# Patient Record
Sex: FEMALE | Race: WHITE | Employment: OTHER | URBAN - METROPOLITAN AREA
[De-identification: names, ages, dates, MRNs, and addresses within clinical notes are randomized per-mention and may not be internally consistent; named-entity substitution may affect disease eponyms.]

---

## 2016-04-08 LAB — COLONOSCOPY, EXTERNAL: NORMAL

## 2017-01-17 ENCOUNTER — OFFICE VISIT (OUTPATIENT)
Dept: FAMILY MEDICINE CLINIC | Age: 62
End: 2017-01-17

## 2017-01-17 VITALS
DIASTOLIC BLOOD PRESSURE: 90 MMHG | HEART RATE: 76 BPM | WEIGHT: 267.6 LBS | TEMPERATURE: 98.7 F | BODY MASS INDEX: 42 KG/M2 | SYSTOLIC BLOOD PRESSURE: 136 MMHG | OXYGEN SATURATION: 99 % | RESPIRATION RATE: 18 BRPM | HEIGHT: 67 IN

## 2017-01-17 DIAGNOSIS — I10 BENIGN ESSENTIAL HYPERTENSION: Primary | ICD-10-CM

## 2017-01-17 NOTE — PROGRESS NOTES
HISTORY OF PRESENT ILLNESS  Navya Harrison is a 64 y.o. female. HPI  3 month follow up BP check and medication evaluation. At her last visit in 8-2016, changed her Lisinopril to Cozaar due to cough. Shortly after the change the cough went completely away. No problems since then. She is tolerating current medication regimen well but admits she has not been eating very healthy. No interim BP checks since making the change either. Review of Systems   Constitutional: Negative. Respiratory: Negative. Cardiovascular: Negative. Gastrointestinal: Negative. Neurological: Negative. Problem List  Date Reviewed: 1/17/2017          Codes Class Noted    Benign essential hypertension ICD-10-CM: I10  ICD-9-CM: 401.1  5/26/2016    Overview Signed 5/26/2016  9:26 AM by Myrna Maddox MD     2006             Hypothyroidism ICD-10-CM: E03.9  ICD-9-CM: 244.9  5/26/2016    Overview Signed 5/26/2016  9:27 AM by Myrna Maddox MD     7/1999             Primary osteoarthritis of left knee ICD-10-CM: E08.02  ICD-9-CM: 715.16  5/26/2016    Overview Signed 5/26/2016  9:42 AM by Myrna Maddox MD     Extensive;  Ortho (needs knee replacement), CSI x 2,              Hypercholesterolemia ICD-10-CM: E78.00  ICD-9-CM: 272.0  6/25/2013    Overview Signed 6/25/2013  8:29 AM by Myrna Maddox MD     ~2006             S/P benign cervical polypectomy ICD-10-CM: S72.733, Z87.42  ICD-9-CM: V45.89  12/20/2010    Overview Addendum 5/26/2016  9:42 AM by Myrna Maddox MD     2009, 2015; Gyn Dr Derrick Daly             H/O Benign Colon Polyp ICD-10-CM: K63.5  ICD-9-CM: 211.3  12/20/2010    Overview Signed 12/20/2010 12:10 PM by Myrna Maddox MD     Colonoscopy 10/2005, 11/2010  q5yrs  Dr Monet German             H/O Uterine Fibroids ICD-10-CM: D25.9  ICD-9-CM: 218.9  12/20/2010    Overview Signed 12/20/2010 12:13 PM by Myrna Maddox MD     2005; no surgical intervention  Gyn Dr Geraldine Guzman incontinence ICD-10-CM: N39.3  ICD-9-CM: [id number]  5/26/2010    Overview Signed 5/26/2010 12:47 PM by Ruthann Thompson MD     Urologist Dr Rapp [facility]             Sleep apnea, RENÉE ICD-10-CM: G47.30  ICD-9-CM: 780.57  4/5/2010    Overview Addendum 12/20/2010 12:14 PM by Ruthann Thompson MD     2009; cpap             Anxiety ICD-10-CM: F41.9  ICD-9-CM: 300.00  4/5/2010        Carpal tunnel syndrome ICD-10-CM: G56.00  ICD-9-CM: 354.0  4/5/2010        Mild Dependent Ankle edema, B ICD-10-CM: M25.473  ICD-9-CM: 782.3  4/5/2010    Overview Signed 4/5/2010  3:52 PM by Lewis Malhotra     mild             Perimenopausal ICD-10-CM: N95.1  ICD-9-CM: 627.2  4/5/2010    Overview Signed 4/5/2010  3:52 PM by Lewis Malhotra     2007             Depression ICD-10-CM: F32.9  ICD-9-CM: 315  4/5/2010        ?  Passed Kidney stone ICD-10-CM: N20.0  ICD-9-CM: 592.0  4/5/2010    Overview Signed 5/26/2010 12:47 PM by Ruthann Thompson MD     2/2010                 Past Surgical History   Procedure Laterality Date    Hx carpal tunnel release  3/2010     Right; Dr Nat Morton Colonoscopy  10/05     benign polyp; repeat 5 yr per Dr Maile Thomas    Hx tonsil and adenoidectomy  age 11    Hx wisdom teeth extraction      Hx carpal tunnel release  3/2010     right hand carpal tunnel    Hx dilation and curettage  7/2005     AUB, benign/neg bx; Dr Dawood Groves    Colonoscopy  11/2010     normal, f/u 5 yrs, Dr Joanie Walden  5/2009     NSR, rate 84, normal EKG    Hx bladder suspension  ~2010     Dr Bianca Ambrocio    Hx cervical polypectomy  2009, 2015     Dr Dawood Groves    Hx dilation and curettage  10/2012     Dr Dawood Groves, [facility]    Hx breast biopsy  11/2011     VPI, right breast biopsy negative    Hx partial hysterectomy  4/26/13     Supracervical hysterectomy for fibroids, Dr Dawood Groves    Hx colonoscopy  4/2016     \"normal\", repeat 5 yrs[facility]     OB History  Para Term  AB TAB SAB Ectopic Multiple Living    1 1                Obstetric Comments     x 1; Gyn Dr Romie Clarke        Current Outpatient Prescriptions   Medication Sig    losartan (COZAAR) 50 mg tablet take 1 tablet by mouth once daily for hypertension    meloxicam (MOBIC) 15 mg tablet Take 1 Tab by mouth daily (with breakfast). Indications: OSTEOARTHRITIS    buPROPion XL (WELLBUTRIN XL) 300 mg XL tablet Take 1 Tab by mouth daily.  levothyroxine (SYNTHROID) 150 mcg tablet Take 1 Tab by mouth Daily (before breakfast).  cholecalciferol (VITAMIN D3) 1,000 unit cap Take  by mouth daily.  multivitamin (ONE A DAY) tablet Take 1 Tab by mouth daily.  aspirin 81 mg tablet Take  by mouth.  OMEGA-3 FATTY ACIDS/VITAMIN E (OMEGA-3 FISH OIL PO) Take 1,200 mg by mouth daily.  Flaxseed Oil Oil Take 1,300 mg by mouth daily. No current facility-administered medications for this visit.       Allergies   Allergen Reactions    Lisinopril Cough    Pcn [Penicillins] Hives    Sulfa (Sulfonamide Antibiotics) Hives    Zocor [Simvastatin] Other (comments)     aches     Social History     Social History    Marital status:      Spouse name: N/A    Number of children: 1    Years of education: N/A     Occupational History   [employer]      Social History Main Topics    Smoking status: Former Smoker     Quit date: 2005    Smokeless tobacco: Never Used    Alcohol use Yes      Comment: maybe 2-3 drinks a week at [employer] Drug use: No    Sexual activity: Yes     Partners: Male     Other Topics Concern    Caffeine Concern Yes     no coffee or tea; cut back to 1-2 cans of diet Coke a day    Special Diet No    Exercise No     Social History Narrative    1 biological daughter, 1 \"adopted\", and 2 step children     Visit Vitals    BP Initial nurse BP check 146/82, repeated at end of exam was 136/90    Pulse 76    Temp 98.7 °F (37.1 °C) (Oral)    Resp 18    Ht 5' 7\" (1.702 m)    Wt 267 lb 9.6 oz (121.4 kg)    LMP 05/01/2011    SpO2 99%    BMI 41.91 kg/m2     Physical Exam   Constitutional: No distress. Neck: Neck supple. No JVD present. Carotid bruit is not present. No thyromegaly present. Cardiovascular: Normal rate, regular rhythm and normal heart sounds. No murmur heard. Pulmonary/Chest: Effort normal and breath sounds normal.   Lymphadenopathy:     She has no cervical adenopathy. Vitals reviewed. ASSESSMENT and PLAN    ICD-10-CM ICD-9-CM    1. Benign essential hypertension I10 401.1      BP mildly elevated today on current regimen  Patient wishes to work on diet/exercise a bit more for now before adjusting her meds. At her last visit in 8-2016, changed her Lisinopril to Cozaar due to cough. Shortly after the change the cough went completely away.   Reviewed diet, nutrition, exercise and weight control  Reviewed medications and side effects  Fasting follow up 3months  Monitor weekly BP's in the interim and bring log to next appt  Follow up sooner if BP's are not improving/approaching goal

## 2017-01-17 NOTE — PATIENT INSTRUCTIONS
High Blood Pressure: Care Instructions  Your Care Instructions  If your blood pressure is usually above 140/90, you have high blood pressure, or hypertension. That means the top number is 140 or higher or the bottom number is 90 or higher, or both. Despite what a lot of people think, high blood pressure usually doesn't cause headaches or make you feel dizzy or lightheaded. It usually has no symptoms. But it does increase your risk for heart attack, stroke, and kidney or eye damage. The higher your blood pressure, the more your risk increases. Your doctor will give you a goal for your blood pressure. Your goal will be based on your health and your age. An example of a goal is to keep your blood pressure below 140/90. Lifestyle changes, such as eating healthy and being active, are always important to help lower blood pressure. You might also take medicine to reach your blood pressure goal.  Follow-up care is a key part of your treatment and safety. Be sure to make and go to all appointments, and call your doctor if you are having problems. It's also a good idea to know your test results and keep a list of the medicines you take. How can you care for yourself at home? Medical treatment  · If you stop taking your medicine, your blood pressure will go back up. You may take one or more types of medicine to lower your blood pressure. Be safe with medicines. Take your medicine exactly as prescribed. Call your doctor if you think you are having a problem with your medicine. · Talk to your doctor before you start taking aspirin every day. Aspirin can help certain people lower their risk of a heart attack or stroke. But taking aspirin isn't right for everyone, because it can cause serious bleeding. · See your doctor regularly. You may need to see the doctor more often at first or until your blood pressure comes down.   · If you are taking blood pressure medicine, talk to your doctor before you take decongestants or anti-inflammatory medicine, such as ibuprofen. Some of these medicines can raise blood pressure. · Learn how to check your blood pressure at home. Lifestyle changes  · Stay at a healthy weight. This is especially important if you put on weight around the waist. Losing even 10 pounds can help you lower your blood pressure. · If your doctor recommends it, get more exercise. Walking is a good choice. Bit by bit, increase the amount you walk every day. Try for at least 30 minutes on most days of the week. You also may want to swim, bike, or do other activities. · Avoid or limit alcohol. Talk to your doctor about whether you can drink any alcohol. · Try to limit how much sodium you eat to less than 2,300 milligrams (mg) a day. Your doctor may ask you to try to eat less than 1,500 mg a day. · Eat plenty of fruits (such as bananas and oranges), vegetables, legumes, whole grains, and low-fat dairy products. · Lower the amount of saturated fat in your diet. Saturated fat is found in animal products such as milk, cheese, and meat. Limiting these foods may help you lose weight and also lower your risk for heart disease. · Do not smoke. Smoking increases your risk for heart attack and stroke. If you need help quitting, talk to your doctor about stop-smoking programs and medicines. These can increase your chances of quitting for good. When should you call for help? Call 911 anytime you think you may need emergency care. This may mean having symptoms that suggest that your blood pressure is causing a serious heart or blood vessel problem. Your blood pressure may be over 180/110. For example, call 911 if:  · You have symptoms of a heart attack. These may include:  ¨ Chest pain or pressure, or a strange feeling in the chest.  ¨ Sweating. ¨ Shortness of breath. ¨ Nausea or vomiting. ¨ Pain, pressure, or a strange feeling in the back, neck, jaw, or upper belly or in one or both shoulders or arms.   ¨ Lightheadedness or sudden weakness. ¨ A fast or irregular heartbeat. · You have symptoms of a stroke. These may include:  ¨ Sudden numbness, tingling, weakness, or loss of movement in your face, arm, or leg, especially on only one side of your body. ¨ Sudden vision changes. ¨ Sudden trouble speaking. ¨ Sudden confusion or trouble understanding simple statements. ¨ Sudden problems with walking or balance. ¨ A sudden, severe headache that is different from past headaches. · You have severe back or belly pain. Do not wait until your blood pressure comes down on its own. Get help right away. Call your doctor now or seek immediate care if:  · Your blood pressure is much higher than normal (such as 180/110 or higher), but you don't have symptoms. · You think high blood pressure is causing symptoms, such as:  ¨ Severe headache. ¨ Blurry vision. Watch closely for changes in your health, and be sure to contact your doctor if:  · Your blood pressure measures 140/90 or higher at least 2 times. That means the top number is 140 or higher or the bottom number is 90 or higher, or both. · You think you may be having side effects from your blood pressure medicine. · Your blood pressure is usually normal, but it goes above normal at least 2 times. Where can you learn more? Go to http://radha-za.info/. Enter C928 in the search box to learn more about \"High Blood Pressure: Care Instructions. \"  Current as of: August 8, 2016  Content Version: 11.1  © 1911-4023 [employer]. Care instructions adapted under license by [employer] (which disclaims liability or warranty for this information). If you have questions about a medical condition or this instruction, always ask your healthcare professional. [place] [address] any warranty or liability for your use of this information.        DASH Diet: Care Instructions  Your Care Instructions  The DASH diet is an eating plan that can help lower your blood pressure. DASH stands for Dietary Approaches to Stop Hypertension. Hypertension is high blood pressure. The DASH diet focuses on eating foods that are high in calcium, potassium, and magnesium. These nutrients can lower blood pressure. The foods that are highest in these nutrients are fruits, vegetables, low-fat dairy products, nuts, seeds, and legumes. But taking calcium, potassium, and magnesium supplements instead of eating foods that are high in those nutrients does not have the same effect. The DASH diet also includes whole grains, fish, and poultry. The DASH diet is one of several lifestyle changes your doctor may recommend to lower your high blood pressure. Your doctor may also want you to decrease the amount of sodium in your diet. Lowering sodium while following the DASH diet can lower blood pressure even further than just the DASH diet alone. Follow-up care is a key part of your treatment and safety. Be sure to make and go to all appointments, and call your doctor if you are having problems. It's also a good idea to know your test results and keep a list of the medicines you take. How can you care for yourself at home? Following the DASH diet  · Eat 4 to 5 servings of fruit each day. A serving is 1 medium-sized piece of fruit, ½ cup chopped or canned fruit, 1/4 cup dried fruit, or 4 ounces (½ cup) of fruit juice. Choose fruit more often than fruit juice. · Eat 4 to 5 servings of vegetables each day. A serving is 1 cup of lettuce or raw leafy vegetables, ½ cup of chopped or cooked vegetables, or 4 ounces (½ cup) of vegetable juice. Choose vegetables more often than vegetable juice. · Get 2 to 3 servings of low-fat and fat-free dairy each day. A serving is 8 ounces of milk, 1 cup of yogurt, or 1 ½ ounces of cheese. · Eat 6 to 8 servings of grains each day.  A serving is 1 slice of bread, 1 ounce of dry cereal, or ½ cup of cooked rice, pasta, or cooked cereal. Try to choose whole-grain products as much as possible. · Limit lean meat, poultry, and fish to 2 servings each day. A serving is 3 ounces, about the size of a deck of cards. · Eat 4 to 5 servings of nuts, seeds, and legumes (cooked dried beans, lentils, and split peas) each week. A serving is 1/3 cup of nuts, 2 tablespoons of seeds, or ½ cup of cooked beans or peas. · Limit fats and oils to 2 to 3 servings each day. A serving is 1 teaspoon of vegetable oil or 2 tablespoons of salad dressing. · Limit sweets and added sugars to 5 servings or less a week. A serving is 1 tablespoon jelly or jam, ½ cup sorbet, or 1 cup of lemonade. · Eat less than 2,300 milligrams (mg) of sodium a day. If you limit your sodium to 1,500 mg a day, you can lower your blood pressure even more. Tips for success  · Start small. Do not try to make dramatic changes to your diet all at once. You might feel that you are missing out on your favorite foods and then be more likely to not follow the plan. Make small changes, and stick with them. Once those changes become habit, add a few more changes. · Try some of the following:  ¨ Make it a goal to eat a fruit or vegetable at every meal and at snacks. This will make it easy to get the recommended amount of fruits and vegetables each day. ¨ Try yogurt topped with fruit and nuts for a snack or healthy dessert. ¨ Add lettuce, tomato, cucumber, and onion to sandwiches. ¨ Combine a ready-made pizza crust with low-fat mozzarella cheese and lots of vegetable toppings. Try using tomatoes, squash, spinach, broccoli, carrots, cauliflower, and onions. ¨ Have a variety of cut-up vegetables with a low-fat dip as an appetizer instead of chips and dip. ¨ Sprinkle sunflower seeds or chopped almonds over salads. Or try adding chopped walnuts or almonds to cooked vegetables. ¨ Try some vegetarian meals using beans and peas. Add garbanzo or kidney beans to salads.  Make burritos and tacos with mashed mcfadden beans or black beans.  Where can you learn more? Go to http://radha-za.info/. Enter D287 in the search box to learn more about \"DASH Diet: Care Instructions. \"  Current as of: March 23, 2016  Content Version: 11.1  © 6958-6131 [employer]. Care instructions adapted under license by [employer] (which disclaims liability or warranty for this information). If you have questions about a medical condition or this instruction, always ask your healthcare professional. [address] any warranty or liability for your use of this information.

## 2017-01-17 NOTE — PROGRESS NOTES
Chief Complaint   Patient presents with    Hypertension     follow up-not fasting     1. Have you been to the ER, urgent care clinic since your last visit? Hospitalized since your last visit? No    2. Have you seen or consulted any other health care providers outside of the [address] [place] [address] since your last visit? Include any pap smears or colon screening.  No

## 2017-03-28 ENCOUNTER — OFFICE VISIT (OUTPATIENT)
Dept: FAMILY MEDICINE CLINIC | Age: 62
End: 2017-03-28

## 2017-03-28 VITALS
OXYGEN SATURATION: 99 % | BODY MASS INDEX: 42.63 KG/M2 | HEART RATE: 71 BPM | RESPIRATION RATE: 18 BRPM | WEIGHT: 271.6 LBS | DIASTOLIC BLOOD PRESSURE: 96 MMHG | HEIGHT: 67 IN | SYSTOLIC BLOOD PRESSURE: 150 MMHG | TEMPERATURE: 98.4 F

## 2017-03-28 DIAGNOSIS — E78.00 HYPERCHOLESTEROLEMIA: ICD-10-CM

## 2017-03-28 DIAGNOSIS — I10 BENIGN ESSENTIAL HYPERTENSION: Primary | ICD-10-CM

## 2017-03-28 RX ORDER — LOSARTAN POTASSIUM 100 MG/1
100 TABLET ORAL DAILY
Qty: 30 TAB | Refills: 5 | Status: SHIPPED | OUTPATIENT
Start: 2017-03-28 | End: 2017-08-27 | Stop reason: SDUPTHER

## 2017-03-28 RX ORDER — ASCORBIC ACID 500 MG
TABLET ORAL
COMMUNITY
End: 2018-02-20

## 2017-03-28 NOTE — PROGRESS NOTES
HISTORY OF PRESENT ILLNESS  Chrissie Solis is a 58 y.o. female. HPI  Fasting follow up hypertension, hyperlipidemia, labs and medication check. Admits not doing anything special w/ diet or exercise. Says, \"I'm pretty sure my labs are going to be horrible\". She also has not been checking her BP's but it has been running high this visit and the last.  Her BP was uncontrolled on Maxzide previously and her TG's were going up. She was changed to Lisinopril which really helped her BP but she developed a cough. She has since been on Cozaar which she is tolerating fine, but BP's not as well controlled. Under some stress recently and admits she has not been taking care of herself. Review of Systems   Constitutional: Negative. Eyes: Negative. Respiratory: Negative. Cardiovascular: Negative. Gastrointestinal: Negative. Neurological: Negative. Negative for headaches. Psychiatric/Behavioral:        Doing ok on Wellbutrin     Problem List  Date Reviewed: 3/28/2017          Codes Class Noted    Benign essential hypertension ICD-10-CM: I10  ICD-9-CM: 401.1  5/26/2016    Overview Signed 5/26/2016  9:26 AM by Chavez Booth MD     2006             Hypothyroidism ICD-10-CM: E03.9  ICD-9-CM: 244.9  5/26/2016    Overview Signed 5/26/2016  9:27 AM by Chavez Booth MD     7/1999             Primary osteoarthritis of left knee ICD-10-CM: G85.67  ICD-9-CM: 715.16  5/26/2016    Overview Signed 5/26/2016  9:42 AM by Chavez Booth MD     Extensive;  Ortho (needs knee replacement), CSI x 2,              Hypercholesterolemia ICD-10-CM: E78.00  ICD-9-CM: 272.0  6/25/2013    Overview Signed 6/25/2013  8:29 AM by Chavez Booth MD     ~2006             S/P benign cervical polypectomy ICD-10-CM: U74.275, Z87.42  ICD-9-CM: V45.89  12/20/2010    Overview Addendum 5/26/2016  9:42 AM by Chavez Booth MD     2009, 2015; Gyn Dr Teddy Bah             H/O Benign Colon Polyp ICD-10-CM: K63.5  ICD-9-CM: 211.3  12/20/2010    Overview Signed 12/20/2010 12:10 PM by Paul Draper MD     Colonoscopy 10/2005, 11/2010  q5yrs  Dr Ferro Grew             H/O Uterine Fibroids ICD-10-CM: D25.9  ICD-9-CM: 218.9  12/20/2010    Overview Signed 12/20/2010 12:13 PM by Paul Draper MD     2005; no surgical intervention  Gyn Dr Elizabeth Hosteller incontinence ICD-10-CM: N39.3  ICD-9-CM: IOS4595  5/26/2010    Overview Signed 5/26/2010 12:47 PM by Paul Draper MD     Urologist Dr Mary Garsia             Sleep apnea, RENÉE ICD-10-CM: Z50.45  ICD-9-CM: 780.57  4/5/2010    Overview Addendum 12/20/2010 12:14 PM by Paul Draper MD     2009; cpap             Anxiety ICD-10-CM: F41.9  ICD-9-CM: 300.00  4/5/2010        Carpal tunnel syndrome ICD-10-CM: G56.00  ICD-9-CM: 354.0  4/5/2010        Mild Dependent Ankle edema, B ICD-10-CM: M25.473  ICD-9-CM: 782.3  4/5/2010    Overview Signed 4/5/2010  3:52 PM by Nicole Doherty     mild             Perimenopausal ICD-10-CM: N95.1  ICD-9-CM: 627.2  4/5/2010    Overview Signed 4/5/2010  3:52 PM by Nicole Doherty     2007             Depression ICD-10-CM: F32.9  ICD-9-CM: 632  4/5/2010        ?  Passed Kidney stone ICD-10-CM: N20.0  ICD-9-CM: 592.0  4/5/2010    Overview Signed 5/26/2010 12:47 PM by Paul Draper MD     2/2010                 Past Surgical History:   Procedure Laterality Date    COLONOSCOPY  10/05    benign polyp; repeat 5 yr per Dr Narendra Lockett COLONOSCOPY  11/2010    normal, f/u 5 yrs, Dr Narendra Lockett EKG ORDERABLE  5/2009    NSR, rate 84, normal EKG    HX BLADDER SUSPENSION  ~2010    Dr [facility]  11/2011    VPI, right breast biopsy negative    HX CARPAL TUNNEL RELEASE  3/2010    Right;  Dearl Members  3/2010    right hand carpal tunnel    HX CERVICAL POLYPECTOMY  2009, 2015    Dr Marquis Buys    HX COLONOSCOPY  4/2016    \"normal\", repeat [facility] yrs[facility]    HX DILATION AND CURETTAGE  2005    AUB, benign/neg bx; Dr Franky Guillaume  10/2012    Dr Maikel Barba, [facility]    HX PARTIAL HYSTERECTOMY  13    Supracervical hysterectomy for fibroids, Dr Alexa Michaud  age 11    HX WISDOM TEETH EXTRACTION       OB History      Para Term  AB TAB SAB Ectopic Multiple Living    1 1                Obstetric Comments     x 1; Gyn Dr Maikel Barba          Current Outpatient Prescriptions   Medication Sig    ascorbic acid, vitamin C, (VITAMIN C) 500 mg tablet Take  by mouth.  meloxicam (MOBIC) 15 mg tablet take 1 tablet by mouth daily with BREAKFAST    losartan (COZAAR) 50 mg tablet take 1 tablet by mouth once daily for hypertension    buPROPion XL (WELLBUTRIN XL) 300 mg XL tablet Take 1 Tab by mouth daily.  levothyroxine (SYNTHROID) 150 mcg tablet Take 1 Tab by mouth Daily (before breakfast).  cholecalciferol (VITAMIN D3) 1,000 unit cap Take  by mouth daily.  multivitamin (ONE A DAY) tablet Take 1 Tab by mouth daily.  aspirin 81 mg tablet Take  by mouth.  OMEGA-3 FATTY ACIDS/VITAMIN E (OMEGA-3 FISH OIL PO) Take 1,200 mg by mouth daily.  Flaxseed Oil Oil Take 1,300 mg by mouth daily. No current facility-administered medications for this visit.       Allergies   Allergen Reactions    Lisinopril Cough    Pcn [Penicillins] Hives    Sulfa (Sulfonamide Antibiotics) Hives    Zocor [Simvastatin] Other (comments)     aches     Social History     Social History    Marital status:      Spouse name: N/A    Number of children: 1    Years of education: N/A     Occupational History   [address] [place] [address]      Social History Main Topics    Smoking status: Former Smoker     Quit date: 2005    Smokeless tobacco: Never Used    Alcohol use Yes      Comment: maybe 2-3 drinks a week at [employer] Drug use: No    Sexual activity: Yes     Partners: Male     Other Topics Concern    Caffeine Concern Yes     no coffee or tea; cut back to 1-2 cans of diet Coke a day    Special Diet No    Exercise No     Social History Narrative    1 biological daughter, 1 \"adopted\", and 2 step children     Visit Vitals    BP (!) 150/96 (BP 1 Location: Left arm, BP Patient Position: Sitting)    Pulse 71    Temp 98.4 °F (36.9 °C) (Oral)    Resp 18    Ht 5' 7\" (1.702 m)    Wt 271 lb 9.6 oz (123.2 kg)    LMP 05/01/2011    SpO2 99%    BMI 42.54 kg/m2     Physical Exam   Constitutional: No distress. Neck: Carotid bruit is not present. Cardiovascular: Normal rate, regular rhythm and normal heart sounds. No murmur heard. Pulmonary/Chest: Effort normal and breath sounds normal.   Vitals reviewed. ASSESSMENT and PLAN    ICD-10-CM ICD-9-CM    1. Benign essential hypertension I10 401.1 losartan (COZAAR) 100 mg tablet      METABOLIC PANEL, COMPREHENSIVE   2. Hypercholesterolemia E78.00 272.0 LIPID PANEL     Fasting labs today    Reviewed diet, nutrition, exercise and weight control; not complying lately    Cardiovascular risk and specific lipid/LDL/BP goals reviewed  Reviewed medications and side effects   Her BP was uncontrolled on Maxzide previously and her TG's were going up. She was changed to Lisinopril which really helped her BP but she developed a cough. She has since been on Cozaar which she is tolerating fine, but BP's not as well controlled. Increase Cozaar from 50 to 10 mg daily. Dash diet. Work on exercise at least 3 x a week for now. Work on wt reduction. Interim BP monitoring.   Further follow up & other recommendations pending review of labs as well, but will plan on follow up check in ~ 3months anyway to reassess BP and medication evaluation

## 2017-03-28 NOTE — PATIENT INSTRUCTIONS
High Blood Pressure: Care Instructions  Your Care Instructions  If your blood pressure is usually above 140/90, you have high blood pressure, or hypertension. That means the top number is 140 or higher or the bottom number is 90 or higher, or both. Despite what a lot of people think, high blood pressure usually doesn't cause headaches or make you feel dizzy or lightheaded. It usually has no symptoms. But it does increase your risk for heart attack, stroke, and kidney or eye damage. The higher your blood pressure, the more your risk increases. Your doctor will give you a goal for your blood pressure. Your goal will be based on your health and your age. An example of a goal is to keep your blood pressure below 140/90. Lifestyle changes, such as eating healthy and being active, are always important to help lower blood pressure. You might also take medicine to reach your blood pressure goal.  Follow-up care is a key part of your treatment and safety. Be sure to make and go to all appointments, and call your doctor if you are having problems. It's also a good idea to know your test results and keep a list of the medicines you take. How can you care for yourself at home? Medical treatment  · If you stop taking your medicine, your blood pressure will go back up. You may take one or more types of medicine to lower your blood pressure. Be safe with medicines. Take your medicine exactly as prescribed. Call your doctor if you think you are having a problem with your medicine. · Talk to your doctor before you start taking aspirin every day. Aspirin can help certain people lower their risk of a heart attack or stroke. But taking aspirin isn't right for everyone, because it can cause serious bleeding. · See your doctor regularly. You may need to see the doctor more often at first or until your blood pressure comes down.   · If you are taking blood pressure medicine, talk to your doctor before you take decongestants or anti-inflammatory medicine, such as ibuprofen. Some of these medicines can raise blood pressure. · Learn how to check your blood pressure at home. Lifestyle changes  · Stay at a healthy weight. This is especially important if you put on weight around the waist. Losing even 10 pounds can help you lower your blood pressure. · If your doctor recommends it, get more exercise. Walking is a good choice. Bit by bit, increase the amount you walk every day. Try for at least 30 minutes on most days of the week. You also may want to swim, bike, or do other activities. · Avoid or limit alcohol. Talk to your doctor about whether you can drink any alcohol. · Try to limit how much sodium you eat to less than 2,300 milligrams (mg) a day. Your doctor may ask you to try to eat less than 1,500 mg a day. · Eat plenty of fruits (such as bananas and oranges), vegetables, legumes, whole grains, and low-fat dairy products. · Lower the amount of saturated fat in your diet. Saturated fat is found in animal products such as milk, cheese, and meat. Limiting these foods may help you lose weight and also lower your risk for heart disease. · Do not smoke. Smoking increases your risk for heart attack and stroke. If you need help quitting, talk to your doctor about stop-smoking programs and medicines. These can increase your chances of quitting for good. When should you call for help? Call 911 anytime you think you may need emergency care. This may mean having symptoms that suggest that your blood pressure is causing a serious heart or blood vessel problem. Your blood pressure may be over 180/110. For example, call 911 if:  · You have symptoms of a heart attack. These may include:  ¨ Chest pain or pressure, or a strange feeling in the chest.  ¨ Sweating. ¨ Shortness of breath. ¨ Nausea or vomiting. ¨ Pain, pressure, or a strange feeling in the back, neck, jaw, or upper belly or in one or both shoulders or arms.   ¨ Lightheadedness or sudden weakness. ¨ A fast or irregular heartbeat. · You have symptoms of a stroke. These may include:  ¨ Sudden numbness, tingling, weakness, or loss of movement in your face, arm, or leg, especially on only one side of your body. ¨ Sudden vision changes. ¨ Sudden trouble speaking. ¨ Sudden confusion or trouble understanding simple statements. ¨ Sudden problems with walking or balance. ¨ A sudden, severe headache that is different from past headaches. · You have severe back or belly pain. Do not wait until your blood pressure comes down on its own. Get help right away. Call your doctor now or seek immediate care if:  · Your blood pressure is much higher than normal (such as 180/110 or higher), but you don't have symptoms. · You think high blood pressure is causing symptoms, such as:  ¨ Severe headache. ¨ Blurry vision. Watch closely for changes in your health, and be sure to contact your doctor if:  · Your blood pressure measures 140/90 or higher at least 2 times. That means the top number is 140 or higher or the bottom number is 90 or higher, or both. · You think you may be having side effects from your blood pressure medicine. · Your blood pressure is usually normal, but it goes above normal at least 2 times. Where can you learn more? Go to http://radha-za.info/. Enter S791 in the search box to learn more about \"High Blood Pressure: Care Instructions. \"  Current as of: August 8, 2016  Content Version: 11.1  © 3381-3565 [employer]. Care instructions adapted under license by [employer] (which disclaims liability or warranty for this information). If you have questions about a medical condition or this instruction, always ask your healthcare professional. [place] [address] any warranty or liability for your use of this information.        DASH Diet: Care Instructions  Your Care Instructions  The DASH diet is an eating plan that can help lower your blood pressure. DASH stands for Dietary Approaches to Stop Hypertension. Hypertension is high blood pressure. The DASH diet focuses on eating foods that are high in calcium, potassium, and magnesium. These nutrients can lower blood pressure. The foods that are highest in these nutrients are fruits, vegetables, low-fat dairy products, nuts, seeds, and legumes. But taking calcium, potassium, and magnesium supplements instead of eating foods that are high in those nutrients does not have the same effect. The DASH diet also includes whole grains, fish, and poultry. The DASH diet is one of several lifestyle changes your doctor may recommend to lower your high blood pressure. Your doctor may also want you to decrease the amount of sodium in your diet. Lowering sodium while following the DASH diet can lower blood pressure even further than just the DASH diet alone. Follow-up care is a key part of your treatment and safety. Be sure to make and go to all appointments, and call your doctor if you are having problems. It's also a good idea to know your test results and keep a list of the medicines you take. How can you care for yourself at home? Following the DASH diet  · Eat 4 to 5 servings of fruit each day. A serving is 1 medium-sized piece of fruit, ½ cup chopped or canned fruit, 1/4 cup dried fruit, or 4 ounces (½ cup) of fruit juice. Choose fruit more often than fruit juice. · Eat 4 to 5 servings of vegetables each day. A serving is 1 cup of lettuce or raw leafy vegetables, ½ cup of chopped or cooked vegetables, or 4 ounces (½ cup) of vegetable juice. Choose vegetables more often than vegetable juice. · Get 2 to 3 servings of low-fat and fat-free dairy each day. A serving is 8 ounces of milk, 1 cup of yogurt, or 1 ½ ounces of cheese. · Eat 6 to 8 servings of grains each day.  A serving is 1 slice of bread, 1 ounce of dry cereal, or ½ cup of cooked rice, pasta, or cooked cereal. Try to choose whole-grain products as much as possible. · Limit lean meat, poultry, and fish to 2 servings each day. A serving is 3 ounces, about the size of a deck of cards. · Eat 4 to 5 servings of nuts, seeds, and legumes (cooked dried beans, lentils, and split peas) each week. A serving is 1/3 cup of nuts, 2 tablespoons of seeds, or ½ cup of cooked beans or peas. · Limit fats and oils to 2 to 3 servings each day. A serving is 1 teaspoon of vegetable oil or 2 tablespoons of salad dressing. · Limit sweets and added sugars to 5 servings or less a week. A serving is 1 tablespoon jelly or jam, ½ cup sorbet, or 1 cup of lemonade. · Eat less than 2,300 milligrams (mg) of sodium a day. If you limit your sodium to 1,500 mg a day, you can lower your blood pressure even more. Tips for success  · Start small. Do not try to make dramatic changes to your diet all at once. You might feel that you are missing out on your favorite foods and then be more likely to not follow the plan. Make small changes, and stick with them. Once those changes become habit, add a few more changes. · Try some of the following:  ¨ Make it a goal to eat a fruit or vegetable at every meal and at snacks. This will make it easy to get the recommended amount of fruits and vegetables each day. ¨ Try yogurt topped with fruit and nuts for a snack or healthy dessert. ¨ Add lettuce, tomato, cucumber, and onion to sandwiches. ¨ Combine a ready-made pizza crust with low-fat mozzarella cheese and lots of vegetable toppings. Try using tomatoes, squash, spinach, broccoli, carrots, cauliflower, and onions. ¨ Have a variety of cut-up vegetables with a low-fat dip as an appetizer instead of chips and dip. ¨ Sprinkle sunflower seeds or chopped almonds over salads. Or try adding chopped walnuts or almonds to cooked vegetables. ¨ Try some vegetarian meals using beans and peas. Add garbanzo or kidney beans to salads.  Make burritos and tacos with mashed mcfadden beans or black beans.  Where can you learn more? Go to http://radha-za.info/. Enter F054 in the search box to learn more about \"DASH Diet: Care Instructions. \"  Current as of: March 23, 2016  Content Version: 11.1  © 1283-2848 [employer]. Care instructions adapted under license by [employer] (which disclaims liability or warranty for this information). If you have questions about a medical condition or this instruction, always ask your healthcare professional. [address] any warranty or liability for your use of this information.

## 2017-03-28 NOTE — MR AVS SNAPSHOT
Visit Information Date & Time Provider Department Dept. Phone Encounter #  
 3/28/2017  8:15 AM [address],  [facility] [phone] [id number] Upcoming Health Maintenance Date Due COLONOSCOPY 1/17/2018 BREAST CANCER SCRN MAMMOGRAM 4/26/2018 PAP AKA CERVICAL CYTOLOGY 5/26/2019 DTaP/Tdap/Td series (2 - Td) 5/26/2026 Allergies as of 3/28/2017  Review Complete On: 3/28/2017 By: [address], [place]  
  
 Severity Noted Reaction Type Reactions Lisinopril  08/17/2016    Cough Pcn [Penicillins]  04/05/2010    Hives Sulfa (Sulfonamide Antibiotics)  04/05/2010    Hives Zocor [Simvastatin]  04/05/2010    Other (comments)  
 aches Current Immunizations  Reviewed on 5/26/2016 Name Date Influenza Vaccine 8/7/2016, 11/10/2015, 10/3/2014, 9/27/2013 Influenza Vaccine Split 10/6/2011, 12/20/2010 Meningococcal Vaccine 1/27/2013 TD Vaccine 5/27/2009, 6/16/1999 Tdap 5/26/2016 Zoster Vaccine, Live 1/27/2013 Not reviewed this visit You Were Diagnosed With   
  
 Codes Comments Hypercholesterolemia    -  Primary ICD-10-CM: E78.00 ICD-9-CM: 272.0 Benign essential hypertension     ICD-10-CM: I10 
ICD-9-CM: 401.1 Vitals BP Pulse Temp Resp Height(growth percentile) Weight(growth percentile) (!) 150/96 (BP 1 Location: Left arm, BP Patient Position: Sitting) 71 98.4 °F (36.9 °C) (Oral) 18 5' 7\" (1.702 m) 271 lb 9.6 oz (123.2 kg) LMP SpO2 BMI OB Status Smoking Status 05/01/2011 99% 42.54 kg/m2 Hysterectomy Former Smoker BMI and BSA Data Body Mass Index Body Surface Area 42.54 kg/m 2 2.41 m 2 Preferred Pharmacy Pharmacy Name Phone [facility], [address] [phone] Your Updated Medication List  
  
   
This list is accurate as of: 3/28/17  8:48 AM.  Always use your most recent med list.  
  
  
  
  
 aspirin 81 mg tablet Take  by mouth. buPROPion  mg XL tablet Commonly known as:  Nickmonserrat Andrew Take 1 Tab by mouth daily. cholecalciferol 1,000 unit Cap Commonly known as:  VITAMIN D3 Take  by mouth daily. Flaxseed Oil Oil Take 1,300 mg by mouth daily. levothyroxine 150 mcg tablet Commonly known as:  SYNTHROID Take 1 Tab by mouth Daily (before breakfast). losartan 100 mg tablet Commonly known as:  COZAAR Take 1 Tab by mouth daily. meloxicam 15 mg tablet Commonly known as:  MOBIC  
take 1 tablet by mouth daily with BREAKFAST  
  
 multivitamin tablet Commonly known as:  ONE A DAY Take 1 Tab by mouth daily. OMEGA-3 FISH OIL PO Take 1,200 mg by mouth daily. VITAMIN C 500 mg tablet Generic drug:  ascorbic acid (vitamin C) Take  by mouth. Prescriptions Sent to Pharmacy Refills  
 losartan (COZAAR) 100 mg tablet 5 Sig: Take 1 Tab by mouth daily. Class: Normal  
 Pharmacy: [id number] [place] [address],[address] Ph #: [phone] Route: Oral  
  
Patient Instructions High Blood Pressure: Care Instructions Your Care Instructions If your blood pressure is usually above 140/90, you have high blood pressure, or hypertension. That means the top number is 140 or higher or the bottom number is 90 or higher, or both. Despite what a lot of people think, high blood pressure usually doesn't cause headaches or make you feel dizzy or lightheaded. It usually has no symptoms. But it does increase your risk for heart attack, stroke, and kidney or eye damage. The higher your blood pressure, the more your risk increases. Your doctor will give you a goal for your blood pressure. Your goal will be based on your health and your age. An example of a goal is to keep your blood pressure below 140/90.  
Lifestyle changes, such as eating healthy and being active, are always important to help lower blood pressure. You might also take medicine to reach your blood pressure goal. 
Follow-up care is a key part of your treatment and safety. Be sure to make and go to all appointments, and call your doctor if you are having problems. It's also a good idea to know your test results and keep a list of the medicines you take. How can you care for yourself at home? Medical treatment · If you stop taking your medicine, your blood pressure will go back up. You may take one or more types of medicine to lower your blood pressure. Be safe with medicines. Take your medicine exactly as prescribed. Call your doctor if you think you are having a problem with your medicine. · Talk to your doctor before you start taking aspirin every day. Aspirin can help certain people lower their risk of a heart attack or stroke. But taking aspirin isn't right for everyone, because it can cause serious bleeding. · See your doctor regularly. You may need to see the doctor more often at first or until your blood pressure comes down. · If you are taking blood pressure medicine, talk to your doctor before you take decongestants or anti-inflammatory medicine, such as ibuprofen. Some of these medicines can raise blood pressure. · Learn how to check your blood pressure at home. Lifestyle changes · Stay at a healthy weight. This is especially important if you put on weight around the waist. Losing even 10 pounds can help you lower your blood pressure. · If your doctor recommends it, get more exercise. Walking is a good choice. Bit by bit, increase the amount you walk every day. Try for at least 30 minutes on most days of the week. You also may want to swim, bike, or do other activities. · Avoid or limit alcohol. Talk to your doctor about whether you can drink any alcohol. · Try to limit how much sodium you eat to less than 2,300 milligrams (mg) a day. Your doctor may ask you to try to eat less than 1,500 mg a day. · Eat plenty of fruits (such as bananas and oranges), vegetables, legumes, whole grains, and low-fat dairy products. · Lower the amount of saturated fat in your diet. Saturated fat is found in animal products such as milk, cheese, and meat. Limiting these foods may help you lose weight and also lower your risk for heart disease. · Do not smoke. Smoking increases your risk for heart attack and stroke. If you need help quitting, talk to your doctor about stop-smoking programs and medicines. These can increase your chances of quitting for good. When should you call for help? Call 911 anytime you think you may need emergency care. This may mean having symptoms that suggest that your blood pressure is causing a serious heart or blood vessel problem. Your blood pressure may be over 180/110. For example, call 911 if: 
· You have symptoms of a heart attack. These may include: ¨ Chest pain or pressure, or a strange feeling in the chest. 
¨ Sweating. ¨ Shortness of breath. ¨ Nausea or vomiting. ¨ Pain, pressure, or a strange feeling in the back, neck, jaw, or upper belly or in one or both shoulders or arms. ¨ Lightheadedness or sudden weakness. ¨ A fast or irregular heartbeat. · You have symptoms of a stroke. These may include: 
¨ Sudden numbness, tingling, weakness, or loss of movement in your face, arm, or leg, especially on only one side of your body. ¨ Sudden vision changes. ¨ Sudden trouble speaking. ¨ Sudden confusion or trouble understanding simple statements. ¨ Sudden problems with walking or balance. ¨ A sudden, severe headache that is different from past headaches. · You have severe back or belly pain. Do not wait until your blood pressure comes down on its own. Get help right away. Call your doctor now or seek immediate care if: 
· Your blood pressure is much higher than normal (such as 180/110 or higher), but you don't have symptoms. · You think high blood pressure is causing symptoms, such as: ¨ Severe headache. ¨ Blurry vision. Watch closely for changes in your health, and be sure to contact your doctor if: 
· Your blood pressure measures 140/90 or higher at least 2 times. That means the top number is 140 or higher or the bottom number is 90 or higher, or both. · You think you may be having side effects from your blood pressure medicine. · Your blood pressure is usually normal, but it goes above normal at least 2 times. Where can you learn more? Go to http://radha-za.info/. Enter S124 in the search box to learn more about \"High Blood Pressure: Care Instructions. \" Current as of: August 8, 2016 Content Version: 11.1 © 2464-0376 [employer]. Care instructions adapted under license by [employer] (which disclaims liability or warranty for this information). If you have questions about a medical condition or this instruction, always ask your healthcare professional. [place] [address] any warranty or liability for your use of this information. DASH Diet: Care Instructions Your Care Instructions The DASH diet is an eating plan that can help lower your blood pressure. DASH stands for Dietary Approaches to Stop Hypertension. Hypertension is high blood pressure. The DASH diet focuses on eating foods that are high in calcium, potassium, and magnesium. These nutrients can lower blood pressure. The foods that are highest in these nutrients are fruits, vegetables, low-fat dairy products, nuts, seeds, and legumes. But taking calcium, potassium, and magnesium supplements instead of eating foods that are high in those nutrients does not have the same effect. The DASH diet also includes whole grains, fish, and poultry. The DASH diet is one of several lifestyle changes your doctor may recommend to lower your high blood pressure.  Your doctor may also want you to decrease the amount of sodium in your diet. Lowering sodium while following the DASH diet can lower blood pressure even further than just the DASH diet alone. Follow-up care is a key part of your treatment and safety. Be sure to make and go to all appointments, and call your doctor if you are having problems. It's also a good idea to know your test results and keep a list of the medicines you take. How can you care for yourself at home? Following the DASH diet · Eat 4 to 5 servings of fruit each day. A serving is 1 medium-sized piece of fruit, ½ cup chopped or canned fruit, 1/4 cup dried fruit, or 4 ounces (½ cup) of fruit juice. Choose fruit more often than fruit juice. · Eat 4 to 5 servings of vegetables each day. A serving is 1 cup of lettuce or raw leafy vegetables, ½ cup of chopped or cooked vegetables, or 4 ounces (½ cup) of vegetable juice. Choose vegetables more often than vegetable juice. · Get 2 to 3 servings of low-fat and fat-free dairy each day. A serving is 8 ounces of milk, 1 cup of yogurt, or 1 ½ ounces of cheese. · Eat 6 to 8 servings of grains each day. A serving is 1 slice of bread, 1 ounce of dry cereal, or ½ cup of cooked rice, pasta, or cooked cereal. Try to choose whole-grain products as much as possible. · Limit lean meat, poultry, and fish to 2 servings each day. A serving is 3 ounces, about the size of a deck of cards. · Eat 4 to 5 servings of nuts, seeds, and legumes (cooked dried beans, lentils, and split peas) each week. A serving is 1/3 cup of nuts, 2 tablespoons of seeds, or ½ cup of cooked beans or peas. · Limit fats and oils to 2 to 3 servings each day. A serving is 1 teaspoon of vegetable oil or 2 tablespoons of salad dressing. · Limit sweets and added sugars to 5 servings or less a week. A serving is 1 tablespoon jelly or jam, ½ cup sorbet, or 1 cup of lemonade. · Eat less than 2,300 milligrams (mg) of sodium a day.  If you limit your sodium to 1,500 mg a day, you can lower your blood pressure even more. Tips for success · Start small. Do not try to make dramatic changes to your diet all at once. You might feel that you are missing out on your favorite foods and then be more likely to not follow the plan. Make small changes, and stick with them. Once those changes become habit, add a few more changes. · Try some of the following: ¨ Make it a goal to eat a fruit or vegetable at every meal and at snacks. This will make it easy to get the recommended amount of fruits and vegetables each day. ¨ Try yogurt topped with fruit and nuts for a snack or healthy dessert. ¨ Add lettuce, tomato, cucumber, and onion to sandwiches. ¨ Combine a ready-made pizza crust with low-fat mozzarella cheese and lots of vegetable toppings. Try using tomatoes, squash, spinach, broccoli, carrots, cauliflower, and onions. ¨ Have a variety of cut-up vegetables with a low-fat dip as an appetizer instead of chips and dip. ¨ Sprinkle sunflower seeds or chopped almonds over salads. Or try adding chopped walnuts or almonds to cooked vegetables. ¨ Try some vegetarian meals using beans and peas. Add garbanzo or kidney beans to salads. Make burritos and tacos with mashed mcfadden beans or black beans. Where can you learn more? Go to http://radha-za.info/. Enter Z421 in the search box to learn more about \"DASH Diet: Care Instructions. \" Current as of: March 23, 2016 Content Version: 11.1 © 4456-1370 [employer]. Care instructions adapted under license by [employer] (which disclaims liability or warranty for this information). If you have questions about a medical condition or this instruction, always ask your healthcare professional. [address]  any warranty or liability for your use of this information. Introducing [facility] & HEALTH SERVICES! Dear Fanny Marie: Thank you for requesting a Avesthagen account. Our records indicate that you already have an active Avesthagen account. You can access your account anytime at [email or web address] Did you know that you can access your hospital and ER discharge instructions at any time in Avesthagen? You can also review all of your test results from your hospital stay or ER visit. Additional Information If you have questions, please visit the Frequently Asked Questions section of the Avesthagen website at [email or web address] Remember, Avesthagen is NOT to be used for urgent needs. For medical emergencies, dial 911. Now available from your iPhone and Android! Please provide this summary of care documentation to your next provider. Your primary care clinician is listed as ESDRAS MUNOZ. If you have any questions after today's visit, please call [phone].

## 2017-03-28 NOTE — PROGRESS NOTES
Chief Complaint   Patient presents with    Hypertension     3 month fasting follow up    Cholesterol Problem     1. Have you been to the ER, urgent care clinic since your last visit? Hospitalized since your last visit? No    2. Have you seen or consulted any other health care providers outside of the [address] [place] [address] since your last visit? Include any pap smears or colon screening.  No

## 2017-03-29 LAB
ALBUMIN SERPL-MCNC: 4.1 G/DL (ref 3.6–4.8)
ALBUMIN/GLOB SERPL: 2 {RATIO} (ref 1.2–2.2)
ALP SERPL-CCNC: 60 IU/L (ref 39–117)
ALT SERPL-CCNC: 27 IU/L (ref 0–32)
AST SERPL-CCNC: 17 IU/L (ref 0–40)
BILIRUB SERPL-MCNC: 0.4 MG/DL (ref 0–1.2)
BUN SERPL-MCNC: 21 MG/DL (ref 8–27)
BUN/CREAT SERPL: 24 (ref 11–26)
CALCIUM SERPL-MCNC: 9.6 MG/DL (ref 8.7–10.3)
CHLORIDE SERPL-SCNC: 105 MMOL/L (ref 96–106)
CHOLEST SERPL-MCNC: 244 MG/DL (ref 100–199)
CO2 SERPL-SCNC: 24 MMOL/L (ref 18–29)
CREAT SERPL-MCNC: 0.87 MG/DL (ref 0.57–1)
GLOBULIN SER CALC-MCNC: 2.1 G/DL (ref 1.5–4.5)
GLUCOSE SERPL-MCNC: 106 MG/DL (ref 65–99)
HDLC SERPL-MCNC: 58 MG/DL
INTERPRETATION, 910389: NORMAL
LDLC SERPL CALC-MCNC: 161 MG/DL (ref 0–99)
POTASSIUM SERPL-SCNC: 4.9 MMOL/L (ref 3.5–5.2)
PROT SERPL-MCNC: 6.2 G/DL (ref 6–8.5)
SODIUM SERPL-SCNC: 142 MMOL/L (ref 134–144)
TRIGL SERPL-MCNC: 127 MG/DL (ref 0–149)
VLDLC SERPL CALC-MCNC: 25 MG/DL (ref 5–40)

## 2017-04-08 NOTE — PROGRESS NOTES
Liver and kidney normal. Electrolytes normal.   TGs are better since stopping the diuretic. Other lipids still high and we already discussed her working on that. Fasting BS is 106 which is considered mild prediabetes. So work hard on sugar free, low carb diet, exercise and wt loss. Fasting follow up 3months.

## 2017-05-25 ENCOUNTER — TELEPHONE (OUTPATIENT)
Dept: FAMILY MEDICINE CLINIC | Age: 62
End: 2017-05-25

## 2017-05-25 NOTE — TELEPHONE ENCOUNTER
Sugey Zimmerman   -   [phone] -  Patient has a sore throat, and possible sinus infection-  Advised of same day appt for tomorrow -  Patient requesting a call from the nurse to see what the nurse has to say -

## 2017-05-26 ENCOUNTER — OFFICE VISIT (OUTPATIENT)
Dept: FAMILY MEDICINE CLINIC | Age: 62
End: 2017-05-26

## 2017-05-26 VITALS
RESPIRATION RATE: 18 BRPM | HEIGHT: 67 IN | BODY MASS INDEX: 43.07 KG/M2 | WEIGHT: 274.4 LBS | TEMPERATURE: 97.1 F | OXYGEN SATURATION: 96 % | HEART RATE: 69 BPM | SYSTOLIC BLOOD PRESSURE: 165 MMHG | DIASTOLIC BLOOD PRESSURE: 95 MMHG

## 2017-05-26 RX ORDER — LOSARTAN POTASSIUM 50 MG/1
TABLET ORAL
Refills: 1 | COMMUNITY
Start: 2017-03-02 | End: 2017-07-03

## 2017-05-26 NOTE — PATIENT INSTRUCTIONS

## 2017-05-26 NOTE — MR AVS SNAPSHOT
Visit Information Date & Time Provider Department Dept. Phone Encounter #  
 5/26/2017  9:20 AM Verónica Ware NP [facility] [phone] [id number] Your Appointments 7/3/2017  8:15 AM  
ROUTINE CARE with Estefani Kelly MD  
[facility]) Appt Note: 3 month f/u appt  
 [address] [address]  
[phone]  
  
   
 [address] [address] Upcoming Health Maintenance Date Due INFLUENZA AGE 9 TO ADULT 8/1/2017 COLONOSCOPY 1/17/2018 BREAST CANCER SCRN MAMMOGRAM 4/26/2018 PAP AKA CERVICAL CYTOLOGY 5/26/2019 DTaP/Tdap/Td series (2 - Td) 5/26/2026 Allergies as of 5/26/2017  Review Complete On: 5/26/2017 By: Verónica Ware NP Severity Noted Reaction Type Reactions Lisinopril  08/17/2016    Cough Pcn [Penicillins]  04/05/2010    Hives Sulfa (Sulfonamide Antibiotics)  04/05/2010    Hives Zocor [Simvastatin]  04/05/2010    Other (comments)  
 aches Current Immunizations  Reviewed on 5/26/2016 Name Date Influenza Vaccine 8/7/2016, 11/10/2015, 10/3/2014, 9/27/2013 Influenza Vaccine Split 10/6/2011, 12/20/2010 Meningococcal Vaccine 1/27/2013 TD Vaccine 5/27/2009, 6/16/1999 Tdap 5/26/2016 Zoster Vaccine, Live 1/27/2013 Not reviewed this visit You Were Diagnosed With   
  
 Codes Comments Cold    -  Primary ICD-10-CM: Dorothy Dark ICD-9-CM: 864 Vitals BP Pulse Temp Resp Height(growth percentile) Weight(growth percentile) (!) 165/95 (BP 1 Location: Left arm, BP Patient Position: Sitting) 69 97.1 °F (36.2 °C) (Oral) 18 5' 7\" (1.702 m) 274 lb 6.4 oz (124.5 kg) LMP SpO2 BMI OB Status Smoking Status 05/01/2011 96% 42.98 kg/m2 Hysterectomy Former Smoker Vitals History BMI and BSA Data Body Mass Index Body Surface Area 42.98 kg/m 2 2.43 m 2 Preferred Pharmacy Pharmacy Name Phone [address] [phone] Your Updated Medication List  
  
   
This list is accurate as of: 5/26/17  9:44 AM.  Always use your most recent med list.  
  
  
  
  
 aspirin 81 mg tablet Take  by mouth. buPROPion  mg XL tablet Commonly known as:  Nadara Carmona Take 1 Tab by mouth daily. cholecalciferol 1,000 unit Cap Commonly known as:  VITAMIN D3 Take  by mouth daily. Flaxseed Oil Oil Take 1,300 mg by mouth daily. levothyroxine 150 mcg tablet Commonly known as:  SYNTHROID Take 1 Tab by mouth Daily (before breakfast). * losartan 50 mg tablet Commonly known as:  COZAAR  
  
 * losartan 100 mg tablet Commonly known as:  COZAAR Take 1 Tab by mouth daily. meloxicam 15 mg tablet Commonly known as:  MOBIC  
take 1 tablet by mouth daily with BREAKFAST  
  
 multivitamin tablet Commonly known as:  ONE A DAY Take 1 Tab by mouth daily. OMEGA-3 FISH OIL PO Take 1,200 mg by mouth daily. VITAMIN C 500 mg tablet Generic drug:  ascorbic acid (vitamin C) Take  by mouth. * Notice: This list has 2 medication(s) that are the same as other medications prescribed for you. Read the directions carefully, and ask your doctor or other care provider to review them with you. Patient Instructions Upper Respiratory Infection (Cold): Care Instructions Your Care Instructions An upper respiratory infection, or URI, is an infection of the nose, sinuses, or throat. URIs are spread by coughs, sneezes, and direct contact. The common cold is the most frequent kind of URI. The flu and sinus infections are other kinds of URIs. Almost all URIs are caused by viruses. Antibiotics won't cure them. But you can treat most infections with home care. This may include drinking lots of fluids and taking over-the-counter pain medicine.  You will probably feel better in 4 to 10 days. The doctor has checked you carefully, but problems can develop later. If you notice any problems or new symptoms, get medical treatment right away. Follow-up care is a key part of your treatment and safety. Be sure to make and go to all appointments, and call your doctor if you are having problems. It's also a good idea to know your test results and keep a list of the medicines you take. How can you care for yourself at home? · To prevent dehydration, drink plenty of fluids, enough so that your urine is light yellow or clear like water. Choose water and other caffeine-free clear liquids until you feel better. If you have kidney, heart, or liver disease and have to limit fluids, talk with your doctor before you increase the amount of fluids you drink. · Take an over-the-counter pain medicine, such as acetaminophen (Tylenol), ibuprofen (Advil, Motrin), or naproxen (Aleve). Read and follow all instructions on the label. · Before you use cough and cold medicines, check the label. These medicines may not be safe for young children or for people with certain health problems. · Be careful when taking over-the-counter cold or flu medicines and Tylenol at the same time. Many of these medicines have acetaminophen, which is Tylenol. Read the labels to make sure that you are not taking more than the recommended dose. Too much acetaminophen (Tylenol) can be harmful. · Get plenty of rest. 
· Do not smoke or allow others to smoke around you. If you need help quitting, talk to your doctor about stop-smoking programs and medicines. These can increase your chances of quitting for good. When should you call for help? Call 911 anytime you think you may need emergency care. For example, call if: 
· You have severe trouble breathing. Call your doctor now or seek immediate medical care if: 
· You seem to be getting much sicker. · You have new or worse trouble breathing. · You have a new or higher fever. · You have a new rash. Watch closely for changes in your health, and be sure to contact your doctor if: 
· You have a new symptom, such as a sore throat, an earache, or sinus pain. · You cough more deeply or more often, especially if you notice more mucus or a change in the color of your mucus. · You do not get better as expected. Where can you learn more? Go to http://radha-za.info/. Enter K931 in the search box to learn more about \"Upper Respiratory Infection (Cold): Care Instructions. \" Current as of: June 30, 2016 Content Version: 11.2 © 2918-6092 [employer]. Care instructions adapted under license by [employer] (which disclaims liability or warranty for this information). If you have questions about a medical condition or this instruction, always ask your healthcare professional. [address] any warranty or liability for your use of this information. Introducing [facility] & HEALTH SERVICES! Dear Cj Rosenbaum: Thank you for requesting a Ceros account. Our records indicate that you already have an active Ceros account. You can access your account anytime at [email or web address] Did you know that you can access your hospital and ER discharge instructions at any time in Ceros? You can also review all of your test results from your hospital stay or ER visit. Additional Information If you have questions, please visit the Frequently Asked Questions section of the Ceros website at [email or web address] Remember, Ceros is NOT to be used for urgent needs. For medical emergencies, dial 911. Now available from your iPhone and Android! Please provide this summary of care documentation to your next provider. Your primary care clinician is listed as ESDRAS MUNOZ. If you have any questions after today's visit, please call [phone].

## 2017-05-26 NOTE — PROGRESS NOTES
Oanh Victor is a 58 y.o. female who is being seen  today for an acute care visit  today (5/26/2017). Assessments and plans as follows:     Assessment & Plan:  Kimberley Fernandez was seen today for cold symptoms. Your symptoms appear to be resolving ,  No acute findings on examine. Diagnoses and all orders for this visit:    Cold- mucinex, zyrtec or Claritin     ----------------------------------------------------------------------    Subjective / HPI:  Oanh Victor presents to office today for an acute care visit for cold like symptoms such as sore throat that started 3 days ago that now she feels is moving up towards sinus. Unsure of fevers or chills. Tingling in ears, no eye involvement, some sinus pressure. She has attempted donta seltzer cold medication last PM nothing today. Prior to Admission medications    Medication Sig Start Date End Date Taking? Authorizing Provider   ascorbic acid, vitamin C, (VITAMIN C) 500 mg tablet Take  by mouth. Yes Historical Provider   losartan (COZAAR) 100 mg tablet Take 1 Tab by mouth daily. 3/28/17  Yes Padmini Knight MD   meloxicam (MOBIC) 15 mg tablet take 1 tablet by mouth daily with BREAKFAST 2/28/17  Yes Padmini Knight MD   buPROPion XL (WELLBUTRIN XL) 300 mg XL tablet Take 1 Tab by mouth daily. 7/7/16  Yes Padmini Knight MD   levothyroxine (SYNTHROID) 150 mcg tablet Take 1 Tab by mouth Daily (before breakfast). 7/7/16  Yes Padmini Knight MD   cholecalciferol (VITAMIN D3) 1,000 unit cap Take  by mouth daily. Yes Historical Provider   multivitamin (ONE A DAY) tablet Take 1 Tab by mouth daily. Yes Historical Provider   aspirin 81 mg tablet Take  by mouth. Yes Historical Provider   OMEGA-3 FATTY ACIDS/VITAMIN E (OMEGA-3 FISH OIL PO) Take 1,200 mg by mouth daily.  5/26/10  Yes Historical Provider   Flaxseed Oil Oil Take 1,300 mg by mouth daily. 7/15/10  Yes Historical Provider   losartan (COZAAR) 50 mg tablet  3/2/17   Historical Provider          Allergies   Allergen Reactions    Lisinopril Cough    Pcn [Penicillins] Hives    Sulfa (Sulfonamide Antibiotics) Hives    Zocor [Simvastatin] Other (comments)     aches           ROS    See HPI    Past Medical History:   Diagnosis Date    Ankle edema 4/5/2010    Anxiety 4/5/2010    Benign essential hypertension 5/26/2016    2006    Carpal tunnel syndrome 4/5/2010    Depression 4/5/2010    Elevated cholesterol 4/5/2010    H/O Benign Colon Polyp 12/20/2010    H/O Uterine Fibroids 12/20/2010    HBP (high blood pressure) 4/5/2010    Hypercholesteremia 5/26/2010    Hypercholesterolemia 6/25/2013    ~2006    Hypertension 5/24/2012    Hypothyroid 4/5/2010    Hypothyroidism 5/26/2016 7/1999    Hypothyroidism (acquired) 5/11/2015 7/1999    Kidney stone 4/5/2010    Other ill-defined conditions     high cholesterol    Perimenopausal 4/5/2010    Primary osteoarthritis of left knee 5/26/2016    Extensive; Ortho (needs knee replacement), CSI x 2,     Psychiatric disorder     anxiety    S/P benign cervical polypectomy 12/20/2010    Sleep apnea 4/5/2010    Stress incontinence 5/26/2010    Thyroid disease        Visit Vitals    BP (!) 165/95 (BP 1 Location: Left arm, BP Patient Position: Sitting)    Pulse 69    Temp 97.1 °F (36.2 °C) (Oral)    Resp 18    Ht 5' 7\" (1.702 m)    Wt 274 lb 6.4 oz (124.5 kg)    LMP 05/01/2011    SpO2 96%    BMI 42.98 kg/m2       Objective:   Physical Exam   HENT:   Right Ear: No middle ear effusion. Left Ear:  No middle ear effusion. Nose: No rhinorrhea. Right sinus exhibits no maxillary sinus tenderness and no frontal sinus tenderness. Left sinus exhibits no maxillary sinus tenderness and no frontal sinus tenderness.    Mouth/Throat: No oropharyngeal exudate, posterior oropharyngeal edema or posterior oropharyngeal erythema. Cardiovascular: Regular rhythm and normal heart sounds. No murmur heard. Pulmonary/Chest: Effort normal and breath sounds normal. She has no wheezes. She has no rales. Abdominal: Soft. Bowel sounds are normal. There is no hepatosplenomegaly. There is no tenderness. There is no rebound. Disclaimer:  Advised her to call back or return to office if symptoms worsen/change/persist.  Discussed expected course/resolution/complications of diagnosis in detail with patient. Medication risks/benefits/costs/interactions/alternatives discussed with patient. She was given an after visit summary which includes diagnoses, current medications, & vitals. She expressed understanding with the diagnosis and plan.         Electronic Signature  Nava Mota NP , [facility]  [phone]

## 2017-05-26 NOTE — PROGRESS NOTES
Chief Complaint   Patient presents with    Cold Symptoms     sore throat, cough, ear tingling X 3 days      1. Have you been to the ER, urgent care clinic since your last visit? Hospitalized since your last visit? No    2. Have you seen or consulted any other health care providers outside of the [facility] since your last visit? Include any pap smears or colon screening.  No

## 2017-05-30 NOTE — TELEPHONE ENCOUNTER
Patient was seen 05/26 by Rui Vasquez, is feeling better. Adv to f/u with PCP for further Tx needed.

## 2017-06-26 ENCOUNTER — TELEPHONE (OUTPATIENT)
Dept: FAMILY MEDICINE CLINIC | Age: 62
End: 2017-06-26

## 2017-06-26 NOTE — TELEPHONE ENCOUNTER
Spoke to pt ans she will call the ER and let them know that the ABX sent in she is allergic too. She is to call me if a problem.

## 2017-06-26 NOTE — TELEPHONE ENCOUNTER
LM that she will need to contact the Urgent Care place and let them know what she is allergic to so they can change the ABX. Asked her to call me back if any problem.

## 2017-06-26 NOTE — TELEPHONE ENCOUNTER
Ana Rosa Roxanne  [phone]    Patient states that she is at [place] on vacation. She dropped something on her foot and it cut her foot pretty bad. She went to the ED and they put in stitches and and gave her an antibiotic. She states that they did not ask her if she was allergic to anything and prescribed her an antibiotic with penicillin in it. She is allergic to penicillin and is asking if Dr. Yanni Reardon would call in an alternate antibiotic. Pharmacy verified. Patient is requesting a call to let her know when the medication is called in.

## 2017-07-03 ENCOUNTER — OFFICE VISIT (OUTPATIENT)
Dept: FAMILY MEDICINE CLINIC | Age: 62
End: 2017-07-03

## 2017-07-03 VITALS
TEMPERATURE: 98.6 F | HEIGHT: 67 IN | WEIGHT: 273.2 LBS | OXYGEN SATURATION: 97 % | SYSTOLIC BLOOD PRESSURE: 118 MMHG | HEART RATE: 70 BPM | BODY MASS INDEX: 42.88 KG/M2 | DIASTOLIC BLOOD PRESSURE: 80 MMHG | RESPIRATION RATE: 18 BRPM

## 2017-07-03 DIAGNOSIS — I10 BENIGN ESSENTIAL HYPERTENSION: Primary | ICD-10-CM

## 2017-07-03 DIAGNOSIS — E03.9 HYPOTHYROIDISM, UNSPECIFIED TYPE: ICD-10-CM

## 2017-07-03 DIAGNOSIS — S91.115S: ICD-10-CM

## 2017-07-03 DIAGNOSIS — Z48.02 ENCOUNTER FOR REMOVAL OF SUTURES: ICD-10-CM

## 2017-07-03 DIAGNOSIS — E78.00 HYPERCHOLESTEROLEMIA: ICD-10-CM

## 2017-07-03 RX ORDER — CLINDAMYCIN HYDROCHLORIDE 300 MG/1
CAPSULE ORAL
Refills: 0 | COMMUNITY
Start: 2017-06-26 | End: 2019-02-01 | Stop reason: ALTCHOICE

## 2017-07-03 NOTE — PROGRESS NOTES
Chief Complaint   Patient presents with    Hypertension     fasting follow up    Cholesterol Problem     ]1. Have you been to the ER, urgent care clinic since your last visit? Hospitalized since your last visit? Yes - [facility] ER for stitches -Left middle toe    2. Have you seen or consulted any other health care providers outside of the [address] [place] [address] since your last visit? Include any pap smears or colon screening.    Yes see above

## 2017-07-03 NOTE — PROGRESS NOTES
HISTORY OF PRESENT ILLNESS  Donelle Boatman Alda Gitelman is a 58 y.o. female. HPI  Fasting follow up hypercholesterolemia, hypertension, thyroid, labs and medication evaluation. At last visit, increased her Cozaar from 50 to 100 mg daily and she is tolerating this well w/o reaction or side effects. Has not been checking interim BP's but it is much better today and she is feeling great. Doing well on all current meds. Needs to have sutures removed from toe. Back on 6-25-17 she was on a children's mission trip in [place], [place]. Dropped her CPAP machine and the plastic edge landed on her left 2nd toe and cut it. She went to [address] [place] [address] in [place]. She had sutures placed, was given another Td booster bc she didn't remember we had given her one here last year, and was dc'd initially on rx for Keflex. She took one dose and then realized it was in the PCN which she is allergic to, stopped it (did not have any reactions to that dose however), went back up to the ED, and was changed to Cleocin which she is taking and tolerating fine. She was prescribed Norco 5/325 mf prn pain but states it really is not painful at all at this time. She has only been applying Vaseline to keep the bandage from sticking but nothing else. She has been on her feet a lot, so the toe gets puffy at times. Review of Systems   Constitutional: Negative. Respiratory: Negative. Cardiovascular: Negative. Gastrointestinal: Negative. Neurological: Negative. Negative for headaches. Psychiatric/Behavioral: Negative.          Doing well on Wellbutrin     Problem List  Date Reviewed: 7/3/2017          Codes Class Noted    Benign essential hypertension ICD-10-CM: I10  ICD-9-CM: 401.1  5/26/2016    Overview Signed 5/26/2016  9:26 AM by Edna Triana MD     2006             Hypothyroidism ICD-10-CM: E03.9  ICD-9-CM: 244.9  5/26/2016    Overview Signed 5/26/2016  9:27 AM by Edna Triana MD     7/1999             Primary osteoarthritis of left knee ICD-10-CM: M17.12  ICD-9-CM: 715.16  5/26/2016    Overview Signed 5/26/2016  9:42 AM by Adonay Bennett MD     Extensive; Ortho (needs knee replacement), CSI x 2,              Hypercholesterolemia ICD-10-CM: E78.00  ICD-9-CM: 272.0  6/25/2013    Overview Signed 6/25/2013  8:29 AM by Adonay Bennett MD     ~2006             S/P benign cervical polypectomy ICD-10-CM: S21.653, Z87.42  ICD-9-CM: V45.89  12/20/2010    Overview Addendum 5/26/2016  9:42 AM by Adonay Bennett MD     2009, 2015; Gyn Dr Claudina Bernheim             H/O Benign Colon Polyp ICD-10-CM: K63.5  ICD-9-CM: 211.3  12/20/2010    Overview Signed 12/20/2010 12:10 PM by Adonay Bennett MD     Colonoscopy 10/2005, 11/2010  q5yrs  Dr Michael Varela             H/O Uterine Fibroids ICD-10-CM: D25.9  ICD-9-CM: 218.9  12/20/2010    Overview Signed 12/20/2010 12:13 PM by Adonay Bennett MD     2005; no surgical intervention  Gyn Dr Lorilee Bence incontinence ICD-10-CM: N39.3  ICD-9-CM: [id number]  5/26/2010    Overview Signed 5/26/2010 12:47 PM by Adonay Bennett MD     Urologist Dr Derek Garcia             Sleep apnea, RENÉE ICD-10-CM: J70.58  ICD-9-CM: 780.57  4/5/2010    Overview Addendum 12/20/2010 12:14 PM by Adonay Bennett MD     2009; cpap             Anxiety ICD-10-CM: F41.9  ICD-9-CM: 300.00  4/5/2010        Carpal tunnel syndrome ICD-10-CM: G56.00  ICD-9-CM: 354.0  4/5/2010        Mild Dependent Ankle edema, B ICD-10-CM: M25.473  ICD-9-CM: 719.07  4/5/2010    Overview Signed 4/5/2010  3:52 PM by Yossi Bagley     mild             Perimenopausal ICD-10-CM: N95.1  ICD-9-CM: 627.2  4/5/2010    Overview Signed 4/5/2010  3:52 PM by Yossi Bagley     2007             Depression ICD-10-CM: F32.9  ICD-9-CM: 421  4/5/2010        ?  Passed Kidney stone ICD-10-CM: N20.0  ICD-9-CM: 592.0  4/5/2010    Overview Signed 5/26/2010 12:47 PM by Adonay Bennett MD     8/2329 Past Surgical History:   Procedure Laterality Date    COLONOSCOPY  10/05    benign polyp; repeat 5 yr per Dr Rom Melton COLONOSCOPY  2010    normal, f/u 5 yrs, Dr Rom Melton EKG ORDERABLE  2009    NSR, rate 84, normal EKG    HX BLADDER SUSPENSION  ~    Dr Patel Seek  2011    VPI, right breast biopsy negative    HX CARPAL TUNNEL RELEASE  3/2010    Right; Dr Alondra Johnson  3/2010    right hand carpal tunnel    HX CERVICAL POLYPECTOMY  ,     Dr Severiano Russell    HX COLONOSCOPY  2016    \"normal\", repeat [facility] yrs[facility]    HX DILATION AND CURETTAGE  2005    AUB, benign/neg bx; Dr Marley Less  10/2012    Dr Severiano Russell, AUB    HX PARTIAL HYSTERECTOMY  13    Supracervical hysterectomy for fibroids, Dr Wong Locus  age 11   Keary Roup HX [address] EXTRACTION         OB History      Para Term  AB Living    1 1        SAB TAB Ectopic Molar Multiple Live Births                 Obstetric Comments     x 1; Gyn Dr Severiano Russell        Current Outpatient Prescriptions   Medication Sig    clindamycin (CLEOCIN) 300 mg capsule take 1 capsule by mouth every 6 hours for 10 days    levothyroxine (SYNTHROID) 150 mcg tablet take 1 tablet by mouth daily before BREAKFAST    buPROPion XL (WELLBUTRIN XL) 300 mg XL tablet take 1 tablet by mouth once daily    ascorbic acid, vitamin C, (VITAMIN C) 500 mg tablet Take  by mouth.  losartan (COZAAR) 100 mg tablet Take 1 Tab by mouth daily.  meloxicam (MOBIC) 15 mg tablet take 1 tablet by mouth daily with BREAKFAST    cholecalciferol (VITAMIN D3) 1,000 unit cap Take  by mouth daily.  multivitamin (ONE A DAY) tablet Take 1 Tab by mouth daily.  aspirin 81 mg tablet Take  by mouth.  OMEGA-3 FATTY ACIDS/VITAMIN E (OMEGA-3 FISH OIL PO) Take 1,200 mg by mouth daily.  Flaxseed Oil Oil Take 1,300 mg by mouth daily. No current facility-administered medications for this visit.       Allergies   Allergen Reactions    Lisinopril Cough    Pcn [Penicillins] Hives    Sulfa (Sulfonamide Antibiotics) Hives    Zocor [Simvastatin] Other (comments)     aches     Social History     Social History    Marital status:      Spouse name: N/A    Number of children: 1    Years of education: N/A     Occupational History   [address] [place] [address]      Social History Main Topics    Smoking status: Former Smoker     Quit date: 1/1/2005    Smokeless tobacco: Never Used    Alcohol use Yes      Comment: maybe 2-3 drinks a week at [employer] Drug use: No    Sexual activity: Yes     Partners: Male     Other Topics Concern    Caffeine Concern Yes     no coffee or tea; cut back to 1-2 cans of diet Coke a day    Special Diet No    Exercise No     Social History Narrative    1 biological daughter, 1 \"adopted\", and 2 step children     Immunization History   Administered Date(s) Administered    Influenza Vaccine 09/27/2013, 10/03/2014, 11/10/2015, 08/07/2016    Influenza Vaccine Split 12/20/2010, 10/06/2011    Meningococcal ACWY Vaccine 01/27/2013    TD Vaccine 06/16/1999, 05/27/2009    Tdap 05/26/2016    Zoster Vaccine, Live 01/27/2013       Family History   Problem Relation Age of Onset    Arthritis-osteo Father     Stroke Father      TIA's    Cancer Father      melanoma on back removed    Heart Disease Father 80     pacemaker  placed    Hypertension Mother     Arthritis-osteo Mother     Heart Disease Mother      valve disease    Heart Attack Maternal Grandmother 76    Depression Daughter     Substance Abuse Daughter     Alcohol abuse Daughter      Visit Vitals    /80 (BP 1 Location: Right arm, BP Patient Position: Sitting)    Pulse 70    Temp 98.6 °F (37 °C) (Oral)    Resp 18    Ht 5' 7\" (1.702 m)    Wt 273 lb 3.2 oz (123.9 kg)    LMP 05/01/2011    SpO2 97%    BMI 42.79 kg/m2         Physical Exam Constitutional: No distress. Cardiovascular: Normal rate, regular rhythm and normal heart sounds. No murmur heard. Pulmonary/Chest: Effort normal and breath sounds normal. No respiratory distress. Musculoskeletal: She exhibits no edema. Feet:    Distal left 2nd toe: semicircular closed laceration w/ interrupted sutures in place. Deeply embedded, w/ mildly inflamed, pink skin overlying. Partially dehisced wound w/ superficial crusting. Small bleeding upon removal of sutures. Neurological: Coordination abnormal.   Psychiatric: She has a normal mood and affect. Her behavior is normal. Thought content normal.   Vitals reviewed. ASSESSMENT and PLAN    ICD-10-CM ICD-9-CM    1. Benign essential hypertension R99 415.3 METABOLIC PANEL, BASIC      URINALYSIS W/MICROSCOPIC   2. Hypercholesterolemia E78.00 272.0 LIPID PANEL   3. Hypothyroidism, unspecified type E03.9 244.9 TSH 3RD GENERATION      T4, FREE   4. Laceration of second toe, left, sequela S91.115S 906.1    5. Encounter for removal of sutures Z48.02 V58.32      Fasting labs today  Reviewed diet, nutrition, exercise and weight control  Cardiovascular risk and specific lipid/LDL/BP goals reviewed  BP much better w/ increased dose of Cozaar from 50 to 100 mg last visit  Reviewed medications and side effects  Doing well on current regimen  Patient [facility] at [facility] ED [facility]-25-17 for left 2nd toe laceration. Sutures placed and told to remove 7 days, she is now day 9  Removed sutures in office today. Partial wound dehiscence and mild superficial infection. Area irrigated w/ saline, dried and Dermabond applied. Patient counseled on home care instructions and monitoring for s/sx of worsening infection. Keep dry x 24 hrs, then keep cleaned, dried and topical Bacitracin until well healed.   Complete full course of Cleocin oral abx therapy  Further follow up & other recommendations pending review of labs as well  If all remains good and stable, RTC 1 yr fasting CPE.   RTC sooner prn

## 2017-07-03 NOTE — PATIENT INSTRUCTIONS

## 2017-07-03 NOTE — MR AVS SNAPSHOT
Visit Information Date & Time Provider Department Dept. Phone Encounter #  
 7/3/2017  8:15 AM [address],  [address] [phone] [id number] Upcoming Health Maintenance Date Due INFLUENZA AGE 9 TO ADULT 8/1/2017 COLONOSCOPY 1/17/2018 BREAST CANCER SCRN MAMMOGRAM 4/26/2018 PAP AKA CERVICAL CYTOLOGY 5/26/2019 DTaP/Tdap/Td series (2 - Td) 5/26/2026 Allergies as of 7/3/2017  Review Complete On: 7/3/2017 By: [address], MD  
  
 Severity Noted Reaction Type Reactions Lisinopril  08/17/2016    Cough Pcn [Penicillins]  04/05/2010    Hives Sulfa (Sulfonamide Antibiotics)  04/05/2010    Hives Zocor [Simvastatin]  04/05/2010    Other (comments)  
 aches Current Immunizations  Reviewed on 7/3/2017 Name Date Influenza Vaccine 8/7/2016, 11/10/2015, 10/3/2014, 9/27/2013 Influenza Vaccine Split 10/6/2011, 12/20/2010 Meningococcal ACWY Vaccine 1/27/2013 TD Vaccine 5/27/2009, 6/16/1999 Tdap 5/26/2016 Zoster Vaccine, Live 1/27/2013 Reviewed by [address], MD on 7/3/2017 at  8:27 AM  
You Were Diagnosed With   
  
 Codes Comments Benign essential hypertension    -  Primary ICD-10-CM: I10 
ICD-9-CM: 401.1 Hypercholesterolemia     ICD-10-CM: E78.00 ICD-9-CM: 272.0 Hypothyroidism, unspecified type     ICD-10-CM: E03.9 ICD-9-CM: 244.9 Laceration of second toe, left, sequela     ICD-10-CM: S91.115S ICD-9-CM: 906.1 Encounter for removal of sutures     ICD-10-CM: Z48.02 
ICD-9-CM: V58.32 Vitals BP Pulse Temp Resp Height(growth percentile) Weight(growth percentile) 118/80 (BP 1 Location: Right arm, BP Patient Position: Sitting) 70 98.6 °F (37 °C) (Oral) 18 5' 7\" (1.702 m) 273 lb 3.2 oz (123.9 kg) LMP SpO2 BMI OB Status Smoking Status 05/01/2011 97% 42.79 kg/m2 Hysterectomy Former Smoker Vitals History BMI and BSA Data Body Mass Index Body Surface Area 42.79 kg/m 2 2.42 m 2 Preferred Pharmacy Pharmacy Name Phone Dg [phone] [phone] Your Updated Medication List  
  
   
This list is accurate as of: 7/3/17  9:02 AM.  Always use your most recent med list.  
  
  
  
  
 aspirin 81 mg tablet Take  by mouth. buPROPion  mg XL tablet Commonly known as:  WELLBUTRIN XL  
take 1 tablet by mouth once daily  
  
 cholecalciferol 1,000 unit Cap Commonly known as:  VITAMIN D3 Take  by mouth daily. clindamycin 300 mg capsule Commonly known as:  CLEOCIN  
take 1 capsule by mouth every 6 hours for 10 days Flaxseed Oil Oil Take 1,300 mg by mouth daily. levothyroxine 150 mcg tablet Commonly known as:  SYNTHROID  
take 1 tablet by mouth daily before BREAKFAST  
  
 losartan 100 mg tablet Commonly known as:  COZAAR Take 1 Tab by mouth daily. meloxicam 15 mg tablet Commonly known as:  MOBIC  
take 1 tablet by mouth daily with BREAKFAST  
  
 multivitamin tablet Commonly known as:  ONE A DAY Take 1 Tab by mouth daily. OMEGA-3 FISH OIL PO Take 1,200 mg by mouth daily. VITAMIN C 500 mg tablet Generic drug:  ascorbic acid (vitamin C) Take  by mouth. We Performed the Following LIPID PANEL [30329 CPT(R)] METABOLIC PANEL, BASIC [09927 CPT(R)] T4, FREE [id number] CPT(R)] TSH 3RD GENERATION [06476 CPT(R)] URINALYSIS W/MICROSCOPIC [73682 CPT(R)] Patient Instructions High Cholesterol: Care Instructions Your Care Instructions Cholesterol is a type of fat in your blood. It is needed for many body functions, such as making new cells. Cholesterol is made by your body. It also comes from food you eat. High cholesterol means that you have too much of the fat in your blood. This raises your risk of a heart attack and stroke. LDL and HDL are part of your total cholesterol. LDL is the \"bad\" cholesterol. High LDL can raise your risk for heart disease, heart attack, and stroke. HDL is the \"good\" cholesterol. It helps clear bad cholesterol from the body. High HDL is linked with a lower risk of heart disease, heart attack, and stroke. Your cholesterol levels help your doctor find out your risk for having a heart attack or stroke. You and your doctor can talk about whether you need to lower your risk and what treatment is best for you. A heart-healthy lifestyle along with medicines can help lower your cholesterol and your risk. The way you choose to lower your risk will depend on how high your risk is for heart attack and stroke. It will also depend on how you feel about taking medicines. Follow-up care is a key part of your treatment and safety. Be sure to make and go to all appointments, and call your doctor if you are having problems. It's also a good idea to know your test results and keep a list of the medicines you take. How can you care for yourself at home? · Eat a variety of foods every day. Good choices include fruits, vegetables, whole grains (like oatmeal), dried beans and peas, nuts and seeds, soy products (like tofu), and fat-free or low-fat dairy products. · Replace butter, margarine, and hydrogenated or partially hydrogenated oils with olive and canola oils. (Canola oil margarine without trans fat is fine.) · Replace red meat with fish, poultry, and soy protein (like tofu). · Limit processed and packaged foods like chips, crackers, and cookies. · Bake, broil, or steam foods. Don't saenz them. · Be physically active. Get at least 30 minutes of exercise on most days of the week. Walking is a good choice. You also may want to do other activities, such as running, swimming, cycling, or playing tennis or team sports.  
· Stay at a healthy weight or lose weight by making the changes in eating and physical activity listed above. Losing just a small amount of weight, even 5 to 10 pounds, can reduce your risk for having a heart attack or stroke. · Do not smoke. When should you call for help? Watch closely for changes in your health, and be sure to contact your doctor if: 
· You need help making lifestyle changes. · You have questions about your medicine. Where can you learn more? Go to http://radha-za.info/. Enter O595 in the search box to learn more about \"High Cholesterol: Care Instructions. \" Current as of: April 3, 2017 Content Version: 11.3 © 5790-2737 [employer]. Care instructions adapted under license by [employer] (which disclaims liability or warranty for this information). If you have questions about a medical condition or this instruction, always ask your healthcare professional. [address] any warranty or liability for your use of this information. Introducing [facility]! Dear Alec Gr: Thank you for requesting a [employer] account. Our records indicate that you already have an active [employer] account. You can access your account anytime at [email or web address] Did you know that you can access your hospital and ER discharge instructions at any time in [employer]? You can also review all of your test results from your hospital stay or ER visit. Additional Information If you have questions, please visit the Frequently Asked Questions section of the [employer] website at [email or web address] Remember, [employer] is NOT to be used for urgent needs. For medical emergencies, dial 911. Now available from your iPhone and Android! Please provide this summary of care documentation to your next provider. Your primary care clinician is listed as ESDRAS MUNOZ. If you have any questions after today's visit, please call [phone].

## 2017-07-04 LAB
APPEARANCE UR: CLEAR
BACTERIA #/AREA URNS HPF: NORMAL /[HPF]
BILIRUB UR QL STRIP: NEGATIVE
BUN SERPL-MCNC: 18 MG/DL (ref 8–27)
BUN/CREAT SERPL: 20 (ref 12–28)
CALCIUM SERPL-MCNC: 9.4 MG/DL (ref 8.7–10.3)
CASTS URNS QL MICRO: NORMAL /LPF
CHLORIDE SERPL-SCNC: 104 MMOL/L (ref 96–106)
CHOLEST SERPL-MCNC: 238 MG/DL (ref 100–199)
CO2 SERPL-SCNC: 23 MMOL/L (ref 18–29)
COLOR UR: YELLOW
CREAT SERPL-MCNC: 0.89 MG/DL (ref 0.57–1)
EPI CELLS #/AREA URNS HPF: NORMAL /HPF
GLUCOSE SERPL-MCNC: 110 MG/DL (ref 65–99)
GLUCOSE UR QL: NEGATIVE
HDLC SERPL-MCNC: 54 MG/DL
HGB UR QL STRIP: NEGATIVE
INTERPRETATION, 910389: NORMAL
KETONES UR QL STRIP: NEGATIVE
LDLC SERPL CALC-MCNC: 144 MG/DL (ref 0–99)
LEUKOCYTE ESTERASE UR QL STRIP: ABNORMAL
MICRO URNS: ABNORMAL
NITRITE UR QL STRIP: NEGATIVE
PH UR STRIP: 6 [PH] (ref 5–7.5)
POTASSIUM SERPL-SCNC: 4.8 MMOL/L (ref 3.5–5.2)
PROT UR QL STRIP: NEGATIVE
RBC #/AREA URNS HPF: NORMAL /HPF
SODIUM SERPL-SCNC: 141 MMOL/L (ref 134–144)
SP GR UR: 1.01 (ref 1–1.03)
T4 FREE SERPL-MCNC: 1.47 NG/DL (ref 0.82–1.77)
TRIGL SERPL-MCNC: 202 MG/DL (ref 0–149)
TSH SERPL DL<=0.005 MIU/L-ACNC: 0.34 UIU/ML (ref 0.45–4.5)
UROBILINOGEN UR STRIP-MCNC: 0.2 MG/DL (ref 0.2–1)
VLDLC SERPL CALC-MCNC: 40 MG/DL (ref 5–40)
WBC #/AREA URNS HPF: NORMAL /HPF

## 2017-07-12 NOTE — PROGRESS NOTES
Spoke with patient regarding results and recommendations. Voiced understanding.    6 month follow up scheduled

## 2017-07-12 NOTE — PROGRESS NOTES
Urine, kidney normal  Review lipids, total and LDL are getting better, still not at goal but moving in right direction  HDL remains good  TG high and have worsened from last check  Fasting BS mildly elevated and in prediabetes range  Try to follow some simple nutrition/health tips: including avoiding concentrated sweets, carbohydrate restriction between 30-40g carbs max per meal, 15 grams carbs max per snack, not skipping meals, eating low fat/high protein snacks between meals, getting regular cardio/aerobic exercise at least 3 x a week x 30 minutes to one hour. Thyroid mildly on oversuppressed side, but marginal. Cont same dose for now but need to follow more closely  Would like to have fasting follow up in 6 months to reassess all this. Set now.

## 2017-08-27 DIAGNOSIS — I10 BENIGN ESSENTIAL HYPERTENSION: ICD-10-CM

## 2017-08-27 RX ORDER — MELOXICAM 15 MG/1
TABLET ORAL
Qty: 30 TAB | Refills: 5 | Status: SHIPPED | OUTPATIENT
Start: 2017-08-27 | End: 2018-01-24 | Stop reason: SDUPTHER

## 2017-08-27 RX ORDER — LOSARTAN POTASSIUM 100 MG/1
TABLET ORAL
Qty: 30 TAB | Refills: 5 | Status: SHIPPED | OUTPATIENT
Start: 2017-08-27 | End: 2018-05-25 | Stop reason: SDUPTHER

## 2017-08-27 NOTE — TELEPHONE ENCOUNTER
BUN 18  8 - 27 mg/dL Final   Creatinine 0.89  0.57 - 1.00 mg/dL Final   GFR est non-AA 70  >59 mL/min/1.73 Final   GFR est AA 80  >59 mL/min/1.73 Final   BUN/Creatinine ratio 20  12 - 28  Final   Sodium 141  134 - 144 mmol/L Final   Potassium 4.8  3.5 - 5.2 mmol/L Final   Chloride 104  96 - 106 mmol/L Final   CO2 23  18 - 29 mmol/L Final   Calcium 9.4  8.7 - 10.3 mg/dL Final

## 2018-02-20 ENCOUNTER — OFFICE VISIT (OUTPATIENT)
Dept: FAMILY MEDICINE CLINIC | Age: 63
End: 2018-02-20

## 2018-02-20 VITALS
RESPIRATION RATE: 18 BRPM | BODY MASS INDEX: 37.95 KG/M2 | OXYGEN SATURATION: 98 % | TEMPERATURE: 99 F | WEIGHT: 241.8 LBS | HEART RATE: 73 BPM | SYSTOLIC BLOOD PRESSURE: 120 MMHG | HEIGHT: 67 IN | DIASTOLIC BLOOD PRESSURE: 80 MMHG

## 2018-02-20 DIAGNOSIS — F32.A CHRONIC DEPRESSION: ICD-10-CM

## 2018-02-20 DIAGNOSIS — E03.9 HYPOTHYROIDISM, UNSPECIFIED TYPE: ICD-10-CM

## 2018-02-20 DIAGNOSIS — E78.00 HYPERCHOLESTEROLEMIA: ICD-10-CM

## 2018-02-20 DIAGNOSIS — I10 BENIGN ESSENTIAL HYPERTENSION: Primary | ICD-10-CM

## 2018-02-20 DIAGNOSIS — Z12.31 ENCOUNTER FOR SCREENING MAMMOGRAM FOR BREAST CANCER: ICD-10-CM

## 2018-02-20 RX ORDER — BUPROPION HYDROCHLORIDE 150 MG/1
150 TABLET ORAL
Qty: 30 TAB | Refills: 5 | Status: SHIPPED | OUTPATIENT
Start: 2018-02-20 | End: 2018-07-24 | Stop reason: SDUPTHER

## 2018-02-20 NOTE — PROGRESS NOTES
Chief Complaint   Patient presents with    Hypertension     fasting follow up     Cholesterol Problem    Thyroid Problem     1. Have you been to the ER, urgent care clinic since your last visit? Hospitalized since your last visit? No    2. Have you seen or consulted any other health care providers outside of the [address] [place] [address] since your last visit? Include any pap smears or colon screening.  No     Dana Duffy - chiropractor    Is on [employer] plan and therefor is not taking any vitamins or supplements at this time - started in January

## 2018-02-20 NOTE — PATIENT INSTRUCTIONS
Body Mass Index: Care Instructions  Your Care Instructions    Body mass index (BMI) can help you see if your weight is raising your risk for health problems. It uses a formula to compare how much you weigh with how tall you are. · A BMI lower than 18.5 is considered underweight. · A BMI between 18.5 and 24.9 is considered healthy. · A BMI between 25 and 29.9 is considered overweight. A BMI of 30 or higher is considered obese. If your BMI is in the normal range, it means that you have a lower risk for weight-related health problems. If your BMI is in the overweight or obese range, you may be at increased risk for weight-related health problems, such as high blood pressure, heart disease, stroke, arthritis or joint pain, and diabetes. If your BMI is in the underweight range, you may be at increased risk for health problems such as fatigue, lower protection (immunity) against illness, muscle loss, bone loss, hair loss, and hormone problems. BMI is just one measure of your risk for weight-related health problems. You may be at higher risk for health problems if you are not active, you eat an unhealthy diet, or you drink too much alcohol or use tobacco products. Follow-up care is a key part of your treatment and safety. Be sure to make and go to all appointments, and call your doctor if you are having problems. It's also a good idea to know your test results and keep a list of the medicines you take. How can you care for yourself at home? · Practice healthy eating habits. This includes eating plenty of fruits, vegetables, whole grains, lean protein, and low-fat dairy. · If your doctor recommends it, get more exercise. Walking is a good choice. Bit by bit, increase the amount you walk every day. Try for at least 30 minutes on most days of the week. · Do not smoke. Smoking can increase your risk for health problems. If you need help quitting, talk to your doctor about stop-smoking programs and medicines. These can increase your chances of quitting for good. · Limit alcohol to 2 drinks a day for men and 1 drink a day for women. Too much alcohol can cause health problems. If you have a BMI higher than 25  · Your doctor may do other tests to check your risk for weight-related health problems. This may include measuring the distance around your waist. A waist measurement of more than 40 inches in men or 35 inches in women can increase the risk of weight-related health problems. · Talk with your doctor about steps you can take to stay healthy or improve your health. You may need to make lifestyle changes to lose weight and stay healthy, such as changing your diet and getting regular exercise. If you have a BMI lower than 18.5  · Your doctor may do other tests to check your risk for health problems. · Talk with your doctor about steps you can take to stay healthy or improve your health. You may need to make lifestyle changes to gain or maintain weight and stay healthy, such as getting more healthy foods in your diet and doing exercises to build muscle. Where can you learn more? Go to http://radha-za.info/. Enter S176 in the search box to learn more about \"Body Mass Index: Care Instructions. \"  Current as of: October 13, 2016  Content Version: 11.4  © 1414-3289 [employer]wise[employer]. Care instructions adapted under license by [employer] (which disclaims liability or warranty for this information). If you have questions about a medical condition or this instruction, always ask your healthcare professional. [place] [address] any warranty or liability for your use of this information. High Cholesterol: Care Instructions  Your Care Instructions    Cholesterol is a type of fat in your blood. It is needed for many body functions, such as making new cells. Cholesterol is made by your body. It also comes from food you eat.  High cholesterol means that you have too much of the fat in your blood. This raises your risk of a heart attack and stroke. LDL and HDL are part of your total cholesterol. LDL is the \"bad\" cholesterol. High LDL can raise your risk for heart disease, heart attack, and stroke. HDL is the \"good\" cholesterol. It helps clear bad cholesterol from the body. High HDL is linked with a lower risk of heart disease, heart attack, and stroke. Your cholesterol levels help your doctor find out your risk for having a heart attack or stroke. You and your doctor can talk about whether you need to lower your risk and what treatment is best for you. A heart-healthy lifestyle along with medicines can help lower your cholesterol and your risk. The way you choose to lower your risk will depend on how high your risk is for heart attack and stroke. It will also depend on how you feel about taking medicines. Follow-up care is a key part of your treatment and safety. Be sure to make and go to all appointments, and call your doctor if you are having problems. It's also a good idea to know your test results and keep a list of the medicines you take. How can you care for yourself at home? · Eat a variety of foods every day. Good choices include fruits, vegetables, whole grains (like oatmeal), dried beans and peas, nuts and seeds, soy products (like tofu), and fat-free or low-fat dairy products. · Replace butter, margarine, and hydrogenated or partially hydrogenated oils with olive and canola oils. (Canola oil margarine without trans fat is fine.)  · Replace red meat with fish, poultry, and soy protein (like tofu). · Limit processed and packaged foods like chips, crackers, and cookies. · Bake, broil, or steam foods. Don't saenz them. · Be physically active. Get at least 30 minutes of exercise on most days of the week. Walking is a good choice. You also may want to do other activities, such as running, swimming, cycling, or playing tennis or team sports.   · Stay at a healthy weight or lose weight by making the changes in eating and physical activity listed above. Losing just a small amount of weight, even 5 to 10 pounds, can reduce your risk for having a heart attack or stroke. · Do not smoke. When should you call for help? Watch closely for changes in your health, and be sure to contact your doctor if:  ? · You need help making lifestyle changes. ? · You have questions about your medicine. Where can you learn more? Go to http://radha-za.info/. Enter H833 in the search box to learn more about \"High Cholesterol: Care Instructions. \"  Current as of: September 21, 2016  Content Version: 11.4  © 8231-6530 [employer]. Care instructions adapted under license by [employer] (which disclaims liability or warranty for this information). If you have questions about a medical condition or this instruction, always ask your healthcare professional. [address] any warranty or liability for your use of this information.

## 2018-02-20 NOTE — PROGRESS NOTES
HISTORY OF PRESENT ILLNESS   HPI  Fasting follow up hypercholesterolemia, hypertension, hypothyroidism, depression, labs and medication check. Overall has been getting along well and feeling pretty good in general.  She has been seeing a chiropractor and received some laser treatments of her knees which has helped some but not completely. She is still taking the Mobic daily which enables her to remain active and be more functional.  She started a nutrition/wt loss program in 1-2017 offered by her chiropractor called [employer].  It consists of varying low calorie diet/meal plans that focus on low fat, low carb foods, lean protein and increased green vegetables. She has lost 32 lbs since doing so. In the literature she was reading, it shared that people who take Wellbutrin could be hindered by the wt reduction benefits. She has been on this medication x several years for chronic depression. Her mood and energy have been really good for a long time now and she would like to taper the dose and see how she does on the lower dose for a while. REVIEW OF SYMPTOMS     Review of Systems   Respiratory: Negative. Cardiovascular: Negative. Negative for chest pain, palpitations and leg swelling (she states her ankle and leg edema have been really well controlled, not bothered by it for a while). Gastrointestinal: Negative. Psychiatric/Behavioral: Negative for depression.  The patient is not nervous/anxious and does not have insomnia.            PROBLEM LIST/MEDICAL HISTORY      Problem List  Date Reviewed: 2/20/2018          Codes Class Noted    Benign essential hypertension ICD-10-CM: I10  ICD-9-CM: 401.1  5/26/2016    Overview Signed 5/26/2016  9:26 AM by Narayan Quesada MD     2006             Hypothyroidism ICD-10-CM: E03.9  ICD-9-CM: 244.9  5/26/2016    Overview Signed 5/26/2016  9:27 AM by Narayan Quesada MD     7/1999             Primary osteoarthritis of left knee ICD-10-CM: M17.12  ICD-9-CM: 715.16  5/26/2016    Overview Signed 5/26/2016  9:42 AM by [address], [place]     Extensive; Ortho (needs knee replacement), CSI x 2,              Hypercholesterolemia ICD-10-CM: E78.00  ICD-9-CM: 272.0  6/25/2013    Overview Signed 6/25/2013  8:29 AM by [address], MD     ~2006             S/P benign cervical polypectomy ICD-10-CM: N95.719, Z87.42  ICD-9-CM: V45.89  12/20/2010    Overview Addendum 5/26/2016  9:42 AM by [address], MD     2009, 2015; Gyn Dr Devi Johnson             H/O Benign Colon Polyp ICD-10-CM: K63.5  ICD-9-CM: 211.3  12/20/2010    Overview Signed 12/20/2010 12:10 PM by [address], MD     Colonoscopy 10/2005, 11/2010  q5yrs  Dr Darrian Cooper             H/O Uterine Fibroids ICD-10-CM: D25.9  ICD-9-CM: 218.9  12/20/2010    Overview Signed 12/20/2010 12:13 PM by [address], MD     2005; no surgical intervention  Gyn Dr Denver Paling incontinence ICD-10-CM: N39.3  ICD-9-CM: KGC1020  5/26/2010    Overview Signed 5/26/2010 12:47 PM by [address], MD     Urologist Dr Willy Clayton             Sleep apnea, RENÉE ICD-10-CM: U97.38  ICD-9-CM: 780.57  4/5/2010    Overview Addendum 12/20/2010 12:14 PM by [address], MD     2009; cpap             Anxiety ICD-10-CM: F41.9  ICD-9-CM: 300.00  4/5/2010        Carpal tunnel syndrome ICD-10-CM: G56.00  ICD-9-CM: 354.0  4/5/2010        Mild Dependent Ankle edema, B ICD-10-CM: M25.473  ICD-9-CM: 719.07  4/5/2010    Overview Signed 4/5/2010  3:52 PM by Taryn Lemus     mild             Perimenopausal ICD-10-CM: N95.1  ICD-9-CM: 627.2  4/5/2010    Overview Signed 4/5/2010  3:52 PM by Taryn Lemus     2007             Depression ICD-10-CM: F32.9  ICD-9-CM: 638  4/5/2010        ?  Passed Kidney stone ICD-10-CM: N20.0  ICD-9-CM: 592.0  4/5/2010    Overview Signed 5/26/2010 12:47 PM by [address] [place] [address] [address], MD     2/2010                       PAST SURGICAL HISTORY     Past Surgical History:   Procedure Laterality Date    COLONOSCOPY  10/05    benign polyp; repeat 5 yr per Dr Noel Moses COLONOSCOPY  11/2010    normal, f/u 5 yrs, Dr Noel Moses EKG ORDERABLE  5/2009    NSR, rate 84, normal EKG    HX BLADDER SUSPENSION  ~2010    Dr Richard Meza  11/2011    VPI, right breast biopsy negative    HX CARPAL TUNNEL RELEASE  3/2010    Right; Dr Santos Claire  3/2010    right hand carpal tunnel    HX CERVICAL POLYPECTOMY  2009, 2015    Dr Dex Spicer    HX COLONOSCOPY  4/2016    \"normal\", [facility] yrs[facility]    HX COLONOSCOPY  04/08/2016    Dr Brian Hannon-Internaal Hemorrhoids- Normal mucosa in the whole colon - Repeat colonoscopy in five years    HX DILATION AND CURETTAGE  7/2005    AUB, benign/neg bx; Dr Meme Phillipsnt  10/2012    Dr Dex Spicer, [facility]    HX PARTIAL HYSTERECTOMY  4/26/13    Supracervical hysterectomy for fibroids, Dr Rosalva Simons  age 11   [facility]           MEDICATIONS      Current Outpatient Prescriptions   Medication Sig    levothyroxine (SYNTHROID) 150 mcg tablet take 1 tablet by mouth once daily BEFORE BREAKFAST    meloxicam (MOBIC) 15 mg tablet take 1 tablet by mouth daily WITH BREAKFAST    losartan (COZAAR) 100 mg tablet take 1 tablet by mouth once daily    clindamycin (CLEOCIN) 300 mg capsule take 1 capsule by mouth every 6 hours for 10 days    buPROPion XL (WELLBUTRIN XL) 300 mg XL tablet take 1 tablet by mouth once daily     No current facility-administered medications for this visit.            ALLERGIES     Allergies   Allergen Reactions    Lisinopril Cough    Pcn [Penicillins] Hives    Sulfa (Sulfonamide Antibiotics) Hives    Zocor [Simvastatin] Other (comments)     aches          SOCIAL HISTORY       Social History     Social History    Marital status:      Spouse name: N/A    Number of children: 1    Years of education: N/A     Occupational History   [address] [employer]      Social History Main Topics    Smoking status: Former Smoker     Quit date: 1/1/2005    Smokeless tobacco: Never Used    Alcohol use Yes      Comment: maybe 2-3 drinks a week at [facility] Drug use: No    Sexual activity: Yes     Partners: Male     Other Topics Concern    Caffeine Concern No     no coffee, tea and cut out her diet Cokes    Weight Concern Yes     started Nutra[employer] Diet 1-1-18 through her chiropractor and so far has lost 32 lbs    Special Diet Yes     started [employer] Diet 1-1-18 through her chiropractor; no sugars, low carb, reduced calorie, more protein and vegetables    Exercise No     Social History Narrative    1 biological daughter, 1 \"adopted\", and 2 step children        IMMUNIZATIONS  Immunization History   Administered Date(s) Administered    Influenza Vaccine 09/27/2013, 10/03/2014, 11/10/2015, 08/07/2016, 09/30/2017    Influenza Vaccine Split 12/20/2010, 10/06/2011    Meningococcal ACWY Vaccine 01/27/2013    TD Vaccine 06/16/1999, 05/27/2009    Td 06/25/2017    Tdap 05/26/2016    Zoster Vaccine, Live 01/27/2013         FAMILY HISTORY     Family History   Problem Relation Age of Onset    Arthritis-osteo Father     Stroke Father      TIA's   [facility] Cancer Father      melanoma on back removed    Heart Disease Father 80     pacemaker  placed    Hypertension Mother     Arthritis-osteo Mother     Heart Disease Mother      valve disease    Heart Attack Maternal Grandmother 76    Depression Daughter     Substance Abuse Daughter     Alcohol abuse Daughter          VITALS     Visit Vitals    /80 (BP 1 Location: Left arm, BP Patient Position: Sitting)    Pulse 73    Temp 99 °F (37.2 °C) (Oral)    Resp 18    Ht 5' 7\" (1.702 m)    Wt 241 lb 12.8 oz (109.7 kg)    LMP 05/01/2011    SpO2 98%    BMI 37.87 kg/m2          PHYSICAL EXAMINATION     Physical Exam   Constitutional: No distress.    Neck: Neck supple. No JVD present. Carotid bruit is not present. No thyromegaly present. Cardiovascular: Normal rate, regular rhythm and normal heart sounds. Exam reveals no gallop and no friction rub. No murmur heard. Pulmonary/Chest: Effort normal and breath sounds normal. No respiratory distress. Abdominal: Soft. Bowel sounds are normal. She exhibits no distension and no mass. There is no tenderness. Musculoskeletal: She exhibits no tenderness. Mild, non pitting B ankle edema. Skin warm, intact. Psychiatric: Mood, affect and judgment normal.   Vitals reviewed.              ASSESSMENT & PLAN       ICD-10-CM ICD-9-CM    1. Benign essential hypertension I10 401.1 CBC W/O DIFF      LIPID PANEL      METABOLIC PANEL, BASIC   2. Hypercholesterolemia E78.00 272.0 LIPID PANEL      ALT      AST   3. Hypothyroidism, unspecified type E03.9 244.9 T4, FREE      TSH 3RD GENERATION   4. Chronic depression F32.9 311 buPROPion XL (WELLBUTRIN XL) 150 mg tablet   5. Encounter for screening mammogram for breast cancer Z12.31 V76.12 [facility] MAMMO BI SCREENING INCL CAD     Fasting labs today  Reviewed diet, nutrition, exercise, wt mgt, BMI and goals  Commended on her modifications and progress thus far  Cardiovascular risk and specific lipid/LDL goals reviewed  Reviewed medications and side effects in detail  Doing well on current regimen. Her depression has been under really good control on Wellbutrin x years and she would like to try tapering dose and see how she does on the reduced dose for a while. Decreased Wellbutrin XL from 300 to 150 mg qam and reassess over the next few months, sooner prn  Further follow up & other recommendations pending review of labs.  If all remains good and stable, follow up in 6months

## 2018-02-21 LAB
ALT SERPL-CCNC: 20 IU/L (ref 0–32)
AST SERPL-CCNC: 14 IU/L (ref 0–40)
BUN SERPL-MCNC: 14 MG/DL (ref 8–27)
BUN/CREAT SERPL: 16 (ref 12–28)
CALCIUM SERPL-MCNC: 9.7 MG/DL (ref 8.7–10.3)
CHLORIDE SERPL-SCNC: 101 MMOL/L (ref 96–106)
CHOLEST SERPL-MCNC: 190 MG/DL (ref 100–199)
CO2 SERPL-SCNC: 20 MMOL/L (ref 18–29)
CREAT SERPL-MCNC: 0.9 MG/DL (ref 0.57–1)
ERYTHROCYTE [DISTWIDTH] IN BLOOD BY AUTOMATED COUNT: 13.8 % (ref 12.3–15.4)
GFR SERPLBLD CREATININE-BSD FMLA CKD-EPI: 69 ML/MIN/{1.73_M2}
GFR SERPLBLD CREATININE-BSD FMLA CKD-EPI: 79 ML/MIN/{1.73_M2}
GLUCOSE SERPL-MCNC: 92 MG/DL (ref 65–99)
HCT VFR BLD AUTO: 41.4 % (ref 34–46.6)
HDLC SERPL-MCNC: 39 MG/DL
HGB BLD-MCNC: 13.2 G/DL (ref 11.1–15.9)
INTERPRETATION, 910389: NORMAL
LDLC SERPL CALC-MCNC: 124 MG/DL (ref 0–99)
MCH RBC QN AUTO: 29.5 PG (ref 26.6–33)
MCHC RBC AUTO-ENTMCNC: 31.9 G/DL (ref 31.5–35.7)
MCV RBC AUTO: 92 FL (ref 79–97)
PLATELET # BLD AUTO: 181 X10E3/UL (ref 150–379)
POTASSIUM SERPL-SCNC: 4.7 MMOL/L (ref 3.5–5.2)
RBC # BLD AUTO: 4.48 X10E6/UL (ref 3.77–5.28)
SODIUM SERPL-SCNC: 140 MMOL/L (ref 134–144)
T4 FREE SERPL-MCNC: 2.29 NG/DL (ref 0.82–1.77)
TRIGL SERPL-MCNC: 134 MG/DL (ref 0–149)
TSH SERPL DL<=0.005 MIU/L-ACNC: 0.03 UIU/ML (ref 0.45–4.5)
VLDLC SERPL CALC-MCNC: 27 MG/DL (ref 5–40)
WBC # BLD AUTO: 4.1 X10E3/UL (ref 3.4–10.8)

## 2018-02-27 ENCOUNTER — TELEPHONE (OUTPATIENT)
Dept: FAMILY MEDICINE CLINIC | Age: 63
End: 2018-02-27

## 2018-02-27 DIAGNOSIS — E03.9 HYPOTHYROIDISM, UNSPECIFIED TYPE: Primary | ICD-10-CM

## 2018-02-28 NOTE — TELEPHONE ENCOUNTER
Blood counts, liver, kidney, electrolytes normal.  BS good and wnl  TG MARKEDLY better from 202 to 134 and LDL has improved nicely from 144 to 124 and is the best it has been the last several checks, great job! HDL did go down however from 54 to 39 w/ goal being >50. Keep working on diet and wt loss, incorporate exercise as discussed and target at least 150 minutes per week    Also unfortunately her thyroid has continued to go more on \"overactive\" side and now we MUST decrease her dose to reduce her risk of cardiac and bone effects. She is currently taking Synthroid 150 mcg daily. Reduce to 137 mcg daily. After r/w pt route back so I can send in new dose, confirm pharm. RTC for non fasting lab check only in 8 weeks. Order pended, no appt needed.

## 2018-03-02 ENCOUNTER — TELEPHONE (OUTPATIENT)
Dept: FAMILY MEDICINE CLINIC | Age: 63
End: 2018-03-02

## 2018-03-02 RX ORDER — LEVOTHYROXINE SODIUM 137 UG/1
137 TABLET ORAL
Qty: 30 TAB | Refills: 3 | Status: CANCELLED | OUTPATIENT
Start: 2018-03-02

## 2018-03-02 RX ORDER — LEVOTHYROXINE SODIUM 137 UG/1
137 TABLET ORAL
Qty: 30 TAB | Refills: 3 | OUTPATIENT
Start: 2018-03-02 | End: 2018-05-25 | Stop reason: SDUPTHER

## 2018-03-02 NOTE — TELEPHONE ENCOUNTER
PI of results. She states that she wants me to leave a voice mail of her results or anything tht I need to tell her. She can't always call back right away. I explained that we aren't allowed to do that unless the pt tells the [facility] that she wants us too. (It can't be a standing order). She states understanding. I told her I would put it in her chart. I confirmed pharmacy and she does 30 day supply.

## 2018-03-02 NOTE — TELEPHONE ENCOUNTER
Patient returned your call in ref to a medication refill. She says that she will call you every time she is available hoping to catch you.   Best # [phone]  Davida Alegria  3/2/18

## 2018-03-06 ENCOUNTER — HOSPITAL ENCOUNTER (OUTPATIENT)
Dept: MAMMOGRAPHY | Age: 63
Discharge: HOME | End: 2018-03-06
Attending: FAMILY MEDICINE
Payer: COMMERCIAL

## 2018-03-06 DIAGNOSIS — Z12.31 ENCOUNTER FOR SCREENING MAMMOGRAM FOR BREAST CANCER: ICD-10-CM

## 2018-03-06 PROCEDURE — 77067 SCR MAMMO BI INCL CAD: CPT

## 2018-04-17 ENCOUNTER — TELEPHONE (OUTPATIENT)
Dept: FAMILY MEDICINE CLINIC | Age: 63
End: 2018-04-17

## 2018-04-17 DIAGNOSIS — G47.33 OSA (OBSTRUCTIVE SLEEP APNEA): Primary | ICD-10-CM

## 2018-04-17 NOTE — TELEPHONE ENCOUNTER
----- Message from Vladimir Treadwell sent at 4/17/2018  9:21 AM EDT -----  Regarding: RE: Non-Urgent Medical Question  Contact: [phone]  Thank you! I have made an appointment with Dr Thuan Smith for July 18. I told them Dr Gricelda Saez recommended her. Do I need some kind of official referral?  ----- Message -----  From: Brian Askew LPN  Sent: 1/84/4194  3:56 PM EDT  To: Vladimir Treadwell  Subject: RE: Non-Urgent Medical Question    I can set up a referral and they will contact you if you would like. Or you can call and see when they can get you in. NORAH    ----- Message -----     From: Vladimir Treadwell     Sent: 4/12/2018  2:29 PM EDT       To: Hector Miranda MD  Subject: RE: Non-Urgent Medical Question    Thanks so much!  ----- Message -----  From: Brian Askew LPN  Sent: 1/84/0578  1:46 PM EDT  To: Vladimir Treadwell  Subject: RE: Non-Urgent Medical Question    We refer to Dr Thuan Smith @ [facility]. They have an office at [place] [address]. They have 2 other places as well. At [address] [place] [address]. NORAH    I don't know if they are with [employer]. I would assume they are since we are.    ----- Message -----     From: Vladimir Treadwell     Sent: 4/12/2018 11:07 AM EDT       To: Hector Miranda MD  Subject: Non-Urgent Medical Question    Hey, it is me again. ..... Riky Myers Maybe you can refer a specialist that deals with sleep apnea that is in the [facility]?     Thanks

## 2018-05-25 DIAGNOSIS — I10 BENIGN ESSENTIAL HYPERTENSION: ICD-10-CM

## 2018-05-26 RX ORDER — LOSARTAN POTASSIUM 100 MG/1
TABLET ORAL
Qty: 30 TAB | Refills: 5 | Status: SHIPPED | OUTPATIENT
Start: 2018-05-26 | End: 2018-11-20 | Stop reason: SDUPTHER

## 2018-05-26 RX ORDER — LEVOTHYROXINE SODIUM 137 UG/1
TABLET ORAL
Qty: 30 TAB | Refills: 0 | Status: SHIPPED | OUTPATIENT
Start: 2018-05-26 | End: 2018-06-23 | Stop reason: SDUPTHER

## 2018-06-24 RX ORDER — LEVOTHYROXINE SODIUM 137 UG/1
TABLET ORAL
Qty: 14 TAB | Refills: 0 | Status: SHIPPED | OUTPATIENT
Start: 2018-06-24 | End: 2018-07-24 | Stop reason: SDUPTHER

## 2018-06-25 DIAGNOSIS — E03.9 HYPOTHYROIDISM, UNSPECIFIED TYPE: ICD-10-CM

## 2018-06-26 ENCOUNTER — TELEPHONE (OUTPATIENT)
Dept: FAMILY MEDICINE CLINIC | Age: 63
End: 2018-06-26

## 2018-06-26 LAB
T4 FREE SERPL-MCNC: 1.59 NG/DL (ref 0.82–1.77)
TSH SERPL DL<=0.005 MIU/L-ACNC: 0.78 UIU/ML (ref 0.45–4.5)

## 2018-06-27 NOTE — TELEPHONE ENCOUNTER
Can call and LM for pt that her follow up thyroid tests are improved and normal. Can cont same meds. Set fasting cpe for ~ 2-2019, return sooner prn.

## 2018-07-18 ENCOUNTER — OFFICE VISIT (OUTPATIENT)
Dept: SLEEP MEDICINE | Age: 63
End: 2018-07-18

## 2018-07-18 VITALS
SYSTOLIC BLOOD PRESSURE: 105 MMHG | HEIGHT: 67 IN | HEART RATE: 73 BPM | WEIGHT: 223 LBS | BODY MASS INDEX: 35 KG/M2 | OXYGEN SATURATION: 94 % | DIASTOLIC BLOOD PRESSURE: 66 MMHG

## 2018-07-18 DIAGNOSIS — I10 BENIGN ESSENTIAL HYPERTENSION: ICD-10-CM

## 2018-07-18 DIAGNOSIS — G47.33 OBSTRUCTIVE SLEEP APNEA (ADULT) (PEDIATRIC): Primary | ICD-10-CM

## 2018-07-18 NOTE — Clinical Note
Thank you for the referral.  I will keep you informed of her progress.  [address], [place]

## 2018-07-18 NOTE — PROGRESS NOTES
217 [facility]. [place], [address]  Tel.  [phone]  Fax. 100 [facility] 60  [place], [address]  Tel.  [phone]  Fax. [phone] [address] [address] [address]  Tel.  [phone]  Fax. [phone]       Subjective:      Raven Bey is a 61 y.o. female who I am asked to see in consultation for evaluation and management of sleep apnea. She was diagnosed with sleep apnea 9 years ago. She is using her device regularly. She complains of needing newmachine associated withhers is wearing out and is big and bulky . Symptoms began a few years ago, unchanged since that time. She usually can fall asleep in 20 minutes. Family or house members note snoring, periods of not breathing, without CPAP. She denies falling asleep while driving. There are minimal  mask comfort problems. The following problems are noted with PAP:     Drowsiness no Problems exhaling no   Snoring no Forget to put on no   Mask Comfortable no Can't fall asleep no   Dry Mouth no Mask falls off yes   Air Leaking yes Frequent awakenings yes     Raven Bey does not wake up frequently at night. She is not bothered by waking up too early and left unable to get back to sleep. She actually sleeps about 7 hours at night and wakes up about 1 times during the night. She does not work shifts:  .   Simeon Barnes indicates she does not get too little sleep at night. Her bedtime is 2300. She awakens at 0700. She does take naps. She takes 1 naps a week lasting 45 min to an hour. She has the following observed behaviors: Light snoring;  .   Other remarks:    She needs a new machine  Graham Sleepiness Score: 4      Allergies   Allergen Reactions    Lisinopril Cough    Pcn [Penicillins] Hives    Sulfa (Sulfonamide Antibiotics) Hives    Zocor [Simvastatin] Other (comments)     aches         Current Outpatient Prescriptions:     levothyroxine (SYNTHROID) 137 mcg tablet, take 1 tablet by mouth every morning on empty stomach, Disp: 14 Tab, Rfl: 0    losartan (COZAAR) 100 mg tablet, take 1 tablet by mouth once daily, Disp: 30 Tab, Rfl: 5    OTHER, Portable CPAP machine for RENÉE, with new mask and tubing but no change to mask or settings; G47.30 GARO 99m prog good, Disp: 1 Each, Rfl: 0    meloxicam (MOBIC) 15 mg tablet, take 1 tablet by mouth daily WITH BREAKFAST, Disp: 30 Tab, Rfl: 5    buPROPion XL (WELLBUTRIN XL) 150 mg tablet, Take 1 Tab by mouth Daily (before breakfast). , Disp: 30 Tab, Rfl: 5    meloxicam (MOBIC) 15 mg tablet, take 1 tablet by mouth daily WITH BREAKFAST, Disp: 90 Tab, Rfl: 1    clindamycin (CLEOCIN) 300 mg capsule, take 1 capsule by mouth every 6 hours for 10 days, Disp: , Rfl: 0     She  has a past medical history of Ankle edema (4/5/2010); Anxiety (4/5/2010); Benign essential hypertension (5/26/2016); Carpal tunnel syndrome (4/5/2010); Depression (4/5/2010); Elevated cholesterol (4/5/2010); H/O Benign Colon Polyp (12/20/2010); H/O Uterine Fibroids (12/20/2010); HBP (high blood pressure) (4/5/2010); Hypercholesteremia (5/26/2010); Hypercholesterolemia (6/25/2013); Hypertension (5/24/2012); Hypothyroid (4/5/2010); Hypothyroidism (5/26/2016); Hypothyroidism (acquired) (5/11/2015); Kidney stone (4/5/2010); Other ill-defined conditions(799.89); Perimenopausal (4/5/2010); Primary osteoarthritis of left knee (5/26/2016); Psychiatric disorder; S/P benign cervical polypectomy (12/20/2010); Sleep apnea (4/5/2010); Stress incontinence (5/26/2010); and Thyroid disease.     She  has a past surgical history that includes hx carpal tunnel release (3/2010); colonoscopy (10/05); hx tonsil and adenoidectomy (age 11); hx wisdom teeth extraction; hx carpal tunnel release (3/2010); hx dilation and curettage (7/2005); colonoscopy (11/2010); ekg orderable (5/2009); hx bladder suspension (~2010); hx cervical polypectomy (2009, 2015); hx dilation and curettage (10/2012); hx breast biopsy (11/2011); hx partial hysterectomy (4/26/13); hx colonoscopy (4/2016); and hx colonoscopy (04/08/2016). She family history includes Alcohol abuse in her daughter; Arthritis-osteo in her father and mother; Cancer in her father; Depression in her daughter; Heart Attack (age of onset: 76) in her maternal grandmother; Heart Disease in her mother; Heart Disease (age of onset: 80) in her father; Hypertension in her mother; Stroke in her father; Substance Abuse in her daughter. She  reports that she quit smoking about 13 years ago. She has never used smokeless tobacco. She reports that she drinks alcohol. She reports that she does not use illicit drugs. Review of Systems:  Constitutional: +weight loss (50 pounds)  Eyes:  No blurred vision. CVS:  No significant chest pain  Pulm:  No significant shortness of breath  GI:  No significant nausea or vomiting  :  No significant nocturia  Musculoskeletal:   significant joint pain at night  Skin:  No significant rashes  Neuro:  No significant dizziness   Psych:  No active mood issues    raredreaming noted; no nightmares ; no early morning headaches ; no memory problems; occasional concentration issues; no history of any automobile or occupational accidents due to daytime drowsiness. Objective:     Visit Vitals    /66 (BP 1 Location: Left arm, BP Patient Position: Sitting)    Pulse 73    Ht 5' 7\" (1.702 m)    Wt 223 lb (101.2 kg)    LMP 05/01/2011    SpO2 94%    BMI 34.93 kg/m2         General:   Not in acute distress   Eyes:  Anicteric sclerae, no obvious strabismus   Nose:  No obvious nasal septum deviation    Oropharynx:   Class 3 oropharyngeal outlet, thick tongue base, enlarged and boggy uvula, low-lying soft palate, narrow tonsilo-pharyngeal pilars   Tonsils:   tonsils are absent   Neck:   Neck circ.  in \"inches\": 14; midline trachea   Chest/Lungs:  Equal lung expansion, clear on auscultation    CVS:  Normal rate, regular rhythm; no JVD   Skin:  Warm to touch; no obvious rashes   Neuro:  No focal deficits ; no obvious tremor    Psych:  Normal affect,  normal countenance;          Assessment:       ICD-10-CM ICD-9-CM    1. Obstructive sleep apnea (adult) (pediatric) G47.33 327.23 SLEEP STUDY UNATTENDED, 4 CHANNEL   2. Benign essential hypertension I10 401.1        Plan:         Follow-up Disposition: Not on File      No previous sleep records available. HSAT ordered to reestablish the diagnosis of sleep apnea. I have reviewed the different types of sleep studies. Attended sleep studies and home sleep apnea tests. Home sleep testing tests only for the presence and severity of sleep apnea. she understands that if the HSAT does not provide reliable result(such as poor data/failed HSAT recording), she may have to repeat the HSAT or come in for an attended polysomnogram.   Treatment options for sleep apnea were reviewed. she is not against a trial of PAP if found to have significant sleep apnea. She needs a new PAP (APAP 5-12 cm)  The treatment plan was reviewed with the patient in detail and reviewed with the patient and the lead technologist. she understands that the lead technologist will be calling her  with the results and assisting with the next step in the treatment plan as outlined today during the consultation with me. All of her questions were addressed. Counseling was provided regarding the importance of regular PAP use and on proper sleep hygiene and safe driving. 2. Hypertension - she continues on her current regimen. I have reviewed the relationship between hypertension as it relates to sleep-disordered breathing. Thank you for allowing us to participate in your patient's medical care.   Electronically signed by    Denise Ramirez MD  Diplomate in Sleep Medicine  [facility]

## 2018-07-18 NOTE — PATIENT INSTRUCTIONS
217 [facility] [place]. [place], [address]  Tel.  [phone]  Fax. 100 [facility] 60  [place], [address] [facility]  Tel.  [phone]  Fax. [phone] [address]  Tel.  [phone]  Fax. [phone]     Sleep Apnea: After Your Visit  Your Care Instructions  Sleep apnea occurs when you frequently stop breathing for 10 seconds or longer during sleep. It can be mild to severe, based on the number of times per hour that you stop breathing or have slowed breathing. Blocked or narrowed airways in your nose, mouth, or throat can cause sleep apnea. Your airway can become blocked when your throat muscles and tongue relax during sleep. Sleep apnea is common, occurring in 1 out of 20 individuals. Individuals having any of the following characteristics should be evaluated and treated right away due to high risk and detrimental consequences from untreated sleep apnea:  1. Obesity  2. Congestive Heart failure  3. Atrial Fibrillation  4. Uncontrolled Hypertension  5. Type II Diabetes  6. Night-time Arrhythmias  7. Stroke  8. Pulmonary Hypertension  9. High-risk Driving Populations (pilots, truck drivers, etc.)  10. Patients Considering Weight-loss Surgery    How do you know you have sleep apnea? You probably have sleep apnea if you answer 'yes' to 3 or more of the following questions:  S - Have you been told that you Snore? T - Are you often Tired during the day? O - Has anyone Observed you stop breathing while sleeping? P- Do you have (or are being treated for) high blood Pressure? B - Are you obese (Body Mass Index > 35)? A - Is your Age 48years old or older? N - Is your Neck size greater than 16 inches? G - Are you male Gender? A sleep physician can prescribe a breathing device that prevents tissues in the throat from blocking your airway.  Or your doctor may recommend using a dental device (oral breathing device) to help keep your airway open. In some cases, surgery may be needed to remove enlarged tissues in the throat. Follow-up care is a key part of your treatment and safety. Be sure to make and go to all appointments, and call your doctor if you are having problems. It's also a good idea to know your test results and keep a list of the medicines you take. How can you care for yourself at home? · Lose weight, if needed. It may reduce the number of times you stop breathing or have slowed breathing. · Go to bed at the same time every night. · Sleep on your side. It may stop mild apnea. If you tend to roll onto your back, sew a pocket in the back of your pajama top. Put a tennis ball into the pocket, and stitch the pocket shut. This will help keep you from sleeping on your back. · Avoid alcohol and medicines such as sleeping pills and sedatives before bed. · Do not smoke. Smoking can make sleep apnea worse. If you need help quitting, talk to your doctor about stop-smoking programs and medicines. These can increase your chances of quitting for good. · Prop up the head of your bed 4 to 6 inches by putting bricks under the legs of the bed. · Treat breathing problems, such as a stuffy nose, caused by a cold or allergies. · Use a continuous positive airway pressure (CPAP) breathing machine if lifestyle changes do not help your apnea and your doctor recommends it. The machine keeps your airway from closing when you sleep. · If CPAP does not help you, ask your doctor whether you should try other breathing machines. A bilevel positive airway pressure machine has two types of air pressureâone for breathing in and one for breathing out. Another device raises or lowers air pressure as needed while you breathe. · If your nose feels dry or bleeds when using one of these machines, talk with your doctor about increasing moisture in the air. A humidifier may help.   · If your nose is runny or stuffy from using a breathing machine, talk with your doctor about using decongestants or a corticosteroid nasal spray. When should you call for help? Watch closely for changes in your health, and be sure to contact your doctor if:  · You still have sleep apnea even though you have made lifestyle changes. · You are thinking of trying a device such as CPAP. · You are having problems using a CPAP or similar machine. Where can you learn more? Go to [employer]. Enter L380 in the search box to learn more about \"Sleep Apnea: After Your Visit. \"   © 1572-1789 Healthwise, Incorporated. Care instructions adapted under license by Sade Melton (which disclaims liability or warranty for this information). This care instruction is for use with your licensed healthcare professional. If you have questions about a medical condition or this instruction, always ask your healthcare professional. Ami Driscoll any warranty or liability for your use of this information. PROPER SLEEP HYGIENE    What to avoid  · Do not have drinks with caffeine, such as coffee or black tea, for 8 hours before bed. · Do not smoke or use other types of tobacco near bedtime. Nicotine is a stimulant and can keep you awake. · Avoid drinking alcohol late in the evening, because it can cause you to wake in the middle of the night. · Do not eat a big meal close to bedtime. If you are hungry, eat a light snack. · Do not drink a lot of water close to bedtime, because the need to urinate may wake you up during the night. · Do not read or watch TV in bed. Use the bed only for sleeping and sexual activity. What to try  · Go to bed at the same time every night, and wake up at the same time every morning. Do not take naps during the day. · Keep your bedroom quiet, dark, and cool. · Get regular exercise, but not within 3 to 4 hours of your bedtime. .  · Sleep on a comfortable pillow and mattress.   · If watching the clock makes you anxious, turn it facing away from you so you cannot see the time. · If you worry when you lie down, start a worry book. Well before bedtime, write down your worries, and then set the book and your concerns aside. · Try meditation or other relaxation techniques before you go to bed. · If you cannot fall asleep, get up and go to another room until you feel sleepy. Do something relaxing. Repeat your bedtime routine before you go to bed again. · Make your house quiet and calm about an hour before bedtime. Turn down the lights, turn off the TV, log off the computer, and turn down the volume on music. This can help you relax after a busy day. Drowsy Driving  The [address] [place] [address] Road Traffic Safety Administration cites drowsiness as a causing factor in more than 248,074 police reported crashes annually, resulting in 76,000 injuries and 1,500 deaths. Other surveys suggest 55% of people polled have driven while drowsy in the past year, 23% had fallen asleep but not crashed, 3% crashed, and 2% had and accident due to drowsy driving. Who is at risk? Young Drivers: One study of drowsy driving accidents states that 55% of the drivers were under 25 years. Of those, 75% were male. Shift Workers and Travelers: People who work overnight or travel across time zones frequently are at higher risk of experiencing Circadian Rhythm Disorders. They are trying to work and function when their body is programed to sleep. Sleep Deprived: Lack of sleep has a serious impact on your ability to pay attention or focus on a task. Consistently getting less than the average of 8 hours your body needs creates partial or cumulative sleep deprivation. Untreated Sleep Disorders: Sleep Apnea, Narcolepsy, R.L.S., and other sleep disorders (untreated) prevent a person from getting enough restful sleep. This leads to excessive daytime sleepiness and increases the risk for drowsy driving accidents by up to 7 times.   Medications / Alcohol: Even over the counter medications can cause drowsiness. Medications that impair a drivers attention should have a warning label. Alcohol naturally makes you sleepy and on its own can cause accidents. Combined with excessive drowsiness its effects are amplified. Signs of Drowsy Driving:   * You don't remember driving the last few miles   * You may drift out of your anna marie   * You are unable to focus and your thoughts wander   * You may yawn more often than normal   * You have difficulty keeping your eyes open / nodding off   * Missing traffic signs, speeding, or tailgating  Prevention-   Good sleep hygiene, lifestyle and behavioral choices have the most impact on drowsy driving. There is no substitute for sleep and the average person requires 8 hours nightly. If you find yourself driving drowsy, stop and sleep. Consider the sleep hygiene tips provided during your visit as well. Medication Refill Policy: Refills for all medications require 1 week advance notice. Please have your pharmacy fax a refill request. We are unable to fax, or call in \"controled substance\" medications and you will need to pick these prescriptions up from our office. [employer]is Activation    Thank you for requesting access to [employer]. Please follow the instructions below to securely access and download your online medical record. Id[employer]is allows you to send messages to your doctor, view your test results, renew your prescriptions, schedule appointments, and more. How Do I Sign Up? 1. In your internet browser, go to [email or web address]. 2. Click on the First Time User? Click Here link in the Sign In box. You will see the New Member Sign Up page. 3. Enter your Id[employer]is Access Code exactly as it appears below. You will not need to use this code after youve completed the sign-up process. If you do not sign up before the expiration date, you must request a new code. Idylis Access Code:  Activation code not generated  Current Idylis Status: Active (This is the date your [employer] access code will )    4. Enter the last four digits of your Social Security Number (xxxx) and Date of Birth (mm/dd/yyyy) as indicated and click Submit. You will be taken to the next sign-up page. 5. Create a RingCaptchat ID. This will be your [employer]use login ID and cannot be changed, so think of one that is secure and easy to remember. 6. Create a [employer] password. You can change your password at any time. 7. Enter your Password Reset Question and Answer. This can be used at a later time if you forget your password. 8. Enter your e-mail address. You will receive e-mail notification when new information is available in [address] 9. Click Sign Up. You can now view and download portions of your medical record. 10. Click the Download Summary menu link to download a portable copy of your medical information. Additional Information    If you have questions, please call [phone]. Remember, [employer] is NOT to be used for urgent needs. For medical emergencies, dial 911.

## 2018-07-18 NOTE — PROGRESS NOTES
Patients PAP machine was unable to be downloaded. The following information was able to be collected: Therapy Type: CPAP  PAP /Model: F&P SleepStyle 600  Set Pressure: 7 cm (by manometer)  Heated humidifier: yes  Mask /Size: Respironics Comfort gel nasal - medium                      [address] [place]. [place], [address]  Tel.  [phone]  Fax. 100 [facility] 60  [place], [address] [facility]  Tel.  [phone]  Fax. [phone] [facility] [place] [address]  Tel.  [phone]  Fax. [phone]       S>Fannie Cobb is a 61 y.o. female seen today to receive a home sleep testing unit (HST). · Patient was educated on proper hookup and operation of the HST. · Instruction forms and documentation were reviewed and signed. · The patient demonstrated good understanding of the HST. O>    Visit Vitals    /66 (BP 1 Location: Left arm, BP Patient Position: Sitting)    Pulse 73    Ht 5' 7\" (1.702 m)    Wt 223 lb (101.2 kg)    LMP 05/01/2011    SpO2 94%    BMI 34.93 kg/m2    Neck circ. in \"inches\": 14    A>  1. Obstructive sleep apnea (adult) (pediatric)          P>  · General information regarding operations and maintenance of the device was provided. · She was provided information on sleep apnea including coresponding risk factors and the importance of proper treatment. · Follow-up appointment was made to return the HST. She will be contacted once the results have been reviewed. · She was asked to contact our office for any problems regarding her home sleep test study.

## 2018-07-24 DIAGNOSIS — F32.A CHRONIC DEPRESSION: ICD-10-CM

## 2018-07-25 ENCOUNTER — TELEPHONE (OUTPATIENT)
Dept: SLEEP MEDICINE | Age: 63
End: 2018-07-25

## 2018-07-25 DIAGNOSIS — G47.33 OBSTRUCTIVE SLEEP APNEA (ADULT) (PEDIATRIC): Primary | ICD-10-CM

## 2018-07-25 NOTE — TELEPHONE ENCOUNTER
[facility]    Date of Study: 7/18/18    The following information was gathered from the patients study log:    · Lights off: 11:50P  · Estimated sleep onset: 12:50A    · Awakened a total of 4 times  · The patient felt they slept 6 hours  · Patient took no sleep aid before starting the test  · Sleep quality was worse compared to a usual nights sleep. Further information provided: \"Seemed more restless than usual.  Could be the change in pattern with sleep study, but do have a lot going on. \"

## 2018-07-26 RX ORDER — BUPROPION HYDROCHLORIDE 150 MG/1
TABLET ORAL
Qty: 90 TAB | Refills: 0 | Status: SHIPPED | OUTPATIENT
Start: 2018-07-26 | End: 2018-10-22 | Stop reason: SDUPTHER

## 2018-07-26 RX ORDER — LEVOTHYROXINE SODIUM 137 UG/1
TABLET ORAL
Qty: 90 TAB | Refills: 0 | Status: SHIPPED | OUTPATIENT
Start: 2018-07-26 | End: 2018-09-22 | Stop reason: SDUPTHER

## 2018-07-26 NOTE — TELEPHONE ENCOUNTER
Results of sleep study in [employer]  Lead tech to convey results to patient  HSAT results in [employer]. Test positive for moderate sleep apnea. AHI 19/hour and lowest oxygen saturation was 78%. We had discussed treatment options at initial consultation. Based on the results of the home sleep apnea test, I believe an APAP (this will be a replacement device since hers is so old) would be an effective mode of therapy. APAP order attached. she should be seen in the sleep disorder center 4-6 weeks after initiating PAP therapy. The APAP will have modem access so she can call the sleep center if she  has questions/concerns regarding the initial PAP settings. Front staff to Order PAP and call patient and let them know which DME company they should be hearing from after results reviewed with lead support technologist.   Schedule for first adherence visit in 6 weeks.

## 2018-07-26 NOTE — TELEPHONE ENCOUNTER
Last seen 2/20/18, last lab 6/25/18. Pt scheduled 2/1/19. rx sent over per verbal order from Dr Gregg Crane    thyroid tests are improved and normal. Can cont same meds. Set fasting cpe for ~ 2-2019, return sooner prn.

## 2018-07-31 ENCOUNTER — DOCUMENTATION ONLY (OUTPATIENT)
Dept: SLEEP MEDICINE | Age: 63
End: 2018-07-31

## 2018-07-31 NOTE — TELEPHONE ENCOUNTER
Reviewed sleep study results with patient. She expressed understanding and is willing to proceed with a trial of APAP. Fax DME order & Schedule 1st adherence visit in 60 to 90 days. She will leave to go out of town this Saturday 8/4/2018 and will be gone for about 10 days.

## 2018-08-02 ENCOUNTER — TELEPHONE (OUTPATIENT)
Dept: SLEEP MEDICINE | Age: 63
End: 2018-08-02

## 2018-11-28 ENCOUNTER — OFFICE VISIT (OUTPATIENT)
Dept: SLEEP MEDICINE | Age: 63
End: 2018-11-28

## 2018-11-28 VITALS
WEIGHT: 228 LBS | OXYGEN SATURATION: 97 % | BODY MASS INDEX: 35.79 KG/M2 | SYSTOLIC BLOOD PRESSURE: 121 MMHG | HEART RATE: 68 BPM | HEIGHT: 67 IN | DIASTOLIC BLOOD PRESSURE: 75 MMHG

## 2018-11-28 DIAGNOSIS — I10 BENIGN ESSENTIAL HYPERTENSION: ICD-10-CM

## 2018-11-28 DIAGNOSIS — G47.33 OBSTRUCTIVE SLEEP APNEA (ADULT) (PEDIATRIC): Primary | ICD-10-CM

## 2018-11-28 PROBLEM — E66.01 SEVERE OBESITY (HCC): Status: ACTIVE | Noted: 2018-11-28

## 2018-11-28 NOTE — PROGRESS NOTES
217 [facility]., [facility]. [place], [address]  Tel.  [phone]  Fax. 100 [facility] 60  [place], [address] [facility]  Tel.  [phone]  Fax. [phone] [address]   Tel.  [phone]  Fax. [phone]     S>Fannie Crouch is a 61 y.o. female seen for a positive airway pressure follow-up. She reports no problems using the device. The following problems are identified:    Drowsiness no Problems exhaling no   Snoring no Forget to put on no   Mask Comfortable yes Can't fall asleep no   Dry Mouth no Mask falls off no   Air Leaking no Frequent awakenings no       Download reviewed. She admits that her sleep has improved. Therapy Apnea Index averaged over PAP use: 1 /hr which reflects improved sleep breathing condition. Allergies   Allergen Reactions    Lisinopril Cough    Pcn [Penicillins] Hives    Sulfa (Sulfonamide Antibiotics) Hives    Zocor [Simvastatin] Other (comments)     aches       She has a current medication list which includes the following prescription(s): losartan, bupropion xl, levothyroxine, OTHER, meloxicam, meloxicam, and clindamycin. .      She  has a past medical history of Ankle edema, Anxiety, Benign essential hypertension, Carpal tunnel syndrome, Depression, Elevated cholesterol, H/O Benign Colon Polyp, H/O Uterine Fibroids, HBP (high blood pressure), Hypercholesteremia, Hypercholesterolemia, Hypertension, Hypothyroid, Hypothyroidism, Hypothyroidism (acquired), Kidney stone, Other ill-defined conditions(799.89), Perimenopausal, Primary osteoarthritis of left knee, Psychiatric disorder, S/P benign cervical polypectomy, Sleep apnea, Stress incontinence, and Thyroid disease. [place] Sleepiness Score: 4   and Modified F.O.S.Q. Score Total / 2: 20   which reflect improved sleep quality over therapy time.     O>    Visit Vitals  /75 (BP 1 Location: Left arm, BP Patient Position: Sitting)   Pulse 68   Ht 5' 7\" (1.702 m)   Wt 228 lb (103.4 kg)   LMP 05/01/2011   SpO2 97%   BMI 35.71 kg/m²           General:   Alert, oriented, not in distress   Neck:   No JVD    Chest/Lungs:  symetrical lung expansion , no accessory muscle use    Extremities:  no obvious rashes , negative edema    Neuro:  No focal deficits ; No obvious tremor    Psych:  Normal affect ,  Normal countenance ;           A>    ICD-10-CM ICD-9-CM    1. Obstructive sleep apnea (adult) (pediatric) G47.33 327.23    2. Benign essential hypertension I10 401.1      AHI = 19(2018). On CPAP :  5-12 cmH2O. Compliant:      yes    Therapeutic Response:  Positive    P>      * Follow-up Disposition:  Return in about 1 year (around 11/28/2019). she will continue on her current pressure settings. Wants old machine adjusted to 10 cm for use at her daughter's house   * She was asked to contact our office for any problems regarding PAP therapy. * Counseling was provided regarding the importance of regular PAP use and on proper sleep hygiene and safe driving. * Re-enforced proper and regular cleaning for the device. 2. Hypertension - she continues on her current regimen. I have reviewed the relationship between hypertension as it relates to sleep-disordered breathing.      Electronically signed by    Tommie Kwan MD  Diplomate in Sleep Medicine  [facility]

## 2018-11-28 NOTE — PATIENT INSTRUCTIONS
217 [facility]., [place]. [place], [address]  Tel.  [phone]  Fax. 100 [facility] 60  [place], [address]  Tel.  [phone]  Fax. [phone] [address] [place]  Tel.  [phone]  Fax. [phone]     PROPER SLEEP HYGIENE    What to avoid  · Do not have drinks with caffeine, such as coffee or black tea, for 8 hours before bed. · Do not smoke or use other types of tobacco near bedtime. Nicotine is a stimulant and can keep you awake. · Avoid drinking alcohol late in the evening, because it can cause you to wake in the middle of the night. · Do not eat a big meal close to bedtime. If you are hungry, eat a light snack. · Do not drink a lot of water close to bedtime, because the need to urinate may wake you up during the night. · Do not read or watch TV in bed. Use the bed only for sleeping and sexual activity. What to try  · Go to bed at the same time every night, and wake up at the same time every morning. Do not take naps during the day. · Keep your bedroom quiet, dark, and cool. · Get regular exercise, but not within 3 to 4 hours of your bedtime. .  · Sleep on a comfortable pillow and mattress. · If watching the clock makes you anxious, turn it facing away from you so you cannot see the time. · If you worry when you lie down, start a worry book. Well before bedtime, write down your worries, and then set the book and your concerns aside. · Try meditation or other relaxation techniques before you go to bed. · If you cannot fall asleep, get up and go to another room until you feel sleepy. Do something relaxing. Repeat your bedtime routine before you go to bed again. · Make your house quiet and calm about an hour before bedtime. Turn down the lights, turn off the TV, log off the computer, and turn down the volume on music. This can help you relax after a busy day.     Drowsy Driving  The [address] [place] [address] [employer] Traffic Safety Administration cites drowsiness as a causing factor in more than 225,651 police reported crashes annually, resulting in 76,000 injuries and 1,500 deaths. Other surveys suggest 55% of people polled have driven while drowsy in the past year, 23% had fallen asleep but not crashed, 3% crashed, and 2% had and accident due to drowsy driving. Who is at risk? Young Drivers: One study of drowsy driving accidents states that 55% of the drivers were under 25 years. Of those, 75% were male. Shift Workers and Travelers: People who work overnight or travel across time zones frequently are at higher risk of experiencing Circadian Rhythm Disorders. They are trying to work and function when their body is programed to sleep. Sleep Deprived: Lack of sleep has a serious impact on your ability to pay attention or focus on a task. Consistently getting less than the average of 8 hours your body needs creates partial or cumulative sleep deprivation. Untreated Sleep Disorders: Sleep Apnea, Narcolepsy, R.L.S., and other sleep disorders (untreated) prevent a person from getting enough restful sleep. This leads to excessive daytime sleepiness and increases the risk for drowsy driving accidents by up to 7 times. Medications / Alcohol: Even over the counter medications can cause drowsiness. Medications that impair a drivers attention should have a warning label. Alcohol naturally makes you sleepy and on its own can cause accidents. Combined with excessive drowsiness its effects are amplified. Signs of Drowsy Driving:   * You don't remember driving the last few miles   * You may drift out of your anna marie   * You are unable to focus and your thoughts wander   * You may yawn more often than normal   * You have difficulty keeping your eyes open / nodding off   * Missing traffic signs, speeding, or tailgating  Prevention-   Good sleep hygiene, lifestyle and behavioral choices have the most impact on drowsy driving.  There is no substitute for sleep and the average person requires 8 hours nightly. If you find yourself driving drowsy, stop and sleep. Consider the sleep hygiene tips provided during your visit as well. Medication Refill Policy: Refills for all medications require 1 week advance notice. Please have your pharmacy fax a refill request. We are unable to fax, or call in \"controled substance\" medications and you will need to pick these prescriptions up from our office. [employer]    Thank you for requesting access to [employer]. Please follow the instructions below to securely access and download your online medical record. [employer] allows you to send messages to your doctor, view your test results, renew your prescriptions, schedule appointments, and more. How Do I Sign Up? 1. In your internet browser, go to [email or web address]. 2. Click on the First Time User? Click Here link in the Sign In box. You will see the New Member Sign Up page. 3. Enter your [employer] Access Code exactly as it appears below. You will not need to use this code after youve completed the sign-up process. If you do not sign up before the expiration date, you must request a new code. [employer] Access Code: Activation code not generated  Current [employer] Status: Active (This is the date your [employer] access code will )    4. Enter the last four digits of your Social Security Number (xxxx) and Date of Birth (mm/dd/yyyy) as indicated and click Submit. You will be taken to the next sign-up page. 5. Create a [employer] ID. This will be your [employer] login ID and cannot be changed, so think of one that is secure and easy to remember. 6. Create a [employer] password. You can change your password at any time. 7. Enter your Password Reset Question and Answer. This can be used at a later time if you forget your password. 8. Enter your e-mail address. You will receive e-mail notification when new information is available in [address]   9. Click Sign Up. You can now view and download portions of your medical record. 10. Click the Download Summary menu link to download a portable copy of your medical information. Additional Information    If you have questions, please call [phone]. Remember, [employer] is NOT to be used for urgent needs. For medical emergencies, dial 911.

## 2019-02-01 ENCOUNTER — OFFICE VISIT (OUTPATIENT)
Dept: FAMILY MEDICINE CLINIC | Age: 64
End: 2019-02-01

## 2019-02-01 VITALS
HEIGHT: 68 IN | TEMPERATURE: 98.2 F | SYSTOLIC BLOOD PRESSURE: 104 MMHG | BODY MASS INDEX: 35.4 KG/M2 | OXYGEN SATURATION: 98 % | DIASTOLIC BLOOD PRESSURE: 64 MMHG | WEIGHT: 233.6 LBS | RESPIRATION RATE: 18 BRPM | HEART RATE: 66 BPM

## 2019-02-01 DIAGNOSIS — I10 BENIGN ESSENTIAL HYPERTENSION: ICD-10-CM

## 2019-02-01 DIAGNOSIS — E03.9 HYPOTHYROIDISM, UNSPECIFIED TYPE: ICD-10-CM

## 2019-02-01 DIAGNOSIS — E78.00 HYPERCHOLESTEROLEMIA: ICD-10-CM

## 2019-02-01 DIAGNOSIS — Z01.419 WELL WOMAN EXAM WITH ROUTINE GYNECOLOGICAL EXAM: ICD-10-CM

## 2019-02-01 DIAGNOSIS — Z00.00 ROUTINE GENERAL MEDICAL EXAMINATION AT A HEALTH CARE FACILITY: Primary | ICD-10-CM

## 2019-02-01 PROBLEM — F32.A MILD DEPRESSION: Status: ACTIVE | Noted: 2019-02-01

## 2019-02-01 NOTE — PROGRESS NOTES
Chief Complaint   Patient presents with    Complete Physical     fasting - does pap here and is due      1. Have you been to the ER, urgent care clinic since your last visit? Hospitalized since your last visit? No    2. Have you seen or consulted any other health care providers outside of the [address] [place] [address] since your last visit? Include any pap smears or colon screening.  No

## 2019-02-01 NOTE — PATIENT INSTRUCTIONS
Well Visit, Women 48 to 72: Care Instructions  Your Care Instructions    Physical exams can help you stay healthy. Your doctor has checked your overall health and may have suggested ways to take good care of yourself. He or she also may have recommended tests. At home, you can help prevent illness with healthy eating, regular exercise, and other steps. Follow-up care is a key part of your treatment and safety. Be sure to make and go to all appointments, and call your doctor if you are having problems. It's also a good idea to know your test results and keep a list of the medicines you take. How can you care for yourself at home? · Reach and stay at a healthy weight. This will lower your risk for many problems, such as obesity, diabetes, heart disease, and high blood pressure. · Get at least 30 minutes of exercise on most days of the week. Walking is a good choice. You also may want to do other activities, such as running, swimming, cycling, or playing tennis or team sports. · Do not smoke. Smoking can make health problems worse. If you need help quitting, talk to your doctor about stop-smoking programs and medicines. These can increase your chances of quitting for good. · Protect your skin from too much sun. When you're outdoors from 10 a.m. to 4 p.m., stay in the shade or cover up with clothing and a hat with a wide brim. Wear sunglasses that block UV rays. Even when it's cloudy, put broad-spectrum sunscreen (SPF 30 or higher) on any exposed skin. · See a dentist one or two times a year for checkups and to have your teeth cleaned. · Wear a seat belt in the car. · Limit alcohol to 1 drink a day. Too much alcohol can cause health problems. Follow your doctor's advice about when to have certain tests. These tests can spot problems early. · Cholesterol.  Your doctor will tell you how often to have this done based on your age, family history, or other things that can increase your risk for heart attack and stroke. · Blood pressure. Have your blood pressure checked during a routine doctor visit. Your doctor will tell you how often to check your blood pressure based on your age, your blood pressure results, and other factors. · Mammogram. Ask your doctor how often you should have a mammogram, which is an X-ray of your breasts. A mammogram can spot breast cancer before it can be felt and when it is easiest to treat. · Pap test and pelvic exam. Ask your doctor how often you should have a Pap test. You may not need to have a Pap test as often as you used to. · Vision. Have your eyes checked every year or two or as often as your doctor suggests. Some experts recommend that you have yearly exams for glaucoma and other age-related eye problems starting at age 48. · Hearing. Tell your doctor if you notice any change in your hearing. You can have tests to find out how well you hear. · Diabetes. Ask your doctor whether you should have tests for diabetes. · Colon cancer. You should begin tests for colon cancer at age 48. You may have one of several tests. Your doctor will tell you how often to have tests based on your age and risk. Risks include whether you already had a precancerous polyp removed from your colon or whether your parents, sisters and brothers, or children have had colon cancer. · Thyroid disease. Talk to your doctor about whether to have your thyroid checked as part of a regular physical exam. Women have an increased chance of a thyroid problem. · Osteoporosis. You should begin tests for bone density at age 72. If you are younger than 72, ask your doctor whether you have factors that may increase your risk for this disease. You may want to have this test before age 72. · Heart attack and stroke risk. At least every 4 to 6 years, you should have your risk for heart attack and stroke assessed.  Your doctor uses factors such as your age, blood pressure, cholesterol, and whether you smoke or have diabetes to show what your risk for a heart attack or stroke is over the next 10 years. When should you call for help? Watch closely for changes in your health, and be sure to contact your doctor if you have any problems or symptoms that concern you. Where can you learn more? Go to http://radha-za.info/. Enter N991 in the search box to learn more about \"Well Visit, Women 50 to 72: Care Instructions. \"  Current as of: March 28, 2018  Content Version: 11.9  © 7513-4294 [employer]. Care instructions adapted under license by [employer] (which disclaims liability or warranty for this information). If you have questions about a medical condition or this instruction, always ask your healthcare professional. [address] any warranty or liability for your use of this information.

## 2019-02-01 NOTE — PROGRESS NOTES
HISTORY OF PRESENT ILLNESS   HPI  Annual CPE, fasting and well woman visit w/ gyn exam and pap. , s/p supracervical hysterectomy for benign disease. No HRT. No /Gyn/breast or  complaints. Mood good and stable on Wellbutrin. Trying to cut back on carbs. No exercise. BPs have been excellent. No problems w/ Cozaar. In general feeling pretty well. No specific complaints at this time. REVIEW OF SYMPTOMS     Review of Systems   Constitutional: Negative. HENT: Negative. Eyes: Negative. Wears glasses, up to date on complete eye exam   Respiratory: Negative. Cardiovascular: Negative. Gastrointestinal: Negative. Genitourinary: Negative. No complaints of abnormal vaginal discharge or bleeding, dryness, irritation or urinary complaints. No complaints of breast lumps, tenderness, mass or nipple discharge. No sexually active for a long time. Neurological: Negative. Endo/Heme/Allergies: Negative. Psychiatric/Behavioral: Negative. Negative for depression. Wellbutrin working well           PROBLEM LIST/MEDICAL HISTORY      Problem List  Date Reviewed: 2/1/2019          Codes Class Noted    Severe obesity ([facility] [address] ICD-10-CM: E66.01  ICD-9-CM: 278.01  11/28/2018        Benign essential hypertension ICD-10-CM: I10  ICD-9-CM: 401.1  5/26/2016    Overview Signed 5/26/2016  9:26 AM by Chinyere Lou MD     2006             Hypothyroidism ICD-10-CM: E03.9  ICD-9-CM: 244.9  5/26/2016    Overview Signed 5/26/2016  9:27 AM by Chinyere Lou MD     7/1999             Primary osteoarthritis of left knee ICD-10-CM: M17.12  ICD-9-CM: 715.16  5/26/2016    Overview Signed 5/26/2016  9:42 AM by Chinyere Lou MD     Extensive;  Ortho (needs knee replacement), CSI x 2,              Hypercholesterolemia ICD-10-CM: E78.00  ICD-9-CM: 272.0  6/25/2013    Overview Signed 6/25/2013  8:29 AM by Chinyere Lou MD     ~5820             S/P benign cervical polypectomy ICD-10-CM: Z98.890, Z87.42  ICD-9-CM: V45.89  12/20/2010    Overview Addendum 5/26/2016  9:42 AM by Kvng Schneider MD     2009, 2015; Gyn Dr Emilee Jaeger             H/O Benign Colon Polyp ICD-10-CM: K63.5  ICD-9-CM: 211.3  12/20/2010    Overview Signed 12/20/2010 12:10 PM by Kvng Schneider MD     Colonoscopy 10/2005, 11/2010  q5yrs  Dr Sofi Segrua             H/O Uterine Fibroids ICD-10-CM: D21.9  ICD-9-CM: 215.9  12/20/2010    Overview Signed 12/20/2010 12:13 PM by Kvng Schneider MD     2005; no surgical intervention  Gyn Dr Ruslan Ceja incontinence ICD-10-CM: N39.3  ICD-9-CM: FIR5901  5/26/2010    Overview Signed 5/26/2010 12:47 PM by Kvng Schneider MD     Urologist Dr Geovany Villar             Sleep apnea, RENÉE ICD-10-CM: G47.30  ICD-9-CM: 780.57  4/5/2010    Overview Addendum 12/20/2010 12:14 PM by Kvng Schneider MD     2009; cpap             Anxiety ICD-10-CM: F41.9  ICD-9-CM: 300.00  4/5/2010        Carpal tunnel syndrome ICD-10-CM: G56.00  ICD-9-CM: 354.0  4/5/2010        Mild Dependent Ankle edema, B ICD-10-CM: M25.473  ICD-9-CM: 719.07  4/5/2010    Overview Signed 4/5/2010  3:52 PM by Abby Banda     mild             Perimenopausal ICD-10-CM: N95.1  ICD-9-CM: 627.2  4/5/2010    Overview Signed 4/5/2010  3:52 PM by Abby Banda     2007             Depression ICD-10-CM: F32.9  ICD-9-CM: 258  4/5/2010        ?  Passed Kidney stone ICD-10-CM: N20.0  ICD-9-CM: 592.0  4/5/2010    Overview Signed 5/26/2010 12:47 PM by Kvng Schneider MD     2/2010                       PAST SURGICAL HISTORY       Past Surgical History:   Procedure Laterality Date    COLONOSCOPY  10/05    benign polyp; repeat 5 yr per Dr Nella Vallecillo COLONOSCOPY  11/2010    normal, f/u 5 yrs, Dr Sofi Segura    EKG ORDERABLE  5/2009    NSR, rate 84, normal EKG    HX BLADDER SUSPENSION  ~2010    Dr Jalen Merchant  11/2011    VPI, right breast biopsy negative    HX CARPAL TUNNEL RELEASE  3/2010    Right; Dr Hung Bacon  2009, 2015    Dr Mely Ibarra HX COLONOSCOPY  4/2016    \"normal\", [facility] yrs[facility]    HX COLONOSCOPY  04/08/2016    Dr Darlene Hannon-Internal Hemorrhoids- Normal mucosa in the whole colon - Repeat colonoscopy in 5 years    HX DILATION AND CURETTAGE  7/2005    AUB, benign/neg bx; Dr Akila Harding  10/2012    Dr Sabi Herring, [facility]    HX PARTIAL HYSTERECTOMY  4/26/13    Supracervical hysterectomy for fibroids, Dr Dai Willett  age 11   [facility] HX WISDOM TEETH EXTRACTION           MEDICATIONS      Current Outpatient Medications   Medication Sig    losartan (COZAAR) 100 mg tablet take 1 tablet by mouth once daily    buPROPion XL (WELLBUTRIN XL) 150 mg tablet take 1 tablet by mouth once daily BEFORE BREAKFAST    levothyroxine (SYNTHROID) 137 mcg tablet Take 137 mcg by mouth Daily (before breakfast).  OTHER Portable CPAP machine for RENÉE, with new mask and tubing but no change to mask or settings; G47.30 GARO 99m prog good    meloxicam (MOBIC) 15 mg tablet take 1 tablet by mouth daily WITH BREAKFAST     No current facility-administered medications for this visit.            ALLERGIES     Allergies   Allergen Reactions    Lisinopril Cough    Pcn [Penicillins] Hives    Sulfa (Sulfonamide Antibiotics) Hives    Zocor [Simvastatin] Other (comments)     aches          SOCIAL HISTORY       Social History     Socioeconomic History    Marital status:      Spouse name: Not on file    Number of children: 1    Years of education: Not on file    Highest education level: Not on file   Social Needs    Financial resource strain: Not on file    Food insecurity - worry: Not on file    Food insecurity - inability: Not on file   [facility] needs - medical: Not on file   [facility] needs - non-medical: Not on file   Occupational History    Occupation: [address] Occupation: Retired    Tobacco Use    Smoking status: Former Smoker     Last attempt to quit: 2005     Years since quittin.0    Smokeless tobacco: Never Used   Substance and Sexual Activity    Alcohol use: Yes     Comment: maybe 2-3 drinks a week at most    Drug use: No    Sexual activity: Not x years     Partners: Male   Other Topics Concern     Service Not Asked    Blood Transfusions Not Asked    Caffeine Concern No     Comment: no coffee, tea and cut out her diet Cokes    Occupational Exposure Not Asked    Hobby Hazards Not Asked    Sleep Concern Not Asked    Stress Concern Not Asked    Weight Concern Not Asked    Special Diet No     Comment: just trying to eat less carbs    Back Care Not Asked    Exercise No    Bike Helmet Not Asked    Seat Belt Not Asked    Self-Exams Not Asked   Social History Narrative    1 biological daughter, 1 \"adopted\", and 2 step children        IMMUNIZATIONS  Immunization History   Administered Date(s) Administered    Influenza Vaccine 10/03/2014, 11/10/2015, 2016, 2017, 2018    Influenza Vaccine Split 2010, 10/06/2011    Meningococcal ACWY Vaccine 2013    TD Vaccine 1999, 2009    Td 2017    Tdap 2016    Zoster Recombinant 2018    Zoster Vaccine, Live 2013         FAMILY HISTORY     Family History   Problem Relation Age of Onset    Arthritis-osteo Father     Stroke Father         TIA's    Cancer Father         melanoma on back removed    Pacemaker Father 80    Heart Disease Father          81 yo in     Hypertension Mother    [de-identified] Arthritis-osteo Mother     Heart Disease Mother         valve disease    Heart Attack Maternal Grandmother 76    Depression Daughter     Substance Abuse Daughter     Alcohol abuse Daughter          VITALS     Visit Vitals  /64 (BP 1 Location: Left arm, BP Patient Position: Sitting)   Pulse 66   Temp 98.2 °F (36.8 °C) (Oral)   Resp 18   Ht 5' 7.5\" (1.715 m)   Wt 233 lb 9.6 oz (106 kg)   LMP 05/01/2011   SpO2 98%   BMI 36.05 kg/m²          PHYSICAL EXAMINATION     Physical Exam   Constitutional: She is oriented to person, place, and time and well-developed, well-nourished, and in no distress. HENT:   Right Ear: Tympanic membrane normal.   Left Ear: Tympanic membrane normal.   Nose: Nose normal.   Mouth/Throat: Oropharynx is clear and moist. No oropharyngeal exudate. Eyes: Conjunctivae are normal. Pupils are equal, round, and reactive to light. Neck: Neck supple. Carotid bruit is not present. No thyromegaly present. Cardiovascular: Normal rate, regular rhythm and normal heart sounds. No murmur heard. Pulmonary/Chest: Effort normal and breath sounds normal. Right breast exhibits no inverted nipple, no mass, no nipple discharge, no skin change and no tenderness. Left breast exhibits no inverted nipple, no mass, no nipple discharge, no skin change and no tenderness. Abdominal: Soft. Bowel sounds are normal. She exhibits no distension and no mass. There is no tenderness. Genitourinary: Vagina normal and vulva normal.   Genitourinary Comments: 2 small endocervical polyps. Cervix friable w/ pap collection. Musculoskeletal: Normal range of motion. She exhibits no edema or tenderness. Lymphadenopathy:     She has no cervical adenopathy. She has no axillary adenopathy. Right: No supraclavicular adenopathy present. Left: No supraclavicular adenopathy present. Neurological: She is alert and oriented to person, place, and time. Skin: Skin is warm and dry. Psychiatric: Affect normal.   Vitals reviewed.              ASSESSMENT & PLAN       ICD-10-CM ICD-9-CM    1. Routine general medical examination at a health care facility Z00.00 V70.0 CBC W/O DIFF      LIPID PANEL      METABOLIC PANEL, COMPREHENSIVE      TSH 3RD GENERATION      URINALYSIS W/ RFLX MICROSCOPIC   2.  Well woman exam with routine gynecological exam Z01.419 V72.31 PAP IG, APTIMA HPV AND RFX 16/18,45 ([id number])   3. Benign essential hypertension I10 401.1    4. Hypercholesterolemia E78.00 272.0 LIPID PANEL   5. Hypothyroidism, unspecified type E03.9 244.9 TSH 3RD GENERATION     Pap collected today. Endocervical polyps present, friable pap collection. Patient advised that this may be inadequate specimen due to both of these issues today. Patient reports long standing h/o recurrent benign cervical polypectomies over the years per her gyn Dr. Nahum Gloria. Patient retired last month and currently has a high deductible insurance plan right now. She would prefer to defer seeing gyn for another cervical polypectomy given her low risk hx and asymptomatic state. Once she gets a new plan or goes on to Medicare she will pursue it further at that time or sooner if any problems or concerns arise in the interim  Fasting labs today  Cardiovascular risk and specific lipid/LDL/BP goals reviewed  Her BP has been under excellent control. She would like to taper the Cozaar 100 mg to 1/2 tablet daily and monitor interim BPs  She will update me w/ interim BP readings and if remains good, will send in 50 mg tablets next rx renewal and let her stick w/ that then try taper to 25 mg if she is doing better w/ lifestyle modification at that time. Reviewed medications and side effects in detail  Reviewed diet, nutrition, exercise, weight management, BMI/goals, age/risk based screening recommendations, health maintenance & prevention counseling. Colonoscopy up to date  Patient low risk and defers mammogram screenings to q 2 yrs, informed consent  Further follow up & other recommendations pending review of labs.  If all remains good and stable, follow up in 1 yr, sooner prn

## 2019-02-02 LAB
ALBUMIN SERPL-MCNC: 4.6 G/DL (ref 3.6–4.8)
ALBUMIN/GLOB SERPL: 2.3 {RATIO} (ref 1.2–2.2)
ALP SERPL-CCNC: 57 IU/L (ref 39–117)
ALT SERPL-CCNC: 21 IU/L (ref 0–32)
APPEARANCE UR: CLEAR
AST SERPL-CCNC: 16 IU/L (ref 0–40)
BACTERIA #/AREA URNS HPF: ABNORMAL /[HPF]
BILIRUB SERPL-MCNC: 1.1 MG/DL (ref 0–1.2)
BILIRUB UR QL STRIP: NEGATIVE
BUN SERPL-MCNC: 26 MG/DL (ref 8–27)
BUN/CREAT SERPL: 32 (ref 12–28)
CALCIUM SERPL-MCNC: 9.4 MG/DL (ref 8.7–10.3)
CASTS URNS QL MICRO: ABNORMAL /LPF
CHLORIDE SERPL-SCNC: 102 MMOL/L (ref 96–106)
CHOLEST SERPL-MCNC: 268 MG/DL (ref 100–199)
CO2 SERPL-SCNC: 21 MMOL/L (ref 20–29)
COLOR UR: YELLOW
CREAT SERPL-MCNC: 0.81 MG/DL (ref 0.57–1)
EPI CELLS #/AREA URNS HPF: ABNORMAL /HPF
ERYTHROCYTE [DISTWIDTH] IN BLOOD BY AUTOMATED COUNT: 14 % (ref 12.3–15.4)
GLOBULIN SER CALC-MCNC: 2 G/DL (ref 1.5–4.5)
GLUCOSE SERPL-MCNC: 81 MG/DL (ref 65–99)
GLUCOSE UR QL: NEGATIVE
HCT VFR BLD AUTO: 44.6 % (ref 34–46.6)
HDLC SERPL-MCNC: 73 MG/DL
HGB BLD-MCNC: 14.7 G/DL (ref 11.1–15.9)
HGB UR QL STRIP: NEGATIVE
INTERPRETATION, 910389: NORMAL
KETONES UR QL STRIP: NEGATIVE
LDLC SERPL CALC-MCNC: 170 MG/DL (ref 0–99)
LEUKOCYTE ESTERASE UR QL STRIP: ABNORMAL
MCH RBC QN AUTO: 30.8 PG (ref 26.6–33)
MCHC RBC AUTO-ENTMCNC: 33 G/DL (ref 31.5–35.7)
MCV RBC AUTO: 94 FL (ref 79–97)
MICRO URNS: ABNORMAL
NITRITE UR QL STRIP: POSITIVE
PH UR STRIP: 5.5 [PH] (ref 5–7.5)
PLATELET # BLD AUTO: 202 X10E3/UL (ref 150–379)
POTASSIUM SERPL-SCNC: 4.5 MMOL/L (ref 3.5–5.2)
PROT SERPL-MCNC: 6.6 G/DL (ref 6–8.5)
PROT UR QL STRIP: NEGATIVE
RBC # BLD AUTO: 4.77 X10E6/UL (ref 3.77–5.28)
RBC #/AREA URNS HPF: ABNORMAL /HPF
SODIUM SERPL-SCNC: 139 MMOL/L (ref 134–144)
SP GR UR: 1.01 (ref 1–1.03)
TRIGL SERPL-MCNC: 126 MG/DL (ref 0–149)
TSH SERPL DL<=0.005 MIU/L-ACNC: 3.13 UIU/ML (ref 0.45–4.5)
UROBILINOGEN UR STRIP-MCNC: 0.2 MG/DL (ref 0.2–1)
VLDLC SERPL CALC-MCNC: 25 MG/DL (ref 5–40)
WBC # BLD AUTO: 4.2 X10E3/UL (ref 3.4–10.8)
WBC #/AREA URNS HPF: >30 /HPF

## 2019-02-08 LAB
CYTOLOGIST CVX/VAG CYTO: NORMAL
CYTOLOGY CVX/VAG DOC THIN PREP: NORMAL
DX ICD CODE: NORMAL
HPV I/H RISK 4 DNA CVX QL PROBE+SIG AMP: NEGATIVE
Lab: NORMAL
Lab: NORMAL
OTHER STN SPEC: NORMAL
PATH REPORT.FINAL DX SPEC: NORMAL
STAT OF ADQ CVX/VAG CYTO-IMP: NORMAL

## 2019-02-09 ENCOUNTER — TELEPHONE (OUTPATIENT)
Dept: FAMILY MEDICINE CLINIC | Age: 64
End: 2019-02-09

## 2019-02-13 ENCOUNTER — TELEPHONE (OUTPATIENT)
Dept: FAMILY MEDICINE CLINIC | Age: 64
End: 2019-02-13

## 2019-02-14 RX ORDER — CIPROFLOXACIN 500 MG/1
500 TABLET ORAL 2 TIMES DAILY
Qty: 6 TAB | Refills: 0 | Status: SHIPPED | OUTPATIENT
Start: 2019-02-14 | End: 2019-02-17

## 2019-02-14 NOTE — TELEPHONE ENCOUNTER
Ok I sent in the Cipro to the pharm you pended. Just sending back to you because I didn't know if pt was waiting to hear back. Thanks.

## 2019-02-14 NOTE — TELEPHONE ENCOUNTER
PI of results. She denies having any urinary symptoms. She is currently in [place] and will need to send ABX to [facility] there. She gave me the info where to send it.

## 2019-02-14 NOTE — TELEPHONE ENCOUNTER
Advise pt blood counts, liver, kidney, and thyroid are all good and normal.  BS good and normal, non diabetes range    LDL significantly elevated and has worsened from last check. HDL has improved very nicely and is currently excellent, the best reading she has had the last several checks over the past few years. She was on Zocor in the past for high LDL but didn't tolerate it. Does she want to work on diet/exercise and continue monitoring or try different statin? Also she can follow AHA cholesterol lowering tips and gradually build cardio to at least 150 min per week. Urine appears to be consistent w/ a possible urinary tract infection. Having any urinary symptoms (ie/ frequent urination, urgency, burning, etc?)  I would recommend treatment w/ oral abx Cipro 500 mg bid x 3 days. After r/w pt may send this in or phone in and advise me.

## 2019-04-17 ENCOUNTER — HOSPITAL ENCOUNTER (OUTPATIENT)
Dept: MAMMOGRAPHY | Age: 64
Discharge: HOME | End: 2019-04-17
Attending: FAMILY MEDICINE
Payer: COMMERCIAL

## 2019-04-17 DIAGNOSIS — Z12.39 SCREENING BREAST EXAMINATION: ICD-10-CM

## 2019-04-17 PROCEDURE — 77063 BREAST TOMOSYNTHESIS BI: CPT

## 2019-04-18 ENCOUNTER — HOSPITAL ENCOUNTER (OUTPATIENT)
Dept: MAMMOGRAPHY | Age: 64
Discharge: HOME | End: 2019-04-18
Attending: FAMILY MEDICINE
Payer: COMMERCIAL

## 2019-04-18 ENCOUNTER — TELEPHONE (OUTPATIENT)
Dept: FAMILY MEDICINE CLINIC | Age: 64
End: 2019-04-18

## 2019-04-18 DIAGNOSIS — R92.8 ABNORMAL MAMMOGRAM OF RIGHT BREAST: ICD-10-CM

## 2019-04-18 PROCEDURE — 77061 BREAST TOMOSYNTHESIS UNI: CPT

## 2019-04-18 PROCEDURE — 76642 ULTRASOUND BREAST LIMITED: CPT

## 2019-04-18 NOTE — TELEPHONE ENCOUNTER
I had Ralf Chapman calling from [address] [place] [address] imaging center wanted to let you know that she will send a biopsy order for two areas on her right breast for Dr. Tami Lozano to sign off on it.      Best contact # [phone]

## 2019-04-18 NOTE — PROGRESS NOTES
I spoke with Lynne Barahona and she will have MJ tell Dr Ronda López sign off on the orders in CC.   dsw

## 2019-04-22 ENCOUNTER — HOSPITAL ENCOUNTER (OUTPATIENT)
Dept: MAMMOGRAPHY | Age: 64
Discharge: HOME | End: 2019-04-22
Attending: FAMILY MEDICINE
Payer: COMMERCIAL

## 2019-04-22 DIAGNOSIS — R92.8 ABNORMAL MAMMOGRAM OF RIGHT BREAST: ICD-10-CM

## 2019-04-22 DIAGNOSIS — N63.10 MASS OF RIGHT BREAST: ICD-10-CM

## 2019-04-22 PROCEDURE — 74011250636 HC RX REV CODE- 250/636: Performed by: RADIOLOGY

## 2019-04-22 PROCEDURE — 77065 DX MAMMO INCL CAD UNI: CPT

## 2019-04-22 PROCEDURE — 74011000250 HC RX REV CODE- 250: Performed by: RADIOLOGY

## 2019-04-22 PROCEDURE — 19083 BX BREAST 1ST LESION US IMAG: CPT

## 2019-04-22 PROCEDURE — 88305 TISSUE EXAM BY PATHOLOGIST: CPT

## 2019-04-22 PROCEDURE — A4648 IMPLANTABLE TISSUE MARKER: HCPCS

## 2019-04-22 PROCEDURE — 88360 TUMOR IMMUNOHISTOCHEM/MANUAL: CPT

## 2019-04-22 RX ORDER — LIDOCAINE HYDROCHLORIDE 10 MG/ML
10 INJECTION INFILTRATION; PERINEURAL
Status: COMPLETED | OUTPATIENT
Start: 2019-04-22 | End: 2019-04-22

## 2019-04-22 RX ORDER — LIDOCAINE HYDROCHLORIDE AND EPINEPHRINE 10; 10 MG/ML; UG/ML
20 INJECTION, SOLUTION INFILTRATION; PERINEURAL ONCE
Status: COMPLETED | OUTPATIENT
Start: 2019-04-22 | End: 2019-04-22

## 2019-04-22 RX ADMIN — LIDOCAINE HYDROCHLORIDE 10 ML: 10 INJECTION, SOLUTION INFILTRATION; PERINEURAL at 08:25

## 2019-04-22 RX ADMIN — LIDOCAINE HYDROCHLORIDE,EPINEPHRINE BITARTRATE 200 MG: 10; .01 INJECTION, SOLUTION INFILTRATION; PERINEURAL at 08:25

## 2019-04-22 NOTE — DISCHARGE INSTRUCTIONS
Breast Biopsy Discharge Instructions    PAIN CONTROL     You may have mild discomfort; take 1-2 Tylenol every 4-6 hours as needed.  Do not take aspirin containing products or anti-inflammatory medications (Advil, Aleve, Motrin, Ibuprofen, etc.) for 24 hours.  Wearing a comfortable bra for support may help with discomfort. WOUND CARE      A small amount of bleeding or bruising at the biopsy site is normal. Watch for signs and symptoms of infections: skin warm to touch, yellowish drainage from wound, fever or severe pain.  Place an ice pack over the site hourly, 20-30 at a time for a few hours today.  The clear dressing is water resistant (you may shower, but do not allow the dressing to become saturated). You may remove the dressing in 48 hours. The steri-strips (small pieces of tape covering the incision) will fall off on their own in a few days. If the clear dressing irritates your skin or begins to peel off, you may remove it. Remember, if you remove the clear dressing before 48 hours, you should not get the site wet.  If at any point you have EXCESSIVE BLEEDING (saturating the gauze under the clear dressing) OR pain please call:    Daytime 7:30am-5:00pm: [facility] [phone]    After Hours:    [phone] (ask to speak to a radiologist)              or [address] [facility] [phone]    ACTIVITY     Do not participate in any strenuous activities for 24 hours (exercise, sports, housework, etc.).  You may resume work (light duty only) for the first 24 hours.  No heavy lifting with the arm on the affected side of your body. ADDITIONAL INSTRUCTIONS    We will call you with results on Wednesday April 24 in the afternoon.        YOUR TREATMENT TEAM TODAY WAS    MD: Mellissa  RN: Jesusita Zamorano   Radiology Tech: Tushar Phillips

## 2019-04-22 NOTE — PROGRESS NOTES
Patient tolerated right breast biopsy x 2 sites well with minimal bleeding. Biopsy site was bandaged in the usual fashion and discharge instructions were reviewed with the patient. She was provided with a written copy as well. Advised patient that results should be available by Wednesday and that she will receive a phone call in the afternoon. Encouraged her to call with any questions or concerns.

## 2019-04-24 ENCOUNTER — TELEPHONE (OUTPATIENT)
Dept: FAMILY MEDICINE CLINIC | Age: 64
End: 2019-04-24

## 2019-04-24 NOTE — PROGRESS NOTES
Dr. Katherine Zamora called patient with pathology. Follow up call placed to the patient with appointment information, Dr. Ivy Channel May 6 at 8:30, arrival 8am. Patient states that the biopsy sites are healing well with only some bruising, no concerns. Encouraged her to call with any questions.

## 2019-04-24 NOTE — TELEPHONE ENCOUNTER
Advise pt I got path report from her biopsy and there is a note that some one from that dept has already called to discuss the results w/ her. The findings are detailed below. Patient needs to see breast surgeon, our [facility] group of Dr. Delbert Rojo. I would like to get her in for an appt as soon as possible. Not sure if they gave pt information to get an appt when they advised her of the results or not, but we need to get her in. Let me know status. Thanks. PATHOLOGY RESULTS:   Right breast, three o'clock mass, site A, core biopsy:   Fibrocystic changes with apocrine metaplasia and dense, sclerotic stroma   No in situ or invasive carcinoma is identified      Right breast, five o'clock mass, site B, core biopsy:   Invasive ductal carcinoma, favor grade 2   No significant in situ component is identified   Invasive carcinoma measures 1.2 cm in greatest dimension in a single core   ER, [place] and HER-2/keli are pending, results will be reported in an addendum      RESULTS CONVEYED: To the patient via phone by Dr. Alfredo Robles 4/24/19.     ASSESSMENT:   The results of the 3:00 biopsy site are considered benign discordant. The results of the 5:00 biopsy site are considered concordant      RECOMMENDATIONS: Surgical consultation.  Excision of the 3:00 site is recommended  as well as treatment of the known 5:00 malignancy.

## 2019-05-06 ENCOUNTER — OFFICE VISIT (OUTPATIENT)
Dept: SURGERY | Age: 64
End: 2019-05-06

## 2019-05-06 VITALS
SYSTOLIC BLOOD PRESSURE: 122 MMHG | BODY MASS INDEX: 35.31 KG/M2 | WEIGHT: 233 LBS | DIASTOLIC BLOOD PRESSURE: 55 MMHG | HEIGHT: 68 IN | HEART RATE: 81 BPM

## 2019-05-06 DIAGNOSIS — C50.311 MALIGNANT NEOPLASM OF LOWER-INNER QUADRANT OF RIGHT BREAST OF FEMALE, ESTROGEN RECEPTOR POSITIVE (HCC): Primary | ICD-10-CM

## 2019-05-06 DIAGNOSIS — Z17.0 MALIGNANT NEOPLASM OF LOWER-INNER QUADRANT OF RIGHT BREAST OF FEMALE, ESTROGEN RECEPTOR POSITIVE (HCC): Primary | ICD-10-CM

## 2019-05-06 RX ORDER — DOXYCYCLINE HYCLATE 100 MG
TABLET ORAL
Refills: 0 | COMMUNITY
Start: 2019-05-05 | End: 2019-06-04

## 2019-05-06 NOTE — PROGRESS NOTES
HISTORY OF PRESENT ILLNESS  Worthy Hashimoto Genna Conner is a 59 y.o. female. HPI NEW Patient presents for consultation at the request of Dr. Felice Blanco for newly diagnosed RIGHT breast cancer. The patient had no abnormal breast symptoms. This was detected from mammogram which led to diagnostic imaging and biopsies. No pain at biopsy sites. She currently has an infection on her nose and is taking doxycycline (started antibiotics yesterday). Her  has h/o impetigo  Her sister in law is here with her today. 4/22/19 - RIGHT breast biopsies. Site A) fibrocystic changes with possible radial scar and atypia. Site B) IDC, grade 2, ER positive, TX positive, HER 2 negative. No family history of breast or ovarian cancer. Father had melanoma. Breast imaging-  Screening mammogram 4/17/19 BI-RADS 0.  RIGHT breast diagnostic mammogram and RIGHT breast US 4/18/19 BI-RADS 4. RIGHT breast biopsies 4/22/19. [facility] [place] Results (most recent):  Results from [place] encounter on 04/22/19   SILV[facility]O POST BX IMAGING RT INCL CAD    Addendum Addendum: ADDENDUM TO Ultrasound guided right BREAST BIOPSY, PERFORMED ON 4/22/19  PATHOLOGY RESULTS:  Right breast, three o'clock mass, site A, core biopsy:  Fibrocystic changes with apocrine metaplasia and dense, sclerotic stroma  No in situ or invasive carcinoma is identified   Right breast, five o'clock mass, site B, core biopsy:  Invasive ductal carcinoma, favor grade 2  No significant in situ component is identified  Invasive carcinoma measures 1.2 cm in greatest dimension in a single core  ER, TX and HER-2/keli are pending, results will be reported in an addendum   RESULTS CONVEYED: To the patient via phone by Dr. Geeta Rodarte 4/24/19. ASSESSMENT:  The results of the 3:00 biopsy site are considered benign discordant. The results of the 5:00 biopsy site are considered concordant   RECOMMENDATIONS: Surgical consultation.  Excision of the 3:00 site is  recommended as well as treatment of the known 5:00 malignancy. Anna Moseley MD 4/24/2019  1:43 PM          Narrative STUDY:  ULTRASOUND-GUIDED CORE NEEDLE BIOPSY OF THE right BREAST x2    INDICATION: 2 suspicious right breast masses    PROCEDURE:    The risks and benefits of the procedure were explained to the patient, questions  were answered, and informed consent was obtained. The mass at 3 o'clock in the right breast (site A) was localized with  ultrasound. The breast was prepped in the usual sterile manner. Following the  administration of a local anesthetic and dermatotomy, and using ultrasound  guidance,  a 12 gauge Celero  needle was advanced to the lesion, and 4 cores  were obtained. Pre and post-fire images confirmed accurate needle trajectory. A metallic clip was placed at the biopsy site. The above steps were then  repeated for a 5:00 right breast mass (site B). Post-biopsy digital mammogram  confirms the presence of the clip at the intended biopsy site. The patient  tolerated the procedure well without complication. Final pathology is pending. Impression IMPRESSION:     Successful ultrasound-guided biopsy yielding cores submitted for histologic  analysis. Clips were placed at the biopsy sites. Post-biopsy digital mammogram  confirms the presence of the clips at the intended biopsy sites. Further  recommendations will be based on final pathology results. Past Medical History:   Diagnosis Date    Ankle edema 4/5/2010    Anxiety 4/5/2010    Benign essential hypertension 5/26/2016 2006    Carpal tunnel syndrome 4/5/2010    Depression 4/5/2010    H/O Benign Colon Polyp 12/20/2010    H/O Uterine Fibroids 12/20/2010    Hypercholesterolemia 6/25/2013    ~2006    Hypothyroidism 5/26/2016 7/1999    Kidney stone 4/5/2010    Perimenopausal 4/5/2010    Primary osteoarthritis of left knee 5/26/2016    Extensive;  Ortho (needs knee replacement), CSI x 2,     S/P benign cervical polypectomy 2010    Sleep apnea 2010    Stress incontinence 2010     Past Surgical History:   Procedure Laterality Date    COLONOSCOPY  10/05    benign polyp; repeat 5 yr per Dr Stefania Lance COLONOSCOPY  2010    normal, f/u 5 yrs, Dr Stefania Lance EKG ORDERABLE  2009    NSR, rate 84, normal EKG    HX BLADDER SUSPENSION  ~    Dr Emeka Rene  2011    VPI, right breast biopsy negative    HX CARPAL TUNNEL RELEASE  3/2010    Right; Dr Corrine Palma  ,     Dr Brigida Melara    HX COLONOSCOPY  2016    Dr Sally Hannon-Internal Hemorrhoids- Normal mucosa in the whole colon - Repeat colonoscopy in 5 years    HX DILATION AND CURETTAGE  2005    AUB, benign/neg bx; Dr Brigida Melara    HX [facility]  10/2012    Dr Brigida Melara, [facility]    [address]  [address]    Supracervical hysterectomy for fibroids, Dr Rossana Childs  age 11   [facility] HX WISDOM TEETH EXTRACTION       Social History     Socioeconomic History    Marital status:      Spouse name: Not on file    Number of children: 1    Years of education: Not on file    Highest education level: Not on file   Occupational History    Occupation: [address] Occupation: Retired    Social Needs    Financial resource strain: Not on file    Food insecurity:     Worry: Not on file     Inability: Not on file   go2 media needs:     Medical: Not on file     Non-medical: Not on file   Tobacco Use    Smoking status: Former Smoker     Last attempt to quit: 2005     Years since quittin.3    Smokeless tobacco: Never Used   Substance and Sexual Activity    Alcohol use: Yes     Comment: maybe 2-3 drinks a week at most    Drug use: No    Sexual activity: Not Currently     Partners: Male     Comment: not sexually active x many years   Lifestyle    Physical activity:     Days per week: Not on file     Minutes per session: Not on file    Stress: Not on file   Relationships    Social connections:     Talks on phone: Not on file     Gets together: Not on file     Attends Shinto service: Not on file     Active member of club or organization: Not on file     Attends meetings of clubs or organizations: Not on file     Relationship status: Not on file    Intimate partner violence:     Fear of current or ex partner: Not on file     Emotionally abused: Not on file     Physically abused: Not on file     Forced sexual activity: Not on file   Other Topics Concern     Service Not Asked    Blood Transfusions Not Asked    Caffeine Concern No     Comment: no coffee, tea and cut out her diet Cokes    Occupational Exposure Not Asked   Larna Bernardo Hazards Not Asked    Sleep Concern Not Asked    Stress Concern Not Asked    Weight Concern Not Asked    Special Diet No     Comment: just trying to eat less carbs    Back Care Not Asked    Exercise No    Bike Helmet Not Asked    Seat Belt Not Asked    Self-Exams Not Asked   Social History Narrative    1 biological daughter, 1 \"adopted\", and 2 step children     OB History        1    Para   1    Term                AB        Living           SAB        TAB        Ectopic        Molar        Multiple        Live Births              Obstetric Comments    x 1; Previous Gyn Dr Ariadna Lane  Menarche:  10. LMP: ? .  # of Children:  1. Age at Delivery of First Child:  34.   Hysterectomy/oophorectomy:  YES/YES. Breast Bx:  yes. Hx of Breast Feeding:  yes. BCP:  yes.  Hormone therapy:  no.                  Current Outpatient Medications:     doxycycline (VIBRA-TABS) 100 mg tablet, take 1 tablet by mouth twice a day, Disp: , Rfl: 0    meloxicam (MOBIC) 15 mg tablet, take 1 tablet by mouth daily with BREAKFAST, Disp: 90 Tab, Rfl: 0    buPROPion XL (WELLBUTRIN XL) 150 mg tablet, take 1 tablet by mouth once daily before BREAKFAST, Disp: 90 Tab, Rfl: 3    levothyroxine (SYNTHROID) 137 mcg tablet, take 1 tablet by mouth once daily BEFORE BRAKFAST, Disp: 90 Tab, Rfl: 3    losartan (COZAAR) 100 mg tablet, take 1 tablet by mouth once daily, Disp: 90 Tab, Rfl: 3    meloxicam (MOBIC) 15 mg tablet, take 1 tablet by mouth daily WITH BREAKFAST, Disp: 30 Tab, Rfl: 5    OTHER, Portable CPAP machine for RENÉE, with new mask and tubing but no change to mask or settings; G47.30 GARO 99m prog good, Disp: 1 Each, Rfl: 0  Allergies   Allergen Reactions    Lisinopril Cough    Pcn [Penicillins] Hives    Sulfa (Sulfonamide Antibiotics) Hives    Zocor [Simvastatin] Other (comments)     aches       Review of Systems   Constitutional: Negative. HENT: Negative. Eyes: Negative. Respiratory: Negative. Cardiovascular: Negative. Gastrointestinal: Negative. Genitourinary: Negative. Musculoskeletal: Positive for joint pain. Skin: Negative. Nose redness and swelling   Neurological: Negative. Endo/Heme/Allergies: Bruises/bleeds easily. Psychiatric/Behavioral: The patient is nervous/anxious. All other systems reviewed and are negative. Physical Exam   Constitutional: She is oriented to person, place, and time. She appears well-developed and well-nourished. HENT:   Head: Normocephalic. Nose with cellulitis and swelling. Inside right nare area of fluctuance   Eyes: EOM are normal.   Neck: Neck supple. No thyromegaly present. Cardiovascular: Normal rate, regular rhythm, normal heart sounds and intact distal pulses. Pulmonary/Chest: Effort normal and breath sounds normal. Right breast exhibits no inverted nipple, no mass, no nipple discharge, no skin change and no tenderness. Left breast exhibits no inverted nipple, no mass, no nipple discharge, no skin change and no tenderness. Breasts are symmetrical.   Abdominal: Soft. Musculoskeletal: Normal range of motion. Lymphadenopathy:     She has no cervical adenopathy. She has no axillary adenopathy.    Neurological: She is alert and oriented to person, place, and time. Skin: Skin is warm. Nursing note and vitals reviewed. BREAST ULTRASOUND, Preop planning  Indication:preop planning  right Breast 3:00 and 5:00    Technique: The area was scanned using a high-frequency linear-array near-field transducer  Findings: clip seen 3:00 at RA region  Slip seen at 5:00 3 cfn   Impression: Biopsy site visible with ultrasound  Disposition:  Breast mri    ASSESSMENT and PLAN    ICD-10-CM ICD-9-CM    1. Malignant neoplasm of lower-inner quadrant of right breast of female, estrogen receptor positive ([facility] [address] C50.311 174.3 MRI BREAST BI W WO CONT    Z17.0 V86.0      60 yo female with right breast IDC and atypia at 5:00 and 3:00  IDC grade 2 Er/Pr+ Her 2 keli -   T1 N0  She is here with her friend  I have reviewed the imaging and pathology with her and she was given copies of these reports. 90 minutes were spent face-to-face with the patient during this encounter and 90% of that time was spent on counseling and coordination of care. 1. Discussed lumpectomy and radiation vs mastectomy. Discussed reconstruction. MRI ordered to see if patient is a candidate for a lumpectomy. 2. Discussed sentinel lymph node biopsy. 3. Discussed external beam radiation. 4. Discussed hormone therapy. 5. Discussed the possibility of chemotherapy. Will schedule mri  Patient would like to see plastics  I will call her after mri  She has severe nasal cellulitis  I have called Nadine Wren and Dr. Onesimo Tapia will see her today.

## 2019-05-06 NOTE — LETTER
5/6/2019 10:21 AM 
 
Patient:  Caleb Liz YOB: 1955 Date of Visit: 5/6/2019 Dear Fabián Sosa MD 
[address] [id number] VIA In Basket 
 : Thank you for referring Ms. Sushma Villegas to me for evaluation/treatment. Below are the relevant portions of my assessment and plan of care. HISTORY OF PRESENT ILLNESS Caleb Liz is a 59 y.o. female. HPI NEW Patient presents for consultation at the request of Dr. Nadira Herron for newly diagnosed RIGHT breast cancer. The patient had no abnormal breast symptoms. This was detected from mammogram which led to diagnostic imaging and biopsies. No pain at biopsy sites. She currently has an infection on her nose and is taking doxycycline (started antibiotics yesterday). Her  has h/o impetigo Her sister in law is here with her today. 4/22/19 - RIGHT breast biopsies. Site A) fibrocystic changes with possible radial scar and atypia. Site B) IDC, grade 2, ER positive, DE positive, HER 2 negative. No family history of breast or ovarian cancer. Father had melanoma. Breast imaging- 
Screening mammogram 4/17/19 BI-RADS 0. 
RIGHT breast diagnostic mammogram and RIGHT breast US 4/18/19 BI-RADS 4. RIGHT breast biopsies 4/22/19. [facility] Results (most recent): 
Results from Hospital Encounter encounter on 04/22/19 [facility]O POST BX IMAGING RT INCL CAD  Addendum Addendum: ADDENDUM TO Ultrasound guided right BREAST BIOPSY, PERFORMED ON 4/22/19  PATHOLOGY RESULTS:  Right breast, three o'clock mass, site A, core biopsy:  Fibrocystic changes with apocrine metaplasia and dense, sclerotic stroma  No in situ or invasive carcinoma is identified   Right breast, five o'clock mass, site B, core biopsy:  Invasive ductal carcinoma, favor grade 2  No significant in situ component is identified  Invasive carcinoma measures 1.2 cm in greatest dimension in a single core  ER, DE and HER-2/keli are pending, results will be reported in an addendum   RESULTS CONVEYED: To the patient via phone by Dr. Vanessa Gan 4/24/19. ASSESSMENT:  The results of the 3:00 biopsy site are considered benign discordant. The results of the 5:00 biopsy site are considered concordant   RECOMMENDATIONS: Surgical consultation. Excision of the 3:00 site is  recommended as well as treatment of the known 5:00 malignancy. Reny Larkin MD 4/24/2019  1:43 PM        
 Narrative STUDY:  ULTRASOUND-GUIDED CORE NEEDLE BIOPSY OF THE right BREAST x2 
 
INDICATION: 2 suspicious right breast masses PROCEDURE: The risks and benefits of the procedure were explained to the patient, questions 
were answered, and informed consent was obtained. The mass at 3 o'clock in the right breast (site A) was localized with 
ultrasound. The breast was prepped in the usual sterile manner. Following the 
administration of a local anesthetic and dermatotomy, and using ultrasound 
guidance,  a 12 gauge Celero  needle was advanced to the lesion, and 4 cores 
were obtained. Pre and post-fire images confirmed accurate needle trajectory. A metallic clip was placed at the biopsy site. The above steps were then 
repeated for a 5:00 right breast mass (site B). Post-biopsy digital mammogram 
confirms the presence of the clip at the intended biopsy site. The patient 
tolerated the procedure well without complication. Final pathology is pending. Impression IMPRESSION:  
 
Successful ultrasound-guided biopsy yielding cores submitted for histologic 
analysis. Clips were placed at the biopsy sites. Post-biopsy digital mammogram 
confirms the presence of the clips at the intended biopsy sites. Further 
recommendations will be based on final pathology results. Past Medical History:  
Diagnosis Date  Ankle edema 4/5/2010  Anxiety 4/5/2010  Benign essential hypertension 5/26/2016  
 2006  Carpal tunnel syndrome 4/5/2010  Depression 4/5/2010  H/O Benign Colon Polyp 12/20/2010  
 H/O Uterine Fibroids 12/20/2010  Hypercholesterolemia 6/25/2013  
 ~2006  Hypothyroidism 5/26/2016 7/1999  Kidney stone 4/5/2010  Perimenopausal 4/5/2010  Primary osteoarthritis of left knee 5/26/2016 Extensive; Ortho (needs knee replacement), CSI x 2,   S/P benign cervical polypectomy 12/20/2010  Sleep apnea 4/5/2010  Stress incontinence 5/26/2010 Past Surgical History:  
Procedure Laterality Date  COLONOSCOPY  10/05  
 benign polyp; repeat 5 yr per Dr India Partida  COLONOSCOPY  11/2010  
 normal, f/u 5 yrs, Dr India Partida  EKG ORDERABLE  5/2009 NSR, rate 84, normal EKG  HX BLADDER SUSPENSION  ~2010 Dr Danielle Jimenez  HX BREAST BIOPSY  11/2011 VPI, right breast biopsy negative  HX CARPAL TUNNEL RELEASE  3/2010 Right; Dr Bhavesh Queen  HX CERVICAL POLYPECTOMY  2009, 2015 Dr Maximo Waldrop  HX COLONOSCOPY  04/08/2016 Dr Tori Hannon-Internal Hemorrhoids- Normal mucosa in the whole colon - Repeat colonoscopy in 5 years  HX DILATION AND CURETTAGE  7/2005 AUB, benign/neg bx; Dr Maximo Waldrop  HX DILATION AND CURETTAGE  10/2012 Dr Maximo Waldrop, [facility]  HX PARTIAL HYSTERECTOMY  4/26/13 Supracervical hysterectomy for fibroids, Dr Maximo Waldrop  HX TONSIL AND ADENOIDECTOMY  age 9  
 HX WISDOM TEETH EXTRACTION Social History Socioeconomic History  Marital status:  Spouse name: Not on file  Number of children: 1  Years of education: Not on file  Highest education level: Not on file Occupational History  Occupation: [employer]  Occupation: Retired  Social Needs  Financial resource strain: Not on file  Food insecurity:  
  Worry: Not on file Inability: Not on file  Transportation needs:  
  Medical: Not on file Non-medical: Not on file Tobacco Use  Smoking status: Former Smoker Last attempt to quit: 1/1/2005 Years since quittin.3  Smokeless tobacco: Never Used Substance and Sexual Activity  Alcohol use: Yes Comment: maybe 2-3 drinks a week at most  
 Drug use: No  
 Sexual activity: Not Currently Partners: Male Comment: not sexually active x many years Lifestyle  Physical activity:  
  Days per week: Not on file Minutes per session: Not on file  Stress: Not on file Relationships  Social connections:  
  Talks on phone: Not on file Gets together: Not on file Attends Alevism service: Not on file Active member of club or organization: Not on file Attends meetings of clubs or organizations: Not on file Relationship status: Not on file  Intimate partner violence:  
  Fear of current or ex partner: Not on file Emotionally abused: Not on file Physically abused: Not on file Forced sexual activity: Not on file Other Topics Concern [address] Service Not Asked  Blood Transfusions Not Asked  Caffeine Concern No  
  Comment: no coffee, tea and cut out her diet Cokes  Occupational Exposure Not Asked Rodolph New Hazards Not Asked  Sleep Concern Not Asked  Stress Concern Not Asked  Weight Concern Not Asked  Special Diet No  
  Comment: just trying to eat less carbs  Back Care Not Asked  Exercise No  
 Bike Helmet Not Asked  Seat Belt Not Asked  Self-Exams Not Asked Social History Narrative 1 biological daughter, 1 \"adopted\", and 2 step children OB History Ava Solum 1 Para 1 Term  AB Living SAB  
   
 TAB Ectopic Molar Multiple Live Births Obstetric Comments  x 1; Previous Gyn Dr Velasquez Handler Menarche:  10. LMP: ? .  # of Children:  1. Age at Delivery of First Child:  34.   Hysterectomy/oophorectomy:  YES/YES. Breast Bx:  yes. Hx of Breast Feeding:  yes. BCP:  yes.  Hormone therapy:  no.  
 
  
  
  
 
 
 Current Outpatient Medications:  
  doxycycline (VIBRA-TABS) 100 mg tablet, take 1 tablet by mouth twice a day, Disp: , Rfl: 0 
  meloxicam (MOBIC) 15 mg tablet, take 1 tablet by mouth daily with BREAKFAST, Disp: 90 Tab, Rfl: 0 
  buPROPion XL (WELLBUTRIN XL) 150 mg tablet, take 1 tablet by mouth once daily before BREAKFAST, Disp: 90 Tab, Rfl: 3 
  levothyroxine (SYNTHROID) 137 mcg tablet, take 1 tablet by mouth once daily BEFORE BRAKFAST, Disp: 90 Tab, Rfl: 3 
  losartan (COZAAR) 100 mg tablet, take 1 tablet by mouth once daily, Disp: 90 Tab, Rfl: 3 
  meloxicam (MOBIC) 15 mg tablet, take 1 tablet by mouth daily WITH BREAKFAST, Disp: 30 Tab, Rfl: 5 
  OTHER, Portable CPAP machine for RENÉE, with new mask and tubing but no change to mask or settings; G47.30 GARO 99m prog good, Disp: 1 Each, Rfl: 0 Allergies Allergen Reactions  Lisinopril Cough  Pcn [Penicillins] Hives  Sulfa (Sulfonamide Antibiotics) Hives  Zocor [Simvastatin] Other (comments)  
  aches Review of Systems Constitutional: Negative. HENT: Negative. Eyes: Negative. Respiratory: Negative. Cardiovascular: Negative. Gastrointestinal: Negative. Genitourinary: Negative. Musculoskeletal: Positive for joint pain. Skin: Negative. Nose redness and swelling Neurological: Negative. Endo/Heme/Allergies: Bruises/bleeds easily. Psychiatric/Behavioral: The patient is nervous/anxious. All other systems reviewed and are negative. Physical Exam  
Constitutional: She is oriented to person, place, and time. She appears well-developed and well-nourished. HENT:  
Head: Normocephalic. Nose with cellulitis and swelling. Inside right nare area of fluctuance Eyes: EOM are normal.  
Neck: Neck supple. No thyromegaly present. Cardiovascular: Normal rate, regular rhythm, normal heart sounds and intact distal pulses.   
Pulmonary/Chest: Effort normal and breath sounds normal. Right breast exhibits no inverted nipple, no mass, no nipple discharge, no skin change and no tenderness. Left breast exhibits no inverted nipple, no mass, no nipple discharge, no skin change and no tenderness. Breasts are symmetrical.  
Abdominal: Soft. Musculoskeletal: Normal range of motion. Lymphadenopathy:  
  She has no cervical adenopathy. She has no axillary adenopathy. Neurological: She is alert and oriented to person, place, and time. Skin: Skin is warm. Nursing note and vitals reviewed. BREAST ULTRASOUND, Preop planning Indication:preop planning  right Breast 3:00 and 5:00 Technique: The area was scanned using a high-frequency linear-array near-field transducer Findings: clip seen 3:00 at RA region Slip seen at 5:00 3 cfn Impression: Biopsy site visible with ultrasound Disposition:  Breast mri ASSESSMENT and PLAN 
  ICD-10-CM ICD-9-CM 1. Malignant neoplasm of lower-inner quadrant of right breast of [facility], [facility] C50.311 174.3 MRI BREAST BI W WO CONT  
 Z17.0 V86.0   
 
60 yo female with right breast IDC and atypia at 5:00 and 3:00 IDC grade 2 Er/Pr+ Her 2 keli -  
T1 N0 She is here with her friend I have reviewed the imaging and pathology with her and she was given copies of these reports. 90 minutes were spent face-to-face with the patient during this encounter and 90% of that time was spent on counseling and coordination of care. 1. Discussed lumpectomy and radiation vs mastectomy. Discussed reconstruction. MRI ordered to see if patient is a candidate for a lumpectomy. 2. Discussed sentinel lymph node biopsy. 3. Discussed external beam radiation. 4. Discussed hormone therapy. 5. Discussed the possibility of chemotherapy. Will schedule mri Patient would like to see plastics I will call her after mri She has severe nasal cellulitis I have called Marichuy Kern and Dr. Cassandra Padilla will see her today. If you have questions, please do not hesitate to call me. I look forward to following Ms. See Urbina along with you. Sincerely, Paige Mcneill MD

## 2019-05-06 NOTE — COMMUNICATION BODY
HISTORY OF PRESENT ILLNESS  Joyce Gan is a 59 y.o. female. HPI NEW Patient presents for consultation at the request of Dr. Alannah Klein for newly diagnosed RIGHT breast cancer. The patient had no abnormal breast symptoms. This was detected from mammogram which led to diagnostic imaging and biopsies. No pain at biopsy sites. She currently has an infection on her nose and is taking doxycycline (started antibiotics yesterday). Her  has h/o impetigo  Her sister in law is here with her today. 4/22/19 - RIGHT breast biopsies. Site A) fibrocystic changes with possible radial scar and atypia. Site B) IDC, grade 2, ER positive, AK positive, HER 2 negative. No family history of breast or ovarian cancer. Father had melanoma. Breast imaging-  Screening mammogram 4/17/19 BI-RADS 0.  RIGHT breast diagnostic mammogram and RIGHT breast US 4/18/19 BI-RADS 4. RIGHT breast biopsies 4/22/19. [facility] (most recent[facility]  Results from [place] encounter on 04/22/19   SIL[facility]ANO POST BX IMAGING RT INCL CAD    Addendum Addendum: ADDENDUM TO Ultrasound guided right BREAST BIOPSY, PERFORMED ON 4/22/19  PATHOLOGY RESULTS:  Right breast, three o'clock mass, site A, core biopsy:  Fibrocystic changes with apocrine metaplasia and dense, sclerotic stroma  No in situ or invasive carcinoma is identified   Right breast, five o'clock mass, site B, core biopsy:  Invasive ductal carcinoma, favor grade 2  No significant in situ component is identified  Invasive carcinoma measures 1.2 cm in greatest dimension in a single core  ER, AK and HER-2/keli are pending, results will be reported in an addendum   RESULTS CONVEYED: To the patient via phone by Dr. Nicholas Mistry 4/24/19. ASSESSMENT:  The results of the 3:00 biopsy site are considered benign discordant. The results of the 5:00 biopsy site are considered concordant   RECOMMENDATIONS: Surgical consultation.  Excision of the 3:00 site is  recommended as well as treatment of the known 5:00 malignancy. Rebeca Paiz MD 4/24/2019  1:43 PM          Narrative STUDY:  ULTRASOUND-GUIDED CORE NEEDLE BIOPSY OF THE right BREAST x2    INDICATION: 2 suspicious right breast masses    PROCEDURE:    The risks and benefits of the procedure were explained to the patient, questions  were answered, and informed consent was obtained. The mass at 3 o'clock in the right breast (site A) was localized with  ultrasound. The breast was prepped in the usual sterile manner. Following the  administration of a local anesthetic and dermatotomy, and using ultrasound  guidance,  a 12 gauge Celero  needle was advanced to the lesion, and 4 cores  were obtained. Pre and post-fire images confirmed accurate needle trajectory. A metallic clip was placed at the biopsy site. The above steps were then  repeated for a 5:00 right breast mass (site B). Post-biopsy digital mammogram  confirms the presence of the clip at the intended biopsy site. The patient  tolerated the procedure well without complication. Final pathology is pending. Impression IMPRESSION:     Successful ultrasound-guided biopsy yielding cores submitted for histologic  analysis. Clips were placed at the biopsy sites. Post-biopsy digital mammogram  confirms the presence of the clips at the intended biopsy sites. Further  recommendations will be based on final pathology results. Past Medical History:   Diagnosis Date    Ankle edema 4/5/2010    Anxiety 4/5/2010    Benign essential hypertension 5/26/2016 2006    Carpal tunnel syndrome 4/5/2010    Depression 4/5/2010    H/O Benign Colon Polyp 12/20/2010    H/O Uterine Fibroids 12/20/2010    Hypercholesterolemia 6/25/2013    ~2006    Hypothyroidism 5/26/2016 7/1999    Kidney stone 4/5/2010    Perimenopausal 4/5/2010    Primary osteoarthritis of left knee 5/26/2016    Extensive;  Ortho (needs knee replacement), CSI x 2,     S/P benign cervical polypectomy 2010    Sleep apnea 2010    Stress incontinence 2010     Past Surgical History:   Procedure Laterality Date    COLONOSCOPY  10/05    benign polyp; repeat 5 yr per Dr Sunil Carlson COLONOSCOPY  2010    normal, f/u 5 yrs, Dr Sunil Carlson EKG ORDERABLE  2009    NSR, rate 84, normal EKG    HX BLADDER SUSPENSION  ~    Dr Tono Frias  2011    VPI, right breast biopsy negative    HX CARPAL TUNNEL RELEASE  3/2010    Right; Dr Konrad Gooden  ,     Dr Dianna Fields    HX COLONOSCOPY  2016    Dr Karen Hannon-Internal Hemorrhoids- Normal mucosa in the whole colon - Repeat colonoscopy in 5 years    HX DILATION AND CURETTAGE  2005    AUB, benign/neg bx; Dr Dianna Fields    HX [address]  10/2012    Dr Dianna Fields, [facility]    [address]  [address]    Supracervical hysterectomy for fibroids, Dr Thad Colon  age 11   [facility] HX WISDOM TEETH EXTRACTION       Social History     Socioeconomic History    Marital status:      Spouse name: Not on file    Number of children: 1    Years of education: Not on file    Highest education level: Not on file   Occupational History    Occupation: [address] Occupation: Retired    Social Needs    Financial resource strain: Not on file    Food insecurity:     Worry: Not on file     Inability: Not on file   TheraCell needs:     Medical: Not on file     Non-medical: Not on file   Tobacco Use    Smoking status: Former Smoker     Last attempt to quit: 2005     Years since quittin.3    Smokeless tobacco: Never Used   Substance and Sexual Activity    Alcohol use: Yes     Comment: maybe 2-3 drinks a week at most    Drug use: No    Sexual activity: Not Currently     Partners: Male     Comment: not sexually active x many years   Lifestyle    Physical activity:     Days per week: Not on file     Minutes per session: Not on file    Stress: Not on file   Relationships    Social connections:     Talks on phone: Not on file     Gets together: Not on file     Attends Christian service: Not on file     Active member of club or organization: Not on file     Attends meetings of clubs or organizations: Not on file     Relationship status: Not on file    Intimate partner violence:     Fear of current or ex partner: Not on file     Emotionally abused: Not on file     Physically abused: Not on file     Forced sexual activity: Not on file   Other Topics Concern     Service Not Asked    Blood Transfusions Not Asked    Caffeine Concern No     Comment: no coffee, tea and cut out her diet Cokes    Occupational Exposure Not Asked   [place] Hazards Not Asked    Sleep Concern Not Asked    Stress Concern Not Asked    Weight Concern Not Asked    Special Diet No     Comment: just trying to eat less carbs    Back Care Not Asked    Exercise No    Bike Helmet Not Asked    Seat Belt Not Asked    Self-Exams Not Asked   Social History Narrative    1 biological daughter, 1 \"adopted\", and 2 step children     OB History        1    Para   1    Term                AB        Living           SAB        TAB        Ectopic        Molar        Multiple        Live Births              Obstetric Comments    x 1; Previous Gyn Dr Julieanne Gilford  Menarche:  10. LMP: ? .  # of Children:  1. Age at Delivery of First Child:  34.   Hysterectomy/oophorectomy:  YES/YES. Breast Bx:  yes. Hx of Breast Feeding:  yes. BCP:  yes.  Hormone therapy:  no.                  Current Outpatient Medications:     doxycycline (VIBRA-TABS) 100 mg tablet, take 1 tablet by mouth twice a day, Disp: , Rfl: 0    meloxicam (MOBIC) 15 mg tablet, take 1 tablet by mouth daily with BREAKFAST, Disp: 90 Tab, Rfl: 0    buPROPion XL (WELLBUTRIN XL) 150 mg tablet, take 1 tablet by mouth once daily before BREAKFAST, Disp: 90 Tab, Rfl: 3    levothyroxine (SYNTHROID) 137 mcg tablet, take 1 tablet by mouth once daily BEFORE BRAKFAST, Disp: 90 Tab, Rfl: 3    losartan (COZAAR) 100 mg tablet, take 1 tablet by mouth once daily, Disp: 90 Tab, Rfl: 3    meloxicam (MOBIC) 15 mg tablet, take 1 tablet by mouth daily WITH BREAKFAST, Disp: 30 Tab, Rfl: 5    OTHER, Portable CPAP machine for RENÉE, with new mask and tubing but no change to mask or settings; G47.30 GARO 99m prog good, Disp: 1 Each, Rfl: 0  Allergies   Allergen Reactions    Lisinopril Cough    Pcn [Penicillins] Hives    Sulfa (Sulfonamide Antibiotics) Hives    Zocor [Simvastatin] Other (comments)     aches       Review of Systems   Constitutional: Negative. HENT: Negative. Eyes: Negative. Respiratory: Negative. Cardiovascular: Negative. Gastrointestinal: Negative. Genitourinary: Negative. Musculoskeletal: Positive for joint pain. Skin: Negative. Nose redness and swelling   Neurological: Negative. Endo/Heme/Allergies: Bruises/bleeds easily. Psychiatric/Behavioral: The patient is nervous/anxious. All other systems reviewed and are negative. Physical Exam   Constitutional: She is oriented to person, place, and time. She appears well-developed and well-nourished. HENT:   Head: Normocephalic. Nose with cellulitis and swelling. Inside right nare area of fluctuance   Eyes: EOM are normal.   Neck: Neck supple. No thyromegaly present. Cardiovascular: Normal rate, regular rhythm, normal heart sounds and intact distal pulses. Pulmonary/Chest: Effort normal and breath sounds normal. Right breast exhibits no inverted nipple, no mass, no nipple discharge, no skin change and no tenderness. Left breast exhibits no inverted nipple, no mass, no nipple discharge, no skin change and no tenderness. Breasts are symmetrical.   Abdominal: Soft. Musculoskeletal: Normal range of motion. Lymphadenopathy:     She has no cervical adenopathy. She has no axillary adenopathy.    Neurological: She is alert and oriented to person, place, and time. Skin: Skin is warm. Nursing note and vitals reviewed. BREAST ULTRASOUND, Preop planning  Indication:preop planning  right Breast 3:00 and 5:00    Technique: The area was scanned using a high-frequency linear-array near-field transducer  Findings: clip seen 3:00 at RA region  Slip seen at 5:00 3 cfn   Impression: Biopsy site visible with ultrasound  Disposition:  Breast mri    ASSESSMENT and PLAN    ICD-10-CM ICD-9-CM    1. Malignant neoplasm of lower-inner quadrant of right breast of female, estrogen receptor positive ([facility] [address] C50.311 174.3 MRI BREAST BI W WO CONT    Z17.0 V86.0      60 yo female with right breast IDC and atypia at 5:00 and 3:00  IDC grade 2 Er/Pr+ Her 2 keli -   T1 N0  She is here with her friend  I have reviewed the imaging and pathology with her and she was given copies of these reports. 90 minutes were spent face-to-face with the patient during this encounter and 90% of that time was spent on counseling and coordination of care. 1. Discussed lumpectomy and radiation vs mastectomy. Discussed reconstruction. MRI ordered to see if patient is a candidate for a lumpectomy. 2. Discussed sentinel lymph node biopsy. 3. Discussed external beam radiation. 4. Discussed hormone therapy. 5. Discussed the possibility of chemotherapy. Will schedule mri  Patient would like to see plastics  I will call her after mri  She has severe nasal cellulitis  I have called Maurene Heimlich and Dr. Eldon Thompson will see her today.

## 2019-05-06 NOTE — PATIENT INSTRUCTIONS

## 2019-05-13 ENCOUNTER — DOCUMENTATION ONLY (OUTPATIENT)
Dept: SURGERY | Age: 64
End: 2019-05-13

## 2019-05-15 ENCOUNTER — DOCUMENTATION ONLY (OUTPATIENT)
Dept: SURGERY | Age: 64
End: 2019-05-15

## 2019-05-15 NOTE — PROGRESS NOTES
Received voicemail from [facility] re: has a prior authorization been performed for breast MRI? She did not leave a phone number. I have reached out to Armando Salcido at [facility].

## 2019-05-16 ENCOUNTER — DOCUMENTATION ONLY (OUTPATIENT)
Dept: SURGERY | Age: 64
End: 2019-05-16

## 2019-05-16 NOTE — PROGRESS NOTES
Received a call from [address] [place] [address] Jami De La Rosa  that this patient has still not been authorized for her breast MRI. They requested our office call to accelerate the process since the MRI has already been postponed once. The tracking number is [id number]. ALBERT was called and I received authorization. The authorization number is [id number].

## 2019-05-17 ENCOUNTER — TELEPHONE (OUTPATIENT)
Dept: SURGERY | Age: 64
End: 2019-05-17

## 2019-05-17 ENCOUNTER — DOCUMENTATION ONLY (OUTPATIENT)
Dept: SURGERY | Age: 64
End: 2019-05-17

## 2019-05-17 ENCOUNTER — HOSPITAL ENCOUNTER (OUTPATIENT)
Dept: MRI IMAGING | Age: 64
Discharge: HOME | End: 2019-05-17
Attending: SURGERY
Payer: COMMERCIAL

## 2019-05-17 DIAGNOSIS — C50.311 MALIGNANT NEOPLASM OF LOWER-INNER QUADRANT OF RIGHT BREAST OF FEMALE, ESTROGEN RECEPTOR POSITIVE (HCC): ICD-10-CM

## 2019-05-17 DIAGNOSIS — Z17.0 MALIGNANT NEOPLASM OF LOWER-INNER QUADRANT OF RIGHT BREAST OF FEMALE, ESTROGEN RECEPTOR POSITIVE (HCC): ICD-10-CM

## 2019-05-17 PROCEDURE — 74011000258 HC RX REV CODE- 258: Performed by: SURGERY

## 2019-05-17 PROCEDURE — A9585 GADOBUTROL INJECTION: HCPCS | Performed by: SURGERY

## 2019-05-17 PROCEDURE — 77049 MRI BREAST C-+ W/CAD BI: CPT

## 2019-05-17 PROCEDURE — 74011250636 HC RX REV CODE- 250/636: Performed by: SURGERY

## 2019-05-17 RX ADMIN — SODIUM CHLORIDE 100 ML: 900 INJECTION, SOLUTION INTRAVENOUS at 10:39

## 2019-05-17 RX ADMIN — GADOBUTROL 10 ML: 604.72 INJECTION INTRAVENOUS at 10:38

## 2019-05-17 NOTE — TELEPHONE ENCOUNTER
Patient called with several questions:    - has appt with plastics on Tuesday with Dr. Connie Xiong (?) at [facility]. Per [facility] notes, it looks like the doctor who Dr. Olivia Soto had originally wanted the patient to go see (Dr. Claritza Mackay) was out of network. The next note referred to Dr. Delia Reyes, but the patient got an appt with Dr. Connie Xiong.    - had breast mri today. Not resulted yet. Dr. Olivia Soto will call her on Monday with results.     - I gave her results of low risk mammaprint and she was happy with this

## 2019-06-04 NOTE — PERIOP NOTES
[facility]  Ambulatory Surgery Unit  Pre-operative Instructions    Surgery/Procedure Date  06/13/19            Tentative Arrival Time 0700      1. On the day of your surgery/procedure, please report to the Ambulatory Surgery Unit Registration Desk and sign in at your designated time. The Ambulatory Surgery Unit is located in [facility] on the [facility] side of the [facility] [place] across from the [facility]. Please have all of your health insurance cards and a photo ID. 2. You must have someone with you to drive you home, as you should not drive a car for 24 hours following anesthesia. Please make arrangements for a responsible adult friend or family member to stay with you for at least the first 24 hours after your surgery. 3. Do not have anything to eat or drink (including water, gum, mints, coffee, juice) after 11:59 PM  06/12/19. This may not apply to medications prescribed by your physician. (Please note below the special instructions with medications to take the morning of surgery, if applicable.)    4. We recommend you do not drink any alcoholic beverages for 24 hours before and after your surgery. 5. Contact your surgeons office for instructions on the following medications: non-steroidal anti-inflammatory drugs (i.e. Advil, Aleve), vitamins, and supplements. (Some surgeons will want you to stop these medications prior to surgery and others may allow you to take them)   **If you are currently taking Plavix, Coumadin, Aspirin and/or other blood-thinning agents, contact your surgeon for instructions. ** Your surgeon will partner with the physician prescribing these medications to determine if it is safe to stop or if you need to continue taking. Please do not stop taking these medications without instructions from your surgeon.     6. In an effort to help prevent surgical site infection, we ask that you shower with an anti-bacterial soap (i.e. Dial/Safeguard, or the soap provided to you at your preadmission testing appointment) for 3 days prior to and on the morning of surgery, using a fresh towel after each shower. (Please begin this process with fresh bed linens.) Do not apply any lotions, powders, or deodorants after the shower on the day of your procedure. If applicable, please do not shave the operative site for 48 hours prior to surgery. 7. Wear comfortable clothes. Wear glasses instead of contacts. Do not bring any jewelry or money (other than copays or fees as instructed). Do not wear make-up, particularly mascara, the morning of your surgery. Do not wear nail polish, particularly if you are having foot /hand surgery. Wear your hair loose or down, no ponytails, buns, celine pins or clips. All body piercings must be removed. 8. You should understand that if you do not follow these instructions your surgery may be cancelled. If your physical condition changes (i.e. fever, cold or flu) please contact your surgeon as soon as possible. 9. It is important that you be on time. If a situation occurs where you may be late, or if you have any questions or problems, please call [phone].    10. Your surgery time may be subject to change. You will receive a phone call the day prior to surgery to confirm your arrival time. 11. Pediatric patients: please bring a change of clothes, diapers, bottle/sippy cup, pacifier, etc.      Special Instructions: Take all medications and inhalers, as prescribed, on the morning of surgery with a sip of water EXCEPT: n/a        I understand a pre-operative phone call will be made to verify my surgery time. In the event that I am not available, I give permission for a message to be left on my answering service and/or with another person?       yes         ___________________      ___________________      ________________  (Signature of Patient)          (Witness)                   (Date and Time)

## 2019-06-07 ENCOUNTER — TELEPHONE (OUTPATIENT)
Dept: SURGERY | Age: 64
End: 2019-06-07

## 2019-06-07 ENCOUNTER — DOCUMENTATION ONLY (OUTPATIENT)
Dept: SURGERY | Age: 64
End: 2019-06-07

## 2019-06-07 DIAGNOSIS — Z17.0 MALIGNANT NEOPLASM OF LOWER-INNER QUADRANT OF RIGHT BREAST OF FEMALE, ESTROGEN RECEPTOR POSITIVE (HCC): Primary | ICD-10-CM

## 2019-06-07 DIAGNOSIS — C50.311 MALIGNANT NEOPLASM OF LOWER-INNER QUADRANT OF RIGHT BREAST OF FEMALE, ESTROGEN RECEPTOR POSITIVE (HCC): Primary | ICD-10-CM

## 2019-06-07 NOTE — TELEPHONE ENCOUNTER
The patient called to make sure the instructions she received from [facility] were correct. She will stop her Mobic 5 days prior to surgery. She also wanted to report that she did not have a MRSA test done when she had her nasal infection. I told her I would let Dr. Flor Bryan know. She was appreciative for the call back.

## 2019-06-12 ENCOUNTER — ANESTHESIA EVENT (OUTPATIENT)
Dept: SURGERY | Age: 64
End: 2019-06-12
Payer: COMMERCIAL

## 2019-06-12 ENCOUNTER — TELEPHONE (OUTPATIENT)
Dept: SURGERY | Age: 64
End: 2019-06-12

## 2019-06-12 NOTE — TELEPHONE ENCOUNTER
I called the patient and explained that Dr. Gay Olivera will review this with the patient tomorrow. The patient was appreciative.

## 2019-06-12 NOTE — TELEPHONE ENCOUNTER
----- Message from Mohini Kim MD sent at 6/12/2019  3:29 PM EDT -----  Regarding: RE: travel to [place]   I can discuss this with her tomorrow. ----- Message -----  From: Jose Monk RN  Sent: 6/12/2019   2:21 PM  To: Dariela Hargrove RN, Mohini Kim MD  Subject: travel to [facility],  Patient having lumpectomy tomorrow 6/13/19. She wants to know if/when she can visit daughter in [place]. She understands that she will need radiation and wants to know what her timeline is.     Thanks,  Korina Gutierrez

## 2019-06-13 ENCOUNTER — APPOINTMENT (OUTPATIENT)
Dept: NUCLEAR MEDICINE | Age: 64
End: 2019-06-13
Attending: SURGERY
Payer: COMMERCIAL

## 2019-06-13 ENCOUNTER — ANESTHESIA (OUTPATIENT)
Dept: SURGERY | Age: 64
End: 2019-06-13
Payer: COMMERCIAL

## 2019-06-13 ENCOUNTER — HOSPITAL ENCOUNTER (OUTPATIENT)
Age: 64
Setting detail: OUTPATIENT SURGERY
Discharge: HOME | End: 2019-06-13
Attending: SURGERY | Admitting: SURGERY
Payer: COMMERCIAL

## 2019-06-13 VITALS
OXYGEN SATURATION: 98 % | DIASTOLIC BLOOD PRESSURE: 52 MMHG | SYSTOLIC BLOOD PRESSURE: 101 MMHG | RESPIRATION RATE: 16 BRPM | BODY MASS INDEX: 37.47 KG/M2 | HEIGHT: 69 IN | TEMPERATURE: 97.9 F | HEART RATE: 69 BPM | WEIGHT: 253 LBS

## 2019-06-13 DIAGNOSIS — C50.311 MALIGNANT NEOPLASM OF LOWER-INNER QUADRANT OF RIGHT FEMALE BREAST (HCC): ICD-10-CM

## 2019-06-13 DIAGNOSIS — Z17.0 ESTROGEN RECEPTOR POSITIVE: ICD-10-CM

## 2019-06-13 DIAGNOSIS — Z17.0 MALIGNANT NEOPLASM OF LOWER-INNER QUADRANT OF RIGHT BREAST OF FEMALE, ESTROGEN RECEPTOR POSITIVE (HCC): ICD-10-CM

## 2019-06-13 DIAGNOSIS — C50.311 MALIGNANT NEOPLASM OF LOWER-INNER QUADRANT OF RIGHT BREAST OF FEMALE, ESTROGEN RECEPTOR POSITIVE (HCC): ICD-10-CM

## 2019-06-13 PROCEDURE — 77030031139 HC SUT VCRL2 J&J -A: Performed by: SURGERY

## 2019-06-13 PROCEDURE — 77030011640 HC PAD GRND REM COVD -A: Performed by: SURGERY

## 2019-06-13 PROCEDURE — 74011250636 HC RX REV CODE- 250/636

## 2019-06-13 PROCEDURE — 77030020255 HC SOL INJ LR 1000ML BG: Performed by: SURGERY

## 2019-06-13 PROCEDURE — 77030010509 HC AIRWY LMA MSK TELE -A: Performed by: NURSE ANESTHETIST, CERTIFIED REGISTERED

## 2019-06-13 PROCEDURE — 74011000250 HC RX REV CODE- 250: Performed by: SURGERY

## 2019-06-13 PROCEDURE — 76030000001 HC AMB SURG OR TIME 1 TO 1.5: Performed by: SURGERY

## 2019-06-13 PROCEDURE — 88342 IMHCHEM/IMCYTCHM 1ST ANTB: CPT

## 2019-06-13 PROCEDURE — 77030019908 HC STETH ESOPH SIMS -A: Performed by: NURSE ANESTHETIST, CERTIFIED REGISTERED

## 2019-06-13 PROCEDURE — 76060000062 HC AMB SURG ANES 1 TO 1.5 HR: Performed by: SURGERY

## 2019-06-13 PROCEDURE — 74011000250 HC RX REV CODE- 250

## 2019-06-13 PROCEDURE — 77030034556 HC PRB MARGN DETECT LUMPECTMY DISP DUNE -G: Performed by: SURGERY

## 2019-06-13 PROCEDURE — 77030021352 HC CBL LD SYS DISP COVD -B: Performed by: SURGERY

## 2019-06-13 PROCEDURE — 88307 TISSUE EXAM BY PATHOLOGIST: CPT

## 2019-06-13 PROCEDURE — 77030002933 HC SUT MCRYL J&J -A: Performed by: SURGERY

## 2019-06-13 PROCEDURE — 74011250636 HC RX REV CODE- 250/636: Performed by: ANESTHESIOLOGY

## 2019-06-13 PROCEDURE — 77030034626 HC LIGASURE SM JAW SEAL OPN SURG COVD -E: Performed by: SURGERY

## 2019-06-13 PROCEDURE — 77030011267 HC ELECTRD BLD COVD -A: Performed by: SURGERY

## 2019-06-13 PROCEDURE — 88360 TUMOR IMMUNOHISTOCHEM/MANUAL: CPT

## 2019-06-13 PROCEDURE — 77030011825 HC SUPP SURG PSTOP S2SG -B: Performed by: SURGERY

## 2019-06-13 PROCEDURE — 77030018673: Performed by: SURGERY

## 2019-06-13 PROCEDURE — 78195 LYMPH SYSTEM IMAGING: CPT

## 2019-06-13 PROCEDURE — 88331 PATH CONSLTJ SURG 1 BLK 1SPC: CPT

## 2019-06-13 PROCEDURE — 76210000057 HC AMBSU PH II REC 1 TO 1.5 HR: Performed by: SURGERY

## 2019-06-13 PROCEDURE — 74011250636 HC RX REV CODE- 250/636: Performed by: SURGERY

## 2019-06-13 PROCEDURE — 77030018836 HC SOL IRR NACL ICUM -A: Performed by: SURGERY

## 2019-06-13 RX ORDER — OXYCODONE AND ACETAMINOPHEN 5; 325 MG/1; MG/1
1 TABLET ORAL
Qty: 30 TAB | Refills: 0 | Status: SHIPPED | OUTPATIENT
Start: 2019-06-13 | End: 2019-06-16

## 2019-06-13 RX ORDER — ONDANSETRON 4 MG/1
4 TABLET, ORALLY DISINTEGRATING ORAL
Qty: 5 TAB | Refills: 1 | Status: SHIPPED | OUTPATIENT
Start: 2019-06-13 | End: 2019-06-26 | Stop reason: ALTCHOICE

## 2019-06-13 RX ORDER — HYDROMORPHONE HYDROCHLORIDE 1 MG/ML
.2-.5 INJECTION, SOLUTION INTRAMUSCULAR; INTRAVENOUS; SUBCUTANEOUS ONCE
Status: DISCONTINUED | OUTPATIENT
Start: 2019-06-13 | End: 2019-06-13 | Stop reason: HOSPADM

## 2019-06-13 RX ORDER — SODIUM CHLORIDE, SODIUM LACTATE, POTASSIUM CHLORIDE, CALCIUM CHLORIDE 600; 310; 30; 20 MG/100ML; MG/100ML; MG/100ML; MG/100ML
25 INJECTION, SOLUTION INTRAVENOUS CONTINUOUS
Status: DISCONTINUED | OUTPATIENT
Start: 2019-06-13 | End: 2019-06-13 | Stop reason: HOSPADM

## 2019-06-13 RX ORDER — FENTANYL CITRATE 50 UG/ML
25 INJECTION, SOLUTION INTRAMUSCULAR; INTRAVENOUS
Status: DISCONTINUED | OUTPATIENT
Start: 2019-06-13 | End: 2019-06-13 | Stop reason: HOSPADM

## 2019-06-13 RX ORDER — SODIUM CHLORIDE 9 MG/ML
INJECTION, SOLUTION INTRAVENOUS
Status: DISCONTINUED
Start: 2019-06-13 | End: 2019-06-13 | Stop reason: HOSPADM

## 2019-06-13 RX ORDER — PHENYLEPHRINE HCL IN 0.9% NACL 0.4MG/10ML
SYRINGE (ML) INTRAVENOUS AS NEEDED
Status: DISCONTINUED | OUTPATIENT
Start: 2019-06-13 | End: 2019-06-13 | Stop reason: HOSPADM

## 2019-06-13 RX ORDER — EPHEDRINE SULFATE 50 MG/ML
INJECTION, SOLUTION INTRAVENOUS AS NEEDED
Status: DISCONTINUED | OUTPATIENT
Start: 2019-06-13 | End: 2019-06-13 | Stop reason: HOSPADM

## 2019-06-13 RX ORDER — ONDANSETRON 2 MG/ML
INJECTION INTRAMUSCULAR; INTRAVENOUS AS NEEDED
Status: DISCONTINUED | OUTPATIENT
Start: 2019-06-13 | End: 2019-06-13 | Stop reason: HOSPADM

## 2019-06-13 RX ORDER — SODIUM CHLORIDE 900 MG/100ML
INJECTION INTRAVENOUS
Status: DISCONTINUED
Start: 2019-06-13 | End: 2019-06-13 | Stop reason: HOSPADM

## 2019-06-13 RX ORDER — BUPIVACAINE HYDROCHLORIDE 2.5 MG/ML
20 INJECTION, SOLUTION EPIDURAL; INFILTRATION; INTRACAUDAL ONCE
Status: COMPLETED | OUTPATIENT
Start: 2019-06-13 | End: 2019-06-13

## 2019-06-13 RX ORDER — SODIUM CHLORIDE 0.9 % (FLUSH) 0.9 %
5-40 SYRINGE (ML) INJECTION EVERY 8 HOURS
Status: DISCONTINUED | OUTPATIENT
Start: 2019-06-13 | End: 2019-06-13 | Stop reason: HOSPADM

## 2019-06-13 RX ORDER — LIDOCAINE HYDROCHLORIDE 10 MG/ML
0.1 INJECTION, SOLUTION EPIDURAL; INFILTRATION; INTRACAUDAL; PERINEURAL AS NEEDED
Status: DISCONTINUED | OUTPATIENT
Start: 2019-06-13 | End: 2019-06-13 | Stop reason: HOSPADM

## 2019-06-13 RX ORDER — SODIUM CHLORIDE 0.9 % (FLUSH) 0.9 %
5-40 SYRINGE (ML) INJECTION AS NEEDED
Status: DISCONTINUED | OUTPATIENT
Start: 2019-06-13 | End: 2019-06-13 | Stop reason: HOSPADM

## 2019-06-13 RX ORDER — OXYCODONE AND ACETAMINOPHEN 5; 325 MG/1; MG/1
1 TABLET ORAL
Status: DISCONTINUED | OUTPATIENT
Start: 2019-06-13 | End: 2019-06-13 | Stop reason: HOSPADM

## 2019-06-13 RX ORDER — DIPHENHYDRAMINE HYDROCHLORIDE 50 MG/ML
12.5 INJECTION, SOLUTION INTRAMUSCULAR; INTRAVENOUS AS NEEDED
Status: DISCONTINUED | OUTPATIENT
Start: 2019-06-13 | End: 2019-06-13 | Stop reason: HOSPADM

## 2019-06-13 RX ORDER — MIDAZOLAM HYDROCHLORIDE 1 MG/ML
INJECTION, SOLUTION INTRAMUSCULAR; INTRAVENOUS AS NEEDED
Status: DISCONTINUED | OUTPATIENT
Start: 2019-06-13 | End: 2019-06-13 | Stop reason: HOSPADM

## 2019-06-13 RX ORDER — LIDOCAINE HYDROCHLORIDE 20 MG/ML
INJECTION, SOLUTION EPIDURAL; INFILTRATION; INTRACAUDAL; PERINEURAL AS NEEDED
Status: DISCONTINUED | OUTPATIENT
Start: 2019-06-13 | End: 2019-06-13 | Stop reason: HOSPADM

## 2019-06-13 RX ORDER — FENTANYL CITRATE 50 UG/ML
INJECTION, SOLUTION INTRAMUSCULAR; INTRAVENOUS AS NEEDED
Status: DISCONTINUED | OUTPATIENT
Start: 2019-06-13 | End: 2019-06-13 | Stop reason: HOSPADM

## 2019-06-13 RX ORDER — VANCOMYCIN HYDROCHLORIDE 1 G/20ML
INJECTION, POWDER, LYOPHILIZED, FOR SOLUTION INTRAVENOUS
Status: COMPLETED
Start: 2019-06-13 | End: 2019-06-13

## 2019-06-13 RX ORDER — VANCOMYCIN/0.9 % SOD CHLORIDE 1.5G/250ML
1500 PLASTIC BAG, INJECTION (ML) INTRAVENOUS ONCE
Status: DISCONTINUED | OUTPATIENT
Start: 2019-06-13 | End: 2019-06-13 | Stop reason: SDUPTHER

## 2019-06-13 RX ORDER — MORPHINE SULFATE 10 MG/ML
2 INJECTION, SOLUTION INTRAMUSCULAR; INTRAVENOUS
Status: DISCONTINUED | OUTPATIENT
Start: 2019-06-13 | End: 2019-06-13 | Stop reason: HOSPADM

## 2019-06-13 RX ORDER — DEXAMETHASONE SODIUM PHOSPHATE 4 MG/ML
INJECTION, SOLUTION INTRA-ARTICULAR; INTRALESIONAL; INTRAMUSCULAR; INTRAVENOUS; SOFT TISSUE AS NEEDED
Status: DISCONTINUED | OUTPATIENT
Start: 2019-06-13 | End: 2019-06-13 | Stop reason: HOSPADM

## 2019-06-13 RX ORDER — PROPOFOL 10 MG/ML
INJECTION, EMULSION INTRAVENOUS AS NEEDED
Status: DISCONTINUED | OUTPATIENT
Start: 2019-06-13 | End: 2019-06-13 | Stop reason: HOSPADM

## 2019-06-13 RX ORDER — LIDOCAINE HYDROCHLORIDE AND EPINEPHRINE 10; 10 MG/ML; UG/ML
1.5 INJECTION, SOLUTION INFILTRATION; PERINEURAL ONCE
Status: COMPLETED | OUTPATIENT
Start: 2019-06-13 | End: 2019-06-13

## 2019-06-13 RX ADMIN — ONDANSETRON 4 MG: 2 INJECTION INTRAMUSCULAR; INTRAVENOUS at 10:22

## 2019-06-13 RX ADMIN — Medication 80 MCG: at 10:15

## 2019-06-13 RX ADMIN — FENTANYL CITRATE 25 MCG: 50 INJECTION, SOLUTION INTRAMUSCULAR; INTRAVENOUS at 10:27

## 2019-06-13 RX ADMIN — Medication 80 MCG: at 10:56

## 2019-06-13 RX ADMIN — VANCOMYCIN HYDROCHLORIDE 1000 MG: 1 INJECTION, POWDER, LYOPHILIZED, FOR SOLUTION INTRAVENOUS at 09:57

## 2019-06-13 RX ADMIN — DEXAMETHASONE SODIUM PHOSPHATE 8 MG: 4 INJECTION, SOLUTION INTRA-ARTICULAR; INTRALESIONAL; INTRAMUSCULAR; INTRAVENOUS; SOFT TISSUE at 10:22

## 2019-06-13 RX ADMIN — FENTANYL CITRATE 25 MCG: 50 INJECTION, SOLUTION INTRAMUSCULAR; INTRAVENOUS at 11:16

## 2019-06-13 RX ADMIN — FENTANYL CITRATE 25 MCG: 50 INJECTION, SOLUTION INTRAMUSCULAR; INTRAVENOUS at 10:11

## 2019-06-13 RX ADMIN — Medication 80 MCG: at 10:29

## 2019-06-13 RX ADMIN — EPHEDRINE SULFATE 10 MG: 50 INJECTION, SOLUTION INTRAVENOUS at 10:40

## 2019-06-13 RX ADMIN — VANCOMYCIN HYDROCHLORIDE 1000 MG: 1 INJECTION, POWDER, LYOPHILIZED, FOR SOLUTION INTRAVENOUS at 09:58

## 2019-06-13 RX ADMIN — LIDOCAINE HYDROCHLORIDE 40 MG: 20 INJECTION, SOLUTION EPIDURAL; INFILTRATION; INTRACAUDAL; PERINEURAL at 10:11

## 2019-06-13 RX ADMIN — PROPOFOL 160 MG: 10 INJECTION, EMULSION INTRAVENOUS at 10:11

## 2019-06-13 RX ADMIN — Medication 80 MCG: at 10:34

## 2019-06-13 RX ADMIN — FENTANYL CITRATE 25 MCG: 50 INJECTION, SOLUTION INTRAMUSCULAR; INTRAVENOUS at 11:00

## 2019-06-13 RX ADMIN — MIDAZOLAM HYDROCHLORIDE 2 MG: 1 INJECTION, SOLUTION INTRAMUSCULAR; INTRAVENOUS at 10:05

## 2019-06-13 RX ADMIN — SODIUM CHLORIDE, SODIUM LACTATE, POTASSIUM CHLORIDE, AND CALCIUM CHLORIDE 25 ML/HR: 600; 310; 30; 20 INJECTION, SOLUTION INTRAVENOUS at 08:57

## 2019-06-13 NOTE — ANESTHESIA POSTPROCEDURE EVALUATION
Procedure(s):  RIGHT BREAST LUMPECTOMY ( x2 ) WTIH ULTRASOUND , RIGHT SENTINAL NODE BIOPSY  SENTINEL NODE BIOPSY. general    Anesthesia Post Evaluation      Multimodal analgesia: multimodal analgesia used between 6 hours prior to anesthesia start to PACU discharge  Patient location during evaluation: bedside  Patient participation: complete - patient participated  Level of consciousness: awake and alert  Pain score: 0  Airway patency: patent  Anesthetic complications: no  Cardiovascular status: acceptable  Respiratory status: acceptable  Hydration status: acceptable  Post anesthesia nausea and vomiting:  none      No vitals data found for the desired time range.

## 2019-06-13 NOTE — ANESTHESIA PREPROCEDURE EVALUATION
Relevant Problems   No relevant active problems       Anesthetic History   No history of anesthetic complications            Review of Systems / Medical History  Patient summary reviewed, nursing notes reviewed and pertinent labs reviewed    Pulmonary        Sleep apnea: CPAP           Neuro/Psych         Psychiatric history (anxiety)     Cardiovascular    Hypertension: well controlled              Exercise tolerance: >4 METS     GI/Hepatic/Renal  Within defined limits              Endo/Other      Hypothyroidism: well controlled  Obesity, arthritis ( knees) and cancer (right breast)     Other Findings            Physical Exam    Airway  Mallampati: I  TM Distance: 4 - 6 cm  Neck ROM: normal range of motion   Mouth opening: Normal     Cardiovascular    Rhythm: regular  Rate: normal      Pertinent negatives: No murmur   Dental    Dentition: Caps/crowns  Comments:  On molars   Pulmonary  Breath sounds clear to auscultation               Abdominal  GI exam deferred       Other Findings            Anesthetic Plan    ASA: 3  Anesthesia type: general    Monitoring Plan: BIS      Induction: Intravenous  Anesthetic plan and risks discussed with: Patient

## 2019-06-13 NOTE — BRIEF OP NOTE
BRIEF OPERATIVE NOTE    Date of Procedure: 6/13/2019   Preoperative Diagnosis: RIGHT BREAST CANCER right apocrine metaplasia  Postoperative Diagnosis: same  Procedure(s):  RIGHT BREAST LUMPECTOMY ( x2 ) WTIH ULTRASOUND , RIGHT   SENTINEL NODE BIOPSY  intraop margin assessment using rf spectroscopy  Surgeon(s) and Role:     Parminder Child MD - Primary         Surgical Assistant: kat savage    Surgical Staff:  Circ-1: Marely Meier RN  Scrub Tech-1: Allison Plush Tech-2: Angelina Meza  Surg Asst-1: Lelon Masker  Event Time In Time Out   Incision Start 1027    Incision Close 1110      Anesthesia: General   Estimated Blood Loss: 10 ml  Specimens:   ID Type Source Tests Collected by Time Destination   1 : RIGHT AXILLARY SENTINEL NODE #1 Frozen Section Axillary  Jearlean Scheuermann, MD 6/13/2019 1033 Pathology   2 : RIGHT AXILLARY SENTINEL NODE #2 Frozen Section Axillary  Jearlean Scheuermann, MD 6/13/2019 1034 Pathology   3 : RIGHT AXILLARY SENTINEL NODE #3 Frozen Section Axillary  Jearlean Scheuermann, MD 6/13/2019 1034 Pathology   4 : RIGHT BREAST LUMPECTOMY AT 3 O'CLOCK Preservative Breast  Jearlean Scheuermann, MD 6/13/2019 1045 Pathology   5 : RIGHT BREAST LUMPECTOMY AT 5 O'CLOCK Preservative Breast  Jearlean Scheuermann, MD 6/13/2019 1055 Pathology      Findings: sln negative    Complications: none  Implants: * No implants in log *    Dictated [id number]

## 2019-06-13 NOTE — PERIOP NOTES
Patient: Keon Green MRN: [id number]  SSN: [id number]   YOB: 1955  Age: 59 y.o. Sex: female     Patient is status post Procedure(s):  RIGHT BREAST LUMPECTOMY ( x2 ) WTIH ULTRASOUND , RIGHT SENTINAL NODE BIOPSY  SENTINEL NODE BIOPSY. Surgeon(s) and Role:     Trenton Bailey MD - Primary    Local/Dose/Irrigation:  SEE MAR                Peripheral IV 06/13/19 Left Antecubital (Active)   Site Assessment Clean, dry, & intact 6/13/2019  8:54 AM   Phlebitis Assessment 0 6/13/2019  8:54 AM   Infiltration Assessment 0 6/13/2019  8:54 AM   Dressing Status Clean, dry, & intact 6/13/2019  8:54 AM   Dressing Type Tape;Transparent 6/13/2019  8:54 AM   Hub Color/Line Status Pink; Infusing 6/13/2019  8:54 AM            Airway - Endotracheal Tube 06/13/19 Oral (Active)                   Dressing/Packing:  Wound Breast Right-Dressing Type: 4 x 4;Bra;Topical skin adhesive/glue (06/13/19 0900)  Wound Axilla Right-Dressing Type: 4 x 4;Topical skin adhesive/glue (06/13/19 0900)    Splint/Cast:  ]    Other:  INTRAOPERATIVE ASSESSMENT USING MARGIN PROBE.

## 2019-06-13 NOTE — PERIOP NOTES
Patient states that  Roshni Fatima will be with them for at least 24 hours following today's procedure.

## 2019-06-13 NOTE — PERIOP NOTES
Permission received to review discharge instructions and discuss private health information with  Christine Gray. [id number] at bedside, updated on pt's status. Blood pressure improving with iv fluids. [place]- receiving d/c instructions. 1144-D/c instructions completed, all questions answered. Reviewed when to call the doctor, when to call 911, hygiene care, use of ice, acitivity and diet. 1202-Blood pressure improving, pt reports her normal pressure around 863'I systolic. Pt denies any dizziness or feeling funny. Explained that anesthesia relaxes you and therefore drops blood pressure. 1220-Blood pressure improving, MAP remains in 60's pt continuing to receive iv fluids. Pt continues to deny  Dizziness. 1230 Blood pressure lying 103/60, 68, 21, 94% room air, sittin/66, 69, 25, 97%. Pt reports she feels ready to go home. Pt continues to deny pain. 1243-Pt voided x 1 w/o difficulty. 1248-Transported via w/c to awaiting transportation. No complaints noted at this time.

## 2019-06-13 NOTE — H&P
HISTORY OF PRESENT ILLNESS  Crescencio Meza is a 59 y.o. female. HPI NEW Patient presents for consultation at the request of Dr. Josie Post for newly diagnosed RIGHT breast cancer. The patient had no abnormal breast symptoms. This was detected from mammogram which led to diagnostic imaging and biopsies. No pain at biopsy sites. She currently has an infection on her nose and is taking doxycycline (started antibiotics yesterday). Her  has h/o impetigo  Her sister in law is here with her today.     4/22/19 - RIGHT breast biopsies. Site A) fibrocystic changes with possible radial scar and atypia. Site B) IDC, grade 2, ER positive, SC positive, HER 2 negative.      No family history of breast or ovarian cancer. Father had melanoma.     Breast imaging-  Screening mammogram 4/17/19 BI-RADS 0.  RIGHT breast diagnostic mammogram and RIGHT breast US 4/18/19 BI-RADS 4. RIGHT breast biopsies 4/22/19.     [facility] Results (most recent):       Results from [place] encounter on 04/22/19   SILVANO POST BX IMAGING RT INCL CAD     Addendum Addendum: ADDENDUM TO Ultrasound guided right BREAST BIOPSY, PERFORMED ON 4/22/19  PATHOLOGY RESULTS:  Right breast, three o'clock mass, site A, core biopsy:  Fibrocystic changes with apocrine metaplasia and dense, sclerotic stroma  No in situ or invasive carcinoma is identified   Right breast, five o'clock mass, site B, core biopsy:  Invasive ductal carcinoma, favor grade 2  No significant in situ component is identified  Invasive carcinoma measures 1.2 cm in greatest dimension in a single core  ER, SC and HER-2/keli are pending, results will be reported in an addendum   RESULTS CONVEYED: To the patient via phone by Dr. Ana Xie 4/24/19. ASSESSMENT:  The results of the 3:00 biopsy site are considered benign discordant. The results of the 5:00 biopsy site are considered concordant   RECOMMENDATIONS: Surgical consultation.  Excision of the 3:00 site is  recommended as well as treatment of the known 5:00 malignancy. Reny Larkin MD 4/24/2019  1:43 PM           Narrative STUDY:  ULTRASOUND-GUIDED CORE NEEDLE BIOPSY OF THE right BREAST x2     INDICATION: 2 suspicious right breast masses     PROCEDURE:     The risks and benefits of the procedure were explained to the patient, questions  were answered, and informed consent was obtained.     The mass at 3 o'clock in the right breast (site A) was localized with  ultrasound. The breast was prepped in the usual sterile manner. Following the  administration of a local anesthetic and dermatotomy, and using ultrasound  guidance,  a 12 gauge Celero  needle was advanced to the lesion, and 4 cores  were obtained. Pre and post-fire images confirmed accurate needle trajectory. A metallic clip was placed at the biopsy site. The above steps were then  repeated for a 5:00 right breast mass (site B). Post-biopsy digital mammogram  confirms the presence of the clip at the intended biopsy site. The patient  tolerated the procedure well without complication.      Final pathology is pending.        Impression IMPRESSION:      Successful ultrasound-guided biopsy yielding cores submitted for histologic  analysis. Clips were placed at the biopsy sites. Post-biopsy digital mammogram  confirms the presence of the clips at the intended biopsy sites. Further  recommendations will be based on final pathology results.                 Past Medical History:   Diagnosis Date    Ankle edema 4/5/2010    Anxiety 4/5/2010    Benign essential hypertension 5/26/2016 2006    Carpal tunnel syndrome 4/5/2010    Depression 4/5/2010    H/O Benign Colon Polyp 12/20/2010    H/O Uterine Fibroids 12/20/2010    Hypercholesterolemia 6/25/2013     ~2006    Hypothyroidism 5/26/2016 7/1999    Kidney stone 4/5/2010    Perimenopausal 4/5/2010    Primary osteoarthritis of left knee 5/26/2016     Extensive;  Ortho (needs knee replacement), CSI x 2,     S/P benign cervical polypectomy 2010    Sleep apnea 2010    Stress incontinence 2010            Past Surgical History:   Procedure Laterality Date    COLONOSCOPY   10/05     benign polyp; repeat 5 yr per Dr Morris Phillips    COLONOSCOPY   2010     normal, f/u 5 yrs, Dr Morris Phillips    EKG ORDERABLE   2009     NSR, rate 84, normal EKG    HX BLADDER SUSPENSION   ~     Dr Callum Stroud   2011     VPI, right breast biopsy negative    HX CARPAL TUNNEL RELEASE   3/2010     Right; Dr Perlita Green CERVICAL POLYPECTOMY   ,      Dr Tasha Denney    HX COLONOSCOPY   2016     Dr Mitchell Hannon-Internal Hemorrhoids- Normal mucosa in the whole colon - Repeat colonoscopy in 5 years    HX DILATION AND CURETTAGE   2005     AUB, benign/neg bx; Dr Tahsa Denney    HX [facility]   10/2012     Dr Tasha Denney, AUB    HX PARTIAL HYSTERECTOMY   13     Supracervical hysterectomy for fibroids, Dr Chip Obrien   age 11   [de-identified] [de-identified] TEETH EXTRACTION          Social History            Socioeconomic History    Marital status:        Spouse name: Not on file    Number of children: 1    Years of education: Not on file    Highest education level: Not on file   Occupational History    Occupation: [address] Occupation: Retired    Social Needs    Financial resource strain: Not on file    Food insecurity:       Worry: Not on file       Inability: Not on file   [employer]history[employer] needs:       Medical: Not on file       Non-medical: Not on file   Tobacco Use    Smoking status: Former Smoker       Last attempt to quit: 2005       Years since quittin.3    Smokeless tobacco: Never Used   Substance and Sexual Activity    Alcohol use:  Yes       Comment: maybe 2-3 drinks a week at most    Drug use: No    Sexual activity: Not Currently       Partners: Male       Comment: not sexually active x many years   Lifestyle  Physical activity:       Days per week: Not on file       Minutes per session: Not on file    Stress: Not on file   Relationships    Social connections:       Talks on phone: Not on file       Gets together: Not on file       Attends Sikhism service: Not on file       Active member of club or organization: Not on file       Attends meetings of clubs or organizations: Not on file       Relationship status: Not on file    Intimate partner violence:       Fear of current or ex partner: Not on file       Emotionally abused: Not on file       Physically abused: Not on file       Forced sexual activity: Not on file   Other Topics Concern     Service Not Asked    Blood Transfusions Not Asked    Caffeine Concern No       Comment: no coffee, tea and cut out her diet Cokes    Occupational Exposure Not Asked    Hobby Hazards Not Asked    Sleep Concern Not Asked    Stress Concern Not Asked    Weight Concern Not Asked    Special Diet No       Comment: just trying to eat less carbs    Back Care Not Asked    Exercise No    Bike Helmet Not Asked    Seat Belt Not Asked    Self-Exams Not Asked   Social History Narrative     1 biological daughter, 1 \"adopted\", and 2 step children               OB History         1    Para   1    Term                AB        Living            SAB        TAB        Ectopic        Molar        Multiple        Live Births               Obstetric Comments    x 1; Previous Gyn Dr Daniel Watson  Menarche:  10. LMP: ? .  # of Children:  1. Age at Delivery of First Child:  34.   Hysterectomy/oophorectomy:  YES/YES. Breast Bx:  yes. Hx of Breast Feeding:  yes. BCP:  yes.  Hormone therapy:  no.                       Current Outpatient Medications:     doxycycline (VIBRA-TABS) 100 mg tablet, take 1 tablet by mouth twice a day, Disp: , Rfl: 0    meloxicam (MOBIC) 15 mg tablet, take 1 tablet by mouth daily with BREAKFAST, Disp: 90 Tab, Rfl: 0    buPROPion XL (WELLBUTRIN XL) 150 mg tablet, take 1 tablet by mouth once daily before BREAKFAST, Disp: 90 Tab, Rfl: 3    levothyroxine (SYNTHROID) 137 mcg tablet, take 1 tablet by mouth once daily BEFORE BRAKFAST, Disp: 90 Tab, Rfl: 3    losartan (COZAAR) 100 mg tablet, take 1 tablet by mouth once daily, Disp: 90 Tab, Rfl: 3    meloxicam (MOBIC) 15 mg tablet, take 1 tablet by mouth daily WITH BREAKFAST, Disp: 30 Tab, Rfl: 5    OTHER, Portable CPAP machine for RENÉE, with new mask and tubing but no change to mask or settings; G47.30 GARO 99m prog good, Disp: 1 Each, Rfl: 0        Allergies   Allergen Reactions    Lisinopril Cough    Pcn [Penicillins] Hives    Sulfa (Sulfonamide Antibiotics) Hives    Zocor [Simvastatin] Other (comments)       aches         Review of Systems   Constitutional: Negative. HENT: Negative. Eyes: Negative. Respiratory: Negative. Cardiovascular: Negative. Gastrointestinal: Negative. Genitourinary: Negative. Musculoskeletal: Positive for joint pain. Skin: Negative. Nose redness and swelling   Neurological: Negative. Endo/Heme/Allergies: Bruises/bleeds easily. Psychiatric/Behavioral: The patient is nervous/anxious. All other systems reviewed and are negative.        Physical Exam   Constitutional: She is oriented to person, place, and time. She appears well-developed and well-nourished. HENT:   Head: Normocephalic. Nose with cellulitis and swelling. Inside right nare area of fluctuance   Eyes: EOM are normal.   Neck: Neck supple. No thyromegaly present. Cardiovascular: Normal rate, regular rhythm, normal heart sounds and intact distal pulses. Pulmonary/Chest: Effort normal and breath sounds normal. Right breast exhibits no inverted nipple, no mass, no nipple discharge, no skin change and no tenderness. Left breast exhibits no inverted nipple, no mass, no nipple discharge, no skin change and no tenderness. Breasts are symmetrical.   Abdominal: Soft. Musculoskeletal: Normal range of motion. Lymphadenopathy:     She has no cervical adenopathy. She has no axillary adenopathy. Neurological: She is alert and oriented to person, place, and time. Skin: Skin is warm. Nursing note and vitals reviewed.     BREAST ULTRASOUND, Preop planning  Indication:preop planning  right Breast 3:00 and 5:00    Technique: The area was scanned using a high-frequency linear-array near-field transducer  Findings: clip seen 3:00 at RA region  Slip seen at 5:00 3 cfn   Impression: Biopsy site visible with ultrasound  Disposition:  Breast mri     ASSESSMENT and PLAN      ICD-10-CM ICD-9-CM     1. Malignant neoplasm of lower-inner quadrant of right breast of female, estrogen receptor positive ([facility] [address] C50.311 174.3 MRI BREAST BI W WO CONT     Z17.0 V86.0        58 yo female with right breast IDC and atypia at 5:00 and 3:00  IDC grade 2 Er/Pr+ Her 2 keli -   T1 N0  She is here with her friend  I have reviewed the imaging and pathology with her and she was given copies of these reports.     90 minutes were spent face-to-face with the patient during this encounter and 90% of that time was spent on counseling and coordination of care. 1. Discussed lumpectomy and radiation vs mastectomy. Discussed reconstruction. MRI ordered to see if patient is a candidate for a lumpectomy. 2. Discussed sentinel lymph node biopsy. 3. Discussed external beam radiation. 4. Discussed hormone therapy. 5. Discussed the possibility of chemotherapy. Right breast us guided lumpectomies x 2   sln biopsy.

## 2019-06-13 NOTE — PERIOP NOTES
Permission received to review discharge instructions and discuss private health information with  Rima Pink

## 2019-06-13 NOTE — PERIOP NOTES
Handy Paws applied at this time and set to appropriate temperature. Patient given remote and instructed on use. Will continue to monitor.

## 2019-06-13 NOTE — PERIOP NOTES
Mihir Solano  1955  [id number]    Situation:  Verbal report given from: Kriss Grullon. DIMAS GRAHAM RN  Procedure: Procedure(s):  RIGHT BREAST LUMPECTOMY ( x2 ) WTIH ULTRASOUND , RIGHT SENTINAL NODE BIOPSY  SENTINEL NODE BIOPSY    Background:    Preoperative diagnosis: RIGHT BREAST CANCER    Postoperative diagnosis: RIGHT BREAST CANCER    :  Dr. Olivia Soto    Assistant(s): Circ-1: Yessica White RN  Scrub Tech-1: [place] [place] Tech-2: Elliot Diaz  Surg Asst-1: Solomon Solis    Specimens:   ID Type Source Tests Collected by Time Destination   1 : RIGHT AXILLARY SENTINEL NODE #1 Frozen Section Axillary  Keke Coburn MD 6/13/2019 1033 Pathology   2 : RIGHT AXILLARY SENTINEL NODE #2 Frozen Section Axillary  Keke Coburn MD 6/13/2019 1034 Pathology   3 : RIGHT AXILLARY SENTINEL NODE #3 Frozen Section Temitope Coburn MD 6/13/2019 1034 Pathology   4 : RIGHT BREAST LUMPECTOMY AT 3 O'CLOCK Preservative Breast  Keke Coburn MD 6/13/2019 1045 Pathology   5 : RIGHT BREAST LUMPECTOMY AT 5 O'CLOCK Preservative Breast  Keke Coburn MD 6/13/2019 1055 Pathology       Assessment:  Intra-procedure medications         Anesthesia gave intra-procedure sedation and medications, see anesthesia flow sheet     Intravenous fluids: LR@ KVO     Vital signs stable       Recommendation:

## 2019-06-13 NOTE — DISCHARGE INSTRUCTIONS
Discharge Instructions from Dr. Mango Nina    · I will call you with the pathology results, typically within 1 week from today. · You may shower, but no hot tubs, swimming pools, or baths until your incision is healed. · No heavy lifting with the affected extremity (nothing greater than 5 pounds), and limit its use for the next 4-5 days. · You may use an ice pack for comfort for the next couple of days, but do not place ice directly on the skin. Rather, use a towel or clothing to serve as a barrier between skin and ice to prevent injury. · If I placed a drain, follow the drain instructions provided, especially as you keep a record of the drain output. · Follow medication instructions carefully. No aspirin, ibuprofen, aleve or mobic x 1 week  · Wear surgical bra and dressing for 24 hours, then remove. Wear supportive bra at all times. · You will have bruising and swelling  · Watch for signs of infection as listed below. · Redness  · Swelling  · Drainage from the incision or from your nipple that appears infected  · Fever over 101.5 degrees for consecutive readings, or over 99.5 if you are currently undergoing chemotherapy. · Call our office (number is below) for a follow-up appointment. · If you have any problems, our phone number is [phone]    TO PREVENT AN INFECTION      1. [facility] YOUR HANDS     To prevent infection, good handwashing is the most important thing you or your caregiver can do.  Wash your hands with soap and water or use the hand  we gave you before you touch any wounds. 2. SHOWER     Use the antibacterial soap we gave you when you take a shower.  Shower with this soap until your wounds are healed.  To reach all areas of your body, you may need someone to help you.  Dont forget to clean your belly button with every shower. 3.  USE CLEAN SHEETS     Use freshly cleaned sheets on your bed after surgery.       To keep the surgery site clean, do not allow pets to sleep with you while your wound is still healing. 4. STOP SMOKING     Stop smoking, or at least cut back on smoking     Smoking slows your healing. 5.  CONTROL YOUR BLOOD SUGAR     High blood sugars slow wound healing.  If you are diabetic, control your blood sugar levels before and after your surgery. DO NOT TAKE TYLENOL/ACETAMINOPHEN WITH PERCOCET, [facility], [address] TAKE NARCOTIC PAIN MEDICATIONS WITH FOOD     Narcotics tend to be constipating, we suggest taking a stool softener such as Colace or Miralax (follow package instructions). DO NOT DRIVE WHILE TAKING NARCOTIC PAIN MEDICATIONS. DO NOT TAKE SLEEPING MEDICATIONS OR ANTIANXIETY MEDICATIONS WHILE TAKING NARCOTIC PAIN MEDICATIONS,  ESPECIALLY THE NIGHT OF ANESTHESIA! CPAP PATIENTS BE SURE TO WEAR MACHINE WHENEVER NAPPING OR SLEEPING! DISCHARGE SUMMARY from Nurse    The following personal items collected during your admission are returned to you:   Dental Appliance: Dental Appliances: None  Vision: Visual Aid: Glasses(placed in pacu )  Hearing Aid:    Jewelry: Jewelry: None  Clothing: Clothing: With patient(under stretcher)  Other Valuables: Other Valuables: Cell Phone, Eyeglasses, Other (comment)(cell phone given to  Rachid, eyeglasses placed in pacu, patient has small bag with clothes in it that is under stretcher )  Valuables sent to safe:        PATIENT INSTRUCTIONS:    After General Anesthesia or Intravenous Sedation, for 24 hours or while taking prescription Narcotics:        Someone should be with you for the next 24 hours. For your own safety, a responsible adult must drive you home. · Limit your activities  · Recommended activity: Rest today, up with assistance today. Do not climb stairs or shower unattended for the next 24 hours. · Please start with a soft bland diet and advance as tolerated (no nausea) to regular diet.   · If you have a sore throat you should try the following: fluids, warm salt water gargles, or throat lozenges. If it does not improve after several days please follow up with your primary physician. · Do not drive and operate hazardous machinery  · Do not make important personal or business decisions  · Do  not drink alcoholic beverages  · If you have not urinated within 8 hours after discharge, please contact your surgeon on call. Report the following to your surgeon:  · Excessive pain, swelling, redness or odor of or around the surgical area  · Temperature over 100.5  · Nausea and vomiting lasting longer than 4 hours or if unable to take medications  · Any signs of decreased circulation or nerve impairment to extremity: change in color, persistent  numbness, tingling, coldness or increase pain      · You will receive a Post Operative Call from one of the Recovery Room Nurses on the day after your surgery to check on you. It is very important for us to know how you are recovering after your surgery. If you have an issue or need to speak with someone, please call your surgeon, do not wait for the post operative call. · You may receive an e-mail or letter in the mail from Yelena regarding your experience with us in the Ambulatory Surgery Unit. Your feedback is valuable to us and we appreciate your participation in the survey. · If the above instructions are not adequate or you are having problems after your surgery, call the physician at their office number. · We wish you a speedy recovery ? What to do at Home:      *  Please give a list of your current medications to your Primary Care Provider. *  Please update this list whenever your medications are discontinued, doses are      changed, or new medications (including over-the-counter products) are added. *  Please carry medication information at all times in case of emergency situations.             These are general instructions for a healthy lifestyle:    No smoking/ No tobacco products/ Avoid exposure to second hand smoke    Surgeon General's Warning:  Quitting smoking now greatly reduces serious risk to your health. Obesity, smoking, and sedentary lifestyle greatly increases your risk for illness    A healthy diet, regular physical exercise & weight monitoring are important for maintaining a healthy lifestyle    You may be retaining fluid if you have a history of heart failure or if you experience any of the following symptoms:  Weight gain of 3 pounds or more overnight or 5 pounds in a week, increased swelling in our hands or feet or shortness of breath while lying flat in bed. Please call your doctor as soon as you notice any of these symptoms; do not wait until your next office visit. Recognize signs and symptoms of STROKE:    B - Balance  E - Eyes    F-  Face looks uneven  A-  Arms unable to move or move even  S-  Speech slurred or non-existent  T-  Time-call 911 as soon as signs and symptoms begin-DO NOT go       Back to bed or wait to see if you get better-TIME IS BRAIN. If you have not received your influenza and/or pneumococcal vaccine, please follow up with your primary care physician. The discharge information has been reviewed with the patient and caregiver. The patient and caregiver verbalized understanding.

## 2019-06-14 ENCOUNTER — TELEPHONE (OUTPATIENT)
Dept: SURGERY | Age: 64
End: 2019-06-14

## 2019-06-14 NOTE — OP NOTES
[address]  OPERATIVE REPORT    Name:  Aron Dewitt  MR#:  [id number]  :  1955  ACCOUNT #:  [de-identified]  DATE OF SERVICE:  2019    PREOPERATIVE DIAGNOSES:  1. Right breast cancer, 5 o'clock. 2.  Right apocrine metaplasia, 3 o'clock. POSTOPERATIVE DIAGNOSES:  1. Right breast cancer, 5 o'clock. 2.  Right apocrine metaplasia, 3 o'clock. PROCEDURE PERFORMED:  Right breast ultrasound-guided lumpectomy x2, right axillary sentinel lymph node biopsy, intraop margin assessment using RF spectroscopy. SURGEON:  Brayden Quiñonez MD    ASSISTANT:  Sunil Almendarez. ANESTHESIA:  General.    COMPLICATIONS:  None. SPECIMENS REMOVED:  1. Right axillary sentinel node #1.  2.  Right axillary sentinel node #2.  3.  Right axillary sentinel node #3. 4.  Right breast lumpectomy, 3 o'clock. 5.  Right breast lumpectomy, 5 o'clock. IMPLANTS:  No implants. ESTIMATED BLOOD LOSS:  10 mL. FINDINGS:  Levasy lymph nodes negative. INDICATION FOR PROCEDURE:  This is a 77-year-old female with a right breast cancer at 5 o'clock. She also had a lesion biopsy though is apocrine metaplasia at 3 o'clock. She wished to have lumpectomy and node biopsy. PROCEDURE IN DETAIL:  The patient initially went to Nuclear Medicine where technetium-99 was injected to the right breast, she tolerated this well. She went to the preop holding area where surgical site was marked by surgeon. Informed consent was obtained. She was taken to the operating room, laid in supine position, where LMA anesthesia was induced. Right breast prepped and draped in usual fashion. A time-out was performed. Attention was turned to the right axilla, 10 mL of local anesthetic was injected into the skin. A 10-blade was used to make an inferior axillary hairline incision. Bovie cautery was used to dissect down through the axillary fascia.   Three sentinel lymph nodes were identified using Neoprobe guidance and excised with LigaSure and sent for frozen section. These were found to be negative. The cavity was irrigated. A 10 mL of additional local anesthetic was injected in the right axilla and skin. The axillary fascia was closed with interrupted 3-0 Vicryl and the skin with 4-0 subcuticular Monocryl. Attention was turned to the right breast and a periareolar incision from 1 o'clock to 7 o'clock was made with a 10 blade. The 3 o'clock lesion was identified first, it was retroareolar and excised with Bovie cautery and marked short stitch superior and long stitch lateral and sent for permanent pathology. Next, the lesion at 5 o'clock, 3 cm from the nipple was identified, Bovie cautery was used to the tunnels from the nipple incision to this. This was excised all the way to the chest wall and marked with a short stitch superior and a long stitch lateral.  The MarginProbe device was plugged in and calibrated. All six margins were assessed. Total intraop time for margin assessment was two minutes. Next, the cavities were irrigated and additional 20 mL of local was injected into the breast tissue and skin. The deeper tissues were closed with interrupted 2-0 Vicryl and the skin with interrupted 3-0 Vicryl and 4-0 subcuticular Monocryl. Skin glue was placed on incision. All sponge, needle and instrument counts were correct. A surgical bra and dressing was placed on the patient and she went in stable condition to Recovery.       MD SELWNY [email or web address]/[email or web address]  D:  06/13/2019 11:24  T:  06/13/2019 17:22  JOB #:  [id number]

## 2019-06-14 NOTE — TELEPHONE ENCOUNTER
Patient patient to see how she is doing after surgery yesterday. She is doing great! No pain. Has post op 6/26 then going out of town to visit daughter. Asking about radiation. We will set up initial appt/consulation for the patient when Dr. Nuno Alan places order.

## 2019-06-24 DIAGNOSIS — Z17.0 MALIGNANT NEOPLASM OF LOWER-INNER QUADRANT OF RIGHT BREAST OF FEMALE, ESTROGEN RECEPTOR POSITIVE (HCC): Primary | ICD-10-CM

## 2019-06-24 DIAGNOSIS — C50.311 MALIGNANT NEOPLASM OF LOWER-INNER QUADRANT OF RIGHT BREAST OF FEMALE, ESTROGEN RECEPTOR POSITIVE (HCC): Primary | ICD-10-CM

## 2019-06-26 ENCOUNTER — OFFICE VISIT (OUTPATIENT)
Dept: SURGERY | Age: 64
End: 2019-06-26

## 2019-06-26 VITALS
BODY MASS INDEX: 38.21 KG/M2 | WEIGHT: 258 LBS | HEART RATE: 76 BPM | HEIGHT: 69 IN | SYSTOLIC BLOOD PRESSURE: 138 MMHG | DIASTOLIC BLOOD PRESSURE: 72 MMHG

## 2019-06-26 DIAGNOSIS — C50.811 MALIGNANT NEOPLASM OF OVERLAPPING SITES OF RIGHT BREAST IN FEMALE, ESTROGEN RECEPTOR POSITIVE (HCC): Primary | ICD-10-CM

## 2019-06-26 DIAGNOSIS — Z17.0 MALIGNANT NEOPLASM OF OVERLAPPING SITES OF RIGHT BREAST IN FEMALE, ESTROGEN RECEPTOR POSITIVE (HCC): Primary | ICD-10-CM

## 2019-06-26 RX ORDER — MELOXICAM 15 MG/1
1 TABLET ORAL DAILY
Refills: 0 | COMMUNITY
Start: 2019-06-23 | End: 2020-01-30

## 2019-06-26 NOTE — PROGRESS NOTES
HISTORY OF PRESENT ILLNESS  Franklyn Urbina is a 59 y.o. female. HPI ESTABLISHED patient with RIGHT breast cancer, here for POD#13 post-op follow-up s/p RIGHT breast lumpectomy X2 sites, RIGHT SLNB on 6/13/19. She states she has had minimal pain, not taking pain meds and incisions have healed well. Will be meeting with Dr. Sandra Coreas on 7/12/19 for plastics consult. No family history of breast or ovarian cancer. Father had melanoma.     Breast imaging-  Screening mammogram 4/17/19 BI-RADS 0.  RIGHT breast diagnostic mammogram and RIGHT breast US 4/18/19 BI-RADS 4. RIGHT breast biopsies 4/22/19.      6/13/19 surgical pathology:  FINAL PATHOLOGIC DIAGNOSIS   1. Lymph node, right axilla, sentinel #1, biopsy:   One lymph node, no tumor identified (three levels and cytokeratin stain)   2. Lymph node, right axilla, sentinel #2, biopsy:   One lymph node, no tumor identified (three levels and cytokeratin stain)   3. Lymph node, right axilla, sentinel #3, biopsy:   Benign adipose tissue, no lymph node tissue identified   4. Breast, right, 3:00, lumpectomy:   Invasive low-grade adenosquamous carcinoma   Tumor present at anterior and lateral margins   Please see synoptic report and comment   INVASIVE CARCINOMA OF THE BREAST   SPECIMEN   Procedure: Excision (less than total mastectomy)   Specimen Laterality: Right   TUMOR   Histologic Type: Low grade adenosquamous carcinoma   Glandular (Acinar) / Tubular Differentiation: Score 2   Nuclear Pleomorphism: Score 1   Mitotic Rate: Score 1   Overall Grade: Grade 1   Tumor Size: 17 Millimeters (mm)   Ductal Carcinoma In Situ (DCIS): Not identified   Tumor Extent   Skin: Skin is not present   Accessory Findings   Lymphovascular Invasion: Not identified   Treatment Effect: No known presurgical therapy   MARGINS   Invasive Carcinoma Margins: Positive for invasive carcinoma   Positive Margin(s):    Anterior   Lateral   LYMPH NODES   Regional Lymph Nodes: Uninvolved by tumor cells   Number of Lymph Nodes Examined: 2   Number of [place] Nodes Examined: 2   PATHOLOGIC STAGE CLASSIFICATION (pTNM, AJCC 8th Edition)   TNM Descriptors: m (multiple foci of invasive carcinoma)   Primary Tumor (Invasive Carcinoma) (pT): pT1c   Regional Lymph Nodes (pN)   Modifier: (sn): Only sentinel node(s) evaluated. Category (pN): pN0   5. Breast, right, 5:00, lumpectomy:   Invasive and in situ ductal carcinoma   Medial margin is positive for invasive tumor   Lateral margin and superior margin are 2 mm from invasive tumor   Please see synoptic report and comment   INVASIVE CARCINOMA OF THE BREAST   SPECIMEN   Procedure: Excision (less than total mastectomy)   Specimen Laterality: Right   TUMOR   Histologic Type:  Invasive carcinoma of no special type (ductal, not   otherwise specified)   Glandular (Acinar) / Tubular Differentiation: Score 2   Nuclear Pleomorphism: Score 2   Mitotic Rate: Score 1   Overall Grade: Grade 1   Tumor Size: 22 Millimeters (mm)   Ductal Carcinoma In Situ (DCIS): Present   Size (Extent) of DCIS: Millimeters (mm)   Architectural Patterns: Micropapillary, Cribriform   Nuclear Grade: Grade II (intermediate)   Necrosis: Present, central   Tumor Extent   Skin: Skin is not present   Accessory Findings   Perineural invasion: Present   Treatment Effect: No known presurgical therapy   MARGINS   Invasive Carcinoma Margins: Positive for invasive carcinoma   Positive Margin(s): Medial   Additional close margins: Superior and lateral (2 mm)   DCIS Margins: Uninvolved by DCIS   Distance of DCIS from Closest Margin in Millimeters: 2   Millimeters (mm)   Closest Margin: Medial   LYMPH NODES   Regional Lymph Nodes: Uninvolved by tumor cells   Number of Lymph Nodes Examined: 2   Number of [place] Nodes Examined: 2   PATHOLOGIC STAGE CLASSIFICATION (pTNM, AJCC 8th Edition)   TNM Descriptors: m (multiple foci of invasive carcinoma)   Primary Tumor (Invasive Carcinoma) (pT): pT2   Regional Lymph Nodes (pN) Modifier: (sn): Only sentinel node(s) evaluated. Category (pN): pN0     Review of Systems   All other systems reviewed and are negative. Physical Exam   Pulmonary/Chest:       Breast and axillary incisions healing well   Nursing note and vitals reviewed. ASSESSMENT and PLAN    ICD-10-CM ICD-9-CM    1. Malignant neoplasm of overlapping sites of right breast in female, estrogen [facility] C50.811 174.8     Z17.0 V86.0      60 yo female now with multicentric right breast ca.    Discussed mastectomies, reconstruction  She has appointment with Dr. Rufino Cesar  She does not want genetic testing  Will schedule surgery

## 2019-06-26 NOTE — PATIENT INSTRUCTIONS

## 2019-07-24 ENCOUNTER — DOCUMENTATION ONLY (OUTPATIENT)
Dept: SURGERY | Age: 64
End: 2019-07-24

## 2019-07-24 NOTE — PROGRESS NOTES
Verbally informed patient surgery  [facility] ( WILL NOTIFY )      Date: 08/27/2019 @ 7:30 AM  Arrive: 6:00 AM  report to 1st floor outpatient registration day of surgery.  PATIENT ACCESS    PAT: NURSE will call the patient    Arrive: Nurse gives this information  report to [address] [place] [address], [address]; [address] Kaylee Cunha [phone] following instructions:  NPO after Midnight the night before  Shower in AM, no lotion, deodorant, powder,perfume or makeup  Will need  morning of the surgery    POST OP APPOINTMENT : 09/09/2019 @ 1:45PM [facility]

## 2019-07-25 ENCOUNTER — DOCUMENTATION ONLY (OUTPATIENT)
Dept: SURGERY | Age: 64
End: 2019-07-25

## 2019-07-25 NOTE — PROGRESS NOTES
I spoke with the patient, we went over her surgery information. She was getting confused by her MY CHART, to her it looked like her surgery was at [facility]. I reassured her it was at [facility]. She appreciated the call.

## 2019-08-01 DIAGNOSIS — C50.811 MALIGNANT NEOPLASM OF OVERLAPPING SITES OF RIGHT FEMALE BREAST, UNSPECIFIED ESTROGEN RECEPTOR STATUS (HCC): Primary | ICD-10-CM

## 2019-08-20 ENCOUNTER — HOSPITAL ENCOUNTER (OUTPATIENT)
Dept: PREADMISSION TESTING | Age: 64
Discharge: HOME | End: 2019-08-20
Payer: COMMERCIAL

## 2019-08-20 VITALS
WEIGHT: 254.13 LBS | HEIGHT: 68 IN | RESPIRATION RATE: 20 BRPM | DIASTOLIC BLOOD PRESSURE: 74 MMHG | HEART RATE: 71 BPM | SYSTOLIC BLOOD PRESSURE: 119 MMHG | TEMPERATURE: 98.6 F | BODY MASS INDEX: 38.51 KG/M2

## 2019-08-20 LAB
ATRIAL RATE: 65 BPM
BASOPHILS # BLD: 0 K/UL (ref 0–0.1)
BASOPHILS NFR BLD: 0 % (ref 0–1)
CALCULATED P AXIS, ECG09: 56 DEGREES
CALCULATED R AXIS, ECG10: 12 DEGREES
CALCULATED T AXIS, ECG11: 18 DEGREES
DIAGNOSIS, 93000: NORMAL
DIFFERENTIAL METHOD BLD: NORMAL
EOSINOPHIL # BLD: 0 K/UL (ref 0–0.4)
EOSINOPHIL NFR BLD: 0 % (ref 0–7)
ERYTHROCYTE [DISTWIDTH] IN BLOOD BY AUTOMATED COUNT: 13.6 % (ref 11.5–14.5)
HCT VFR BLD AUTO: 41.3 % (ref 35–47)
HGB BLD-MCNC: 12.8 G/DL (ref 11.5–16)
IMM GRANULOCYTES # BLD AUTO: 0 K/UL (ref 0–0.04)
IMM GRANULOCYTES NFR BLD AUTO: 0 % (ref 0–0.5)
LYMPHOCYTES # BLD: 1.4 K/UL (ref 0.8–3.5)
LYMPHOCYTES NFR BLD: 30 % (ref 12–49)
MCH RBC QN AUTO: 29.6 PG (ref 26–34)
MCHC RBC AUTO-ENTMCNC: 31 G/DL (ref 30–36.5)
MCV RBC AUTO: 95.6 FL (ref 80–99)
MONOCYTES # BLD: 0.3 K/UL (ref 0–1)
MONOCYTES NFR BLD: 6 % (ref 5–13)
NEUTS SEG # BLD: 2.9 K/UL (ref 1.8–8)
NEUTS SEG NFR BLD: 64 % (ref 32–75)
NRBC # BLD: 0 K/UL (ref 0–0.01)
NRBC BLD-RTO: 0 PER 100 WBC
P-R INTERVAL, ECG05: 206 MS
PLATELET # BLD AUTO: 185 K/UL (ref 150–400)
PMV BLD AUTO: 11.8 FL (ref 8.9–12.9)
Q-T INTERVAL, ECG07: 412 MS
QRS DURATION, ECG06: 100 MS
QTC CALCULATION (BEZET), ECG08: 428 MS
RBC # BLD AUTO: 4.32 M/UL (ref 3.8–5.2)
VENTRICULAR RATE, ECG03: 65 BPM
WBC # BLD AUTO: 4.6 K/UL (ref 3.6–11)

## 2019-08-20 PROCEDURE — 36415 COLL VENOUS BLD VENIPUNCTURE: CPT

## 2019-08-20 PROCEDURE — 93005 ELECTROCARDIOGRAM TRACING: CPT

## 2019-08-20 PROCEDURE — 85025 COMPLETE CBC W/AUTO DIFF WBC: CPT

## 2019-08-20 NOTE — PERIOP NOTES
Patient given surgical site infection FAQ handout and CHG liquid. Preop instructions reviewed and patient verbalizes understanding of instructions. Patient has been given the opportunity to ask additional questions.

## 2019-08-27 ENCOUNTER — HOSPITAL ENCOUNTER (OUTPATIENT)
Age: 64
Setting detail: OBSERVATION
Discharge: HOME | End: 2019-08-28
Attending: SURGERY | Admitting: SURGERY
Payer: COMMERCIAL

## 2019-08-27 ENCOUNTER — ANESTHESIA (OUTPATIENT)
Dept: MEDSURG UNIT | Age: 64
End: 2019-08-27
Payer: COMMERCIAL

## 2019-08-27 ENCOUNTER — ANESTHESIA EVENT (OUTPATIENT)
Dept: MEDSURG UNIT | Age: 64
End: 2019-08-27
Payer: COMMERCIAL

## 2019-08-27 DIAGNOSIS — C50.811 MALIGNANT NEOPLASM OF OVERLAPPING SITES OF RIGHT FEMALE BREAST, UNSPECIFIED ESTROGEN RECEPTOR STATUS (HCC): ICD-10-CM

## 2019-08-27 PROCEDURE — 77030034850: Performed by: SURGERY

## 2019-08-27 PROCEDURE — 77030011640 HC PAD GRND REM COVD -A: Performed by: SURGERY

## 2019-08-27 PROCEDURE — 74011250636 HC RX REV CODE- 250/636: Performed by: ANESTHESIOLOGY

## 2019-08-27 PROCEDURE — 77030011264 HC ELECTRD BLD EXT COVD -A: Performed by: SURGERY

## 2019-08-27 PROCEDURE — 77030038213 HC SUPP BRA COMPRSS PSTOP COND -B: Performed by: SURGERY

## 2019-08-27 PROCEDURE — 74011000250 HC RX REV CODE- 250: Performed by: SURGERY

## 2019-08-27 PROCEDURE — 77030034626 HC LIGASURE SM JAW SEAL OPN SURG COVD -E: Performed by: SURGERY

## 2019-08-27 PROCEDURE — 88360 TUMOR IMMUNOHISTOCHEM/MANUAL: CPT

## 2019-08-27 PROCEDURE — 77030013674 HC FIL EXPND TISS ALGN -A: Performed by: SURGERY

## 2019-08-27 PROCEDURE — 77030012407 HC DRN WND BARD -B: Performed by: SURGERY

## 2019-08-27 PROCEDURE — 77030019702 HC WRP THER MENM -C: Performed by: SURGERY

## 2019-08-27 PROCEDURE — 77030011267 HC ELECTRD BLD COVD -A: Performed by: SURGERY

## 2019-08-27 PROCEDURE — P9045 ALBUMIN (HUMAN), 5%, 250 ML: HCPCS | Performed by: NURSE ANESTHETIST, CERTIFIED REGISTERED

## 2019-08-27 PROCEDURE — 77030031139 HC SUT VCRL2 J&J -A: Performed by: SURGERY

## 2019-08-27 PROCEDURE — 74011000258 HC RX REV CODE- 258: Performed by: NURSE ANESTHETIST, CERTIFIED REGISTERED

## 2019-08-27 PROCEDURE — 77030020268 HC MISC GENERAL SUPPLY: Performed by: SURGERY

## 2019-08-27 PROCEDURE — 74011000258 HC RX REV CODE- 258: Performed by: SURGERY

## 2019-08-27 PROCEDURE — 77030002966 HC SUT PDS J&J -A: Performed by: SURGERY

## 2019-08-27 PROCEDURE — 76030000012 HC AMB SURG OR TIME 6 TO 6.5: Performed by: SURGERY

## 2019-08-27 PROCEDURE — 74011250636 HC RX REV CODE- 250/636: Performed by: NURSE ANESTHETIST, CERTIFIED REGISTERED

## 2019-08-27 PROCEDURE — 77030010514 HC APPL CLP LIG COVD -B: Performed by: SURGERY

## 2019-08-27 PROCEDURE — 88305 TISSUE EXAM BY PATHOLOGIST: CPT

## 2019-08-27 PROCEDURE — C9290 INJ, BUPIVACAINE LIPOSOME: HCPCS | Performed by: SURGERY

## 2019-08-27 PROCEDURE — 99218 HC RM OBSERVATION: CPT

## 2019-08-27 PROCEDURE — 74011250636 HC RX REV CODE- 250/636

## 2019-08-27 PROCEDURE — 76060000071 HC AMB SURG ANES 6 TO 6.5 HR: Performed by: SURGERY

## 2019-08-27 PROCEDURE — 77030026227 HC FUNL KELLR 2 PCH ALGN -C: Performed by: SURGERY

## 2019-08-27 PROCEDURE — 77030008684 HC TU ET CUF COVD -B: Performed by: ANESTHESIOLOGY

## 2019-08-27 PROCEDURE — 77030012406 HC DRN WND PENRS BARD -A: Performed by: SURGERY

## 2019-08-27 PROCEDURE — 77030040361 HC SLV COMPR DVT MDII -B: Performed by: SURGERY

## 2019-08-27 PROCEDURE — 77030008467 HC STPLR SKN COVD -B: Performed by: SURGERY

## 2019-08-27 PROCEDURE — 77030018842 HC SOL IRR SOD CL 9% BAXT -A: Performed by: SURGERY

## 2019-08-27 PROCEDURE — 77030020263 HC SOL INJ SOD CL0.9% LFCR 1000ML: Performed by: SURGERY

## 2019-08-27 PROCEDURE — 76030000013 HC AMB SURG OR TIME ADDL 0.5: Performed by: SURGERY

## 2019-08-27 PROCEDURE — 74011000250 HC RX REV CODE- 250: Performed by: NURSE ANESTHETIST, CERTIFIED REGISTERED

## 2019-08-27 PROCEDURE — 76060000074: Performed by: SURGERY

## 2019-08-27 PROCEDURE — 76210000035 HC AMBSU PH I REC 1 TO 1.5 HR: Performed by: SURGERY

## 2019-08-27 PROCEDURE — C1789 PROSTHESIS, BREAST, IMP: HCPCS | Performed by: SURGERY

## 2019-08-27 PROCEDURE — 77030013567 HC DRN WND RESERV BARD -A: Performed by: SURGERY

## 2019-08-27 PROCEDURE — 77030018836 HC SOL IRR NACL ICUM -A: Performed by: SURGERY

## 2019-08-27 PROCEDURE — 77030018673: Performed by: SURGERY

## 2019-08-27 PROCEDURE — 74011250636 HC RX REV CODE- 250/636: Performed by: SURGERY

## 2019-08-27 PROCEDURE — 88307 TISSUE EXAM BY PATHOLOGIST: CPT

## 2019-08-27 PROCEDURE — 77030010507 HC ADH SKN DERMBND J&J -B: Performed by: SURGERY

## 2019-08-27 PROCEDURE — 77030020061 HC IV BLD WRMR ADMIN SET 3M -B: Performed by: ANESTHESIOLOGY

## 2019-08-27 PROCEDURE — 77030003666 HC NDL SPINAL BD -A: Performed by: SURGERY

## 2019-08-27 PROCEDURE — 77030002933 HC SUT MCRYL J&J -A: Performed by: SURGERY

## 2019-08-27 PROCEDURE — 77030026438 HC STYL ET INTUB CARD -A: Performed by: ANESTHESIOLOGY

## 2019-08-27 PROCEDURE — 77030027138 HC INCENT SPIROMETER -A

## 2019-08-27 PROCEDURE — 74011250637 HC RX REV CODE- 250/637: Performed by: SURGERY

## 2019-08-27 PROCEDURE — 77030002996 HC SUT SLK J&J -A: Performed by: SURGERY

## 2019-08-27 DEVICE — NATRELLE STYLE 68MP-600 MODERATE PROJECTION  SMOOTH ROUND SALINE
Type: IMPLANTABLE DEVICE | Site: BREAST | Status: FUNCTIONAL
Brand: NATRELLE SALINE-FILLED BREAST IMPLANTS

## 2019-08-27 DEVICE — GRAFT HUM TISS M THK1.2-2MM M PERF CNTOUR ACELLULAR DERM: Type: IMPLANTABLE DEVICE | Site: BREAST | Status: FUNCTIONAL

## 2019-08-27 RX ORDER — PHENYLEPHRINE HCL IN 0.9% NACL 0.4MG/10ML
SYRINGE (ML) INTRAVENOUS AS NEEDED
Status: DISCONTINUED | OUTPATIENT
Start: 2019-08-27 | End: 2019-08-27 | Stop reason: HOSPADM

## 2019-08-27 RX ORDER — PROPOFOL 10 MG/ML
INJECTION, EMULSION INTRAVENOUS AS NEEDED
Status: DISCONTINUED | OUTPATIENT
Start: 2019-08-27 | End: 2019-08-27 | Stop reason: HOSPADM

## 2019-08-27 RX ORDER — ONDANSETRON 2 MG/ML
INJECTION INTRAMUSCULAR; INTRAVENOUS AS NEEDED
Status: DISCONTINUED | OUTPATIENT
Start: 2019-08-27 | End: 2019-08-27 | Stop reason: HOSPADM

## 2019-08-27 RX ORDER — MIDAZOLAM HYDROCHLORIDE 1 MG/ML
1 INJECTION, SOLUTION INTRAMUSCULAR; INTRAVENOUS AS NEEDED
Status: DISCONTINUED | OUTPATIENT
Start: 2019-08-27 | End: 2019-08-27 | Stop reason: HOSPADM

## 2019-08-27 RX ORDER — LEVOTHYROXINE SODIUM 150 UG/1
150 TABLET ORAL
Status: DISCONTINUED | OUTPATIENT
Start: 2019-08-28 | End: 2019-08-27

## 2019-08-27 RX ORDER — CLINDAMYCIN PHOSPHATE 900 MG/50ML
900 INJECTION INTRAVENOUS EVERY 8 HOURS
Status: DISCONTINUED | OUTPATIENT
Start: 2019-08-27 | End: 2019-08-27 | Stop reason: CLARIF

## 2019-08-27 RX ORDER — VANCOMYCIN/0.9 % SOD CHLORIDE 1.5G/250ML
1500 PLASTIC BAG, INJECTION (ML) INTRAVENOUS EVERY 12 HOURS
Status: DISCONTINUED | OUTPATIENT
Start: 2019-08-27 | End: 2019-08-28 | Stop reason: HOSPADM

## 2019-08-27 RX ORDER — FENTANYL CITRATE 50 UG/ML
INJECTION, SOLUTION INTRAMUSCULAR; INTRAVENOUS AS NEEDED
Status: DISCONTINUED | OUTPATIENT
Start: 2019-08-27 | End: 2019-08-27 | Stop reason: HOSPADM

## 2019-08-27 RX ORDER — SODIUM CHLORIDE 0.9 % (FLUSH) 0.9 %
5-40 SYRINGE (ML) INJECTION EVERY 8 HOURS
Status: DISCONTINUED | OUTPATIENT
Start: 2019-08-27 | End: 2019-08-28 | Stop reason: HOSPADM

## 2019-08-27 RX ORDER — LOSARTAN POTASSIUM 50 MG/1
100 TABLET ORAL DAILY
Status: DISCONTINUED | OUTPATIENT
Start: 2019-08-28 | End: 2019-08-28 | Stop reason: HOSPADM

## 2019-08-27 RX ORDER — CLINDAMYCIN PHOSPHATE 900 MG/50ML
INJECTION INTRAVENOUS AS NEEDED
Status: DISCONTINUED | OUTPATIENT
Start: 2019-08-27 | End: 2019-08-27 | Stop reason: HOSPADM

## 2019-08-27 RX ORDER — LIDOCAINE HYDROCHLORIDE 10 MG/ML
0.1 INJECTION, SOLUTION EPIDURAL; INFILTRATION; INTRACAUDAL; PERINEURAL AS NEEDED
Status: DISCONTINUED | OUTPATIENT
Start: 2019-08-27 | End: 2019-08-27 | Stop reason: HOSPADM

## 2019-08-27 RX ORDER — DIAZEPAM 5 MG/1
2.5 TABLET ORAL
Status: DISCONTINUED | OUTPATIENT
Start: 2019-08-27 | End: 2019-08-28 | Stop reason: HOSPADM

## 2019-08-27 RX ORDER — ACETAMINOPHEN 325 MG/1
650 TABLET ORAL ONCE
Status: DISCONTINUED | OUTPATIENT
Start: 2019-08-27 | End: 2019-08-27 | Stop reason: HOSPADM

## 2019-08-27 RX ORDER — OXYCODONE AND ACETAMINOPHEN 5; 325 MG/1; MG/1
1 TABLET ORAL
Status: DISCONTINUED | OUTPATIENT
Start: 2019-08-27 | End: 2019-08-28 | Stop reason: HOSPADM

## 2019-08-27 RX ORDER — SODIUM CHLORIDE, SODIUM LACTATE, POTASSIUM CHLORIDE, CALCIUM CHLORIDE 600; 310; 30; 20 MG/100ML; MG/100ML; MG/100ML; MG/100ML
125 INJECTION, SOLUTION INTRAVENOUS CONTINUOUS
Status: DISCONTINUED | OUTPATIENT
Start: 2019-08-27 | End: 2019-08-27 | Stop reason: HOSPADM

## 2019-08-27 RX ORDER — MORPHINE SULFATE 10 MG/ML
2 INJECTION, SOLUTION INTRAMUSCULAR; INTRAVENOUS
Status: DISCONTINUED | OUTPATIENT
Start: 2019-08-27 | End: 2019-08-27 | Stop reason: HOSPADM

## 2019-08-27 RX ORDER — ONDANSETRON 2 MG/ML
4 INJECTION INTRAMUSCULAR; INTRAVENOUS
Status: DISCONTINUED | OUTPATIENT
Start: 2019-08-27 | End: 2019-08-28 | Stop reason: HOSPADM

## 2019-08-27 RX ORDER — FENTANYL CITRATE 50 UG/ML
25 INJECTION, SOLUTION INTRAMUSCULAR; INTRAVENOUS
Status: COMPLETED | OUTPATIENT
Start: 2019-08-27 | End: 2019-08-27

## 2019-08-27 RX ORDER — NALOXONE HYDROCHLORIDE 0.4 MG/ML
0.4 INJECTION, SOLUTION INTRAMUSCULAR; INTRAVENOUS; SUBCUTANEOUS AS NEEDED
Status: DISCONTINUED | OUTPATIENT
Start: 2019-08-27 | End: 2019-08-28 | Stop reason: HOSPADM

## 2019-08-27 RX ORDER — SODIUM CHLORIDE, SODIUM LACTATE, POTASSIUM CHLORIDE, CALCIUM CHLORIDE 600; 310; 30; 20 MG/100ML; MG/100ML; MG/100ML; MG/100ML
75 INJECTION, SOLUTION INTRAVENOUS CONTINUOUS
Status: DISCONTINUED | OUTPATIENT
Start: 2019-08-27 | End: 2019-08-27 | Stop reason: HOSPADM

## 2019-08-27 RX ORDER — SCOLOPAMINE TRANSDERMAL SYSTEM 1 MG/1
1 PATCH, EXTENDED RELEASE TRANSDERMAL ONCE
Status: DISCONTINUED | OUTPATIENT
Start: 2019-08-27 | End: 2019-08-28 | Stop reason: HOSPADM

## 2019-08-27 RX ORDER — ACETAMINOPHEN 10 MG/ML
INJECTION, SOLUTION INTRAVENOUS AS NEEDED
Status: DISCONTINUED | OUTPATIENT
Start: 2019-08-27 | End: 2019-08-27 | Stop reason: HOSPADM

## 2019-08-27 RX ORDER — GLYCOPYRROLATE 0.2 MG/ML
INJECTION INTRAMUSCULAR; INTRAVENOUS AS NEEDED
Status: DISCONTINUED | OUTPATIENT
Start: 2019-08-27 | End: 2019-08-27 | Stop reason: HOSPADM

## 2019-08-27 RX ORDER — ROCURONIUM BROMIDE 10 MG/ML
INJECTION, SOLUTION INTRAVENOUS AS NEEDED
Status: DISCONTINUED | OUTPATIENT
Start: 2019-08-27 | End: 2019-08-27 | Stop reason: HOSPADM

## 2019-08-27 RX ORDER — BUPROPION HYDROCHLORIDE 150 MG/1
150 TABLET, EXTENDED RELEASE ORAL DAILY
Status: DISCONTINUED | OUTPATIENT
Start: 2019-08-28 | End: 2019-08-28 | Stop reason: HOSPADM

## 2019-08-27 RX ORDER — SUCCINYLCHOLINE CHLORIDE 20 MG/ML
INJECTION INTRAMUSCULAR; INTRAVENOUS AS NEEDED
Status: DISCONTINUED | OUTPATIENT
Start: 2019-08-27 | End: 2019-08-27 | Stop reason: HOSPADM

## 2019-08-27 RX ORDER — SODIUM CHLORIDE 9 MG/ML
25 INJECTION, SOLUTION INTRAVENOUS CONTINUOUS
Status: DISCONTINUED | OUTPATIENT
Start: 2019-08-27 | End: 2019-08-27 | Stop reason: HOSPADM

## 2019-08-27 RX ORDER — DEXAMETHASONE SODIUM PHOSPHATE 4 MG/ML
INJECTION, SOLUTION INTRA-ARTICULAR; INTRALESIONAL; INTRAMUSCULAR; INTRAVENOUS; SOFT TISSUE AS NEEDED
Status: DISCONTINUED | OUTPATIENT
Start: 2019-08-27 | End: 2019-08-27 | Stop reason: HOSPADM

## 2019-08-27 RX ORDER — HYDROMORPHONE HYDROCHLORIDE 1 MG/ML
0.2 INJECTION, SOLUTION INTRAMUSCULAR; INTRAVENOUS; SUBCUTANEOUS
Status: DISCONTINUED | OUTPATIENT
Start: 2019-08-27 | End: 2019-08-27 | Stop reason: HOSPADM

## 2019-08-27 RX ORDER — ONDANSETRON 2 MG/ML
4 INJECTION INTRAMUSCULAR; INTRAVENOUS AS NEEDED
Status: DISCONTINUED | OUTPATIENT
Start: 2019-08-27 | End: 2019-08-27 | Stop reason: HOSPADM

## 2019-08-27 RX ORDER — NEOSTIGMINE METHYLSULFATE 1 MG/ML
INJECTION INTRAVENOUS AS NEEDED
Status: DISCONTINUED | OUTPATIENT
Start: 2019-08-27 | End: 2019-08-27 | Stop reason: HOSPADM

## 2019-08-27 RX ORDER — SODIUM CHLORIDE 0.9 % (FLUSH) 0.9 %
5-40 SYRINGE (ML) INJECTION AS NEEDED
Status: DISCONTINUED | OUTPATIENT
Start: 2019-08-27 | End: 2019-08-28 | Stop reason: HOSPADM

## 2019-08-27 RX ORDER — FENTANYL CITRATE 50 UG/ML
50 INJECTION, SOLUTION INTRAMUSCULAR; INTRAVENOUS AS NEEDED
Status: DISCONTINUED | OUTPATIENT
Start: 2019-08-27 | End: 2019-08-27 | Stop reason: HOSPADM

## 2019-08-27 RX ORDER — SODIUM CHLORIDE, SODIUM LACTATE, POTASSIUM CHLORIDE, CALCIUM CHLORIDE 600; 310; 30; 20 MG/100ML; MG/100ML; MG/100ML; MG/100ML
50 INJECTION, SOLUTION INTRAVENOUS CONTINUOUS
Status: DISCONTINUED | OUTPATIENT
Start: 2019-08-27 | End: 2019-08-28 | Stop reason: HOSPADM

## 2019-08-27 RX ORDER — LIDOCAINE HYDROCHLORIDE 20 MG/ML
INJECTION, SOLUTION EPIDURAL; INFILTRATION; INTRACAUDAL; PERINEURAL AS NEEDED
Status: DISCONTINUED | OUTPATIENT
Start: 2019-08-27 | End: 2019-08-27 | Stop reason: HOSPADM

## 2019-08-27 RX ORDER — SODIUM CHLORIDE 0.9 % (FLUSH) 0.9 %
5-40 SYRINGE (ML) INJECTION EVERY 8 HOURS
Status: DISCONTINUED | OUTPATIENT
Start: 2019-08-27 | End: 2019-08-27 | Stop reason: HOSPADM

## 2019-08-27 RX ORDER — SODIUM CHLORIDE, SODIUM LACTATE, POTASSIUM CHLORIDE, CALCIUM CHLORIDE 600; 310; 30; 20 MG/100ML; MG/100ML; MG/100ML; MG/100ML
INJECTION, SOLUTION INTRAVENOUS
Status: DISCONTINUED | OUTPATIENT
Start: 2019-08-27 | End: 2019-08-27 | Stop reason: HOSPADM

## 2019-08-27 RX ORDER — MIDAZOLAM HYDROCHLORIDE 1 MG/ML
INJECTION, SOLUTION INTRAMUSCULAR; INTRAVENOUS AS NEEDED
Status: DISCONTINUED | OUTPATIENT
Start: 2019-08-27 | End: 2019-08-27 | Stop reason: HOSPADM

## 2019-08-27 RX ORDER — SODIUM CHLORIDE 0.9 % (FLUSH) 0.9 %
5-40 SYRINGE (ML) INJECTION AS NEEDED
Status: DISCONTINUED | OUTPATIENT
Start: 2019-08-27 | End: 2019-08-27 | Stop reason: HOSPADM

## 2019-08-27 RX ORDER — DIPHENHYDRAMINE HYDROCHLORIDE 50 MG/ML
12.5 INJECTION, SOLUTION INTRAMUSCULAR; INTRAVENOUS
Status: DISCONTINUED | OUTPATIENT
Start: 2019-08-27 | End: 2019-08-28 | Stop reason: HOSPADM

## 2019-08-27 RX ORDER — POLYETHYLENE GLYCOL 3350 17 G/17G
17 POWDER, FOR SOLUTION ORAL DAILY
Status: DISCONTINUED | OUTPATIENT
Start: 2019-08-28 | End: 2019-08-28 | Stop reason: HOSPADM

## 2019-08-27 RX ORDER — DIPHENHYDRAMINE HYDROCHLORIDE 50 MG/ML
12.5 INJECTION, SOLUTION INTRAMUSCULAR; INTRAVENOUS AS NEEDED
Status: DISCONTINUED | OUTPATIENT
Start: 2019-08-27 | End: 2019-08-27 | Stop reason: HOSPADM

## 2019-08-27 RX ORDER — HYDROMORPHONE HYDROCHLORIDE 2 MG/ML
INJECTION, SOLUTION INTRAMUSCULAR; INTRAVENOUS; SUBCUTANEOUS AS NEEDED
Status: DISCONTINUED | OUTPATIENT
Start: 2019-08-27 | End: 2019-08-27 | Stop reason: HOSPADM

## 2019-08-27 RX ORDER — ALBUMIN HUMAN 50 G/1000ML
SOLUTION INTRAVENOUS AS NEEDED
Status: DISCONTINUED | OUTPATIENT
Start: 2019-08-27 | End: 2019-08-27 | Stop reason: HOSPADM

## 2019-08-27 RX ORDER — HYDROMORPHONE HYDROCHLORIDE 1 MG/ML
0.5 INJECTION, SOLUTION INTRAMUSCULAR; INTRAVENOUS; SUBCUTANEOUS
Status: DISCONTINUED | OUTPATIENT
Start: 2019-08-27 | End: 2019-08-28 | Stop reason: HOSPADM

## 2019-08-27 RX ORDER — MIDAZOLAM HYDROCHLORIDE 1 MG/ML
0.5 INJECTION, SOLUTION INTRAMUSCULAR; INTRAVENOUS
Status: DISCONTINUED | OUTPATIENT
Start: 2019-08-27 | End: 2019-08-27 | Stop reason: HOSPADM

## 2019-08-27 RX ORDER — ROPIVACAINE HYDROCHLORIDE 5 MG/ML
30 INJECTION, SOLUTION EPIDURAL; INFILTRATION; PERINEURAL ONCE
Status: DISCONTINUED | OUTPATIENT
Start: 2019-08-27 | End: 2019-08-27 | Stop reason: HOSPADM

## 2019-08-27 RX ADMIN — SODIUM CHLORIDE, SODIUM LACTATE, POTASSIUM CHLORIDE, AND CALCIUM CHLORIDE 50 ML/HR: 600; 310; 30; 20 INJECTION, SOLUTION INTRAVENOUS at 19:56

## 2019-08-27 RX ADMIN — SODIUM CHLORIDE, SODIUM LACTATE, POTASSIUM CHLORIDE, AND CALCIUM CHLORIDE 125 ML/HR: 600; 310; 30; 20 INJECTION, SOLUTION INTRAVENOUS at 07:19

## 2019-08-27 RX ADMIN — FENTANYL CITRATE 100 MCG: 50 INJECTION, SOLUTION INTRAMUSCULAR; INTRAVENOUS at 07:45

## 2019-08-27 RX ADMIN — Medication 120 MCG: at 08:19

## 2019-08-27 RX ADMIN — ROCURONIUM BROMIDE 10 MG: 10 SOLUTION INTRAVENOUS at 10:46

## 2019-08-27 RX ADMIN — HYDROMORPHONE HYDROCHLORIDE 0.5 MG: 2 INJECTION, SOLUTION INTRAMUSCULAR; INTRAVENOUS; SUBCUTANEOUS at 14:57

## 2019-08-27 RX ADMIN — SODIUM CHLORIDE 6 MCG: 900 INJECTION, SOLUTION INTRAVENOUS at 11:26

## 2019-08-27 RX ADMIN — SODIUM CHLORIDE, POTASSIUM CHLORIDE, SODIUM LACTATE AND CALCIUM CHLORIDE: 600; 310; 30; 20 INJECTION, SOLUTION INTRAVENOUS at 14:23

## 2019-08-27 RX ADMIN — NEOSTIGMINE METHYLSULFATE 3 MG: 1 INJECTION, SOLUTION INTRAVENOUS at 15:21

## 2019-08-27 RX ADMIN — ROCURONIUM BROMIDE 10 MG: 10 SOLUTION INTRAVENOUS at 12:45

## 2019-08-27 RX ADMIN — ONDANSETRON HYDROCHLORIDE 4 MG: 2 INJECTION, SOLUTION INTRAVENOUS at 08:24

## 2019-08-27 RX ADMIN — CLINDAMYCIN IN 5 PERCENT DEXTROSE 900 MG: 18 INJECTION, SOLUTION INTRAVENOUS at 13:57

## 2019-08-27 RX ADMIN — ROCURONIUM BROMIDE 10 MG: 10 SOLUTION INTRAVENOUS at 10:51

## 2019-08-27 RX ADMIN — ALBUMIN (HUMAN) 250 ML: 12.5 INJECTION, SOLUTION INTRAVENOUS at 08:34

## 2019-08-27 RX ADMIN — SODIUM CHLORIDE 4 MCG: 900 INJECTION, SOLUTION INTRAVENOUS at 10:39

## 2019-08-27 RX ADMIN — ROCURONIUM BROMIDE 20 MG: 10 SOLUTION INTRAVENOUS at 09:00

## 2019-08-27 RX ADMIN — ACETAMINOPHEN 1000 MG: 10 INJECTION, SOLUTION INTRAVENOUS at 07:51

## 2019-08-27 RX ADMIN — MIDAZOLAM 2 MG: 1 INJECTION INTRAMUSCULAR; INTRAVENOUS at 07:25

## 2019-08-27 RX ADMIN — Medication 80 MCG: at 10:15

## 2019-08-27 RX ADMIN — HYDROMORPHONE HYDROCHLORIDE 1 MG: 2 INJECTION, SOLUTION INTRAMUSCULAR; INTRAVENOUS; SUBCUTANEOUS at 09:43

## 2019-08-27 RX ADMIN — SODIUM CHLORIDE, POTASSIUM CHLORIDE, SODIUM LACTATE AND CALCIUM CHLORIDE: 600; 310; 30; 20 INJECTION, SOLUTION INTRAVENOUS at 08:00

## 2019-08-27 RX ADMIN — Medication 120 MCG: at 10:55

## 2019-08-27 RX ADMIN — FENTANYL CITRATE 25 MCG: 50 INJECTION, SOLUTION INTRAMUSCULAR; INTRAVENOUS at 16:12

## 2019-08-27 RX ADMIN — ROCURONIUM BROMIDE 20 MG: 10 SOLUTION INTRAVENOUS at 09:27

## 2019-08-27 RX ADMIN — ROCURONIUM BROMIDE 10 MG: 10 SOLUTION INTRAVENOUS at 11:59

## 2019-08-27 RX ADMIN — FENTANYL CITRATE 25 MCG: 50 INJECTION, SOLUTION INTRAMUSCULAR; INTRAVENOUS at 16:35

## 2019-08-27 RX ADMIN — OXYCODONE HYDROCHLORIDE AND ACETAMINOPHEN 1 TABLET: 5; 325 TABLET ORAL at 20:25

## 2019-08-27 RX ADMIN — FENTANYL CITRATE 25 MCG: 50 INJECTION, SOLUTION INTRAMUSCULAR; INTRAVENOUS at 16:28

## 2019-08-27 RX ADMIN — FENTANYL CITRATE 25 MCG: 50 INJECTION, SOLUTION INTRAMUSCULAR; INTRAVENOUS at 16:19

## 2019-08-27 RX ADMIN — FENTANYL CITRATE 50 MCG: 50 INJECTION, SOLUTION INTRAMUSCULAR; INTRAVENOUS at 07:53

## 2019-08-27 RX ADMIN — VANCOMYCIN HYDROCHLORIDE 1500 MG: 10 INJECTION, POWDER, LYOPHILIZED, FOR SOLUTION INTRAVENOUS at 22:25

## 2019-08-27 RX ADMIN — SUCCINYLCHOLINE CHLORIDE 160 MG: 20 INJECTION, SOLUTION INTRAMUSCULAR; INTRAVENOUS at 07:45

## 2019-08-27 RX ADMIN — PROPOFOL 200 MG: 10 INJECTION, EMULSION INTRAVENOUS at 07:45

## 2019-08-27 RX ADMIN — SODIUM CHLORIDE, POTASSIUM CHLORIDE, SODIUM LACTATE AND CALCIUM CHLORIDE: 600; 310; 30; 20 INJECTION, SOLUTION INTRAVENOUS at 07:25

## 2019-08-27 RX ADMIN — MIDAZOLAM 2 MG: 1 INJECTION INTRAMUSCULAR; INTRAVENOUS at 07:35

## 2019-08-27 RX ADMIN — Medication 10 ML: at 22:28

## 2019-08-27 RX ADMIN — LIDOCAINE HYDROCHLORIDE 100 MG: 20 INJECTION, SOLUTION EPIDURAL; INFILTRATION; INTRACAUDAL; PERINEURAL at 07:45

## 2019-08-27 RX ADMIN — ONDANSETRON HYDROCHLORIDE 4 MG: 2 INJECTION, SOLUTION INTRAVENOUS at 15:21

## 2019-08-27 RX ADMIN — FENTANYL CITRATE 100 MCG: 50 INJECTION, SOLUTION INTRAMUSCULAR; INTRAVENOUS at 09:02

## 2019-08-27 RX ADMIN — ROCURONIUM BROMIDE 10 MG: 10 SOLUTION INTRAVENOUS at 11:35

## 2019-08-27 RX ADMIN — GLYCOPYRROLATE 0.4 MG: 0.2 INJECTION INTRAMUSCULAR; INTRAVENOUS at 15:21

## 2019-08-27 RX ADMIN — DEXAMETHASONE SODIUM PHOSPHATE 4 MG: 4 INJECTION, SOLUTION INTRAMUSCULAR; INTRAVENOUS at 08:24

## 2019-08-27 RX ADMIN — CLINDAMYCIN IN 5 PERCENT DEXTROSE 900 MG: 18 INJECTION, SOLUTION INTRAVENOUS at 07:51

## 2019-08-27 RX ADMIN — ROCURONIUM BROMIDE 10 MG: 10 SOLUTION INTRAVENOUS at 13:34

## 2019-08-27 RX ADMIN — HYDROMORPHONE HYDROCHLORIDE 0.5 MG: 2 INJECTION, SOLUTION INTRAMUSCULAR; INTRAVENOUS; SUBCUTANEOUS at 13:09

## 2019-08-27 RX ADMIN — SODIUM CHLORIDE, POTASSIUM CHLORIDE, SODIUM LACTATE AND CALCIUM CHLORIDE: 600; 310; 30; 20 INJECTION, SOLUTION INTRAVENOUS at 11:13

## 2019-08-27 RX ADMIN — ROCURONIUM BROMIDE 5 MG: 10 SOLUTION INTRAVENOUS at 07:45

## 2019-08-27 NOTE — BRIEF OP NOTE
BRIEF OPERATIVE NOTE    Date of Procedure: 8/27/2019   Preoperative Diagnosis: RIGHT BREAST CANCER,  Postoperative Diagnosis: RIGHT BREAST CANCER,    Procedure(s):  BILATERAL BREAST SKIN SPARING MASTECTOMIES  IMMEDIATE BILATERAL BREAST RECONSTRUCTION WITH DIRECT TO IMPLANT USE OF ALLODERM AND BILATERAL BREAST RECONSTRUCTION INTRA-OPERATIVE ASSESMENT OF BLOOD FLOW  Surgeon(s) and Role:  Panel 1:     Capri Velasquez MD - Primary  Panel 2:     * Efrem Pappas MD - Primary         Surgical Assistant: None    Surgical Staff:  Circ-1: Minal Page, EARL  Circ-2: Rina Gonzales, RN  Registered Nurse Assistant: Celestino Rivera, RN; Ana Foley, EARL  Scrub Tech-1: Jason Mar  Scrub Tech-2: Shivam Mir  Scrub RN-Relief: Nahun Argueta RN; Suan Schirmer, RN  Event Time In Time Out   Incision Start 5713    Incision Close 1536      Anesthesia: General   Estimated Blood Loss: 200  Specimens:   ID Type Source Tests Collected by Time Destination   1 : LEFT PROPHYLACTIC MASTECTOMY Fresh Breast  David Bey MD 8/27/2019 0848 Pathology   2 : RIGHT MASTECTOMY Fresh Breast  David Bey MD 8/27/2019 0940 Pathology   3 : Left mastectomy skin, short stitch superior, long stitch lateral Fresh Breast  David Bey MD 8/27/2019 1155 Pathology   4 : Right breast mastectomy skin, short stitch superior, long stitch lateral Fresh Breast  David Bey MD 8/27/2019 1510 Pathology      Findings: Breast cancer    Complications: None  Implants:   Implant Name Type Inv.  Item Serial No.  Lot No. LRB No. Used Action   ALLODERM SELECT TISSUE MATRIX RTU   [id number]  [id number] Left 1 Implanted   ALLODERM SELECT TISSUE MATRIX RTU   [id number]  [id number] Left 1 Implanted   ALLODERM SELECT TISSUE [employer]RI[employer] RTU   [id number]  [id number] Right 1 Implanted   ALLODERM SELECT TISSUE MATRIX RTU   [id number]  [id number] Right 1 Implanted   IMPLANT BREAST SALINE 600CC - [id number] Breast IMPLANT BREAST SALINE 600CC [id number] [employer] 67 N/A Left 1 Implanted   IMPLANT BREAST SALINE 600CC - [id number] Breast IMPLANT BREAST SALINE 600CC [id number] [employer] 67 N/A Right 1 Implanted

## 2019-08-27 NOTE — BRIEF OP NOTE
BRIEF OPERATIVE NOTE    Date of Procedure: 8/27/2019   Preoperative Diagnosis: RIGHT BREAST CANCER multicentric with + margins  Postoperative Diagnosis: RIGHT BREAST CANCER multicentric with + margins   Procedure(s):  BILATERAL BREAST SKIN SPARING MASTECTOMIES  IMMEDIATE BILATERAL BREAST RECONSTRUCTION WITH DIRECT TO IMPLANT USE OF ALLODERM AND BILATERAL BREAST RECONSTRUCTION INTRA-OPERATIVE ASSESMENT OF BLOOD FLOW  Surgeon(s) and Role:  Panel 1:     Sharath Brown MD - Primary  Panel 2:     * Alyssa Raymond MD - Primary         Surgical Assistant: Lydia Rossi    Surgical Staff:  Circ-1: Jyotsna Marinelli RN  Circ-2: Donne Sever, RN  Registered Nurse Assistant: Latonia Starr RN  Scrub Tech-1: Dmitri HOOKS  Event Time In Time Out   Incision Start 9237    Incision Close       Anesthesia: General   Estimated Blood Loss: 150 ml  Specimens:   ID Type Source Tests Collected by Time Destination   1 : LEFT PROPHYLACTIC MASTECTOMY Fresh Breast  Yaw Frederick MD 8/27/2019 0848 Pathology   2 : RIGHT MASTECTOMY Fresh Breast  Yaw Frederick MD 8/27/2019 8228 Pathology      Findings: bilateral breasts removed    Complications: none  Implants:   Implant Name Type Inv.  Item Serial No.  Lot No. LRB No. Used Action   ALLODERM SELECT TISSUE MATRIX RTU   [id number]  [id number] Left 1 Implanted   ALLODERM SELECT TISSUE [employer] RTU   [id number]  [id number] Left 1 Implanted   ALLODERM SELECT TISSUE MATRIX RTU   [id number]  [id number] Right 1 Implanted   ALLODERM SELECT TISSUE MATRIX RTU   [id number]  [id number] Right 1 Implanted       Dictated stat

## 2019-08-27 NOTE — ROUTINE PROCESS
Patient: Kieran Kerns MRN: [id number]  SSN: [id number]   YOB: 1955  Age: 59 y.o. Sex: female     Patient is status post Procedure(s):  BILATERAL BREAST SKIN SPARING MASTECTOMIES  IMMEDIATE BILATERAL BREAST RECONSTRUCTION WITH DIRECT TO IMPLANT USE OF ALLODERM AND BILATERAL BREAST RECONSTRUCTION INTRA-OPERATIVE ASSESMENT OF BLOOD FLOW. Surgeon(s) and Role:  Panel 1:     * Toi Lowe MD - Primary  Panel 2:     * Manish Espitia MD - Primary    Local/Dose/Irrigation:  See [facility]                  Peripheral IV 08/27/19 Left;Posterior Hand (Active)   Site Assessment Clean, dry, & intact 8/27/2019  7:17 AM   Phlebitis Assessment 0 8/27/2019  7:17 AM   Infiltration Assessment 0 8/27/2019  7:17 AM   Dressing Status Clean, dry, & intact 8/27/2019  7:17 AM   Dressing Type Transparent 8/27/2019  7:17 AM   Hub Color/Line Status Pink 8/27/2019  7:17 AM   Alcohol Cap Used Yes 8/27/2019  7:17 AM       Peripheral IV 08/27/19 Right Hand (Active)          Angelo-Patten Drain 08/27/19 Left;Superior; Outer Chest (Active)       Angelo-Patten Drain 08/27/19 Left; Inferior; Outer Chest (Active)       Angelo-Patten Drain 08/27/19 Right;Superior; Outer Chest (Active)       Angelo-Patten Drain 08/27/19 Inferior; Outer Chest (Active)      Airway - Endotracheal Tube 08/27/19 Oral (Active)                   Dressing/Packing:  Wound Breast-Dressing Type: Bra;Topical skin adhesive/glue(Dermabond, Biopatch and Tegaderm x 4 on drain sites) (08/27/19 1500)    Splint/Cast:  ]    Other:

## 2019-08-27 NOTE — H&P
HISTORY OF PRESENT ILLNESS  Sixto Anand is a 59 y.o. female. HPI ESTABLISHED patient with RIGHT breast cancer, here for POD#13 post-op follow-up s/p RIGHT breast lumpectomy X2 sites, RIGHT SLNB on 6/13/19. She states she has had minimal pain, not taking pain meds and incisions have healed well. Will be meeting with Dr. Nate Simon on 7/12/19 for plastics consult.     No family history of breast or ovarian cancer. Father had melanoma.     Breast imaging-  Screening mammogram 4/17/19 BI-RADS 0.  RIGHT breast diagnostic mammogram and RIGHT breast US 4/18/19 BI-RADS 4. RIGHT breast biopsies 4/22/19.        6/13/19 surgical pathology:  FINAL PATHOLOGIC DIAGNOSIS   1. Lymph node, right axilla, sentinel #1, biopsy:   One lymph node, no tumor identified (three levels and cytokeratin stain)   2. Lymph node, right axilla, sentinel #2, biopsy:   One lymph node, no tumor identified (three levels and cytokeratin stain)   3. Lymph node, right axilla, sentinel #3, biopsy:   Benign adipose tissue, no lymph node tissue identified   4. Breast, right, 3:00, lumpectomy:   Invasive low-grade adenosquamous carcinoma   Tumor present at anterior and lateral margins   Please see synoptic report and comment   INVASIVE CARCINOMA OF THE BREAST   SPECIMEN   Procedure: Excision (less than total mastectomy)   Specimen Laterality: Right   TUMOR   Histologic Type: Low grade adenosquamous carcinoma   Glandular (Acinar) / Tubular Differentiation: Score 2   Nuclear Pleomorphism: Score 1   Mitotic Rate: Score 1   Overall Grade: Grade 1   Tumor Size: 17 Millimeters (mm)   Ductal Carcinoma In Situ (DCIS): Not identified   Tumor Extent   Skin: Skin is not present   Accessory Findings   Lymphovascular Invasion: Not identified   Treatment Effect: No known presurgical therapy   MARGINS   Invasive Carcinoma Margins: Positive for invasive carcinoma   Positive Margin(s):    Anterior   Lateral   LYMPH NODES   Regional Lymph Nodes: Uninvolved by tumor cells Number of Lymph Nodes Examined: 2   Number of [place] Nodes Examined: 2   PATHOLOGIC STAGE CLASSIFICATION (pTNM, AJCC 8th Edition)   TNM Descriptors: m (multiple foci of invasive carcinoma)   Primary Tumor (Invasive Carcinoma) (pT): pT1c   Regional Lymph Nodes (pN)   Modifier: (sn): Only sentinel node(s) evaluated. Category (pN): pN0   5. Breast, right, 5:00, lumpectomy:   Invasive and in situ ductal carcinoma   Medial margin is positive for invasive tumor   Lateral margin and superior margin are 2 mm from invasive tumor   Please see synoptic report and comment   INVASIVE CARCINOMA OF THE BREAST   SPECIMEN   Procedure: Excision (less than total mastectomy)   Specimen Laterality: Right   TUMOR   Histologic Type:  Invasive carcinoma of no special type (ductal, not   otherwise specified)   Glandular (Acinar) / Tubular Differentiation: Score 2   Nuclear Pleomorphism: Score 2   Mitotic Rate: Score 1   Overall Grade: Grade 1   Tumor Size: 22 Millimeters (mm)   Ductal Carcinoma In Situ (DCIS): Present   Size (Extent) of DCIS: Millimeters (mm)   Architectural Patterns: Micropapillary, Cribriform   Nuclear Grade: Grade II (intermediate)   Necrosis: Present, central   Tumor Extent   Skin: Skin is not present   Accessory Findings   Perineural invasion: Present   Treatment Effect: No known presurgical therapy   MARGINS   Invasive Carcinoma Margins: Positive for invasive carcinoma   Positive Margin(s): Medial   Additional close margins: Superior and lateral (2 mm)   DCIS Margins: Uninvolved by DCIS   Distance of DCIS from Closest Margin in Millimeters: 2   Millimeters (mm)   Closest Margin: Medial   LYMPH NODES   Regional Lymph Nodes: Uninvolved by tumor cells   Number of Lymph Nodes Examined: 2   Number of [place] Nodes Examined: 2   PATHOLOGIC STAGE CLASSIFICATION (pTNM, AJCC 8th Edition)   TNM Descriptors: m (multiple foci of invasive carcinoma)   Primary Tumor (Invasive Carcinoma) (pT): pT2   Regional Lymph Nodes (pN) Modifier: (sn): Only sentinel node(s) evaluated. Category (pN): pN0      Review of Systems   All other systems reviewed and are negative.        Physical Exam   Pulmonary/Chest:       Breast and axillary incisions healing well   Nursing note and vitals reviewed.        ASSESSMENT and PLAN      ICD-10-CM ICD-9-CM     1. Malignant neoplasm of overlapping sites of right breast in female, estrogen receptor positive ([facility] C50.811 174. 8       Z17.0 V86.0        Bilateral skin sparing mastectomies recon admit for obs

## 2019-08-27 NOTE — PERIOP NOTES
Consent from office not completely filled in. Dr. Marissa Cazares in to see patient. Procedure verified, opportunity for questions given, resigned by .

## 2019-08-27 NOTE — PROGRESS NOTES
Bedside and Verbal shift change report given to Esvin Jo RN (oncoming nurse) by Cande Friedman RN (offgoing nurse). Report included the following information SBAR, Kardex, OR Summary, Procedure Summary, Intake/Output, MAR, Accordion and Recent Results.

## 2019-08-27 NOTE — PROGRESS NOTES
TRANSFER - IN REPORT:    Verbal report received from Margaret(name) on Elias Dumont  being received from Ambulatory PACU(unit) for routine post - op      Report consisted of patients Situation, Background, Assessment and   Recommendations(SBAR). Information from the following report(s) SBAR, Kardex, OR Summary, Procedure Summary, Intake/Output, MAR and Recent Results was reviewed with the receiving nurse. Opportunity for questions and clarification was provided. Assessment completed upon patients arrival to unit and care assumed.

## 2019-08-27 NOTE — PERIOP NOTES
TRANSFER - OUT REPORT:    Verbal report given to Nadeen Quinteros on [facility]  being transferred to Room 307, 3rd floor for routine post - op       Report consisted of patients Situation, Background, Assessment and   Recommendations(SBAR). Information from the following report(s) SBAR was reviewed with the receiving nurse. Lines:   Peripheral IV 08/27/19 Left;Posterior Hand (Active)   Site Assessment Clean, dry, & intact 8/27/2019  4:00 PM   Phlebitis Assessment 0 8/27/2019  4:00 PM   Infiltration Assessment 0 8/27/2019  4:00 PM   Dressing Status Clean, dry, & intact 8/27/2019  4:00 PM   Dressing Type Transparent 8/27/2019  4:00 PM   Hub Color/Line Status Pink; Infusing 8/27/2019  4:00 PM   Alcohol Cap Used Yes 8/27/2019  7:17 AM       Peripheral IV 08/27/19 Right Hand (Active)   Site Assessment Clean, dry, & intact 8/27/2019  3:53 PM   Phlebitis Assessment 0 8/27/2019  3:53 PM   Infiltration Assessment 0 8/27/2019  3:53 PM   Dressing Status Clean, dry, & intact 8/27/2019  3:53 PM   Dressing Type Transparent 8/27/2019  3:53 PM   Hub Color/Line Status Pink; Infusing 8/27/2019  3:53 PM        Opportunity for questions and clarification was provided.       Patient transported with:   Registered Nurse

## 2019-08-27 NOTE — OP NOTES
[address]  OPERATIVE REPORT    Name:  Jason Foster  MR#:  [id number]  :  1955  ACCOUNT #:  [de-identified]  DATE OF SERVICE:  2019      PREOPERATIVE DIAGNOSIS:  Multicentric right breast cancer with positive margins on lumpectomy. POSTOPERATIVE DIAGNOSIS:  Multicentric right breast cancer with positive margins on lumpectomy. PROCEDURE PERFORMED:  Bilateral skin-sparing mastectomies. SURGEON:  Tatiana Rodas MD    ASSISTANT:  Marco Antonio Jones. ANESTHESIA:  General.    COMPLICATIONS:  None. SPECIMENS REMOVED:  1. Left prophylactic mastectomy. 2.  Right mastectomy. IMPLANTS:  No implants for my part of the procedure. ESTIMATED BLOOD LOSS:  150 mL. FINDINGS:  Bilateral breasts removed. INDICATIONS FOR PROCEDURE:  This is a 22-year-old female who had breast conservation with lumpectomy, sentinel node biopsy on the right. She initially had breast cancer in the lower inner quadrant on the right and then some atypia at 3 o'clock on core needle biopsy, and then on excision, she turned out to have multicentric breast cancer with positive margins. She decided to not have re-excision, but have mastectomy with reconstruction and a prophylactic mastectomy with reconstruction on either side. PROCEDURE IN DETAIL:  The patient was seen in the preop holding area where surgical site was marked by surgeon and informed consent was obtained. She was taken to the operating room, laid in supine position where general endotracheal anesthesia was induced. A Modi catheter was placed without complication. Bilateral breasts were prepped and draped in usual fashion. A time-out was performed. Attention was turned to the left breast where a periareolar incision was made with a 10-blade. Bovie cautery was used to dissect superior, medial, inferior and lateral skin flaps.   The breast was removed in total from the chest wall in a medial-to-lateral fashion incorporating the pectoral fascia. The breast was marked short superior and long stitch lateral and sent for permanent pathology. Hemostasis was obtained with Bovie cautery and a moist lap was packed in the cavity. Attention was turned to the right breast.  In the same fashion, a periareolar incision was made with a 10-blade. Bovie cautery was used to dissect superior, medial, inferior and lateral skin flaps. The breast was removed in total from the chest wall in a medial-to-lateral fashion and marked short superior, long stitch lateral and sent for permanent pathology. Hemostasis was obtained on the right with a Bovie cautery and a moist lap was packed in the cavity. All sponge, needle, and instrument counts were correct during this time and the patient was stable, and Dr. Elizabeth Kimball came in to start his part of the procedure.         Viridiana Borrero MD      [id number]  D:  08/27/2019 9:58  T:  08/27/2019 10:03  JOB #:  [id number]

## 2019-08-27 NOTE — ANESTHESIA PREPROCEDURE EVALUATION
Relevant Problems   No relevant active problems       Anesthetic History   No history of anesthetic complications            Review of Systems / Medical History  Patient summary reviewed, nursing notes reviewed and pertinent labs reviewed    Pulmonary        Sleep apnea: CPAP           Neuro/Psych         Psychiatric history (anxiety)     Cardiovascular    Hypertension: well controlled              Exercise tolerance: >4 METS     GI/Hepatic/Renal  Within defined limits              Endo/Other      Hypothyroidism: well controlled  Morbid obesity, arthritis ( knees) and cancer (right breast)  Pertinent negatives: No obesity   Other Findings              Physical Exam    Airway  Mallampati: I  TM Distance: 4 - 6 cm  Neck ROM: normal range of motion   Mouth opening: Normal     Cardiovascular    Rhythm: regular  Rate: normal      Pertinent negatives: No murmur   Dental    Dentition: Caps/crowns  Comments:  On molars   Pulmonary  Breath sounds clear to auscultation               Abdominal  GI exam deferred       Other Findings            Anesthetic Plan    ASA: 3  Anesthesia type: general          Induction: Intravenous  Anesthetic plan and risks discussed with: Patient

## 2019-08-28 VITALS
WEIGHT: 254 LBS | TEMPERATURE: 98.3 F | HEART RATE: 72 BPM | HEIGHT: 68 IN | BODY MASS INDEX: 38.49 KG/M2 | RESPIRATION RATE: 16 BRPM | DIASTOLIC BLOOD PRESSURE: 77 MMHG | OXYGEN SATURATION: 97 % | SYSTOLIC BLOOD PRESSURE: 137 MMHG

## 2019-08-28 PROCEDURE — 74011250637 HC RX REV CODE- 250/637: Performed by: SURGERY

## 2019-08-28 PROCEDURE — 99218 HC RM OBSERVATION: CPT

## 2019-08-28 PROCEDURE — 77030018846 HC SOL IRR STRL H20 ICUM -A

## 2019-08-28 RX ADMIN — BUPROPION HYDROCHLORIDE 150 MG: 150 TABLET, EXTENDED RELEASE ORAL at 09:29

## 2019-08-28 RX ADMIN — LOSARTAN POTASSIUM 100 MG: 50 TABLET ORAL at 09:29

## 2019-08-28 RX ADMIN — OXYCODONE HYDROCHLORIDE AND ACETAMINOPHEN 1 TABLET: 5; 325 TABLET ORAL at 03:52

## 2019-08-28 RX ADMIN — OXYCODONE HYDROCHLORIDE AND ACETAMINOPHEN 1 TABLET: 5; 325 TABLET ORAL at 11:54

## 2019-08-28 RX ADMIN — POLYETHYLENE GLYCOL 3350 17 G: 17 POWDER, FOR SOLUTION ORAL at 09:30

## 2019-08-28 RX ADMIN — LEVOTHYROXINE SODIUM 137 MCG: 112 TABLET ORAL at 07:06

## 2019-08-28 RX ADMIN — Medication 10 ML: at 07:07

## 2019-08-28 NOTE — PROGRESS NOTES
Dx: Breast cancer    S:  Doing well, no complaints    O:  Bilat breast incision c/d/i  Implants intact  ALLI ss x 4  Flaps wwp    A/P:  Dispo

## 2019-08-28 NOTE — ANESTHESIA POSTPROCEDURE EVALUATION
Procedure(s):  BILATERAL BREAST SKIN SPARING MASTECTOMIES  IMMEDIATE BILATERAL BREAST RECONSTRUCTION WITH DIRECT TO IMPLANT USE OF ALLODERM AND BILATERAL BREAST RECONSTRUCTION INTRA-OPERATIVE ASSESMENT OF BLOOD FLOW.    general    <BSHSIANPOST>    Vitals Value Taken Time   /65 8/27/2019  4:45 PM   Temp 36.8 °C (98.2 °F) 8/27/2019  3:55 PM   Pulse 84 8/27/2019  4:49 PM   Resp 14 8/27/2019  4:49 PM   SpO2 97 % 8/27/2019  4:49 PM   Vitals shown include unvalidated device data.

## 2019-08-28 NOTE — OP NOTES
[address]   OPERATIVE REPORT    Name:  Eloise Thompson  MR#:  [id number]  :  1955  ACCOUNT #:  [de-identified]  DATE OF SERVICE:  2019    PREOPERATIVE DIAGNOSES:  1. Right breast cancer. 2.  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 POSTOPERATIVE DIAGNOSES:  1. Right breast cancer. 2.  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 PROCEDURES PERFORMED:  1. Immediate bilateral breast reconstruction with direct-to-implant reconstruction. 2.  Use of AlloDerm bilateral breast reconstruction. SURGEON:  Amy Sorenson MD    ASSISTANT:  None. ANESTHESIA:  General.    COMPLICATIONS:  None. SPECIMENS REMOVED:  1. Right breast mastectomy skin. 2.  Left breast mastectomy skin. IMPLANTS:  1. Right breast implant reference #.  2.  Right breast implant serial #[id number].  3.  Left breast implant reference #.  4.  Left breast implant serial #[id number].  5.  Right breast implant fill volume 650/600 mL. 6.  Left breast implant fill volume 650/600 mL. ESTIMATED BLOOD LOSS:  200 mL. IV FLUIDS:  1.  2700 mL of crystalloid. 2.  250 mL of albumin. URINE OUTPUT:  900 mL. DRAINS:  1. Right breast sub-AlloDerm drain. 2.  Right breast supra-AlloDerm drain. 3.  Left breast sub-AlloDerm drain. 4.  Left breast supra-AlloDerm drain. RIGHT BREAST WEIGHT:  828 g. LEFT BREAST WEIGHT:  857 g. RIGHT BREAST ALLODERM:  1. Lot # [id number].  2.  Lot # [id number]. LEFT BREAST ALLODERM:  Lot # [id number]. FINDINGS:  1. Right breast cancer. 2.  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 INDICATION FOR PROCEDURE:  The patient is a 20-year-old female, known to [facility] due to diagnosis of right breast cancer. She is status post diagnosis of right breast cancer.   She now presents to the operating room today after having a diagnosis of positive margins in 2015 for a completion right mastectomy and a left-sided prophylactic mastectomy. She also desires to undergo immediate reconstruction. Her preference is for a direct-to-implant reconstruction. DESCRIPTION OF PROCEDURE:  I met the patient in the preoperative holding area. I reviewed and updated the history and physical and surgical consent. I answered the patient's questions to her complete satisfaction. I discussed the risks of surgery, which included, but not limited to, infection, bleeding, wound complications, need for additional surgery, need for emergency surgery. The patient expressed understanding and wished to proceed forward with the surgery. The patient was brought to the operating room and preoperative briefing performed. SCDs were placed on bilateral lower extremities and turned on. The patient was placed on the operating room table. Pressure points were padded. Preoperative antibiotics were administered and the patient was placed under general anesthesia. The chest and upper abdomen were prepped and draped in sterile fashion and a time-out was performed. I first began by taking 2 pieces of AlloDerm per side, sized them together to perform one large sheath in order to cover the anterior surface of the implant on both the left and right-hand side. In the meantime, Dr. Verena Guzman began with her portion of the procedure, which would be bilateral skin-sparing mastectomies, which will be separately dictated by her. Upon completion of her portion of the procedure, I began my portion of the procedure. I first began by performing careful irrigation and hemostasis on both the left and right-hand side. I examined the mastectomy flaps and appeared to be good, healthy mastectomy flaps. I then tried several sizers, and after trying several sizers, it appeared that we could proceed forward with the direct-to-implant reconstruction. After this was done, I then selected permanent version of the implant, this was a .   I filled the implant with 650 mL on both the left and right-hand side. I then trimmed the ADMs on both the left and right-hand side in order to minimize the redundancy of the AlloDerm. I then transposed the boundaries of the implants on both the left and right-hand side and sutured to the outer aspect of the ADMs to the chest wall, leaving a portion of it just inside in order to insert the implant. Using no-touch technique, I placed the implant into the sub-AlloDerm space. I then placed a 7-Swedish flat drain in the sub-AlloDerm space and approximated the remainder of the AlloDerm to the chest wall with PDS sutures. I then placed a 15-Swedish round drain in the supra-AlloDerm space. I then removed the portion of the mastectomy skin on both the left and right-hand side in order to provide fresh skin edges. I then closed the incision in layers with subdermal and subcuticular Monocryls followed by Dermabond. The patient was then awakened and taken to the recovery room in stable condition. Sponge and instrument counts were correct at the end of the case. I was present throughout the entirety of the procedure.         MD ASHISH [email or web address]  D:  08/27/2019 17:43  T:  08/27/2019 20:18  JOB #:  [id number]

## 2019-08-28 NOTE — PROGRESS NOTES
1149 Discharge instructions have been discussed with the patient and her . The patient has a few questions pertaining to instructions provided by Dr. Reji Zamora and his medical group. Dr. Shorty Ibarra instructions are not present. Pt was advised to contact Dr. Shorty Ibarra office for clarification. One dose of Percocet was provided for pt's comfort on ride home.

## 2019-08-28 NOTE — PROGRESS NOTES
Problem: Falls - Risk of  Goal: *Absence of Falls  Description  Document Candida Lightning Fall Risk and appropriate interventions in the flowsheet.   Outcome: Resolved/Met  Note:   Fall Risk Interventions:            Medication Interventions: Teach patient to arise slowly                   Problem: Patient Education: Go to Patient Education Activity  Goal: Patient/Family Education  Outcome: Resolved/Met     Problem: Patient Education: Go to Patient Education Activity  Goal: Patient/Family Education  Outcome: Resolved/Met     Problem: Mastectomy: Day of Surgery  Goal: Activity/Safety  Outcome: Resolved/Met  Goal: Consults, if ordered  Outcome: Resolved/Met  Goal: Diagnostic Test/Procedures  Outcome: Resolved/Met  Goal: Nutrition/Diet  Outcome: Resolved/Met  Goal: Discharge Planning  Outcome: Resolved/Met  Goal: Medications  Outcome: Resolved/Met  Goal: Respiratory  Outcome: Resolved/Met  Goal: Treatments/Interventions/Procedures  Outcome: Resolved/Met  Goal: Psychosocial  Outcome: Resolved/Met  Goal: *Hemodynamically stable  Outcome: Resolved/Met  Goal: *Optimal pain control at patient's stated goal  Outcome: Resolved/Met  Goal: *Absence of infection signs and symptoms  Outcome: Resolved/Met     Problem: Mastectomy: Post-Op Day 1/Day of Discharge  Goal: Activity/Safety  Outcome: Resolved/Met  Goal: Consults, if ordered  Outcome: Resolved/Met  Goal: Diagnostic Test/Procedures  Outcome: Resolved/Met  Goal: Nutrition/Diet  Outcome: Resolved/Met  Goal: Discharge Planning  Outcome: Resolved/Met  Goal: Medications  Outcome: Resolved/Met  Goal: Respiratory  Outcome: Resolved/Met  Goal: Treatments/Interventions/Procedures  Outcome: Resolved/Met  Goal: Psychosocial  Outcome: Resolved/Met     Problem: Mastectomy: Discharge Outcomes  Goal: *Demonstrates progressive activity  Outcome: Resolved/Met  Goal: *Tolerating diet  Outcome: Resolved/Met  Goal: *Hemodynamically stable  Outcome: Resolved/Met  Goal: *Describes available resources and support systems  Outcome: Resolved/Met  Goal: *Optimal pain control at patient's stated goal  Outcome: Resolved/Met  Goal: *Verbalizes understanding and describes medication purposes and frequencies  Outcome: Resolved/Met  Goal: *Lungs clear or at baseline  Outcome: Resolved/Met  Goal: *Voiding  Outcome: Resolved/Met  Goal: *Verbalizes and demonstrates incision care  Outcome: Resolved/Met  Goal: *Expresses feelings about diagnosis and surgery  Outcome: Resolved/Met     Problem: Pain  Goal: *Control of Pain  Outcome: Resolved/Met  Goal: *PALLIATIVE CARE:  Alleviation of Pain  Outcome: Resolved/Met     Problem: Patient Education: Go to Patient Education Activity  Goal: Patient/Family Education  Outcome: Resolved/Met

## 2019-08-28 NOTE — PROGRESS NOTES
7288 Bedside and Verbal shift change report given to Maryanne Rene RN / Gil Reeves RN (oncoming nurse) by Ankit Johnston RN (offgoing nurse). Report included the following information SBAR, Kardex, Procedure Summary, Intake/Output, MAR, Accordion, Recent Results and Med Rec Status.

## 2019-08-28 NOTE — DISCHARGE SUMMARY
Dx: Breast cancer    DOA - 08/27/19  DOD - 08/28/19    Patient admitted on 08/27/19 for mastectomies and immediate reconstruction. Surgery uneventful. On POD #1 hospital course unremarkable and patient to be followed as an outpatient.      FOV: 09/06/19 - Dr. Romie Andre    Rx: Cont home meds, given ERAS protocol

## 2019-08-28 NOTE — DISCHARGE INSTRUCTIONS
Patient Education        Breast Reconstruction With an Expander or Implant: What to Expect at Home  Your Recovery    Breast reconstruction is surgery to rebuild the shape of your breast after you have had part or all of your breast removed because of cancer. You may have had a tissue expander or an implant placed during the surgery. If an expander was placed, salt water (saline) or air was added to it to start stretching your skin. Right after the surgery you will probably feel weak, and you may feel pain for 2 to 3 weeks. You may have a pulling or stretching feeling in your breast area. You can expect to feel better and stronger each day, although you may need pain medicine for a week or two. You may get tired easily or have less energy than usual. This may last for several weeks after surgery. You will likely have several drains near your incision. These help with healing. The drains will be removed when the fluid buildup slows. Drains are usually removed in the first few weeks after surgery. Stitches usually are removed in 5 to 10 days. If you had an expander placed, you will need to regularly see your doctor every couple of weeks. During these visits, more salt water will be added to the expander. Or if you have an expander that uses air, you may need to add air at home. After several months, this will stretch the skin enough to cover the implant. Your new breast may feel firmer and look rounder or flatter than your other breast. The new breast may not have the same shape as your breast did before it was removed. Breast reconstruction with an implant won't restore normal feeling to your breast. It may take several months for your breast to heal.  This care sheet gives you a general idea about how long it will take for you to recover. But each person recovers at a different pace. Follow the steps below to get better as quickly as possible. How can you care for yourself at home?   Activity    · Rest when you feel tired. Getting enough sleep will help you recover.     · For about 6 weeks after surgery, avoid lifting anything that would make you strain. This may include a child, heavy grocery bags, milk containers, a heavy briefcase or backpack, cat litter or dog food bags, a vacuum , or anything that weighs more than 10 to 15 pounds. Do not lift anything over your head for 3 to 6 weeks.     · You may have pain for a few weeks after surgery. Support the area with your hands or a firm pillow when you bend, cough, or move.     · Ask your doctor when you can drive again.     · Ask your doctor when it is okay for you to have sex.     · You can take your first shower the day after the drain near your incision is removed. This is usually in about 1 week. Do not take a bath or soak in a hot tub for about 4 weeks.     · You will probably be able to go back to work or your normal routine in 3 to 6 weeks. This depends on the type of work you do and any further treatment. Diet    · You can eat your normal diet. If your stomach is upset, try bland, low-fat foods like plain rice, broiled chicken, toast, and yogurt.     · Drink plenty of fluids (unless your doctor tells you not to).     · You may notice that your bowel movements are not regular right after your surgery. This is common. Try to avoid constipation and straining with bowel movements. You may want to take a fiber supplement. If you have not had a bowel movement after a couple of days, ask your doctor about taking a mild laxative. Medicines    · Your doctor will tell you if and when you can restart your medicines. He or she will also give you instructions about taking any new medicines.     · If you take blood thinners, such as warfarin (Coumadin), clopidogrel (Plavix), or aspirin, be sure to talk to your doctor. He or she will tell you if and when to start taking those medicines again.  Make sure that you understand exactly what your doctor wants you to do.     · Take pain medicines exactly as directed. ? If the doctor gave you a prescription medicine for pain, take it as prescribed. ? If you are not taking a prescription pain medicine, ask your doctor if you can take an over-the-counter medicine.     · If you think your pain medicine is making you sick to your stomach:  ? Take your medicine after meals (unless your doctor has told you not to). ? Ask your doctor for a different pain medicine. If you were given medicine for nausea, take it as directed.     · If your doctor prescribed antibiotics, take them as directed. Do not stop taking them just because you feel better. You need to take the full course of antibiotics. Incision care    · If your doctor gave you specific instructions on how to care for your incision, follow those instructions.     · You may be wearing a special bra that holds your bandages in place after the surgery. Your doctor will tell you when you can stop wearing the bra. Your doctor may want you to wear the bra at night as well as during the day for several weeks. Do not wear an underwire bra for 1 month.     · If you have strips of tape on the incision, leave the tape on for a week or until it falls off.     · Wash the area daily with warm, soapy water, and pat it dry. Don't use hydrogen peroxide or alcohol, which can slow healing.     · You may cover the area with a gauze bandage if it weeps or rubs against clothing. Change the bandage 1 to 2 times every day. Consider having someone help you with this.     · Keep the area clean and dry. Drain care    · You may have one or more drains near your incision. Your doctor will tell you how to take care of them. Exercise    · Try to walk each day. Start by walking a little more than you did the day before. Bit by bit, increase the amount you walk.  Walking boosts blood flow and helps prevent pneumonia, constipation, and blood clots in your legs.     · Avoid strenuous activities, such as bicycle riding, jogging, weight lifting, or aerobic exercise, until your doctor says it is okay. This includes housework, especially if you have to use the arm on the side of your surgery.     · Your doctor will tell you when to begin stretching exercises and normal activities.    Elevation    · Prop up the arm on the side of your surgery on a pillow when you sit or lie down. Try to keep your arm above the level of your heart. This will help reduce swelling. Follow-up care is a key part of your treatment and safety. Be sure to make and go to all appointments, and call your doctor if you are having problems. It's also a good idea to know your test results and keep a list of the medicines you take. When should you call for help? Call 911 anytime you think you may need emergency care. For example, call if:    · You passed out (lost consciousness).     · You have chest pain, are short of breath, or cough up blood.    Call your doctor now or seek immediate medical care if:    · You have pain that does not get better after you take pain medicine.     · You are sick to your stomach or cannot drink fluids.     · You cannot pass stools or gas.     · You have loose stitches, or your incision comes open.     · Bright red blood has soaked through the bandage over your incision.     · You have signs of infection, such as:  ? Increased pain, swelling, warmth, or redness. ? Red streaks leading from the incision. ? Pus draining from the incision. ? A fever.     · You have signs of a blood clot in your leg (called a deep vein thrombosis), such as:  ? Pain in your calf, back of the knee, thigh, or groin. ? Redness or swelling in your leg.    Watch closely for any changes in your health, and be sure to contact your doctor if you have any problems. Where can you learn more? Go to http://radha-za.info/.   Enter J010 in the search box to learn more about \"Breast Reconstruction With an Expander or Implant: What to Expect at Home. \"  Current as of: December 19, 2018  Content Version: 12.1  © 6817-6095 [employer]. Care instructions adapted under license by [employer] (which disclaims liability or warranty for this information). If you have questions about a medical condition or this instruction, always ask your healthcare professional. [place] [address] any warranty or liability for your use of this information.

## 2019-09-04 DIAGNOSIS — C50.811 MALIGNANT NEOPLASM OF OVERLAPPING SITES OF RIGHT BREAST IN FEMALE, ESTROGEN RECEPTOR POSITIVE (HCC): Primary | ICD-10-CM

## 2019-09-04 DIAGNOSIS — Z17.0 MALIGNANT NEOPLASM OF OVERLAPPING SITES OF RIGHT BREAST IN FEMALE, ESTROGEN RECEPTOR POSITIVE (HCC): Primary | ICD-10-CM

## 2019-09-11 ENCOUNTER — TELEPHONE (OUTPATIENT)
Dept: SURGERY | Age: 64
End: 2019-09-11

## 2019-09-11 ENCOUNTER — OFFICE VISIT (OUTPATIENT)
Dept: ONCOLOGY | Age: 64
End: 2019-09-11

## 2019-09-11 ENCOUNTER — DOCUMENTATION ONLY (OUTPATIENT)
Dept: ONCOLOGY | Age: 64
End: 2019-09-11

## 2019-09-11 ENCOUNTER — OFFICE VISIT (OUTPATIENT)
Dept: SURGERY | Age: 64
End: 2019-09-11

## 2019-09-11 VITALS
HEIGHT: 68 IN | BODY MASS INDEX: 39.4 KG/M2 | SYSTOLIC BLOOD PRESSURE: 138 MMHG | DIASTOLIC BLOOD PRESSURE: 71 MMHG | WEIGHT: 260 LBS | HEART RATE: 75 BPM

## 2019-09-11 VITALS
WEIGHT: 260.1 LBS | BODY MASS INDEX: 39.42 KG/M2 | DIASTOLIC BLOOD PRESSURE: 120 MMHG | HEART RATE: 77 BPM | HEIGHT: 68 IN | TEMPERATURE: 99.8 F | SYSTOLIC BLOOD PRESSURE: 177 MMHG | OXYGEN SATURATION: 97 %

## 2019-09-11 DIAGNOSIS — Z98.890 H/O BREAST RECONSTRUCTION: ICD-10-CM

## 2019-09-11 DIAGNOSIS — C50.811 CANCER OF OVERLAPPING SITES OF RIGHT BREAST (HCC): Primary | ICD-10-CM

## 2019-09-11 DIAGNOSIS — L08.9 SKIN INFECTION: ICD-10-CM

## 2019-09-11 DIAGNOSIS — M19.90 ARTHRITIS: ICD-10-CM

## 2019-09-11 DIAGNOSIS — I10 ESSENTIAL HYPERTENSION: ICD-10-CM

## 2019-09-11 DIAGNOSIS — Z90.13 H/O BILATERAL MASTECTOMY: ICD-10-CM

## 2019-09-11 RX ORDER — ANASTROZOLE 1 MG/1
1 TABLET ORAL DAILY
Qty: 90 TAB | Refills: 3 | Status: SHIPPED | OUTPATIENT
Start: 2019-09-11 | End: 2020-09-22

## 2019-09-11 NOTE — PATIENT INSTRUCTIONS
Breast Reconstruction With an Expander or Implant: What to Expect at [address] [place] [address]    Breast reconstruction is surgery to rebuild the shape of your breast after you have had part or all of your breast removed because of cancer. You may have had a tissue expander or an implant placed during the surgery. If an expander was placed, salt water (saline) or air was added to it to start stretching your skin. Right after the surgery you will probably feel weak, and you may feel pain for 2 to 3 weeks. You may have a pulling or stretching feeling in your breast area. You can expect to feel better and stronger each day, although you may need pain medicine for a week or two. You may get tired easily or have less energy than usual. This may last for several weeks after surgery. You will likely have several drains near your incision. These help with healing. The drains will be removed when the fluid buildup slows. Drains are usually removed in the first few weeks after surgery. Stitches usually are removed in 5 to 10 days. If you had an expander placed, you will need to regularly see your doctor every couple of weeks. During these visits, more salt water will be added to the expander. Or if you have an expander that uses air, you may need to add air at home. After several months, this will stretch the skin enough to cover the implant. Your new breast may feel firmer and look rounder or flatter than your other breast. The new breast may not have the same shape as your breast did before it was removed. Breast reconstruction with an implant won't restore normal feeling to your breast. It may take several months for your breast to heal.  This care sheet gives you a general idea about how long it will take for you to recover. But each person recovers at a different pace. Follow the steps below to get better as quickly as possible. How can you care for yourself at home? Activity    · Rest when you feel tired.  Getting enough sleep will help you recover.     · For about 6 weeks after surgery, avoid lifting anything that would make you strain. This may include a child, heavy grocery bags, milk containers, a heavy briefcase or backpack, cat litter or dog food bags, a vacuum , or anything that weighs more than 10 to 15 pounds. Do not lift anything over your head for 3 to 6 weeks.     · You may have pain for a few weeks after surgery. Support the area with your hands or a firm pillow when you bend, cough, or move.     · Ask your doctor when you can drive again.     · Ask your doctor when it is okay for you to have sex.     · You can take your first shower the day after the drain near your incision is removed. This is usually in about 1 week. Do not take a bath or soak in a hot tub for about 4 weeks.     · You will probably be able to go back to work or your normal routine in 3 to 6 weeks. This depends on the type of work you do and any further treatment. Diet    · You can eat your normal diet. If your stomach is upset, try bland, low-fat foods like plain rice, broiled chicken, toast, and yogurt.     · Drink plenty of fluids (unless your doctor tells you not to).     · You may notice that your bowel movements are not regular right after your surgery. This is common. Try to avoid constipation and straining with bowel movements. You may want to take a fiber supplement. If you have not had a bowel movement after a couple of days, ask your doctor about taking a mild laxative. Medicines    · Your doctor will tell you if and when you can restart your medicines. He or she will also give you instructions about taking any new medicines.     · If you take blood thinners, such as warfarin (Coumadin), clopidogrel (Plavix), or aspirin, be sure to talk to your doctor. He or she will tell you if and when to start taking those medicines again.  Make sure that you understand exactly what your doctor wants you to do.     · Take pain medicines exactly as directed. ? If the doctor gave you a prescription medicine for pain, take it as prescribed. ? If you are not taking a prescription pain medicine, ask your doctor if you can take an over-the-counter medicine.     · If you think your pain medicine is making you sick to your stomach:  ? Take your medicine after meals (unless your doctor has told you not to). ? Ask your doctor for a different pain medicine. If you were given medicine for nausea, take it as directed.     · If your doctor prescribed antibiotics, take them as directed. Do not stop taking them just because you feel better. You need to take the full course of antibiotics. Incision care    · If your doctor gave you specific instructions on how to care for your incision, follow those instructions.     · You may be wearing a special bra that holds your bandages in place after the surgery. Your doctor will tell you when you can stop wearing the bra. Your doctor may want you to wear the bra at night as well as during the day for several weeks. Do not wear an underwire bra for 1 month.     · If you have strips of tape on the incision, leave the tape on for a week or until it falls off.     · Wash the area daily with warm, soapy water, and pat it dry. Don't use hydrogen peroxide or alcohol, which can slow healing.     · You may cover the area with a gauze bandage if it weeps or rubs against clothing. Change the bandage 1 to 2 times every day. Consider having someone help you with this.     · Keep the area clean and dry. Drain care    · You may have one or more drains near your incision. Your doctor will tell you how to take care of them. Exercise    · Try to walk each day. Start by walking a little more than you did the day before. Bit by bit, increase the amount you walk.  Walking boosts blood flow and helps prevent pneumonia, constipation, and blood clots in your legs.     · Avoid strenuous activities, such as bicycle riding, jogging, weight lifting, or aerobic exercise, until your doctor says it is okay. This includes housework, especially if you have to use the arm on the side of your surgery.     · Your doctor will tell you when to begin stretching exercises and normal activities.    Elevation    · Prop up the arm on the side of your surgery on a pillow when you sit or lie down. Try to keep your arm above the level of your heart. This will help reduce swelling. Follow-up care is a key part of your treatment and safety. Be sure to make and go to all appointments, and call your doctor if you are having problems. It's also a good idea to know your test results and keep a list of the medicines you take. When should you call for help? Call 911 anytime you think you may need emergency care. For example, call if:    · You passed out (lost consciousness).     · You have chest pain, are short of breath, or cough up blood.    Call your doctor now or seek immediate medical care if:    · You have pain that does not get better after you take pain medicine.     · You are sick to your stomach or cannot drink fluids.     · You cannot pass stools or gas.     · You have loose stitches, or your incision comes open.     · Bright red blood has soaked through the bandage over your incision.     · You have signs of infection, such as:  ? Increased pain, swelling, warmth, or redness. ? Red streaks leading from the incision. ? Pus draining from the incision. ? A fever.     · You have signs of a blood clot in your leg (called a deep vein thrombosis), such as:  ? Pain in your calf, back of the knee, thigh, or groin. ? Redness or swelling in your leg.    Watch closely for any changes in your health, and be sure to contact your doctor if you have any problems. Where can you learn more? Go to http://radha-za.info/.   Enter Z915 in the search box to learn more about \"Breast Reconstruction With an Expander or Implant: What to Expect at Home.\"  Current as of: December 19, 2018  Content Version: 12.1  © 9215-4640 Healthwise, [facility]. Care instructions adapted under license by [employer] (which disclaims liability or warranty for this information). If you have questions about a medical condition or this instruction, always ask your healthcare professional. [place] [address] any warranty or liability for your use of this information.

## 2019-09-11 NOTE — PROGRESS NOTES
[facility] [place] at [address], [address]: [phone]  F: [phone]    Reason for Visit:   Kashmir Dumont is a 59 y.o. female who is seen in consultation at the request of Dr. Jaxon Francis for evaluation of breast cancer. Treatment History:   B/l  Mast/ reconstruction 8/27/19    STAGE: 1 3 o clock adenosquamous triple negative  5 o clock ER+ HER2 negative mammaprint low risk    History of Present Illness:     Pt seen today for office consult for new breast cancer IDC ER+ HER2 negative and adenosquamous triple negative post b/l mast/ reconstruction for adjuvant therapy discussion. Initially pt had abnormal screening mammo 4/19. Breast imaging-  Screening mammogram 4/17/19 BI-RADS 0.  RIGHT breast diagnostic mammogram and RIGHT breast US 4/18/19 BI-RADS 4. RIGHT breast biopsies 4/22/19. Breast biopsy IDC ER+ HER2 negative mammaprint low risk    Breast MRI with 2 sites on RIGHT, negative on LEFT. Lumpectomy surgery  3 o clock adenosquamous triple negative  5 o clock ER+ HER2 negative mammaprint low risk  + margins. Then mastectomy/ reconstruction 8/27/19. Still healing with drains. + residual disease ER+ HER2 negative. [facility] op [facility]/ got out sat. On IV abx now for 3 weeks. No records here. Seeing breast surgery this afternoon.    Has drain tubes/ some redness better on right, still present on left      Past Medical History:   Diagnosis Date    Ankle edema 4/5/2010    Anxiety 4/5/2010    Benign essential hypertension 5/26/2016    2006     Cancer ([address] 2019    BREAST    Carpal tunnel syndrome 4/5/2010    Depression 4/5/2010    ANXIETY    H/O Benign Colon Polyp 12/20/2010    H/O Uterine Fibroids 12/20/2010    Hypercholesterolemia 6/25/2013    ~2006    Hypothyroidism 5/26/2016 7/1999    Kidney stone 4/5/2010    Perimenopausal 4/5/2010    Primary osteoarthritis of left knee 5/26/2016    Extensive;  Ortho (needs knee replacement), [facility] x 2,     S/P benign cervical polypectomy 12/20/2010    Sleep apnea 4/5/2010    USES CPAP    Stress incontinence 5/26/2010      Past Surgical History:   Procedure Laterality Date    COLONOSCOPY  10/05    benign polyp; repeat 5 yr per Dr Tono Ramos COLONOSCOPY  11/2010    normal, f/u 5 yrs, Dr Tono Ramos EKG ORDERABLE  5/2009    NSR, rate 84, normal EKG    HX BLADDER SUSPENSION  ~2010    Dr Zoë Coyne  11/2011    VPI, right breast biopsy negative    HX BREAST LUMPECTOMY Right 6/13/2019    RIGHT BREAST LUMPECTOMY ( x2 ) WTIH ULTRASOUND , RIGHT SENTINAL NODE BIOPSY performed by Yash Nath MD at [facility]    HX BREAST RECONSTRUCTION Bilateral 8/27/2019    IMMEDIATE BILATERAL BREAST RECONSTRUCTION WITH DIRECT TO IMPLANT USE OF ALLODERM AND BILATERAL BREAST RECONSTRUCTION INTRA-OPERATIVE ASSESMENT OF BLOOD FLOW performed by Meek Ferrer MD at [address]  3/2010    Right; Dr Doreen Go  2009, 2015    Dr Barron Kimball    HX COLONOSCOPY  04/08/2016    Dr Devika Hannon-Internal Hemorrhoids- Normal mucosa in the whole colon - Repeat colonoscopy in 5 years    HX DILATION AND CURETTAGE  7/2005    AUB, benign/neg bx; Dr Jorge L Mendiola  10/2012    Dr Barron Kimball, [facility]    HX GI  2016    COLONOSCOPY    HX [address]    HX HYSTERECTOMY  2013    HX MASTECTOMY Bilateral 8/27/2019    BILATERAL BREAST SKIN SPARING MASTECTOMIES performed by Yash Nath MD at [address] ORTHOPAEDIC Right 2010    CARPAL TUNNEL    HX PARTIAL HYSTERECTOMY  4/26/13    Supracervical hysterectomy for fibroids, Dr Grullon Mon  age 11    [place] [facility] UROLOGICAL  2010    BLADDER SLING     HX WISDOM TEETH EXTRACTION        Social History     Tobacco Use    Smoking status: Former Smoker     Packs/day: 0.50     Years: 25.00     Pack years: 12.50     Last attempt to quit: 2005     Years since quittin.7    Smokeless tobacco: Never Used   Substance Use Topics    Alcohol use: Yes     Alcohol/week: 5.0 standard drinks     Types: 5 Shots of liquor per week      Family History   Problem Relation Age of Onset   [place] Arthritis-osteo Father     Stroke Father         TIA's   [place] Cancer Father         melanoma on back removed    Pacemaker Father 80    Heart Disease Father          81 yo in     Hypertension Mother    [place] Arthritis-osteo Mother     Heart Disease Mother         valve disease    Heart Attack Maternal Grandmother 76    No Known Problems Sister     No Known Problems Brother     Depression Daughter     Substance Abuse Daughter     Alcohol abuse Daughter      Current Outpatient Medications   Medication Sig    anastrozole (ARIMIDEX) 1 mg tablet Take 1 mg by mouth daily. Indications: Hormone Receptor Positive Breast Cancer    buPROPion XL (WELLBUTRIN XL) 150 mg tablet take 1 tablet by mouth once daily before BREAKFAST    levothyroxine (SYNTHROID) 137 mcg tablet take 1 tablet by mouth once daily BEFORE BRAKFAST    losartan (COZAAR) 100 mg tablet take 1 tablet by mouth once daily (Patient taking differently: one daily)    OTHER Portable CPAP machine for RENÉE, with new mask and tubing but no change to mask or settings; G47.30 GARO 99m prog good    meloxicam (MOBIC) 15 mg tablet Take 1 Tab by mouth daily. No current facility-administered medications for this visit. Allergies   Allergen Reactions    Lisinopril Cough    Pcn [Penicillins] Hives    Sulfa (Sulfonamide Antibiotics) Hives    Zocor [Simvastatin] Myalgia        Review of Systems: A complete review of systems was obtained, negative except as described above.     Physical Exam:     Visit Vitals  BP (!) 177/120 (BP Patient Position: Sitting) Comment (BP 1 Location): ankle   Pulse 77   Temp 99.8 °F (37.7 °C) (Oral)   Ht 5' 8\" (1.727 m)   Wt 260 lb 1.6 oz (118 kg)   LMP 05/01/2011   SpO2 97%   BMI 39.55 kg/m²     ECOG PS: 1  bp not accurate due to location taken  General: No distress  Eyes: PERRLA, anicteric sclerae  HENT: Atraumatic, OP clear  Neck: Supple  Lymphatic: No cervical, supraclavicular  Respiratory: CTAB, normal respiratory effort  CV: Normal rate, regular rhythm, no murmurs, no peripheral edema  GI: Soft, nontender, nondistended, no masses, no hepatomegaly, no splenomegaly  MS: Normal gait and station. Digits without clubbing or cyanosis. Skin: No rashes, ecchymoses, or petechiae. Normal temperature, turgor, and texture. Psych: Alert, oriented, appropriate affect, normal judgment/insight  Breast b/l mastectomy/ reconstruction healing with drains with some redness on left      Results:     Lab Results   Component Value Date/Time    WBC 4.6 08/20/2019 10:57 AM    HGB 12.8 08/20/2019 10:57 AM    HCT 41.3 08/20/2019 10:57 AM    PLATELET 835 77/85/7940 10:57 AM    MCV 95.6 08/20/2019 10:57 AM    ABS. NEUTROPHILS 2.9 08/20/2019 10:57 AM     Lab Results   Component Value Date/Time    Sodium 139 02/01/2019 10:32 AM    Potassium 4.5 02/01/2019 10:32 AM    Chloride 102 02/01/2019 10:32 AM    CO2 21 02/01/2019 10:32 AM    Glucose 81 02/01/2019 10:32 AM    BUN 26 02/01/2019 10:32 AM    Creatinine 0.81 02/01/2019 10:32 AM    GFR est AA 89 02/01/2019 10:32 AM    GFR est non-AA 78 02/01/2019 10:32 AM    Calcium 9.4 02/01/2019 10:32 AM     Lab Results   Component Value Date/Time    Bilirubin, total 1.1 02/01/2019 10:32 AM    ALT (SGPT) 21 02/01/2019 10:32 AM    AST (SGOT) 16 02/01/2019 10:32 AM    Alk.  phosphatase 57 02/01/2019 10:32 AM    Protein, total 6.6 02/01/2019 10:32 AM    Albumin 4.6 02/01/2019 10:32 AM    Globulin 3.3 07/15/2010 08:05 AM       MRI Results (most recent):  Results from Hospital Encounter encounter on 05/17/19   MRI BREAST BI W WO CONT    Narrative INDICATION: Right invasive ductal carcinoma, grade 2, ER/PA positive, HER-2/keli  negative. COMPARISON: Multiple prior breast imaging studies dating back to 2015. TECHNIQUE:  Multisequence, multiplanar, bilateral breast MRI was performed in prone position  using a dedicated breast coil. Images were obtained without contrast and dynamic  postcontrast images were obtained in multiple phases. 10 mL IV Gadavist was  administered. Subtraction images were reconstructed. Postcontrast images were  reviewed with dedicated kinetic analysis software. FINDINGS:  There is mild background parenchymal enhancement and heterogeneous  fibroglandular tissue. Numerous sub-5 mm foci of enhancement are scattered in  the bilateral breasts. Right breast:  A 16 x 13 x 16 mm, spiculated mass with a biopsy clip and mixed kinetics at 6:00  in the middle third of the right breast corresponds to the biopsy-proven  invasive ductal carcinoma. There is a small hematoma in the retroareolar biopsy site in the anterior third. A 6 x 7 x 8 mm, irregularly marginated, enhancing nodule with mixed kinetics at  the superior aspect of the hematoma corresponds roughly in size and morphology  to the hypoechoic, shadowing mass seen on prior ultrasound at 3:00 in the  periareolar region. Incidental note is made of an intramammary lymph node at 1:00 in the middle  third. No axillary or internal mammary chain lymphadenopathy. Left breast:  No suspicious enhancing foci. No axillary or internal mammary chain  lymphadenopathy. A summary portfolio with key images has been sent from kinetic analysis software  to PACS. Impression IMPRESSION:   1. Invasive ductal carcinoma in the right breast at 6:00.   2. Small enhancing nodule associated with a hematoma in the retroareolar right  breast biopsy site. 3. No suspicious enhancing foci in the left breast.  4. No lymphadenopathy. 5. BI-RADS Assessment Category 6: Known biopsy proven malignancy.              Records reviewed and summarized above. Pathology report(s) reviewed above. Radiology report(s) reviewed above. Assessment/PLAN:     1) stage 1 T1cN0 ER+ HER2 negative mammaprint low risk breast cancer post b/l mast/ reconstruction  Pt has two separate tumors with different path types. 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 Still healing from surgery today with drains. Had infection post surgery and admit at [address]. No records here. On IV abx now for 3 weeks and pt is seeing surgery today and plastics tmw. Reviewed path and data today. Reviewed dx, stage and treatment of breast cancer overall. Pt does not want adjuvant chemo and likely would have a low benefit. Pt states she does not need radiation and will clear this up with breast surgery today. Unclear if mastectomy margins negative. We discussed adjuvant hormonal therapy with anastrozole today. The risks and benefits of aromatase inhibitors (anastrozole, letrozole, and exemestane) were discussed in detail and the patient was informed of the following: Risks include the development of painful muscles and joints (arthralgia/myalgia) and bone loss. Muscle and joint pain can be severe but rarely result in any tissue damage; symptoms usually resolve in several weeks when the medication is stopped. Bone loss is common and a bone density test is recommended as a baseline and then yearly to every several years depending on initial results. The risk of fractures is increased by a few percent in patients taking these drugs, but careful monitoring of bone density and using bone protecting agents when indicated can minimize these risks. Unlike tamoxifen there is no increased risk of blood clots or endometrial cancer.  AIs can cause or worsen vaginal dryness but women using these drugs should not use vaginal estrogen preparations for these symptoms. AIs can also cause or increase hot flashes. Any other symptoms should be reported. Pt is not ready to start AI and needs to heal from surgery/ has infection on IV abx for 3 weeks. We discussed f/u here in a month to decide on when to start AI. Pt is fine trying adjuvant AI. rx given today. 2) post mast/ reconstruction infection on IV abx for 3 weeks. Per surgery/ plastics. 3) HTN/ depression/ hypothyroid/ arthritis/ obesity. Per PCP. 4) psychosocial. Mood good. Here with daughter today. Has had more pain than she anticipated post surgery. SW for support today. F/u here in a month  Call if questions. I appreciate the opportunity to participate in Ms. Shalini Willingham's care.     Signed By: Neno Tim DO

## 2019-09-11 NOTE — PROGRESS NOTES
HISTORY OF PRESENT ILLNESS  Enrique Phoenix is a 59 y.o. female. HPI ESTABLISHED patient here for post op visit s/p BILATERAL mastectomies with reconstruction. Patient developed fever and post op infection at drain sites and was hospitalized for 4 days at [facility]. She still has a PICC line and is getting IV antibiotics. Has 3 of 4 ALLI drains in place still. Has an appt with Dr. Anisa Wilcox tomorrow morning. Met with Dr. Bret Rowe today to discuss AI. Breast cancer-  6/13/19 - RIGHT breast lumpectomy x 2 sites and RIGHT SLNB. 60 yo female now with multicentric right breast ca. Discussed mastectomies, reconstruction. 8/27/19 - BILATERAL mastectomies with reconstruction. Dr. Anisa Wilcox did direct saline implants. Surg path showed LEFT breast benign, residual IDC on RIGHT breast.  9/11/19 - met with Dr. Bret Rowe. Will begin Anastrozole after she heals.     No family history of breast or ovarian cancer. Father had melanoma. FINAL PATHOLOGIC DIAGNOSIS   1. Breast, left, prophylactic mastectomy:   Benign breast with fibrocystic changes including intraductal papillomas, usual duct hyperplasia, microcyst formation, sclerosing adenosis, apocrine metaplasia, stromal fibrosis   Calcified blood vessel   2. Breast, right, mastectomy:   Residual multifocal intraductal carcinoma, duct carcinoma in situ, and adenosquamous carcinoma   Anterior margin positive for ductal carcinoma in situ   Invasive carcinoma within 1.0 mm of anterior margin   ER/NV/Her-2 status will be reported in an addendum   Please see synoptic report and comment     SYNOPTIC REPORT - INVASIVE CARCINOMA OF THE BREAST   SPECIMEN   Procedure: Total mastectomy   Specimen Laterality: Right   TUMOR   Histologic Type:  Invasive carcinoma of no special type (ductal, not otherwise specified)   Glandular (Acinar) / Tubular Differentiation: Score 2   Nuclear Pleomorphism: Score 2   Mitotic Rate: Score 1 (? 3 mitoses per mm2)   Overall Grade: Grade 1 (scores of 3, 4 or 5)   Tumor Size: 8.0 mm   Tumor Focality: Multiple foci of invasive carcinoma   Number of Foci: 4   Ductal Carcinoma In Situ (DCIS): Present   Ductal Carcinoma In Situ (DCIS): Negative for extensive intraductal component (EIC)   Architectural Patterns: Solid, Cribriform, Comedo   Nuclear Grade: Grade II (intermediate)   Necrosis: Present, central (expansive \"comedo\" necrosis)   Lobular Carcinoma In Situ (LCIS): No LCIS in specimen   Tumor Extent   Nipple DCIS: DCIS does not involve the nipple epidermis   Accessory Findings   Lymphovascular Invasion: Not identified   Dermal Lymphovascular Invasion: Not identified   Microcalcifications: Present in carcinoma and in non-neoplastic tissue   Treatment Effect: No known presurgical therapy   MARGINS   Invasive Carcinoma Margins: Invasive carcinoma within 1.0 mm of anterior margin   DCIS Margins: Positive anterior margin   LYMPH NODES   Regional Lymph Nodes: No lymph nodes submitted or found   PATHOLOGIC STAGE CLASSIFICATION (pTNM, AJCC 8th Edition)   TNM Descriptors: m (multiple foci of invasive carcinoma)   Primary Tumor (Invasive Carcinoma) (pT): [id number]   ADDITIONAL FINDINGS: Fibrocystic breast changes, calcified blood vessels, post-surgical changes   3. Skin, left breast, excision:   Benign skin and breast parenchyma   4. Skin, right breast, excision:   Benign skin and breast parenchyma with postsurgical changes   Review of Systems   All other systems reviewed and are negative. Physical Exam   Pulmonary/Chest:       Bilateral reconstruction intact  Drain on each side, serosanguinous  Minimal erythema left recon breast.   No erythema around drains. No erythema right recon breast   Nursing note and vitals reviewed. ASSESSMENT and PLAN    ICD-10-CM ICD-9-CM    1. Cancer of overlapping sites of right breast ([facility] C50.811 174.8      - has f/u with Dr. Dayana Tom tomorrow. Still on iv abx. Overall healing ok. Will see her back in 1 month  Prescription for neurontin given for pain.

## 2019-09-11 NOTE — PROGRESS NOTES
Kashmir Dumont is a 59 y.o. female  Chief Complaint   Patient presents with    Follow-up     Breast Cancer     1. Have you been to the ER, urgent care clinic since your last visit? Hospitalized since your last visit? This is the pt first time here. 2. Have you seen or consulted any other health care providers outside of the [address] [place] [address] since your last visit? Include any pap smears or colon screening. This is the pt first time here. Pt had a double myectomy. (August 27th)  Pt had came home to surgery and then went back to the ER because of High temp. (September 4th)  Pt has very high BP, retook and still high.

## 2019-09-11 NOTE — LETTER
9/11/19 Patient: Yvette Fan YOB: 1955 Date of Visit: 9/11/2019 Judi Fisher MD 
[address] [facility] [address] [phone] VIA In Basket Dear Judi Fisher MD, Thank you for referring Ms. Jacqueline Villegas to [address] [place] [address] for evaluation. My notes for this consultation are attached. If you have questions, please do not hesitate to call me. I look forward to following your patient along with you. Sincerely, Hamlet Nevarez, DO

## 2019-09-11 NOTE — TELEPHONE ENCOUNTER
Patient called to confirm name of prescription that Dr. Ab Bridges wrote for her - thought it was another name. I told her gabapentin is also called neurontin.

## 2019-09-11 NOTE — PROGRESS NOTES
This note will not be viewable in [address] Oncology Navigator  Psychosocial Assessment    Reason for Assessment:    []Depression  []Anxiety  []Caregiver Chelsea  []Maladaptive Coping with Serious Illness   [] Social Work Referral [x] Initial Assessment  [] Other     Sources of Information:    [x]Patient  [x]Family  []Staff  []Medical Record    Advance Care Planning:  Advance Care Planning 8/27/2019   Confirm Advance Directive Yes, not on file   Patient Would Like to Complete Advance Directive Yes   Does the patient have other document types Organ Directive   Patient does not have an advanced medical directive, and did not express interest in completing one today.     Mental Status:    [x]Alert  []Lethargic  []Unresponsive   [] Unable to assess   Oriented to:  [x]Person  [x]Place  [x]Time  [x]Situation      Barriers to Learning:    []Language  []Developmental  []Cognitive  []Altered Mental Status  []Visual/Hearing Impairment  []Unable to Read/Write  []Motivational   [x]No Barriers Identified  []Other:    Relationship Status:  []Single  [x]  []Significant Other/Life Partner  []  []  []      Living Circumstances:  []Lives Alone  [x]Family/Significant Other in Household  []Roommates  []Children in the Home  []Paid Caregivers  []Assisted Living Facility/Group Home  []Skilled [address]  []Homeless  []Incarcerated  []Environmental/Care Concerns  []other:    Employment Status:  []Employed Full-time []Employed Part-time []Homemaker [] Disabled  [x] Retired []Other:    Support System:    [x]Strong  []Fair  []Limited    Financial/Legal Concerns:    []Uninsured  []Limited Income/Resources  []Non-Citizen  [x]No Concerns Identified  []Financial POA:    []Other:    Denominational/Spiritual/Existential:  []Strong Sense of Spirituality  []Involved in [employer]  []Request  Visit  []Expressing Spiritual/Existential Angst  [x]No Concerns Identified    Coping with Illness:         Patient: Family/Caregiver:   Understanding and Acceptance of Illness/Prognosis  [x] []   Strong Sense of Resilience [x] []   Self Reflection [x] []   Engaged Support System [x] []   Does not Readily Discuss Illness [] []   Denial of Terminal Status [] []   Anger [] []   Depression [] []   Anxiety/Fear [] []   Bargaining [] []   Recent Diagnosis/Prognosis [] []   Difficulties with Body Image [] []   Loss of Identity [] []   Excessive Substance Use [] []   Mental Health History [] []   Enmeshed Relationships [] []   History of Loss [] []   Anticipatory Grief [] []   Concern for Complicated Grief [] []   Suicidal Ideation or Plan [] []   Unable to assess [] []            Narrative:   Met with the patient and her daughter Lora Woodson) during her initial office visit today. Patient is being evaluated for cancer of overlapping sites of her right breast.  The patient explained \"I had surgery and was hospitalized for a skin infection. I am on IV ABX for another 2.5 weeks. \"      The patient lives with her . She explained that there is a handicap accessible addition and ramp on their home, from preparations of taking her 80year old father into her home. She explained that he passed in December 2018 not long after the additional was completed. The patient and her  have one daughter, Ashley Atkins. The patient is retired. 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 The patient's  is a retired , and suffers from [address] and back problems. The patient has IV ABX through [facility] for another 2.5 weeks. Her daughter, Ashley Atkins, is visiting from [place] with her dog. Ashley Atkins is assisting the patient with IV ABX care. The patient has a PCP - Dr. Natalia Mccord. Provided this 's contact information as well as information regarding the complementary services provided by the [facility]. Assessment/Action:   1.   Introduced self and role of this  in the [address] [place] [address]  2.  Informed the patient of the [facility] and available resources there. 3.  Continue to meet with the patient when she returns to the clinic for ongoing assessment of the patients adjustment to her diagnosis and treatment. 4.  Ongoing psychosocial support as desired by patient.       Plan/Referral:     Complementary therapies referral  Ken Samuel LCSW

## 2019-09-11 NOTE — PATIENT INSTRUCTIONS
Anastrozole (Arimidex®)   This information is about a hormonal therapy used to treat breast cancer called anastrozole, which is also called Arimidex®. Throughout this page we refer to it by its more commonly used name, Arimidex. This information should ideally be read with our general information about breast cancer or secondary breast cancer. Arimidex   Arimidex is a type of hormonal therapy used to treat breast cancer in women who have been through the menopause (change of life). You will see your doctor regularly while you have this treatment so they can monitor its effects. Hormonal therapies interfere with the production or action of particular hormones in the body. Hormones are substances produced naturally in the body. They act as chemical messengers and help control the activity of cells and organs. Aromatase inhibitors   Many breast cancers rely on the hormone oestrogen to grow. These cancers are known as hormone-sensitive breast cancers. In women who have had their menopause, the main source of oestrogen is through the conversion of androgens (sex hormones produced by the adrenal glands) into oestrogens. This is carried out by an enzyme called aromatase. The conversion process is known as aromatisation, and it happens mainly in the fatty tissues of the body. Arimidex is a drug that blocks the process of aromatisation, and so reduces the amount of oestrogen in the body. As less oestrogen reaches the cancer cells, they grow more slowly or stop growing altogether. Drugs that work in this way are known as aromatase inhibitors. Other aromatase inhibitors include letrozole (Femara®) and exemestane (Aromasin®). How Arimidex is taken   Arimidex is a tablet that is taken once a day. It should be swallowed whole with a glass of water, at about the same time each day. It doesn't matter whether this is in the morning or evening.    When it is given   Your doctor will take into account a number of different factors when planning your treatment. Arimidex is used to treat post-menopausal women with hormone-sensitive breast cancer. Early breast cancer   Arimidex may be given to women with early breast cancer (cancer that has not spread) after they have had surgery to remove the cancer. Giving treatment after surgery to reduce the chance of the cancer coming back is known as adjuvant therapy. For some women, Arimidex may be more effective than tamoxifen, and it has different side effects. Studies show that switching to Arimidex after taking tamoxifen for 2-3 years may be better than five years of tamoxifen for some women. Advanced breast cancer   Arimidex can be used to treat women who have breast cancer that has spread to other parts of the body (advanced or secondary breast cancer). It can also be used to treat women whose breast cancer has come back after initial treatment. You might find our information about staging and grading of breast cancer useful. Length of treatment   Your doctors will discuss the length of treatment they feel is appropriate for you. Arimidex may be given over a number of years, or for as long as it is controlling your cancer, depending on your individual situation. Possible side effects   Each person's reaction to any medicine is different. Most people have very few side effects with Arimidex, while others may experience more. The side effects described here won't affect everyone and may be different if you are taking more than one drug. We have outlined the most common side effects, but haven't included those that are rare and therefore unlikely to affect you. If you notice any effects which aren't listed here, discuss them with your doctor or nurse. You may have some of the following side effects, to varying degrees:   Hot flushes and sweats   These are usually mild and may wear off after a period of time.  Sometimes people find it helps to cut down on tea, coffee, nicotine and alcohol. Research suggests that hormones called progestogens or some types of antidepressants may be helpful in controlling this side effect. Your doctor or nurse can discuss this with you. Some people find complementary therapies such as acupuncture helpful. Your GP may be able to give you details about having these on the [facility]. If you find your own therapist, make sure that they are properly qualified and registered. You can read more about treatments for menopausal symptoms like hot flushes in our information about managing menopausal symptoms. Vaginal dryness   This may occur while using Arimidex. Gels that can help to overcome the dryness are available. You can buy these from a  or your doctor can prescribe them. Feeling sick (nausea) and being sick (vomiting)   These side effects are rare and usually mild. They can usually be effectively treated, so let your doctor know if you are affected. Feeling sick can often be relieved by taking your tablet with food or at night. Diarrhoea   If you have diarrhoea it's important to drink plenty of fluids. Hair thinning   Some women notice that their hair becomes thinner while taking Arimidex. This is usually mild and the hair grows back at the end of treatment. Headaches   Some people have headaches while taking Arimidex, but this is not common. It's important to drink plenty of fluids. Let your doctor know if you are getting headaches, as they can prescribe medication to help. Skin rashes   Rarely, Arimidex can cause skin rashes. Vaginal bleeding   Some women have some vaginal bleeding, usually in the first few weeks of treatment. This is rare and usually occurs after changing from other hormonal therapies to treatment with Arimidex. If the bleeding continues, tell your doctor or breast care nurse. Joint pains/muscular stiffness   Some women have pain and stiffness in their joints while taking Arimidex.  Let your doctor know if these effects are a problem. You may find it helpful to take mild painkillers. Tiredness (fatigue) and lethargy   Some people can have increased tiredness, especially at the start of treatment. It's important to get plenty of rest. If you are very sleepy you should not drive or operate machinery. Risk of osteoporosis   Women who have, or are at risk of, osteoporosis (weakened bones), should have their bone strength assessed before and during treatment with Arimidex. Some women may need to take bone-strengthening drugs to help prevent osteoporosis developing. Always let your doctor or nurse know about any side effects you have. There are usually ways in which they can be controlled or improved. Things to remember about Arimidex tablets    Arimidex may interact with other medicines. Tell your doctor about any medicines you are taking, including non-prescribed drugs such as complementary therapies, vitamins and herbal drugs.  Keep the tablets in a safe place, out of the reach of children.  Keep the tablets in the original packaging and store them at room temperature, away from heat and direct sunlight.  Don't worry if you forget to take your tablet. Do not take a double dose. The levels of the drug in your blood will not change very much, but try not to miss more than one or two tablets in a row.  If your doctor decides to stop the treatment, return any remaining tablets to the pharmacist. Don't flush them down the toilet or throw them away.  Remember to get a new prescription a few weeks before you run out of tablets, and make sure that you have plenty for holidays. References   This information is based on our Anastrozole (Arimidex®) fact sheet and has been compiled using information from a number of reliable sources, including:    wallace Crane. Dick Mcdaniel: The Complete Drug Reference. 36th edition. 2009. [employer]. [facility] [employer]. 59th edition. 2010.  [facility] and [address].  Early and locally advanced breast cancer: diagnosis and treatment. February 2009. National institute for Health and Clinical Excellence (NICE).  electronic Medicines [facility]). www.medicines. org.uk (accessed September 2010).

## 2019-09-23 ENCOUNTER — TELEPHONE (OUTPATIENT)
Dept: SURGERY | Age: 64
End: 2019-09-23

## 2019-09-23 NOTE — TELEPHONE ENCOUNTER
Returned patient's \"hello\" message. She is actually at [facility] this morning awaiting surgery to drain fluid after her BILATERAL mastectomy and reconstruction performed almost a month ago. Says that she finally got in touch with Dr. Bright Salazar last night, and he admitted her. She does not think there is any infection because the drainage is \"thin and pinkish,\" however Dr. Bright Salazar does not feel like this will resolve with aspiration and is planning on taking her to the OR to \"clean the fluid out. \"  She is an add-on today, so she is not sure when the surgery will take place. I asked her to keep us posted on her progress. She was very appreciative of the phone call. Says that she has been very impressed with the care provided by this office.

## 2019-10-08 ENCOUNTER — TELEPHONE (OUTPATIENT)
Dept: SURGERY | Age: 64
End: 2019-10-08

## 2019-10-08 ENCOUNTER — OFFICE VISIT (OUTPATIENT)
Dept: ONCOLOGY | Age: 64
End: 2019-10-08

## 2019-10-08 VITALS
DIASTOLIC BLOOD PRESSURE: 114 MMHG | TEMPERATURE: 98.6 F | HEART RATE: 66 BPM | BODY MASS INDEX: 38.81 KG/M2 | HEIGHT: 68 IN | SYSTOLIC BLOOD PRESSURE: 166 MMHG | OXYGEN SATURATION: 95 % | WEIGHT: 256.1 LBS

## 2019-10-08 DIAGNOSIS — Z98.890 H/O BREAST RECONSTRUCTION: ICD-10-CM

## 2019-10-08 DIAGNOSIS — C50.811 CANCER OF OVERLAPPING SITES OF RIGHT BREAST (HCC): Primary | ICD-10-CM

## 2019-10-08 DIAGNOSIS — L08.9 SKIN INFECTION: ICD-10-CM

## 2019-10-08 DIAGNOSIS — E66.01 SEVERE OBESITY (HCC): ICD-10-CM

## 2019-10-08 DIAGNOSIS — M19.90 ARTHRITIS: ICD-10-CM

## 2019-10-08 DIAGNOSIS — Z90.13 H/O BILATERAL MASTECTOMY: ICD-10-CM

## 2019-10-08 DIAGNOSIS — I10 ESSENTIAL HYPERTENSION: ICD-10-CM

## 2019-10-08 DIAGNOSIS — F32.A MILD DEPRESSION: ICD-10-CM

## 2019-10-08 RX ORDER — GABAPENTIN 300 MG/1
300 CAPSULE ORAL 2 TIMES DAILY
COMMUNITY
End: 2020-01-30

## 2019-10-08 NOTE — TELEPHONE ENCOUNTER
Patient L/M that she needed to talk to Dr. Vashti Joyner about her \"condition. \"      I attempted to call the patient back, but she was unavailable. I L/M for her that I was available today if I could answer any questions for her, otherwise I would have to have Dr. Vashti Joyner call her tomorrow when she is back in the office.

## 2019-10-08 NOTE — TELEPHONE ENCOUNTER
Patient returned my message. She had surgery on 9/23/19 to \"clean out\" her LEFT implant. Has done better, but still is seeing Dr. Luz Marina Hdz for drainage from the LEFT surgical incision. Has been on several courses of antibiotics. He has recommended silver bandages to the area to help with healing. Complains of both breasts being swollen and painful. To her, they feel as thought they are going to \"explode. \"  She does not think this has gotten better since surgery, although does say that it is more \"off and on. \"  Is taking gabapentin and OTC pain meds without improvement. Is interested in finding out what Dr. Nanci Vang thinks about the pain and is also wondering if Dr. Luz Marina Hdz recommends having the LEFT implant removed, would she recommend having the RIGHT removed as well? Is this one likely to cause her problems as well? I let her know that I would send a message to Dr. Nanci Vang to call her tomorrow. She has an appointment at the end of the month. Wonders if she should come in sooner. She was very appreciative of the return phone call. She is aware Dr. Nanci Vang is in surgery today so will not get a call today.

## 2019-10-08 NOTE — LETTER
10/8/2019 3:17 PM 
 
Patient:  Tasia Harrison YOB: 1955 Date of Visit: 10/8/2019 Dear No Recipients: Thank you for referring Ms. Juana Bernardo to me for evaluation/treatment. Below are the relevant portions of my assessment and plan of care. If you have questions, please do not hesitate to call me. I look forward to following Ms. Ana Thaddeus along with you. Sincerely, Mone Ann, DO

## 2019-10-08 NOTE — PROGRESS NOTES
[facility] [place] at [address], [address]  Danis [place]: [phone]  F: [phone]    Reason for Visit:   Gaby Gordon is a 59 y.o. female who is seen in consultation at the request of Dr. Anitha Saab for evaluation of breast cancer. 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 Still has not started AI. Not ready to start adjuvant hormonal therapy yet. Hx of mastectomy/ reconstruction 8/27/19. Still healing with drains. + residual disease ER+ HER2 negative. Still on IV abx. Here with daughter. Past Medical History:   Diagnosis Date    Ankle edema 4/5/2010    Anxiety 4/5/2010    Benign essential hypertension 5/26/2016    2006     Cancer ([address] 2019    BREAST    Carpal tunnel syndrome 4/5/2010    Depression 4/5/2010    ANXIETY    H/O Benign Colon Polyp 12/20/2010    H/O Uterine Fibroids 12/20/2010    Hypercholesterolemia 6/25/2013    ~2006    Hypothyroidism 5/26/2016 7/1999    Kidney stone 4/5/2010    Perimenopausal 4/5/2010    Primary osteoarthritis of left knee 5/26/2016    Extensive;  Ortho (needs knee replacement), CSI x 2,     S/P benign cervical polypectomy 12/20/2010    Sleep apnea 4/5/2010    USES CPAP    Stress incontinence 5/26/2010      Past Surgical History:   Procedure Laterality Date    COLONOSCOPY  10/05    benign polyp; repeat 5 yr per Dr Diana Bedoya COLONOSCOPY  11/2010    normal, f/u 5 yrs, Dr Diana Bedoya EKG ORDERABLE  5/2009    NSR, rate 84, normal EKG    HX BLADDER SUSPENSION  ~2010    Dr Lobo Reveal  11/2011    VPI, right breast biopsy negative    HX BREAST LUMPECTOMY Right 6/13/2019 RIGHT BREAST LUMPECTOMY ( x2 ) [facility] ULTRASOUND , RIGHT SENTINAL NODE BIOPSY performed by Virgilio Taylor MD at [facility] AMBULATORY OR    HX BREAST RECONSTRUCTION Bilateral 2019    IMMEDIATE BILATERAL BREAST RECONSTRUCTION WITH DIRECT TO IMPLANT USE OF ALLODERM AND BILATERAL BREAST RECONSTRUCTION INTRA-OPERATIVE ASSESMENT OF BLOOD FLOW performed by Hugo Zamorano MD at [address]  3/2010    Right; Dr Cher Coronado  ,     Dr Su Diaz    HX COLONOSCOPY  2016    Dr Dolores Hannon-Internal Hemorrhoids- Normal mucosa in the whole colon - Repeat colonoscopy in 5 years    HX DILATION AND CURETTAGE  2005    AUB, benign/neg bx; Dr Ab Go  10/2012    Dr Su Diaz, AUB    HX GI  2016    COLONOSCOPY    HX [address]    HX HYSTERECTOMY  2013    HX MASTECTOMY Bilateral 2019    BILATERAL BREAST SKIN SPARING MASTECTOMIES performed by Virgilio Taylor MD at [address] ORTHOPAEDIC Right 2010    CARPAL TUNNEL    HX PARTIAL HYSTERECTOMY  13    Supracervical hysterectomy for fibroids, Dr Simeon Tejeda  age 11    [facility]    HX UROLOGICAL  2010    BLADDER SLING     HX WISDOM TEETH EXTRACTION        Social History     Tobacco Use    Smoking status: Former Smoker     Packs/day: 0.50     Years: 25.00     Pack years: 12.50     Last attempt to quit: 2005     Years since quittin.7    Smokeless tobacco: Never Used   Substance Use Topics    Alcohol use:  Yes     Alcohol/week: 5.0 standard drinks     Types: 5 Shots of liquor per week      Family History   Problem Relation Age of Onset   [facility] Arthritis-osteo Father     Stroke Father         TIA's    Cancer Father         melanoma on back removed    Pacemaker Father 80    Heart Disease Father          79 yo in     Hypertension Mother     Arthritis-osteo Mother     Heart Disease Mother         valve disease    Heart Attack Maternal Grandmother 76    No Known Problems Sister     No Known Problems Brother     Depression Daughter     Substance Abuse Daughter     Alcohol abuse Daughter      Current Outpatient Medications   Medication Sig    OTHER     gabapentin (NEURONTIN) 300 mg capsule Take 300 mg by mouth two (2) times a day.  anastrozole (ARIMIDEX) 1 mg tablet Take 1 mg by mouth daily. Indications: Hormone Receptor Positive Breast Cancer    meloxicam (MOBIC) 15 mg tablet Take 1 Tab by mouth daily.  buPROPion XL (WELLBUTRIN XL) 150 mg tablet take 1 tablet by mouth once daily before BREAKFAST    levothyroxine (SYNTHROID) 137 mcg tablet take 1 tablet by mouth once daily BEFORE BRAKFAST    losartan (COZAAR) 100 mg tablet take 1 tablet by mouth once daily (Patient taking differently: one daily)    OTHER Portable CPAP machine for RENÉE, with new mask and tubing but no change to mask or settings; G47.30 GARO 99m prog good     No current facility-administered medications for this visit. Allergies   Allergen Reactions    Lisinopril Cough    Pcn [Penicillins] Hives    Sulfa (Sulfonamide Antibiotics) Hives    Zocor [Simvastatin] Myalgia        Review of Systems: A complete review of systems was obtained, negative except as described above. Physical Exam:     Visit Vitals  BP (!) 166/114 (BP Patient Position: Sitting)   Pulse 66   Temp 98.6 °F (37 °C) (Oral)   Ht 5' 8\" (1.727 m)   Wt 256 lb 1.6 oz (116.2 kg)   LMP 05/01/2011   SpO2 95%   BMI 38.94 kg/m²     ECOG PS: 1  bp not accurate due to location taken  General: No distress  Eyes:  anicteric sclerae  HENT: Atraumatic  Neck: Supple  Respiratory: CTAB, normal respiratory effort  CV: Normal rate, regular rhythm, no murmurs, no peripheral edema  GI: Soft, nontender, nondistended  MS: Normal gait and station. Skin: No rashes, ecchymoses, or petechiae.  Normal temperature, turgor, and texture. Psych: Alert, oriented, appropriate affect, normal judgment/insight  PICC line left arm  Breast b/l mastectomy/ reconstruction healing with drains with some redness on left      Results:     Lab Results   Component Value Date/Time    WBC 4.6 08/20/2019 10:57 AM    HGB 12.8 08/20/2019 10:57 AM    HCT 41.3 08/20/2019 10:57 AM    PLATELET 299 02/64/3586 10:57 AM    MCV 95.6 08/20/2019 10:57 AM    ABS. NEUTROPHILS 2.9 08/20/2019 10:57 AM     Lab Results   Component Value Date/Time    Sodium 139 02/01/2019 10:32 AM    Potassium 4.5 02/01/2019 10:32 AM    Chloride 102 02/01/2019 10:32 AM    CO2 21 02/01/2019 10:32 AM    Glucose 81 02/01/2019 10:32 AM    BUN 26 02/01/2019 10:32 AM    Creatinine 0.81 02/01/2019 10:32 AM    GFR est AA 89 02/01/2019 10:32 AM    GFR est non-AA 78 02/01/2019 10:32 AM    Calcium 9.4 02/01/2019 10:32 AM     Lab Results   Component Value Date/Time    Bilirubin, total 1.1 02/01/2019 10:32 AM    ALT (SGPT) 21 02/01/2019 10:32 AM    AST (SGOT) 16 02/01/2019 10:32 AM    Alk. phosphatase 57 02/01/2019 10:32 AM    Protein, total 6.6 02/01/2019 10:32 AM    Albumin 4.6 02/01/2019 10:32 AM    Globulin 3.3 07/15/2010 08:05 AM       MRI Results (most recent):  Results from Hospital Encounter encounter on 05/17/19   MRI BREAST BI W WO CONT    Narrative INDICATION: Right invasive ductal carcinoma, grade 2, ER/TN positive, HER-2/keli  negative. COMPARISON: Multiple prior breast imaging studies dating back to 2015. TECHNIQUE:  Multisequence, multiplanar, bilateral breast MRI was performed in prone position  using a dedicated breast coil. Images were obtained without contrast and dynamic  postcontrast images were obtained in multiple phases. 10 mL IV Gadavist was  administered. Subtraction images were reconstructed. Postcontrast images were  reviewed with dedicated kinetic analysis software. FINDINGS:  There is mild background parenchymal enhancement and heterogeneous  fibroglandular tissue. Numerous sub-5 mm foci of enhancement are scattered in  the bilateral breasts. Right breast:  A 16 x 13 x 16 mm, spiculated mass with a biopsy clip and mixed kinetics at 6:00  in the middle third of the right breast corresponds to the biopsy-proven  invasive ductal carcinoma. There is a small hematoma in the retroareolar biopsy site in the anterior third. A 6 x 7 x 8 mm, irregularly marginated, enhancing nodule with mixed kinetics at  the superior aspect of the hematoma corresponds roughly in size and morphology  to the hypoechoic, shadowing mass seen on prior ultrasound at 3:00 in the  periareolar region. Incidental note is made of an intramammary lymph node at 1:00 in the middle  third. No axillary or internal mammary chain lymphadenopathy. Left breast:  No suspicious enhancing foci. No axillary or internal mammary chain  lymphadenopathy. A summary portfolio with key images has been sent from kinetic analysis software  to PACS. Impression IMPRESSION:   1. Invasive ductal carcinoma in the right breast at 6:00.   2. Small enhancing nodule associated with a hematoma in the retroareolar right  breast biopsy site. 3. No suspicious enhancing foci in the left breast.  4. No lymphadenopathy. 5. BI-RADS Assessment Category 6: Known biopsy proven malignancy. Records reviewed and summarized above. Pathology report(s) reviewed above. Radiology report(s) reviewed above. Assessment/PLAN:     1) stage 1 T1cN0 ER+ HER2 negative mammaprint low risk breast cancer post b/l mast/ reconstruction  Pt has two separate tumors with different path types. 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 Still healing from surgery today with drains. Still on IV abx. Had infection post surgery and still dealing with this. On IV abx still. Feeling ok overall. Tired.    Pt did not want adjuvant chemo and likely would have a low benefit. We discussed adjuvant hormonal therapy with anastrozole today. Pt is not ready to start AI and wants to continue to heal from surgery. We discussed f/u here in a month again to decide on when to start AI. 2) post mast/ reconstruction infection on IV abx for 3 weeks. Per surgery/ plastics. 3) HTN/ depression/ hypothyroid/ arthritis/ obesity. Per PCP. 4) psychosocial. Mood good. Here with daughter today. SW for support today. F/u here in a month  Call if questions. I appreciate the opportunity to participate in Ms. Shonda Willingham's care.     Signed By: Sherren Mire, DO

## 2019-10-08 NOTE — LETTER
10/8/19 Patient: Krystal Cowan YOB: 1955 Date of Visit: 10/8/2019 Lacey Maravilla MD 
[address] [address] VIA In Basket Dear Lacey Maravilla MD, Thank you for referring Ms. Bryan Powell to [address] [place] [address] for evaluation. My notes for this consultation are attached. If you have questions, please do not hesitate to call me. I look forward to following your patient along with you. Sincerely, Simón Purvis, DO

## 2019-10-08 NOTE — PROGRESS NOTES
Rafa Hansen is a 59 y.o. female    Chief Complaint   Patient presents with    Follow-up     Breast Cancer     1. Have you been to the ER, urgent care clinic since your last visit? Hospitalized since your last visit? Yes went to the ER for leakage in surgical area. 2. Have you seen or consulted any other health care providers outside of the [address] [place] [address] since your last visit? Include any pap smears or colon screening. Yes,  and  from [facility]    Pt requested BP to be taken on her ankle.

## 2019-10-10 ENCOUNTER — TELEPHONE (OUTPATIENT)
Dept: SURGERY | Age: 64
End: 2019-10-10

## 2019-10-10 NOTE — TELEPHONE ENCOUNTER
Patient wanted to relay the following message to Dr. Rodolfo Adame:    She met with Dr. Nadine Chaves. Is going to have surgery on 10/15/19 to have implants removed.

## 2019-10-18 ENCOUNTER — TELEPHONE (OUTPATIENT)
Dept: SURGERY | Age: 64
End: 2019-10-18

## 2019-10-18 NOTE — TELEPHONE ENCOUNTER
The patient called to keep Dr. Brooke Orozco \"in the loop\" regarding her surgery with Dr. Ruth Castellon. The patient is S/P BILATERAL Implant removal on 10/15/19. She has questions regarding the inconsistencies she sees in the size and shape of both breasts and is concerned with infection. I let her know that Dr. Brooke Orozco is out of the office but I felt she should call Dr. Dariel Vega office. The patient states she is waiting for their return call. I assured her that I would make sure Dr. Brooke Orozco is aware upon her return. She was appreciative.

## 2019-10-28 ENCOUNTER — DOCUMENTATION ONLY (OUTPATIENT)
Dept: SURGERY | Age: 64
End: 2019-10-28

## 2019-10-28 NOTE — PROGRESS NOTES
Patient L/M on nurse voicemail that she would like a referral to PT. Says that she may not go now, but would like to consider this in the future. She has an appointment on 10/30/19 and can be given the information for the physical therapist at that time.

## 2019-10-30 ENCOUNTER — OFFICE VISIT (OUTPATIENT)
Dept: SURGERY | Age: 64
End: 2019-10-30

## 2019-10-30 VITALS
DIASTOLIC BLOOD PRESSURE: 59 MMHG | WEIGHT: 255 LBS | HEIGHT: 68 IN | HEART RATE: 92 BPM | SYSTOLIC BLOOD PRESSURE: 136 MMHG | BODY MASS INDEX: 38.65 KG/M2

## 2019-10-30 DIAGNOSIS — C50.811 CANCER OF OVERLAPPING SITES OF RIGHT BREAST (HCC): Primary | ICD-10-CM

## 2019-10-30 RX ORDER — IBUPROFEN 600 MG/1
800 TABLET ORAL
Refills: 0 | COMMUNITY
Start: 2019-08-12 | End: 2020-01-30

## 2019-10-30 RX ORDER — ACETAMINOPHEN 325 MG/1
1000 TABLET ORAL
COMMUNITY
Start: 2019-09-23 | End: 2020-01-30

## 2019-10-30 NOTE — PATIENT INSTRUCTIONS
Surgical Drain Care: Care Instructions  Your Care Instructions    After a surgery, fluid may collect inside your body in the surgical area. This makes an infection or other problems more likely. A surgical drain allows the fluid to flow out. The doctor puts a thin, flexible rubber tube into the area of your body where the fluid is likely to collect. The rubber tube carries the fluid outside your body. The most common type of surgical drain carries the fluid into a collection bulb that you empty. This is called a Angelo-Patten (ALLI) drain. The drain uses suction created by the bulb to pull the fluid from your body into the bulb. The rubber tube will probably be held in place by one or two stitches in your skin. The bulb will probably be attached with a safety pin to your clothes or near the bandage so that it doesn't flip around or pull on the stitches. Another type of drain is called a Penrose drain. This type of drain doesn't have a bulb. Instead, the end of the tube is open. That allows the fluid to drain onto a dressing taped to your skin. The drain may be kept in place next to your skin with a stitch or a safety pin in the tube. When you first get the drain, the fluid will be bloody. It will change color from red to pink to a light yellow or clear as the wound heals and the fluid starts to go away. Your doctor may give you information on when you no longer need the drain and when it will be removed. Follow-up care is a key part of your treatment and safety. Be sure to make and go to all appointments, and call your doctor if you are having problems. It's also a good idea to know your test results and keep a list of the medicines you take. How can you care for yourself at home? How to empty the bulb of a Angelo-Patten drain  Follow any instructions your doctor gives you. How often you empty the bulb depends on how much fluid is draining. Empty the bulb when it is half full.   1. Wash your hands with soap and water. 2. Take the plug out of the bulb. 3. Empty the bulb. If your doctor asks you to measure the fluid, empty the fluid into a measuring cup, and write down the color and how much you collected. Your doctor will want to know this information. 4. Clean the plug with alcohol. 5. Squeeze the bulb until it is flat. This removes all the air from the bulb. You may need to put the bulb on a table or a counter to flatten it. 6. Keep the bulb flat, and put the plug in. The bulb should stay flat after you put the plug back in. This creates the suction that pulls the fluid into the bulb. 7. Empty the fluid into the toilet. 8. Wash your hands. How to change the dressing around your surgical drain  You may have a dressing (bandage). The dressing is often made of gauze pads held on with tape. Your doctor will tell you how often to change it. 1. Wash your hands with soap and water. 2. Take off the dressing from around the drain. 3. Clean the drain site and the skin around it with soap and water. Use gauze or a cotton swab. 4. When the site is dry, put on a new dressing. The way your dressing is put on depends on what kind of drain you have. You will get instructions for your type of drain. 5. Wash your hands again with soap and water. Your doctor may ask you to keep track of your dressing changes. Write down the time of day and the amount and color of the fluid on the dressing. How to help prevent clogs in your surgical drain  Squeezing or \"milking\" the tube of your surgical drain can help prevent clogs so that it drains correctly. Your doctor will tell you when you need to do this. In general, you do this when:  · You see a clot in the tube that prevents fluid from draining. The clot may look like a dark, stringy lining. · You see fluid leaking around the tube where it goes into the skin. Follow these steps for milking the tube.   1. Use one hand to hold and pinch the tube where it leaves the skin.  2. With the thumb and first finger of your other hand, pinch the tube just below where you're holding it. 3. Slowly and firmly push your thumb and first finger down the tubing toward the end of the tube. 4. Repeat this as many times as needed to move the clot. If you have a Angelo-Patten (ALLI) drain, the clot should move down the tube and into the bulb. If you have a Penrose drain, the clot should move into the dressing. When should you call for help? Call your doctor now or seek immediate medical care if:    · You have signs of infection, such as:  ? Increased pain, swelling, warmth, or redness around the area. ? Red streaks leading from the area. ? Pus draining from the area. ? A fever.     · You see a sudden change in the color or smell of the drainage.     · The tube is coming loose where it leaves your skin.    Watch closely for changes in your health, and be sure to contact your doctor if:    · You see a lot of fluid around the drain.     · You cannot remove a clot from the tube by milking the tube. Where can you learn more? Go to http://radha-za.info/. Enter K117 in the search box to learn more about \"Surgical Drain Care: Care Instructions. \"  Current as of: June 26, 2019  Content Version: 12.2  © 6326-6687 [employer]. Care instructions adapted under license by [employer] (which disclaims liability or warranty for this information). If you have questions about a medical condition or this instruction, always ask your healthcare professional. [place] [address] any warranty or liability for your use of this information.

## 2019-10-30 NOTE — PROGRESS NOTES
HISTORY OF PRESENT ILLNESS  Codie Mancera is a 59 y.o. female. HPI ESTABLISHED patient here for follow up s/p removal of BILATERAL breast implants. She completed IV antibiotics and had PICC line removed on Saturday. Still had 1 ALLI drain placed on LEFT. Follows up with Dr. Jennifer Salinas tomorrow.      Breast cancer-  6/13/19 - RIGHT breast lumpectomy x 2 sites and RIGHT SLNB. 58 yo female now with multicentric right breast ca. Discussed mastectomies, reconstruction. 8/27/19 - BILATERAL mastectomies with reconstruction. Dr. Jennifer Salinas did direct saline implants. Surg path showed LEFT breast benign, residual IDC on RIGHT breast.  9/11/19 - met with Dr. Chava Will. Will begin Anastrozole after she heals. 10/15/19 - removal of bilateral breast implants      No family history of breast or ovarian cancer. Father had melanoma. Review of Systems   All other systems reviewed and are negative. Physical Exam   Pulmonary/Chest:       Drain on left. Right chest incision healing. No erythema   Nursing note and vitals reviewed. ASSESSMENT and PLAN    ICD-10-CM ICD-9-CM    1.  [facility] C50.811 174.8      - healing ok now  Wants pt will refer her to juan a barrios

## 2019-11-01 ENCOUNTER — DOCUMENTATION ONLY (OUTPATIENT)
Dept: SURGERY | Age: 64
End: 2019-11-01

## 2019-11-12 ENCOUNTER — TELEPHONE (OUTPATIENT)
Dept: SURGERY | Age: 64
End: 2019-11-12

## 2019-11-12 NOTE — TELEPHONE ENCOUNTER
The patient called wondering why she has not heard from PT. I see there is an order in the computer and I spoke with Alex Spicer our . PT states they have tried to call the patient and did not receive an answer. The patient was given the direct number to schedule her own appointment. She was appreciative for the call. # [phone].

## 2019-12-02 ENCOUNTER — OFFICE VISIT (OUTPATIENT)
Dept: ONCOLOGY | Age: 64
End: 2019-12-02

## 2019-12-02 VITALS
BODY MASS INDEX: 41.28 KG/M2 | DIASTOLIC BLOOD PRESSURE: 73 MMHG | OXYGEN SATURATION: 97 % | HEART RATE: 79 BPM | HEIGHT: 68 IN | TEMPERATURE: 97.8 F | SYSTOLIC BLOOD PRESSURE: 124 MMHG | WEIGHT: 272.4 LBS

## 2019-12-02 DIAGNOSIS — Z98.890 H/O BREAST RECONSTRUCTION: ICD-10-CM

## 2019-12-02 DIAGNOSIS — I10 ESSENTIAL HYPERTENSION: ICD-10-CM

## 2019-12-02 DIAGNOSIS — Z90.13 H/O BILATERAL MASTECTOMY: ICD-10-CM

## 2019-12-02 DIAGNOSIS — C50.811 CANCER OF OVERLAPPING SITES OF RIGHT BREAST (HCC): Primary | ICD-10-CM

## 2019-12-02 DIAGNOSIS — F32.A MILD DEPRESSION: ICD-10-CM

## 2019-12-02 DIAGNOSIS — E03.9 HYPOTHYROIDISM, UNSPECIFIED TYPE: ICD-10-CM

## 2019-12-02 DIAGNOSIS — M19.90 ARTHRITIS: ICD-10-CM

## 2019-12-02 DIAGNOSIS — E66.01 SEVERE OBESITY (HCC): ICD-10-CM

## 2019-12-02 NOTE — PROGRESS NOTES
Milana Mcgee is a 59 y.o. female  Chief Complaint   Patient presents with    Follow-up    Breast Cancer     1. Have you been to the ER, urgent care clinic since your last visit? Hospitalized since your last visit? Yes, for the reasons listed below. 2. Have you seen or consulted any other health care providers outside of the [address] [place] [address] since your last visit? Include any pap smears or colon screening. Yes, pt states she sees the doctors at [facility]. Pt states she had her implant removed on the 15th and then was hospitalized on the 30th of October for infection and high fever and was released in the 4th of November. Pt states she has a Wound Vac which is related to her infection.

## 2019-12-02 NOTE — LETTER
12/2/19 Patient: Oh Lyons YOB: 1955 Date of Visit: 12/2/2019 Toño Valencia MD 
[address] [facility] [facility] [address] VIA In Basket Dear Toño Valencia MD, Thank you for referring Ms. Olman Lopez to [address] [place] [address] for evaluation. My notes for this consultation are attached. If you have questions, please do not hesitate to call me. I look forward to following your patient along with you. Sincerely, Catrina Aguilar, DO

## 2019-12-02 NOTE — PROGRESS NOTES
[facility] [place] at [address]   [address], [place]: [phone]  F: [phone]    Reason for Visit:   Michael Gutierrez is a 59 y.o. female who is seen for 1 month follow up of breast cancer. Treatment History:   · 6/13/19 - RIGHT breast lumpectomy x 2 sites and RIGHT SLNB  · 8/27/19 - BILATERAL mastectomies with reconstruction. Dr. Harika Landaverde did direct saline implants. Surg path showed LEFT breast benign, residual IDC on RIGHT breast.  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 She was on IV abx. She had bilateral implant removal on 10/15/19. She was still having issues with wound healing and she was admitted on 10/30/19 with infection/high fevers. She received a wound vac and completed IV abx on 11/21/19. She still has wound vac. She has a follow up appointment with Dr. Harika Landaverde on 12/5/19. She reports that her appetite is good and energy levels low. She is here alone today. Last Visit:  Still has not started AI. Not ready to start adjuvant hormonal therapy yet. Hx of mastectomy/ reconstruction 8/27/19. Still healing with drains. + residual disease ER+ HER2 negative. Still on IV abx. Here with daughter.      Past Medical History:   Diagnosis Date    Ankle edema 4/5/2010    Anxiety 4/5/2010    Benign essential hypertension 5/26/2016    2006     Cancer ([address] 2019    BREAST    Carpal tunnel syndrome 4/5/2010    Depression 4/5/2010    ANXIETY    H/O Benign Colon Polyp 12/20/2010    H/O Uterine Fibroids 12/20/2010    Hypercholesterolemia 6/25/2013    ~2006    Hypothyroidism 5/26/2016 7/1999    Kidney stone 4/5/2010    Perimenopausal 4/5/2010    Primary osteoarthritis of left knee 5/26/2016    Extensive;  Ortho (needs knee replacement), CSI x 2,     S/P benign cervical polypectomy 12/20/2010    Sleep apnea 4/5/2010    USES CPAP    Stress incontinence 5/26/2010      Past Surgical History:   Procedure Laterality Date    COLONOSCOPY  10/05    benign polyp; repeat 5 yr per Dr Lonnie Devries COLONOSCOPY  11/2010    normal, f/u 5 yrs, Dr Lonnie Devries EKG ORDERABLE  5/2009    NSR, rate 84, normal EKG    HX BLADDER SUSPENSION  ~2010    Dr Filiberto Blackwell  11/2011    VPI, right breast biopsy negative    HX BREAST LUMPECTOMY Right 6/13/2019    RIGHT BREAST LUMPECTOMY ( x2 ) WTIH ULTRASOUND , RIGHT SENTINAL NODE BIOPSY performed by Manuelito Herrera MD at [facility]    HX BREAST RECONSTRUCTION Bilateral 8/27/2019    IMMEDIATE BILATERAL BREAST RECONSTRUCTION WITH DIRECT TO IMPLANT USE OF ALLODERM AND BILATERAL BREAST RECONSTRUCTION INTRA-OPERATIVE ASSESMENT OF BLOOD FLOW performed by Sara Donaldson MD at [address]  3/2010    Right; Dr Addie Marie  2009, 2015    Dr Kyung Olszewski    HX COLONOSCOPY  04/08/2016    Dr Alicia Hannon-Internal Hemorrhoids- Normal mucosa in the whole colon - Repeat colonoscopy in 5 years    HX DILATION AND CURETTAGE  7/2005    AUB, benign/neg bx; Dr Stefano Russell  10/2012    Dr Kyung Olszewski, AUB    HX GI  2016    COLONOSCOPY    HX [address]    HX HYSTERECTOMY  2013    HX MASTECTOMY Bilateral 8/27/2019    BILATERAL BREAST SKIN SPARING MASTECTOMIES performed by Manuelito Herrera MD at [address] ORTHOPAEDIC Right 2010    CARPAL TUNNEL    HX PARTIAL HYSTERECTOMY  4/26/13    Supracervical hysterectomy for fibroids, Dr Coral Gage  age 11    [place] HX UROLOGICAL  2010    [address]     HX WISDOM TEETH EXTRACTION        Social History     Tobacco Use    Smoking status: Former Smoker     Packs/day: 0.50     Years: 25.00     Pack years: 12.50     Last attempt to quit: 2005     Years since quittin.9    Smokeless tobacco: Never Used   Substance Use Topics    Alcohol use: Yes     Alcohol/week: 5.0 standard drinks     Types: 5 Shots of liquor per week      Family History   Problem Relation Age of Onset   [facility] Arthritis-osteo Father     Stroke Father         TIA's   [facility] Cancer Father         melanoma on back removed    Pacemaker Father 80    Heart Disease Father          81 yo in     Hypertension Mother    [facility] Arthritis-osteo Mother     Heart Disease Mother         valve disease    Heart Attack Maternal Grandmother 76    No Known Problems Sister     No Known Problems Brother     Depression Daughter     Substance Abuse Daughter     Alcohol abuse Daughter      Current Outpatient Medications   Medication Sig    acetaminophen (TYLENOL) 325 mg tablet Take 1,000 mg by mouth.  ibuprofen (MOTRIN) 600 mg tablet 800 mg.    gabapentin (NEURONTIN) 300 mg capsule Take 300 mg by mouth two (2) times a day.  anastrozole (ARIMIDEX) 1 mg tablet Take 1 mg by mouth daily. Indications: Hormone Receptor Positive Breast Cancer    buPROPion XL (WELLBUTRIN XL) 150 mg tablet take 1 tablet by mouth once daily before BREAKFAST    levothyroxine (SYNTHROID) 137 mcg tablet take 1 tablet by mouth once daily BEFORE BRAKFAST    losartan (COZAAR) 100 mg tablet take 1 tablet by mouth once daily (Patient taking differently: one daily)    OTHER Portable CPAP machine for RENÉE, with new mask and tubing but no change to mask or settings; G47.30 GARO 99m prog good    meloxicam (MOBIC) 15 mg tablet Take 1 Tab by mouth daily. No current facility-administered medications for this visit.        Allergies   Allergen Reactions    Lisinopril Cough    Pcn [Penicillins] Hives    Sulfa (Sulfonamide Antibiotics) Hives    Zocor [Simvastatin] Myalgia      Review of Systems: A complete review of systems was obtained, negative except as described above. Physical Exam:     Visit Vitals  /73 (BP 1 Location: Left arm, BP Patient Position: Sitting)   Pulse 79   Temp 97.8 °F (36.6 °C) (Oral)   Ht 5' 8\" (1.727 m)   Wt 272 lb 6.4 oz (123.6 kg)   LMP 05/01/2011   SpO2 97%   BMI 41.42 kg/m²     ECOG PS: 1    General: No distress  Eyes: Anicteric sclerae  HENT: Atraumatic  Neck: Supple  Respiratory: CTAB, normal respiratory effort  CV: Normal rate, regular rhythm, no murmurs, no peripheral edema  GI: Soft, nontender, nondistended  MS: Normal gait and station. Skin: Warm and dry. Wound vac in place to left breast  Psych: Alert, oriented, appropriate affect, normal judgment/insight  Breast: b/l mastectomy/ reconstruction healing with drains with some redness on left    Results:     Lab Results   Component Value Date/Time    WBC 4.6 08/20/2019 10:57 AM    HGB 12.8 08/20/2019 10:57 AM    HCT 41.3 08/20/2019 10:57 AM    PLATELET 402 80/18/9525 10:57 AM    MCV 95.6 08/20/2019 10:57 AM    ABS. NEUTROPHILS 2.9 08/20/2019 10:57 AM     Lab Results   Component Value Date/Time    Sodium 139 02/01/2019 10:32 AM    Potassium 4.5 02/01/2019 10:32 AM    Chloride 102 02/01/2019 10:32 AM    CO2 21 02/01/2019 10:32 AM    Glucose 81 02/01/2019 10:32 AM    BUN 26 02/01/2019 10:32 AM    Creatinine 0.81 02/01/2019 10:32 AM    GFR est AA 89 02/01/2019 10:32 AM    GFR est non-AA 78 02/01/2019 10:32 AM    Calcium 9.4 02/01/2019 10:32 AM     Lab Results   Component Value Date/Time    Bilirubin, total 1.1 02/01/2019 10:32 AM    ALT (SGPT) 21 02/01/2019 10:32 AM    AST (SGOT) 16 02/01/2019 10:32 AM    Alk.  phosphatase 57 02/01/2019 10:32 AM    Protein, total 6.6 02/01/2019 10:32 AM    Albumin 4.6 02/01/2019 10:32 AM    Globulin 3.3 07/15/2010 08:05 AM       MRI Results (most recent):  Results from Hospital Encounter encounter on 05/17/19   MRI BREAST BI W WO CONT Narrative INDICATION: Right invasive ductal carcinoma, grade 2, ER/MT positive, HER-2/keli  negative. COMPARISON: Multiple prior breast imaging studies dating back to 2015. TECHNIQUE:  Multisequence, multiplanar, bilateral breast MRI was performed in prone position  using a dedicated breast coil. Images were obtained without contrast and dynamic  postcontrast images were obtained in multiple phases. 10 mL IV Gadavist was  administered. Subtraction images were reconstructed. Postcontrast images were  reviewed with dedicated kinetic analysis software. FINDINGS:  There is mild background parenchymal enhancement and heterogeneous  fibroglandular tissue. Numerous sub-5 mm foci of enhancement are scattered in  the bilateral breasts. Right breast:  A 16 x 13 x 16 mm, spiculated mass with a biopsy clip and mixed kinetics at 6:00  in the middle third of the right breast corresponds to the biopsy-proven  invasive ductal carcinoma. There is a small hematoma in the retroareolar biopsy site in the anterior third. A 6 x 7 x 8 mm, irregularly marginated, enhancing nodule with mixed kinetics at  the superior aspect of the hematoma corresponds roughly in size and morphology  to the hypoechoic, shadowing mass seen on prior ultrasound at 3:00 in the  periareolar region. Incidental note is made of an intramammary lymph node at 1:00 in the middle  third. No axillary or internal mammary chain lymphadenopathy. Left breast:  No suspicious enhancing foci. No axillary or internal mammary chain  lymphadenopathy. A summary portfolio with key images has been sent from kinetic analysis software  to PACS. Impression IMPRESSION:   1. Invasive ductal carcinoma in the right breast at 6:00.   2. Small enhancing nodule associated with a hematoma in the retroareolar right  breast biopsy site. 3. No suspicious enhancing foci in the left breast.  4. No lymphadenopathy.   5. BI-RADS Assessment Category 6: Known biopsy proven malignancy. Records reviewed and summarized above. Pathology report(s) reviewed above. Radiology report(s) reviewed above. Assessment/PLAN:     1) Stage 1 T1cN0 ER+ HER2 Negative Mammaprint low risk breast cancer post b/l mast/ reconstruction  Patient has two separate tumors with different path types. 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 5 o clock ER+ HER2 negative mammaprint low risk. She had a lumpectomy 6/13/19 with + margin then had b/l mastectomy/reconstruction on 8/27/19. She was having post op wound/healing issues. She was on IV abx for 3 weeks. She then had bilateral implants removed on 10/15/19 and was then admitted for infection on 10/30/19 and d/c'd on 11/4/19. She completed IV abx on 11/21/19. She is still healing from this/now has a wound vac. She still has post op pain, taking Ibuprofen/Tylenol and Gabapentin. She has a follow up appointment with plastics, Dr. Vernell Landa, on 12/5/19. She sees PT later this week. She is feeling okay overall, exahsued. Patient did not want adjuvant chemo and likely would have a low benefit. Anastrozole has been on hold due to surgeries/wound healing issues. She is here today to discuss starting Anastrozole. She is ready to start now and already has script. The risks and benefits of aromatase inhibitors (anastrozole, letrozole, and exemestane) were discussed in detail and the patient was informed of the following: Risks include the development of painful muscles and joints (arthralgia/myalgia) and bone loss. Muscle and joint pain can be severe but rarely result in any tissue damage; symptoms usually resolve in several weeks when the medication is stopped. Bone loss is common and a bone density test is recommended as a baseline and then yearly to every several years depending on initial results.  The risk of fractures is increased by a few percent in patients taking these drugs, but careful monitoring of bone density and using bone protecting agents when indicated can minimize these risks. Unlike tamoxifen there is no increased risk of blood clots or endometrial cancer. AIs can cause or worsen vaginal dryness but women using these drugs should not use vaginal estrogen preparations for these symptoms. AIs can also cause or increase hot flashes. Any other symptoms should be reported. 2) s/p Mastectomy/reconstruction infection   She was on IV abx for 3 weeks. She then had implants removed on 10/15/19. She was hospitalized on 10/30/19 for infection/fever and d/c'd on 11/4. She completed IV abx on 11/21/19. She still has a wound vac. Management per surgery/plastics - appointment with Dr. Narayan Duvall 12/5/19. 3) HTN/Depression/Hypothyroid/Arthritis/Obesity  BP stable today at 124/73. Management per PCP. 4) Psychosocial  Mood good, coping well overall. SW for support as needed. She is here alone today. Call if questions. Follow up here in 3 months. This patient was seen in conjunction with Loulou Sanders NP. I appreciate the opportunity to participate in Ms. Beth Willingham's care.     Signed By: Darby Goins DO

## 2019-12-04 ENCOUNTER — APPOINTMENT (OUTPATIENT)
Dept: PHYSICAL THERAPY | Age: 64
End: 2019-12-04

## 2019-12-05 ENCOUNTER — HOSPITAL ENCOUNTER (OUTPATIENT)
Dept: PHYSICAL THERAPY | Age: 64
Discharge: HOME | End: 2019-12-05
Payer: COMMERCIAL

## 2019-12-05 PROCEDURE — 97110 THERAPEUTIC EXERCISES: CPT | Performed by: PHYSICAL THERAPIST

## 2019-12-05 PROCEDURE — 97162 PT EVAL MOD COMPLEX 30 MIN: CPT | Performed by: PHYSICAL THERAPIST

## 2019-12-05 PROCEDURE — 97140 MANUAL THERAPY 1/> REGIONS: CPT | Performed by: PHYSICAL THERAPIST

## 2019-12-05 PROCEDURE — 97530 THERAPEUTIC ACTIVITIES: CPT | Performed by: PHYSICAL THERAPIST

## 2019-12-05 NOTE — PROGRESS NOTES
PT ONCOLOGY EVAL/DAILY NOTE    Patient Name: Emelia Mark  Date:2019  : 1955  [x]  Patient  Verified  Payor: Patricia Shea / Plan: Virginie Goldman / Product Type: CAROLA /    In time:4:00  Out time:5:15  Total Treatment Time (min): 75  Total Timed Codes (min): 60     Visit #: 1 of 20    Treatment Area: Shoulder stiffness, right [M25.611]     SUBJECTIVE    Current symptoms/Complaints: Saw Dr Kylie Fish who his having her take a break from wound vac. Sees Dr Kylie Fish next week. Pt c/o's of B chest area pain since implant removal and infection requiring hospitalization. Pain  when using B arms for any ADL involving use of arms at or above shoulder level. Pt is frustrated with lack of stamina and motion, retired Director of [employer], also had to take care of her dad, was building an addition on her house for him, he passed last year and she has been taking care of his estate and her home. Subjective functional status:     Present Symptoms/History:    History of Present Illness:From Dr Noelle Pruitt office note 2019:    Emelia Mark is a 59 y.o. 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 She was on IV abx. She had bilateral implant removal on 10/15/19. She was still having issues with wound healing and she was admitted on 10/30/19 with infection/high fevers. She received a wound vac and completed IV abx on 19. She still has wound vac. She has a follow up appointment with Dr. Kylie Fish on 19. She reports that her appetite is good and energy levels low.  She is here alone today.          Past Medical History:   Diagnosis Date    Ankle edema 2010    Anxiety 2010    Benign essential hypertension 2016     2006     Cancer ([facility] 2019     BREAST    Carpal tunnel syndrome 2010    Depression 2010     ANXIETY    H/O Benign Colon Polyp 2010  H/O Uterine Fibroids 12/20/2010    Hypercholesterolemia 6/25/2013     ~2006    Hypothyroidism 5/26/2016 7/1999    Kidney stone 4/5/2010    Perimenopausal 4/5/2010    Primary osteoarthritis of left knee 5/26/2016     Extensive;  Ortho (needs knee replacement), CSI x 2,     S/P benign cervical polypectomy 12/20/2010    Sleep apnea 4/5/2010     USES CPAP    Stress incontinence 5/26/2010            Past Surgical History:   Procedure Laterality Date    COLONOSCOPY   10/05     benign polyp; repeat 5 yr per Dr Giuliano Chisholm   11/2010     normal, f/u 5 yrs, Dr Dominique Trevino EKG ORDERABLE   5/2009     NSR, rate 84, normal EKG    HX BLADDER SUSPENSION   ~2010     Dr Estephania Cabrera   11/2011     VPI, right breast biopsy negative    HX BREAST LUMPECTOMY Right 6/13/2019     RIGHT BREAST LUMPECTOMY ( x2 ) WTIH ULTRASOUND , RIGHT SENTINAL NODE BIOPSY performed by Tiffanie Alexander MD at [place] [address] HX BREAST RECONSTRUCTION Bilateral 8/27/2019     IMMEDIATE BILATERAL BREAST RECONSTRUCTION WITH DIRECT TO IMPLANT USE OF ALLODERM AND BILATERAL BREAST RECONSTRUCTION INTRA-OPERATIVE ASSESMENT OF BLOOD FLOW performed by Beltran Rojas MD at [place] [address]   3/2010     Right; Dr Harris Saliva   2009, 2015     Dr Yaya Us   04/08/2016     Dr Jaclyn Hannon-Internal Hemorrhoids- Normal mucosa in the whole colon - Repeat colonoscopy in 5 years    HX DILATION AND CURETTAGE   7/2005     AUB, benign/neg bx; Dr Jasmin Escobar   10/2012     Dr Patricia Mcnamaar, AUB    HX GI   2016     COLONOSCOPY    HX HEENT   1993     WISDOM TEETH REMOVED    HX HYSTERECTOMY   2013    HX MASTECTOMY Bilateral 8/27/2019     BILATERAL BREAST SKIN SPARING MASTECTOMIES performed by Tiffanie Alexander MD at [address] [place] [address] HX ORTHOPAEDIC Right 2010     CARPAL TUNNEL    HX PARTIAL HYSTERECTOMY   4/26/13     Supracervical hysterectomy for fibroids, Dr Caro Newton age 10    HX TONSILLECTOMY   1960    HX UROLOGICAL   2010     BLADDER SLING     HX WISDOM TEETH EXTRACTION            Referring Provider: Adina Michel MD   Medical Oncologist: Dr Yao Espana   Primary Care Physician: León Gage MD  Next Appointment: Dr Yomaira Hernandes 12/12/2019 Sees Dr Wally Castro in [place]  Diagnosis: s/p B mastectomies with failed reconstruction; B shoulder stiffness and pain; generalized weakness   Precautions/Indications: lymphedema, hx of recurrent infections  History of Present Illness:  Treatment History:   · 6/13/19 - RIGHT breast lumpectomy x 2 sites and RIGHT SLNB  · 8/27/19 - BILATERAL mastectomies with reconstruction. Dr. Yomaira Hernandes did direct saline implants. Surg path showed LEFT breast benign, residual IDC on RIGHT breast.  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    yes  Limitations/Prior level of functioning: Pt was working full time, no difficulties with use of B UE's  Dominant Hand: right handed  Personal Goals: \"more motion, less pain, more stamina\"  Home Situation (including environment, stairs, family support, if living alone, any DME used at home):  Pt lives alone maintaining home and finishing up with her father's estate, no a.d. in home  Work Status: retired   Current meds: see chart  Barriers:    [x]N/A []pain []financial []time []transportation []other  Motivation: medium  Substance use:   [x]N/A  []Alcohol []Tobacco []other:   Cognition: A & O x 4        OBJECTIVE    Ports/ Drains: N/A  Skin/Soft Tissue: Skin of B chest and UE's dry, incisions healed R chest from removal of implants; slow healing L incision with open draining wound  Vitals: BP L OK=664/70      HR=77            Sp02=99%            Oral temp:  97.9  Outcome Measures: FOTO=62/100     Girth (cm)                             Distance from wrist (cm)    R  L  Palm: 21.3  20.8  Wrist: 18.4  18.6  Forearm:  31.4  30    16               Elbow: 31.2  32  Bicep: 39.3  38.4    34   Axilla: 39  39.2  ROM:                       R   L       Scapulohumoral Control / Rhythm:     No                   No    Able to eccentrically lower with good control?  Left:   No         Right:   No      Upper Extremity AROM:                                                                                 R                                 L  Shoulder Flexion   100                               95                                                     Shoulder Abduction  95                               85  Shoulder Extension  15                               10    Shoulder ER   25                               20    Shoulder IR                        20                               22                                                                                       UPPER QUARTER                           MUSCLE STRENGTH  KEY                                                              R            L  0 - No Contraction        C1, C2 Neck Flex 4                                       1 - Trace                       C3 Side Flex          4           4                                                 2 - Poor                         C4 Sh Elev    4           4                                             3 - Fair                          C5 Deltoid/Biceps  3        3                                   4 - Good                       C6 Wrist Ext           4         4                                            5 - Normal                     C7 Triceps             3         3                                                                                  C8 Thumb Ext        4         4                                                                                          T1 Hand Inst    4         4                                             MMT: See above      R  L  Shoulder flexion  2    2     Shoulder ER  2    2    Shoulder IR  2    2      Neurological: Sensations: Light touch: Diminished B axilla, chest wall                   Palpation: Pt hypersensitive to touch B axilla, all R sided incisions, unable to assess L chest area d/t wound  Posture: Forward head, B IR g-h jts  Breath pattern assessment  Diaphragm: difficulty with initiation; not primary  Anterior chest primary   Accessory respiratory muscle use: B scalenes, UT/LS       15 min [x]Eval                  []Re-Eval       30 min Therapeutic Exercise:  [x] See flow sheet :   Rationale: increase ROM and increase strength to improve the patients ability to perform ADL's required for return to work and/or community      15 min Therapeutic Activity:  []  See flow sheet :   Rationale: Pt education on lymphedema risk reduction, proper skin care, importance of HEP compliance         15 min Manual Therapy:  PROM B UE's all planes, STM kareem R breast area, axilla, lateral torso   Rationale: decrease pain, increase ROM, increase tissue extensibility, decrease edema , decrease trigger points and increase postural awareness to effectively use extremity during functional tasks (reaching, grooming, dressing, walking)             With   [x] TE   [x] TA   [] neuro   [] other: Patient Education: [] Review HEP    [] Progressed/Changed HEP based on:   [] positioning   [] body mechanics   [] transfers   [] heat/ice application    [] other:      Other Objective/Functional Measures:       Pain Level (0-10 scale) post treatment: 3     ASSESSMENT/Changes in Function: Pt is a 79 y.o female s/p B mastectomies with failed reconstruction and infection requiring hospitalization.  Pt's chief c/o is painful use of B UE's for all ADL's and decreased stamina. Pt presents with non-healing L chest wall incision, decreased B upper quadrant motion and strength, impaired breath and posture.  Patient will benefit from skilled PT services to address functional mobility deficits, address ROM deficits, address strength deficits, analyze and address soft tissue restrictions, analyze and cue movement patterns, analyze and modify body mechanics/ergonomics, assess and modify postural abnormalities and instruct in home and community integration to address rehab goals per plan of care          Goals:  See Plan of Care    PLAN  []  Upgrade activities as tolerated     [x]  Continue plan of care  []  Update interventions per flow sheet       []  Discharge due to:_  []  Other:_      Weyman Galeazzi MSPT, B[facility]IE 12/5/2019  3:53 PM

## 2019-12-05 NOTE — PROGRESS NOTES
[address] [facility] Tanya Escobedo 57  Phone: [phone]  Fax: [phone]    Plan of Care/ Statement of Necessity for Physical Therapy Services 2-15    Patient name: Pushpa Alvarado  : 1955  Provider#: [id number]  Referral source: Danielle Terrazas MD      Medical/Treatment Diagnosis: Shoulder stiffness, right [M25.611]  Stiffness of left shoulder, not elsewhere classified [M25.612]     Prior Hospitalization: see medical history     Comorbidities: HTN, R breast cancer, B implant removal, infection  Prior Level of Function: No difficulties with use of B UE's  Medications: Verified on Patient Summary List    Start of Care: 2019      Onset Date: 10/2019       The Plan of Care and following information is based on the information from the initial evaluation. Assessment/ key information: Pt is a 79 y.o female s/p B mastectomies with failed reconstruction and infection requiring hospitalization. Pt's chief c/o is painful use of B UE's for all ADL's and decreased stamina. Pt presents with non-healing L chest wall incision, decreased B upper quadrant motion and strength, impaired breath and posture. Patient will benefit from skilled PT services to address functional mobility deficits, address ROM deficits, address strength deficits, analyze and address soft tissue restrictions, analyze and cue movement patterns, analyze and modify body mechanics/ergonomics, assess and modify postural abnormalities and instruct in home and community integration to address rehab goals per plan of care      Evaluation Complexity History MEDIUM  Complexity : 1-2 comorbidities / personal factors will impact the outcome/ POC ; Examination MEDIUM Complexity : 3 Standardized tests and measures addressing body structure, function, activity limitation and / or participation in recreation  ;Presentation MEDIUM Complexity : Evolving with changing characteristics  ; Clinical Decision Making MEDIUM Complexity : FOTO score of 26-74  Overall Complexity Rating: MEDIUM    Problem List: pain affecting function, decrease ROM, decrease strength, edema affecting function, decrease ADL/ functional abilitiies and decrease activity tolerance   Treatment Plan may include any combination of the following: Therapeutic exercise, Therapeutic activities, Neuromuscular re-education, Physical agent/modality, Manual therapy, Patient education and Functional mobility training  Patient / Family readiness to learn indicated by: asking questions and trying to perform skills  Persons(s) to be included in education: patient (P)  Barriers to Learning/Limitations: None  Patient Goal (s): increase motion, decrease pain, more stamina  Patient Self Reported Health Status: fair  Rehabilitation Potential: good    Short Term Goals: To be accomplished in 2 treatments:  1) Pt will verbalize understanding of  lymphedema risk reduction practices. 2) Pt will verbalize knowledge of signs and symptoms to report to physician. Long Term Goals: To be accomplished in 20 treatments:   1) Pt will be Independent with HEP for pain management, utilizing correct breath pattern, strengthening, and motion exercises in order to maintain gains achieved in therapy. 2) Pt will utilize correct breath and movement patterns during all ADL's involving reaching above shoulder level with decreased pain by 3-4  levels on NPS.   3) Pt will have increased B shoulder flexion, abduction and ER by 10-15 degrees or > and increased B upper quarter strength by 1-2 levels in order to be able to care for home and perform all ADL's with decreased pain in R shoulder/chest by 3-4 levels on NPS  4) Pt will be able to tolerate HIIT therapy session involving cardio, core and UE strengthening for 30 mins without rest breaks or discomfort in B upper quadrants in order to increase activity tolerance and energy levels  Frequency / Duration: Patient to be seen 1-2 times per week for 20 treatments. Patient/ Caregiver education and instruction: exercises and other pt education on lymphedema risk reduction; proper skin care and wound care instructions, importance of HEP compliance    [x]  [facility] 12/5/2019     ________________________________________________________________________    I certify that the above Therapy Services are being furnished while the patient is under my care. I agree with the treatment plan and certify that this therapy is necessary.     [de-identified] Signature:____________________  Date:____________Time: _________

## 2019-12-09 ENCOUNTER — HOSPITAL ENCOUNTER (OUTPATIENT)
Dept: PHYSICAL THERAPY | Age: 64
Discharge: HOME | End: 2019-12-09
Payer: COMMERCIAL

## 2019-12-09 DIAGNOSIS — I10 BENIGN ESSENTIAL HYPERTENSION: ICD-10-CM

## 2019-12-09 PROCEDURE — 97140 MANUAL THERAPY 1/> REGIONS: CPT

## 2019-12-09 PROCEDURE — 97110 THERAPEUTIC EXERCISES: CPT

## 2019-12-09 RX ORDER — LOSARTAN POTASSIUM 100 MG/1
TABLET ORAL
Qty: 90 TAB | Refills: 0 | Status: SHIPPED | OUTPATIENT
Start: 2019-12-09 | End: 2020-03-30 | Stop reason: SDUPTHER

## 2019-12-09 NOTE — PROGRESS NOTES
PT DAILY TREATMENT NOTE 2-15    Patient Name: Jose Rafael Reid  Date:2019  : 1955  [x]  Patient  Verified  Payor: Ga Flanagan / Plan: Andre Melgar / Product Type: CAROLA /    In time:4:00p  Out time:5:00p  Total Treatment Time (min): 60  Visit #:  2    Treatment Area: Shoulder stiffness, right [M25.611]  Stiffness of left shoulder, not elsewhere classified [M25.612]    SUBJECTIVE  Pain Level (0-10 scale): 3 surgical sites  Any medication changes, allergies to medications, adverse drug reactions, diagnosis change, or new procedure performed?: [x] No    [] Yes (see summary sheet for update)  Subjective functional status/changes:   [] No changes reported  Patient reports no issues with HEP. Patient reports continued limitations with reaching overhead. OBJECTIVE    30 min Therapeutic Exercise:  [x] See flow sheet :   Rationale: increase ROM, increase strength and increase proprioception to improve the patients ability to lift, carry, reach, and complete ADL's    30 Min Manual Therapy:  PROM all directions to tolerance, STM kareem R breast, axilla, lateral torso   Rationale: decrease pain, increase ROM, increase tissue extensibility and decrease trigger points  to improve the patients ability to lift, carry, reach, and complete ADL's      With   [] TE   [] TA   [] neuro   [] other: Patient Education: [x] Review HEP    [] Progressed/Changed HEP based on:   [] positioning   [] body mechanics   [] transfers   [] heat/ice application    [] other:      Other Objective/Functional Measures:      Pain Level (0-10 scale) post treatment: 3    ASSESSMENT/Changes in Function:   Patient did well with all interventions with improved ROM noted for IR/ER, and continues to be limited with flexion and abduction.    Patient will continue to benefit from skilled PT services to modify and progress therapeutic interventions, address functional mobility deficits, address ROM deficits, address strength deficits, analyze and address soft tissue restrictions, analyze and cue movement patterns, analyze and modify body mechanics/ergonomics and assess and modify postural abnormalities to attain remaining goals. []  See Plan of Care  []  See progress note/recertification  []  See Discharge Summary         Progress towards goals / Updated goals:  Patient did well with advanced exercises and will do well with continued focus on tissue extensibility and improving ROM.      PLAN  [x]  Upgrade activities as tolerated     [x]  Continue plan of care  []  Update interventions per flow sheet       []  Discharge due to:_  []  Other:_      Luisito Stephen 12/9/2019

## 2019-12-13 ENCOUNTER — HOSPITAL ENCOUNTER (OUTPATIENT)
Dept: PHYSICAL THERAPY | Age: 64
Discharge: HOME | End: 2019-12-13
Payer: COMMERCIAL

## 2019-12-13 PROCEDURE — 97110 THERAPEUTIC EXERCISES: CPT

## 2019-12-13 PROCEDURE — 97140 MANUAL THERAPY 1/> REGIONS: CPT

## 2019-12-13 NOTE — PROGRESS NOTES
PT DAILY TREATMENT NOTE 2-15    Patient Name: Emily Palma  Date:2019  : 1955  [x]  Patient  Verified  Payor: Luis Alfredo De Guzman / Plan: Adrienne Power / Product Type: CAROLA /    In time:10:00a Out time:11:00a  Total Treatment Time (min): 60  Visit #:  3    Treatment Area: Shoulder stiffness, right [M25.611]  Stiffness of left shoulder, not elsewhere classified [M25.612]    SUBJECTIVE  Pain Level (0-10 scale): 3 surgical sites, 4-5 fatigue  Any medication changes, allergies to medications, adverse drug reactions, diagnosis change, or new procedure performed?: [x] No    [] Yes (see summary sheet for update)  Subjective functional status/changes:   [] No changes reported  Patient reports no changes since last session and did not have any issues. Patient report she does not have to go on the wound vac and was given smaller patch to cover wound. OBJECTIVE    40 min Therapeutic Exercise:  [x] See flow sheet :   Rationale: increase ROM, increase strength and increase proprioception to improve the patients ability to lift, carry, reach, and complete ADL's    20 Min Manual Therapy:  PROM all directions to tolerance, STM kareem R breast, axilla, lateral torso   Rationale: decrease pain, increase ROM, increase tissue extensibility and decrease trigger points  to improve the patients ability to lift, carry, reach, and complete ADL's      With   [] TE   [] TA   [] neuro   [] other: Patient Education: [x] Review HEP    [] Progressed/Changed HEP based on:   [] positioning   [] body mechanics   [] transfers   [] heat/ice application    [] other:      Other Objective/Functional Measures: HEP updated     Pain Level (0-10 scale) post treatment: 3    ASSESSMENT/Changes in Function:   Patient did well with advanced interventions with improved ROM noted for IR/ER, and continues to be limited with flexion and abduction.    Patient will continue to benefit from skilled PT services to modify and progress therapeutic interventions, address functional mobility deficits, address ROM deficits, address strength deficits, analyze and address soft tissue restrictions, analyze and cue movement patterns, analyze and modify body mechanics/ergonomics and assess and modify postural abnormalities to attain remaining goals. []  See Plan of Care  []  See progress note/recertification  []  See Discharge Summary         Progress towards goals / Updated goals:  Patient did well with advanced exercises and will do well with continued focus on tissue extensibility and improving ROM.      PLAN  [x]  Upgrade activities as tolerated     [x]  Continue plan of care  []  Update interventions per flow sheet       []  Discharge due to:_  []  Other:_      Mariah Acevedo 12/13/2019

## 2019-12-17 ENCOUNTER — HOSPITAL ENCOUNTER (OUTPATIENT)
Dept: PHYSICAL THERAPY | Age: 64
Discharge: HOME | End: 2019-12-17
Payer: COMMERCIAL

## 2019-12-17 PROCEDURE — 97110 THERAPEUTIC EXERCISES: CPT

## 2019-12-17 PROCEDURE — 97140 MANUAL THERAPY 1/> REGIONS: CPT

## 2019-12-18 ENCOUNTER — TELEPHONE (OUTPATIENT)
Dept: SURGERY | Age: 64
End: 2019-12-18

## 2019-12-18 NOTE — TELEPHONE ENCOUNTER
Patient called reporting an enlarging lump located above mastectomy site. She has PT tomorrow at [facility] at [facility].   I offered her an appt with Dr. Chritsiana Escobar at 3:40pm.

## 2019-12-19 ENCOUNTER — OFFICE VISIT (OUTPATIENT)
Dept: SURGERY | Age: 64
End: 2019-12-19

## 2019-12-19 ENCOUNTER — HOSPITAL ENCOUNTER (OUTPATIENT)
Dept: PHYSICAL THERAPY | Age: 64
Discharge: HOME | End: 2019-12-19
Payer: COMMERCIAL

## 2019-12-19 VITALS
WEIGHT: 272 LBS | HEART RATE: 86 BPM | DIASTOLIC BLOOD PRESSURE: 68 MMHG | HEIGHT: 68 IN | SYSTOLIC BLOOD PRESSURE: 132 MMHG | BODY MASS INDEX: 41.22 KG/M2

## 2019-12-19 DIAGNOSIS — R22.32 AXILLARY MASS, LEFT: Primary | ICD-10-CM

## 2019-12-19 PROCEDURE — 97110 THERAPEUTIC EXERCISES: CPT

## 2019-12-19 PROCEDURE — 97140 MANUAL THERAPY 1/> REGIONS: CPT

## 2019-12-19 NOTE — PROGRESS NOTES
HISTORY OF PRESENT ILLNESS  Nickolas Sorenson is a 59 y.o. female. HPI ESTABLISHED patient here today for complaints of a new enlarging  LEFT axillary lump which started 4 days ago. The lump is tender to palpation. The patient had a complicated breast cancer course including multiple surgeries and hospitalizations for wound infections. Wound vac removed 2 weeks  · 19 - RIGHT breast lumpectomy x 2 sites and RIGHT SLNB  · 19 - BILATERAL mastectomies with reconstruction. Dr. Ab Barahona did direct saline implants. Surg path showed LEFT breast benign, residual IDC on RIGHT breast.  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 Breasts:         Left: Swelling, mass (5 cm mass under the skin erythema), skin change (erythema and swelling in the axilla) and tenderness present. Comments: bandaid over wound      Incision and Drainage complex axillary Abscess  Indication : LEFT axillary abscess  Ultrasound finding: elongated abscess cavity extending 3 cm in depth, 1 cm in width. Significant tissue swelling. Normal 8mm axillary lymph node identified deeper in the axilla. Prep : alcohol. Anesthesia : Lidocaine 1% with epi, 4 cc  Incision: 1 cm incision  Procedure: the abscess cavity was explored and 3 cc exudate was drained  The wound was cleaned with hydrogen peroxide    Packin/4\" nugauze, approximately 5 cm  Diposition: Okay to shower. Change dressing as needed. Remove packing on   ASSESSMENT and PLAN    ICD-10-CM ICD-9-CM    1. Axillary mass, left R22.32 782.2      LEFT axillary abscess drained. This appears to be a superficial abscess. It does have a lot of associated induration that did not resolve with drainage of the exudate.   She will remove the packing on   Follow up next week if symptoms have not improved  She sees Dr Kylie Fish December 26th

## 2019-12-19 NOTE — PROGRESS NOTES
PT DAILY TREATMENT NOTE 2-15    Patient Name: Oh Lyons  Date:2019  : 1955  [x]  Patient  Verified  Payor: Mert Hooks / Plan: Vanessa Larios / Product Type: CAROLA /    In time:2:00p Out time:3:00p  Total Treatment Time (min): 60  Visit #:  5    Treatment Area: Shoulder stiffness, right [M25.611]  Stiffness of left shoulder, not elsewhere classified [M25.612]    SUBJECTIVE  Pain Level (0-10 scale): 1/10, 4 fatigue  Any medication changes, allergies to medications, adverse drug reactions, diagnosis change, or new procedure performed?: [x] No    [] Yes (see summary sheet for update)  Subjective functional status/changes:   [] No changes reported  Patient reports the growth under right arm has gotten a little bigger and has an appointment with Dr. Edmundo Arredondo for after therapy. Patient reports very little if any pain today and mostly with just end range movements. OBJECTIVE    40 min Therapeutic Exercise:  [x] See flow sheet :   Rationale: increase ROM, increase strength and increase proprioception to improve the patients ability to lift, carry, reach, and complete ADL's    20 Min Manual Therapy:  PROM all directions to tolerance, STM kareem R breast, axilla, lateral torso, pec release   Rationale: decrease pain, increase ROM, increase tissue extensibility and decrease trigger points  to improve the patients ability to lift, carry, reach, and complete ADL's      With   [] TE   [] TA   [] neuro   [] other: Patient Education: [x] Review HEP    [] Progressed/Changed HEP based on:   [] positioning   [] body mechanics   [] transfers   [] heat/ice application    [] other:      Other Objective/Functional Measures: min pain PROM flexion<abductionLUE due to growth in axilla. Pain Level (0-10 scale) post treatment: 1    ASSESSMENT/Changes in Function:   Patient did well with advanced interventions with ROM WNL noted for IR/ER, and continues to be limited with flexion and abduction.  Patient presents with about 4 cm diameter growth in L axilla, an increase since last visit, pink in color, no warmth, or signs of infection. Minor TTP, firm to touch. Pt to see MD today to address growth in axilla. Patient will continue to benefit from skilled PT services to modify and progress therapeutic interventions, address functional mobility deficits, address ROM deficits, address strength deficits, analyze and address soft tissue restrictions, analyze and cue movement patterns, analyze and modify body mechanics/ergonomics and assess and modify postural abnormalities to attain remaining goals. []  See Plan of Care  []  See progress note/recertification  []  See Discharge Summary         Progress towards goals / Updated goals: Patient demonstrates improved overall ROM with tightness at end range, no increased pain.      PLAN  [x]  Upgrade activities as tolerated     [x]  Continue plan of care  []  Update interventions per flow sheet       []  Discharge due to:_  []  Other:_      Lori Streeter 12/19/2019

## 2019-12-19 NOTE — PATIENT INSTRUCTIONS

## 2019-12-24 ENCOUNTER — OFFICE VISIT (OUTPATIENT)
Dept: SURGERY | Age: 64
End: 2019-12-24

## 2019-12-24 ENCOUNTER — HOSPITAL ENCOUNTER (OUTPATIENT)
Dept: PHYSICAL THERAPY | Age: 64
Discharge: HOME | End: 2019-12-24
Payer: COMMERCIAL

## 2019-12-24 ENCOUNTER — DOCUMENTATION ONLY (OUTPATIENT)
Dept: SURGERY | Age: 64
End: 2019-12-24

## 2019-12-24 ENCOUNTER — HOSPITAL ENCOUNTER (OUTPATIENT)
Dept: LAB | Age: 64
Discharge: HOME | End: 2019-12-24

## 2019-12-24 VITALS
WEIGHT: 272 LBS | BODY MASS INDEX: 41.22 KG/M2 | DIASTOLIC BLOOD PRESSURE: 79 MMHG | SYSTOLIC BLOOD PRESSURE: 149 MMHG | HEIGHT: 68 IN | HEART RATE: 82 BPM

## 2019-12-24 DIAGNOSIS — M79.89 LEFT AXILLARY SWELLING: ICD-10-CM

## 2019-12-24 DIAGNOSIS — N64.89 SEROMA OF BREAST: Primary | ICD-10-CM

## 2019-12-24 PROCEDURE — 97110 THERAPEUTIC EXERCISES: CPT | Performed by: PHYSICAL THERAPIST

## 2019-12-24 PROCEDURE — 97140 MANUAL THERAPY 1/> REGIONS: CPT | Performed by: PHYSICAL THERAPIST

## 2019-12-24 NOTE — PROGRESS NOTES
[facility]  OFFICE PROCEDURE PROGRESS NOTE        Chart reviewed for the following:   I, Dr. Carlos Parra, have reviewed the History, Physical and updated the Allergic reactions for [address] performed immediately prior to start of procedure:   IDr. Carlos, have performed the following reviews on [address] prior to the start of the procedure:            * Patient was identified by name and date of birth   * Agreement on procedure being performed was verified  * Risks and Benefits explained to the patient  * Procedure site verified and marked as necessary  * Patient was positioned for comfort  * Consent was signed and verified     Time: 1200pm      Date of procedure: 12/24/2019    Procedure performed by:  Dr. Carlos Parra    Provider assisted by: Lola Braxton RN    Patient assisted by: Lola Braxton RN    How tolerated by patient: Patient tolerated the procedure well without complications. Denies pain post biopsy. Post Procedural Pain Scale: 0 none    Comments: Written and verbal post biopsy instructions reviewed with and given to patient with her understanding.

## 2019-12-24 NOTE — PROGRESS NOTES
HISTORY OF PRESENT ILLNESS  Jenny Ng is a 59 y.o. female. HPI ESTABLISHED patient, s/p BILATERAL mastectomies with reconstruction, here for LEFT axillary abscessed lymph node. The abscess was drained and packed 5 days ago. Packing fell out after 2 days, on the 21st and she had been changing the dressing 3x/day. She is concerned that it is still swollen and draining. The patient had a complicated breast cancer course including multiple surgeries and hospitalizations for wound infections. Wound vac removed 2 weeks  Treated with vanc, cefepime  · 6/13/19 - RIGHT breast lumpectomy x 2 sites and RIGHT SLNB  · 8/27/19 - BILATERAL mastectomies with reconstruction. Dr. Radha Dominguez did direct saline implants. Surg path showed LEFT breast benign, residual IDC on RIGHT breast.  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 US - Guided Core Biopsy  Indication : Left axillary lymphadenopathy, and enlarged lymphatic ducts in the upper arm  Ultrasound Findings: lymphadenopathy. The lymph nodes and dilated lymphatic tissue is hyperechoic. No hypoechoic component that would suggest persistent abscess or malignancy. Prep : alcohol. Anesthesia : 1% lidocaine with epinephrine, 5cc. Device : The hand-held 10 gauge BARD needle was inserted through the lesion and captured tissue with real-time Ultrasound Confirmation. .   Core Sampling :  3 cores were obtained. Marker: clip placed   Dressing : Steristrips, gauze and tape. Instructions : Remove gauze this evening. Remove steristrips in one week.    Tolerance: Pt tolerated procedure with no discomfort  Pathology :   [place]:  ASSESSMENT and PLAN    ICD-10-CM ICD-9-CM    1. Seroma of breast N64.89 998.13    2. Left axillary swelling M79.89 729.81 SURGICAL PATHOLOGY      CULTURE, BODY FLUID W GRAM STAIN      1. culures obtained from the LEFT axillary lymph node abscess  2. Biopsy of the LEFT axillary lymph node to confirm that it is not malignant  3. Pt is seeing Dr Jeaneth Damon on Dec 26th.

## 2019-12-24 NOTE — PROGRESS NOTES
PT DAILY TREATMENT NOTE 2-15    Patient Name: Lynn Clemens  Date:2019  : 1955  [x]  Patient  Verified  Payor: Alan June / Plan: Carlos Van / Product Type: CAROLA /    In time:9:00 Out time:10:15   Total Treatment Time (min): 75  Visit #:  6    Treatment Area: Shoulder stiffness, right [M25.611]  Stiffness of left shoulder, not elsewhere classified [M25.612]    SUBJECTIVE  Pain Level (0-10 scale): 1/10, 5 fatigue  Any medication changes, allergies to medications, adverse drug reactions, diagnosis change, or new procedure performed?: [x] No    [] Yes (see summary sheet for update)  Subjective functional status/changes:   [] No changes reported  Pt states that she saw Dr Anushka Nicholson who drained the abcess/growth L axilla, stating \"putting her arm down is uncomfortable\" She sees Dr Anushka Nicholson directly after PT appt today. Pt also c/o's carpal tunnel syndrome L and R    OBJECTIVE    45 min Therapeutic Exercise:  [x] See flow sheet :   Rationale: increase ROM, increase strength and increase proprioception to improve the patients ability to lift, carry, reach, and complete ADL's    30 Min Manual Therapy:  R PROM all directions to tolerance, STM kareem R breast, axilla, lateral torso, pec release, MFR R axilla, lateral torso, MFR neck pull/arm pull   Rationale: decrease pain, increase ROM, increase tissue extensibility and decrease trigger points  to improve the patients ability to lift, carry, reach, and complete ADL's      With   [] TE   [] TA   [] neuro   [] other: Patient Education: [x] Review HEP    [] Progressed/Changed HEP based on:   [] positioning   [] body mechanics   [] transfers   [] heat/ice application    [] other:      Other Objective/Functional Measures:       Pain Level (0-10 scale) post treatment: 1    ASSESSMENT/Changes in Function:    Pt had improved tolerance to PROM R UE, L UE withheld treatment d/t L growth in axilla.   Patient will continue to benefit from skilled PT services to modify and progress therapeutic interventions, address functional mobility deficits, address ROM deficits, address strength deficits, analyze and address soft tissue restrictions, analyze and cue movement patterns, analyze and modify body mechanics/ergonomics and assess and modify postural abnormalities to attain remaining goals.      []  See Plan of Care  []  See progress note/recertification  []  See Discharge Summary         Progress towards goals / Updated goals:      PLAN  [x]  Upgrade activities as tolerated     [x]  Continue plan of care  []  Update interventions per flow sheet       []  Discharge due to:_  []  Other:_      Matteo GRAYT, BREE-ARCHIE 12/24/2019

## 2019-12-26 ENCOUNTER — HOSPITAL ENCOUNTER (OUTPATIENT)
Dept: PHYSICAL THERAPY | Age: 64
Discharge: HOME | End: 2019-12-26
Payer: COMMERCIAL

## 2019-12-26 ENCOUNTER — TELEPHONE (OUTPATIENT)
Dept: SURGERY | Age: 64
End: 2019-12-26

## 2019-12-26 LAB
BACTERIA SPEC CULT: NORMAL
SERVICE CMNT-IMP: NORMAL

## 2019-12-26 PROCEDURE — 97140 MANUAL THERAPY 1/> REGIONS: CPT

## 2019-12-26 PROCEDURE — 97110 THERAPEUTIC EXERCISES: CPT

## 2019-12-26 NOTE — PROGRESS NOTES
PT DAILY TREATMENT NOTE 2-15    Patient Name: Nadine Humphrey  Date:2019  : 1955  [x]  Patient  Verified  Payor: Vita Royal / Plan: Samy Montanez / Product Type: CAROLA /    In time:1:50p Out time:3:05p  Total Treatment Time (min): 60  Visit #:  7    Treatment Area: Shoulder stiffness, right [M25.611]  Stiffness of left shoulder, not elsewhere classified [M25.612]    SUBJECTIVE  Pain Level (0-10 scale): 1/10, 4 fatigue  Any medication changes, allergies to medications, adverse drug reactions, diagnosis change, or new procedure performed?: [x] No    [] Yes (see summary sheet for update)  Subjective functional status/changes:   [] No changes reported  Patient reports the plastic surgeon is happy with her pervious wound, but is leaving Dr. Tejinder Crook to take care of recent abscess. Patient reports she will call Dr. Tejinder Crook to find out the process of the abscess healing as she is unsure if she needs to follow up. OBJECTIVE    40 min Therapeutic Exercise:  [x] See flow sheet :   Rationale: increase ROM, increase strength and increase proprioception to improve the patients ability to lift, carry, reach, and complete ADL's    20 Min Manual Therapy:  PROM all directions to tolerance, STM kareem R breast, axilla, lateral torso, pec release   Rationale: decrease pain, increase ROM, increase tissue extensibility and decrease trigger points  to improve the patients ability to lift, carry, reach, and complete ADL's      With   [] TE   [] TA   [] neuro   [] other: Patient Education: [x] Review HEP    [] Progressed/Changed HEP based on:   [] positioning   [] body mechanics   [] transfers   [] heat/ice application    [] other:      Other Objective/Functional Measures:     Pain Level (0-10 scale) post treatment: 1    ASSESSMENT/Changes in Function:   Patient did well with all interventions with with mod fatigue noted. Patient making good progress with improved tissue extensibility noted.      Patient will continue to benefit from skilled PT services to modify and progress therapeutic interventions, address functional mobility deficits, address ROM deficits, address strength deficits, analyze and address soft tissue restrictions, analyze and cue movement patterns, analyze and modify body mechanics/ergonomics and assess and modify postural abnormalities to attain remaining goals. []  See Plan of Care  []  See progress note/recertification  []  See Discharge Summary         Progress towards goals / Updated goals: Patient demonstrates improved overall ROM with tightness at end range, no increased pain.      PLAN  [x]  Upgrade activities as tolerated     [x]  Continue plan of care  []  Update interventions per flow sheet       []  Discharge due to:_  []  Other:_      Gerber Askew 12/26/2019

## 2019-12-29 ENCOUNTER — HOSPITAL ENCOUNTER (EMERGENCY)
Age: 64
Discharge: HOME | End: 2019-12-29
Attending: EMERGENCY MEDICINE | Admitting: EMERGENCY MEDICINE
Payer: COMMERCIAL

## 2019-12-29 ENCOUNTER — APPOINTMENT (OUTPATIENT)
Dept: GENERAL RADIOLOGY | Age: 64
End: 2019-12-29
Attending: EMERGENCY MEDICINE
Payer: COMMERCIAL

## 2019-12-29 VITALS
TEMPERATURE: 97.9 F | SYSTOLIC BLOOD PRESSURE: 178 MMHG | HEART RATE: 80 BPM | DIASTOLIC BLOOD PRESSURE: 91 MMHG | RESPIRATION RATE: 16 BRPM

## 2019-12-29 DIAGNOSIS — M79.671 RIGHT FOOT PAIN: Primary | ICD-10-CM

## 2019-12-29 LAB
BACTERIA SPEC CULT: ABNORMAL
BACTERIA SPEC CULT: ABNORMAL
GRAM STN SPEC: ABNORMAL
GRAM STN SPEC: ABNORMAL
SERVICE CMNT-IMP: ABNORMAL

## 2019-12-29 PROCEDURE — 99283 EMERGENCY DEPT VISIT LOW MDM: CPT

## 2019-12-29 PROCEDURE — L4360 PNEUMAT WALKING BOOT PRE CST: HCPCS

## 2019-12-29 PROCEDURE — 73630 X-RAY EXAM OF FOOT: CPT

## 2019-12-29 NOTE — ED PROVIDER NOTES
HPI patient is a 60-year-old white female who presents the ED with complaints of right foot pain. She states that she was at the [place] yesterday walking when she felt a sharp pop in her right  foot on the dorsal lateral aspect. She denies any direct injury or fall. Skin integrity is intact. There is no obvious bony deformity, rash, bruising or erythema. Good neurovascular sensation. No apparent tendon or nerve injury. Pain increases with weightbearing. She has not had any medications today prior to arrival.      Past Medical History:   Diagnosis Date    Ankle edema 4/5/2010    Anxiety 4/5/2010    Benign essential hypertension 5/26/2016 2006     Cancer ([facility] [address] 2019    BREAST    Carpal tunnel syndrome 4/5/2010    Depression 4/5/2010    ANXIETY    H/O Benign Colon Polyp 12/20/2010    H/O Uterine Fibroids 12/20/2010    Hypercholesterolemia 6/25/2013    ~2006    Hypothyroidism 5/26/2016 7/1999    Kidney stone 4/5/2010    Perimenopausal 4/5/2010    Primary osteoarthritis of left knee 5/26/2016    Extensive;  Ortho (needs knee replacement), CSI x 2,     S/P benign cervical polypectomy 12/20/2010    Sleep apnea 4/5/2010    USES CPAP    Stress incontinence 5/26/2010       Past Surgical History:   Procedure Laterality Date    COLONOSCOPY  10/05    benign polyp; repeat 5 yr per Dr Oni Nicholas COLONOSCOPY  11/2010    normal, f/u 5 yrs, Dr Oni Nicholas EKG ORDERABLE  5/2009    NSR, rate 84, normal EKG    HX BLADDER SUSPENSION  ~2010    Dr Delio Augustine  11/2011    VPI, right breast biopsy negative    HX BREAST LUMPECTOMY Right 6/13/2019    RIGHT BREAST LUMPECTOMY ( x2 ) WTIH ULTRASOUND , RIGHT SENTINAL NODE BIOPSY performed by Dalton Archer MD at [facility] AMBULATORY OR    HX BREAST RECONSTRUCTION Bilateral 8/27/2019    IMMEDIATE BILATERAL BREAST RECONSTRUCTION WITH DIRECT TO IMPLANT USE OF ALLODERM AND BILATERAL BREAST RECONSTRUCTION INTRA-OPERATIVE ASSESMENT OF BLOOD FLOW performed by Beltran Rojas MD at [address]  3/2010    Right; Dr Steven Landa  ,     Dr Patricia Mcnamara    HX COLONOSCOPY  2016    Dr Jaclyn Hannon-Internal Hemorrhoids- Normal mucosa in the whole colon - Repeat colonoscopy in 5 years    HX DILATION AND CURETTAGE  2005    AUB, benign/neg bx; Dr Jasmin Escobar  10/2012    Dr Patricia Mcnamara, [facility]    HX GI  2016    COLONOSCOPY    HX [address]    HX HYSTERECTOMY  2013    HX MASTECTOMY Bilateral 2019    BILATERAL BREAST SKIN SPARING MASTECTOMIES performed by Tiffanie Alexander MD at [address] ORTHOPAEDIC Right     [address] HX PARTIAL HYSTERECTOMY  13    Supracervical hysterectomy for fibroids, Dr Zechariah Barnett  age 11   [address] [place] UROLOGICAL      BLADDER SLING     HX WISDOM TEETH EXTRACTION           Family History:   Problem Relation Age of Onset    Arthritis-osteo Father     Stroke Father         TIA's    Cancer Father         melanoma on back removed    Pacemaker Father 80    Heart Disease Father          79 yo in     Hypertension Mother     Arthritis-osteo Mother     Heart Disease Mother         valve disease    Heart Attack Maternal Grandmother 76    No Known Problems Sister     No Known Problems Brother     Depression Daughter     Substance Abuse Daughter     Alcohol abuse Daughter        Social History     Socioeconomic History    Marital status:      Spouse name: Not on file    Number of children: 1    Years of education: Not on file    Highest education level: Not on file   Occupational History    Occupation: [address] Occupation: Retired    Social Needs    Financial resource strain: Not on file    Food insecurity:     Worry: Not on file     Inability: Not on file   Skipjump needs: Medical: Not on file     Non-medical: Not on file   Tobacco Use    Smoking status: Former Smoker     Packs/day: 0.50     Years: 25.00     Pack years: 12.50     Last attempt to quit: 1/1/2005     Years since quitting: 15.0    Smokeless tobacco: Never Used   Substance and Sexual Activity    Alcohol use: Yes     Alcohol/week: 5.0 standard drinks     Types: 5 Shots of liquor per week    Drug use: No    Sexual activity: Not Currently     Partners: Male     Comment: not sexually active x many years   Lifestyle    Physical activity:     Days per week: Not on file     Minutes per session: Not on file    Stress: Not on file   Relationships    Social connections:     Talks on phone: Not on file     Gets together: Not on file     Attends [employer] service: Not on file     Active member of club or organization: Not on file     Attends meetings of clubs or organizations: Not on file     Relationship status: Not on file    Intimate partner violence:     Fear of current or ex partner: Not on file     Emotionally abused: Not on file     Physically abused: Not on file     Forced sexual activity: Not on file   Other Topics Concern     Service Not Asked    Blood Transfusions Not Asked    Caffeine Concern No     Comment: no coffee, tea and cut out her diet Cokes    Occupational Exposure Not Asked   Reggie Huddle Hazards Not Asked    Sleep Concern Not Asked    Stress Concern Not Asked    Weight Concern Not Asked    Special Diet No     Comment: just trying to eat less carbs    Back Care Not Asked    Exercise No    Bike Helmet Not Asked    Seat Belt Not Asked    Self-Exams Not Asked   Social History Narrative    1 biological daughter, 1 \"adopted\", and 2 step children         ALLERGIES: Lisinopril; Pcn [penicillins]; Sulfa (sulfonamide antibiotics); and Zocor [simvastatin]    Review of Systems   Constitutional: Negative for activity change, appetite change, fever and unexpected weight change.    HENT: Negative for congestion, sore throat and trouble swallowing. Eyes: Negative for visual disturbance. Respiratory: Negative for cough and shortness of breath. Cardiovascular: Negative for chest pain, palpitations and leg swelling. Gastrointestinal: Negative for abdominal pain, constipation, diarrhea and vomiting. Genitourinary: Negative for dysuria. Musculoskeletal: Positive for arthralgias, gait problem and joint swelling. Skin: Negative for rash and wound. Neurological: Negative for dizziness, weakness and headaches. All other systems reviewed and are negative. Vitals:    12/29/19 0906   BP: (!) 178/91   Pulse: 80   Resp: 16   Temp: 97.9 °F (36.6 °C)            Physical Exam  Vitals signs and nursing note reviewed. Constitutional:       General: She is not in acute distress. Appearance: Normal appearance. She is obese. She is not ill-appearing, toxic-appearing or diaphoretic. Comments: White female; non smoker   HENT:      Head: Normocephalic. Nose: Nose normal.      Mouth/Throat:      Mouth: Mucous membranes are moist.      Pharynx: No posterior oropharyngeal erythema. Neck:      Musculoskeletal: Normal range of motion and neck supple. Cardiovascular:      Rate and Rhythm: Normal rate and regular rhythm. Pulmonary:      Effort: Pulmonary effort is normal.      Breath sounds: Normal breath sounds. Abdominal:      General: Bowel sounds are normal.      Palpations: Abdomen is soft. Musculoskeletal:         General: Tenderness and signs of injury present. Comments: Reports right dorsal lateral foot pain with palpation and weight bearing; Skin integrity is intact. There is no obvious deformity, bruising or erythema. Good neurovascular sensation. No apparent tendon or nerve injury. Skin:     General: Skin is warm and dry. Findings: No rash. Neurological:      General: No focal deficit present.       Mental Status: She is alert and oriented to person, place, and time. Mental status is at baseline. Psychiatric:         Mood and Affect: Mood normal.          MDM       Procedures      Patient has been reexamined and denies any pain while sitting she states she only has pain with weightbearing;. She was placed in a walking boot for comfort and support to the right foot; good neurovascular sensation before and after the walking boot placement by the RN. She was encouraged to follow-up with a foot and ankle specialist or orthopedic doctor for further evaluation. 10:48 AM  Patient's results and plan of care have been reviewed with her. Patient and/or family have verbally conveyed their understanding and agreement of the patient's signs, symptoms, diagnosis, treatment and prognosis and additionally agree to follow up as recommended or return to the Emergency Room should her condition change prior to follow-up. Discharge instructions have also been provided to the patient with some educational information regarding her diagnosis as well a list of reasons why she would want to return to the ER prior to her follow-up appointment should her condition change. Aria Mcmillan NP

## 2019-12-29 NOTE — ED TRIAGE NOTES
Triage: Pt arrives from home with CC of pain to right foot since yesterday. She was walking around a museum when she heard and felt a pop in the foot and it has been painful ever since. Pt denies any deformity. +PMS. More painful when weight bearing.

## 2019-12-29 NOTE — DISCHARGE INSTRUCTIONS
Patient Education        Foot Pain: Care Instructions  Your Care Instructions  Foot injuries that cause pain and swelling are fairly common. Almost all sports or home repair projects can cause a misstep that ends up as foot pain. Normal wear and tear, especially as you get older, also can cause foot pain. Most minor foot injuries will heal on their own, and home treatment is usually all you need to do. If you have a severe injury, you may need tests and treatment. Follow-up care is a key part of your treatment and safety. Be sure to make and go to all appointments, and call your doctor if you are having problems. It's also a good idea to know your test results and keep a list of the medicines you take. How can you care for yourself at home? · Take pain medicines exactly as directed. ? If the doctor gave you a prescription medicine for pain, take it as prescribed. ? If you are not taking a prescription pain medicine, ask your doctor if you can take an over-the-counter medicine. · Rest and protect your foot. Take a break from any activity that may cause pain. · Put ice or a cold pack on your foot for 10 to 20 minutes at a time. Put a thin cloth between the ice and your skin. · Prop up the sore foot on a pillow when you ice it or anytime you sit or lie down during the next 3 days. Try to keep it above the level of your heart. This will help reduce swelling. · Your doctor may recommend that you wrap your foot with an elastic bandage. Keep your foot wrapped for as long as your doctor advises. · If your doctor recommends crutches, use them as directed. · Wear roomy footwear. · As soon as pain and swelling end, begin gentle exercises of your foot. Your doctor can tell you which exercises will help. When should you call for help? Call 911 anytime you think you may need emergency care.  For example, call if:    · Your foot turns pale, white, blue, or cold.    Call your doctor now or seek immediate medical care if:    · You cannot move or stand on your foot.     · Your foot looks twisted or out of its normal position.     · Your foot is not stable when you step down.     · You have signs of infection, such as:  ? Increased pain, swelling, warmth, or redness. ? Red streaks leading from the sore area. ? Pus draining from a place on your foot. ? A fever.     · Your foot is numb or tingly.    Watch closely for changes in your health, and be sure to contact your doctor if:    · You do not get better as expected.     · You have bruises from an injury that last longer than 2 weeks. Where can you learn more? Go to http://radha-za.info/. Enter C623 in the search box to learn more about \"Foot Pain: Care Instructions. \"  Current as of: June 26, 2019  Content Version: 12.2  © 9687-0444 [employer]. Care instructions adapted under license by [employer] (which disclaims liability or warranty for this information). If you have questions about a medical condition or this instruction, always ask your healthcare professional. [place] [address] any warranty or liability for your use of this information. Patient Education        Stress Fracture of the Foot: Care Instructions  Your Care Instructions    A stress fracture is a thin, or hairline, crack in a bone. A stress fracture usually happens from repeated pressure on the foot, like running or jumping. You may need 6 to 8 weeks to heal.  Treatment depends on where the fracture is and how much pain it causes. Do not return to your usual exercise until your doctor says you can. Continued use of an injured foot can make the break worse or keep it from healing. You heal best when you take good care of yourself. Eat a variety of healthy foods, and don't smoke. Follow-up care is a key part of your treatment and safety. Be sure to make and go to all appointments, and call your doctor if you are having problems.  It's also a good idea to know your test results and keep a list of the medicines you take. How can you care for yourself at home? · Be safe with medicines. Take pain medicines exactly as directed. ? If the doctor gave you a prescription medicine for pain, take it as prescribed. ? If you are not taking a prescription pain medicine, ask your doctor if you can take an over-the-counter medicine. Read and follow all instructions on the label. · Follow your doctor's instructions about how much weight you can put on your foot and when you can go back to your usual activities. Use crutches as instructed. · If your doctor suggests it, put ice or a cold pack on your foot for 10 to 20 minutes at a time. Try to do this every 1 to 2 hours for the next 3 days (when you are awake) or until the swelling goes down. Put a thin cloth between the ice and your skin. Keep your splint or cast dry. · Prop up your foot on a pillow when you ice it or anytime you sit or lie down for the next 3 days. Try to keep it above the level of your heart. This will help reduce swelling. · Your doctor may have recommended a cast, a splint, or a special shoe or shoe insert. Follow the instructions your doctor gave you for using any of these treatments. When should you call for help? Call your doctor now or seek immediate medical care if:    · You have increased or severe pain.     · Your foot is cool or pale or changes color.     · You have tingling, weakness, or numbness in your foot and toes.     · Your cast or splint feels too tight.     · You cannot move your toes.     · You have a lot of swelling below your cast.    Watch closely for changes in your health, and be sure to contact your doctor if:    · Pain does not get better day by day.     · The skin under your cast or splint burns or stings. Where can you learn more? Go to http://radha-za.info/.   Enter P090 in the search box to learn more about \"Stress Fracture of the Foot: Care Instructions. \"  Current as of: June 26, 2019  Content Version: 12.2  © 5548-0585 [employer]. Care instructions adapted under license by [employer] (which disclaims liability or warranty for this information). If you have questions about a medical condition or this instruction, always ask your healthcare professional. [place] [address] any warranty or liability for your use of this information.

## 2019-12-31 ENCOUNTER — HOSPITAL ENCOUNTER (OUTPATIENT)
Dept: PHYSICAL THERAPY | Age: 64
Discharge: HOME | End: 2019-12-31
Payer: COMMERCIAL

## 2019-12-31 PROCEDURE — 97110 THERAPEUTIC EXERCISES: CPT | Performed by: PHYSICAL THERAPIST

## 2019-12-31 PROCEDURE — 97140 MANUAL THERAPY 1/> REGIONS: CPT | Performed by: PHYSICAL THERAPIST

## 2019-12-31 NOTE — PROGRESS NOTES
PT DAILY TREATMENT NOTE 2-15    Patient Name: Evelia Reynolds  Date:2019  : 1955  [x]  Patient  Verified  Payor: Luisa Jen / Plan: [address] / Product Type: CAROLA /    In time:12:00 Out time:1:15  Total Treatment Time (min): 75  Visit #:  8    Treatment Area: Shoulder stiffness, right [M25.611]  Stiffness of left shoulder, not elsewhere classified [M25.612]    SUBJECTIVE  Pain Level (0-10 scale): 3/10, 5 fatigue  Any medication changes, allergies to medications, adverse drug reactions, diagnosis change, or new procedure performed?: [x] No    [] Yes (see summary sheet for update)  Subjective functional status/changes:   [] No changes reported  Pt reports that she felt a \"pop\" in R foot while walking at [facility], went to ER, no fx, placed in boot. Saw an orthopedist yesterday who is having stay in boot for about 3-4 weeks. Sees orthopedist on 2019. No restrictions from Dr Manjinder Lubin, Dr Jaguar Rahman, or orthopedist.      OBJECTIVE    30 min Therapeutic Exercise:  [x] See flow sheet :   Rationale: increase ROM, increase strength and increase proprioception to improve the patients ability to lift, carry, reach, and complete ADL's    39 Min Manual Therapy:  PROM all directions to tolerance, STM kareem R breast, axilla, lateral torso, pec release   Rationale: decrease pain, increase ROM, increase tissue extensibility and decrease trigger points  to improve the patients ability to lift, carry, reach, and complete ADL's      With   [x] TE   [] TA   [] neuro   [] other: Patient Education: [x] Review HEP    [x] Progressed/Changed HEP based on: subj and obj assessments  [] positioning   [] body mechanics   [] transfers   [] heat/ice application    [] other:      Other Objective/Functional Measures:     Pain Level (0-10 scale) post treatment: 1    ASSESSMENT/Changes in Function:   Decrease frequency of PT visits to 1xwk d/t insurance changes.  Focus of today's tx's was review of HEP and modifications were made.      Patient will continue to benefit from skilled PT services to modify and progress therapeutic interventions, address functional mobility deficits, address ROM deficits, address strength deficits, analyze and address soft tissue restrictions, analyze and cue movement patterns, analyze and modify body mechanics/ergonomics and assess and modify postural abnormalities to attain remaining goals.      []  See Plan of Care  []  See progress note/recertification  []  See Discharge Summary         Progress towards goals / Updated goals:     PLAN  [x]  Upgrade activities as tolerated     [x]  Continue plan of care  []  Update interventions per flow sheet       []  Discharge due to:_  []  Other:_      SHAJI Lyon 12/31/2019

## 2020-01-07 ENCOUNTER — HOSPITAL ENCOUNTER (OUTPATIENT)
Dept: PHYSICAL THERAPY | Age: 65
Discharge: HOME | End: 2020-01-07
Payer: COMMERCIAL

## 2020-01-07 PROCEDURE — 97110 THERAPEUTIC EXERCISES: CPT | Performed by: PHYSICAL THERAPIST

## 2020-01-07 PROCEDURE — 97140 MANUAL THERAPY 1/> REGIONS: CPT | Performed by: PHYSICAL THERAPIST

## 2020-01-07 NOTE — PROGRESS NOTES
[facility]  [address] [facility] [place], [address]  Phone: [phone]      Fax:  [phone]    Progress Note    Name: Mary Ann Corley   : 1955   MD: Arnoldo Brothers MD       Treatment Diagnosis: Shoulder stiffness, right [M25.611]  Stiffness of left shoulder, not elsewhere classified [M25.612]  Start of Care: 2019    Visits from Start of Care: 9  Missed Visits: 0    Summary of Care:Pt has been receiving PT interventions for B shoulder stiffness and discomfort. She has had set backs since beginning PT with formation of L axillary abscess, increasing fatigue  and slow healing incisional sites. She has made gains in B shoulder motion and use during ADL's with decreased pain. Assessment / Recommendations: Recommending continuation of PT 1-2xwk in order to complete all below goals. Short Term Goals: To be accomplished in 2 treatments:  1) Pt will verbalize understanding of  lymphedema risk reduction practices. MET  2) Pt will verbalize knowledge of signs and symptoms to report to physician . MET     Long Term Goals: To be accomplished in 20 treatments:   1) Pt will be Independent with HEP for pain management, utilizing correct breath pattern, strengthening, and motion exercises in order to maintain gains achieved in therapy. PROGRESSING  2) Pt will utilize correct breath and movement patterns during all ADL's involving reaching above shoulder level with decreased pain by 3-4  levels on NPS. PROGRESSING  3) Pt will have increased B shoulder flexion, abduction and ER by 10-15 degrees or > and increased B upper quarter strength by 1-2 levels in order to be able to care for home and perform all ADL's with decreased pain in R shoulder/chest by 3-4 levels on NPS PROGRESSING  4) Pt will be able to tolerate HIIT therapy session involving cardio, core and UE strengthening for 30 mins without rest breaks or discomfort in B upper quadrants in order to increase activity tolerance and energy levelsPROGRQUINNING    Lillie Leach MSPT, CLT-ARCHIE 1/7/2020    ________________________________________________________________________  NOTE TO PHYSICIAN:  Please complete the following and fax to: [facility]: [phone]  . Retain this original for your records. If you are unable to process this request in 24 hours, please contact our office.        ____ I have read the above report and request that my patient continue therapy with the following changes/special instructions:  ____ I have read the above report and request that my patient be discharged from therapy    Physician's Signature:_________________ Date:___________Time:__________

## 2020-01-07 NOTE — PROGRESS NOTES
PT DAILY TREATMENT NOTE 2-15    Patient Name: Johnson Peabody  Date:2020  : 1955  [x]  Patient  Verified  Payor: Kenyatta Deal / Plan: Irene Grass / Product Type: CAROLA /    In time:12:00 Out time:1:00  Total Treatment Time (min): 60  Visit #:  9    Treatment Area: Shoulder stiffness, right [M25.611]  Stiffness of left shoulder, not elsewhere classified [M25.612]    SUBJECTIVE  Pain Level (0-10 scale): 1/10, 6 fatigue  Any medication changes, allergies to medications, adverse drug reactions, diagnosis change, or new procedure performed?: [x] No    [] Yes (see summary sheet for update)  Subjective functional status/changes:   [] No changes reported  Pt states that she has been very unmotivated to perform HEP on a regular basis and that she feels \"very stiff and tired today\"     OBJECTIVE    30 min Therapeutic Exercise:  [x] See flow sheet :   Rationale: increase ROM, increase strength and increase proprioception to improve the patients ability to lift, carry, reach, and complete ADL's    30 Min Manual Therapy:  PROM B UE's all directions to tolerance, STM kareem R breast, axilla, lateral torso, pec release, B ant chest incisional mobs, MFR B axilla, lateral torso, subclavicular mobs with exhalation   Rationale: decrease pain, increase ROM, increase tissue extensibility and decrease trigger points  to improve the patients ability to lift, carry, reach, and complete ADL's      With   [x] TE   [] TA   [] neuro   [] other: Patient Education: [x] Review HEP    [x] Progressed/Changed HEP based on: subj and obj assessments  [] positioning   [] body mechanics   [] transfers   [] heat/ice application    [] other:      Other Objective/Functional Measures:     Pain Level (0-10 scale) post treatment: 1    ASSESSMENT/Changes in Function:        Patient will continue to benefit from skilled PT services to modify and progress therapeutic interventions, address functional mobility deficits, address ROM deficits, address strength deficits, analyze and address soft tissue restrictions, analyze and cue movement patterns, analyze and modify body mechanics/ergonomics and assess and modify postural abnormalities to attain remaining goals.      []  See Plan of Care  [x]  See progress note/recertification  []  See Discharge Summary         Progress towards goals / Updated goals:  See progress note    PLAN  [x]  Upgrade activities as tolerated     [x]  Continue plan of care  []  Update interventions per flow sheet       []  Discharge due to:_  []  Other:_      Atul ROWLAND, BREE[facility]ARCHIE 1/7/2020

## 2020-01-08 ENCOUNTER — DOCUMENTATION ONLY (OUTPATIENT)
Dept: SURGERY | Age: 65
End: 2020-01-08

## 2020-01-14 ENCOUNTER — HOSPITAL ENCOUNTER (OUTPATIENT)
Dept: PHYSICAL THERAPY | Age: 65
Discharge: HOME | End: 2020-01-14
Payer: COMMERCIAL

## 2020-01-14 PROCEDURE — 97110 THERAPEUTIC EXERCISES: CPT | Performed by: PHYSICAL THERAPIST

## 2020-01-14 PROCEDURE — 97140 MANUAL THERAPY 1/> REGIONS: CPT | Performed by: PHYSICAL THERAPIST

## 2020-01-14 NOTE — PROGRESS NOTES
PT DAILY TREATMENT NOTE 2-15    Patient Name: Denise Gtz  Date:2020  : 1955  [x]  Patient  Verified  Payor: Thad Cash / Plan: [employer] / Product Type: CAROLA /    In time:3:00 Out time:4:00  Total Treatment Time (min): 60  Visit #:  10    Treatment Area: Shoulder stiffness, right [M25.611]  Stiffness of left shoulder, not elsewhere classified [M25.612]    SUBJECTIVE  Pain Level (0-10 scale): 0-1/10, 4-5 fatigue  Any medication changes, allergies to medications, adverse drug reactions, diagnosis change, or new procedure performed?: [x] No    [] Yes (see summary sheet for update)  Subjective functional status/changes:   [] No changes reported  Pt reports that she feels she is better since last visit on doing her HEP. Pt states that she has been doing some stationary bike at home with some difficulty for 10 mins.     OBJECTIVE    30 min Therapeutic Exercise:  [x] See flow sheet :   Rationale: increase ROM, increase strength and increase proprioception to improve the patients ability to lift, carry, reach, and complete ADL's    30 Min Manual Therapy:  PROM B UE's all directions to tolerance, STM kareem R breast, axilla, lateral torso, pec release, B ant chest incisional mobs (approximation on L ), MFR B axilla, lateral torso, subclavicular mobs with exhalation   Rationale: decrease pain, increase ROM, increase tissue extensibility and decrease trigger points  to improve the patients ability to lift, carry, reach, and complete ADL's      With   [x] TE   [] TA   [] neuro   [] other: Patient Education: [x] Review HEP    [x] Progressed/Changed HEP based on: subj and obj assessments  [] positioning   [] body mechanics   [] transfers   [] heat/ice application    [] other:      Other Objective/Functional Measures:     Pain Level (0-10 scale) post treatment: 1    ASSESSMENT/Changes in Function: Pt no longer has bandage covering left side incision, increased sensitivity in this area, performed approximation at L incision line to patient's tolerance. Pt did well with added resistance to upper quadrant strengthening and endurance exercises       Patient will continue to benefit from skilled PT services to modify and progress therapeutic interventions, address functional mobility deficits, address ROM deficits, address strength deficits, analyze and address soft tissue restrictions, analyze and cue movement patterns, analyze and modify body mechanics/ergonomics and assess and modify postural abnormalities to attain remaining goals.      []  See Plan of Care  []  See progress note/recertification  []  See Discharge Summary         Progress towards goals / Updated goals:      PLAN  [x]  Upgrade activities as tolerated     [x]  Continue plan of care  []  Update interventions per flow sheet       []  Discharge due to:_  []  Other:_      Paul Vernon MSPT, CLT-ARCHIE 1/14/2020

## 2020-01-21 ENCOUNTER — HOSPITAL ENCOUNTER (OUTPATIENT)
Dept: PHYSICAL THERAPY | Age: 65
Discharge: HOME | End: 2020-01-21
Payer: COMMERCIAL

## 2020-01-21 PROCEDURE — 97140 MANUAL THERAPY 1/> REGIONS: CPT

## 2020-01-21 PROCEDURE — 97110 THERAPEUTIC EXERCISES: CPT

## 2020-01-21 NOTE — PROGRESS NOTES
PT DAILY TREATMENT NOTE 2-15    Patient Name: eKon Green  Date:2020  : 1955  [x]  Patient  Verified  Payor: Jayson Sarmiento / Plan: Anabella Mendoza / Product Type: CAROLA /    In time:3:00 Out time:4:00  Total Treatment Time (min): 60  Visit #:  11    Treatment Area: Shoulder stiffness, right [M25.611]  Stiffness of left shoulder, not elsewhere classified [M25.612]    SUBJECTIVE  Pain Level (0-10 scale): 0/10, 3-4 fatigue  Any medication changes, allergies to medications, adverse drug reactions, diagnosis change, or new procedure performed?: [x] No    [] Yes (see summary sheet for update)  Subjective functional status/changes:   [] No changes reported  Pt reports less fatigue than she has been having. Patient reports she has been doing her HEP at home. OBJECTIVE    30 min Therapeutic Exercise:  [x] See flow sheet :   Rationale: increase ROM, increase strength and increase proprioception to improve the patients ability to lift, carry, reach, and complete ADL's    30 Min Manual Therapy:  PROM B UE's all directions to tolerance, STM kareem R breast, axilla, lateral torso, pec release, B ant chest incisional mobs (approximation on L ), MFR B axilla, lateral torso, L rib intercostals,  subclavicular mobs with exhalation   Rationale: decrease pain, increase ROM, increase tissue extensibility and decrease trigger points  to improve the patients ability to lift, carry, reach, and complete ADL's      With   [x] TE   [] TA   [] neuro   [] other: Patient Education: [x] Review HEP    [x] Progressed/Changed HEP based on: subj and obj assessments  [] positioning   [] body mechanics   [] transfers   [] heat/ice application    [] other:      Other Objective/Functional Measures:     Pain Level (0-10 scale) post treatment: 1    ASSESSMENT/Changes in Function: Patient continues to present with TTP along left chest wall and lower ribs.         Patient will continue to benefit from skilled PT services to modify and progress therapeutic interventions, address functional mobility deficits, address ROM deficits, address strength deficits, analyze and address soft tissue restrictions, analyze and cue movement patterns, analyze and modify body mechanics/ergonomics and assess and modify postural abnormalities to attain remaining goals. []  See Plan of Care  []  See progress note/recertification  []  See Discharge Summary         Progress towards goals / Updated goals:  Patient making good progress towards goals with good overall tolerance to strengthening exercises.      PLAN  [x]  Upgrade activities as tolerated     [x]  Continue plan of care  []  Update interventions per flow sheet       []  Discharge due to:_  []  Other:_      Wilman Maldonado PTA  1/21/2020

## 2020-01-23 ENCOUNTER — TELEPHONE (OUTPATIENT)
Dept: ONCOLOGY | Age: 65
End: 2020-01-23

## 2020-01-23 NOTE — TELEPHONE ENCOUNTER
Initial session 80 min with pt. More formal note to follow. Plan- Pt to do meditiation CDs each night.  - Consult PCP about her anitdepressant she has been on for 20 yrs.  Not sure if dosage needs to be changed or try a new one.  - follow up appt in 2 wks 2/6/20 at [address] [place] [address]

## 2020-01-28 ENCOUNTER — HOSPITAL ENCOUNTER (OUTPATIENT)
Dept: PHYSICAL THERAPY | Age: 65
Discharge: HOME | End: 2020-01-28
Payer: COMMERCIAL

## 2020-01-28 ENCOUNTER — TELEPHONE (OUTPATIENT)
Dept: ONCOLOGY | Age: 65
End: 2020-01-28

## 2020-01-28 PROCEDURE — 97140 MANUAL THERAPY 1/> REGIONS: CPT

## 2020-01-28 PROCEDURE — 97110 THERAPEUTIC EXERCISES: CPT

## 2020-01-28 NOTE — PROGRESS NOTES
PT DAILY TREATMENT NOTE 2-15    Patient Name: Sonia Osman  Date:2020  : 1955  [x]  Patient  Verified  Payor: [employer] [place] / Plan: Noemy Kimball / Product Type: CAROLA /    In time:3:00p Out time:4:00p  Total Treatment Time (min): 60  Visit #:  12    Treatment Area: Shoulder stiffness, right [M25.611]  Stiffness of left shoulder, not elsewhere classified [M25.612]    SUBJECTIVE  Pain Level (0-10 scale): 0/10, 1-2 fatigue  Any medication changes, allergies to medications, adverse drug reactions, diagnosis change, or new procedure performed?: [x] No    [] Yes (see summary sheet for update)  Subjective functional status/changes:   [] No changes reported  Patient reports she has been noticing more feeling through the chest and continues to feel tight. Patient reports she has not done much today to feel tired so is feeling good right now. Patient states she talked with a counsler and feels much better.     OBJECTIVE    30 min Therapeutic Exercise:  [x] See flow sheet :   Rationale: increase ROM, increase strength and increase proprioception to improve the patients ability to lift, carry, reach, and complete ADL's    30 Min Manual Therapy:  PROM B UE's all directions to tolerance, STM kareem R breast, axilla, lateral torso, pec release, B ant chest incisional mobs (approximation on L ), MFR B axilla, lateral torso, L rib intercostals,  subclavicular mobs with exhalation   Rationale: decrease pain, increase ROM, increase tissue extensibility and decrease trigger points  to improve the patients ability to lift, carry, reach, and complete ADL's      With   [x] TE   [] TA   [] neuro   [] other: Patient Education: [x] Review HEP    [x] Progressed/Changed HEP based on: subj and obj assessments  [] positioning   [] body mechanics   [] transfers   [] heat/ice application    [] other:      Other Objective/Functional Measures:     Pain Level (0-10 scale) post treatment: 1    ASSESSMENT/Changes in Function: Patient continues to present with TTP and decreased tissue extensibility along left chest wall and TTP lower ribs. Patient will continue to benefit from skilled PT services to modify and progress therapeutic interventions, address functional mobility deficits, address ROM deficits, address strength deficits, analyze and address soft tissue restrictions, analyze and cue movement patterns, analyze and modify body mechanics/ergonomics and assess and modify postural abnormalities to attain remaining goals. []  See Plan of Care  []  See progress note/recertification  []  See Discharge Summary         Progress towards goals / Updated goals:  Patient making good progress towards goals with good overall tolerance to strengthening exercises. Patient making steady gains  In overall ROM and tissue extensibility and would benefit from continues PT to address continued tissue turgor bilateral chest.    Short Term Goals: To be accomplished in 2 treatments:  1) Pt will verbalize understanding of  lymphedema risk reduction practices. MET  2) Pt will verbalize knowledge of signs and symptoms to report to physician . MET     Long Term Goals: To be accomplished in 20 treatments:   1) Pt will be Independent with HEP for pain management, utilizing correct breath pattern, strengthening, and motion exercises in order to maintain gains achieved in therapy. PROGRESSING  2) Pt will utilize correct breath and movement patterns during all ADL's involving reaching above shoulder level with decreased pain by 3-4  levels on NPS. PROGRESSING  3) Pt will have increased B shoulder flexion, abduction and ER by 10-15 degrees or > and increased B upper quarter strength by 1-2 levels in order to be able to care for home and perform all ADL's with decreased pain in R shoulder/chest by 3-4 levels on NPS PROGRESSING  4) Pt will be able to tolerate HIIT therapy session involving cardio, core and UE strengthening for 30 mins without rest breaks or discomfort in B upper quadrants in order to increase activity tolerance and energy levels PROGRESSING    PLAN  [x]  Upgrade activities as tolerated     [x]  Continue plan of care  []  Update interventions per flow sheet       []  Discharge due to:_  []  Other:_      [address] [employer] PTA  1/28/2020

## 2020-01-28 NOTE — TELEPHONE ENCOUNTER
Pt is a 58 yo MWF born and raised in [address] [facility]. She is  to [facility] for 17 yrs (2nd marriage, div 12). She has one dtr Suha Kellyner, from her first marriage who is 27 yo and lives in [facility]. Pt has 1 brother in [facility] and 1 sister in [place]. 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 They had but an addition on their home in summer of 2018, sadly her father passed at age 80 after one month of moving in. Pt's mother passed in 2013(both parents lived at [facility]) having had multiple medical issues then got sepsis in hospital.    After a rough year in 2018 with her dad's decline and passing she was diagnosed in April 2019 with breast cancer (IDC). Though she reports she was low risk, she chose to have a double mastectomy and has had a difficult recovery ever since, being hospitalized multiple times for infection and wound related issues. 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 Pt tearful at times, feels this whole process has been pretty traumatic. Reports dtr is very helpful but lives in [place].  is not very supportive as he seems to be struggling with his own medical issues (COPD) and pt describes him as a loner. \"He is a kind person,good provider and but not emotionally supportive. \" Pt has support from her [employer] where she worked and grew up with [employer]) but due to all the infections has had to stay away until recently. Pt denies any formal therapy or  hospitalizations but has been on Wellbutrin for over 20 yrs from her PCP. She had even decreased dosage around 2015. It appears based on her tearfulness and other depressive symptoms that she may benefit from an increase or possibly a new medication. No SI reported but lacks motivation to get out and do things.     .Pt reports good support in [employer], would like to lose some weight by being more active when doctor says its ok to do more activity. Here at [facility] for additional support. Has done meditation. Able at times to use humor. Plan- Pt to do meditation CDs each night.  - Consult PCP about her antidepressant she has been on for 20 yrs. Not sure if dosage needs to be changed or try a new one.  - On Sundays make plans for 2-3x a wk to go out to movie, lunch or short walk with a friend whom she finds supportive.   - follow up appt in 2 wks 2/6/20 at [address] [place] [address] [facility]

## 2020-01-30 ENCOUNTER — OFFICE VISIT (OUTPATIENT)
Dept: FAMILY MEDICINE CLINIC | Age: 65
End: 2020-01-30

## 2020-01-30 VITALS
WEIGHT: 272 LBS | BODY MASS INDEX: 41.22 KG/M2 | RESPIRATION RATE: 16 BRPM | TEMPERATURE: 98.3 F | DIASTOLIC BLOOD PRESSURE: 76 MMHG | HEIGHT: 68 IN | OXYGEN SATURATION: 97 % | SYSTOLIC BLOOD PRESSURE: 110 MMHG | HEART RATE: 76 BPM

## 2020-01-30 DIAGNOSIS — F33.9 CHRONIC MAJOR DEPRESSIVE DISORDER, RECURRENT EPISODE (HCC): Primary | ICD-10-CM

## 2020-01-30 DIAGNOSIS — F43.23 ADJUSTMENT REACTION WITH ANXIETY AND DEPRESSION: ICD-10-CM

## 2020-01-30 DIAGNOSIS — Z90.13 STATUS POST BILATERAL MASTECTOMY: ICD-10-CM

## 2020-01-30 DIAGNOSIS — Z98.890 H/O BREAST RECONSTRUCTION: ICD-10-CM

## 2020-01-30 DIAGNOSIS — E66.01 OBESITY, MORBID, BMI 40.0-49.9 (HCC): ICD-10-CM

## 2020-01-30 DIAGNOSIS — M17.12 PRIMARY OSTEOARTHRITIS OF LEFT KNEE: ICD-10-CM

## 2020-01-30 PROBLEM — F32.A CHRONIC DEPRESSION: Status: ACTIVE | Noted: 2020-01-30

## 2020-01-30 RX ORDER — GABAPENTIN 300 MG/1
CAPSULE ORAL
COMMUNITY
Start: 2019-08-02 | End: 2020-01-30

## 2020-01-30 RX ORDER — DOCUSATE SODIUM 100 MG/1
CAPSULE, LIQUID FILLED ORAL
COMMUNITY
Start: 2019-10-15 | End: 2020-01-30

## 2020-01-30 RX ORDER — LOSARTAN POTASSIUM 100 MG/1
TABLET ORAL
COMMUNITY
Start: 2019-09-05 | End: 2020-01-30

## 2020-01-30 RX ORDER — LOSARTAN POTASSIUM 50 MG/1
TABLET ORAL
COMMUNITY
End: 2020-01-30

## 2020-01-30 RX ORDER — FLUOXETINE HYDROCHLORIDE 20 MG/1
20 CAPSULE ORAL DAILY
Qty: 30 CAP | Refills: 1 | Status: SHIPPED | OUTPATIENT
Start: 2020-01-30 | End: 2020-03-30

## 2020-01-30 RX ORDER — LEVOTHYROXINE SODIUM 137 UG/1
TABLET ORAL
COMMUNITY
Start: 2019-09-05 | End: 2020-01-30

## 2020-01-30 RX ORDER — GABAPENTIN 100 MG/1
CAPSULE ORAL
COMMUNITY
Start: 2019-09-23 | End: 2020-01-30

## 2020-01-30 NOTE — PROGRESS NOTES
Chief Complaint   Patient presents with    Depression     follow up.  discuss possible changing medication    Breast Mass     discuss recent Dx of CA with surgery and medication tx     \"REVIEWED RECORD IN PREPARATION FOR VISIT AND HAVE OBTAINED THE NECESSARY DOCUMENTATION\"  1. Have you been to the ER, urgent care clinic since your last visit? Hospitalized since your last visit? Yes Where: multiple surgeries and locations. 2. Have you seen or consulted any other health care providers outside of the [address] [place] [address] since your last visit? Include any pap smears or colon screening.  Yes Where: same

## 2020-01-31 ENCOUNTER — OFFICE VISIT (OUTPATIENT)
Dept: SURGERY | Age: 65
End: 2020-01-31

## 2020-01-31 VITALS
HEART RATE: 79 BPM | SYSTOLIC BLOOD PRESSURE: 121 MMHG | HEIGHT: 68 IN | WEIGHT: 272 LBS | DIASTOLIC BLOOD PRESSURE: 56 MMHG | BODY MASS INDEX: 41.22 KG/M2

## 2020-01-31 DIAGNOSIS — C50.811 CANCER OF OVERLAPPING SITES OF RIGHT BREAST (HCC): Primary | ICD-10-CM

## 2020-01-31 NOTE — PATIENT INSTRUCTIONS

## 2020-01-31 NOTE — PROGRESS NOTES
HISTORY OF PRESENT ILLNESS  Megan Martinez is a 59 y.o. female. HPI ESTABLISHED patient here for follow up bilateral breast cancer. Overall doing better. No open wounds, all sites have healed including LEFT axilla where she has abscessed lymph node around [place] time. Continues PT with Blair Lewis for tightness in chest wall. Has been doing meditation and going to counseling through [facility] at [facility]. Started an anti anxiety medication.     Breast cancer history-  · 6/13/19 - RIGHT breast lumpectomy x 2 sites and RIGHT SLNB  · 8/27/19 - BILATERAL mastectomies with reconstruction. Dr. Dulce Mandujano did direct saline implants. Surg path showed LEFT breast benign, residual IDC on RIGHT breast.   · 10/15/19 - Removal of bilateral breast implants. Complicated breast cancer course including multiple surgeries and hospitalizations for wound infections. · 12/2019 - Anastrozole - Dr. Malini Schofield     Review of Systems   All other systems reviewed and are negative. Physical Exam  Vitals signs and nursing note reviewed. Chest:             ASSESSMENT and PLAN    ICD-10-CM ICD-9-CM    1.  Cancer of overlapping sites of right breast ([facility] [address] C50.811 174.8      - healing well  - no evidence local recurrence  - f/u 6 months

## 2020-01-31 NOTE — PROGRESS NOTES
Chief Complaint   Patient presents with    Depression    Anxiety    Medication Evaluation     Wellbutrin       HISTORY OF PRESENT ILLNESS   HPI  Patient presents today to update me on her recent interim health conditions, concerns and to discuss depression and anxiety. She states that the past 1 1/2 yrs have been very stressful, difficult and challenging. Starting w/ the unexpected death of her father in . In addition to having to cope w/ the grief of losing him and the overwhelming stress of having to manage his estate, it also resurfaced feelings of suppressed depression that she never really overcame when her mother  in . She feels like she never really had time to grieve fully from that because she immediately went in to taking over caring for her elderly father after that. Then in , she had an abnormal mammogram, then us and ended up having biopsy right breast lump which was + for invasive ductal carcinoma. She had a lumpectomy . Did not require radiation or chemo. She ultimately decided to proceed w/ B mastectomy and implants which she underwent . Shortly after that she developed high fevers and problems w/ the left breast leakage and infection. She ended up being hospitalized at [facility]  and placed on IV abx. All through the months of Sept and Oct she ended up w/ constant problems related to the breast implants, chronic pain and recurrent infections, so she ultimately ended up having the implants removed . She still ended up needed continual IV abx and a wound vac was placed then end of October. This was finally removed 19. The healing process was long, painful, physically & emotionally draining. She ended up w/ severe scarring all over her chest/breast area which through her into a complete state of panic to the point that she almost passed out one time.   Her daughter was the only one whom she felt offered support and really helped her through the recovery process altogether. The entire experience was very traumatizing to her but she didn't seek any outside support, mostly because she didn't know how to proceed and wasn't aware of any resources available until she started PT 12-5-19. She started off going once a week and next month this will be increased to twice a week. Her physical therapist recommended a counselor for her through [facility] [address] She met w/ the [facility] Doretha for the first time last week. This was very helpful and she is scheduled to go back for follow up next week on 1-23-20. Cindy Antony suggested pt see me to discuss medication mgt of her depression, anxiety and grieving. Patient has h/o chronic depression dating back several years ago. She had been well controlled on Wellbutrin XL x many years. In fact we had been weaning her because she had been doing so well for so long. She has been maintained on the 150 mg dose the past few years and at her last visit w/ me she mentioned that she really wanted to try being off of it altogether but was just going to give it more time. Now she is glad she at least stayed on that but is wondering w/ all that is going on now and her recent increased depression and anxiety, if she needs to try something else. Feels sad, depressed, emotionally fatigued, low mood, low energy, no drive, lack of motivation, lack of focus, loss of self identity, difficulty w/ self image since repeated breast reconstructions and scars, heightened anxiety, nervousness, racy, mind racing, sleep patterns vary at night, uncontrollable eating. She has been more sedentary since all this in addition to injuring her right foot 12-28-19 (ended up [facility] and follow up Dr Jerica Meraz). Her arthritis pain also limits her. This is depressing her as well. She wants to try something else for depression and anxiety but is very very concerned about wt gain.       REVIEW OF SYMPTOMS   Review of Systems   Constitutional: Positive for malaise/fatigue. Negative for weight loss. Respiratory: Negative. Cardiovascular: Negative. Gastrointestinal: Negative. Neurological: Negative. Psychiatric/Behavioral: Positive for depression. Negative for suicidal ideas. The patient is nervous/anxious and has insomnia. PROBLEM LIST/MEDICAL HISTORY     Problem List  Date Reviewed: 1/30/2020          Codes Class Noted    Status post right breast lumpectomy ICD-10-CM: Z98.890  ICD-9-CM: V45.89  1/30/2020    Overview Addendum 1/30/2020  8:50 PM by Shannen Holden MD     6-, Invasive ductal carcinoma--->Mastectomy 8-, no chemo, no radiation, started on Arimidex             Chronic depression ICD-10-CM: F32.9  ICD-9-CM: 679  1/30/2020        H/O bilateral mastectomy ICD-10-CM: Z90.13  ICD-9-CM: V45.71  12/2/2019    Overview Addendum 1/30/2020  8:59 PM by Shannen Holden MD     Double Mastectomy (right breast cancer and elective left breast prophylactically); No chemo, No radiation; Started on Arimidex, followed by Dr. Jacinda Jane             H/O breast reconstruction ICD-10-CM: V62.09  ICD-9-CM: V43.82  12/2/2019        Arthritis ICD-10-CM: M19.90  ICD-9-CM: 716.90  12/2/2019        Cancer of overlapping sites of right breast ([facility] ICD-10-CM: C50.811  ICD-9-CM: 174.8  8/27/2019        Severe obesity ([facility] ICD-10-CM: E66.01  ICD-9-CM: 278.01  11/28/2018        Essential hypertension ICD-10-CM: I10  ICD-9-CM: 401.9  5/26/2016    Overview Signed 5/26/2016  9:26 AM by Shannen Holden MD     2006             Hypothyroidism ICD-10-CM: E03.9  ICD-9-CM: 244.9  5/26/2016    Overview Signed 5/26/2016  9:27 AM by Shannen Holden MD     7/1999             Primary osteoarthritis of left knee ICD-10-CM: M17.12  ICD-9-CM: 715.16  5/26/2016    Overview Signed 5/26/2016  9:42 AM by Shannen Holden MD     Extensive;  Ortho (needs knee replacement), CSI x 2,              Hypercholesterolemia ICD-10-CM: E78.00  ICD-9-CM: 272.0  6/25/2013    Overview Signed 6/25/2013  8:29 AM by Nai Arvizu MD     ~1442             S/P benign cervical polypectomy ICD-10-CM: Z07.224, Z87.42  ICD-9-CM: V45.89  12/20/2010    Overview Addendum 5/26/2016  9:42 AM by Nai Arvizu MD     2009, 2015; Gyn Dr Dawood Groves             H/O Benign Colon Polyp ICD-10-CM: K63.5  ICD-9-CM: 211.3  12/20/2010    Overview Signed 12/20/2010 12:10 PM by Nai Arvizu MD     Colonoscopy 10/2005, 11/2010  q5yrs  Dr Maile Thomas             H/O Uterine Fibroids ICD-10-CM: D21.9  ICD-9-CM: 215.9  12/20/2010    Overview Signed 12/20/2010 12:13 PM by Nai Arvizu MD     2005; no surgical intervention  Gyn Dr Geraldine Guzman incontinence ICD-10-CM: N39.3  ICD-9-CM: DOS5053  5/26/2010    Overview Signed 5/26/2010 12:47 PM by Nai Arvizu MD     Urologist Dr Tamela Olmedo             Sleep apnea, RENÉE ICD-10-CM: P89.46  ICD-9-CM: 780.57  4/5/2010    Overview Addendum 12/20/2010 12:14 PM by Nai Arvizu MD     2009; cpap             Anxiety ICD-10-CM: F41.9  ICD-9-CM: 300.00  4/5/2010        Carpal tunnel syndrome ICD-10-CM: G56.00  ICD-9-CM: 354.0  4/5/2010        Mild Dependent Ankle edema, B ICD-10-CM: M25.473  ICD-9-CM: 719.07  4/5/2010    Overview Signed 4/5/2010  3:52 PM by Maryam Pulido     mild             Perimenopausal ICD-10-CM: N95.1  ICD-9-CM: 627.2  4/5/2010    Overview Signed 4/5/2010  3:52 PM by Maryam Pulido     2007             Depression ICD-10-CM: F32.9  ICD-9-CM: 272  4/5/2010        ?  Passed Kidney stone ICD-10-CM: N20.0  ICD-9-CM: 592.0  4/5/2010    Overview Signed 5/26/2010 12:47 PM by Nai Arvizu MD     2/2010                       PAST SURGICAL HISTORY     Past Surgical History:   Procedure Laterality Date    EKG ORDERABLE  5/2009    NSR, rate 84, normal EKG    HX BLADDER SUSPENSION  ~2010    Dr Leonid Biggs Left 09/23/2019    [facility].  Dr. Maikel Bennett, [facility] Mastectomy Implant Infection Left Breast, Replaced Implant, IV abx    HX BREAST BIOPSY Right 11/2011    VPI, right breast biopsy negative    HX BREAST BIOPSY Right 04/22/2019    invasive ductal carcinoma    HX BREAST LUMPECTOMY Right 6/13/2019    RIGHT BREAST LUMPECTOMY ( x2 ) WTIH ULTRASOUND , RIGHT SENTINAL NODE BIOPSY performed by [facility] at [facility]    HX BREAST RECONSTRUCTION Bilateral 8/27/2019    B MASTECTOMY AND IMPLANTS, IMMEDIATE BILATERAL BREAST RECONSTRUCTION WITH DIRECT TO IMPLANT USE OF ALLODERM AND BILATERAL BREAST RECONSTRUCTION INTRA-OPERATIVE ASSESMENT OF BLOOD FLOW performed by Mert Verde MD at [place] [address] Right 03/2010    Dr Nat Collins  2009, 2015    Dr Dawood Groves    HX COLONOSCOPY  04/08/2016    Dr Shruti Hannon-Internal Hemorrhoids- Normal mucosa in the whole colon - Repeat colonoscopy in 5 years    HX COLONOSCOPY  2016    HX COLONOSCOPY  10/2005    benign polyp; repeat 5 yr per Dr Jurgen Rob HX COLONOSCOPY  11/2010    Normal, f/u 5 yrs, Dr Antionette Gillespie  7/2005    AUB, benign/neg bx; Dr Mau Jaramillo  10/2012    Dr Dawood Groves, AUB    HX HYSTERECTOMY  2013    HX MASTECTOMY Bilateral 8/27/2019    BILATERAL BREAST SKIN SPARING MASTECTOMIES performed by Joe Johnson MD at [address] [place] [address] Right 2010    CARPAL TUNNEL    HX PARTIAL HYSTERECTOMY  4/26/13    Supracervical hysterectomy for fibroids, Dr Flavia Dennis HX UROLOGICAL  2010    BLADDER SLING      [facility] Bilateral 10/15/2019    [facility] Dr. Maikel Bennett, B Breast Implant pain and recurrent left breast infection; on wound vac w/ iv abx; wound pump removed 12-4-2019         MEDICATIONS     Current Outpatient Medications   Medication Sig    OTHER,NON-FORMULARY, Tumeric once a day    losartan (COZAAR) 100 mg tablet TAKE 1 TABLET BY MOUTH ONCE DAILY    anastrozole (ARIMIDEX) 1 mg tablet Take 1 mg by mouth daily. Indications: Hormone Receptor Positive Breast Cancer    buPROPion XL (WELLBUTRIN XL) 150 mg tablet take 1 tablet by mouth once daily before BREAKFAST    levothyroxine (SYNTHROID) 137 mcg tablet take 1 tablet by mouth once daily BEFORE BRAKFAST    OTHER Portable CPAP machine for RENÉE, with new mask and tubing but no change to mask or settings; G47.30 GARO 99m prog good               ALLERGIES     Allergies   Allergen Reactions    Pcn [Penicillins] Hives    Sulfa (Sulfonamide Antibiotics) Hives    Zocor [Simvastatin] Myalgia    Lisinopril Cough          SOCIAL HISTORY     Social History     Socioeconomic History    Marital status:      Spouse name: Not on file    Number of children: 1    Years of education: Not on file    Highest education level: Not on file   Occupational History    Occupation: [employer] Occupation: Retired    Tobacco Use    Smoking status: Former Smoker     Packs/day: 0.50     Years: 25.00     Pack years: 12.50     Last attempt to quit: 1/1/2005     Years since quitting: 15.0    Smokeless tobacco: Never Used   Substance and Sexual Activity    Alcohol use:  Yes     Alcohol/week: 5.0 standard drinks     Types: 5 Shots of liquor per week    Drug use: No    Sexual activity: Not Currently     Partners: Male     Comment: not sexually active x many years   Other Topics Concern    Caffeine Concern No     Comment: no coffee, tea and cut out her diet Cokes    Special Diet No    Exercise No   Social History Narrative    1 biological daughter, 1 \"adopted\", and 2 step children        IMMUNIZATIONS  Immunization History   Administered Date(s) Administered    Influenza Vaccine 10/03/2014, 11/10/2015, 08/07/2016, 09/30/2017, 11/21/2018, 12/05/2019    Influenza Vaccine (Quad) PF 11/21/2018, 12/05/2019  Influenza Vaccine Split 2010, 10/06/2011    Meningococcal ACWY Vaccine 2013    TD Vaccine 1999, 2009    Td 2017    Tdap 2016, 2017    Zoster Recombinant 2018, 2019    Zoster Vaccine, Live 2013         FAMILY HISTORY     Family History   Problem Relation Age of Onset    Arthritis-osteo Father     Stroke Father         TIA's    Cancer Father         melanoma on back removed    Pacemaker Father 80    Heart Disease Father          79 yo in     Hypertension Mother     Arthritis-osteo Mother     Heart Disease Mother         valve disease,      Heart Attack Maternal Grandmother 76    No Known Problems Sister     No Known Problems Brother     Depression Daughter     Substance Abuse Daughter     Alcohol abuse Daughter          VITALS     Visit Vitals  /76 (BP 1 Location: Left arm, BP Patient Position: Sitting)   Pulse 76   Temp 98.3 °F (36.8 °C) (Oral)   Resp 16   Ht 5' 8\" (1.727 m)   Wt 272 lb (123.4 kg)   LMP 2011   SpO2 97%   BMI 41.36 kg/m²          PHYSICAL EXAMINATION   Physical Exam  Vitals signs reviewed. Constitutional:       General: She is not in acute distress. Cardiovascular:      Rate and Rhythm: Normal rate. Pulmonary:      Effort: Pulmonary effort is normal. No respiratory distress. Neurological:      Mental Status: She is oriented to person, place, and time. Psychiatric:         Attention and Perception: Attention normal.         Mood and Affect: Mood is depressed. Affect is tearful. Speech: Speech normal.         Behavior: Behavior normal.         Thought Content: Thought content normal. Thought content does not include suicidal ideation. Cognition and Memory: Cognition normal.         Judgment: Judgment normal.             ASSESSMENT & PLAN       ICD-10-CM ICD-9-CM    1.  Chronic major depressive disorder, recurrent episode (HCC) F33.9 296.30 FLUoxetine (PROZAC) 20 mg capsule   2. Adjustment reaction with anxiety and depression F43.23 309.28 FLUoxetine (PROZAC) 20 mg capsule   3. Breast Cancer, Right Breast, Status post bilateral mastectomy 8-2019 Z90.13 V45.71    4. H/O breast reconstruction Z98.82 V43.82    5. Obesity, morbid, BMI 40.0-49.9 ([facility]) E66.01 278.01    6. Primary osteoarthritis of left knee M17.12 715.16      Patient presents today to update me on her recent interim health conditions, concerns and to discuss depression and anxiety. This has been detailed in extensive HPI above and I also updated extensive details in Problem List, PMH, and PSH as documented in today's note. She started seeing a counselor through [address] [place] [address] whom she met w/ for the first time last week. This was very helpful and she is scheduled to go back for follow up next week on 1-23-20. Patient has h/o chronic depression dating back several years ago with recent exacerbation of depression and increased anxiety due to several psychosocial stressors and adjustment reaction. She had been well controlled on Wellbutrin XL x many years til now. She would like to try something else for depression and anxiety at this time but is very very concerned about wt gain lexie in light of her other co-morbidities (obesity, hypertension, sedentary lifestyle, immobility due to orthopedic issues both chronic and acute)  After lengthy discussion about medication classes, indications, effects, risks/benefits, potential interactions, possible side effects, wt concerns, cost concerns, we have agreed to try the following regimen for now: Add Prozac 20 mg 1 cap qam  Continue Wellbutrin  mg 1 tab qam  She will return for follow up, medication check, fasting routine follow up and labs in 6 weeks, but call back or follow up sooner in the interim if needed. Continue counseling per therapist's discretion for now.   Spent >50% of today's 40 min encounter counseling about all the above, reviewing/discussing recent health concerns and medical conditions.

## 2020-02-04 ENCOUNTER — HOSPITAL ENCOUNTER (OUTPATIENT)
Dept: PHYSICAL THERAPY | Age: 65
Discharge: HOME | End: 2020-02-04
Payer: COMMERCIAL

## 2020-02-04 PROCEDURE — 97140 MANUAL THERAPY 1/> REGIONS: CPT

## 2020-02-04 PROCEDURE — 97110 THERAPEUTIC EXERCISES: CPT

## 2020-02-04 NOTE — PROGRESS NOTES
PT DAILY TREATMENT NOTE 2-15    Patient Name: Darcie Ureña  Date:2020  : 1955  [x]  Patient  Verified  Payor: Shweta Marcoyue / Plan: [address] / Product Type: CAROLA /    In time:3:00p Out time:4:00p  Total Treatment Time (min): 60  Visit #:  13    Treatment Area: Shoulder stiffness, right [M25.611]  Stiffness of left shoulder, not elsewhere classified [M25.612]    SUBJECTIVE  Pain Level (0-10 scale): 0/10, 1-2 fatigue  Any medication changes, allergies to medications, adverse drug reactions, diagnosis change, or new procedure performed?: [x] No    [] Yes (see summary sheet for update)  Subjective functional status/changes:   [] No changes reported  Patient reports no issues after last session. Patient states she is a little tired, but is no worse than last session. Patient reports she is a little tender and sore from doing self MFR this weekend and has backed off some from it.      OBJECTIVE    30 min Therapeutic Exercise:  [x] See flow sheet :   Rationale: increase ROM, increase strength and increase proprioception to improve the patients ability to lift, carry, reach, and complete ADL's    30 Min Manual Therapy:  PROM B UE's all directions to tolerance, STM kareem R breast, axilla, lateral torso, pec release, B ant chest incisional mobs, MFR B axilla, lateral torso, L rib intercostals,  subclavicular mobs with exhalation   Rationale: decrease pain, increase ROM, increase tissue extensibility and decrease trigger points  to improve the patients ability to lift, carry, reach, and complete ADL's      With   [x] TE   [] TA   [] neuro   [] other: Patient Education: [x] Review HEP    [x] Progressed/Changed HEP based on: subj and obj assessments  [] positioning   [] body mechanics   [] transfers   [] heat/ice application    [] other:      Other Objective/Functional Measures:     Pain Level (0-10 scale) post treatment: 1    ASSESSMENT/Changes in Function: Patient continues to present with TTP and decreased tissue extensibility along left chest wall and TTP lower ribs. Patient will continue to benefit from skilled PT services to modify and progress therapeutic interventions, address functional mobility deficits, address ROM deficits, address strength deficits, analyze and address soft tissue restrictions, analyze and cue movement patterns, analyze and modify body mechanics/ergonomics and assess and modify postural abnormalities to attain remaining goals. []  See Plan of Care  []  See progress note/recertification  []  See Discharge Summary         Progress towards goals / Updated goals:  Patient making good progress towards goals with good overall tolerance to strengthening exercises. Patient making steady gains  in overall ROM and tissue extensibility. Short Term Goals: To be accomplished in 2 treatments:  1) Pt will verbalize understanding of  lymphedema risk reduction practices. MET  2) Pt will verbalize knowledge of signs and symptoms to report to physician . MET     Long Term Goals: To be accomplished in 20 treatments:   1) Pt will be Independent with HEP for pain management, utilizing correct breath pattern, strengthening, and motion exercises in order to maintain gains achieved in therapy. PROGRESSING  2) Pt will utilize correct breath and movement patterns during all ADL's involving reaching above shoulder level with decreased pain by 3-4  levels on NPS. PROGRESSING  3) Pt will have increased B shoulder flexion, abduction and ER by 10-15 degrees or > and increased B upper quarter strength by 1-2 levels in order to be able to care for home and perform all ADL's with decreased pain in R shoulder/chest by 3-4 levels on NPS PROGRESSING  4) Pt will be able to tolerate HIIT therapy session involving cardio, core and UE strengthening for 30 mins without rest breaks or discomfort in B upper quadrants in order to increase activity tolerance and energy levels PROGRESSING    PLAN  [x]  Upgrade activities as tolerated     [x]  Continue plan of care  []  Update interventions per flow sheet       []  Discharge due to:_  []  Other:_      Wilman Maldonado PTA  2/4/2020

## 2020-02-06 ENCOUNTER — HOSPITAL ENCOUNTER (OUTPATIENT)
Dept: PHYSICAL THERAPY | Age: 65
Discharge: HOME | End: 2020-02-06
Payer: COMMERCIAL

## 2020-02-06 ENCOUNTER — TELEPHONE (OUTPATIENT)
Dept: ONCOLOGY | Age: 65
End: 2020-02-06

## 2020-02-06 PROCEDURE — 97110 THERAPEUTIC EXERCISES: CPT

## 2020-02-06 PROCEDURE — 97140 MANUAL THERAPY 1/> REGIONS: CPT

## 2020-02-06 NOTE — TELEPHONE ENCOUNTER
Follow up session today 1 hr. Pt did get in to see her PCP Dr. Kandis Briscoe to reassess her antidepressant/ antianxiety medication. (thank you Dr. Juanito Gerard!)  Prozac has been added. Still new so did not have much feedback but no major side effects reported today. Was overwhelmed this wknd regarding a 15k medical bill for wound equipment. Pt spoke to her insurance [address] who assisted her and she is hopeful it will be resolved but is frustrated with the efforts and energy it took to explore. On a positive, pt asked a former coworker to the movies and they had a really nice time and hopes to do again. We discussed how she lacks stimulating and or intellectual conversation since retiring and how maybe taking an Adult Educ class could her. Pt does have a Masters Degree from [address] [place] [address] so is willing to explore. Pt hopeful and really good about trying any suggestions we discuss which is great. Plan- Explore and choose one activity/class at [facility]) or the [employer] (schedule provided). - Read \"How to Survive the Loss of a Love\" by [facility]  - Continue exercising as permitted by Lymphedema clinic/she bicycles at home will try to doa little more than her usual 15 min.  Along with her upper body excercises  -Follow up with LCSW on 2/18    Will continue to monitor and offer support    Estela Costa LCSW

## 2020-02-06 NOTE — PROGRESS NOTES
PT DAILY TREATMENT NOTE 2-15    Patient Name: Efrem Serna  Date:2020  : 1955  [x]  Patient  Verified  Payor: Mariana July / Plan: Aliza Guthrie / Product Type: CAROLA /    In time:4:30p Out time:5:30p  Total Treatment Time (min): 60  Visit #:  14    Treatment Area: Shoulder stiffness, right [M25.611]  Stiffness of left shoulder, not elsewhere classified [M25.612]    SUBJECTIVE  Pain Level (0-10 scale): 0/10, 1-2 fatigue  Any medication changes, allergies to medications, adverse drug reactions, diagnosis change, or new procedure performed?: [x] No    [] Yes (see summary sheet for update)  Subjective functional status/changes:   [] No changes reported  Patient reports she feels about the same as last session. Patient states her ROM feels good, but continues to feel pulling through her scar tissue. OBJECTIVE    30 min Therapeutic Exercise:  [x] See flow sheet :   Rationale: increase ROM, increase strength and increase proprioception to improve the patients ability to lift, carry, reach, and complete ADL's    30 Min Manual Therapy:  PROM B UE's all directions to tolerance, STM kareem R breast, axilla, lateral torso, pec release, B ant chest incisional mobs, MFR B axilla, lateral torso, L rib intercostals,  subclavicular mobs with exhalation   Rationale: decrease pain, increase ROM, increase tissue extensibility and decrease trigger points  to improve the patients ability to lift, carry, reach, and complete ADL's      With   [x] TE   [] TA   [] neuro   [] other: Patient Education: [x] Review HEP    [x] Progressed/Changed HEP based on: subj and obj assessments  [] positioning   [] body mechanics   [] transfers   [] heat/ice application    [] other:      Other Objective/Functional Measures:     Pain Level (0-10 scale) post treatment: 1    ASSESSMENT/Changes in Function: Patient with minimal TTP and tightness reported near RUE axilla due to incision.          Patient will continue to benefit from skilled PT services to modify and progress therapeutic interventions, address functional mobility deficits, address ROM deficits, address strength deficits, analyze and address soft tissue restrictions, analyze and cue movement patterns, analyze and modify body mechanics/ergonomics and assess and modify postural abnormalities to attain remaining goals. []  See Plan of Care  []  See progress note/recertification  []  See Discharge Summary         Progress towards goals / Updated goals:  Patient making good progress towards goals with good overall tolerance to strengthening exercises. Patient making steady gains in overall ROM and tissue extensibility. Short Term Goals: To be accomplished in 2 treatments:  1) Pt will verbalize understanding of  lymphedema risk reduction practices. MET  2) Pt will verbalize knowledge of signs and symptoms to report to physician . MET     Long Term Goals: To be accomplished in 20 treatments:   1) Pt will be Independent with HEP for pain management, utilizing correct breath pattern, strengthening, and motion exercises in order to maintain gains achieved in therapy. PROGRESSING  2) Pt will utilize correct breath and movement patterns during all ADL's involving reaching above shoulder level with decreased pain by 3-4  levels on NPS. PROGRESSING  3) Pt will have increased B shoulder flexion, abduction and ER by 10-15 degrees or > and increased B upper quarter strength by 1-2 levels in order to be able to care for home and perform all ADL's with decreased pain in R shoulder/chest by 3-4 levels on NPS PROGRESSING  4) Pt will be able to tolerate HIIT therapy session involving cardio, core and UE strengthening for 30 mins without rest breaks or discomfort in B upper quadrants in order to increase activity tolerance and energy levels PROGRESSING    PLAN  [x]  Upgrade activities as tolerated     [x]  Continue plan of care  []  Update interventions per flow sheet       []  Discharge due to:_  []  Other:_      Evertish Duffy PTA  2/6/2020

## 2020-02-11 ENCOUNTER — HOSPITAL ENCOUNTER (OUTPATIENT)
Dept: PHYSICAL THERAPY | Age: 65
Discharge: HOME | End: 2020-02-11
Payer: COMMERCIAL

## 2020-02-11 PROCEDURE — 97110 THERAPEUTIC EXERCISES: CPT

## 2020-02-11 PROCEDURE — 97140 MANUAL THERAPY 1/> REGIONS: CPT

## 2020-02-11 NOTE — PROGRESS NOTES
PT DAILY TREATMENT NOTE 2-15    Patient Name: Kvng Dahl  Date:2020  : 1955  [x]  Patient  Verified  Payor: Darleen Maria / Plan: Beverly Friday / Product Type: CAROLA /    In time:3:00p Out time:4:00p  Total Treatment Time (min): 60  Visit #:  15    Treatment Area: Shoulder stiffness, right [M25.611]  Stiffness of left shoulder, not elsewhere classified [M25.612]    SUBJECTIVE  Pain Level (0-10 scale): 0/10, 1 fatigue  Any medication changes, allergies to medications, adverse drug reactions, diagnosis change, or new procedure performed?: [x] No    [] Yes (see summary sheet for update)  Subjective functional status/changes:   [x] No changes reported  Patient reports she feels about the same as last session. Patient states her ROM feels good, but continues to feel pulling through her scar tissue. OBJECTIVE    30 min Therapeutic Exercise:  [x] See flow sheet :   Rationale: increase ROM, increase strength and increase proprioception to improve the patients ability to lift, carry, reach, and complete ADL's    30 Min Manual Therapy:  PROM B UE's all directions to tolerance, STM kareem R breast, axilla, lateral torso, pec release, B ant chest incisional mobs, MFR B axilla, lateral torso, L rib intercostals,  subclavicular mobs with exhalation   Rationale: decrease pain, increase ROM, increase tissue extensibility and decrease trigger points  to improve the patients ability to lift, carry, reach, and complete ADL's      With   [x] TE   [] TA   [] neuro   [] other: Patient Education: [x] Review HEP    [x] Progressed/Changed HEP based on: subj and obj assessments  [] positioning   [] body mechanics   [] transfers   [] heat/ice application    [] other:      Other Objective/Functional Measures:     Pain Level (0-10 scale) post treatment: 0    ASSESSMENT/Changes in Function: Patient demonstrate improved mobility through scar tissue.        Patient will continue to benefit from skilled PT services to modify and progress therapeutic interventions, address functional mobility deficits, address ROM deficits, address strength deficits, analyze and address soft tissue restrictions, analyze and cue movement patterns, analyze and modify body mechanics/ergonomics and assess and modify postural abnormalities to attain remaining goals. []  See Plan of Care  []  See progress note/recertification  []  See Discharge Summary         Progress towards goals / Updated goals:  Patient making good progress towards goals with good overall tolerance to strengthening exercises. Patient making steady gains in overall ROM and tissue extensibility. Short Term Goals: To be accomplished in 2 treatments:  1) Pt will verbalize understanding of  lymphedema risk reduction practices. MET  2) Pt will verbalize knowledge of signs and symptoms to report to physician . MET     Long Term Goals: To be accomplished in 20 treatments:   1) Pt will be Independent with HEP for pain management, utilizing correct breath pattern, strengthening, and motion exercises in order to maintain gains achieved in therapy. PROGRESSING  2) Pt will utilize correct breath and movement patterns during all ADL's involving reaching above shoulder level with decreased pain by 3-4  levels on NPS. PROGRESSING  3) Pt will have increased B shoulder flexion, abduction and ER by 10-15 degrees or > and increased B upper quarter strength by 1-2 levels in order to be able to care for home and perform all ADL's with decreased pain in R shoulder/chest by 3-4 levels on NPS PROGRESSING  4) Pt will be able to tolerate HIIT therapy session involving cardio, core and UE strengthening for 30 mins without rest breaks or discomfort in B upper quadrants in order to increase activity tolerance and energy levels PROGRESSING    PLAN  [x]  Upgrade activities as tolerated     [x]  Continue plan of care  []  Update interventions per flow sheet       []  Discharge due to:_  []  Other:_      Queen Augusto Tori PTA  2/11/2020

## 2020-02-13 ENCOUNTER — DOCUMENTATION ONLY (OUTPATIENT)
Dept: SURGERY | Age: 65
End: 2020-02-13

## 2020-02-13 ENCOUNTER — HOSPITAL ENCOUNTER (OUTPATIENT)
Dept: PHYSICAL THERAPY | Age: 65
Discharge: HOME | End: 2020-02-13
Payer: COMMERCIAL

## 2020-02-13 PROCEDURE — 97140 MANUAL THERAPY 1/> REGIONS: CPT | Performed by: PHYSICAL THERAPIST

## 2020-02-13 PROCEDURE — 97110 THERAPEUTIC EXERCISES: CPT | Performed by: PHYSICAL THERAPIST

## 2020-02-13 NOTE — PROGRESS NOTES
[facility]  [address] [facility] Gurvinder Escobedo Kobe Varnertiffanie Guzmán  Phone: [phone]      Fax:  [phone]    Progress Note    Name: Efrem Serna   : 1955   MD: Camilo Terrell MD       Treatment Diagnosis: Shoulder stiffness, right [M25.611]  Stiffness of left shoulder, not elsewhere classified [M25.612]  Start of Care: 2019    Visits from Start of Care: 16  Missed Visits: 0    Summary of Care: Pt has been receiving PT interventions for B shoulder stiffness and decreased motion. Pt has experienced set backs with recurrent infections, development of seroma so progress has been slow but steady. Pt has improved incisional mobility, shoulder motion and scapular strength. Assessment / Recommendations: Continuation of PT 1-2xwk for an additional 3-4 weeks. Progress towards goals / Updated goals:  Patient making good progress towards goals with good overall tolerance to strengthening exercises. Patient making steady gains in overall ROM and tissue extensibility. GOAL STATUS:   1) Pt will be Independent with HEP for pain management, utilizing correct breath pattern, strengthening, and motion exercises in order to maintain gains achieved in therapy. PROGRESSING  2) Pt will utilize correct breath and movement patterns during all ADL's involving reaching above shoulder level with decreased pain by 3-4  levels on NPS. PROGRESSING  3) Pt will have increased B shoulder flexion, abduction and ER by 10-15 degrees or > and increased B upper quarter strength by 1-2 levels in order to be able to care for home and perform all ADL's with decreased pain in R shoulder/chest by 3-4 levels on NPS PROGRESSING  4) Pt will be able to tolerate HIIT therapy session involving cardio, core and UE strengthening for 30 mins without rest breaks or discomfort in B upper quadrants in order to increase activity tolerance and energy levels [address] Hany ROWLAND, CLT-ARCHIE  2/13/2020    ________________________________________________________________________  NOTE TO PHYSICIAN:  Please complete the following and fax to: [facility]: [phone]  . Retain this original for your records. If you are unable to process this request in 24 hours, please contact our office.        ____ I have read the above report and request that my patient continue therapy with the following changes/special instructions:  ____ I have read the above report and request that my patient be discharged from therapy    Physician's Signature:_________________ Date:___________Time:__________

## 2020-02-13 NOTE — PROGRESS NOTES
PT DAILY TREATMENT NOTE 2-15    Patient Name: Evelia Reynolds  Date:2020  : 1955  [x]  Patient  Verified  Payor: Luisa Li / Plan: Katelyn Lyons / Product Type: CAROLA /    In time:3:00p Out time:4:00p  Total Treatment Time (min): 60  Visit #:  16    Treatment Area: Shoulder stiffness, right [M25.611]  Stiffness of left shoulder, not elsewhere classified [M25.612]    SUBJECTIVE  Pain Level (0-10 scale): 0/10, 0= fatigue  Any medication changes, allergies to medications, adverse drug reactions, diagnosis change, or new procedure performed?: [x] No    [] Yes (see summary sheet for update)  Subjective functional status/changes:   [x] No changes reported  Patient reports she feels a \"heaviness\" in front of chest where incisions run, but no pain    OBJECTIVE    30 min Therapeutic Exercise:  [x] See flow sheet :   Rationale: increase ROM, increase strength and increase proprioception to improve the patients ability to lift, carry, reach, and complete ADL's    30 Min Manual Therapy:  PROM B UE's all directions to tolerance, STM kareem R breast, axilla, lateral torso, pec release, B ant chest incisional mobs, MFR B axilla, lateral torso, L rib intercostals,  subclavicular mobs with exhalation   Rationale: decrease pain, increase ROM, increase tissue extensibility and decrease trigger points  to improve the patients ability to lift, carry, reach, and complete ADL's      With   [x] TE   [] TA   [] neuro   [] other: Patient Education: [x] Review HEP    [x] Progressed/Changed HEP based on: subj and obj assessments  [] positioning   [] body mechanics   [] transfers   [] heat/ice application    [] other:      Other Objective/Functional Measures:     Pain Level (0-10 scale) post treatment: 0    ASSESSMENT/Changes in Function:   Patient will continue to benefit from skilled PT services to modify and progress therapeutic interventions, address functional mobility deficits, address ROM deficits, address strength deficits, analyze and address soft tissue restrictions, analyze and cue movement patterns, analyze and modify body mechanics/ergonomics and assess and modify postural abnormalities to attain remaining goals.      []  See Plan of Care  [x]  See progress note/recertification  []  See Discharge Summary         PLAN  [x]  Upgrade activities as tolerated     [x]  Continue plan of care  []  Update interventions per flow sheet       []  Discharge due to:_  []  Other:_      Flako GRAYT, BREE-ARCHIE  2/13/2020

## 2020-02-17 ENCOUNTER — OFFICE VISIT (OUTPATIENT)
Dept: FAMILY MEDICINE CLINIC | Age: 65
End: 2020-02-17

## 2020-02-17 VITALS
TEMPERATURE: 99.3 F | DIASTOLIC BLOOD PRESSURE: 72 MMHG | WEIGHT: 267 LBS | SYSTOLIC BLOOD PRESSURE: 120 MMHG | RESPIRATION RATE: 16 BRPM | HEIGHT: 68 IN | OXYGEN SATURATION: 96 % | HEART RATE: 80 BPM | BODY MASS INDEX: 40.47 KG/M2

## 2020-02-17 DIAGNOSIS — R68.89 FLU-LIKE SYMPTOMS: ICD-10-CM

## 2020-02-17 DIAGNOSIS — J11.1 INFLUENZA: Primary | ICD-10-CM

## 2020-02-17 LAB
QUICKVUE INFLUENZA TEST: POSITIVE
VALID INTERNAL CONTROL?: YES

## 2020-02-17 RX ORDER — OSELTAMIVIR PHOSPHATE 75 MG/1
75 CAPSULE ORAL 2 TIMES DAILY
Qty: 10 CAP | Refills: 0 | Status: SHIPPED | OUTPATIENT
Start: 2020-02-17 | End: 2020-02-22

## 2020-02-17 NOTE — PROGRESS NOTES
HISTORY OF PRESENT ILLNESS  Luke Alonso is a 59 y.o. female. HPI  C/o some minor cold symptoms that began 6 days ago. Yesterday she had fever to 101 with sinus congestion and cough. Taking donta seltzer cold with little symptom relief. Had flu vaccine this season. Past medical history, social history, family history and medications were reviewed and updated. Blood pressure 120/72, pulse 80, temperature 99.3 °F (37.4 °C), temperature source Oral, resp. rate 16, height 5' 8\" (1.727 m), weight 267 lb (121.1 kg), last menstrual period 05/01/2011, SpO2 96 %. Review of Systems   Constitutional: Positive for chills, fever and malaise/fatigue. HENT: Positive for congestion. Negative for ear pain, sinus pain and sore throat. Respiratory: Positive for cough. Negative for sputum production and shortness of breath. Cardiovascular: Negative. All other systems reviewed and are negative. Physical Exam  Constitutional:       General: She is not in acute distress. HENT:      Right Ear: Tympanic membrane and ear canal normal.      Left Ear: Tympanic membrane and ear canal normal.      Nose: Congestion present. Mouth/Throat:      Mouth: Mucous membranes are moist.      Pharynx: Oropharynx is clear. Cardiovascular:      Rate and Rhythm: Normal rate and regular rhythm. Heart sounds: Normal heart sounds. Pulmonary:      Effort: Pulmonary effort is normal.      Breath sounds: Normal breath sounds. Skin:     General: Skin is warm and dry. ASSESSMENT and PLAN  Diagnoses and all orders for this visit:    1. Influenza  -     oseltamivir (TAMIFLU) 75 mg capsule; Take 1 Cap by mouth two (2) times a day for 5 days. Rest and fluids. Advil cold and sinus tabs as directed prn sinus congestion. Add mucinex DM bid for cough/chest congestion. Begin tamiflu as directed. Medication risks, benefit and potential side effects were reviewed.     2. Flu-like symptoms  -     AMB POC RAPID INFLUENZA TEST  Positive influenza B. Follow up prn if sx worsen or FTI.

## 2020-02-18 ENCOUNTER — HOSPITAL ENCOUNTER (OUTPATIENT)
Dept: PHYSICAL THERAPY | Age: 65
End: 2020-02-18
Payer: COMMERCIAL

## 2020-02-18 ENCOUNTER — TELEPHONE (OUTPATIENT)
Dept: ONCOLOGY | Age: 65
End: 2020-02-18

## 2020-02-18 NOTE — TELEPHONE ENCOUNTER
Pt sent an email and left VM needing to cancel today's f/u appt due to flu diagnosis. Replied by email offering 2 time options nexr week.     [facility]

## 2020-02-20 ENCOUNTER — HOSPITAL ENCOUNTER (OUTPATIENT)
Dept: PHYSICAL THERAPY | Age: 65
Discharge: HOME | End: 2020-02-20
Payer: COMMERCIAL

## 2020-02-20 PROCEDURE — 97140 MANUAL THERAPY 1/> REGIONS: CPT | Performed by: PHYSICAL THERAPIST

## 2020-02-20 PROCEDURE — 97110 THERAPEUTIC EXERCISES: CPT | Performed by: PHYSICAL THERAPIST

## 2020-02-20 NOTE — PROGRESS NOTES
PT DAILY TREATMENT NOTE 2-15    Patient Name: Faina Couch  Date:2020  : 1955  [x]  Patient  Verified  Payor: Joey Tavarester / Plan: Alec Armendariz / Product Type: CAROLA /    In time:3:00p Out time:4:00p  Total Treatment Time (min): 60  Visit #:  17    Treatment Area: Shoulder stiffness, right [M25.611]  Stiffness of left shoulder, not elsewhere classified [M25.612]    SUBJECTIVE  Pain Level (0-10 scale): 0/10, 0= fatigue  Any medication changes, allergies to medications, adverse drug reactions, diagnosis change, or new procedure performed?: [x] No    [] Yes (see summary sheet for update)  Subjective functional status/changes:   [x] No changes reported  Patient reports she feels a \"heaviness\" in front of chest where incisions run, but no pain    OBJECTIVE    45 min Therapeutic Exercise:  [x] See flow sheet :   Rationale: increase ROM, increase strength and increase proprioception to improve the patients ability to lift, carry, reach, and complete ADL's    15 Min Manual Therapy: STM kareem R breast, axilla, lateral torso, pec release, B ant chest incisional mobs, MFR B axilla, lateral torso   Rationale: decrease pain, increase ROM, increase tissue extensibility and decrease trigger points  to improve the patients ability to lift, carry, reach, and complete ADL's      With   [x] TE   [] TA   [] neuro   [] other: Patient Education: [x] Review HEP    [] Progressed/Changed HEP based on:    [] positioning   [] body mechanics   [] transfers   [] heat/ice application    [] other:      Other Objective/Functional Measures:     Pain Level (0-10 scale) post treatment: 0    ASSESSMENT/Changes in Function:  Pt exhibited improved skin and fascial mobility at all incisions, as well as decreased tenderness with all PROM at incisions and chest/axillary musculature. Anticipating D/C next week.   Patient will continue to benefit from skilled PT services to modify and progress therapeutic interventions, address functional mobility deficits, address ROM deficits, address strength deficits, analyze and address soft tissue restrictions, analyze and cue movement patterns, analyze and modify body mechanics/ergonomics and assess and modify postural abnormalities to attain remaining goals.      []  See Plan of Care  []  See progress note/recertification  []  See Discharge Summary         PLAN  [x]  Upgrade activities as tolerated     [x]  Continue plan of care  []  Update interventions per flow sheet       []  Discharge due to:_  []  Other:_      Paul Vernon MSPT, CLT-ARCHIE  2/20/2020

## 2020-02-24 ENCOUNTER — TELEPHONE (OUTPATIENT)
Dept: PALLATIVE CARE | Age: 65
End: 2020-02-24

## 2020-02-25 ENCOUNTER — HOSPITAL ENCOUNTER (OUTPATIENT)
Dept: PHYSICAL THERAPY | Age: 65
Discharge: HOME | End: 2020-02-25
Payer: COMMERCIAL

## 2020-02-25 ENCOUNTER — TELEPHONE (OUTPATIENT)
Dept: ONCOLOGY | Age: 65
End: 2020-02-25

## 2020-02-25 ENCOUNTER — OFFICE VISIT (OUTPATIENT)
Dept: PALLATIVE CARE | Age: 65
End: 2020-02-25

## 2020-02-25 DIAGNOSIS — F41.8 ANXIETY ABOUT HEALTH: ICD-10-CM

## 2020-02-25 DIAGNOSIS — M25.511 CHRONIC PAIN OF BOTH SHOULDERS: Primary | ICD-10-CM

## 2020-02-25 DIAGNOSIS — M25.561 CHRONIC PAIN OF BOTH KNEES: ICD-10-CM

## 2020-02-25 DIAGNOSIS — G89.29 CHRONIC PAIN OF BOTH KNEES: ICD-10-CM

## 2020-02-25 DIAGNOSIS — G89.29 CHRONIC PAIN OF BOTH SHOULDERS: Primary | ICD-10-CM

## 2020-02-25 DIAGNOSIS — M25.562 CHRONIC PAIN OF BOTH KNEES: ICD-10-CM

## 2020-02-25 DIAGNOSIS — M25.512 CHRONIC PAIN OF BOTH SHOULDERS: Primary | ICD-10-CM

## 2020-02-25 PROCEDURE — 97110 THERAPEUTIC EXERCISES: CPT

## 2020-02-25 PROCEDURE — 97140 MANUAL THERAPY 1/> REGIONS: CPT

## 2020-02-25 NOTE — PROGRESS NOTES
PT DAILY TREATMENT NOTE 2-15    Patient Name: Caleb Liz  Date:2020  : 1955  [x]  Patient  Verified  Payor: Pollo Garcia / Plan: Kalani Reusing / Product Type: CAROLA /    In time:3:00p Out time:4:00p  Total Treatment Time (min): 60  Visit #:  18    Treatment Area: Shoulder stiffness, right [M25.611]  Stiffness of left shoulder, not elsewhere classified [M25.612]    SUBJECTIVE  Pain Level (0-10 scale): 0/10, 0= fatigue  Any medication changes, allergies to medications, adverse drug reactions, diagnosis change, or new procedure performed?: [x] No    [] Yes (see summary sheet for update)  Subjective functional status/changes:   [x] No changes reported  Patient reports she had acupuncture this morning. Patient reports no changes through her scar tissue since last session. OBJECTIVE    45 min Therapeutic Exercise:  [x] See flow sheet :   Rationale: increase ROM, increase strength and increase proprioception to improve the patients ability to lift, carry, reach, and complete ADL's    15 Min Manual Therapy: STM kareem R breast, axilla, lateral torso, pec release, B ant chest incisional mobs, MFR B axilla, lateral torso   Rationale: decrease pain, increase ROM, increase tissue extensibility and decrease trigger points  to improve the patients ability to lift, carry, reach, and complete ADL's      With   [x] TE   [] TA   [] neuro   [] other: Patient Education: [x] Review HEP    [] Progressed/Changed HEP based on:    [] positioning   [] body mechanics   [] transfers   [] heat/ice application    [] other:      Other Objective/Functional Measures:     Pain Level (0-10 scale) post treatment: 0    ASSESSMENT/Changes in Function:  Pt exhibited improved skin and fascial mobility at all incisions, as well as decreased tenderness with all PROM at incisions and chest/axillary musculature. Anticipating D/C next visit.   Patient will continue to benefit from skilled PT services to modify and progress therapeutic interventions, address functional mobility deficits, address ROM deficits, address strength deficits, analyze and address soft tissue restrictions, analyze and cue movement patterns, analyze and modify body mechanics/ergonomics and assess and modify postural abnormalities to attain remaining goals.      []  See Plan of Care  []  See progress note/recertification  []  See Discharge Summary         PLAN  [x]  Upgrade activities as tolerated     [x]  Continue plan of care  []  Update interventions per flow sheet       []  Discharge due to:_  []  Other:_      Evern Lux PTA  2/25/2020

## 2020-02-25 NOTE — PROGRESS NOTES
Palliative Medicine and Integrative Medicine Acupuncture Note    Date Current Visit: 2020 - visit 1  Date Initial Visit: 20  [facility]    Summary: Chest wall/shoulder pain s/p mastectomies/implants/removal of implants; anxiety and depression    Subjective:     Robert Vasquez is a 59 y.o. female w/ R Julia Hurtado cancer dx 2019 s/p lumpectomy and 2 LN removed. Did not require radiation or chemo. Underwent b/l mastectomies 2019 w/ implants. Had recurrent infections, chronic pain, abscesses requiring IV abx, [place] [facility]  [place], and eventual removal of the implants 10/2019. Discharged from her plastic surgeon at [facility] had scarring and chest wall/shoulder pain. Sees Miladys Fletcher PT and has made lots of gains w/ improved pain and shoulder/chest wall ROM. Has hx of depression and for many years was on Wellbutrin. Recently saw PCP 20 and Prozac was added. Anxiety worsened when she retired from work of 25 years to care for her 79 yo father, who moved in and passed away at end of . Then was dx w/ cancer and had difficult course. Also w/ b/l knee pain from DJD, no hx surgeries or injections. 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 They have one child- dtr Harper Siegel. Defines herself as an introvert by nature, but doing lots of things outside of her comfort zone so to speak to get better. Very open and pleasant.        Past Medical History:   Past Medical History:   Diagnosis Date    Adjustment disorder with mixed anxiety and depressed mood 2020    After death of father  and personal diagnosis of/treatment for breast cancer [id number]; Counselin:  [employer] [address] , LCSW Doretha     Ankle edema 2010    Anxiety 2010    Benign essential hypertension 2016    2006     Carpal tunnel syndrome 2010    Chronic depression 1/30/2020    H/O Benign Colon Polyp 12/20/2010    H/O bilateral mastectomy 12/2/2019    Double Mastectomy (right breast cancer and elective left breast prophylactically); No chemo, No radiation    H/O Uterine Fibroids 12/20/2010    Hypercholesterolemia 6/25/2013    ~2006    Hypothyroidism 5/26/2016 7/1999    Kidney stone 4/5/2010    Perimenopausal 4/5/2010    Primary osteoarthritis of left knee 5/26/2016    Extensive; Ortho (needs knee replacement), CSI x 2,     S/P benign cervical polypectomy 12/20/2010    Sleep apnea 4/5/2010    USES CPAP    Status post right breast lumpectomy 1/30/2020 6-, Invasive ductal carcinoma--->Mastectomy 8-, no chemo, no radiation, started on Arimidex    Stress incontinence 5/26/2010        Past Surgical history:   Past Surgical History:   Procedure Laterality Date    EKG ORDERABLE  5/2009    NSR, rate 84, normal EKG    HX BLADDER SUSPENSION  ~2010    Dr Meagan Pedro Left 09/23/2019    [facility].  Dr. Silvestre Mendez, [facility] Mastectomy Implant Infection Left Breast, Replaced Implant, IV abx    HX BREAST BIOPSY Right 11/2011    VPI, right breast biopsy negative    HX BREAST BIOPSY Right 04/22/2019    invasive ductal carcinoma    HX BREAST LUMPECTOMY Right 6/13/2019    RIGHT BREAST LUMPECTOMY ( x2 ) WT ULTRASOUND , RIGHT SENTINAL NODE BIOPSY performed [facility] Louis [facility] AM[facility]    HX BREAST RECONSTRUCTION Bilateral 8/27/2019    B MASTECTOMY AND IMPLANTS, IMMEDIATE BILATERAL BREAST RECONSTRUCTION WITH DIRECT TO IMPLANT USE OF ALLODERM AND BILATERAL BREAST RECONSTRUCTION INTRA-OPERATIVE ASSESMENT OF BLOOD FLOW performed by Gurjit Brown MD at [place] [address] Right 03/2010    Dr Ronnie Flanagan CERVICAL POLYPECTOMY  2009, 2015    Dr Julieanne Gilford    HX COLONOSCOPY  04/08/2016    Dr Lakia Hannon-Internal Hemorrhoids- Normal mucosa in the whole colon - Repeat colonoscopy in 5 years    HX COLONOSCOPY      HX COLONOSCOPY  10/2005    benign polyp; repeat 5 yr per Dr Cindi Palencia HX COLONOSCOPY  2010    Normal, f/u 5 yrs, Dr Una Walters  2005    AUB, benign/neg bx; Dr Rubio Fox  10/2012    Dr Chastity Voss, [facility]    HX HYSTERECTOMY  2013    HX MASTECTOMY Bilateral 2019    BILATERAL BREAST SKIN SPARING MASTECTOMIES performed by Ximena Benito MD at [place] [address] HX ORTHOPAEDIC Right 2010    CARPAL TUNNEL    HX PARTIAL HYSTERECTOMY  13    Supracervical hysterectomy for fibroids, Dr Heaven Marte  [facility] Bilateral 10/15/2019    [facility] JON Agarwal Breast Implant pain and recurrent left breast infection; on wound vac w/ iv abx; wound pump removed 2019        Family History:. Family History   Problem Relation Age of Onset   [facility] Arthritis-osteo Father     Stroke Father         TIA's   [facility] Cancer Father         melanoma on back removed    Pacemaker Father 80    Heart Disease Father          79 yo in     Hypertension Mother     Arthritis-osteo Mother     Heart Disease Mother         valve disease,      Heart Attack Maternal Grandmother 76    No Known Problems Sister     No Known Problems Brother     Depression Daughter     Substance Abuse Daughter     Alcohol abuse Daughter          ROS:   ROS    The following systems were reviewed: constitutional, ears/nose/mouth/throat, respiratory, gastrointestinal, musculoskeletal, neurologic, psychiatric, endocrine. Positive findings noted below.     Per HPI    Social history:   Social History     Socioeconomic History    Marital status:      Spouse name: Not on file    Number of children: 1    Years of education: Not on file    Highest education level: Not on file   Occupational History    Occupation: [address] Occupation: Retired    Social Needs    Financial resource strain: Not on file    Food insecurity:     Worry: Not on file     Inability: Not on file   BuildingLayer needs:     Medical: Not on file     Non-medical: Not on file   Tobacco Use    Smoking status: Former Smoker     Packs/day: 0.50     Years: 25.00     Pack years: 12.50     Last attempt to quit: 1/1/2005     Years since quitting: 15.1    Smokeless tobacco: Never Used   Substance and Sexual Activity    Alcohol use:  Yes     Alcohol/week: 5.0 standard drinks     Types: 5 Shots of liquor per week    Drug use: No    Sexual activity: Not Currently     Partners: Male     Comment: not sexually active x many years   Lifestyle    Physical activity:     Days per week: Not on file     Minutes per session: Not on file    Stress: Not on file   Relationships    Social connections:     Talks on phone: Not on file     Gets together: Not on file     Attends Tenriism service: Not on file     Active member of club or organization: Not on file     Attends meetings of clubs or organizations: Not on file     Relationship status: Not on file    Intimate partner violence:     Fear of current or ex partner: Not on file     Emotionally abused: Not on file     Physically abused: Not on file     Forced sexual activity: Not on file   Other Topics Concern     Service Not Asked    Blood Transfusions Not Asked    Caffeine Concern No     Comment: no coffee, tea and cut out her diet Cokes    Occupational Exposure Not Asked   Kristie Eglin Hazards Not Asked    Sleep Concern Not Asked    Stress Concern Not Asked    Weight Concern Not Asked    Special Diet No    Back Care Not Asked    Exercise No    Bike Helmet Not Asked    Seat Belt Not Asked    Self-Exams Not Asked   Social History Narrative    1 biological daughter, 1 \"adopted\", and 2 step children        Physical Exam:         Objective:     General appearance:  No apparent distress, well nourished, well groomed   HEENT:  nares clear, moist mucous membranes   Lungs: Clear to auscultation bilaterally   Skin: Warm, dry, b/l mastectomies    Psych:  Normal affect, appropriate   Shoulders  FROM b/l shoulders, NTTP   Msk Neg crepitus in knees, no instability, gait balanced          Assessment and Plan:       ICD-10-CM ICD-9-CM    1. Chronic pain of both shoulders M25.511 719.41     G89.29 338.29     M25.512     2. Chronic pain of both knees M25.561 719.46     M25.562 338.29     G89.29     3. Anxiety about health F41.8 300.09         Treatment Plan:   -Goals: Improved pain, function   -Type of acupuncture and number of treatments planned   -Other modalities or referrals       Acupuncture treatment performed after written and verbal consent obtained. Patient aware that risk include, but are not limited to: pain during needle insertion; bleeding/bruising; infections; internal organ perforation. All questions were answered. Time out performed immediately prior to the start of the procedure. Patient identified by name and date of birth and agreement on procedure being performed was verified. ~~~~~~~~~~~~~~~~~~~~~~~~~    *Good w/ needling    1) Anxiety: Has hx of depression, sees PCP. Doing better working w/ Funmi Flores LCSW and Dr Kassandra Rahman PCP.     -GV20, b/l LI4, LR3  -Auricular BFA    2) B/ shoulder/chest wall pain: Doing great w/ PT- may be discharged soon. Good ROM and no pain. -B/l TH 15, LI15/17    3) B/l knee pain: DJD    -B/l ST36, GB 33, ST34, ST9, LR8, SP10 and eyes of knee. 2 leads, 4 Hz x 20 minutes     ~~~~~~~~~~~~~~~~~~~~~~~~~  Pt tolerated procedure well without apparent complications. Pt advised to avoid strenuous sexual activity, exercise, heavy meals, alcohol for the next 24 hours. Aware that rebound effect may occur following treatment but that should improve back to baseline in several days.        Follow up:    March 17 appt already made- her 65th birthday

## 2020-02-25 NOTE — TELEPHONE ENCOUNTER
Follow up session with pt. Feeling better from the flu. Dis go to [place]. Attended one class which was ok, will try another one soon. Plans to visit dtr in [place] at end of March. Really feels like mood is improving but still struggles to get motivated on certain days. Pt to try to take care of small projects around the house. Continue with reg exercise and acupuncture.     Plan follow up session for March 10th at 10 am.    Luther Nissen LCSW

## 2020-02-27 ENCOUNTER — HOSPITAL ENCOUNTER (OUTPATIENT)
Dept: PHYSICAL THERAPY | Age: 65
Discharge: HOME | End: 2020-02-27
Payer: COMMERCIAL

## 2020-02-27 DIAGNOSIS — Z85.3 HISTORY OF RIGHT BREAST CANCER: Primary | ICD-10-CM

## 2020-02-27 PROCEDURE — 97140 MANUAL THERAPY 1/> REGIONS: CPT | Performed by: PHYSICAL THERAPIST

## 2020-02-27 PROCEDURE — 97110 THERAPEUTIC EXERCISES: CPT | Performed by: PHYSICAL THERAPIST

## 2020-02-27 NOTE — PROGRESS NOTES
[address] [facility] [address]  Phone: [phone]  Fax: [phone]    Discharge Summary  2-15    Patient name: Mihir Solano  : 1955  Provider#: [id number]  Referral source: Keke Coburn MD      Medical/Treatment Diagnosis: Shoulder stiffness, right [M25.611]  Stiffness of left shoulder, not elsewhere classified [M25.612]     Prior Hospitalization: see medical history     Comorbidities: See Plan of Care  Prior Level of Function:See Plan of Care  Medications: Verified on Patient Summary List    Start of Care: 2019      Onset Date:10/2019   Visits from Start of Care: 19     Missed Visits: 0  Reporting Period : 2019 to 2020      ASSESSMENT/SUMMARY OF CARE: Pt has been receiving PT interventions following several reconstruction surgeries for B shoulder stiffness, tightness and pain. She has completed all rehab goals. D/C to HEP    GOAL STATUS:   1) Pt will be Independent with HEP for pain management, utilizing correct breath pattern, strengthening, and motion exercises in order to maintain gains achieved in therapy. MET  2) Pt will utilize correct breath and movement patterns during all ADL's involving reaching above shoulder level with decreased pain by 3-4  levels on NPS. MET  3) Pt will have increased B shoulder flexion, abduction and ER by 10-15 degrees or > and increased B upper quarter strength by 1-2 levels in order to be able to care for home and perform all ADL's with decreased pain in R shoulder/chest by 3-4 levels on NPS MET  4) Pt will be able to tolerate HIIT therapy session involving cardio, core and UE strengthening for 30 mins without rest breaks or discomfort in B upper quadrants in order to increase activity tolerance and energy levels MET    RECOMMENDATIONS:  [x]Discontinue therapy: [x]Patient has reached or is progressing toward set goals      []Patient is non-compliant or has abdicated      []Due to lack of appreciable progress towards set goals      []Other    [facility], ESMERT-ARCHIE 2/27/2020

## 2020-02-27 NOTE — PROGRESS NOTES
PT DAILY TREATMENT NOTE 2-15    Patient Name: Ursula Goins  Date:2020  : 1955  [x]  Patient  Verified  Payor: Darren Shelley / Plan: Raj Goldman / Product Type: CAROLA /    In time:3:00p Out time:4:00p  Total Treatment Time (min): 60  Visit #:  19    Treatment Area: Shoulder stiffness, right [M25.611]  Stiffness of left shoulder, not elsewhere classified [M25.612]    SUBJECTIVE  Pain Level (0-10 scale): 0/10, 0= fatigue  Any medication changes, allergies to medications, adverse drug reactions, diagnosis change, or new procedure performed?: [x] No    [] Yes (see summary sheet for update)  Subjective functional status/changes:   [] No changes reported  Pt sees Dr Angela Caputo next week 3/2. Pt arrives with no pain. Pt reports she is planning a trip to [place] end of March.      OBJECTIVE    45 min Therapeutic Exercise:  [x] See flow sheet :   Rationale: increase ROM, increase strength and increase proprioception to improve the patients ability to lift, carry, reach, and complete ADL's    15 Min Manual Therapy: STM kareem R breast, axilla, lateral torso, pec release, B ant chest incisional mobs, MFR B axilla, lateral torso   Rationale: decrease pain, increase ROM, increase tissue extensibility and decrease trigger points  to improve the patients ability to lift, carry, reach, and complete ADL's      With   [x] TE   [] TA   [] neuro   [] other: Patient Education: [x] Review HEP    [] Progressed/Changed HEP based on:    [] positioning   [] body mechanics   [] transfers   [] heat/ice application    [] other:      Other Objective/Functional Measures:   Skin/Soft Tissue: Skin of B chest and UE's moisturized, no signs of infection, incisions closed and exhibited improved skin and fascial mobility  Outcome Measures: FOTO=76/100 improved from 67/100                                Girth (cm)                             Distance from wrist (cm)                    R                      L  Palm: 21.3                 20.7  Wrist:         18.2                18.6  Forearm:     30.6       30                                            16               Elbow:       32.6                 32  Bicep:        39.5               38.4                                         34        Axilla:   39                    39.2    ROM:                                                                       R                     L       Scapulohumoral Control / Rhythm:     Yes                   Yes    Able to eccentrically lower with good control?  Left:   Yes         Right:   Yes        Upper Extremity AROM:                                                                                      R                                 L  Shoulder Flexion                           167                              170                                                     Shoulder Abduction                      150                               167  Shoulder Extension                       20                               20    Shoulder ER                                  56                              59    Shoulder IR                                   20                               22                                                                                                                   UPPER QUARTER                           MUSCLE STRENGTH  KEY                                                              R            L  0 - No Contraction        C1, C2 Neck Flex        4                                       1 - Trace                       C3 Side Flex          4           4                                                 2 - Poor                         C4 Sh Elev              4           4                                             3 - Fair                          C5 Deltoid/Biceps  4+             4+                                   4 - Good                       C6 Wrist Ext           4             4                                           5 - Normal                     C7 Triceps             4           4                                                                                  T7 Thumb Ext        4               4                                                                                          W4 Hand Inst            4             4                                              MMT: See above                                            R                      L  Shoulder flexion                5                      5            Shoulder ER                     4-                    4-    Shoulder IR                       4                    4       Neurological: Sensations: Light touch: Diminished B axilla, chest wall                    Palpation: No TTP B upper quadrants  Posture: decreased B IR at g-h jts  Breath pattern assessment  Diaphragm: improvement with initiation; not primary  Anterior chest primary   Accessory respiratory muscle use: none    Pain Level (0-10 scale) post treatment: 0    ASSESSMENT/Changes in Function:      []  See Plan of Care  []  See progress note/recertification  [x]  See Discharge Summary         PLAN  []  Upgrade activities as tolerated     []  Continue plan of care  []  Update interventions per flow sheet       [x]  Discharge due to:_ All goals met  []  Other:_      Shannan GRAYT, CLT-ARCHIE  2/27/2020

## 2020-03-03 ENCOUNTER — OFFICE VISIT (OUTPATIENT)
Dept: ONCOLOGY | Age: 65
End: 2020-03-03

## 2020-03-03 VITALS
DIASTOLIC BLOOD PRESSURE: 78 MMHG | HEIGHT: 68 IN | HEART RATE: 73 BPM | WEIGHT: 263.6 LBS | SYSTOLIC BLOOD PRESSURE: 116 MMHG | BODY MASS INDEX: 39.95 KG/M2 | TEMPERATURE: 98.1 F | OXYGEN SATURATION: 95 %

## 2020-03-03 DIAGNOSIS — I10 ESSENTIAL HYPERTENSION: ICD-10-CM

## 2020-03-03 DIAGNOSIS — E03.9 HYPOTHYROIDISM, UNSPECIFIED TYPE: ICD-10-CM

## 2020-03-03 DIAGNOSIS — Z98.890 H/O BREAST RECONSTRUCTION: ICD-10-CM

## 2020-03-03 DIAGNOSIS — C50.811 CANCER OF OVERLAPPING SITES OF RIGHT BREAST (HCC): Primary | ICD-10-CM

## 2020-03-03 DIAGNOSIS — M19.90 ARTHRITIS: ICD-10-CM

## 2020-03-03 DIAGNOSIS — E66.01 SEVERE OBESITY (HCC): ICD-10-CM

## 2020-03-03 DIAGNOSIS — F32.A MILD DEPRESSION: ICD-10-CM

## 2020-03-03 DIAGNOSIS — Z90.13 H/O BILATERAL MASTECTOMY: ICD-10-CM

## 2020-03-03 DIAGNOSIS — Z79.811 AROMATASE INHIBITOR USE: ICD-10-CM

## 2020-03-03 NOTE — PROGRESS NOTES
[facility] [place] [address] [address], [address], [place]: [phone]  F: [phone]    Reason for Visit:   Sarah Mccarty is a 59 y.o. female who is seen for 3 month follow up of breast cancer on adjuvant Anastrozole     Treatment History:   · 19 - RIGHT breast lumpectomy x 2 sites and RIGHT SLNB  · 19 - BILATERAL mastectomies with reconstruction. Dr. Alexandrea Eugene did direct saline implants. Surg path showed LEFT breast benign, residual IDC on RIGHT breast. ER+ HER2 negative. 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 She had bilateral implants removal on 10/15/19. She completed IV abx and had a wound vac. She started adjuvant hormonal therapy with Anastrozole in . She reports that she is feeling well overall today. She is tolerating Anastrozole well overall without side effects. She reports that her appetite is good and energy levels are slowly improving. She has labs with her PCP. She had an appointment with her PCP on 3/24/20. She has a follow up appointment with surgery on 20. She is here alone today.     Past Medical History:   Diagnosis Date    Adjustment disorder with mixed anxiety and depressed mood 2020    After death of father  and personal diagnosis of/treatment for breast cancer [id number]; Counselin:  [facility] [address] , LCSW Doretha     Ankle edema 2010    Anxiety 2010    Benign essential hypertension 2016    2006     Carpal tunnel syndrome 2010    Chronic depression 2020  H/O Benign Colon Polyp 12/20/2010    H/O bilateral mastectomy 12/2/2019    Double Mastectomy (right breast cancer and elective left breast prophylactically); No chemo, No radiation    H/O Uterine Fibroids 12/20/2010    Hypercholesterolemia 6/25/2013    ~2006    Hypothyroidism 5/26/2016 7/1999    Kidney stone 4/5/2010    Perimenopausal 4/5/2010    Primary osteoarthritis of left knee 5/26/2016    Extensive; Ortho (needs knee replacement), CSI x 2,     S/P benign cervical polypectomy 12/20/2010    Sleep apnea 4/5/2010    USES CPAP    Status post right breast lumpectomy 1/30/2020 6-, Invasive ductal carcinoma--->Mastectomy 8-, no chemo, no radiation, started on Arimidex    Stress incontinence 5/26/2010      Past Surgical History:   Procedure Laterality Date    EKG ORDERABLE  5/2009    NSR, rate 84, normal EKG    HX BLADDER SUSPENSION  ~2010    Dr Jania Sierra Left 09/23/2019    MCV.  Dr. Camille Yates, [address] [place] [address] Mastectomy Implant Infection Left Breast, Replaced Implant, IV abx    HX BREAST BIOPSY Right 11/2011    VPI, right breast biopsy negative    HX BREAST BIOPSY Right 04/22/2019    invasive ductal carcinoma    HX BREAST LUMPECTOMY Right 6/13/2019    RIGHT BREAST LUMPECTOMY ( x2 ) WT ULTRASOUND , RIGHT SENTINAL NODE BIOPSY performed by Luisito Aguirre MD at [facility]    HX BREAST RECONSTRUCTION Bilateral 8/27/2019    B MASTECTOMY AND IMPLANTS, IMMEDIATE BILATERAL BREAST RECONSTRUCTION WITH DIRECT TO IMPLANT USE OF ALLODERM AND BILATERAL BREAST RECONSTRUCTION INTRA-OPERATIVE ASSESMENT OF BLOOD FLOW performed by Nena Raines MD at [place] [address] Right 03/2010    Dr Jaqueline Kelly CERVICAL POLYPECTOMY  2009, 2015    Dr Chente Sanchez    HX COLONOSCOPY  04/08/2016    Dr Lito Hannon-Internal Hemorrhoids- Normal mucosa in the whole colon - Repeat colonoscopy in 5 years    HX COLONOSCOPY  2016    HX COLONOSCOPY  10/2005 benign polyp; repeat 5 yr per Dr Urvashi Clinton HX COLONOSCOPY  2010    Normal, f/u 5 yrs, Dr Clemente Steinberg  2005    AUB, benign/neg bx; Dr Matilda Siegel  10/2012    Dr Thomas Hutchinson, AUB    HX HYSTERECTOMY  2013    HX MASTECTOMY Bilateral 2019    BILATERAL BREAST SKIN SPARING MASTECTOMIES performed by Martene Mohs, MD at [address] HX ORTHOPAEDIC Right 2010    CARPAL TUNNEL    HX PARTIAL HYSTERECTOMY  13    Supracervical hysterectomy for fibroids, Dr Sultana Hylton HX UROLOGICAL  2010    Deonna Shown Bilateral 10/15/2019    [facility] JON Hotra Breast Implant pain and recurrent left breast infection; on wound vac w/ iv abx; wound pump removed 2019      Social History     Tobacco Use    Smoking status: Former Smoker     Packs/day: 0.50     Years: 25.00     Pack years: 12.50     Last attempt to quit: 2005     Years since quitting: 15.1    Smokeless tobacco: Never Used   Substance Use Topics    Alcohol use: Yes     Alcohol/week: 5.0 standard drinks     Types: 5 Shots of liquor per week      Family History   Problem Relation Age of Onset   [address] [facility] Arthritis-osteo Father     Stroke Father         TIA's    Cancer Father         melanoma on back removed    Pacemaker Father 80    Heart Disease Father          79 yo in     Hypertension Mother     Arthritis-osteo Mother     Heart Disease Mother         valve disease,  2013    Heart Attack Maternal Grandmother 76    No Known Problems Sister     No Known Problems Brother     Depression Daughter     Substance Abuse Daughter     Alcohol abuse Daughter      Current Outpatient Medications   Medication Sig    OTHER,NON-FORMULARY, Tumeric once a day    FLUoxetine (PROZAC) 20 mg capsule Take 1 Cap by mouth daily.     losartan (COZAAR) 100 mg tablet TAKE 1 TABLET BY MOUTH ONCE DAILY    anastrozole (ARIMIDEX) 1 mg tablet Take 1 mg by mouth daily. Indications: Hormone Receptor Positive Breast Cancer    buPROPion XL (WELLBUTRIN XL) 150 mg tablet take 1 tablet by mouth once daily before BREAKFAST    levothyroxine (SYNTHROID) 137 mcg tablet take 1 tablet by mouth once daily BEFORE BRAKFAST    OTHER Portable CPAP machine for RENÉE, with new mask and tubing but no change to mask or settings; G47.30 GAOR 99m prog good     No current facility-administered medications for this visit. Allergies   Allergen Reactions    Pcn [Penicillins] Hives    Sulfa (Sulfonamide Antibiotics) Hives    Zocor [Simvastatin] Myalgia    Sulfadiazine Other (comments)    Lisinopril Cough      Review of Systems: A complete review of systems was obtained, negative except as described above. Physical Exam:     Visit Vitals  /78 (BP 1 Location: Left arm, BP Patient Position: Sitting)   Pulse 73   Temp 98.1 °F (36.7 °C) (Oral)   Ht 5' 8\" (1.727 m)   Wt 263 lb 9.6 oz (119.6 kg)   LMP 05/01/2011   SpO2 95%   BMI 40.08 kg/m²     ECOG PS: 1  General: No distress  Eyes: Anicteric sclerae  HENT: Atraumatic  Neck: Supple  Respiratory: CTAB, normal respiratory effort  CV: Normal rate, regular rhythm, no murmurs, no peripheral edema  GI: Soft, nontender, nondistended  MS: Normal gait and station  Skin: Warm and dry  Psych: Alert, oriented, appropriate affect, normal judgment/insight  Breast: b/l mastectomy sites well healed     Results:     Lab Results   Component Value Date/Time    WBC 4.6 08/20/2019 10:57 AM    HGB 12.8 08/20/2019 10:57 AM    HCT 41.3 08/20/2019 10:57 AM    PLATELET 376 03/71/9100 10:57 AM    MCV 95.6 08/20/2019 10:57 AM    ABS.  NEUTROPHILS 2.9 08/20/2019 10:57 AM     Lab Results   Component Value Date/Time    Sodium 139 02/01/2019 10:32 AM    Potassium 4.5 02/01/2019 10:32 AM    Chloride 102 02/01/2019 10:32 AM    CO2 21 02/01/2019 10:32 AM    Glucose 81 02/01/2019 10:32 AM    BUN 26 02/01/2019 10:32 AM    Creatinine 0.81 02/01/2019 10:32 AM    GFR est AA 89 02/01/2019 10:32 AM    GFR est non-AA 78 02/01/2019 10:32 AM    Calcium 9.4 02/01/2019 10:32 AM     Lab Results   Component Value Date/Time    Bilirubin, total 1.1 02/01/2019 10:32 AM    ALT (SGPT) 21 02/01/2019 10:32 AM    AST (SGOT) 16 02/01/2019 10:32 AM    Alk. phosphatase 57 02/01/2019 10:32 AM    Protein, total 6.6 02/01/2019 10:32 AM    Albumin 4.6 02/01/2019 10:32 AM    Globulin 3.3 07/15/2010 08:05 AM     MRI Results (most recent):  Results from Hospital Encounter encounter on 05/17/19   MRI BREAST BI W WO CONT    Narrative INDICATION: Right invasive ductal carcinoma, grade 2, ER/NY positive, HER-2/keli  negative. COMPARISON: Multiple prior breast imaging studies dating back to 2015. TECHNIQUE:  Multisequence, multiplanar, bilateral breast MRI was performed in prone position  using a dedicated breast coil. Images were obtained without contrast and dynamic  postcontrast images were obtained in multiple phases. 10 mL IV Gadavist was  administered. Subtraction images were reconstructed. Postcontrast images were  reviewed with dedicated kinetic analysis software. FINDINGS:  There is mild background parenchymal enhancement and heterogeneous  fibroglandular tissue. Numerous sub-5 mm foci of enhancement are scattered in  the bilateral breasts. Right breast:  A 16 x 13 x 16 mm, spiculated mass with a biopsy clip and mixed kinetics at 6:00  in the middle third of the right breast corresponds to the biopsy-proven  invasive ductal carcinoma. There is a small hematoma in the retroareolar biopsy site in the anterior third. A 6 x 7 x 8 mm, irregularly marginated, enhancing nodule with mixed kinetics at  the superior aspect of the hematoma corresponds roughly in size and morphology  to the hypoechoic, shadowing mass seen on prior ultrasound at 3:00 in the  periareolar region.     Incidental note is made of an intramammary lymph node at 1:00 in the middle  third. No axillary or internal mammary chain lymphadenopathy. Left breast:  No suspicious enhancing foci. No axillary or internal mammary chain  lymphadenopathy. A summary portfolio with key images has been sent from kinetic analysis software  to PACS. Impression IMPRESSION:   1. Invasive ductal carcinoma in the right breast at 6:00.   2. Small enhancing nodule associated with a hematoma in the retroareolar right  breast biopsy site. 3. No suspicious enhancing foci in the left breast.  4. No lymphadenopathy. 5. BI-RADS Assessment Category 6: Known biopsy proven malignancy. Records reviewed and summarized above. Pathology report(s) reviewed above. Radiology report(s) reviewed above. Assessment/PLAN:     1) Stage 1 T1cN0 ER+ HER2 Negative Mammaprint low risk breast cancer post b/l mastectomy/reconstruction  Patient has two separate tumors with different path types. 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 5 o clock ER+ HER2 negative mammaprint low risk. She had a lumpectomy 6/13/19 with + margin then had b/l mastectomy/reconstruction on 8/27/19. She was having post op wound/healing issues. She was on IV abx. She then had bilateral implants removed on 10/15/19 and was then admitted for infection on 10/30/19 and d/c'd on 11/4/19. She completed IV abx on 11/21/19. She had a wound vac in place. She is all healed from this now. Management per surgery/plastics. no adjuvant chemo and likely would have a low benefit. She started adjuvant Anastrozole in 12/19. She is tolerating Anastrozole well overall - no side effects. Continue Anastrozole. Patient agrees with plan. She has follow up with surgery on 7/27/20. She has routine labs with her PCP. 2) hx of mastectomy/reconstruction infection   She was on IV abx and then had implants removed on 10/15/19. She was hospitalized on 10/30/19 for infection/fever and d/c'd on 11/4/19.    She completed IV more abx on 11/21/19 and had a wound vac. Well healed now - management per surgery/plastics. 3) HTN/Depression/Hypothyroid/Arthritis/Obesity  BP stable today at 116/78. Management per PCP. 4) Psychosocial  Mood good, coping well overall. SW for support as needed. She is here alone today. Call if questions. Follow up here in 6 months per patient preference. This patient was seen in conjunction with Sherry Moreau NP. I appreciate the opportunity to participate in Ms. Joyce Willingham's care.     Signed By: Lissy Ramirez NP

## 2020-03-03 NOTE — PROGRESS NOTES
Juan Diego Wilson is a 59 y.o. female  Chief Complaint   Patient presents with    Follow-up    Breast Cancer     1. Have you been to the ER, urgent care clinic since your last visit? Hospitalized since your last visit? No.    2. Have you seen or consulted any other health care providers outside of the [address] [place] [address] since your last visit? Include any pap smears or colon screening.  No.

## 2020-03-03 NOTE — LETTER
3/3/20 Patient: Evelia Reynolds YOB: 1955 Date of Visit: 3/3/2020 Jeison Forbes MD 
[address] [facility] [address] VIA In Basket Dear Jeison oFrbes MD, Thank you for referring Ms. Luz Maria Bro to [address] [place] [address] for evaluation. My notes for this consultation are attached. If you have questions, please do not hesitate to call me. I look forward to following your patient along with you. Sincerely, Juan R Lira, DO

## 2020-03-10 ENCOUNTER — TELEPHONE (OUTPATIENT)
Dept: ONCOLOGY | Age: 65
End: 2020-03-10

## 2020-03-10 NOTE — TELEPHONE ENCOUNTER
40 min f/u session. Pt looks and feels good feels less of a heavy weight on her shoulders. Starting to clean house a little and take care of taxes which has been a little stressful due to her inheritance from her dad and the inheritance tax. Does a have  she uses. Has decided on craft that she can actually do in bulk. She is making oven mitts that her daughter, a new realtor, can give as a gift when a house is sold. Pt very excited about visiting dtr in a few weeks. No having any issues with her medicines. Still a little tight in upper body but feels pretty good after her exercises from PT. Plan- Continue with Sunday bib study group and her daily exercises. -Try to get out socially one other day in the week to walk, get lunch, see a movie or take a class. Last session to be after her [place] trip on 4/28 at 10 am. Pt will call if she needs to be seen sooner. She is aware [facility] away form 4/7 to 4/20.     Nicole Godwin [facility]

## 2020-03-16 ENCOUNTER — TELEPHONE (OUTPATIENT)
Dept: PALLATIVE CARE | Age: 65
End: 2020-03-16

## 2020-03-16 NOTE — TELEPHONE ENCOUNTER
Calling patient to see if she wants to keep acupuncture appt . States yes she feels fine and not getting treatment and has no fever, no shortness of breath and no cough. Appt moved up to 11:00am at [facility] with Dr. Zeeshan Burton,.

## 2020-03-17 ENCOUNTER — OFFICE VISIT (OUTPATIENT)
Dept: PALLATIVE CARE | Age: 65
End: 2020-03-17

## 2020-03-17 DIAGNOSIS — M25.562 CHRONIC PAIN OF BOTH KNEES: ICD-10-CM

## 2020-03-17 DIAGNOSIS — M25.511 CHRONIC PAIN OF BOTH SHOULDERS: Primary | ICD-10-CM

## 2020-03-17 DIAGNOSIS — M25.561 CHRONIC PAIN OF BOTH KNEES: ICD-10-CM

## 2020-03-17 DIAGNOSIS — G89.29 CHRONIC PAIN OF BOTH SHOULDERS: Primary | ICD-10-CM

## 2020-03-17 DIAGNOSIS — G89.29 CHRONIC PAIN OF BOTH KNEES: ICD-10-CM

## 2020-03-17 DIAGNOSIS — M25.512 CHRONIC PAIN OF BOTH SHOULDERS: Primary | ICD-10-CM

## 2020-03-17 NOTE — PROGRESS NOTES
Palliative Medicine and Integrative Medicine Acupuncture Note    Date Current Visit: 3/17/2020 - visit 2  Date Initial Visit: 20  Requesting Physician: Lenard Carrasco, [facility]    Summary: Chest wall/shoulder pain s/p mastectomies/implants/removal of implants; anxiety and depression    Subjective:     Juan Diego Wilson is a 72 y.o. female w/ R Thena Mulling cancer dx 2019 s/p lumpectomy and 2 LN removed. Did not require radiation or chemo. Underwent b/l mastectomies 2019 w/ implants. Had recurrent infections, chronic pain, abscesses requiring IV abx, hospitalizations at [facility]  w/ wound vac, and eventual removal of the implants 10/2019. Discharged from her plastic surgeon at [facility] had scarring and chest wall/shoulder pain. Sees Miladys Fletcher PT and has made lots of gains w/ improved pain and shoulder/chest wall ROM. Has hx of depression and for many years was on Wellbutrin. Recently saw PCP 20 and Prozac was added. Anxiety worsened when she retired from work of 25 years to care for her 81 yo father, who moved in and passed away at end of . Then was dx w/ cancer and had difficult course. Also w/ b/l knee pain from DJD, no hx surgeries or injections. 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 They have one child- dtr Mayte Bee. Defines herself as an introvert by nature, but doing lots of things outside of her comfort zone so to speak to get better. Very open and pleasant. Father  recently 81 yo. INTERVAL HX  ~~~~~~~~~~    3/17/20- Pt doing okay, it is her 65th birthday today. She doesn't feel that old despite fatigue after her cancer tx. Shoulders have good ROM, feels tight in the joint and knees still hurting some. Looking forwards to spring and getting to mow her grass.        Past Medical History:   Past Medical History:   Diagnosis Date    Adjustment disorder with mixed anxiety and depressed mood 2020 After death of father  and personal diagnosis of/treatment for breast cancer [id number]; Counselin:  [employer] [address] , Ivis Coyne     Ankle edema 2010    Anxiety 2010    Benign essential hypertension 2016     Carpal tunnel syndrome 2010    Chronic depression 2020    H/O Benign Colon Polyp 2010    H/O bilateral mastectomy 2019    Double Mastectomy (right breast cancer and elective left breast prophylactically); No chemo, No radiation    H/O Uterine Fibroids 2010    Hypercholesterolemia 2013    ~    Hypothyroidism 2016    Kidney stone 2010    Perimenopausal 2010    Primary osteoarthritis of left knee 2016    Extensive; Ortho (needs knee replacement), CSI x 2, -    S/P benign cervical polypectomy 2010    Sleep apnea 2010    USES CPAP    Status post right breast lumpectomy 2020, Invasive ductal carcinoma--->Mastectomy 2019, no chemo, no radiation, started on Arimidex    Stress incontinence 2010        Past Surgical history:   Past Surgical History:   Procedure Laterality Date    EKG ORDERABLE  2009    NSR, rate 84, normal EKG    HX BLADDER SUSPENSION  ~    Dr Claudia Song Left 2019    MCV.  Dr. Salazar Covert, [facility] Mastectomy Implant Infection Left Breast, Replaced Implant, IV abx    HX BREAST BIOPSY Right 2011    VPI, right breast biopsy negative    HX BREAST BIOPSY Right 2019    invasive ductal carcinoma    HX BREAST LUMPECTOMY Right 2019    RIGHT BREAST LUMPECTOMY ( x2 ) WTIH ULTRASOUND , RIGHT SENTINAL NODE BIOPSY performed by Amauri Hunt MD at [facility] AMBULATORY OR    HX BREAST RECONSTRUCTION Bilateral 2019    B MASTECTOMY AND IMPLANTS, IMMEDIATE BILATERAL BREAST RECONSTRUCTION WITH DIRECT TO IMPLANT USE OF ALLODERM AND BILATERAL BREAST RECONSTRUCTION INTRA-OPERATIVE ASSESMENT OF BLOOD FLOW performed by Isaías Fay MD at [facility] Right 2010    Dr Beau Collins  ,     Dr Valerie Villarreal    HX COLONOSCOPY  2016    Dr Ruperto Hannon-Internal Hemorrhoids- Normal mucosa in the whole colon - Repeat colonoscopy in 5 years    HX COLONOSCOPY      HX COLONOSCOPY  10/2005    benign polyp; repeat 5 yr per Dr Katelynn Landa HX COLONOSCOPY  2010    Normal, f/u 5 yrs, Dr Bridger Rojo  2005    AUB, benign/neg bx; Dr Topete Host  10/2012    Dr Valerie Villarreal, AUB    HX HYSTERECTOMY  2013    HX MASTECTOMY Bilateral 2019    BILATERAL BREAST SKIN SPARING MASTECTOMIES performed by Naz Mir MD at [facility] Right     CARPAL TUNNEL    HX PARTIAL HYSTERECTOMY  13    Supracervical hysterectomy for fibroids, Dr Yolanda Guzman  [facility] Bilateral 10/15/2019    MCV Dr. Daniel Banks, JON Breast Implant pain and recurrent left breast infection; on wound vac w/ iv abx; wound pump removed 2019        Family History:.   Family History   Problem Relation Age of Onset   [facility] Arthritis-osteo Father     Stroke Father         TIA's    Cancer Father         melanoma on back removed    Pacemaker Father 80    Heart Disease Father          81 yo in     Hypertension Mother     Arthritis-osteo Mother     Heart Disease Mother         valve disease,      Heart Attack Maternal Grandmother 76    No Known Problems Sister     No Known Problems Brother     Depression Daughter     Substance Abuse Daughter     Alcohol abuse Daughter          ROS:   ROS    The following systems were reviewed: constitutional, ears/nose/mouth/throat, respiratory, gastrointestinal, musculoskeletal, neurologic, psychiatric, endocrine. Positive findings noted below. Per HPI    Social history:   Social History     Socioeconomic History    Marital status:      Spouse name: Not on file    Number of children: 1    Years of education: Not on file    Highest education level: Not on file   Occupational History    Occupation: [address] Occupation: Retired    Social Needs    Financial resource strain: Not on file    Food insecurity     Worry: Not on file     Inability: Not on file   [employer] needs     Medical: Not on file     Non-medical: Not on file   Tobacco Use    Smoking status: Former Smoker     Packs/day: 0.50     Years: 25.00     Pack years: 12.50     Last attempt to quit: 1/1/2005     Years since quitting: 15.2    Smokeless tobacco: Never Used   Substance and Sexual Activity    Alcohol use:  Yes     Alcohol/week: 5.0 standard drinks     Types: 5 Shots of liquor per week    Drug use: No    Sexual activity: Not Currently     Partners: Male     Comment: not sexually active x many years   Lifestyle    Physical activity     Days per week: Not on file     Minutes per session: Not on file    Stress: Not on file   Relationships    Social connections     Talks on phone: Not on file     Gets together: Not on file     Attends Yazdanism service: Not on file     Active member of club or organization: Not on file     Attends meetings of clubs or organizations: Not on file     Relationship status: Not on file    Intimate partner violence     Fear of current or ex partner: Not on file     Emotionally abused: Not on file     Physically abused: Not on file     Forced sexual activity: Not on file   Other Topics Concern     Service Not Asked    Blood Transfusions Not Asked    Caffeine Concern No     Comment: no coffee, tea and cut out her diet Cokes    Occupational Exposure Not Asked   Clemetine Budge Hazards Not Asked    Sleep Concern Not Asked    Stress Concern Not Asked    Weight Concern Not Asked  Special Diet No    Back Care Not Asked    Exercise No    Bike Helmet Not Asked    Seat Belt Not Asked    Self-Exams Not Asked   Social History Narrative    1 biological daughter, 1 \"adopted\", and 2 step children        Physical Exam:         Objective:     General appearance:  No apparent distress, well nourished, well groomed   HEENT:  nares clear, moist mucous membranes   Lungs: Clear to auscultation bilaterally   Skin: Warm, dry, b/l mastectomies    Psych:  Normal affect, appropriate   Shoulders  FROM b/l shoulders, NTTP   Msk Neg crepitus in knees, no instability, gait balanced          Assessment and Plan:       ICD-10-CM ICD-9-CM    1. Chronic pain of both shoulders M25.511 719.41     G89.29 338.29     M25.512     2. Chronic pain of both knees M25.561 719.46     M25.562 338.29     G89.29          Treatment Plan:   -Goals: Improved pain, function   -Type of acupuncture and number of treatments planned   -Other modalities or referrals       Acupuncture treatment performed after written and verbal consent obtained. Patient aware that risk include, but are not limited to: pain during needle insertion; bleeding/bruising; infections; internal organ perforation. All questions were answered. Time out performed immediately prior to the start of the procedure. Patient identified by name and date of birth and agreement on procedure being performed was verified. ~~~~~~~~~~~~~~~~~~~~~~~~~    *Good w/ needling    1) Anxiety: Has hx of depression, sees PCP. [facility]ger [facility]y [facility].     -GV20, LI4, LR 3  -Ricardo mu henna x 10 min at 4 Hz for energy     2) B/ shoulder/chest wall pain: Doing great w/ PT- discharged recently. Good ROM and no pain. -B/l TH 14, LI15   -GB20, SRT b/l traps x 8, inner bladder line at C6,7,T2, T4     3) B/l knee pain: DJD    -B/l ST36, GB 33, ST34, ST9, LR8, SP10 and eyes of knee.  2 leads, 4 Hz x 20 minutes      ~~~~~~~~~~~~~~~~~~~~~~~~~  Pt tolerated procedure well without apparent complications. Pt advised to avoid strenuous sexual activity, exercise, heavy meals, alcohol for the next 24 hours. Aware that rebound effect may occur following treatment but that should improve back to baseline in several days.        Follow up:    Prn

## 2020-03-30 DIAGNOSIS — I10 BENIGN ESSENTIAL HYPERTENSION: Primary | ICD-10-CM

## 2020-03-30 RX ORDER — LOSARTAN POTASSIUM 100 MG/1
100 TABLET ORAL DAILY
Qty: 90 TAB | Refills: 0 | Status: SHIPPED | OUTPATIENT
Start: 2020-03-30 | End: 2020-06-28

## 2020-03-30 NOTE — TELEPHONE ENCOUNTER
Dr. Gonzalo Underwood, maybe this pharmacy has losartan. Request for refill. Last Visit: 2/17/20  NP [place]  Next Appointment: 5/12/20  MD [place]  Previous Refill Encounter(s): 12/9/19  #90    Requested Prescriptions     Pending Prescriptions Disp Refills    losartan (COZAAR) 100 mg tablet 90 Tab 0     Sig: Take 1 Tab by mouth daily.

## 2020-04-06 ENCOUNTER — TELEPHONE (OUTPATIENT)
Dept: PALLATIVE CARE | Age: 65
End: 2020-04-06

## 2020-04-06 NOTE — TELEPHONE ENCOUNTER
Calling patient regarding cancelling her acupuncture clinic appt on 5/5/20 due to COVID 19. Pt understands and will await our phone call to reschedule.

## 2020-05-12 ENCOUNTER — VIRTUAL VISIT (OUTPATIENT)
Dept: FAMILY MEDICINE CLINIC | Age: 65
End: 2020-05-12

## 2020-05-12 VITALS
HEART RATE: 70 BPM | DIASTOLIC BLOOD PRESSURE: 75 MMHG | BODY MASS INDEX: 40.29 KG/M2 | WEIGHT: 265 LBS | SYSTOLIC BLOOD PRESSURE: 126 MMHG

## 2020-05-12 DIAGNOSIS — F32.A ANXIETY AND DEPRESSION: ICD-10-CM

## 2020-05-12 DIAGNOSIS — E03.9 HYPOTHYROIDISM, UNSPECIFIED TYPE: ICD-10-CM

## 2020-05-12 DIAGNOSIS — E78.00 HYPERCHOLESTEROLEMIA: ICD-10-CM

## 2020-05-12 DIAGNOSIS — F41.9 ANXIETY AND DEPRESSION: ICD-10-CM

## 2020-05-12 DIAGNOSIS — I10 BENIGN ESSENTIAL HYPERTENSION: Primary | ICD-10-CM

## 2020-05-12 RX ORDER — CHOLECALCIFEROL (VITAMIN D3) 125 MCG
1 CAPSULE ORAL DAILY
COMMUNITY

## 2020-05-12 RX ORDER — BISMUTH SUBSALICYLATE 262 MG
1 TABLET,CHEWABLE ORAL DAILY
COMMUNITY

## 2020-05-12 NOTE — PROGRESS NOTES
VIRTUAL VISIT    Chief Complaint   Patient presents with    Hypertension     Follow Up    Cholesterol Problem    Thyroid Problem    Depression    Anxiety       HISTORY OF PRESENT ILLNESS   HPI  Patient seen via virtual visit today for follow up of chronic conditions listed below:    Cardiovascular HPI    Hypertension, hyperlipidemia and no known cardiovascular diseases. Diet and Lifestyle: not attempting to follow a low fat, low cholesterol diet, not attempting to follow a low sodium diet, exercises regularly. Wt Readings from Last 3 Encounters:   05/12/20 265 lb (120.2 kg)   03/03/20 263 lb 9.6 oz (119.6 kg)   02/17/20 267 lb (121.1 kg)     Key CAD CHF Meds             losartan (COZAAR) 100 mg tablet (Taking) Take 1 Tab by mouth daily. Medication Compliance: yes    Cardiovascular Medication Side Effects: none    Home BP Monitoring: is well controlled at home, ranging 115//75    BP Readings from Last 3 Encounters:   05/12/20 126/75   03/03/20 116/78   02/17/20 120/72     Thyroid HPI  Diagnosis: Hypothyroidism  Key Meds             levothyroxine (SYNTHROID) 137 mcg tablet (Taking) TAKE 1 TABLET BY MOUTH ONCE DAILY BEFORE BREAKFAST        Most recent adjustment in thyroid medication: N/a  Compliant with medication regimen: yes  Pertinent ROS: obese, wt concern only, otherwise negative  Lab Results   Component Value Date/Time    TSH 3.130 02/01/2019 10:32 AM    T4, Free 1.59 06/25/2018 01:25 PM     Depression/Anxiety  Doing well on current regimen of Wellbutrin and Prozac. Feels her mood has been good and stable lately. Tolerating both meds well. REVIEW OF SYMPTOMS   Review of Systems   Constitutional: Negative. Respiratory: Negative. Cardiovascular: Negative. Gastrointestinal: Negative. Neurological: Negative. Endo/Heme/Allergies: Negative. Psychiatric/Behavioral: Negative.             PROBLEM LIST/MEDICAL HISTORY     Problem List  Date Reviewed: 5/12/2020 Codes Class Noted    Mild depression ([facility].) ICD-10-CM: F32.0  ICD-9-CM: 171  3/3/2020        Aromatase inhibitor use ICD-10-CM: Z79.811  ICD-9-CM: V07.52  3/3/2020        Status post right breast lumpectomy ICD-10-CM: Z98.890  ICD-9-CM: V45.89  2020    Overview Addendum 2020  8:50 PM by Sofia Rosales MD     2019, Invasive ductal carcinoma--->Mastectomy 2019, no chemo, no radiation, started on Arimidex             Chronic depression ICD-10-CM: F32.9  ICD-9-CM: 311  2020        Adjustment disorder with mixed anxiety and depressed mood ICD-10-CM: F43.23  ICD-9-CM: 309.28  2020    Overview Signed 2020  9:31 PM by Sofia Rosales MD     After death of father  and personal diagnosis of/treatment for breast cancer [id number]; Counselin:  [employer] [facility] [address], [address] [place] [address]              H/O bilateral mastectomy ICD-10-CM: Z90.13  ICD-9-CM: V45.71  2019    Overview Addendum 2020  8:59 PM by Sofia Rosales MD     Double Mastectomy (right breast cancer and elective left breast prophylactically);  No chemo, No radiation; Started on Arimidex, followed by Dr. Nataly Manrique             H/O breast reconstruction ICD-10-CM: S12.53  ICD-9-CM: V43.82  2019        Arthritis ICD-10-CM: M19.90  ICD-9-CM: 716.90  2019        Cancer of overlapping sites of right breast ([facility].) ICD-10-CM: C50.811  ICD-9-CM: 174.8  2019        Severe obesity ([facility] ICD-10-CM: E66.01  ICD-9-CM: 278.01  2018        Benign essential hypertension ICD-10-CM: I10  ICD-9-CM: 401.1  2016    Overview Signed 2016  9:26 AM by Sofia Rosales MD                  Hypothyroidism ICD-10-CM: E03.9  ICD-9-CM: 244.9  2016    Overview Signed 2016  9:27 AM by Sofia Rosales MD     1999             Primary osteoarthritis of left knee ICD-10-CM: M17.12  ICD-9-CM: 715.16  2016    Overview Signed 2016  9:42 AM by Artemio Grewal MD     Extensive; Ortho (needs knee replacement), CSI x 2,              Hypercholesterolemia ICD-10-CM: E78.00  ICD-9-CM: 272.0  6/25/2013    Overview Signed 6/25/2013  8:29 AM by Artemio Grewal MD     ~2006             S/P benign cervical polypectomy ICD-10-CM: W34.118, Z87.42  ICD-9-CM: V45.89  12/20/2010    Overview Addendum 5/26/2016  9:42 AM by Artemio Grewal MD     2009, 2015; Gyn Dr Jennifer Sesay             H/O Benign Colon Polyp ICD-10-CM: K63.5  ICD-9-CM: 211.3  12/20/2010    Overview Signed 12/20/2010 12:10 PM by Artemio Grewal MD     Colonoscopy 10/2005, 11/2010  q5yrs  Dr Candida Christian             H/O Uterine Fibroids ICD-10-CM: D21.9  ICD-9-CM: 215.9  12/20/2010    Overview Signed 12/20/2010 12:13 PM by Artemio Grewal MD     2005; no surgical intervention  Gyn Dr Luh Bryant incontinence ICD-10-CM: N39.3  ICD-9-CM: UWN2122  5/26/2010    Overview Signed 5/26/2010 12:47 PM by Artemio Grewal MD     Urologist Dr Adrian Swanson             Sleep apnea, RENÉE ICD-10-CM: L89.59  ICD-9-CM: 780.57  4/5/2010    Overview Addendum 12/20/2010 12:14 PM by Artemio Grewal MD     2009; cpap             Anxiety ICD-10-CM: F41.9  ICD-9-CM: 300.00  4/5/2010        Carpal tunnel syndrome ICD-10-CM: G56.00  ICD-9-CM: 354.0  4/5/2010        Mild Dependent Ankle edema, B ICD-10-CM: M25.473  ICD-9-CM: 719.07  4/5/2010    Overview Signed 4/5/2010  3:52 PM by Cyril Xiong     mild             Perimenopausal ICD-10-CM: N95.1  ICD-9-CM: 627.2  4/5/2010    Overview Signed 4/5/2010  3:52 PM by Deberah Diones     2007             Depression ICD-10-CM: F32.9  ICD-9-CM: 110  4/5/2010        ?  Passed Kidney stone ICD-10-CM: N20.0  ICD-9-CM: 592.0  4/5/2010    Overview Signed 5/26/2010 12:47 PM by Artemio Grewal MD     2/2010                       PAST SURGICAL HISTORY     Past Surgical History:   Procedure Laterality Date    EKG ORDERABLE  5/2009    NSR, rate 84, normal EKG    HX BLADDER SUSPENSION  ~2010    Dr Jade Boo Left 09/23/2019    [facility].  Dr. Jeremías Plaza, [address] [facility] Mastectomy Implant Infection Left Breast, Replaced Implant, IV abx    HX BREAST BIOPSY Right 11/2011    VPI, right breast biopsy negative    HX BREAST BIOPSY Right 04/22/2019    invasive ductal carcinoma    HX BREAST LUMPECTOMY Right 6/13/2019    RIGHT BREAST LUMPECTOMY ( x2 ) WTIH ULTRASOUND , RIGHT SENTINAL NODE BIOPSY performed by Prashant Lockwood MD at [facility] AMBUL[facility]    HX BREAST RECONSTRUCTION Bilateral 8/27/2019    B MASTECTOMY AND IMPLANTS, IMMEDIATE BILATERAL BREAST RECONSTRUCTION WITH DIRECT TO IMPLANT USE OF ALLODERM AND BILATERAL BREAST RECONSTRUCTION INTRA-OPERATIVE ASSESMENT OF BLOOD FLOW performed by Petr Stokes MD at [place] [address] Right 03/2010    [facility]  2009, 2015    Dr Taylor Myrick    HX COLONOSCOPY  04/08/2016    Dr Herberth Hannon-Internal Hemorrhoids- Normal mucosa in the whole colon - Repeat colonoscopy in 5 years    HX COLONOSCOPY  2016    HX COLONOSCOPY  10/2005    benign polyp; repeat 5 yr per Dr Eduardo Roblero HX COLONOSCOPY  11/2010    Normal, f/u 5 yrs, Dr Juan Luis Gann  7/2005    AUB, benign/neg bx; Dr Clay Hoang  10/2012    Dr Taylor Myrick, AUB    HX HYSTERECTOMY  2013    HX MASTECTOMY Bilateral 8/27/2019    BILATERAL BREAST SKIN SPARING MASTECTOMIES performed by Prashant Lockwood MD at [address] [place] [address] Right 2010    CARPAL TUNNEL    HX PARTIAL HYSTERECTOMY  4/26/13    Supracervical hysterectomy for fibroids, Dr Joesph Hoyos HX UROLOGICAL  2010    BLADDER SLING      [facility] Bilateral 10/15/2019    [facility] Dr. Jeremías Plaza, B Breast Implant pain and recurrent left breast infection; on wound vac w/ iv abx; wound pump removed 12-4-2019         MEDICATIONS     Current Outpatient Medications   Medication Sig    multivitamin (ONE A DAY) tablet Take 1 Tab by mouth daily.  cholecalciferol, vitamin D3, (Vitamin D3) 50 mcg (2,000 unit) tab Take 1 Tab by mouth daily.  buPROPion XL (WELLBUTRIN XL) 150 mg tablet TAKE 1 TABLET BY MOUTH ONCE DAILY BEFORE BREAKFAST    FLUoxetine (PROzac) 20 mg capsule TAKE 1 CAPSULE BY MOUTH DAILY    levothyroxine (SYNTHROID) 137 mcg tablet TAKE 1 TABLET BY MOUTH ONCE DAILY BEFORE BREAKFAST    losartan (COZAAR) 100 mg tablet Take 1 Tab by mouth daily.  anastrozole (ARIMIDEX) 1 mg tablet Take 1 mg by mouth daily. Indications: Hormone Receptor Positive Breast Cancer    OTHER,NON-FORMULARY, Tumeric once a day    OTHER Portable CPAP machine for RENÉE, with new mask and tubing but no change to mask or settings; G47.30 GARO 99m prog good     No current facility-administered medications for this visit. ALLERGIES     Allergies   Allergen Reactions    Pcn [Penicillins] Hives    Sulfa (Sulfonamide Antibiotics) Hives    Zocor [Simvastatin] Myalgia    Lisinopril Cough          SOCIAL HISTORY     Social History     Socioeconomic History    Marital status:      Spouse name: Not on file    Number of children: 1    Years of education: Not on file    Highest education level: Not on file   Occupational History    Occupation: [address] Occupation: Retired    Tobacco Use    Smoking status: Former Smoker     Packs/day: 0.50     Years: 25.00     Pack years: 12.50     Last attempt to quit: 1/1/2005     Years since quitting: 15.3    Smokeless tobacco: Never Used   Substance and Sexual Activity    Alcohol use:  Yes     Alcohol/week: 5.0 standard drinks     Types: 5 Shots of liquor per week    Drug use: No    Sexual activity: Not Currently     Partners: Male     Comment: not sexually active x many years   Other Topics Concern    Caffeine Concern No Comment: no coffee, tea and cut out her diet Cokes    Special Diet No     Comment: \"I eat way too much\"    Exercise Yes     Comment: home stationary bike x 30 minutes a day, stretches daily, yardwork once a week   Social History Narrative    1 biological daughter, 1 \"adopted\", and 2 step children        IMMUNIZATIONS  Immunization History   Administered Date(s) Administered    Influenza Vaccine 10/03/2014, 11/10/2015, 2016, 2017, 2018, 2019    Influenza Vaccine (Quad) PF 2018, 2019    Influenza Vaccine Split 2010, 10/06/2011    Meningococcal ACWY Vaccine 2013    TD Vaccine 1999, 2009    Td 2017    Tdap 2016, 2017    Zoster Recombinant 2018, 2019    Zoster Vaccine, Live 2013         FAMILY HISTORY     Family History   Problem Relation Age of Onset    Arthritis-osteo Father     Stroke Father         TIA's    Cancer Father         melanoma on back removed    Pacemaker Father 80    Heart Disease Father          81 yo in     Hypertension Mother     Arthritis-osteo Mother     Heart Disease Mother         valve disease,      Heart Attack Maternal Grandmother 76    No Known Problems Sister     No Known Problems Brother     Depression Daughter     Substance Abuse Daughter     Alcohol abuse Daughter          VITALS     Visit Vitals  /75   Pulse 70   Wt 265 lb (120.2 kg)   LMP 2011   BMI 40.29 kg/m²          PHYSICAL EXAMINATION   Physical Exam  Constitutional:       General: She is not in acute distress. Pulmonary:      Effort: Pulmonary effort is normal. No respiratory distress. Neurological:      Mental Status: She is alert and oriented to person, place, and time. Mental status is at baseline. Psychiatric:         Mood and Affect: Mood and affect normal. Mood is not anxious or depressed. Behavior: Behavior normal.         Thought Content:  Thought content normal. Cognition and Memory: Cognition and memory normal.         Judgment: Judgment normal.             DIAGNOSTIC DATA         LABORATORY DATA       Lab Results   Component Value Date/Time    WBC 4.6 08/20/2019 10:57 AM    HGB 12.8 08/20/2019 10:57 AM    HCT 41.3 08/20/2019 10:57 AM    PLATELET 633 77/24/8891 10:57 AM    MCV 95.6 08/20/2019 10:57 AM     Lab Results   Component Value Date/Time    TSH 3.130 02/01/2019 10:32 AM    T4, Free 1.59 06/25/2018 01:25 PM      Lab Results   Component Value Date/Time    Sodium 139 02/01/2019 10:32 AM    Potassium 4.5 02/01/2019 10:32 AM    Chloride 102 02/01/2019 10:32 AM    CO2 21 02/01/2019 10:32 AM    Anion gap 7 07/15/2010 08:05 AM    Glucose 81 02/01/2019 10:32 AM    BUN 26 02/01/2019 10:32 AM    Creatinine 0.81 02/01/2019 10:32 AM    BUN/Creatinine ratio 32 (H) 02/01/2019 10:32 AM    GFR est AA 89 02/01/2019 10:32 AM    GFR est non-AA 78 02/01/2019 10:32 AM    Calcium 9.4 02/01/2019 10:32 AM      Lab Results   Component Value Date/Time    Sodium 139 02/01/2019 10:32 AM    Potassium 4.5 02/01/2019 10:32 AM    Chloride 102 02/01/2019 10:32 AM    CO2 21 02/01/2019 10:32 AM    Anion gap 7 07/15/2010 08:05 AM    Glucose 81 02/01/2019 10:32 AM    BUN 26 02/01/2019 10:32 AM    Creatinine 0.81 02/01/2019 10:32 AM    BUN/Creatinine ratio 32 (H) 02/01/2019 10:32 AM    GFR est AA 89 02/01/2019 10:32 AM    GFR est non-AA 78 02/01/2019 10:32 AM    Calcium 9.4 02/01/2019 10:32 AM    Bilirubin, total 1.1 02/01/2019 10:32 AM    ALT (SGPT) 21 02/01/2019 10:32 AM    AST (SGOT) 16 02/01/2019 10:32 AM    Alk. phosphatase 57 02/01/2019 10:32 AM    Protein, total 6.6 02/01/2019 10:32 AM    Albumin 4.6 02/01/2019 10:32 AM    Globulin 3.3 07/15/2010 08:05 AM    A-G Ratio 2.3 (H) 02/01/2019 10:32 AM          ASSESSMENT & PLAN       ICD-10-CM ICD-9-CM    1.  Benign essential hypertension, controlled, stable I10 401.1 CBC WITH AUTOMATED DIFF      LIPID PANEL      METABOLIC PANEL, COMPREHENSIVE 2. Hypercholesterolemia E78.00 272.0 LIPID PANEL   3. Hypothyroidism, unspecified type E03.9 244.9 TSH 3RD GENERATION   4. Anxiety and depression F41.9 300.00     F32.9 311      Patient seen via virtual visit today for follow up chronic conditions listed above. Overall stable. She will have fasting labs done sometime in the next few weeks pending status of current Covid pandemic. Orders have been pended. Cardiovascular risk and specific lipid/LDL/BP goals reviewed  Reviewed diet, nutrition, exercise, weight management, BMI/goals, age/risk based screening recommendations, health maintenance & prevention counseling. Cancer screening USPTFS guidelines reviewed w/ pt today. Discussed benefits/positive/negative outcomes of screening based on age/risk stratification. Informed consent for/against screening based on pt's personal hx/risk factors. Updated in history above and health maintenance. Reviewed medications and side effects, doing well on current regimen, no changes at this time. Further follow up & other recommendations pending review of labs. Otherwise she is scheduled for Welcome to Medicare Visit 7/8/20.  ----------------------------------------------------------------------------------------------------------------------------------------------------------  Patient is being evaluated by a video visit encounter for concerns as above. A caregiver was present when appropriate. Due to this being a TeleHealth encounter (During ECEDP-63 public health emergency), evaluation of the following organ systems was limited: Vitals/Constitutional/EENT/Resp/CV/GI//MS/Neuro/Skin/Heme-Lymph-Imm.   Pursuant to the emergency declaration under the [facility], [address] [place] [address] authority and the [employer] and [employer] Act, this Virtual  Visit was conducted, with patient's (and/or legal guardian's) consent, to reduce the patient's risk of exposure to COVID-19 and provide necessary medical care. Services were provided through a video synchronous discussion virtually to substitute for in-person clinic visit. Patient was located at their own home. I was at home while conducting this encounter. Consent:  She and/or her healthcare decision maker is aware that this patient-initiated Telehealth encounter is a billable service, with coverage as determined by her insurance carrier. She is aware that she may receive a bill and has provided verbal consent to proceed: Yes  This virtual visit was conducted via [employer] me. Pursuant to the emergency declaration under the [facility], [facility] waiver authority and the [employer] and [employer] Act, this Virtual  Visit was conducted to reduce the patient's risk of exposure to COVID-19 and provide continuity of care for an established patient. Services were provided through a video synchronous discussion virtually to substitute for in-person clinic visit. Due to this being a TeleHealth evaluation, many elements of the physical examination are unable to be assessed. Total Time: minutes: 11-20 minutes.

## 2020-05-12 NOTE — PATIENT INSTRUCTIONS

## 2020-06-24 DIAGNOSIS — F33.9 CHRONIC MAJOR DEPRESSIVE DISORDER, RECURRENT EPISODE (HCC): ICD-10-CM

## 2020-06-24 DIAGNOSIS — F43.23 ADJUSTMENT REACTION WITH ANXIETY AND DEPRESSION: ICD-10-CM

## 2020-06-24 DIAGNOSIS — E78.00 HYPERCHOLESTEROLEMIA: Primary | ICD-10-CM

## 2020-06-24 DIAGNOSIS — E03.9 HYPOTHYROIDISM, UNSPECIFIED TYPE: ICD-10-CM

## 2020-06-24 RX ORDER — FLUOXETINE HYDROCHLORIDE 20 MG/1
CAPSULE ORAL
Qty: 90 CAP | Refills: 3 | Status: SHIPPED | OUTPATIENT
Start: 2020-06-24 | End: 2021-06-10

## 2020-06-24 RX ORDER — LEVOTHYROXINE SODIUM 137 UG/1
TABLET ORAL
Qty: 30 TAB | Refills: 0 | Status: SHIPPED | OUTPATIENT
Start: 2020-06-24 | End: 2020-07-06

## 2020-06-24 NOTE — TELEPHONE ENCOUNTER
Future Appointments:         Future Appointments   Date Time Provider Yaz Aparicio   7/8/2020 12:00 PM Anne Ospina MD [facility] SCHED   7/27/2020  8:30 AM Freddy Pop MD [facility] SCHED   9/1/2020 11:00 AM Nuzhat Chacon DO

## 2020-06-28 NOTE — TELEPHONE ENCOUNTER
Call and remind pt that she has follow up labs to recheck. Orders pended. REMEMBER: Patient advised us previously that she wants all results/recommendations/messages to be left on her VM. You spoke w/ her about this and said you would notate it in her chart. Didn't want to cause untoward frustration for her again after you all had already talked about this. CRITICAL CARE ATTENDING - CTICU    MEDICATIONS  (STANDING):  artificial tears (preservative free) Ophthalmic Solution 1 Drop(s) Both EYES every 12 hours  DAPTOmycin IVPB 500 milliGRAM(s) IV Intermittent <User Schedule>  dexMEDEtomidine Infusion 0.3 MICROgram(s)/kG/Hr (6.99 mL/Hr) IV Continuous <Continuous>  furosemide   Injectable 20 milliGRAM(s) IV Push every 12 hours  heparin  Infusion 1200 Unit(s)/Hr (8.5 mL/Hr) IV Continuous <Continuous>  insulin lispro (HumaLOG) corrective regimen sliding scale   SubCutaneous every 6 hours  pantoprazole  Injectable 40 milliGRAM(s) IV Push every 12 hours  petrolatum Ophthalmic Ointment 1 Application(s) Both EYES every 6 hours  sodium chloride 0.9%. 1000 milliLiter(s) (10 mL/Hr) IV Continuous <Continuous>                            5.7    13.11 )-----------( 119      ( 2020 00:34 )             19.0           142  |  106  |  20  ----------------------------<  153<H>  4.3   |  26  |  1.04    Ca    8.8      2020 00:34  Phos  3.6       Mg     2.0         TPro  6.2  /  Alb  3.5  /  TBili  0.5  /  DBili  x   /  AST  71<H>  /  ALT  106<H>  /  AlkPhos  56        PT/INR - ( 2020 00:34 )   PT: 16.4 sec;   INR: 1.42 ratio         PTT - ( 2020 08:16 )  PTT:69.4 sec        Daily     Daily Weight in k (2020 00:00)       @ 07:  -   @ 07:00  --------------------------------------------------------  IN: 1173.1 mL / OUT: 2615 mL / NET: -1441.9 mL     @ 07:01  -   @ 09:29  --------------------------------------------------------  IN: 48.5 mL / OUT: 200 mL / NET: -151.5 mL        Critically Ill patient  : [ ] preoperative ,   [x ] post operative    Requires :  [x ] Arterial Line   [ ] Central Line  [ ] PA catheter  [x ] IABP  [x ] ECMO  [ ] LVAD  [ ] Ventilator  [ ] pacemaker [ ] Impella.                      [x ] ABG's     [x ] Pulse Oxymetry Monitoring  Bedside evaluation , monitoring , treatment of hemodynamics , fluids , IVP/ IVCD meds.      Diagnosis:     - Induction of  by dilation and curettage     Unplanned, life-threatening pregnancy     ACC/AHA stage D NICM, listed for heart transplant    AV tejal re-entry tachycardia    PEA arrest     Dilated cardiomyopathy     Myopericarditis    Irritable bowel syndrome    PICC line infection     Asthma    Low dose IVCD Sedation     Hypoxemia / O2 delivery via HF Nasal Cannula    VA ECMO at 4300 rpm, 4.7 flow     IVCD anticoagulation with [x ] Heparin for ECMO circuit, IABP   [ ] Argatroban for     Tolerates NG / NJ feeds at [ ] goal rate    [x ] trophic rate    [x ] 10 cc/hr      rate     Recent cultures NGTD     Hemodynamic lability,instability. Requires IVCD [ ] vasopressors [ ] inotropes  [ ] vasodilator  [x ]IVSS fluid  to maintain MAP, perfusion, C.I.       By signing my name below, I, Sanaz Osborne, attest that this documentation has been prepared under the direction and in the presence of Cayden Atkinson MD.  Electronically signed: Aleksey Steven, 20 @ 09:40    I, Cayden Atkinson , personally performed the services described in this documentation. all medical record entries made by the scribe were at my direction and in my presence. I have reviewed the chart and agree that the record reflects my personal performance and is accurate and complete  Electronically signed: Cayden Atkinson MD.     Discussed with CT surgeon, Physician's Assistant - Nurse Practitioner- Critical care medicine team.   Dicussed at  AM / PM rounds.   Chart, labs , films reviewed.    Total Time: CRITICAL CARE ATTENDING - CTICU    MEDICATIONS  (STANDING):  artificial tears (preservative free) Ophthalmic Solution 1 Drop(s) Both EYES every 12 hours  DAPTOmycin IVPB 500 milliGRAM(s) IV Intermittent <User Schedule>  dexMEDEtomidine Infusion 0.3 MICROgram(s)/kG/Hr (6.99 mL/Hr) IV Continuous <Continuous>  furosemide   Injectable 20 milliGRAM(s) IV Push every 12 hours  heparin  Infusion 1200 Unit(s)/Hr (8.5 mL/Hr) IV Continuous <Continuous>  insulin lispro (HumaLOG) corrective regimen sliding scale   SubCutaneous every 6 hours  pantoprazole  Injectable 40 milliGRAM(s) IV Push every 12 hours  petrolatum Ophthalmic Ointment 1 Application(s) Both EYES every 6 hours  sodium chloride 0.9%. 1000 milliLiter(s) (10 mL/Hr) IV Continuous <Continuous>                            5.7    13.11 )-----------( 119      ( 2020 00:34 )             19.0           142  |  106  |  20  ----------------------------<  153<H>  4.3   |  26  |  1.04    Ca    8.8      2020 00:34  Phos  3.6       Mg     2.0         TPro  6.2  /  Alb  3.5  /  TBili  0.5  /  DBili  x   /  AST  71<H>  /  ALT  106<H>  /  AlkPhos  56        PT/INR - ( 2020 00:34 )   PT: 16.4 sec;   INR: 1.42 ratio         PTT - ( 2020 08:16 )  PTT:69.4 sec        Daily     Daily Weight in k (2020 00:00)       @ 07:  -   @ 07:00  --------------------------------------------------------  IN: 1173.1 mL / OUT: 2615 mL / NET: -1441.9 mL     @ 07:01  -   @ 09:29  --------------------------------------------------------  IN: 48.5 mL / OUT: 200 mL / NET: -151.5 mL        Critically Ill patient  : [ ] preoperative ,   [x ] post operative    Requires :  [x ] Arterial Line   [x ] Central Line  [ x] PA catheter  [x ] IABP  [x ] ECMO  [ ] LVAD  [ ] Ventilator  [ ] pacemaker [ ] Impella.                      [x ] ABG's     [x ] Pulse Oxymetry Monitoring  Bedside evaluation , monitoring , treatment of hemodynamics , fluids , IVP/ IVCD meds.      Diagnosis:     - Induction of  by dilation and curettage     Unplanned, life-threatening pregnancy     ACC/AHA stage D NICM, listed for heart transplant    AV tejal re-entry tachycardia    PEA arrest / CPR    Dilated cardiomyopathy     Cardiogenic Shock     Myopericarditis    PICC line infection     Asthma ?    Low dose IVCD Sedation     Hypoxemia / O2 delivery via HF Nasal Cannula    respiratory failure     VA ECMO at 4300 rpm, 4.7 flow     IVCD anticoagulation with [x ] Heparin for ECMO circuit, IABP   [ ] Argatroban for     Tolerates NG / NJ feeds at [ ] goal rate    [x ] trophic rate    [x ] 10 cc/hr      rate      Hemodynamic lability,instability. Requires IVCD [ ] vasopressors [ ] inotropes  [ ] vasodilator  [x ]IVSS fluid  to maintain MAP, perfusion, C.I.  ECMO / IABP    ECMO Circuit    IABP   [ x] management   [ ] wean 1:1 1:2 1:3   [ ] removal and f/u vascular checks     Thrombocytopenia           By signing my name below, I, Sanaz Osbrone, attest that this documentation has been prepared under the direction and in the presence of Cayden Atkinson MD.  Electronically signed: Aleksey Steven, 20 @ 09:40    I, Cayden Atkinson , personally performed the services described in this documentation. all medical record entries made by the scribe were at my direction and in my presence. I have reviewed the chart and agree that the record reflects my personal performance and is accurate and complete  Electronically signed: Cayden Atkinson MD.     Discussed with CT surgeon, Physician's Assistant - Nurse Practitioner- Critical care medicine team.   Dicussed at  AM / PM rounds.   Chart, labs , films reviewed.    Total Time: 90 min

## 2020-07-01 LAB
ALBUMIN SERPL-MCNC: 4.4 G/DL (ref 3.8–4.8)
ALBUMIN/GLOB SERPL: 2.4 {RATIO} (ref 1.2–2.2)
ALP SERPL-CCNC: 70 IU/L (ref 39–117)
ALT SERPL-CCNC: 18 IU/L (ref 0–32)
AST SERPL-CCNC: 13 IU/L (ref 0–40)
BASOPHILS # BLD AUTO: 0 X10E3/UL (ref 0–0.2)
BASOPHILS NFR BLD AUTO: 0 %
BILIRUB SERPL-MCNC: 0.6 MG/DL (ref 0–1.2)
BUN SERPL-MCNC: 19 MG/DL (ref 8–27)
BUN/CREAT SERPL: 22 (ref 12–28)
CALCIUM SERPL-MCNC: 9.9 MG/DL (ref 8.7–10.3)
CHLORIDE SERPL-SCNC: 105 MMOL/L (ref 96–106)
CHOLEST SERPL-MCNC: 326 MG/DL (ref 100–199)
CO2 SERPL-SCNC: 20 MMOL/L (ref 20–29)
COMMENT, 011824: ABNORMAL
CREAT SERPL-MCNC: 0.85 MG/DL (ref 0.57–1)
EOSINOPHIL # BLD AUTO: 0 X10E3/UL (ref 0–0.4)
EOSINOPHIL NFR BLD AUTO: 0 %
ERYTHROCYTE [DISTWIDTH] IN BLOOD BY AUTOMATED COUNT: 14.4 % (ref 11.7–15.4)
GLOBULIN SER CALC-MCNC: 1.8 G/DL (ref 1.5–4.5)
GLUCOSE SERPL-MCNC: 95 MG/DL (ref 65–99)
HCT VFR BLD AUTO: 40.9 % (ref 34–46.6)
HDLC SERPL-MCNC: 59 MG/DL
HGB BLD-MCNC: 13.4 G/DL (ref 11.1–15.9)
IMM GRANULOCYTES # BLD AUTO: 0 X10E3/UL (ref 0–0.1)
IMM GRANULOCYTES NFR BLD AUTO: 1 %
INTERPRETATION, 910389: NORMAL
LDLC SERPL CALC-MCNC: 234 MG/DL (ref 0–99)
LYMPHOCYTES # BLD AUTO: 0.9 X10E3/UL (ref 0.7–3.1)
LYMPHOCYTES NFR BLD AUTO: 19 %
MCH RBC QN AUTO: 29.6 PG (ref 26.6–33)
MCHC RBC AUTO-ENTMCNC: 32.8 G/DL (ref 31.5–35.7)
MCV RBC AUTO: 91 FL (ref 79–97)
MONOCYTES # BLD AUTO: 0.3 X10E3/UL (ref 0.1–0.9)
MONOCYTES NFR BLD AUTO: 6 %
NEUTROPHILS # BLD AUTO: 3.7 X10E3/UL (ref 1.4–7)
NEUTROPHILS NFR BLD AUTO: 74 %
PLATELET # BLD AUTO: 236 X10E3/UL (ref 150–450)
POTASSIUM SERPL-SCNC: 4.6 MMOL/L (ref 3.5–5.2)
PROT SERPL-MCNC: 6.2 G/DL (ref 6–8.5)
RBC # BLD AUTO: 4.52 X10E6/UL (ref 3.77–5.28)
SODIUM SERPL-SCNC: 140 MMOL/L (ref 134–144)
T4 FREE SERPL-MCNC: 1.12 NG/DL (ref 0.82–1.77)
TRIGL SERPL-MCNC: 164 MG/DL (ref 0–149)
TSH SERPL DL<=0.005 MIU/L-ACNC: 7.78 UIU/ML (ref 0.45–4.5)
VLDLC SERPL CALC-MCNC: 33 MG/DL (ref 5–40)
WBC # BLD AUTO: 4.9 X10E3/UL (ref 3.4–10.8)

## 2020-07-06 DIAGNOSIS — E03.9 HYPOTHYROIDISM, UNSPECIFIED TYPE: ICD-10-CM

## 2020-07-06 DIAGNOSIS — E78.2 MIXED HYPERLIPIDEMIA: Primary | ICD-10-CM

## 2020-07-06 RX ORDER — ROSUVASTATIN CALCIUM 10 MG/1
10 TABLET, COATED ORAL
Qty: 90 TAB | Refills: 0 | Status: SHIPPED | OUTPATIENT
Start: 2020-07-06 | End: 2020-10-04

## 2020-07-06 RX ORDER — LEVOTHYROXINE SODIUM 150 UG/1
150 TABLET ORAL
Qty: 90 TAB | Refills: 0 | Status: SHIPPED | OUTPATIENT
Start: 2020-07-06 | End: 2020-09-17

## 2020-07-24 DIAGNOSIS — E03.9 HYPOTHYROIDISM, UNSPECIFIED TYPE: ICD-10-CM

## 2020-07-24 RX ORDER — LEVOTHYROXINE SODIUM 137 UG/1
TABLET ORAL
Qty: 90 TAB | OUTPATIENT
Start: 2020-07-24

## 2020-07-27 ENCOUNTER — OFFICE VISIT (OUTPATIENT)
Dept: SURGERY | Age: 65
End: 2020-07-27

## 2020-07-27 VITALS
DIASTOLIC BLOOD PRESSURE: 60 MMHG | BODY MASS INDEX: 40.16 KG/M2 | SYSTOLIC BLOOD PRESSURE: 135 MMHG | HEIGHT: 68 IN | WEIGHT: 265 LBS | HEART RATE: 79 BPM

## 2020-07-27 DIAGNOSIS — Z90.13 S/P MASTECTOMY, BILATERAL: Primary | ICD-10-CM

## 2020-07-27 NOTE — PATIENT INSTRUCTIONS
Breast Cancer: Care Instructions Your Care Instructions Breast cancer occurs when abnormal cells grow out of control in the breast. These cancer cells can spread within the breast, to nearby lymph nodes and other tissues, and to other parts of the body. Being treated for cancer can weaken your body, and you may feel very tired. Get the rest your body needs so you can feel better. Finding out that you have cancer is scary. You may feel many emotions and may need some help coping. Seek out family, friends, and counselors for support. You also can do things at home to make yourself feel better while you go through treatment. Call the [employer] (2-771.933.8276) or visit its website at [employer] for more information. Follow-up care is a key part of your treatment and safety. Be sure to make and go to all appointments, and call your doctor if you are having problems. It's also a good idea to know your test results and keep a list of the medicines you take. How can you care for yourself at home? · Take your medicines exactly as prescribed. Call your doctor if you think you are having a problem with your medicine. You may get medicine for nausea and vomiting if you have these side effects. · Follow your doctor's instructions to relieve pain. Pain from cancer and surgery can almost always be controlled. Use pain medicine when you first notice pain, before it becomes severe. · Eat healthy food. If you do not feel like eating, try to eat food that has protein and extra calories to keep up your strength and prevent weight loss. Drink liquid meal replacements for extra calories and protein. Try to eat your main meal early. · Get some physical activity every day, but do not get too tired. Keep doing the hobbies you enjoy as your energy allows. · Do not smoke. Smoking can make your cancer worse. If you need help quitting, talk to your doctor about stop-smoking programs and medicines. These can increase your chances of quitting for good. · Take steps to control your stress and workload. Learn relaxation techniques. ? Share your feelings. Stress and tension affect our emotions. By expressing your feelings to others, you may be able to understand and cope with them. ? Consider joining a support group. Talking about a problem with your spouse, a good friend, or other people with similar problems is a good way to reduce tension and stress. ? Express yourself through art. Try writing, crafts, dance, or art to relieve stress. Some dance, writing, or art groups may be available just for people who have cancer. ? Be kind to your body and mind. Getting enough sleep, eating a healthy diet, and taking time to do things you enjoy can contribute to an overall feeling of balance in your life and can help reduce stress. ? Get help if you need it. Discuss your concerns with your doctor or counselor. · If you are vomiting or have diarrhea: ? Drink plenty of fluids (enough so that your urine is light yellow or clear like water) to prevent dehydration. Choose water and other caffeine-free clear liquids. If you have kidney, heart, or liver disease and have to limit fluids, talk with your doctor before you increase the amount of fluids you drink. ? When you are able to eat, try clear soups, mild foods, and liquids until all symptoms are gone for 12 to 48 hours. Other good choices include dry toast, crackers, cooked cereal, and gelatin dessert, such as Jell-O. · If you have not already done so, prepare a list of advance directives. Advance directives are instructions to your doctor and family members about what kind of care you want if you become unable to speak or express yourself. When should you call for help? [id number] anytime you think you may need emergency care. For example, call if: 
· You passed out (lost consciousness). Call your doctor now or seek immediate medical care if: 
· You have a fever. · You have abnormal bleeding. · You think you have an infection. · You have new or worse pain. · You have new symptoms, such as a cough, belly pain, vomiting, diarrhea, or a rash. Watch closely for changes in your health, and be sure to contact your doctor if: 
· You are much more tired than usual. 
· You have swollen glands in your armpits, groin, or neck. · You do not get better as expected. Where can you learn more? Go to http://radha-za.info/ Enter V321 in the search box to learn more about \"Breast Cancer: Care Instructions. \" Current as of: August 22, 2019               Content Version: 12.5 © 5751-9481 [employer]. Care instructions adapted under license by [employer] (which disclaims liability or warranty for this information). If you have questions about a medical condition or this instruction, always ask your healthcare professional. [address] any warranty or liability for your use of this information.

## 2020-07-27 NOTE — PROGRESS NOTES
HISTORY OF PRESENT ILLNESS  Williams Arteaga is a 72 y.o. female. HPI ESTABLISHED patient here today for follow up RIGHT breast cancer. S/P BILATERAL mastectomies with implants 8/27/19. After multiple infections she had the implants removed. The patient is currently taking Anastrozole and tolerating well. She has no complaints at this time. Breast cancer history-  · 6/13/19 - RIGHT breast lumpectomy x 2 sites and RIGHT SLNB  · 8/27/19 - BILATERAL mastectomies with reconstruction. Dr. Nando Ratliff did direct saline implants. Surg path showed LEFT breast benign, residual IDC on RIGHT breast.   · 10/15/19 - Removal of bilateral breast implants. Complicated breast cancer course including multiple surgeries and hospitalizations for wound infections. · 12/2019 - Anastrozole - Dr. Parul Berrios  Review of Systems   All other systems reviewed and are negative. Physical Exam  Nursing note reviewed. Chest:          Comments: No chest wall masses  No adneopathy  Lymphadenopathy:      Upper Body:      Right upper body: No supraclavicular, axillary or pectoral adenopathy. Left upper body: No supraclavicular, axillary or pectoral adenopathy. ASSESSMENT and PLAN    ICD-10-CM ICD-9-CM    1.  S/P mastectomy, bilateral  Z90.13 V45.71      - doing well no evidence local recurrence  - f/u in 6 months

## 2020-07-27 NOTE — LETTER
7/27/20 Patient: Michael Gutierrez YOB: 1955 Date of Visit: 7/27/2020 Wagner Erickson MD 
[address] [address] VIA In Basket Angel [address] [facility] [place] [phone] [phone] VIA In Basket Dear MD Angel Lopez DO, Thank you for referring Ms. Yasmani Matthew to [address] [facility] [address] [place] [address] for evaluation. My notes for this consultation are attached. If you have questions, please do not hesitate to call me. I look forward to following your patient along with you. Sincerely, Amalia Will MD

## 2020-09-01 ENCOUNTER — VIRTUAL VISIT (OUTPATIENT)
Dept: ONCOLOGY | Age: 65
End: 2020-09-01
Payer: MEDICARE

## 2020-09-01 DIAGNOSIS — C50.811 CANCER OF OVERLAPPING SITES OF RIGHT BREAST (HCC): Primary | ICD-10-CM

## 2020-09-01 DIAGNOSIS — I10 ESSENTIAL HYPERTENSION: ICD-10-CM

## 2020-09-01 DIAGNOSIS — Z90.13 H/O BILATERAL MASTECTOMY: ICD-10-CM

## 2020-09-01 DIAGNOSIS — Z79.811 AROMATASE INHIBITOR USE: ICD-10-CM

## 2020-09-01 PROCEDURE — 99213 OFFICE O/P EST LOW 20 MIN: CPT | Performed by: INTERNAL MEDICINE

## 2020-09-01 NOTE — PROGRESS NOTES
[facility] [place] [address] [address]  W: [phone]  F: [phone]      Reason for Visit:   Anshu Chambers is a 72 y.o. female who is seen by synchronous (real-time) audio-video technology for follow up of breast cancer on adjuvant Anastrozole     Treatment History:   · 19 - RIGHT breast lumpectomy x 2 sites and RIGHT SLNB  · 19 - BILATERAL mastectomies with reconstruction. Dr. Vernell Landa did direct saline implants. Surg path showed LEFT breast benign, residual IDC on RIGHT breast. ER+ HER2 negative. 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 Tolerating AI fine. No new issues. Has occ hot flashes. Saw PCP and got new cholesterol med. Labs with PCP. Last visit:  She had bilateral implants removal on 10/15/19. She completed IV abx and had a wound vac. She started adjuvant hormonal therapy with Anastrozole in . She reports that she is feeling well overall today. She is tolerating Anastrozole well overall without side effects. She reports that her appetite is good and energy levels are slowly improving. She has labs with her PCP. She had an appointment with her PCP on 3/24/20. She has a follow up appointment with surgery on 20. She is here alone today.     Past Medical History:   Diagnosis Date    Adjustment disorder with mixed anxiety and depressed mood 2020    After death of father  and personal diagnosis of/treatment for breast cancer [id number]; Counselin:  Devika Caldwell of [address], [place]     Ankle edema 4/5/2010    Anxiety 4/5/2010    Benign essential hypertension 5/26/2016 2006     Carpal tunnel syndrome 4/5/2010    Chronic depression 1/30/2020    H/O Benign Colon Polyp 12/20/2010    H/O bilateral mastectomy 12/2/2019    Double Mastectomy (right breast cancer and elective left breast prophylactically); No chemo, No radiation    H/O Uterine Fibroids 12/20/2010    Hypercholesterolemia 6/25/2013    ~2006    Hypothyroidism 5/26/2016 7/1999    Kidney stone 4/5/2010    Mixed hyperlipidemia 7/6/2020    Perimenopausal 4/5/2010    Primary osteoarthritis of left knee 5/26/2016    Extensive; Ortho (needs knee replacement), CSI x 2,     S/P benign cervical polypectomy 12/20/2010    Sleep apnea 4/5/2010    USES CPAP    Status post right breast lumpectomy 1/30/2020 6-, Invasive ductal carcinoma--->Mastectomy 8-, no chemo, no radiation, started on Arimidex    Stress incontinence 5/26/2010      Past Surgical History:   Procedure Laterality Date    EKG ORDERABLE  5/2009    NSR, rate 84, normal EKG    HX BLADDER SUSPENSION  ~2010    Dr Bravo Slight Left 09/23/2019    [facility].  Dr. Harika Landaverde, [facility] Mastectomy Implant Infection Left Breast, Replaced Implant, IV abx    HX BREAST BIOPSY Right 11/2011    VPI, right breast biopsy negative    HX BREAST BIOPSY Right 04/22/2019    invasive ductal carcinoma    HX BREAST LUMPECTOMY Right 6/13/2019    RIGHT BREAST LUMPECTOMY ( x2 ) WTIH ULTRASOUND , RIGHT SENTINAL NODE BIOPSY performed by Karri Laguna MD at hospitals AMBULATORY OR    HX BREAST RECONSTRUCTION Bilateral 8/27/2019    B MASTECTOMY AND IMPLANTS, IMMEDIATE BILATERAL BREAST RECONSTRUCTION WITH DIRECT TO IMPLANT USE OF ALLODERM AND BILATERAL BREAST RECONSTRUCTION INTRA-OPERATIVE ASSESMENT OF BLOOD FLOW performed by Marleen Schafer MD at [place] [address] Right 03/2010    Dr Kayleen Park POLYPECTOMY  ,     Dr Abelino Hassan    HX COLONOSCOPY  2016    Dr Kristie Hannon-Internal Hemorrhoids- Normal mucosa in the whole colon - Repeat colonoscopy in 5 years    HX COLONOSCOPY      HX COLONOSCOPY  10/2005    benign polyp; repeat 5 yr per Dr Tracee Uriarte HX COLONOSCOPY  2010    Normal, f/u 5 yrs, Dr Sage Perales  2005    AUB, benign/neg bx; Dr Sinan Diallo  10/2012    Dr Abelino Hassan, AUB    HX HYSTERECTOMY  2013    HX MASTECTOMY Bilateral 2019    BILATERAL BREAST SKIN SPARING MASTECTOMIES performed by Clive Rosado MD at [address] Right 2010    CARPAL TUNNEL    HX PARTIAL HYSTERECTOMY  13    Supracervical hysterectomy for fibroids, Dr Emilie Washburn HX UROLOGICAL      [place] Bilateral 10/15/2019    [facility]V Dr. Indigo Bellamy B Breast Implant pain and recurrent left breast infection; on wound vac w/ iv abx; wound pump removed 2019      Social History     Tobacco Use    Smoking status: Former Smoker     Packs/day: 0.50     Years: 25.00     Pack years: 12.50     Last attempt to quit: 2005     Years since quitting: 15.6    Smokeless tobacco: Never Used   Substance Use Topics    Alcohol use:  Yes     Alcohol/week: 5.0 standard drinks     Types: 5 Shots of liquor per week      Family History   Problem Relation Age of Onset   Alec Hoose Arthritis-osteo Father     Stroke Father         TIA's    Cancer Father         melanoma on back removed    Pacemaker Father 80    Heart Disease Father          79 yo in     Hypertension Mother     Arthritis-osteo Mother     Heart Disease Mother         valve disease,  2013    Heart Attack Maternal Grandmother 76    No Known Problems Sister     No Known Problems Brother     Depression Daughter     Substance Abuse Daughter     Alcohol abuse Daughter      Current Outpatient Medications   Medication Sig    levothyroxine (SYNTHROID) 150 mcg tablet Take 1 Tab by mouth Daily (before breakfast).  rosuvastatin (CRESTOR) 10 mg tablet Take 1 Tab by mouth nightly.  buPROPion XL (WELLBUTRIN XL) 150 mg tablet TAKE 1 TABLET BY MOUTH EVERY DAY BEFORE BREAKFAST    losartan (COZAAR) 100 mg tablet TAKE 1 TABLET BY MOUTH DAILY    FLUoxetine (PROzac) 20 mg capsule TAKE 1 CAPSULE BY MOUTH DAILY    multivitamin (ONE A DAY) tablet Take 1 Tab by mouth daily.  cholecalciferol, vitamin D3, (Vitamin D3) 50 mcg (2,000 unit) tab Take 1 Tab by mouth daily.  OTHER,NON-FORMULARY, Tumeric once a day    anastrozole (ARIMIDEX) 1 mg tablet Take 1 mg by mouth daily. Indications: Hormone Receptor Positive Breast Cancer    OTHER Portable CPAP machine for RENÉE, with new mask and tubing but no change to mask or settings; G47.30 GARO 99m prog good     No current facility-administered medications for this visit. Allergies   Allergen Reactions    Pcn [Penicillins] Hives    Sulfa (Sulfonamide Antibiotics) Hives    Zocor [Simvastatin] Myalgia    Lisinopril Cough      Review of Systems: A complete review of systems was obtained, negative except as described above. Physical Exam:     General: alert, cooperative, no distress   Mental  status: normal mood, behavior, speech, dress, motor activity, and thought processes, able to follow commands   HENT: NCAT   Neck: no visualized mass   Resp: no respiratory distress   Neuro: no gross deficits   Skin: no discoloration or lesions of concern on visible areas   Psychiatric: normal affect, consistent with stated mood, no evidence of hallucinations       Due to this being a TeleHealth evaluation (During [facility]-17 public health emergency), many elements of the physical examination are unable to be assessed.   Evaluation of the following organ systems was limited: Vitals/Constitutional/EENT/Resp/CV/GI//MS/Neuro/Skin/Heme-Lymph-Imm. Results:     Lab Results   Component Value Date/Time    WBC 4.9 06/30/2020 11:27 AM    HGB 13.4 06/30/2020 11:27 AM    HCT 40.9 06/30/2020 11:27 AM    PLATELET 912 82/30/8963 11:27 AM    MCV 91 06/30/2020 11:27 AM    ABS. NEUTROPHILS 3.7 06/30/2020 11:27 AM     Lab Results   Component Value Date/Time    Sodium 140 06/30/2020 11:27 AM    Potassium 4.6 06/30/2020 11:27 AM    Chloride 105 06/30/2020 11:27 AM    CO2 20 06/30/2020 11:27 AM    Glucose 95 06/30/2020 11:27 AM    BUN 19 06/30/2020 11:27 AM    Creatinine 0.85 06/30/2020 11:27 AM    GFR est AA 83 06/30/2020 11:27 AM    GFR est non-AA 72 06/30/2020 11:27 AM    Calcium 9.9 06/30/2020 11:27 AM     Lab Results   Component Value Date/Time    Bilirubin, total 0.6 06/30/2020 11:27 AM    ALT (SGPT) 18 06/30/2020 11:27 AM    Alk. phosphatase 70 06/30/2020 11:27 AM    Protein, total 6.2 06/30/2020 11:27 AM    Albumin 4.4 06/30/2020 11:27 AM    Globulin 3.3 07/15/2010 08:05 AM     MRI Results (most recent):  Results from Hospital Encounter encounter on 05/17/19   MRI BREAST BI W WO CONT    Narrative INDICATION: Right invasive ductal carcinoma, grade 2, ER/OK positive, HER-2/keli  negative. COMPARISON: Multiple prior breast imaging studies dating back to 2015. TECHNIQUE:  Multisequence, multiplanar, bilateral breast MRI was performed in prone position  using a dedicated breast coil. Images were obtained without contrast and dynamic  postcontrast images were obtained in multiple phases. 10 mL IV Gadavist was  administered. Subtraction images were reconstructed. Postcontrast images were  reviewed with dedicated kinetic analysis software. FINDINGS:  There is mild background parenchymal enhancement and heterogeneous  fibroglandular tissue. Numerous sub-5 mm foci of enhancement are scattered in  the bilateral breasts.     Right breast:  A 16 x 13 x 16 mm, spiculated mass with a biopsy clip and mixed kinetics at 6:00  in the middle third of the right breast corresponds to the biopsy-proven  invasive ductal carcinoma. There is a small hematoma in the retroareolar biopsy site in the anterior third. A 6 x 7 x 8 mm, irregularly marginated, enhancing nodule with mixed kinetics at  the superior aspect of the hematoma corresponds roughly in size and morphology  to the hypoechoic, shadowing mass seen on prior ultrasound at 3:00 in the  periareolar region. Incidental note is made of an intramammary lymph node at 1:00 in the middle  third. No axillary or internal mammary chain lymphadenopathy. Left breast:  No suspicious enhancing foci. No axillary or internal mammary chain  lymphadenopathy. A summary portfolio with key images has been sent from kinetic analysis software  to PACS. Impression IMPRESSION:   1. Invasive ductal carcinoma in the right breast at 6:00.   2. Small enhancing nodule associated with a hematoma in the retroareolar right  breast biopsy site. 3. No suspicious enhancing foci in the left breast.  4. No lymphadenopathy. 5. BI-RADS Assessment Category 6: Known biopsy proven malignancy. Records reviewed and summarized above. Pathology report(s) reviewed above. Radiology report(s) reviewed above. Assessment/PLAN:     1) Stage 1 T1cN0 ER+ HER2 Negative Mammaprint low risk breast cancer post b/l mastectomy/reconstruction  Patient has two separate tumors with different path types. 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 5 o clock ER+ HER2 negative mammaprint low risk. She had a lumpectomy 6/13/19 with + margin then had b/l mastectomy/reconstruction on 8/27/19. Then infection and implants removed. no adjuvant chemo      Pt seen today virtually due to COVID. She started adjuvant Anastrozole in 12/19. She is tolerating Anastrozole well overall - no side effects. Continue Anastrozole. No issues on chest wall on self exam.   Recently saw PCP.   Still sees surgery also.   routine labs with her PCP. 2) hx of mastectomy/reconstruction infection   She was on IV abx and then had implants removed on 10/15/19. She was hospitalized on 10/30/19 for infection/fever and d/c'd on 11/4/19. She completed IV more abx on 11/21/19 and had a wound vac.  management per surgery/plastics. 3) HTN/Depression/Hypothyroid/Arthritis/Obesity  Management per PCP. 4) Psychosocial  Mood good, coping well overall. SW for support as needed. Call if questions. Follow up here in 6 months / sees surgery also. I was in office while conducting this encounter. The patient was at her home. Consent:  She and/or her healthcare decision maker is aware that this patient-initiated Telehealth encounter is a billable service, with coverage as determined by her insurance carrier. She is aware that she may receive a bill and has provided verbal consent to proceed: yes    Pursuant to the emergency declaration under the [employer] and the [facility], [place], 1135 waiver authority and the [employer] General Act, this Virtual  Visit was conducted, with patient's (and/or legal guardian's) consent, to reduce the patient's risk of exposure to COVID-19 and provide necessary medical care. Services were provided through a video synchronous discussion virtually to substitute for in-person visit. I appreciate the opportunity to participate in Ms. Misael Willingham's care.     Signed By: Namrata Velásquez DO

## 2020-09-10 ENCOUNTER — HOSPITAL ENCOUNTER (OUTPATIENT)
Dept: LAB | Age: 65
Discharge: HOME | End: 2020-09-10
Payer: MEDICARE

## 2020-09-10 ENCOUNTER — APPOINTMENT (OUTPATIENT)
Dept: FAMILY MEDICINE CLINIC | Age: 65
End: 2020-09-10

## 2020-09-10 PROCEDURE — 36415 COLL VENOUS BLD VENIPUNCTURE: CPT

## 2020-09-10 PROCEDURE — 84439 ASSAY OF FREE THYROXINE: CPT

## 2020-09-10 PROCEDURE — 84443 ASSAY THYROID STIM HORMONE: CPT

## 2020-09-10 PROCEDURE — 80053 COMPREHEN METABOLIC PANEL: CPT

## 2020-09-10 PROCEDURE — 80061 LIPID PANEL: CPT

## 2020-09-11 LAB
ALBUMIN SERPL-MCNC: 4.3 G/DL (ref 3.8–4.8)
ALBUMIN/GLOB SERPL: 2.2 {RATIO} (ref 1.2–2.2)
ALP SERPL-CCNC: 82 IU/L (ref 39–117)
ALT SERPL-CCNC: 19 IU/L (ref 0–32)
AST SERPL-CCNC: 16 IU/L (ref 0–40)
BILIRUB SERPL-MCNC: 0.6 MG/DL (ref 0–1.2)
BUN SERPL-MCNC: 20 MG/DL (ref 8–27)
BUN/CREAT SERPL: 23 (ref 12–28)
CALCIUM SERPL-MCNC: 9.9 MG/DL (ref 8.7–10.3)
CHLORIDE SERPL-SCNC: 103 MMOL/L (ref 96–106)
CHOLEST SERPL-MCNC: 207 MG/DL (ref 100–199)
CO2 SERPL-SCNC: 21 MMOL/L (ref 20–29)
CREAT SERPL-MCNC: 0.88 MG/DL (ref 0.57–1)
GLOBULIN SER CALC-MCNC: 2 G/DL (ref 1.5–4.5)
GLUCOSE SERPL-MCNC: 102 MG/DL (ref 65–99)
HDLC SERPL-MCNC: 59 MG/DL
INTERPRETATION, 910389: NORMAL
LDLC SERPL CALC-MCNC: 115 MG/DL (ref 0–99)
POTASSIUM SERPL-SCNC: 4.8 MMOL/L (ref 3.5–5.2)
PROT SERPL-MCNC: 6.3 G/DL (ref 6–8.5)
SODIUM SERPL-SCNC: 140 MMOL/L (ref 134–144)
T4 FREE SERPL-MCNC: 1.11 NG/DL (ref 0.82–1.77)
TRIGL SERPL-MCNC: 192 MG/DL (ref 0–149)
TSH SERPL DL<=0.005 MIU/L-ACNC: 5.75 UIU/ML (ref 0.45–4.5)
VLDLC SERPL CALC-MCNC: 33 MG/DL (ref 5–40)

## 2020-09-17 ENCOUNTER — OFFICE VISIT (OUTPATIENT)
Dept: FAMILY MEDICINE CLINIC | Age: 65
End: 2020-09-17
Payer: MEDICARE

## 2020-09-17 VITALS
TEMPERATURE: 97.7 F | RESPIRATION RATE: 16 BRPM | OXYGEN SATURATION: 96 % | SYSTOLIC BLOOD PRESSURE: 124 MMHG | DIASTOLIC BLOOD PRESSURE: 77 MMHG | HEIGHT: 68 IN | HEART RATE: 80 BPM | WEIGHT: 283.4 LBS | BODY MASS INDEX: 42.95 KG/M2

## 2020-09-17 DIAGNOSIS — Z00.00 WELCOME TO MEDICARE PREVENTIVE VISIT: Primary | ICD-10-CM

## 2020-09-17 DIAGNOSIS — E03.9 HYPOTHYROIDISM, UNSPECIFIED TYPE: ICD-10-CM

## 2020-09-17 DIAGNOSIS — E78.2 MIXED HYPERLIPIDEMIA: ICD-10-CM

## 2020-09-17 DIAGNOSIS — Z78.0 POSTMENOPAUSAL STATE: ICD-10-CM

## 2020-09-17 DIAGNOSIS — Z13.820 SCREENING FOR OSTEOPOROSIS: ICD-10-CM

## 2020-09-17 DIAGNOSIS — Z23 ENCOUNTER FOR IMMUNIZATION: ICD-10-CM

## 2020-09-17 DIAGNOSIS — I10 BENIGN ESSENTIAL HYPERTENSION: ICD-10-CM

## 2020-09-17 PROCEDURE — G8536 NO DOC ELDER MAL SCRN: HCPCS | Performed by: FAMILY MEDICINE

## 2020-09-17 PROCEDURE — 90732 PPSV23 VACC 2 YRS+ SUBQ/IM: CPT

## 2020-09-17 PROCEDURE — G8754 DIAS BP LESS 90: HCPCS | Performed by: FAMILY MEDICINE

## 2020-09-17 PROCEDURE — G0402 INITIAL PREVENTIVE EXAM: HCPCS | Performed by: FAMILY MEDICINE

## 2020-09-17 PROCEDURE — G9708 BILAT MAST/HX BI /UNILAT MAS: HCPCS | Performed by: FAMILY MEDICINE

## 2020-09-17 PROCEDURE — G8400 PT W/DXA NO RESULTS DOC: HCPCS | Performed by: FAMILY MEDICINE

## 2020-09-17 PROCEDURE — 1101F PT FALLS ASSESS-DOCD LE1/YR: CPT | Performed by: FAMILY MEDICINE

## 2020-09-17 PROCEDURE — 99214 OFFICE O/P EST MOD 30 MIN: CPT | Performed by: FAMILY MEDICINE

## 2020-09-17 PROCEDURE — 1090F PRES/ABSN URINE INCON ASSESS: CPT | Performed by: FAMILY MEDICINE

## 2020-09-17 PROCEDURE — G0463 HOSPITAL OUTPT CLINIC VISIT: HCPCS | Performed by: FAMILY MEDICINE

## 2020-09-17 PROCEDURE — G9717 DOC PT DX DEP/BP F/U NT REQ: HCPCS | Performed by: FAMILY MEDICINE

## 2020-09-17 PROCEDURE — 90653 IIV ADJUVANT VACCINE IM: CPT

## 2020-09-17 PROCEDURE — G8419 CALC BMI OUT NRM PARAM NOF/U: HCPCS | Performed by: FAMILY MEDICINE

## 2020-09-17 PROCEDURE — G8752 SYS BP LESS 140: HCPCS | Performed by: FAMILY MEDICINE

## 2020-09-17 PROCEDURE — 3017F COLORECTAL CA SCREEN DOC REV: CPT | Performed by: FAMILY MEDICINE

## 2020-09-17 PROCEDURE — G8427 DOCREV CUR MEDS BY ELIG CLIN: HCPCS | Performed by: FAMILY MEDICINE

## 2020-09-17 RX ORDER — LEVOTHYROXINE SODIUM 175 UG/1
175 TABLET ORAL
Qty: 90 TAB | Refills: 0 | Status: SHIPPED | OUTPATIENT
Start: 2020-09-17 | End: 2020-12-12

## 2020-09-17 NOTE — PROGRESS NOTES
Chief Complaint   Patient presents with    Welcome To Medicare       1. Have you been to the ER, urgent care clinic since your last visit? Hospitalized since your last visit? No    2. Have you seen or consulted any other health care providers outside of the [address] [place] [address] [place] since your last visit? Include any pap smears or colon screening. Yes When: August 2020 Where: [facility] Reason for visit: seeing squiggly lines and flashing lights in the corner of [id number]    3 most recent PHQ Screens 9/17/2020   PHQ Not Done -   Little interest or pleasure in doing things Not at all   Feeling down, depressed, irritable, or hopeless Several days   Total Score PHQ 2 1       Fall Risk Assessment, last 12 mths 9/17/2020   Able to walk? Yes   Fall in past 12 months? No             ADL Assessment 9/17/2020   Feeding yourself No Help Needed   Getting from bed to chair No Help Needed   Getting dressed No Help Needed   Bathing or showering No Help Needed   Walk across the room (includes cane/walker) No Help Needed   Using the telphone No Help Needed   Taking your medications No Help Needed   Preparing meals No Help Needed   Managing money (expenses/bills) No Help Needed   Moderately strenuous housework (laundry) No Help Needed   Shopping for personal items (toiletries/medicines) No Help Needed   Shopping for groceries No Help Needed   Driving No Help Needed   Climbing a flight of stairs No Help Needed   Getting to places beyond walking distances No Help Needed       Abuse Screening Questionnaire 9/17/2020   Do you ever feel afraid of your partner? N   Are you in a relationship with someone who physically or mentally threatens you? N   Is it safe for you to go home?  Chaz Mendoza

## 2020-09-17 NOTE — PATIENT INSTRUCTIONS
Medicare Wellness Visit, Female The best way to live healthy is to have a lifestyle where you eat a well-balanced diet, exercise regularly, limit alcohol use, and quit all forms of tobacco/nicotine, if applicable. Regular preventive services are another way to keep healthy. Preventive services (vaccines, screening tests, monitoring & exams) can help personalize your care plan, which helps you manage your own care. Screening tests can find health problems at the earliest stages, when they are easiest to treat. [facility] follows the current, evidence-based guidelines published by the [facility]man ([facility]) when recommending preventive services for our patients. Because we follow these guidelines, sometimes recommendations change over time as research supports it. (For example, mammograms used to be recommended annually. Even though Medicare will still pay for an annual mammogram, the newer guidelines recommend a mammogram every two years for women of average risk). Of course, you and your doctor may decide to screen more often for some diseases, based on your risk and your co-morbidities (chronic disease you are already diagnosed with). Preventive services for you include: - Medicare offers their members a free annual wellness visit, which is time for you and your primary care provider to discuss and plan for your preventive service needs. Take advantage of this benefit every year! 
-All adults over the age of 72 should receive the recommended pneumonia vaccines. Current USPSTF guidelines recommend a series of two vaccines for the best pneumonia protection.  
-All adults should have a flu vaccine yearly and a tetanus vaccine every 10 years.  
-All adults age 48 and older should receive the shingles vaccines (series of two vaccines). -All adults age 38-68 who are overweight should have a diabetes screening test once every three years. -All adults born between [birth year] and 1965 should be screened once for Hepatitis C. 
-Other screening tests and preventive services for persons with diabetes include: an eye exam to screen for diabetic retinopathy, a kidney function test, a foot exam, and stricter control over your cholesterol.  
-Cardiovascular screening for adults with routine risk involves an electrocardiogram (ECG) at intervals determined by your doctor.  
-Colorectal cancer screenings should be done for adults age 54-65 with no increased risk factors for colorectal cancer. There are a number of acceptable methods of screening for this type of cancer. Each test has its own benefits and drawbacks. Discuss with your doctor what is most appropriate for you during your annual wellness visit. The different tests include: colonoscopy (considered the best screening method), a fecal occult blood test, a fecal DNA test, and sigmoidoscopy. 
 
-A bone mass density test is recommended when a woman turns 65 to screen for osteoporosis. This test is only recommended one time, as a screening. Some providers will use this same test as a disease monitoring tool if you already have osteoporosis. -Breast cancer screenings are recommended every other year for women of normal risk, age 54-69. 
-Cervical cancer screenings for women over age 72 are only recommended with certain risk factors. Here is a list of your current Health Maintenance items (your personalized list of preventive services) with a due date: 
Health Maintenance Due Topic Date Due  Bone Mineral Density   03/17/2020  Pneumococcal Vaccine (1 of 1 - PPSV23) 03/17/2020  Yearly Flu Vaccine (1) 09/01/2020

## 2020-09-17 NOTE — PROGRESS NOTES
This is a \"Welcome to [employer]"  Initial Preventive Physical Examination (IPPE) providing Personalized Prevention Plan Services (Performed in the first 12 months of enrollment)    I have reviewed the patient's medical history in detail and updated the computerized patient record. History     Past Medical History:   Diagnosis Date    Adjustment disorder with mixed anxiety and depressed mood 2020    After death of father  and personal diagnosis of/treatment for breast cancer [id number]; Counselin:  [place] [facility] [address] [place]     Ankle edema 2010    Anxiety 2010    Benign essential hypertension 2016     Carpal tunnel syndrome 2010    Chronic depression 2020    H/O Benign Colon Polyp 2010    H/O bilateral mastectomy 2019    Double Mastectomy (right breast cancer and elective left breast prophylactically); No chemo, No radiation    H/O Uterine Fibroids 2010    Hypercholesterolemia 2013    ~    Hypothyroidism 2016    Kidney stone 2010    Mixed hyperlipidemia 2020    Perimenopausal 2010    Primary osteoarthritis of left knee 2016    Extensive; Ortho (needs knee replacement), CSI x 2, -    S/P benign cervical polypectomy 2010    Sleep apnea 2010    USES CPAP    Status post right breast lumpectomy 2020, Invasive ductal carcinoma--->Mastectomy 2019, no chemo, no radiation, started on Arimidex    Stress incontinence 2010      Past Surgical History:   Procedure Laterality Date    EKG ORDERABLE  2009    NSR, rate 84, normal EKG    HX BLADDER SUSPENSION  ~    Dr Vasques Sox Left 2019    [facility].  Dr. Jeaneth Damon, Post Mastectomy Implant Infection Left Breast, Replaced Implant, IV abx    HX BREAST BIOPSY Right 2011    VPI, right breast biopsy negative    HX BREAST BIOPSY Right 2019 invasive ductal carcinoma    HX BREAST LUMPECTOMY Right 6/13/2019    RIGHT BREAST LUMPECTOMY ( x2 ) WTIH ULTRASOUND , RIGHT SENTINAL NODE BIOPSY performed by Manuelito Herrera MD at [facility]    HX BREAST RECONSTRUCTION Bilateral 8/27/2019    B MASTECTOMY AND IMPLANTS, IMMEDIATE BILATERAL BREAST RECONSTRUCTION WITH DIRECT TO IMPLANT USE OF ALLODERM AND BILATERAL BREAST RECONSTRUCTION INTRA-OPERATIVE ASSESMENT OF BLOOD FLOW performed by Sara Donaldson MD at [place] [address]    HX [address] Right 03/2010    Dr Marycarmen Collins [facility]  2009, 2015    Dr Kyung Olszewski    HX COLONOSCOPY  04/08/2016    Dr Alicia Hannon-Internal Hemorrhoids- Normal mucosa in the whole colon - Repeat colonoscopy in 5 years    HX COLONOSCOPY  2016    HX COLONOSCOPY  10/2005    benign polyp; repeat 5 yr per Dr Lonnie Devries HX COLONOSCOPY  11/2010    Normal, f/u 5 yrs, Dr Colt Merchant  7/2005    AUB, benign/neg bx; Dr Stefano Russell  10/2012    Dr Kyung Olszewski, AUB    HX HYSTERECTOMY  2013    HX MASTECTOMY Bilateral 8/27/2019    BILATERAL BREAST SKIN SPARING MASTECTOMIES performed by Manuelito Herrera MD at [facility] Right 2010    CARPAL TUNNEL    HX PARTIAL HYSTERECTOMY  4/26/13    Supracervical hysterectomy for fibroids, Dr Ella Mae  [facility] Bilateral 10/15/2019    [facility] Dr. Nando Ratliff B Breast Implant pain and recurrent left breast infection; on wound vac w/ iv abx; wound pump removed 12-4-2019     Current Outpatient Medications   Medication Sig Dispense Refill    levothyroxine (SYNTHROID) 150 mcg tablet Take 1 Tab by mouth Daily (before breakfast). 90 Tab 0    rosuvastatin (CRESTOR) 10 mg tablet Take 1 Tab by mouth nightly.  90 Tab 0    buPROPion XL (WELLBUTRIN XL) 150 mg tablet TAKE 1 TABLET BY MOUTH EVERY DAY BEFORE BREAKFAST 90 Tab 1    losartan (COZAAR) 100 mg tablet TAKE 1 TABLET BY MOUTH DAILY 90 Tab 1    FLUoxetine (PROzac) 20 mg capsule TAKE 1 CAPSULE BY MOUTH DAILY 90 Cap 3    multivitamin (ONE A DAY) tablet Take 1 Tab by mouth daily.  cholecalciferol, vitamin D3, (Vitamin D3) 50 mcg (2,000 unit) tab Take 1 Tab by mouth daily.  OTHER,NON-FORMULARY, Tumeric once a day      anastrozole (ARIMIDEX) 1 mg tablet Take 1 mg by mouth daily. Indications: Hormone Receptor Positive Breast Cancer 90 Tab 3    OTHER Portable CPAP machine for RENÉE, with new mask and tubing but no change to mask or settings; G47.30 GARO 99m prog good 1 Each 0     Allergies   Allergen Reactions    Pcn [Penicillins] Hives    Sulfa (Sulfonamide Antibiotics) Hives    Zocor [Simvastatin] Myalgia    Lisinopril Cough       Family History   Problem Relation Age of Onset    Arthritis-osteo Father     Stroke Father         TIA's    Cancer Father         melanoma on back removed    Pacemaker Father 80    Heart Disease Father          79 yo in     Hypertension Mother     Arthritis-osteo Mother     Heart Disease Mother         valve disease,  2013    Heart Attack Maternal Grandmother 76    No Known Problems Sister     No Known Problems Brother     Depression Daughter     Substance Abuse Daughter     Alcohol abuse Daughter      Social History     Tobacco Use    Smoking status: Former Smoker     Packs/day: 0.50     Years: 25.00     Pack years: 12.50     Last attempt to quit: 2005     Years since quitting: 15.7    Smokeless tobacco: Never Used   Substance Use Topics    Alcohol use:  Yes     Alcohol/week: 5.0 standard drinks     Types: 5 Shots of liquor per week       Depression Risk Screen     3 most recent PHQ Screens 2020   PHQ Not Done Active Diagnosis of Depression or Bipolar Disorder       Alcohol Risk Screen   Do you average more than 1 drink per night or more than 7 drinks a week:  No    On any one occasion in the past three months have you have had more than 3 drinks containing alcohol:  No          Functional Ability and Level of Safety   Diet: No special diet     Hearing: Hearing is good. Vision Screening:  Vision is good. Had complete eye exam by her eye doctor last month  No exam data present     Activities of Daily Living: The home contains: no safety equipment. Patient does total self care     Ambulation: with no difficulty     Exercise level: moderately active     Fall Risk Screen:  Fall Risk Assessment, last 12 mths 9/17/2020   Able to walk? Yes   Fall in past 12 months? No     Abuse Screen:  Patient is not abused       Screening EKG   EKG order placed: No    Patient Care Team   Patient Care Team:  Daniel Walker MD as PCP - General  Daniel Walker MD as PCP - [facility] Empaneled Provider  Pilar Lou MD (Breast Surgery)  Susan Lewis MD as Physician (Plastic Surgery)  Kylee Alcantar DO as Physician (Hematology and Oncology)  Pliar Lou MD (Breast Surgery)     End of Life Planning   Advanced care planning directives were not discussed with the patient and/or family/caregiver. Assessment/Plan   Education and counseling provided:  Are appropriate based on today's review and evaluation  Pneumococcal Vaccine  Influenza Vaccine  Bone mass measurement (DEXA)    Diagnoses and all orders for this visit:    1. Welcome to Medicare preventive visit    2. Screening for osteoporosis  -     DEXA BONE DENSITY STUDY AXIAL; Future    3. Encounter for immunization  -     ADMIN INFLUENZA VIRUS VAC  -     INFLUENZA VACCINE INACTIVATED (IIV), SUBUNIT, ADJUVANTED, IM  -     ADMIN PNEUMOCOCCAL VACCINE  -     PNEUMOCOCCAL POLYSACCHARIDE VACCINE, 23-VALENT, ADULT OR IMMUNOSUPPRESSED PT DOSE,    4. Postmenopausal state  -     DEXA BONE DENSITY STUDY AXIAL;  Future        Health Maintenance Due   Topic Date Due    Bone Densitometry (Dexa) Screening 03/17/2020    Pneumococcal 65+ years (1 of 1 - PPSV23) 03/17/2020    Flu Vaccine (1) 09/01/2020

## 2020-09-17 NOTE — PROGRESS NOTES
Chief Complaint   Patient presents with    Welcome To Medicare    Cholesterol Problem    Hypertension    Thyroid Problem    Results     labs done 9-10-20    Medication Evaluation       HISTORY OF PRESENT ILLNESS   HPI  Follow up hypertension, hypercholesterolemia, hypothyroidism, review lab results and medication evaluation. Cholesterol numbers much much better since being on Crestor. She is tolerating this well and not having the aches/pains like she did w/ Zocor in the past.  BP's remain good and stable  Mood remains good and stable  Diet not good  Has increased her exercise to 45 minutes 3 x a week on stationary bike  Wt is up  TSH has improved since increasing dose of Synthroid from 137 to 150 but still high. REVIEW OF SYMPTOMS   Review of Systems   Constitutional: Negative. Eyes:        Saw eye doctor last month, floaters, f/u again next month   Respiratory: Negative. Cardiovascular: Negative. Gastrointestinal: Negative. Genitourinary: Negative. Musculoskeletal: Negative for myalgias. Neurological: Negative. Endo/Heme/Allergies: Negative. Psychiatric/Behavioral: Negative.          Stable on Wellbutrin           PROBLEM LIST/MEDICAL HISTORY     Problem List  Date Reviewed: 9/17/2020          Codes Class Noted    Mixed hyperlipidemia ICD-10-CM: E78.2  ICD-9-CM: 272.2  7/6/2020        Mild depression ([facility] ICD-10-CM: F32.0  ICD-9-CM: 779  3/3/2020        Aromatase inhibitor use ICD-10-CM: Z79.811  ICD-9-CM: V07.52  3/3/2020        Status post right breast lumpectomy ICD-10-CM: Z98.890  ICD-9-CM: V45.89  1/30/2020    Overview Addendum 1/30/2020  8:50 PM by Manjula Bernal MD     6-, Invasive ductal carcinoma--->Mastectomy 8-, no chemo, no radiation, started on Arimidex             Chronic depression ICD-10-CM: F32.9  ICD-9-CM: 311  1/30/2020        Adjustment disorder with mixed anxiety and depressed mood ICD-10-CM: F43.23  ICD-9-CM: 309.28  1/30/2020 Overview Signed 2020  9:31 PM by Keli Monge MD     After death of father  and personal diagnosis of/treatment for breast cancer [id number]; Counselin:  [place] [facility] [address], [facility]              H/O bilateral mastectomy ICD-10-CM: Z90.13  ICD-9-CM: V45.71  2019    Overview Addendum 2020  8:59 PM by Keli Monge MD     Double Mastectomy (right breast cancer and elective left breast prophylactically); No chemo, No radiation; Started on Arimidex, followed by Dr. Leisa Trinh             H/O breast reconstruction ICD-10-CM: B95.21  ICD-9-CM: V43.82  2019        Arthritis ICD-10-CM: M19.90  ICD-9-CM: 716.90  2019        Cancer of overlapping sites of right breast ([facility] ICD-10-CM: C50.811  ICD-9-CM: 174.8  2019        Severe obesity ([facility] ICD-10-CM: E66.01  ICD-9-CM: 278.01  2018        Benign essential hypertension ICD-10-CM: I10  ICD-9-CM: 401.1  2016    Overview Signed 2016  9:26 AM by Keli Monge MD                  Hypothyroidism ICD-10-CM: E03.9  ICD-9-CM: 244.9  2016    Overview Signed 2016  9:27 AM by Keli Monge MD     1999             Primary osteoarthritis of left knee ICD-10-CM: M17.12  ICD-9-CM: 715.16  2016    Overview Signed 2016  9:42 AM by Keli Monge MD     Extensive;  Ortho (needs knee replacement), CSI x 2,              Hypercholesterolemia ICD-10-CM: E78.00  ICD-9-CM: 272.0  2013    Overview Signed 2013  8:29 AM by Keli Monge MD     ~[id number]             S/P benign cervical polypectomy ICD-10-CM: G78.363, Z87.42  ICD-9-CM: V45.89  2010    Overview Addendum 2016  9:42 AM by Keli Monge MD     , ; Gyn Dr Samina Quach             H/O Benign Colon Polyp ICD-10-CM: K63.5  ICD-9-CM: 211.3  2010    Overview Signed 2010 12:10 PM by Keli Monge MD Colonoscopy 10/2005, 11/2010  q5yrs  Dr Abilio Perera             H/O Uterine Fibroids ICD-10-CM: D21.9  ICD-9-CM: 215.9  12/20/2010    Overview Signed 12/20/2010 12:13 PM by Duncan Frias MD     2005; no surgical intervention  Gyn Dr Homero Khanna incontinence ICD-10-CM: N39.3  ICD-9-CM: [id number]  5/26/2010    Overview Signed 5/26/2010 12:47 PM by Duncan Frias MD     Urologist Dr Lucille Romberg             Sleep apnea, RENÉE ICD-10-CM: G47.30  ICD-9-CM: 780.57  4/5/2010    Overview Addendum 12/20/2010 12:14 PM by Duncan Frias MD     2009; cpap             Anxiety ICD-10-CM: F41.9  ICD-9-CM: 300.00  4/5/2010        Carpal tunnel syndrome ICD-10-CM: G56.00  ICD-9-CM: 354.0  4/5/2010        Mild Dependent Ankle edema, B ICD-10-CM: M25.473  ICD-9-CM: 719.07  4/5/2010    Overview Signed 4/5/2010  3:52 PM by Neel Coe     mild             Perimenopausal ICD-10-CM: N95.1  ICD-9-CM: 627.2  4/5/2010    Overview Signed 4/5/2010  3:52 PM by Neel Coe     2007             Depression ICD-10-CM: F32.9  ICD-9-CM: 766  4/5/2010        ? Passed Kidney stone ICD-10-CM: N20.0  ICD-9-CM: 592.0  4/5/2010    Overview Signed 5/26/2010 12:47 PM by Duncan Frias MD     2/2010                       PAST SURGICAL HISTORY     Past Surgical History:   Procedure Laterality Date    EKG ORDERABLE  5/2009    NSR, rate 84, normal EKG    HX BLADDER SUSPENSION  ~2010    Dr Pearl Pedersen Left 09/23/2019    [facility].  Dr. Luz Marina Hdz, [employer] Mastectomy Implant Infection Left Breast, Replaced Implant, IV abx    HX BREAST BIOPSY Right 11/2011    VPI, right breast biopsy negative    HX BREAST BIOPSY Right 04/22/2019    invasive ductal carcinoma    HX BREAST LUMPECTOMY Right 6/13/2019    RIGHT BREAST LUMPECTOMY ( x2 ) WTIH ULTRASOUND , RIGHT SENTINAL NODE BIOPSY performed by Nohelia Lopez MD at [facility] AMBULATORY OR    HX BREAST RECONSTRUCTION Bilateral 8/27/2019    B MASTECTOMY AND IMPLANTS, IMMEDIATE BILATERAL BREAST RECONSTRUCTION WITH DIRECT TO IMPLANT USE OF ALLODERM AND BILATERAL BREAST RECONSTRUCTION INTRA-OPERATIVE ASSESMENT OF BLOOD FLOW performed by Hector Barragan MD at [address] Right 03/2010    Dr Sara No CERVICAL POLYPECTOMY  2009, 2015    Dr Cassandra Evangelista    HX COLONOSCOPY  04/08/2016    Dr Girma Hannon-Internal Hemorrhoids- Normal mucosa in the whole colon - Repeat colonoscopy in 5 years    HX COLONOSCOPY  2016    HX COLONOSCOPY  10/2005    benign polyp; repeat 5 yr per Dr Monica Lal HX COLONOSCOPY  11/2010    Normal, f/u 5 yrs, Dr Manisha Comer  7/2005    AUB, benign/neg bx; Dr Shaylee Conway  10/2012    Dr Cassandra Evangelista, AUB    HX HYSTERECTOMY  2013    HX MASTECTOMY Bilateral 8/27/2019    BILATERAL BREAST SKIN SPARING MASTECTOMIES performed by Nelly Roberson MD at [facility] Right 2010    CARPAL TUNNEL    HX PARTIAL HYSTERECTOMY  4/26/13    Supracervical hysterectomy for fibroids, Dr Douglas Bowden  [address] Bilateral 10/15/2019    [facility] Dr. Matt Saliva, B Breast Implant pain and recurrent left breast infection; on wound vac w/ iv abx; wound pump removed 12-4-2019         MEDICATIONS     Current Outpatient Medications   Medication Sig    levothyroxine (SYNTHROID) 175 mcg tablet Take 1 Tab by mouth Daily (before breakfast).  rosuvastatin (CRESTOR) 10 mg tablet Take 1 Tab by mouth nightly.  buPROPion XL (WELLBUTRIN XL) 150 mg tablet TAKE 1 TABLET BY MOUTH EVERY DAY BEFORE BREAKFAST    losartan (COZAAR) 100 mg tablet TAKE 1 TABLET BY MOUTH DAILY    FLUoxetine (PROzac) 20 mg capsule TAKE 1 CAPSULE BY MOUTH DAILY    multivitamin (ONE A DAY) tablet Take 1 Tab by mouth daily.     cholecalciferol, vitamin D3, (Vitamin D3) 50 mcg (2,000 unit) tab Take 1 Tab by mouth daily.  OTHER,NON-FORMULARY, Tumeric once a day    anastrozole (ARIMIDEX) 1 mg tablet Take 1 mg by mouth daily. Indications: Hormone Receptor Positive Breast Cancer    OTHER Portable CPAP machine for RENÉE, with new mask and tubing but no change to mask or settings; G47.30 GARO 99m prog good     No current facility-administered medications for this visit. ALLERGIES     Allergies   Allergen Reactions    Pcn [Penicillins] Hives    Sulfa (Sulfonamide Antibiotics) Hives    Zocor [Simvastatin] Myalgia    Lisinopril Cough          SOCIAL HISTORY     Social History     Socioeconomic History    Marital status:      Spouse name: Not on file    Number of children: 1    Years of education: Not on file    Highest education level: Not on file   Occupational History    Occupation: [address] Occupation: Retired    Tobacco Use    Smoking status: Former Smoker     Packs/day: 0.50     Years: 25.00     Pack years: 12.50     Last attempt to quit: 1/1/2005     Years since quitting: 15.7    Smokeless tobacco: Never Used   Substance and Sexual Activity    Alcohol use:  Yes     Alcohol/week: 5.0 standard drinks     Types: 5 Shots of liquor per week    Drug use: No    Sexual activity: Not Currently     Partners: Male     Comment: not sexually active x many years   Other Topics Concern    Caffeine Concern No     Comment: no coffee, tea and cut out her diet Cokes    Special Diet No     Comment: \"I eat way too much\"    Exercise Yes     Comment: home stationary bike x 40-45 minutes a day, stretches daily, yardwork once a week   Social History Narrative    1 biological daughter, 1 \"adopted\", and 2 step children        IMMUNIZATIONS  Immunization History   Administered Date(s) Administered    Influenza Vaccine 10/03/2014, 11/10/2015, 08/07/2016, 09/30/2017, 11/21/2018, 12/05/2019    Influenza Vaccine (Quad) PF 11/21/2018, 12/05/2019    Influenza Vaccine (Tri) Adjuvanted 2020    Influenza Vaccine Split 2010, 10/06/2011    Meningococcal ACWY Vaccine 2013    Pneumococcal Polysaccharide (PPSV-23) 2020    TD Vaccine 1999, 2009    Td 2017    Tdap 2016, 2017    Zoster Recombinant 2018, 2019    Zoster Vaccine, Live 2013         FAMILY HISTORY     Family History   Problem Relation Age of Onset    Arthritis-osteo Father     Stroke Father         TIA's   [employer] Father         melanoma on back removed    Pacemaker Father 80    Heart Disease Father          81 yo in     Hypertension Mother    [place] Arthritis-osteo Mother     Heart Disease Mother         valve disease,      Heart Attack Maternal Grandmother 76    No Known Problems Sister     No Known Problems Brother     Depression Daughter     Substance Abuse Daughter     Alcohol abuse Daughter          VITALS     Visit Vitals  /77   Pulse 80   Temp 97.7 °F (36.5 °C) (Oral)   Resp 16   Ht 5' 8\" (1.727 m)   Wt 283 lb 6.4 oz (128.5 kg)   LMP 2011   SpO2 96%   BMI 43.09 kg/m²          PHYSICAL EXAMINATION   Physical Exam  Constitutional:       General: She is not in acute distress. Appearance: She is obese. HENT:      Right Ear: Tympanic membrane normal.      Left Ear: Tympanic membrane normal.   Cardiovascular:      Rate and Rhythm: Normal rate and regular rhythm. Pulmonary:      Effort: Pulmonary effort is normal.   Neurological:      Mental Status: She is alert and oriented to person, place, and time. Mental status is at baseline.    Psychiatric:         Mood and Affect: Mood and affect normal.             DIAGNOSTIC DATA         LABORATORY DATA     Results for orders placed or performed in visit on 20   LIPID PANEL   Result Value Ref Range    Cholesterol, total 207 (H) 100 - 199 mg/dL    Triglyceride 192 (H) 0 - 149 mg/dL    HDL Cholesterol 59 >39 mg/dL    VLDL Cholesterol Luis 33 5 - 40 mg/dL    LDL Chol Calc (NIH) 115 (H) 0 - 99 mg/dL   METABOLIC PANEL, COMPREHENSIVE   Result Value Ref Range    Glucose 102 (H) 65 - 99 mg/dL    BUN 20 8 - 27 mg/dL    Creatinine 0.88 0.57 - 1.00 mg/dL    GFR est non-AA 69 >59 mL/min/1.73    GFR est AA 80 >59 mL/min/1.73    BUN/Creatinine ratio 23 12 - 28    Sodium 140 134 - 144 mmol/L    Potassium 4.8 3.5 - 5.2 mmol/L    Chloride 103 96 - 106 mmol/L    CO2 21 20 - 29 mmol/L    Calcium 9.9 8.7 - 10.3 mg/dL    Protein, total 6.3 6.0 - 8.5 g/dL    Albumin 4.3 3.8 - 4.8 g/dL    GLOBULIN, TOTAL 2.0 1.5 - 4.5 g/dL    A-G Ratio 2.2 1.2 - 2.2    Bilirubin, total 0.6 0.0 - 1.2 mg/dL    Alk. phosphatase 82 39 - 117 IU/L    AST (SGOT) 16 0 - 40 IU/L    ALT (SGPT) 19 0 - 32 IU/L   TSH 3RD GENERATION   Result Value Ref Range    TSH 5.750 (H) 0.450 - 4.500 uIU/mL   T4, FREE   Result Value Ref Range    T4, Free 1.11 0.82 - 1.77 ng/dL   CVD REPORT   Result Value Ref Range    INTERPRETATION Note        Lab Results   Component Value Date/Time    Glucose 102 (H) 09/10/2020 08:34 AM    LDL, calculated 234 (H) 06/30/2020 11:27 AM    LDL Chol Calc (NIH) 115 (H) 09/10/2020 08:34 AM    Creatinine 0.88 09/10/2020 08:34 AM      Lab Results   Component Value Date/Time    TSH 5.750 (H) 09/10/2020 08:34 AM    T4, Free 1.11 09/10/2020 08:34 AM          ASSESSMENT & PLAN       ICD-10-CM ICD-9-CM    1. Welcome to Medicare preventive visit  Z00.00 V70.0 See separate note under this same encounter visit for Medicare Wellness note. 2. Encounter for immunization  Z23 V03.89 ADMIN INFLUENZA VIRUS VAC      INFLUENZA VACCINE INACTIVATED (IIV), SUBUNIT, ADJUVANTED, IM      ADMIN PNEUMOCOCCAL VACCINE      PNEUMOCOCCAL POLYSACCHARIDE VACCINE, 23-VALENT, ADULT OR IMMUNOSUPPRESSED PT DOSE,   3. Screening for osteoporosis  Z13.820 V82.81 DEXA BONE DENSITY STUDY AXIAL   4. Postmenopausal state  Z78.0 V49.81 DEXA BONE DENSITY STUDY AXIAL   5. Benign essential hypertension, controlled, stable I10 401.1    6.  Mixed hyperlipidemia improved on Crestor E78.2 272.2    7. Hypothyroidism, unspecified type  E03.9 244.9 T4, FREE      TSH 3RD GENERATION      levothyroxine (SYNTHROID) 175 mcg tablet     Fasting lab results from 9/10/20 and schedule of future lab studies reviewed with patient  Cardiovascular risk and specific lipid/LDL/BS/BP goals reviewed  Cholesterol numbers much much better since being on Crestor. She is tolerating this well and not having the aches/pains like she did w/ Zocor in the past.  TSH has improved since increasing dose of Synthroid from 137 to 150 but still high. Titrate to 175 mcg once daily and repeat TFT's 8-10 weeks, order given for [facility]  Reviewed medications and side effects in detail. Doing well on regimen otherwise. No changes at this time  Reviewed diet, nutrition, exercise, weight management, BMI/goals, age/risk based screening recommendations, health maintenance & prevention counseling. Cancer screening USPTFS guidelines reviewed w/ pt today. Discussed benefits/positive/negative outcomes of screening based on age/risk stratification. Informed consent for/against screening based on pt's personal hx/risk factors. Updated in history above and health maintenance. Further follow up & other recommendations pending review of labs.  If all remains good and stable, follow up in 1 yr, sooner prn

## 2020-09-22 RX ORDER — ANASTROZOLE 1 MG/1
TABLET ORAL
Qty: 90 TAB | Refills: 3 | Status: SHIPPED | OUTPATIENT
Start: 2020-09-22 | End: 2021-09-10

## 2020-10-04 DIAGNOSIS — E78.2 MIXED HYPERLIPIDEMIA: ICD-10-CM

## 2020-10-04 DIAGNOSIS — E03.9 HYPOTHYROIDISM, UNSPECIFIED TYPE: ICD-10-CM

## 2020-10-04 RX ORDER — LEVOTHYROXINE SODIUM 150 UG/1
TABLET ORAL
Qty: 90 TAB | Refills: 0 | OUTPATIENT
Start: 2020-10-04

## 2020-10-04 RX ORDER — ROSUVASTATIN CALCIUM 10 MG/1
TABLET, COATED ORAL
Qty: 90 TAB | Refills: 3 | Status: SHIPPED | OUTPATIENT
Start: 2020-10-04 | End: 2021-09-08

## 2020-11-10 ENCOUNTER — OFFICE VISIT (OUTPATIENT)
Dept: FAMILY MEDICINE CLINIC | Age: 65
End: 2020-11-10
Payer: MEDICARE

## 2020-11-10 VITALS
HEART RATE: 82 BPM | OXYGEN SATURATION: 98 % | TEMPERATURE: 98.4 F | HEIGHT: 68 IN | BODY MASS INDEX: 43.89 KG/M2 | RESPIRATION RATE: 18 BRPM | DIASTOLIC BLOOD PRESSURE: 82 MMHG | WEIGHT: 289.6 LBS | SYSTOLIC BLOOD PRESSURE: 115 MMHG

## 2020-11-10 DIAGNOSIS — H66.91 ACUTE OTITIS MEDIA, RIGHT: Primary | ICD-10-CM

## 2020-11-10 DIAGNOSIS — H60.391 ACUTE INFECTIVE OTITIS EXTERNA OF RIGHT EAR: ICD-10-CM

## 2020-11-10 DIAGNOSIS — H91.91 DECREASED HEARING OF RIGHT EAR: ICD-10-CM

## 2020-11-10 DIAGNOSIS — H93.11 TINNITUS OF RIGHT EAR: ICD-10-CM

## 2020-11-10 PROCEDURE — 1090F PRES/ABSN URINE INCON ASSESS: CPT | Performed by: FAMILY MEDICINE

## 2020-11-10 PROCEDURE — 1101F PT FALLS ASSESS-DOCD LE1/YR: CPT | Performed by: FAMILY MEDICINE

## 2020-11-10 PROCEDURE — G8400 PT W/DXA NO RESULTS DOC: HCPCS | Performed by: FAMILY MEDICINE

## 2020-11-10 PROCEDURE — G9717 DOC PT DX DEP/BP F/U NT REQ: HCPCS | Performed by: FAMILY MEDICINE

## 2020-11-10 PROCEDURE — G8754 DIAS BP LESS 90: HCPCS | Performed by: FAMILY MEDICINE

## 2020-11-10 PROCEDURE — 3017F COLORECTAL CA SCREEN DOC REV: CPT | Performed by: FAMILY MEDICINE

## 2020-11-10 PROCEDURE — G8536 NO DOC ELDER MAL SCRN: HCPCS | Performed by: FAMILY MEDICINE

## 2020-11-10 PROCEDURE — G8419 CALC BMI OUT NRM PARAM NOF/U: HCPCS | Performed by: FAMILY MEDICINE

## 2020-11-10 PROCEDURE — 99213 OFFICE O/P EST LOW 20 MIN: CPT | Performed by: FAMILY MEDICINE

## 2020-11-10 PROCEDURE — G9708 BILAT MAST/HX BI /UNILAT MAS: HCPCS | Performed by: FAMILY MEDICINE

## 2020-11-10 PROCEDURE — G8427 DOCREV CUR MEDS BY ELIG CLIN: HCPCS | Performed by: FAMILY MEDICINE

## 2020-11-10 PROCEDURE — G8752 SYS BP LESS 140: HCPCS | Performed by: FAMILY MEDICINE

## 2020-11-10 PROCEDURE — G0463 HOSPITAL OUTPT CLINIC VISIT: HCPCS | Performed by: FAMILY MEDICINE

## 2020-11-10 RX ORDER — AZITHROMYCIN 250 MG/1
TABLET, FILM COATED ORAL
Qty: 6 TAB | Refills: 0 | Status: SHIPPED | OUTPATIENT
Start: 2020-11-10 | End: 2020-11-15

## 2020-11-10 RX ORDER — NEOMYCIN SULFATE, POLYMYXIN B SULFATE AND HYDROCORTISONE 10; 3.5; 1 MG/ML; MG/ML; [USP'U]/ML
4 SUSPENSION/ DROPS AURICULAR (OTIC) 4 TIMES DAILY
Qty: 10 ML | Refills: 0 | Status: SHIPPED | OUTPATIENT
Start: 2020-11-10 | End: 2020-11-20

## 2020-11-10 NOTE — PROGRESS NOTES
Chief Complaint   Patient presents with    Ear Pain     Right ear pain, radiating     1. Have you been to the ER, urgent care clinic since your last visit? Hospitalized since your last visit? No    2. Have you seen or consulted any other health care providers outside of the [address] [place] [address] since your last visit? Include any pap smears or colon screening.  No

## 2020-11-10 NOTE — PROGRESS NOTES
Chief Complaint   Patient presents with    Ear Pain     Right ear pain x 3-4 days       HISTORY OF PRESENT ILLNESS   HPI  3-4 d h/o right ear pain. Aching and throbbing. No drainage. Slight sensation of fullness. Endorses decreased hearing and mild tinnitus right ear but states that has been chronic x the past few years. Yet she has never sought evaluation for it because she states it doesn't bother her and enables her to sleep better at night since her hearing is dulled on that side. Denies congestion, sinus pain, sore throat, cough, fevers or other upper respiratory symptoms. She has h/o mild seasonal allergies and on occasion takes otc allergy meds for those symptoms but nothing lately. Only taking Tylenol prn ear pain right now and that does help some. Has not taken any yet today. REVIEW OF SYMPTOMS   Review of Systems   Constitutional: Negative for chills and fever. HENT: Positive for ear pain, hearing loss and tinnitus. Negative for ear discharge, sinus pain and sore throat. Respiratory: Negative. Negative for cough and shortness of breath. Gastrointestinal: Negative for nausea. Neurological: Negative. Negative for dizziness and headaches.            PROBLEM LIST/MEDICAL HISTORY     Problem List  Date Reviewed: 11/10/2020          Codes Class Noted    Mixed hyperlipidemia ICD-10-CM: E78.2  ICD-9-CM: 272.2  7/6/2020        Mild [facility] ([facility] ICD-10-CM: F32.0  ICD-9-CM: 150  3/3/2020        Aromatase inhibitor use ICD-10-CM: Z79.811  ICD-9-CM: V07.52  3/3/2020        Status post right breast lumpectomy ICD-10-CM: Z98.890  ICD-9-CM: V45.89  1/30/2020    Overview Addendum 1/30/2020  8:50 PM by Jameson Suarez MD     6-, Invasive ductal carcinoma--->Mastectomy 8-, no chemo, no radiation, started on Arimidex             Chronic depression ICD-10-CM: F32.9  ICD-9-CM: 311  1/30/2020        Adjustment disorder with mixed anxiety and depressed mood ICD-10-CM: Q77.47  ICD-9-CM: 309.28  2020    Overview Signed 2020  9:31 PM by Anne Justin MD     After death of father  and personal diagnosis of/treatment for breast cancer [id number]; Counselin:  [place] [facility] [address] , [facility]              H/O bilateral mastectomy ICD-10-CM: Z90.13  ICD-9-CM: V45.71  2019    Overview Addendum 2020  8:59 PM by Anne Justin MD     Double Mastectomy (right breast cancer and elective left breast prophylactically); No chemo, No radiation; Started on Arimidex, followed by Dr. Zeynep Duncan             H/O breast reconstruction ICD-10-CM: W40.107  ICD-9-CM: V43.82  2019        Arthritis ICD-10-CM: M19.90  ICD-9-CM: 716.90  2019        Cancer of overlapping sites of right breast ([facility] ICD-10-CM: C50.811  ICD-9-CM: 174.8  2019        Severe obesity ([facility] ICD-10-CM: E66.01  ICD-9-CM: 278.01  2018        Benign essential hypertension ICD-10-CM: I10  ICD-9-CM: 401.1  2016    Overview Signed 2016  9:26 AM by Anne Justin MD                  Hypothyroidism ICD-10-CM: E03.9  ICD-9-CM: 244.9  2016    Overview Signed 2016  9:27 AM by Anne Justin MD     1999             Primary osteoarthritis of left knee ICD-10-CM: M17.12  ICD-9-CM: 715.16  2016    Overview Signed 2016  9:42 AM by Anne Justin MD     Extensive;  Ortho (needs knee replacement), CSI x 2,              Hypercholesterolemia ICD-10-CM: E78.00  ICD-9-CM: 272.0  2013    Overview Signed 2013  8:29 AM by Anne Justin MD     ~1389             S/P benign cervical polypectomy ICD-10-CM: W10.417, Z87.42  ICD-9-CM: V45.89  2010    Overview Addendum 2016  9:42 AM by Anne Justin MD     , ; Gyn Dr Mady Cardoso             H/O Benign Colon Polyp ICD-10-CM: K63.5  ICD-9-CM: 211.3  2010    Overview Signed 2010 12:10 PM by Matthieu Yates MD     Colonoscopy 10/2005, 11/2010  q5yrs  Dr Ashly Bernal             H/O Uterine Fibroids ICD-10-CM: D21.9  ICD-9-CM: 215.9  12/20/2010    Overview Signed 12/20/2010 12:13 PM by Matthieu Yates MD     2005; no surgical intervention  Gyn Dr Des Quiroga incontinence ICD-10-CM: N39.3  ICD-9-CM: HWA6316  5/26/2010    Overview Signed 5/26/2010 12:47 PM by Matthieu Yates MD     Urologist Dr Rosalinda Barahona             Sleep apnea, RENÉE ICD-10-CM: G47.30  ICD-9-CM: 780.57  4/5/2010    Overview Addendum 12/20/2010 12:14 PM by Matthieu Yates MD     2009; cpap             Anxiety ICD-10-CM: F41.9  ICD-9-CM: 300.00  4/5/2010        Carpal tunnel syndrome ICD-10-CM: G56.00  ICD-9-CM: 354.0  4/5/2010        Mild Dependent Ankle edema, B ICD-10-CM: M25.473  ICD-9-CM: 719.07  4/5/2010    Overview Signed 4/5/2010  3:52 PM by Rae Hahn     mild             Perimenopausal ICD-10-CM: N95.1  ICD-9-CM: 627.2  4/5/2010    Overview Signed 4/5/2010  3:52 PM by Rae Hahn     2007             Depression ICD-10-CM: F32.9  ICD-9-CM: 355  4/5/2010        ? Passed Kidney stone ICD-10-CM: N20.0  ICD-9-CM: 592.0  4/5/2010    Overview Signed 5/26/2010 12:47 PM by Matthieu Yates MD     2/2010                       PAST SURGICAL HISTORY     Past Surgical History:   Procedure Laterality Date    EKG ORDERABLE  5/2009    NSR, rate 84, normal EKG    HX BLADDER SUSPENSION  ~2010    Dr Calles Lung Left 09/23/2019    [facility].  Dr. Gretel Holder, [facility] Mastectomy Implant Infection Left Breast, Replaced Implant, IV abx    HX BREAST BIOPSY Right 11/2011    VPI, right breast biopsy negative    HX BREAST BIOPSY Right 04/22/2019    invasive ductal carcinoma    HX BREAST LUMPECTOMY Right 6/13/2019    RIGHT BREAST LUMPECTOMY ( x2 ) WTIH ULTRASOUND , RIGHT SENTINAL NODE BIOPSY performed by Louellen Osler, MD at [facility]    HX BREAST RECONSTRUCTION Bilateral 8/27/2019    B MASTECTOMY AND IMPLANTS, IMMEDIATE BILATERAL BREAST RECONSTRUCTION WITH DIRECT TO IMPLANT USE OF ALLODERM AND BILATERAL BREAST RECONSTRUCTION INTRA-OPERATIVE ASSESMENT OF BLOOD FLOW [facility]    Dr Yamilka Rodriguez CERVICAL POLYPECTOMY  2009, 2015    Dr Rowena Zarate    HX COLONOSCOPY  04/08/2016    Dr Tessa Hannon-Internal Hemorrhoids- Normal mucosa in the whole colon - Repeat colonoscopy in 5 years    HX COLONOSCOPY  2016    HX COLONOSCOPY  10/2005    benign polyp; repeat 5 yr per Dr Samina Jordan HX COLONOSCOPY  11/2010    Normal, f/u 5 yrs, Dr Lenora Ivy  7/2005    AUB, benign/neg bx; Dr Betsy Waterman  10/2012    Dr Rowena Zarate, [facility]    HX HEENT  2019    Nasal Abscess I&D, Dr. Kim Dennis HX HYSTERECTOMY  2013    HX MASTECTOMY Bilateral 8/27/2019    BILATERAL BREAST SKIN SPARING MASTECTOMIES performed by Pattie Webber MD at [facility] Right 2010    CARPAL TUNNEL    HX PARTIAL HYSTERECTOMY  4/26/13    Supracervical hysterectomy for fibroids, Dr Manjeet Brewer  [facility] Bilateral 10/15/2019    [facility] Dr. Rodger Alberto B Breast Implant pain and recurrent left breast infection; on wound vac w/ iv abx; wound pump removed 12-4-2019         MEDICATIONS     Current Outpatient Medications   Medication Sig    rosuvastatin (CRESTOR) 10 mg tablet TAKE 1 TABLET BY MOUTH EVERY NIGHT    anastrozole (ARIMIDEX) 1 mg tablet TAKE 1 TABLET BY MOUTH EVERY DAY    levothyroxine (SYNTHROID) 175 mcg tablet Take 1 Tab by mouth Daily (before breakfast).     buPROPion XL (WELLBUTRIN XL) 150 mg tablet TAKE 1 TABLET BY MOUTH EVERY DAY BEFORE BREAKFAST    losartan (COZAAR) 100 mg tablet TAKE 1 TABLET BY MOUTH DAILY    FLUoxetine (PROzac) 20 mg capsule TAKE 1 CAPSULE BY MOUTH DAILY    multivitamin (ONE A DAY) tablet Take 1 Tab by mouth daily.  cholecalciferol, vitamin D3, (Vitamin D3) 50 mcg (2,000 unit) tab Take 1 Tab by mouth daily.  OTHER,NON-FORMULARY, Tumeric once a day    OTHER Portable CPAP machine for RENÉE, with new mask and tubing but no change to mask or settings; G47.30 GARO 99m prog good     No current facility-administered medications for this visit. ALLERGIES     Allergies   Allergen Reactions    Pcn [Penicillins] Hives    Sulfa (Sulfonamide Antibiotics) Hives    Zocor [Simvastatin] Myalgia    Lisinopril Cough          SOCIAL HISTORY     Social History     Socioeconomic History    Marital status:      Spouse name: Not on file    Number of children: 1    Years of education: Not on file    Highest education level: Not on file   Occupational History    Occupation: [address] Occupation: Retired    Tobacco Use    Smoking status: Former Smoker     Packs/day: 0.50     Years: 25.00     Pack years: 12.50     Last attempt to quit: 1/1/2005     Years since quitting: 15.8    Smokeless tobacco: Never Used   Substance and Sexual Activity    Alcohol use:  Yes     Alcohol/week: 5.0 standard drinks     Types: 5 Shots of liquor per week    Drug use: No    Sexual activity: Not Currently     Partners: Male     Comment: not sexually active x many years   Other Topics Concern    Caffeine Concern No     Comment: no coffee, tea and cut out her diet Cokes    Special Diet No     Comment: \"I eat way too much\"    Exercise Yes     Comment: home stationary bike x 40-45 minutes a day, stretches daily, yardwork once a week   Social History Narrative    1 biological daughter, 1 \"adopted\", and 2 step children        IMMUNIZATIONS  Immunization History   Administered Date(s) Administered    Influenza Vaccine 10/03/2014, 11/10/2015, 08/07/2016, 09/30/2017, 11/21/2018, 12/05/2019    Influenza Vaccine (Quad) PF (>6 Mo Flulaval, Fluarix, and >3 Yrs Afluria, Fluzone 35628) 2018, 2019    Influenza Vaccine (Tri) Adjuvanted (>65 Yrs FLUAD TRI 28168) 2020    Influenza Vaccine Split 2010, 10/06/2011    Meningococcal ACWY Vaccine 2013    Pneumococcal Polysaccharide (PPSV-23) 2020    TD Vaccine 1999, 2009    Td 2017    Tdap 2016, 2017    Zoster Recombinant 2018, 2019    Zoster Vaccine, Live 2013         FAMILY HISTORY     Family History   Problem Relation Age of Onset    Arthritis-osteo Father     Stroke Father         TIA's   Lev.Lax Cancer Father         melanoma on back removed    Pacemaker Father 80    Heart Disease Father          79 yo in     Hypertension Mother     Arthritis-osteo Mother     Heart Disease Mother         valve disease,      Heart Attack Maternal Grandmother 76    No Known Problems Sister     No Known Problems Brother     Depression Daughter     Substance Abuse Daughter     Alcohol abuse Daughter          VITALS     Visit Vitals  /82 (BP 1 Location: Left arm, BP Patient Position: Sitting)   Pulse 82   Temp 98.4 °F (36.9 °C) (Temporal)   Resp 18   Ht 5' 8\" (1.727 m)   Wt 289 lb 9.6 oz (131.4 kg)   LMP 2011   SpO2 98%   BMI 44.03 kg/m²          PHYSICAL EXAMINATION   Physical Exam  Vitals signs reviewed. Constitutional:       General: She is not in acute distress. Appearance: She is not ill-appearing. HENT:      Right Ear: Swelling and tenderness present. No drainage. A middle ear effusion is present. No mastoid tenderness. Tympanic membrane is injected and bulging (mild). Tympanic membrane is not perforated. Left Ear: Tympanic membrane, ear canal and external ear normal.   Neck:      Musculoskeletal: Neck supple. Cardiovascular:      Rate and Rhythm: Normal rate. Pulmonary:      Effort: Pulmonary effort is normal.      Breath sounds: Normal breath sounds. Lymphadenopathy:      Cervical: No cervical adenopathy. ASSESSMENT & PLAN       ICD-10-CM ICD-9-CM    1. Acute otitis media, right  H66.91 382.9 azithromycin (ZITHROMAX) 250 mg tablet      neomycin-polymyxin-hydrocortisone, buffered, (PEDIOTIC) 3.5-10,000-1 mg/mL-unit/mL-% otic suspension   2. Acute infective otitis externa of right ear  H60.391 380.10 azithromycin (ZITHROMAX) 250 mg tablet      neomycin-polymyxin-hydrocortisone, buffered, (PEDIOTIC) 3.5-10,000-1 mg/mL-unit/mL-% otic suspension   3. Chronic Decreased hearing of right ear H91.91 389.9 REFERRAL TO ENT-OTOLARYNGOLOGY   4. Chronic Tinnitus of right ear H93.11 388.30 REFERRAL TO ENT-OTOLARYNGOLOGY     Patient is PCN Allergic (Severe hives and Ceph hx unknown). Azithromycin prescribed as directed  Cortisporin Otic Qtts qid x 10 days  Reviewed medications and side effects in detail. May continue Tylenol q6hr prn. Warm compresses. Follow up w/ ENT.  Call back in the interim prn

## 2020-11-19 ENCOUNTER — HOSPITAL ENCOUNTER (OUTPATIENT)
Dept: LAB | Age: 65
Discharge: HOME | End: 2020-11-19
Payer: MEDICARE

## 2020-11-19 PROCEDURE — 84439 ASSAY OF FREE THYROXINE: CPT

## 2020-11-19 PROCEDURE — 36415 COLL VENOUS BLD VENIPUNCTURE: CPT

## 2020-11-19 PROCEDURE — 84443 ASSAY THYROID STIM HORMONE: CPT

## 2020-11-20 LAB
T4 FREE SERPL-MCNC: 1.52 NG/DL (ref 0.82–1.77)
TSH SERPL DL<=0.005 MIU/L-ACNC: 0.73 UIU/ML (ref 0.45–4.5)

## 2020-12-07 ENCOUNTER — PATIENT MESSAGE (OUTPATIENT)
Dept: FAMILY MEDICINE CLINIC | Age: 65
End: 2020-12-07

## 2020-12-12 DIAGNOSIS — F32.A CHRONIC DEPRESSION: ICD-10-CM

## 2020-12-12 DIAGNOSIS — E03.9 HYPOTHYROIDISM, UNSPECIFIED TYPE: ICD-10-CM

## 2020-12-12 DIAGNOSIS — I10 BENIGN ESSENTIAL HYPERTENSION: ICD-10-CM

## 2020-12-12 RX ORDER — BUPROPION HYDROCHLORIDE 150 MG/1
TABLET ORAL
Qty: 90 TAB | Refills: 2 | Status: SHIPPED | OUTPATIENT
Start: 2020-12-12 | End: 2021-09-08

## 2020-12-12 RX ORDER — LEVOTHYROXINE SODIUM 175 UG/1
TABLET ORAL
Qty: 90 TAB | Refills: 2 | Status: SHIPPED | OUTPATIENT
Start: 2020-12-12 | End: 2021-09-08

## 2020-12-12 RX ORDER — LOSARTAN POTASSIUM 100 MG/1
TABLET ORAL
Qty: 90 TAB | Refills: 2 | Status: SHIPPED | OUTPATIENT
Start: 2020-12-12 | End: 2021-09-08

## 2021-01-11 ENCOUNTER — OFFICE VISIT (OUTPATIENT)
Dept: SURGERY | Age: 66
End: 2021-01-11
Payer: MEDICARE

## 2021-01-11 VITALS
HEIGHT: 68 IN | DIASTOLIC BLOOD PRESSURE: 85 MMHG | SYSTOLIC BLOOD PRESSURE: 170 MMHG | HEART RATE: 75 BPM | BODY MASS INDEX: 43.8 KG/M2 | WEIGHT: 289 LBS | TEMPERATURE: 97.2 F

## 2021-01-11 DIAGNOSIS — C50.811 CANCER OF OVERLAPPING SITES OF RIGHT BREAST (HCC): Primary | ICD-10-CM

## 2021-01-11 DIAGNOSIS — Z90.13 S/P MASTECTOMY, BILATERAL: ICD-10-CM

## 2021-01-11 PROCEDURE — 99213 OFFICE O/P EST LOW 20 MIN: CPT | Performed by: SURGERY

## 2021-01-11 NOTE — PROGRESS NOTES
HISTORY OF PRESENT ILLNESS  Carole Parsons is a 72 y.o. female. HPI ESTABLISHED patient here for follow up RIGHT breast cancer, s/p bilateral mastectomies. No breast issues to report at this time. Taking Anastrozole. Breast cancer history-  · 6/13/19 - RIGHT breast lumpectomy x 2 sites and RIGHT SLNB  · 8/27/19 - BILATERAL mastectomies with reconstruction. Dr. Fortino Saucedo did direct saline implants. Surg path showed LEFT breast benign, residual IDC on RIGHT breast.   · 10/15/19 - Removal of bilateral breast implants. Complicated breast cancer course including multiple surgeries and hospitalizations for wound infections.   · 12/2019 - Anastrozole - Dr. Nena Espinoza      Review of Systems   All other systems reviewed and are negative. Physical Exam  Vitals signs and nursing note reviewed. Chest:          Comments: Bilateral breasts surgically absent  No chest wall masses    Lymphadenopathy:      Upper Body:      Right upper body: No supraclavicular, axillary or pectoral adenopathy. Left upper body: No supraclavicular, axillary or pectoral adenopathy. ASSESSMENT and PLAN    ICD-10-CM ICD-9-CM    1. Cancer of overlapping sites of right breast ([facility]  C50.811 174.8    2. S/P mastectomy, bilateral  Z90.13 V45.71      - no evidence local recurrence  - f/u in 6 months with np  20 minutes was spent with patient on counseling and coordination of care.

## 2021-01-11 NOTE — LETTER
1/12/2021 Patient: Keli Levi YOB: 1955 Date of Visit: 1/11/2021 Paul Zuleta MD 
[address] [place] [address] [phone] [address] [employer] [address] [facility] [place] [address] [phone] [address] [employer] Dear MD Teto Sahni DO, Thank you for referring Ms. Peewee Younger to [address] [facility] [place] MAIN OFFICE [address] [place] [address] for evaluation. My notes for this consultation are attached. If you have questions, please do not hesitate to call me. I look forward to following your patient along with you. Sincerely, Mathew Allen MD

## 2021-01-11 NOTE — PATIENT INSTRUCTIONS

## 2021-01-25 ENCOUNTER — HOSPITAL ENCOUNTER (OUTPATIENT)
Dept: MAMMOGRAPHY | Age: 66
Discharge: HOME | End: 2021-01-25
Attending: FAMILY MEDICINE
Payer: MEDICARE

## 2021-01-25 DIAGNOSIS — Z13.820 SCREENING FOR OSTEOPOROSIS: ICD-10-CM

## 2021-01-25 DIAGNOSIS — Z78.0 POSTMENOPAUSAL STATE: ICD-10-CM

## 2021-01-25 PROCEDURE — 77080 DXA BONE DENSITY AXIAL: CPT

## 2021-03-01 ENCOUNTER — VIRTUAL VISIT (OUTPATIENT)
Dept: ONCOLOGY | Age: 66
End: 2021-03-01
Payer: MEDICARE

## 2021-03-01 DIAGNOSIS — Z90.13 H/O BILATERAL MASTECTOMY: ICD-10-CM

## 2021-03-01 DIAGNOSIS — Z79.811 AROMATASE INHIBITOR USE: ICD-10-CM

## 2021-03-01 DIAGNOSIS — I10 ESSENTIAL HYPERTENSION: ICD-10-CM

## 2021-03-01 DIAGNOSIS — R23.2 HOT FLASHES: ICD-10-CM

## 2021-03-01 DIAGNOSIS — Z98.890 H/O BREAST RECONSTRUCTION: ICD-10-CM

## 2021-03-01 DIAGNOSIS — C50.811 CANCER OF OVERLAPPING SITES OF RIGHT BREAST (HCC): Primary | ICD-10-CM

## 2021-03-01 PROCEDURE — G0463 HOSPITAL OUTPT CLINIC VISIT: HCPCS | Performed by: INTERNAL MEDICINE

## 2021-03-01 PROCEDURE — 99213 OFFICE O/P EST LOW 20 MIN: CPT | Performed by: INTERNAL MEDICINE

## 2021-03-01 NOTE — PROGRESS NOTES
[facility] [place] at [address]  [address], [address]  W: [phone]  F: [phone]      Reason for Visit:   Jana Mon is a 72 y.o. female who is seen by synchronous (real-time) audio-video technology for follow up of breast cancer on adjuvant Anastrozole     Treatment History:   · 19 - RIGHT breast lumpectomy x 2 sites and RIGHT SLNB  · 19 - BILATERAL mastectomies with reconstruction. Dr. Tanisha Darden did direct saline implants. Surg path showed LEFT breast benign, residual IDC on RIGHT breast. ER+ HER2 negative. 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 Tolerating AI fine. No new issues. Has occ hot flashes. Tolerable. No new chest wall self exam issues. Had ear infection/ saw PCP and ENT. Better now. Labs with PCP. No fevers/ chills/ chest pain/ SOB/ nausea/ vomiting/diarrhea/ pain/fatigue        Past Medical History:   Diagnosis Date    Adjustment disorder with mixed anxiety and depressed mood 2020    After death of father  and personal diagnosis of/treatment for breast cancer [id number]; Counselin:  Shakira Quality of "[employer] , [address] [facility] Ankle edema 2010    Anxiety 2010    Benign essential hypertension 2016    2006     Carpal tunnel syndrome 2010    Chronic depression 2020    H/O Benign Colon Polyp 2010    H/O bilateral mastectomy 2019    Double Mastectomy (right breast cancer and elective left breast prophylactically);  No chemo, No radiation    H/O Uterine Fibroids 12/20/2010    Hypercholesterolemia 6/25/2013    ~2006    Hypothyroidism 5/26/2016 7/1999    Kidney stone 4/5/2010    Mixed hyperlipidemia 7/6/2020    Perimenopausal 4/5/2010    Primary osteoarthritis of left knee 5/26/2016    Extensive; Ortho (needs knee replacement), CSI x 2,     S/P benign cervical polypectomy 12/20/2010    Sleep apnea 4/5/2010    USES CPAP    Status post right breast lumpectomy 1/30/2020 6-, Invasive ductal carcinoma--->Mastectomy 8-, no chemo, no radiation, started on Arimidex    Stress incontinence 5/26/2010      Past Surgical History:   Procedure Laterality Date    EKG ORDERABLE  5/2009    NSR, rate 84, normal EKG    HX BLADDER SUSPENSION  ~2010    Dr Jen Pat Left 09/23/2019    MCV.  Dr. Tanisha Darden, [address] [facility] Mastectomy Implant Infection Left Breast, Replaced Implant, IV abx    HX BREAST BIOPSY Right 11/2011    VPI, right breast biopsy negative    HX BREAST BIOPSY Right 04/22/2019    invasive ductal carcinoma    HX BREAST LUMPECTOMY Right 6/13/2019    RIGHT BREAST LUMPECTOMY ( x2 ) WT ULTRASOUND , RIGHT SENTINAL NODE BIOPSY performed by Forrest Moss MD at [facility] AM[facility]ULATORY OR    HX BREAST RECONSTRUCTION Bilateral 8/27/2019    B MASTECTOMY AND IMPLANTS, IMMEDIATE BILATERAL BREAST RECONSTRUCTION WITH DIRECT TO IMPLANT USE OF ALLODERM AND BILATERAL BREAST RECONSTRUCTION INTRA-OPERATIVE ASSESMENT OF BLOOD FLOW performed by Erlinda Edwards MD at [place] [address] Right 03/2010    Dr Monie Cm CERVICAL POLYPECTOMY  2009, 2015    Dr Cedric Urbina    HX COLONOSCOPY  04/08/2016    Dr Anmol Hannon-Internal Hemorrhoids- Normal mucosa in the whole colon - Repeat colonoscopy in 5 years    HX COLONOSCOPY  2016    HX COLONOSCOPY  10/2005    benign polyp; repeat 5 yr per Dr Smith Cornea HX COLONOSCOPY  11/2010    Normal, f/u 5 yrs, Dr Savannah Shelley 2005    AUB, benign/neg bx; Dr Yamilet Saldaña  10/2012    Dr Ros Albarran, AUB    HX HEENT      Nasal Abscess I&D, Dr. Brooke Lynn HX HYSTERECTOMY  2013    HX MASTECTOMY Bilateral 2019    BILATERAL BREAST SKIN SPARING MASTECTOMIES performed by Wally St MD at [address] HX ORTHOPAEDIC Right 2010    CARPAL TUNNEL    HX PARTIAL HYSTERECTOMY  13    Supracervical hysterectomy for fibroids, Dr Ric Vogt HX UROLOGICAL  2010    BLADDER SLING     HX Eyad Marek 50 Bilateral 10/15/2019    MCV Dr. Renetta Green B Breast Implant pain and recurrent left breast infection; on wound vac w/ iv abx; wound pump removed 2019      Social History     Tobacco Use    Smoking status: Former Smoker     Packs/day: 0.50     Years: 25.00     Pack years: 12.50     Quit date: 2005     Years since quittin.1    Smokeless tobacco: Never Used   Substance Use Topics    Alcohol use: Yes     Alcohol/week: 5.0 standard drinks     Types: 5 Shots of liquor per week      Family History   Problem Relation Age of Onset   Kari Shetty Arthritis-osteo Father     Stroke Father         TIA's    Cancer Father         melanoma on back removed    Pacemaker Father 80    Heart Disease Father          79 yo in     Hypertension Mother    Kari Shetty Arthritis-osteo Mother     Heart Disease Mother         valve disease,  2013    Heart Attack Maternal Grandmother 76    No Known Problems Sister     No Known Problems Brother     Depression Daughter     Substance Abuse Daughter     Alcohol abuse Daughter      Current Outpatient Medications   Medication Sig    ascorbic acid (VITAMIN C PO) Take  by mouth.     buPROPion XL (WELLBUTRIN XL) 150 mg tablet TAKE 1 TABLET BY MOUTH EVERY DAY BEFORE BREAKFAST    losartan (COZAAR) 100 mg tablet TAKE 1 TABLET BY MOUTH DAILY    levothyroxine (SYNTHROID) 175 mcg tablet TAKE 1 TABLET BY MOUTH DAILY BEFORE BREAKFAST    rosuvastatin (CRESTOR) 10 mg tablet TAKE 1 TABLET BY MOUTH EVERY NIGHT    anastrozole (ARIMIDEX) 1 mg tablet TAKE 1 TABLET BY MOUTH EVERY DAY    FLUoxetine (PROzac) 20 mg capsule TAKE 1 CAPSULE BY MOUTH DAILY    multivitamin (ONE A DAY) tablet Take 1 Tab by mouth daily.  cholecalciferol, vitamin D3, (Vitamin D3) 50 mcg (2,000 unit) tab Take 1 Tab by mouth daily.  OTHER,NON-FORMULARY, Tumeric once a day    OTHER Portable CPAP machine for RENÉE, with new mask and tubing but no change to mask or settings; G47.30 GARO 99m prog good     No current facility-administered medications for this visit. Allergies   Allergen Reactions    Pcn [Penicillins] Hives    Sulfa (Sulfonamide Antibiotics) Hives    Zocor [Simvastatin] Myalgia    Lisinopril Cough      Review of Systems: A complete review of systems was obtained, negative except as described above. Physical Exam:     General: alert, cooperative, no distress   Mental  status: normal mood, behavior, speech, dress, motor activity, and thought processes, able to follow commands   HENT: NCAT   Neck: no visualized mass   Resp: no respiratory distress   Neuro: no gross deficits   Skin: no discoloration or lesions of concern on visible areas   Psychiatric: normal affect, consistent with stated mood, no evidence of hallucinations       Due to this being a TeleHealth evaluation (During XVXOT-06 public health emergency), many elements of the physical examination are unable to be assessed. Evaluation of the following organ systems was limited: Vitals/Constitutional/EENT/Resp/CV/GI//MS/Neuro/Skin/Heme-Lymph-Imm. Results:     Lab Results   Component Value Date/Time    WBC 4.9 06/30/2020 11:27 AM    HGB 13.4 06/30/2020 11:27 AM    HCT 40.9 06/30/2020 11:27 AM    PLATELET 155 43/97/3288 11:27 AM    MCV 91 06/30/2020 11:27 AM    ABS.  NEUTROPHILS 3.7 06/30/2020 11:27 AM     Lab Results   Component Value Date/Time Sodium 140 09/10/2020 08:34 AM    Potassium 4.8 09/10/2020 08:34 AM    Chloride 103 09/10/2020 08:34 AM    CO2 21 09/10/2020 08:34 AM    Glucose 102 (H) 09/10/2020 08:34 AM    BUN 20 09/10/2020 08:34 AM    Creatinine 0.88 09/10/2020 08:34 AM    GFR est AA 80 09/10/2020 08:34 AM    GFR est non-AA 69 09/10/2020 08:34 AM    Calcium 9.9 09/10/2020 08:34 AM     Lab Results   Component Value Date/Time    Bilirubin, total 0.6 09/10/2020 08:34 AM    ALT (SGPT) 19 09/10/2020 08:34 AM    Alk. phosphatase 82 09/10/2020 08:34 AM    Protein, total 6.3 09/10/2020 08:34 AM    Albumin 4.3 09/10/2020 08:34 AM    Globulin 3.3 07/15/2010 08:05 AM     MRI Results (most recent):  Results from Hospital Encounter encounter on 05/17/19   MRI BREAST BI W WO CONT    Narrative INDICATION: Right invasive ductal carcinoma, grade 2, ER/ND positive, HER-2/keli  negative. COMPARISON: Multiple prior breast imaging studies dating back to 2015. TECHNIQUE:  Multisequence, multiplanar, bilateral breast MRI was performed in prone position  using a dedicated breast coil. Images were obtained without contrast and dynamic  postcontrast images were obtained in multiple phases. 10 mL IV Gadavist was  administered. Subtraction images were reconstructed. Postcontrast images were  reviewed with dedicated kinetic analysis software. FINDINGS:  There is mild background parenchymal enhancement and heterogeneous  fibroglandular tissue. Numerous sub-5 mm foci of enhancement are scattered in  the bilateral breasts. Right breast:  A 16 x 13 x 16 mm, spiculated mass with a biopsy clip and mixed kinetics at 6:00  in the middle third of the right breast corresponds to the biopsy-proven  invasive ductal carcinoma. There is a small hematoma in the retroareolar biopsy site in the anterior third.   A 6 x 7 x 8 mm, irregularly marginated, enhancing nodule with mixed kinetics at  the superior aspect of the hematoma corresponds roughly in size and morphology  to the hypoechoic, shadowing mass seen on prior ultrasound at 3:00 in the  periareolar region. Incidental note is made of an intramammary lymph node at 1:00 in the middle  third. No axillary or internal mammary chain lymphadenopathy. Left breast:  No suspicious enhancing foci. No axillary or internal mammary chain  lymphadenopathy. A summary portfolio with key images has been sent from kinetic analysis software  to PACS. Impression IMPRESSION:   1. Invasive ductal carcinoma in the right breast at 6:00.   2. Small enhancing nodule associated with a hematoma in the retroareolar right  breast biopsy site. 3. No suspicious enhancing foci in the left breast.  4. No lymphadenopathy. 5. BI-RADS Assessment Category 6: Known biopsy proven malignancy. Records reviewed and summarized above. Pathology report(s) reviewed above. Radiology report(s) reviewed above. Assessment/PLAN:     1) Stage 1 T1cN0 ER+ HER2 Negative Mammaprint low risk breast cancer post b/l mastectomy/reconstruction  Patient has two separate tumors with different path types. 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 5 o clock ER+ HER2 negative mammaprint low risk. She had a lumpectomy 6/13/19 with + margin then had b/l mastectomy/reconstruction on 8/27/19. Then infection and implants removed. no adjuvant chemo      Pt seen today virtually due to COVID. She started adjuvant Anastrozole in 12/19. She is tolerating Anastrozole well overall - no side effects. Continue Anastrozole for total 5 years if possible. No issues on chest wall on self exam.   Recently saw PCP. Still sees surgery also. routine labs with her PCP. 2) hx of mastectomy/reconstruction infection   She was on IV abx and then had implants removed on 10/15/19. She was hospitalized on 10/30/19 for infection/fever and d/c'd on 11/4/19. She completed IV more abx on 11/21/19 and had a wound vac.  management per surgery/plastics.      3) HTN/Depression/Hypothyroid/Arthritis/Obesity  Management per PCP. 4) ear infection per PCP/ ENT. 5) Psychosocial  Mood good, coping well overall. SW for support as needed. Call if questions. Follow up here in 6 months / sees surgery also. I was in office while conducting this encounter. The patient was at her home. Consent:  She and/or her healthcare decision maker is aware that this patient-initiated Telehealth encounter is a billable service, with coverage as determined by her insurance carrier. She is aware that she may receive a bill and has provided verbal consent to proceed: yes    Pursuant to the emergency declaration under the [address] and the [facility] waiver authority and the [employer] Act, this Virtual  Visit was conducted, with patient's (and/or legal guardian's) consent, to reduce the patient's risk of exposure to COVID-19 and provide necessary medical care. Services were provided through a video synchronous discussion virtually to substitute for in-person visit. I appreciate the opportunity to participate in Ms. Carmen Willingham's care.     Signed By: Fredis Viera DO

## 2021-06-10 DIAGNOSIS — F43.23 ADJUSTMENT REACTION WITH ANXIETY AND DEPRESSION: ICD-10-CM

## 2021-06-10 DIAGNOSIS — F33.9 CHRONIC MAJOR DEPRESSIVE DISORDER, RECURRENT EPISODE (HCC): ICD-10-CM

## 2021-06-10 RX ORDER — FLUOXETINE HYDROCHLORIDE 20 MG/1
CAPSULE ORAL
Qty: 90 CAPSULE | Refills: 1 | Status: SHIPPED | OUTPATIENT
Start: 2021-06-10 | End: 2021-12-07

## 2021-07-12 NOTE — PROGRESS NOTES
HISTORY OF PRESENT ILLNESS  Ames Schilling Sherren Good is a 77 y.o. female. HPI ESTABLISHED patient here for follow up RIGHT breast cancer, s/p bilateral mastectomies. .No breast concerns and no pain.       Breast cancer history -  Referring - Dr. 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 Thad Santiago  · 19 - BILATERAL mastectomies with reconstruction. Dr. Bernard Gomez did direct saline implants. - Dr. Thad Santiago  · Mammaprint - low risk  · Surg path showed LEFT breast benign, residual IDC on RIGHT breast - 0.8cm, ER pos, AZ pos, HER2 neg   · 10/15/19 - Removal of bilateral breast implants. Complicated breast cancer course including multiple surgeries and hospitalizations for wound infections.   · 2019 - LEFT lymph node biopsy - Dr. Kodi Berg  Left axillary lymph node, core biopsy:   Fat necrosis with dense mixed inflammation and abscess formation   No lymph node tissue or malignancy identified   · 2019 - started anastrozole - Dr. Cecilio Jacobs      Family history -   No family history of breast or ovarian cancer. Father had melanoma. OB History        1    Para   1    Term                AB        Living           SAB        TAB        Ectopic        Molar        Multiple        Live Births              Obstetric Comments    x 1; Previous Gyn Dr Kb Bertrand  Menarche:  10. LMP: ? .  # of Children:  1. Age at Delivery of First Child:  34.   Hysterectomy/oophorectomy:  YES/YES. Breast Bx:  yes. Hx of Breast Feeding:  yes. BCP:  yes. Hormone therapy:  no.                    Past Surgical History:   Procedure Laterality Date    EKG ORDERABLE  2009    NSR, rate 84, normal EKG    HX BLADDER SUSPENSION  ~    Dr Isis Jiang Left 2019    MCV.  Dr. Bernard Gomez, Post Mastectomy Implant Infection Left Breast, Replaced Implant, IV abx    HX BREAST BIOPSY Right 2011    VPI, right breast biopsy negative    HX BREAST BIOPSY Right 04/22/2019    invasive ductal carcinoma    HX BREAST LUMPECTOMY Right 6/13/2019    RIGHT BREAST LUMPECTOMY ( x2 ) WTIH ULTRASOUND , RIGHT SENTINAL NODE BIOPSY performed by Marc Brown MD at [facility] AM[facility]ULATORY OR    HX BREAST RECONSTRUCTION Bilateral 8/27/2019    B MASTECTOMY AND IMPLANTS, IMMEDIATE BILATERAL BREAST RECONSTRUCTION WITH DIRECT TO IMPLANT USE OF ALLODERM AND BILATERAL BREAST RECONSTRUCTION INTRA-OPERATIVE ASSESMENT OF BLOOD FLOW performed by Fabi Holden MD at [place] [address]    HX [address] Right 03/2010    Dr Daneen Babinski CERVICAL POLYPECTOMY  2009, 2015    Dr Iris Yuan    HX COLONOSCOPY  04/08/2016    Dr Joe Hannon-Internal Hemorrhoids- Normal mucosa in the whole colon - Repeat colonoscopy in 5 years    HX COLONOSCOPY  2016    HX COLONOSCOPY  10/2005    benign polyp; repeat 5 yr per Dr Cynthia Cortes HX COLONOSCOPY  11/2010    Normal, f/u 5 yrs, Dr Norma Jimenez  7/2005    AUB, benign/neg bx; Dr Monique Jimenez  10/2012    Dr Iris Yuan, AUB    HX HEENT  2019    Nasal Abscess I&D, Dr. Darrian Avery HX HYSTERECTOMY  2013    HX MASTECTOMY Bilateral 8/27/2019    BILATERAL BREAST SKIN SPARING MASTECTOMIES performed by Marc Brown MD at [address] Right 2010    CARPAL TUNNEL    HX PARTIAL HYSTERECTOMY  4/26/13    Supracervical hysterectomy for fibroids, Dr Ovidio Wilson HX UROLOGICAL  2010    Linda Kill HX Eyad Marek 50 Bilateral 10/15/2019    MCV Dr. Johnson Treadwell, B Breast Implant pain and recurrent left breast infection; on wound vac w/ iv abx; wound pump removed 12-4-2019           ROS    Physical Exam  Constitutional:       Appearance: Normal appearance. Chest:      Breasts:         Right: Absent. Left: Absent.        Musculoskeletal:      Comments: FROM - UE x 2   Lymphadenopathy:      Upper Body:      Right upper body: No supraclavicular or axillary adenopathy. Left upper body: No supraclavicular or axillary adenopathy. Neurological:      Mental Status: She is alert. Psychiatric:         Attention and Perception: Attention normal.         Mood and Affect: Mood normal.         Speech: Speech normal.         Behavior: Behavior normal.         Visit Vitals  BP (!) 156/60 (BP 1 Location: Left upper arm, BP Patient Position: Sitting, BP Cuff Size: Adult)   Pulse 66   Ht 5' 8\" (1.727 m)   Wt 289 lb (131.1 kg)   LMP 05/01/2011   BMI 43.94 kg/m²         ASSESSMENT and PLAN  Encounter Diagnoses   Name Primary?  Cancer of overlapping sites of right breast ([facility] [address] Yes    S/P mastectomy, bilateral     History of breast cancer in female       Normal exam with no evidence of local recurrence across the chest wall. Continues on AI and is tolerating well. BILATERAL mastectomies - no routine breast imaging. RTC in 6 months or sooner PRN. She is comfortable with this plan. All questions answered and she stated understanding. Total time spent for this patient - 20 minutes.

## 2021-07-13 ENCOUNTER — OFFICE VISIT (OUTPATIENT)
Dept: SURGERY | Age: 66
End: 2021-07-13
Payer: MEDICARE

## 2021-07-13 VITALS
BODY MASS INDEX: 43.8 KG/M2 | HEIGHT: 68 IN | HEART RATE: 66 BPM | SYSTOLIC BLOOD PRESSURE: 156 MMHG | DIASTOLIC BLOOD PRESSURE: 60 MMHG | WEIGHT: 289 LBS

## 2021-07-13 DIAGNOSIS — Z85.3 HISTORY OF BREAST CANCER IN FEMALE: ICD-10-CM

## 2021-07-13 DIAGNOSIS — Z90.13 S/P MASTECTOMY, BILATERAL: ICD-10-CM

## 2021-07-13 DIAGNOSIS — C50.811 CANCER OF OVERLAPPING SITES OF RIGHT BREAST (HCC): Primary | ICD-10-CM

## 2021-07-13 PROCEDURE — G8536 NO DOC ELDER MAL SCRN: HCPCS | Performed by: NURSE PRACTITIONER

## 2021-07-13 PROCEDURE — 1090F PRES/ABSN URINE INCON ASSESS: CPT | Performed by: NURSE PRACTITIONER

## 2021-07-13 PROCEDURE — G9708 BILAT MAST/HX BI /UNILAT MAS: HCPCS | Performed by: NURSE PRACTITIONER

## 2021-07-13 PROCEDURE — G8417 CALC BMI ABV UP PARAM F/U: HCPCS | Performed by: NURSE PRACTITIONER

## 2021-07-13 PROCEDURE — G9717 DOC PT DX DEP/BP F/U NT REQ: HCPCS | Performed by: NURSE PRACTITIONER

## 2021-07-13 PROCEDURE — G8427 DOCREV CUR MEDS BY ELIG CLIN: HCPCS | Performed by: NURSE PRACTITIONER

## 2021-07-13 PROCEDURE — 1101F PT FALLS ASSESS-DOCD LE1/YR: CPT | Performed by: NURSE PRACTITIONER

## 2021-07-13 PROCEDURE — 3017F COLORECTAL CA SCREEN DOC REV: CPT | Performed by: NURSE PRACTITIONER

## 2021-07-13 PROCEDURE — G8399 PT W/DXA RESULTS DOCUMENT: HCPCS | Performed by: NURSE PRACTITIONER

## 2021-07-13 PROCEDURE — G8753 SYS BP > OR = 140: HCPCS | Performed by: NURSE PRACTITIONER

## 2021-07-13 PROCEDURE — G8754 DIAS BP LESS 90: HCPCS | Performed by: NURSE PRACTITIONER

## 2021-07-13 PROCEDURE — 99213 OFFICE O/P EST LOW 20 MIN: CPT | Performed by: NURSE PRACTITIONER

## 2021-07-13 NOTE — PATIENT INSTRUCTIONS
Eating Healthy Foods: Care Instructions  Your Care Instructions     Eating healthy foods can help lower your risk for disease. Healthy food gives you energy and keeps your heart strong, your brain active, your muscles working, and your bones strong. A healthy diet includes a variety of foods from the basic food groups: grains, vegetables, fruits, milk and milk products, and meat and beans. Some people may eat more of their favorite foods from only one food group and, as a result, miss getting the nutrients they need. So, it is important to pay attention not only to what you eat but also to what you are missing from your diet. You can eat a healthy, balanced diet by making a few small changes. Follow-up care is a key part of your treatment and safety. Be sure to make and go to all appointments, and call your doctor if you are having problems. It's also a good idea to know your test results and keep a list of the medicines you take. How can you care for yourself at home? Look at what you eat  · Keep a food diary for a week or two and record everything you eat or drink. Track the number of servings you eat from each food group. · For a balanced diet every day, eat a variety of:  ? 6 or more ounce-equivalents of grains, such as cereals, breads, crackers, rice, or pasta, every day. An ounce-equivalent is 1 slice of bread, 1 cup of ready-to-eat cereal, or ½ cup of cooked rice, cooked pasta, or cooked cereal.  ? 2½ cups of vegetables, especially:  § Dark-green vegetables such as broccoli and spinach. § Orange vegetables such as carrots and sweet potatoes. § Dry beans (such as mcfadden and kidney beans) and peas (such as lentils). ? 2 cups of fresh, frozen, or canned fruit. A small apple or 1 banana or orange equals 1 cup. ? 3 cups of nonfat or low-fat milk, yogurt, or other milk products. ? 5½ ounces of meat and beans, such as chicken, fish, lean meat, beans, nuts, and seeds.  One egg, 1 tablespoon of peanut butter, ½ ounce nuts or seeds, or ¼ cup of cooked beans equals 1 ounce of meat. · Learn how to read food labels for serving sizes and ingredients. Fast-food and convenience-food meals often contain few or no fruits or vegetables. Make sure you eat some fruits and vegetables to make the meal more nutritious. · Look at your food diary. For each food group, add up what you have eaten and then divide the total by the number of days. This will give you an idea of how much you are eating from each food group. See if you can find some ways to change your diet to make it more healthy. Start small  · Do not try to make dramatic changes to your diet all at once. You might feel that you are missing out on your favorite foods and then be more likely to fail. · Start slowly, and gradually change your habits. Try some of the following:  ? Use whole wheat bread instead of white bread. ? Use nonfat or low-fat milk instead of whole milk. ? Eat brown rice instead of white rice, and eat whole wheat pasta instead of white-flour pasta. ? Try low-fat cheeses and low-fat yogurt. ? Add more fruits and vegetables to meals and have them for snacks. ? Add lettuce, tomato, cucumber, and onion to sandwiches. ? Add fruit to yogurt and cereal.  Enjoy food  · You can still eat your favorite foods. You just may need to eat less of them. If your favorite foods are high in fat, salt, and sugar, limit how often you eat them, but do not cut them out entirely. · Eat a wide variety of foods. Make healthy choices when eating out  · The type of restaurant you choose can help you make healthy choices. Even fast-food chains are now offering more low-fat or healthier choices on the menu. · Choose smaller portions, or take half of your meal home. · When eating out, try:  ? A veggie pizza with a whole wheat crust or grilled chicken (instead of sausage or pepperoni).   ? Pasta with roasted vegetables, grilled chicken, or marinara sauce instead of cream sauce. ? A vegetable wrap or grilled chicken wrap. ? Broiled or poached food instead of fried or breaded items. Make healthy choices easy  · Buy packaged, prewashed, ready-to-eat fresh vegetables and fruits, such as baby carrots, salad mixes, and chopped or shredded broccoli and cauliflower. · Buy packaged, presliced fruits, such as melon or pineapple. · Choose 100% fruit or vegetable juice instead of soda. Limit juice intake to 4 to 6 oz (½ to ¾ cup) a day. · Blend low-fat yogurt, fruit juice, and canned or frozen fruit to make a smoothie for breakfast or a snack. Where can you learn more? Go to http://www.woodruff.com/  Enter T756 in the search box to learn more about \"Eating Healthy Foods: Care Instructions. \"  Current as of: December 17, 2020               Content Version: 12.8  © 2006-2021 Healthwise, [facility]. Care instructions adapted under license by [employer] (which disclaims liability or warranty for this information). If you have questions about a medical condition or this instruction, always ask your healthcare professional. [place] [address] any warranty or liability for your use of this information.

## 2021-07-16 ENCOUNTER — VIRTUAL VISIT (OUTPATIENT)
Dept: FAMILY MEDICINE CLINIC | Age: 66
End: 2021-07-16
Payer: MEDICARE

## 2021-07-16 DIAGNOSIS — S60.469A INFECTED INSECT BITE OF FINGER: Primary | ICD-10-CM

## 2021-07-16 DIAGNOSIS — L03.011 CELLULITIS OF RIGHT RING FINGER: ICD-10-CM

## 2021-07-16 DIAGNOSIS — L08.9 INFECTED INSECT BITE OF FINGER: Primary | ICD-10-CM
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
## 2021-07-16 PROCEDURE — 3017F COLORECTAL CA SCREEN DOC REV: CPT | Performed by: FAMILY MEDICINE

## 2021-07-16 PROCEDURE — G9708 BILAT MAST/HX BI /UNILAT MAS: HCPCS | Performed by: FAMILY MEDICINE

## 2021-07-16 PROCEDURE — G8399 PT W/DXA RESULTS DOCUMENT: HCPCS | Performed by: FAMILY MEDICINE

## 2021-07-16 PROCEDURE — G0463 HOSPITAL OUTPT CLINIC VISIT: HCPCS | Performed by: FAMILY MEDICINE

## 2021-07-16 PROCEDURE — 1090F PRES/ABSN URINE INCON ASSESS: CPT | Performed by: FAMILY MEDICINE

## 2021-07-16 PROCEDURE — 1101F PT FALLS ASSESS-DOCD LE1/YR: CPT | Performed by: FAMILY MEDICINE

## 2021-07-16 PROCEDURE — G9717 DOC PT DX DEP/BP F/U NT REQ: HCPCS | Performed by: FAMILY MEDICINE

## 2021-07-16 PROCEDURE — G8756 NO BP MEASURE DOC: HCPCS | Performed by: FAMILY MEDICINE

## 2021-07-16 PROCEDURE — G8427 DOCREV CUR MEDS BY ELIG CLIN: HCPCS | Performed by: FAMILY MEDICINE

## 2021-07-16 PROCEDURE — 99213 OFFICE O/P EST LOW 20 MIN: CPT | Performed by: FAMILY MEDICINE

## 2021-07-16 RX ORDER — MUPIROCIN 20 MG/G
OINTMENT TOPICAL 2 TIMES DAILY
Qty: 22 G | Refills: 0 | Status: SHIPPED | OUTPATIENT
Start: 2021-07-16 | End: 2021-07-26

## 2021-07-16 RX ORDER — CLINDAMYCIN HYDROCHLORIDE 300 MG/1
300 CAPSULE ORAL 3 TIMES DAILY
Qty: 30 CAPSULE | Refills: 0 | Status: SHIPPED | OUTPATIENT
Start: 2021-07-16 | End: 2021-07-26

## 2021-07-16 NOTE — PROGRESS NOTES
VIRTUAL VISIT    Patient seen via virtual visit today for the following:  Chief Complaint   Patient presents with    Skin Infection     Right ring finger x 1 week     HISTORY OF PRESENT ILLNESS   HPI  1 week ago noticed an itchy small red bump on her right ring finger. Has gradually gotten worse. Area becoming redder, more swollen and tender. A few days ago it had a white head that ruptured w/ pale yellow exudate. Has been oozing some clear, blood tinged fluid ever since. She applied a topical antibiotic/antipain reliever one day about 3 days ago but that made it worse, so she has not been using anything on it since. No fevers, chills. Not sure if it was a bug bite or something else bc she cant think of any trauma or anything else that would have triggered this so she is pretty sure it was an insect bite. She had MRSA in 2019. REVIEW OF SYMPTOMS   Review of Systems   Constitutional: Negative. Respiratory: Negative. Cardiovascular: Negative.             PROBLEM LIST/MEDICAL HISTORY     Problem List  Date Reviewed: 2021        Codes Class Noted    Mixed hyperlipidemia ICD-10-CM: E78.2  ICD-9-CM: 272.2  2020        [facility] ([facility] ICD-10-CM: F32.0  ICD-9-CM: 997  3/3/2020        Aromatase inhibitor use ICD-10-CM: Z79.811  ICD-9-CM: V07.52  3/3/2020        Status post right breast lumpectomy ICD-10-CM: Z98.890  ICD-9-CM: V45.89  2020    Overview Addendum 2020  8:50 PM by Alana Martinez MD     2019, Invasive ductal carcinoma--->Mastectomy 2019, no chemo, no radiation, started on Arimidex             Chronic depression ICD-10-CM: F32.9  ICD-9-CM: 311  2020        Adjustment disorder with mixed anxiety and depressed mood ICD-10-CM: F43.23  ICD-9-CM: 309.28  2020    Overview Signed 2020  9:31 PM by Alana Martinez MD     After death of father  and personal diagnosis of/treatment for breast cancer [id number]; Counselin: [place] [facility] [address] [facility]              H/O bilateral mastectomy ICD-10-CM: Z90.13  ICD-9-CM: V45.71  12/2/2019    Overview Addendum 1/30/2020  8:59 PM by Angelina Esparza MD     Double Mastectomy (right breast cancer and elective left breast prophylactically); No chemo, No radiation; Started on Arimidex, followed by Dr. Norma Rust             H/O breast reconstruction ICD-10-CM: B75.680  ICD-9-CM: V43.82  12/2/2019        Arthritis ICD-10-CM: M19.90  ICD-9-CM: 716.90  12/2/2019        Cancer of overlapping sites of right breast ([facility] ICD-10-CM: C50.811  ICD-9-CM: 174.8  8/27/2019        Severe obesity ([facility] ICD-10-CM: E66.01  ICD-9-CM: 278.01  11/28/2018        Benign essential hypertension ICD-10-CM: I10  ICD-9-CM: 401.1  5/26/2016    Overview Signed 5/26/2016  9:26 AM by Angelina Esparza MD     2006             Hypothyroidism ICD-10-CM: E03.9  ICD-9-CM: 244.9  5/26/2016    Overview Signed 5/26/2016  9:27 AM by Angelina Esparza MD     7/1999             Primary osteoarthritis of left knee ICD-10-CM: M17.12  ICD-9-CM: 715.16  5/26/2016    Overview Signed 5/26/2016  9:42 AM by Angelina Esparza MD     Extensive;  Ortho (needs knee replacement), CSI x 2,              Hypercholesterolemia ICD-10-CM: E78.00  ICD-9-CM: 272.0  6/25/2013    Overview Signed 6/25/2013  8:29 AM by Angelina Esparza MD     ~2202             S/P benign cervical polypectomy ICD-10-CM: O89.357, Z87.42  ICD-9-CM: V45.89  12/20/2010    Overview Addendum 5/26/2016  9:42 AM by Angelina Esparza MD     2009, 2015; Gyn  [facility]             H/O Benign Colon Polyp ICD-10-CM: K63.5  ICD-9-CM: 211.3  12/20/2010    Overview Signed 12/20/2010 12:10 PM by Angelina Esparza MD     Colonoscopy 10/2005, 11/2010  q5yrs  Dr Mendel Kettering             H/O Uterine Fibroids ICD-10-CM: D21.9  ICD-9-CM: 215.9  12/20/2010    Overview Signed 12/20/2010 12:13 PM by Angelina Esparza MD 2005; no surgical intervention  Gyn Dr Radha Colbert incontinence ICD-10-CM: N39.3  ICD-9-CM: LOZ4273  5/26/2010    Overview Signed 5/26/2010 12:47 PM by Jessu Orlando MD     Urologist Dr May Patterson             Sleep apnea, RENÉE ICD-10-CM: G47.30  ICD-9-CM: 780.57  4/5/2010    Overview Addendum 12/20/2010 12:14 PM by Jesus Orlando MD     2009; cpap             Anxiety ICD-10-CM: F41.9  ICD-9-CM: 300.00  4/5/2010        Carpal tunnel syndrome ICD-10-CM: G56.00  ICD-9-CM: 354.0  4/5/2010        Mild Dependent Ankle edema, B ICD-10-CM: M25.473  ICD-9-CM: 719.07  4/5/2010    Overview Signed 4/5/2010  3:52 PM by Radha Kidney     mild             Perimenopausal ICD-10-CM: N95.1  ICD-9-CM: 627.2  4/5/2010    Overview Signed 4/5/2010  3:52 PM by Radha Kidney     2007             Depression ICD-10-CM: F32.9  ICD-9-CM: 247  4/5/2010        ? Passed Kidney stone ICD-10-CM: N20.0  ICD-9-CM: 592.0  4/5/2010    Overview Signed 5/26/2010 12:47 PM by Jesus Orlando MD     2/2010                       PAST SURGICAL HISTORY     Past Surgical History:   Procedure Laterality Date    EKG ORDERABLE  5/2009    NSR, rate 84, normal EKG    HX BLADDER SUSPENSION  ~2010    Dr Isis Jiang Left 09/23/2019    [facility].  [facility] Bernard Gomez[facility] Mastectomy Implant Infection Left Breast, Replaced Implant, IV abx    HX BREAST BIOPSY Right 11/2011    VPI, right breast biopsy negative    HX BREAST BIOPSY Right 04/22/2019    invasive ductal carcinoma    HX BREAST LUMPECTOMY Right 6/13/2019    RIGHT BREAST LUMPECTOMY ( x2 ) WTIH ULTRASOUND , RIGHT SENTINAL NODE BIOPSY performed by Kylie Porter MD at [facility] AMBULATORY OR    HX BREAST RECONSTRUCTION Bilateral 8/27/2019    B MASTECTOMY AND IMPLANTS, IMMEDIATE BILATERAL BREAST RECONSTRUCTION WITH DIRECT TO IMPLANT USE OF ALLODERM AND BILATERAL BREAST RECONSTRUCTION INTRA-OPERATIVE ASSESMENT OF BLOOD FLOW performed by China Pierce MD at [facility] AMBULATORY OR    HX CARPAL TUNNEL RELEASE Right 03/2010    Dr Ely Jackson  2009, 2015    Dr Lawerance Canavan    HX COLONOSCOPY  04/08/2016    Dr Candi Hannon-Internal Hemorrhoids- Normal mucosa in the whole colon - Repeat colonoscopy in 5 years    HX COLONOSCOPY  2016    HX COLONOSCOPY  10/2005    benign polyp; repeat 5 yr per Dr Annie Whitehead HX COLONOSCOPY  11/2010    Normal, f/u 5 yrs, Dr Rima Martines  7/2005    AUB, benign/neg bx; Dr Kimmy Rapp  10/2012    Dr Lawerance Canavan, [facility]    HX HEENT  2019    Nasal Abscess I&D, Dr. Sneha Zuleta HX HYSTERECTOMY  2013    HX MASTECTOMY Bilateral 8/27/2019    BILATERAL BREAST SKIN SPARING MASTECTOMIES performed by Yuval Wright MD at [facility] Right 2010    CARPAL TUNNEL    HX PARTIAL HYSTERECTOMY  4/26/13    Supracervical hysterectomy for fibroids, Dr Willetta Severance HX UROLOGICAL  2010    Ernestina Maxine Bilateral 10/15/2019    [facility] Herring Breast Implant pain and recurrent left breast infection; on wound vac w/ iv abx; wound pump removed 12-4-2019         MEDICATIONS     Current Outpatient Medications   Medication Sig    FLUoxetine (PROzac) 20 mg capsule TAKE 1 CAPSULE BY MOUTH DAILY    ascorbic acid (VITAMIN C PO) Take  by mouth.  buPROPion XL (WELLBUTRIN XL) 150 mg tablet TAKE 1 TABLET BY MOUTH EVERY DAY BEFORE BREAKFAST    losartan (COZAAR) 100 mg tablet TAKE 1 TABLET BY MOUTH DAILY    levothyroxine (SYNTHROID) 175 mcg tablet TAKE 1 TABLET BY MOUTH DAILY BEFORE BREAKFAST    rosuvastatin (CRESTOR) 10 mg tablet TAKE 1 TABLET BY MOUTH EVERY NIGHT    anastrozole (ARIMIDEX) 1 mg tablet TAKE 1 TABLET BY MOUTH EVERY DAY    multivitamin (ONE A DAY) tablet Take 1 Tab by mouth daily.     cholecalciferol, vitamin D3, (Vitamin D3) 50 mcg (2,000 unit) tab Take 1 Tab by mouth daily.  OTHER,NON-FORMULARY, Tumeric once a day    OTHER Portable CPAP machine for RENÉE, with new mask and tubing but no change to mask or settings; G47.30 GARO 99m prog good     No current facility-administered medications for this visit. ALLERGIES     Allergies   Allergen Reactions    Pcn [Penicillins] Hives    Sulfa (Sulfonamide Antibiotics) Hives    Zocor [Simvastatin] Myalgia    Lisinopril Cough          SOCIAL HISTORY     Social History     Tobacco Use    Smoking status: Former Smoker     Packs/day: 0.50     Years: 25.00     Pack years: 12.50     Quit date: 2005     Years since quittin.5    Smokeless tobacco: Never Used   Substance Use Topics    Alcohol use:  Yes     Alcohol/week: 5.0 standard drinks     Types: 5 Shots of liquor per week     Social History     Social History Narrative    1 biological daughter, 1 \"adopted\", and 2 step children     Social History     Substance and Sexual Activity   Sexual Activity Not Currently    Partners: Male    Comment: not sexually active x many years       IMMUNIZATIONS     Immunization History   Administered Date(s) Administered    Influenza Vaccine 10/03/2014, 11/10/2015, 2016, 2017, 2018, 2019    Influenza Vaccine (Quad) PF (>6 Mo Flulaval, Fluarix, and >3 Yrs [facility], Fluzone [address]) 2018, 2019    Influenza Vaccine (Tri) Adjuvanted (>65 Yrs FLUAD TRI 31383) 2020    Influenza Vaccine Split 2010, 10/06/2011    Meningococcal ACWY Vaccine 2013    Pneumococcal Polysaccharide (PPSV-23) 2020    TD Vaccine 1999, 2009    Td 2017    Tdap 2016, 2017    Zoster Recombinant 2018, 2019    Zoster Vaccine, Live 2013         FAMILY HISTORY     Family History   Problem Relation Age of Onset    Arthritis-osteo Father     Stroke Father         TIA's    Cancer Father         melanoma on back removed    Pacemaker Father 80    Heart Disease Father          79 yo in     Hypertension Mother     Arthritis-osteo Mother     Heart Disease Mother         valve disease,  2013    Heart Attack Maternal Grandmother 76    No Known Problems Sister     No Known Problems Brother     Depression Daughter     Substance Abuse Daughter     Alcohol abuse Daughter          Radha Guerrero limited due to virtual visit. Self reported data collected today: None       PHYSICAL EXAMINATION   Physical Exam  Vitals reviewed. Constitutional:       General: She is not in acute distress. Appearance: She is not ill-appearing. Skin:     Comments: Patient attached images via MyChart of the infected area dorsal aspect of middle phalanx of right ring finger. Erythematous, edema, center has pustule and adjacent to it is a pinpoint area oozing serosanguinous fluid. ROM intact. Neurological:      Mental Status: She is alert. ASSESSMENT & PLAN       ICD-10-CM ICD-9-CM    1. Infected insect bite of finger  S60.469A 915.5 clindamycin (CLEOCIN) 300 mg capsule    L08.9 E906.4 mupirocin (BACTROBAN) 2 % ointment    W57. XXXA     2. Cellulitis of right ring finger  L03.011 681.00 clindamycin (CLEOCIN) 300 mg capsule      mupirocin (BACTROBAN) 2 % ointment       Clindamycin 300 mg tid x 10 days (PCN and Sulfa Allg; Prior MRSA history)  Warm water and antibacterial soap washes daily  Bactroban ointment bid until follow up  Reviewed medications and side effects  Follow up to reassess in 48-72 hrs, sooner prn  ER if area worsens over the weekend    ---------------------------------------------------------------------------------------------------------------  Patient is being evaluated by a virtual/ video visit encounter to address concerns as noted above. Patient identification was verified at the start of the visit: YES  A caregiver was present when appropriate.  Due to this being a TeleHealth encounter (During  public health emergency), evaluation of the following organ systems was limited: Vitals/Constitutional/EENT/Resp/CV/GI//MS/Neuro/Skin/Heme-Lymph-Imm. Pursuant to the emergency declaration under the [address] [place] [address] authority and the [employer] Act, this Virtual  Visit was conducted, with patient's (and/or legal guardian's) consent, to reduce the patient's risk of exposure to COVID-19 and provide necessary medical care. Services were provided through a video synchronous discussion virtually to substitute for in-person clinic visit. The patient (and/or legal guardian) has also been advised to contact this office for worsening conditions or problems, and seek emergency medical treatment and/or call 911 if deemed necessary. Patient was located at their own home. I was at home while conducting this encounter. Consent:  She and/or her healthcare decision maker is aware that this patient-initiated Telehealth encounter is a billable service, with coverage as determined by her insurance carrier. She is aware that she may receive a bill and has provided verbal consent to proceed: Yes  This virtual visit was conducted via [employer] me. Pursuant to the emergency declaration under the [address] [place] [address], [facility] authority and the [employer] Act, this Virtual  Visit was conducted to reduce the patient's risk of exposure to COVID-19 and provide continuity of care for an established patient. Services were provided through a video synchronous discussion virtually to substitute for in-person clinic visit. Due to this being a TeleHealth evaluation, many elements of the physical examination are unable to be assessed. Total Time: minutes: 20. An electronic signature was used to authenticate this note.   ~Leah Sosa MD~

## 2021-07-16 NOTE — PROGRESS NOTES
1 week ago noticed an itchy small red bump on her right ring finger  Has gradually gotten worse. Area becoming redder, more swollen and tender. A few days ago it had a white head that ruptured w/ pale yellow exudate.   Has been oozing some clear, blood tinged fluid ever since  She applied a topical antibiotic/antipain reliever one day about 3 days ago but that made it worse, so she has not been using anything on it since  No fevers, chills

## 2021-07-21 ENCOUNTER — VIRTUAL VISIT (OUTPATIENT)
Dept: FAMILY MEDICINE CLINIC | Age: 66
End: 2021-07-21
Payer: MEDICARE
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
## 2021-07-21 DIAGNOSIS — S60.469A INFECTED INSECT BITE OF FINGER: Primary | ICD-10-CM

## 2021-07-21 DIAGNOSIS — L03.011 CELLULITIS OF RIGHT RING FINGER: ICD-10-CM

## 2021-07-21 DIAGNOSIS — L08.9 INFECTED INSECT BITE OF FINGER: Primary | ICD-10-CM

## 2021-07-21 PROBLEM — F32.A MILD DEPRESSION: Status: RESOLVED | Noted: 2020-03-03 | Resolved: 2021-07-21

## 2021-07-21 PROCEDURE — G8756 NO BP MEASURE DOC: HCPCS | Performed by: FAMILY MEDICINE

## 2021-07-21 PROCEDURE — 3017F COLORECTAL CA SCREEN DOC REV: CPT | Performed by: FAMILY MEDICINE

## 2021-07-21 PROCEDURE — 1101F PT FALLS ASSESS-DOCD LE1/YR: CPT | Performed by: FAMILY MEDICINE

## 2021-07-21 PROCEDURE — 99212 OFFICE O/P EST SF 10 MIN: CPT | Performed by: FAMILY MEDICINE

## 2021-07-21 PROCEDURE — G0463 HOSPITAL OUTPT CLINIC VISIT: HCPCS | Performed by: FAMILY MEDICINE

## 2021-07-21 PROCEDURE — 1090F PRES/ABSN URINE INCON ASSESS: CPT | Performed by: FAMILY MEDICINE

## 2021-07-21 PROCEDURE — G8427 DOCREV CUR MEDS BY ELIG CLIN: HCPCS | Performed by: FAMILY MEDICINE

## 2021-07-21 PROCEDURE — G9717 DOC PT DX DEP/BP F/U NT REQ: HCPCS | Performed by: FAMILY MEDICINE

## 2021-07-21 PROCEDURE — G9708 BILAT MAST/HX BI /UNILAT MAS: HCPCS | Performed by: FAMILY MEDICINE

## 2021-07-21 PROCEDURE — G8399 PT W/DXA RESULTS DOCUMENT: HCPCS | Performed by: FAMILY MEDICINE

## 2021-07-21 NOTE — PROGRESS NOTES
VIRTUAL VISIT    Patient seen via virtual visit today for the following:  Chief Complaint   Patient presents with    Follow-up     from  visit     Skin Infection     finger     HISTORY OF PRESENT ILLNESS   HPI  Patient seen via virtual visit for 5 day follow up right ring finger infection. She is on day 6 of 10 of oral Clindamycin 300 mg and topical Bactroban Ointment. She has continued to do daily antibiotic cleanses. The pustular region ruptured about 4 days ago and drained a lot of pus over the course of about 2 days. It is now drying up and scabbed over. No more purulent drainage. Occasionally when she bends her finger a small amount of serosanguinous fluid may drain out but for the most part it is dried up w/ a scab over it. The areas flattened out and the surrounding redness is diminishing. The pain and soreness is a lot better. Remains afebrile. Pleased w/ how it is coming along. REVIEW OF SYMPTOMS   Review of Systems   Constitutional: Negative. Negative for chills and fever.            PROBLEM LIST/MEDICAL HISTORY     Problem List  Date Reviewed: 2021        Codes Class Noted    Mixed hyperlipidemia ICD-10-CM: E78.2  ICD-9-CM: 272.2  2020        Aromatase inhibitor use ICD-10-CM: Z79.811  ICD-9-CM: V07.52  3/3/2020        Status post right breast lumpectomy ICD-10-CM: Z98.890  ICD-9-CM: V45.89  2020    Overview Addendum 2020  8:50 PM by Nat Barker MD     2019, Invasive ductal carcinoma--->Mastectomy 2019, no chemo, no radiation, started on Arimidex             Chronic depression ICD-10-CM: F32.9  ICD-9-CM: 311  2020        Adjustment disorder with mixed anxiety and depressed mood ICD-10-CM: F43.23  ICD-9-CM: 309.28  2020    Overview Signed 2020  9:31 PM by Nat Barker MD     After death of father  and personal diagnosis of/treatment for breast cancer [id number]; Counselin:  [place] [employer] [facility] 46              H/O bilateral mastectomy ICD-10-CM: Z90.13  ICD-9-CM: V45.71  12/2/2019    Overview Addendum 1/30/2020  8:59 PM by Vicki Pena MD     Double Mastectomy (right breast cancer and elective left breast prophylactically); No chemo, No radiation; Started on Arimidex, followed by Dr. Shaun Epps             H/O breast reconstruction ICD-10-CM: G85.062  ICD-9-CM: V43.82  12/2/2019        Arthritis ICD-10-CM: M19.90  ICD-9-CM: 716.90  12/2/2019        Cancer of overlapping sites of right breast ([facility] ICD-10-CM: C50.811  ICD-9-CM: 174.8  8/27/2019        Severe obesity ([facility] ICD-10-CM: E66.01  ICD-9-CM: 278.01  11/28/2018        Benign essential hypertension ICD-10-CM: I10  ICD-9-CM: 401.1  5/26/2016    Overview Signed 5/26/2016  9:26 AM by Vicki Pena MD     2006             Hypothyroidism ICD-10-CM: E03.9  ICD-9-CM: 244.9  5/26/2016    Overview Signed 5/26/2016  9:27 AM by Vikci Pena MD     7/1999             Primary osteoarthritis of left knee ICD-10-CM: M17.12  ICD-9-CM: 715.16  5/26/2016    Overview Signed 5/26/2016  9:42 AM by Vciki Pena MD     Extensive;  Ortho (needs knee replacement), CSI x 2,              Hypercholesterolemia ICD-10-CM: E78.00  ICD-9-CM: 272.0  6/25/2013    Overview Signed 6/25/2013  8:29 AM by Vicki Pena MD     ~7009             S/P benign cervical polypectomy ICD-10-CM: I31.980, Z87.42  ICD-9-CM: V45.89  12/20/2010    Overview Addendum 5/26/2016  9:42 AM by Vicki Pena MD     2009, 2015; Gyn Dr [place]             H/O Benign Colon Polyp ICD-10-CM: K63.5  ICD-9-CM: 211.3  12/20/2010    Overview Signed 12/20/2010 12:10 PM by Vicki Pena MD     Colonoscopy 10/2005, 11/2010  q5yrs  Dr Chen Guerra             H/O Uterine Fibroids ICD-10-CM: D21.9  ICD-9-CM: 215.9  12/20/2010    Overview Signed 12/20/2010 12:13 PM by Vicki Pena MD     2005; no surgical intervention  Gyn Dr Rebecca Steiner incontinence ICD-10-CM: N39.3  ICD-9-CM: SMV7299  5/26/2010    Overview Signed 5/26/2010 12:47 PM by Michelle Lora MD     Urologist Dr Natanael Reyes             Sleep apnea, RENÉE ICD-10-CM: G47.30  ICD-9-CM: 780.57  4/5/2010    Overview Addendum 12/20/2010 12:14 PM by Michelle Lora MD     2009; cpap             Anxiety ICD-10-CM: F41.9  ICD-9-CM: 300.00  4/5/2010        Carpal tunnel syndrome ICD-10-CM: G56.00  ICD-9-CM: 354.0  4/5/2010        Mild Dependent Ankle edema, B ICD-10-CM: M25.473  ICD-9-CM: 719.07  4/5/2010    Overview Signed 4/5/2010  3:52 PM by Parth Dunham     mild             Perimenopausal ICD-10-CM: N95.1  ICD-9-CM: 627.2  4/5/2010    Overview Signed 4/5/2010  3:52 PM by Parth Dunham     2007             Depression ICD-10-CM: F32.9  ICD-9-CM: 543  4/5/2010        ? Passed Kidney stone ICD-10-CM: N20.0  ICD-9-CM: 592.0  4/5/2010    Overview Signed 5/26/2010 12:47 PM by Michelle Lora MD     2/2010                       PAST SURGICAL HISTORY     Past Surgical History:   Procedure Laterality Date    EKG ORDERABLE  5/2009    NSR, rate 84, normal EKG    HX BLADDER SUSPENSION  ~2010    Dr Tabares Pain Left 09/23/2019    [facility].  Dr. Ursula Damon, [facility] Mastectomy Implant Infection Left Breast, Replaced Implant, IV abx    HX BREAST BIOPSY Right 11/2011    VPI, right breast biopsy negative    HX BREAST BIOPSY Right 04/22/2019    invasive ductal carcinoma    HX BREAST LUMPECTOMY Right 6/13/2019    RIGHT BREAST LUMPECTOMY ( x2 ) WTIH ULTRASOUND , RIGHT SENTINAL NODE BIOPSY performed by Malvin Beebe MD at [facility]    HX BREAST RECONSTRUCTION Bilateral 8/27/2019    B MASTECTOMY AND IMPLANTS, IMMEDIATE BILATERAL BREAST RECONSTRUCTION WITH DIRECT TO IMPLANT USE OF ALLODERM AND BILATERAL BREAST RECONSTRUCTION INTRA-OPERATIVE ASSESMENT OF BLOOD FLOW performed by Dipti Goyal MD at [address] HX CARPAL TUNNEL RELEASE Right 03/2010    Dr Gregory Michel  2009, 2015    Dr Shanelle Tejada    HX COLONOSCOPY  04/08/2016    Dr Shakira Hannon-Internal Hemorrhoids- Normal mucosa in the whole colon - Repeat colonoscopy in 5 years    HX COLONOSCOPY  2016    HX COLONOSCOPY  10/2005    benign polyp; repeat 5 yr per Dr Kevin Jeffrey HX COLONOSCOPY  11/2010    Normal, f/u 5 yrs, Dr Teresa Napier  7/2005    AUB, benign/neg bx; Dr Jarod Fitch  10/2012    Dr Shanelle Tejada, [facility]    HX HEENT  2019    Nasal Abscess I&D, Dr. Jaquelin Marcus HX HYSTERECTOMY  2013    HX MASTECTOMY Bilateral 8/27/2019    BILATERAL BREAST SKIN SPARING MASTECTOMIES performed by Aftab Bishop MD at [facility] Right 2010    CARPAL TUNNEL    HX PARTIAL HYSTERECTOMY  4/26/13    Supracervical hysterectomy for fibroids, Dr Arley Dong Bilateral 10/15/2019    [facility]ON Javed Breast Implant pain and recurrent left breast infection; on wound vac w/ iv abx; wound pump removed 12-4-2019         MEDICATIONS     Current Outpatient Medications   Medication Sig    clindamycin (CLEOCIN) 300 mg capsule Take 1 Capsule by mouth three (3) times daily for 10 days.  mupirocin (BACTROBAN) 2 % ointment Apply  to affected area two (2) times a day for 10 days.  FLUoxetine (PROzac) 20 mg capsule TAKE 1 CAPSULE BY MOUTH DAILY    ascorbic acid (VITAMIN C PO) Take  by mouth.     buPROPion XL (WELLBUTRIN XL) 150 mg tablet TAKE 1 TABLET BY MOUTH EVERY DAY BEFORE BREAKFAST    losartan (COZAAR) 100 mg tablet TAKE 1 TABLET BY MOUTH DAILY    levothyroxine (SYNTHROID) 175 mcg tablet TAKE 1 TABLET BY MOUTH DAILY BEFORE BREAKFAST    rosuvastatin (CRESTOR) 10 mg tablet TAKE 1 TABLET BY MOUTH EVERY NIGHT    anastrozole (ARIMIDEX) 1 mg tablet TAKE 1 TABLET BY MOUTH EVERY DAY    multivitamin (ONE A DAY) tablet Take 1 Tab by mouth daily.  cholecalciferol, vitamin D3, (Vitamin D3) 50 mcg (2,000 unit) tab Take 1 Tab by mouth daily.  OTHER,NON-FORMULARY, Tumeric once a day    OTHER Portable CPAP machine for RENÉE, with new mask and tubing but no change to mask or settings; G47.30 GARO 99m prog good     No current facility-administered medications for this visit. ALLERGIES     Allergies   Allergen Reactions    Pcn [Penicillins] Hives    Sulfa (Sulfonamide Antibiotics) Hives    Zocor [Simvastatin] Myalgia    Lisinopril Cough          SOCIAL HISTORY     Social History     Tobacco Use    Smoking status: Former Smoker     Packs/day: 0.50     Years: 25.00     Pack years: 12.50     Quit date: 2005     Years since quittin.5    Smokeless tobacco: Never Used   Substance Use Topics    Alcohol use:  Yes     Alcohol/week: 5.0 standard drinks     Types: 5 Shots of liquor per week     Social History     Social History Narrative     1 biological daughter, 1 \"adopted\", and 2 step children    Retired 2018    Works for her [address] History     Substance and Sexual Activity   Sexual Activity Not Currently    Partners: Male    Comment: not sexually active x many years       IMMUNIZATIONS     Immunization History   Administered Date(s) Administered    Influenza Vaccine 10/03/2014, 11/10/2015, 2016, 2017, 2018, 2019    Influenza Vaccine (Quad) PF (>6 Mo Flulaval, Fluarix, and >3 Yrs Afluria, Fluzone [id number]) 2018, 2019    Influenza Vaccine (Tri) Adjuvanted (>65 Yrs FLUAD TRI 04135) 2020    Influenza Vaccine Split 2010, 10/06/2011    Meningococcal ACWY Vaccine 2013    Pneumococcal Polysaccharide (PPSV-23) 2020    TD Vaccine 1999, 2009    Td 2017    Tdap 2016, 2017    Zoster Recombinant 2018, 2019    Zoster Vaccine, Live 2013         FAMILY HISTORY     Family History   Problem Relation Age of Onset   [de-identified] Arthritis-osteo Father     Stroke Father         TIA's    Cancer Father         melanoma on back removed    Pacemaker Father 80    Heart Disease Father          79 yo in     Hypertension Mother     Arthritis-osteo Mother     Heart Disease Mother         valve disease,  2013    Heart Attack Maternal Grandmother 76    No Known Problems Sister     No Known Problems Brother     Depression Daughter     Substance Abuse Daughter     Alcohol abuse Daughter          Yamila Vito limited due to virtual visit. Self reported data collected today:     PHYSICAL EXAMINATION   Physical Exam  Constitutional:       General: She is not in acute distress. Appearance: She is not ill-appearing. Skin:     Comments: Patient submitted via her patient portal an up close picture of the affected area on her right ring finger which is improving compared to exam . Area is flat w/ an overlying erythematous dried covering. Surrounding erythema present but diminished from last exam.             ASSESSMENT & PLAN       ICD-10-CM ICD-9-CM    1. Infected insect bite of finger  S60.469A 915.5     L08.9 E906.4     W57. XXXA     2. Cellulitis of right ring finger  L03.011 681.00      Improving on day 6 of oral Clindamycin 300 mg and topical Bactroban Ointment. Continue current treatment. She will submit another picture of the area through her patient portal in 4-5 days to reassess or call back/follow up sooner in the interim if the area does not continue to improve or should anything worsen prior to then    ---------------------------------------------------------------------------------------------------------------  Patient is being evaluated by a virtual/ video visit encounter to address concerns as noted above.    Patient identification was verified at the start of the visit: YES  A caregiver was present when appropriate. Due to this being a TeleHealth encounter (During JDEOM-13 public health emergency), evaluation of the following organ systems was limited: Vitals/Constitutional/EENT/Resp/CV/GI//MS/Neuro/Skin/Heme-Lymph-Imm. Pursuant to the emergency declaration under the [address] [place] [address], [address] [place] [address] authority and the [employer] Act, this Virtual  Visit was conducted, with patient's (and/or legal guardian's) consent, to reduce the patient's risk of exposure to COVID-19 and provide necessary medical care. Services were provided through a video synchronous discussion virtually to substitute for in-person clinic visit. The patient (and/or legal guardian) has also been advised to contact this office for worsening conditions or problems, and seek emergency medical treatment and/or call 911 if deemed necessary. Patient was located at their own home. I was in the office while conducting this encounter. Consent:  She and/or her healthcare decision maker is aware that this patient-initiated Telehealth encounter is a billable service, with coverage as determined by her insurance carrier. She is aware that she may receive a bill and has provided verbal consent to proceed: Yes  This virtual visit was conducted via [employer]. Pursuant to the emergency declaration under the [address] [place] [address], [address] [place] [address] authority and the [employer] and [employer] Act, this Virtual  Visit was conducted to reduce the patient's risk of exposure to COVID-19 and provide continuity of care for an established patient. Services were provided through a video synchronous discussion virtually to substitute for in-person clinic visit. Due to this being a TeleHealth evaluation, many elements of the physical examination are unable to be assessed. Total Time: minutes: 11-20 minutes.   An electronic signature was used to authenticate this note.   ~Leah Fernandez MD~

## 2021-07-21 NOTE — PROGRESS NOTES
Ruptured and drained a lot of pus over the course of about 2 days  It is now drying up and scabbed over  Flattened out  The pain and soreness is a lot better  The redness around the area is diminishing    Day 6 of 10 of the Clindamycin and Bactroban Ointment

## 2021-09-01 ENCOUNTER — OFFICE VISIT (OUTPATIENT)
Dept: ONCOLOGY | Age: 66
End: 2021-09-01
Payer: MEDICARE

## 2021-09-01 VITALS
BODY MASS INDEX: 42.72 KG/M2 | TEMPERATURE: 98.3 F | HEART RATE: 71 BPM | SYSTOLIC BLOOD PRESSURE: 110 MMHG | HEIGHT: 68 IN | OXYGEN SATURATION: 96 % | WEIGHT: 281.9 LBS | DIASTOLIC BLOOD PRESSURE: 69 MMHG

## 2021-09-01 DIAGNOSIS — C50.811 CANCER OF OVERLAPPING SITES OF RIGHT BREAST (HCC): Primary | ICD-10-CM

## 2021-09-01 DIAGNOSIS — Z98.890 H/O BREAST RECONSTRUCTION: ICD-10-CM

## 2021-09-01 DIAGNOSIS — Z79.811 AROMATASE INHIBITOR USE: ICD-10-CM

## 2021-09-01 DIAGNOSIS — Z90.13 H/O BILATERAL MASTECTOMY: ICD-10-CM

## 2021-09-01 DIAGNOSIS — R23.2 HOT FLASHES: ICD-10-CM

## 2021-09-01 DIAGNOSIS — I10 ESSENTIAL HYPERTENSION: ICD-10-CM

## 2021-09-01 PROCEDURE — G8399 PT W/DXA RESULTS DOCUMENT: HCPCS | Performed by: INTERNAL MEDICINE

## 2021-09-01 PROCEDURE — 1101F PT FALLS ASSESS-DOCD LE1/YR: CPT | Performed by: INTERNAL MEDICINE

## 2021-09-01 PROCEDURE — G9708 BILAT MAST/HX BI /UNILAT MAS: HCPCS | Performed by: INTERNAL MEDICINE

## 2021-09-01 PROCEDURE — G8417 CALC BMI ABV UP PARAM F/U: HCPCS | Performed by: INTERNAL MEDICINE

## 2021-09-01 PROCEDURE — 1090F PRES/ABSN URINE INCON ASSESS: CPT | Performed by: INTERNAL MEDICINE

## 2021-09-01 PROCEDURE — 3017F COLORECTAL CA SCREEN DOC REV: CPT | Performed by: INTERNAL MEDICINE

## 2021-09-01 PROCEDURE — G9717 DOC PT DX DEP/BP F/U NT REQ: HCPCS | Performed by: INTERNAL MEDICINE

## 2021-09-01 PROCEDURE — G0463 HOSPITAL OUTPT CLINIC VISIT: HCPCS | Performed by: INTERNAL MEDICINE

## 2021-09-01 PROCEDURE — G8536 NO DOC ELDER MAL SCRN: HCPCS | Performed by: INTERNAL MEDICINE

## 2021-09-01 PROCEDURE — G8752 SYS BP LESS 140: HCPCS | Performed by: INTERNAL MEDICINE

## 2021-09-01 PROCEDURE — G8427 DOCREV CUR MEDS BY ELIG CLIN: HCPCS | Performed by: INTERNAL MEDICINE

## 2021-09-01 PROCEDURE — 99213 OFFICE O/P EST LOW 20 MIN: CPT | Performed by: INTERNAL MEDICINE

## 2021-09-01 PROCEDURE — G8754 DIAS BP LESS 90: HCPCS | Performed by: INTERNAL MEDICINE

## 2021-09-01 NOTE — PROGRESS NOTES
[facility] [place] [address] [address], [address], [place]: [phone]  F: [phone]      Reason for Visit:   Deepthi Pollack is a 77 y.o. female who is seen for follow up of breast cancer on adjuvant Anastrozole     Treatment History:   · 19 - RIGHT breast lumpectomy x 2 sites and RIGHT SLNB  · 19 - BILATERAL mastectomies with reconstruction. Dr. Jadon Boyd did direct saline implants. Surg path showed LEFT breast benign, residual IDC on RIGHT breast. ER+ HER2 negative. 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 Tolerating AI fine. No new issues. Has occ hot flashes. Tolerable. Labs with PCP. No new issues today. No fevers/ chills/ chest pain/ SOB/ nausea/ vomiting/diarrhea/ pain/fatigue        Past Medical History:   Diagnosis Date    Adjustment disorder with mixed anxiety and depressed mood 2020    After death of father  and personal diagnosis of/treatment for breast cancer [id number]; Counselin:  [place] [facility] [address] Ankle edema 2010    Anxiety 2010    Benign essential hypertension 2016    2006     Carpal tunnel syndrome 2010    Chronic depression 2020    H/O Benign Colon Polyp 2010    H/O bilateral mastectomy 2019    Double Mastectomy (right breast cancer and elective left breast prophylactically);  No chemo, No radiation    H/O Uterine Fibroids 2010    Hypercholesterolemia 2013    ~    Hypothyroidism 2016 7/1999    Kidney stone 4/5/2010    Mixed hyperlipidemia 7/6/2020    Perimenopausal 4/5/2010    Primary osteoarthritis of left knee 5/26/2016    Extensive; Ortho (needs knee replacement), CSI x 2,     S/P benign cervical polypectomy 12/20/2010    Sleep apnea 4/5/2010    USES CPAP    Status post right breast lumpectomy 1/30/2020 6-, Invasive ductal carcinoma--->Mastectomy 8-, no chemo, no radiation, started on Arimidex    Stress incontinence 5/26/2010      Past Surgical History:   Procedure Laterality Date    EKG ORDERABLE  5/2009    NSR, rate 84, normal EKG    HX BLADDER SUSPENSION  ~2010    Dr Carlos Manuel Perez Left 09/23/2019    [facility].  Dr. Isaiah Stroud, [address] [place] [address] Mastectomy Implant Infection Left Breast, Replaced Implant, IV abx    HX BREAST BIOPSY Right 11/2011    VPI, right breast biopsy negative    HX BREAST BIOPSY Right 04/22/2019    invasive ductal carcinoma    HX BREAST LUMPECTOMY Right 6/13/2019    RIGHT BREAST LUMPECTOMY ( x2 ) WTIH ULTRASOUND , RIGHT SENTINAL NODE BIOPSY performed by Kisha Bahena MD at [facility] AM[facility] OR    HX BREAST RECONSTRUCTION Bilateral 8/27/2019    B MASTECTOMY AND IMPLANTS, IMMEDIATE BILATERAL BREAST RECONSTRUCTION WITH DIRECT TO IMPLANT USE OF ALLODERM AND BILATERAL BREAST RECONSTRUCTION INTRA-OPERATIVE ASSESMENT OF BLOOD FLOW performed by Brian John MD at [place] [address] Right 03/2010    Dr Juventino Collins  2009, 2015    Dr Morris Zhao    HX COLONOSCOPY  04/08/2016    Dr Pj Hannon-Internal Hemorrhoids- Normal mucosa in the whole colon - Repeat colonoscopy in 5 years    HX COLONOSCOPY  2016    HX COLONOSCOPY  10/2005    benign polyp; repeat 5 yr per Dr Aileen Echevarria HX COLONOSCOPY  11/2010    Normal, f/u 5 yrs, Dr Gavi Serrano  7/2005    AUB, benign/neg bx; Dr Chiquita Boland  10/2012    Dr Morris Zhao, AUB    HX HEENT     Nasal Abscess I&D, Dr. Zakia Gerard HX HYSTERECTOMY  2013    HX MASTECTOMY Bilateral 2019    BILATERAL BREAST SKIN SPARING MASTECTOMIES performed by Sangeetha Gates MD at [address] HX ORTHOPAEDIC Right 2010    CARPAL TUNNEL    HX PARTIAL HYSTERECTOMY  13    Supracervical hysterectomy for fibroids, Dr Bernie Melton HX UROLOGICAL  2010    BLADDER SLING      [facility] REMOVAL INTACT BREAST IMPLANT Bilateral 10/15/2019    [facility] Dr. Sandra Mojica B Breast Implant pain and recurrent left breast infection; on wound vac w/ iv abx; wound pump removed 2019      Social History     Tobacco Use    Smoking status: Former Smoker     Packs/day: 0.50     Years: 25.00     Pack years: 12.50     Quit date: 2005     Years since quittin.6    Smokeless tobacco: Never Used   Substance Use Topics    Alcohol use: Yes     Alcohol/week: 5.0 standard drinks     Types: 5 Shots of liquor per week      Family History   Problem Relation Age of Onset   Kay Mccann Arthritis-osteo Father     Stroke Father         TIA's    Cancer Father         melanoma on back removed    Pacemaker Father 80    Heart Disease Father          79 yo in     Hypertension Mother    Kay Siobhan Arthritis-osteo Mother     Heart Disease Mother         valve disease,      Heart Attack Maternal Grandmother 76    No Known Problems Sister     No Known Problems Brother     Depression Daughter     Substance Abuse Daughter     Alcohol abuse Daughter      Current Outpatient Medications   Medication Sig    FLUoxetine (PROzac) 20 mg capsule TAKE 1 CAPSULE BY MOUTH DAILY    ascorbic acid (VITAMIN C PO) Take  by mouth.     buPROPion XL (WELLBUTRIN XL) 150 mg tablet TAKE 1 TABLET BY MOUTH EVERY DAY BEFORE BREAKFAST    losartan (COZAAR) 100 mg tablet TAKE 1 TABLET BY MOUTH DAILY    levothyroxine (SYNTHROID) 175 mcg tablet TAKE 1 TABLET BY MOUTH DAILY BEFORE BREAKFAST    rosuvastatin (CRESTOR) 10 mg tablet TAKE 1 TABLET BY MOUTH EVERY NIGHT    anastrozole (ARIMIDEX) 1 mg tablet TAKE 1 TABLET BY MOUTH EVERY DAY    multivitamin (ONE A DAY) tablet Take 1 Tab by mouth daily.  cholecalciferol, vitamin D3, (Vitamin D3) 50 mcg (2,000 unit) tab Take 1 Tab by mouth daily.  OTHER,NON-FORMULARY, Tumeric once a day    OTHER Portable CPAP machine for RENÉE, with new mask and tubing but no change to mask or settings; G47.30 GARO 99m prog good     No current facility-administered medications for this visit. Allergies   Allergen Reactions    Pcn [Penicillins] Hives    Sulfa (Sulfonamide Antibiotics) Hives    Zocor [Simvastatin] Myalgia    Lisinopril Cough      A complete review of systems was obtained, negative except as described above and as reported on ROS sheet scanned into system. Physical Exam:     General: alert, cooperative, no distress   Mental  status: normal mood, behavior, speech, dress, motor activity, and thought processes, able to follow commands   HENT: NCAT   Neck: no visualized mass   Resp: no respiratory distress, lungs clear  CV regular  G soft NT  Ext no edema   Neuro: no gross deficits   Skin: no discoloration or lesions of concern on visible areas   Psychiatric: normal affect, consistent with stated mood, no evidence of hallucinations     Breast b/l mastectomy/ no lesions/masses        Results:     Lab Results   Component Value Date/Time    WBC 4.9 06/30/2020 11:27 AM    HGB 13.4 06/30/2020 11:27 AM    HCT 40.9 06/30/2020 11:27 AM    PLATELET 422 08/18/2938 11:27 AM    MCV 91 06/30/2020 11:27 AM    ABS.  NEUTROPHILS 3.7 06/30/2020 11:27 AM     Lab Results   Component Value Date/Time    Sodium 140 09/10/2020 08:34 AM    Potassium 4.8 09/10/2020 08:34 AM    Chloride 103 09/10/2020 08:34 AM    CO2 21 09/10/2020 08:34 AM    Glucose 102 (H) 09/10/2020 08:34 AM    BUN 20 09/10/2020 08:34 AM    Creatinine 0.88 09/10/2020 08:34 AM    GFR est AA 80 09/10/2020 08:34 AM    GFR est non-AA 69 09/10/2020 08:34 AM    Calcium 9.9 09/10/2020 08:34 AM     Lab Results   Component Value Date/Time    Bilirubin, total 0.6 09/10/2020 08:34 AM    ALT (SGPT) 19 09/10/2020 08:34 AM    Alk. phosphatase 82 09/10/2020 08:34 AM    Protein, total 6.3 09/10/2020 08:34 AM    Albumin 4.3 09/10/2020 08:34 AM    Globulin 3.3 07/15/2010 08:05 AM     MRI Results (most recent):  Results from Hospital Encounter encounter on 05/17/19    MRI BREAST BI W WO CONT    Narrative  INDICATION: Right invasive ductal carcinoma, grade 2, ER/AR positive, HER-2/keli  negative. COMPARISON: Multiple prior breast imaging studies dating back to 2015. TECHNIQUE:  Multisequence, multiplanar, bilateral breast MRI was performed in prone position  using a dedicated breast coil. Images were obtained without contrast and dynamic  postcontrast images were obtained in multiple phases. 10 mL IV Gadavist was  administered. Subtraction images were reconstructed. Postcontrast images were  reviewed with dedicated kinetic analysis software. FINDINGS:  There is mild background parenchymal enhancement and heterogeneous  fibroglandular tissue. Numerous sub-5 mm foci of enhancement are scattered in  the bilateral breasts. Right breast:  A 16 x 13 x 16 mm, spiculated mass with a biopsy clip and mixed kinetics at 6:00  in the middle third of the right breast corresponds to the biopsy-proven  invasive ductal carcinoma. There is a small hematoma in the retroareolar biopsy site in the anterior third. A 6 x 7 x 8 mm, irregularly marginated, enhancing nodule with mixed kinetics at  the superior aspect of the hematoma corresponds roughly in size and morphology  to the hypoechoic, shadowing mass seen on prior ultrasound at 3:00 in the  periareolar region. Incidental note is made of an intramammary lymph node at 1:00 in the middle  third. No axillary or internal mammary chain lymphadenopathy.     Left breast:  No suspicious enhancing foci. No axillary or internal mammary chain  lymphadenopathy. A summary portfolio with key images has been sent from kinetic analysis software  to PACS. Impression  IMPRESSION:  1. Invasive ductal carcinoma in the right breast at 6:00.  2. Small enhancing nodule associated with a hematoma in the retroareolar right  breast biopsy site. 3. No suspicious enhancing foci in the left breast.  4. No lymphadenopathy. 5. BI-RADS Assessment Category 6: Known biopsy proven malignancy. Records reviewed and summarized above. Pathology report(s) reviewed above. Radiology report(s) reviewed above. Assessment/PLAN:     1) Stage 1 T1cN0 ER+ HER2 Negative Mammaprint low risk breast cancer post b/l mastectomy/reconstruction  Patient has two separate tumors with different path types. 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 5 o clock ER+ HER2 negative mammaprint low risk. She had a lumpectomy 6/13/19 with + margin then had b/l mastectomy/reconstruction on 8/27/19. Then infection and implants removed. no adjuvant chemo      Pt seen today for fu. She started adjuvant Anastrozole in 12/19. She is tolerating Anastrozole well overall - no side effects. Continue Anastrozole for total 5 years. sees PCP. Still sees surgery also. routine labs with her PCP. 2) hx of mastectomy/reconstruction infection   She was on IV abx and then had implants removed on 10/15/19. She was hospitalized on 10/30/19 for infection/fever and d/c'd on 11/4/19. She completed IV more abx on 11/21/19 and had a wound vac.  management per surgery/plastics. 3) HTN/Depression/Hypothyroid/Arthritis/Obesity  Management per PCP. 4) ear infection per PCP/ ENT. 5) Psychosocial  Mood good, coping well overall. SW for support as needed. Call if questions. Follow up here in 6 months / sees surgery also. I appreciate the opportunity to participate in Ms. Mabel Willingham's care.     Signed By: Andreina Stone DO

## 2021-09-01 NOTE — PROGRESS NOTES
Herminio Hendrickson is a 77 y.o. female  Chief Complaint   Patient presents with    Follow-up    Breast Cancer     1. Have you been to the ER, urgent care clinic since your last visit? Hospitalized since your last visit? No.    2. Have you seen or consulted any other health care providers outside of the [address] [place] [address] since your last visit? Include any pap smears or colon screening. Yes, patient went to see Dr.Dustin William! Patient is now fully vaccinated with the [employer] covid-19 vaccine.

## 2021-09-08 DIAGNOSIS — E78.2 MIXED HYPERLIPIDEMIA: ICD-10-CM

## 2021-09-08 DIAGNOSIS — I10 BENIGN ESSENTIAL HYPERTENSION: ICD-10-CM

## 2021-09-08 DIAGNOSIS — F32.A CHRONIC DEPRESSION: ICD-10-CM

## 2021-09-08 DIAGNOSIS — E03.9 HYPOTHYROIDISM, UNSPECIFIED TYPE: ICD-10-CM

## 2021-09-08 RX ORDER — ROSUVASTATIN CALCIUM 10 MG/1
TABLET, COATED ORAL
Qty: 30 TABLET | Refills: 0 | Status: SHIPPED | OUTPATIENT
Start: 2021-09-08 | End: 2021-10-06 | Stop reason: SDUPTHER

## 2021-09-08 RX ORDER — LEVOTHYROXINE SODIUM 175 UG/1
TABLET ORAL
Qty: 30 TABLET | Refills: 0 | Status: SHIPPED | OUTPATIENT
Start: 2021-09-08 | End: 2021-10-06 | Stop reason: SDUPTHER

## 2021-09-08 RX ORDER — BUPROPION HYDROCHLORIDE 150 MG/1
TABLET ORAL
Qty: 30 TABLET | Refills: 0 | Status: SHIPPED | OUTPATIENT
Start: 2021-09-08 | End: 2021-10-06 | Stop reason: SDUPTHER

## 2021-09-08 RX ORDER — LOSARTAN POTASSIUM 100 MG/1
TABLET ORAL
Qty: 30 TABLET | Refills: 0 | Status: SHIPPED | OUTPATIENT
Start: 2021-09-08 | End: 2021-10-06 | Stop reason: SDUPTHER

## 2021-09-09 DIAGNOSIS — I10 BENIGN ESSENTIAL HYPERTENSION: ICD-10-CM

## 2021-09-09 DIAGNOSIS — E78.2 MIXED HYPERLIPIDEMIA: ICD-10-CM

## 2021-09-09 DIAGNOSIS — E03.9 HYPOTHYROIDISM, UNSPECIFIED TYPE: ICD-10-CM

## 2021-09-09 RX ORDER — LOSARTAN POTASSIUM 100 MG/1
TABLET ORAL
Qty: 90 TABLET | OUTPATIENT
Start: 2021-09-09

## 2021-09-09 RX ORDER — LEVOTHYROXINE SODIUM 175 UG/1
TABLET ORAL
Qty: 90 TABLET | OUTPATIENT
Start: 2021-09-09

## 2021-09-09 RX ORDER — ROSUVASTATIN CALCIUM 10 MG/1
TABLET, COATED ORAL
Qty: 90 TABLET | OUTPATIENT
Start: 2021-09-09

## 2021-09-09 NOTE — TELEPHONE ENCOUNTER
Patient due annual wellness and fasting labs. Neither are scheduled. I am pending fasting lab orders. Please get her booked for fasting lab appt asap and can be booked w/ me in office for soonest available. I will send in all her scripts for now for 30 day supply.

## 2021-09-10 RX ORDER — ANASTROZOLE 1 MG/1
TABLET ORAL
Qty: 90 TABLET | Refills: 3 | Status: SHIPPED | OUTPATIENT
Start: 2021-09-10 | End: 2022-08-25

## 2021-09-24 ENCOUNTER — LAB ONLY (OUTPATIENT)
Dept: FAMILY MEDICINE CLINIC | Age: 66
End: 2021-09-24

## 2021-09-24 DIAGNOSIS — E03.8 OTHER SPECIFIED HYPOTHYROIDISM: Primary | ICD-10-CM

## 2021-09-24 DIAGNOSIS — E78.2 MIXED HYPERLIPIDEMIA: ICD-10-CM

## 2021-09-24 DIAGNOSIS — R09.02 LAB FINDINGS OF HYPOXEMIA: ICD-10-CM

## 2021-09-24 DIAGNOSIS — E78.00 HYPERCHOLESTEROLEMIA: ICD-10-CM

## 2021-09-24 DIAGNOSIS — I10 BENIGN ESSENTIAL HYPERTENSION: ICD-10-CM

## 2021-09-24 LAB
ALBUMIN SERPL-MCNC: 3.5 G/DL (ref 3.5–5)
ALBUMIN/GLOB SERPL: 1.2 {RATIO} (ref 1.1–2.2)
ALP SERPL-CCNC: 86 U/L (ref 45–117)
ALT SERPL-CCNC: 28 U/L (ref 12–78)
ANION GAP SERPL CALC-SCNC: 4 MMOL/L (ref 5–15)
AST SERPL-CCNC: 16 U/L (ref 15–37)
BASOPHILS # BLD: 0 K/UL (ref 0–0.1)
BASOPHILS NFR BLD: 0 % (ref 0–1)
BILIRUB SERPL-MCNC: 0.9 MG/DL (ref 0.2–1)
BUN SERPL-MCNC: 22 MG/DL (ref 6–20)
BUN/CREAT SERPL: 26 (ref 12–20)
CALCIUM SERPL-MCNC: 9.6 MG/DL (ref 8.5–10.1)
CHLORIDE SERPL-SCNC: 107 MMOL/L (ref 97–108)
CHOLEST SERPL-MCNC: 219 MG/DL
CO2 SERPL-SCNC: 28 MMOL/L (ref 21–32)
CREAT SERPL-MCNC: 0.84 MG/DL (ref 0.55–1.02)
DIFFERENTIAL METHOD BLD: ABNORMAL
EOSINOPHIL # BLD: 0 K/UL (ref 0–0.4)
EOSINOPHIL NFR BLD: 0 % (ref 0–7)
ERYTHROCYTE [DISTWIDTH] IN BLOOD BY AUTOMATED COUNT: 14.3 % (ref 11.5–14.5)
GLOBULIN SER CALC-MCNC: 2.9 G/DL (ref 2–4)
GLUCOSE SERPL-MCNC: 94 MG/DL (ref 65–100)
HCT VFR BLD AUTO: 40.7 % (ref 35–47)
HDLC SERPL-MCNC: 73 MG/DL
HDLC SERPL: 3 {RATIO} (ref 0–5)
HGB BLD-MCNC: 12.5 G/DL (ref 11.5–16)
IMM GRANULOCYTES # BLD AUTO: 0 K/UL (ref 0–0.04)
IMM GRANULOCYTES NFR BLD AUTO: 1 % (ref 0–0.5)
LDLC SERPL CALC-MCNC: 122.6 MG/DL (ref 0–100)
LYMPHOCYTES # BLD: 1.2 K/UL (ref 0.8–3.5)
LYMPHOCYTES NFR BLD: 21 % (ref 12–49)
MCH RBC QN AUTO: 30.3 PG (ref 26–34)
MCHC RBC AUTO-ENTMCNC: 30.7 G/DL (ref 30–36.5)
MCV RBC AUTO: 98.5 FL (ref 80–99)
MONOCYTES # BLD: 0.5 K/UL (ref 0–1)
MONOCYTES NFR BLD: 9 % (ref 5–13)
NEUTS SEG # BLD: 3.9 K/UL (ref 1.8–8)
NEUTS SEG NFR BLD: 69 % (ref 32–75)
NRBC # BLD: 0 K/UL (ref 0–0.01)
NRBC BLD-RTO: 0 PER 100 WBC
PLATELET # BLD AUTO: 202 K/UL (ref 150–400)
PMV BLD AUTO: 11.3 FL (ref 8.9–12.9)
POTASSIUM SERPL-SCNC: 4.5 MMOL/L (ref 3.5–5.1)
PROT SERPL-MCNC: 6.4 G/DL (ref 6.4–8.2)
RBC # BLD AUTO: 4.13 M/UL (ref 3.8–5.2)
SODIUM SERPL-SCNC: 139 MMOL/L (ref 136–145)
T4 FREE SERPL-MCNC: 1 NG/DL (ref 0.8–1.5)
TRIGL SERPL-MCNC: 117 MG/DL (ref ?–150)
TSH SERPL DL<=0.05 MIU/L-ACNC: 1.37 UIU/ML (ref 0.36–3.74)
VLDLC SERPL CALC-MCNC: 23.4 MG/DL
WBC # BLD AUTO: 5.7 K/UL (ref 3.6–11)

## 2021-09-24 NOTE — PROGRESS NOTES
Patient arrived for lab completion for upcoming physical.  Per Dr. Kristel Hartman note on 9/8/2021 pending orders for labs were placed for patient to complete and to schedule an physical.  Per verbal order labs were placed for patient.   Rebekah Barahona LPN

## 2021-09-27 NOTE — PROGRESS NOTES
Labs pre-ordered for review at upcoming appointment with PCP:  Future Appointments  Date Time Provider Yaz Aparicio  10/6/2021  1:15 PM Keron Aldridge MD [facility] AMB

## 2021-10-05 DIAGNOSIS — E78.2 MIXED HYPERLIPIDEMIA: ICD-10-CM

## 2021-10-06 ENCOUNTER — OFFICE VISIT (OUTPATIENT)
Dept: FAMILY MEDICINE CLINIC | Age: 66
End: 2021-10-06
Payer: MEDICARE

## 2021-10-06 VITALS
BODY MASS INDEX: 44.07 KG/M2 | RESPIRATION RATE: 16 BRPM | TEMPERATURE: 98.3 F | OXYGEN SATURATION: 98 % | HEART RATE: 76 BPM | SYSTOLIC BLOOD PRESSURE: 132 MMHG | WEIGHT: 290.8 LBS | HEIGHT: 68 IN | DIASTOLIC BLOOD PRESSURE: 78 MMHG

## 2021-10-06 DIAGNOSIS — E03.9 HYPOTHYROIDISM, UNSPECIFIED TYPE: ICD-10-CM

## 2021-10-06 DIAGNOSIS — E66.01 OBESITY, CLASS III, BMI 40-49.9 (MORBID OBESITY) (HCC): ICD-10-CM

## 2021-10-06 DIAGNOSIS — E78.2 MIXED HYPERLIPIDEMIA: ICD-10-CM

## 2021-10-06 DIAGNOSIS — Z00.00 MEDICARE ANNUAL WELLNESS VISIT, SUBSEQUENT: Primary | ICD-10-CM

## 2021-10-06 DIAGNOSIS — F32.A CHRONIC DEPRESSION: ICD-10-CM

## 2021-10-06 DIAGNOSIS — I10 BENIGN ESSENTIAL HYPERTENSION: ICD-10-CM

## 2021-10-06 PROCEDURE — 1090F PRES/ABSN URINE INCON ASSESS: CPT | Performed by: FAMILY MEDICINE

## 2021-10-06 PROCEDURE — G0463 HOSPITAL OUTPT CLINIC VISIT: HCPCS | Performed by: FAMILY MEDICINE

## 2021-10-06 PROCEDURE — G9717 DOC PT DX DEP/BP F/U NT REQ: HCPCS | Performed by: FAMILY MEDICINE

## 2021-10-06 PROCEDURE — G8752 SYS BP LESS 140: HCPCS | Performed by: FAMILY MEDICINE

## 2021-10-06 PROCEDURE — G8536 NO DOC ELDER MAL SCRN: HCPCS | Performed by: FAMILY MEDICINE

## 2021-10-06 PROCEDURE — 1101F PT FALLS ASSESS-DOCD LE1/YR: CPT | Performed by: FAMILY MEDICINE

## 2021-10-06 PROCEDURE — G0439 PPPS, SUBSEQ VISIT: HCPCS | Performed by: FAMILY MEDICINE

## 2021-10-06 PROCEDURE — G8754 DIAS BP LESS 90: HCPCS | Performed by: FAMILY MEDICINE

## 2021-10-06 PROCEDURE — 3017F COLORECTAL CA SCREEN DOC REV: CPT | Performed by: FAMILY MEDICINE

## 2021-10-06 PROCEDURE — G8427 DOCREV CUR MEDS BY ELIG CLIN: HCPCS | Performed by: FAMILY MEDICINE

## 2021-10-06 PROCEDURE — G8417 CALC BMI ABV UP PARAM F/U: HCPCS | Performed by: FAMILY MEDICINE

## 2021-10-06 PROCEDURE — 99214 OFFICE O/P EST MOD 30 MIN: CPT | Performed by: FAMILY MEDICINE

## 2021-10-06 PROCEDURE — G8399 PT W/DXA RESULTS DOCUMENT: HCPCS | Performed by: FAMILY MEDICINE

## 2021-10-06 PROCEDURE — G9708 BILAT MAST/HX BI /UNILAT MAS: HCPCS | Performed by: FAMILY MEDICINE

## 2021-10-06 RX ORDER — ROSUVASTATIN CALCIUM 10 MG/1
10 TABLET, COATED ORAL
Qty: 90 TABLET | Refills: 3 | Status: SHIPPED | OUTPATIENT
Start: 2021-10-06 | End: 2021-12-02

## 2021-10-06 RX ORDER — LOSARTAN POTASSIUM 100 MG/1
100 TABLET ORAL DAILY
Qty: 90 TABLET | Refills: 3 | Status: SHIPPED | OUTPATIENT
Start: 2021-10-06 | End: 2022-10-30 | Stop reason: SDUPTHER

## 2021-10-06 RX ORDER — BUPROPION HYDROCHLORIDE 150 MG/1
150 TABLET ORAL
Qty: 90 TABLET | Refills: 3 | Status: SHIPPED | OUTPATIENT
Start: 2021-10-06 | End: 2022-10-30 | Stop reason: SDUPTHER

## 2021-10-06 RX ORDER — ROSUVASTATIN CALCIUM 10 MG/1
TABLET, COATED ORAL
Qty: 90 TABLET | Refills: 3 | Status: SHIPPED | OUTPATIENT
Start: 2021-10-06 | End: 2022-09-22

## 2021-10-06 RX ORDER — LEVOTHYROXINE SODIUM 175 UG/1
175 TABLET ORAL
Qty: 90 TABLET | Refills: 3 | Status: SHIPPED | OUTPATIENT
Start: 2021-10-06 | End: 2022-10-07

## 2021-10-06 NOTE — PATIENT INSTRUCTIONS

## 2021-10-06 NOTE — PROGRESS NOTES
Chief Complaint   Patient presents with    Hypertension     had labs done    Thyroid Problem    Annual Wellness Visit     1. \"Have you been to the ER, urgent care clinic since your last visit? Hospitalized since your last visit? \" No    2. \"Have you seen or consulted any other health care providers outside of the [address] [place] [address] since your last visit? \" Yes Where: ortho Dr Massiel Simpson for both knees 8/23   Dr Shilpa Rojas       3. For patients over 45: Has the patient had a colonoscopy? Scheduled for 10/28    If the patient is female:    4. For patients over 40: Has the patient had a mammogram? In system    5. For patients over 21: Has the patient had a pap smear?  She will schedule gyn Dr Tam Ariza

## 2021-10-06 NOTE — PROGRESS NOTES
Chief Complaint   Patient presents with    Annual Wellness Visit    Cholesterol Problem     had fasting labs done 9/24/21    Hypertension    Thyroid Problem    Medication Refill       HISTORY OF PRESENT ILLNESS   HPI  Follow up hypercholesterolemia, hypertension, hypothyroidism, depression and medication check. Had fasting labs done 9/24/21, here to review. Doing well on current medication regimen and tolerating w/o reaction or side effects. New rx renewals of some of her medications today. Diet: nothing special, admits likes sweets, cheese, not controlling her portions  Caffeine: no coffee or tea, occ decaff sodas diet coke or ginger ale  Exercise: stationary bike x 45 minutes every day   Weight: ranges in the 280's-290's over time  Home BP's on her wrist monitor run 120's/70's  Generally feeling well, no complaints   REVIEW OF SYMPTOMS   Review of Systems   Constitutional: Negative. HENT: Negative. Eyes: Negative. Respiratory: Negative. Cardiovascular: Negative. Gastrointestinal: Negative. Genitourinary: Negative. Musculoskeletal: Negative for myalgias. Neurological: Negative. Endo/Heme/Allergies: Negative. Psychiatric/Behavioral: Negative. Mood has been good and stable on current regimen of Wellbutrin and Prozac daily           PROBLEM LIST/MEDICAL HISTORY     Problem List  Date Reviewed: 10/6/2021        Codes Class Noted    Mixed hyperlipidemia ICD-10-CM: E78.2  ICD-9-CM: 272.2  7/6/2020        Aromatase inhibitor use ICD-10-CM: Z79.811  ICD-9-CM: V07.52  3/3/2020        Status post right breast lumpectomy ICD-10-CM: Z98.890  ICD-9-CM: V45.89  1/30/2020    Overview Addendum 1/30/2020  8:50 PM by Janette Castaneda MD     6-, Invasive ductal carcinoma--->Mastectomy 8-, no chemo, no radiation, started on Arimidex             Chronic depression ICD-10-CM: F32. A  ICD-9-CM: 629  1/30/2020        Adjustment disorder with mixed anxiety and depressed mood ICD-10-CM: Y36.09  ICD-9-CM: 309.28  2020    Overview Signed 2020  9:31 PM by Holly Nair MD     After death of father  and personal diagnosis of/treatment for breast cancer [id number]; Counselin:  [place] [facility] [address], [address]              H/O bilateral mastectomy ICD-10-CM: Z90.13  ICD-9-CM: V45.71  2019    Overview Addendum 2020  8:59 PM by Holly Nair MD     Double Mastectomy (right breast cancer and elective left breast prophylactically); No chemo, No radiation; Started on Arimidex, followed by Dr. Criss Holley             H/O breast reconstruction ICD-10-CM: H48.550  ICD-9-CM: V43.82  2019        Arthritis ICD-10-CM: M19.90  ICD-9-CM: 716.90  2019        Cancer of overlapping sites of right breast ([facility] [address] ICD-10-CM: C50.811  ICD-9-CM: 174.8  2019        Severe obesity ([facility] [address] ICD-10-CM: E66.01  ICD-9-CM: 278.01  2018        Benign essential hypertension ICD-10-CM: I10  ICD-9-CM: 401.1  2016    Overview Signed 2016  9:26 AM by Holly Nair MD                  Hypothyroidism ICD-10-CM: E03.9  ICD-9-CM: 244.9  2016    Overview Signed 2016  9:27 AM by Holly Nair MD     1999             Primary osteoarthritis of knees, bilateral ICD-10-CM: M17.0  ICD-9-CM: 715.16  2016    Overview Addendum 10/6/2021  1:28 PM by Holly Nair MD     Extensive;  Ortho (needs knee replacement), CSI x 2, -; CSI 21 Dr. Ferrer Apo             Hypercholesterolemia ICD-10-CM: E78.00  ICD-9-CM: 272.0  2013    Overview Signed 2013  8:29 AM by Holly Nair MD     ~2587             S/P benign cervical polypectomy ICD-10-CM: L27.740, Z87.42  ICD-9-CM: V45.89  2010    Overview Addendum 2016  9:42 AM by Holly Nair MD     , ; Gyn Dr Rony Reed             H/O Benign Colon Polyp ICD-10-CM: B73.1  ICD-9-CM: 211.3 12/20/2010    Overview Signed 12/20/2010 12:10 PM by Maria A Casarez MD     Colonoscopy 10/2005, 11/2010  q5yrs  Dr Vanessa Mitchell             H/O Uterine Fibroids ICD-10-CM: D21.9  ICD-9-CM: 215.9  12/20/2010    Overview Signed 12/20/2010 12:13 PM by Maria A Casarez MD     2005; no surgical intervention  Gyn Dr Noman Bonilla incontinence ICD-10-CM: N39.3  ICD-9-CM: EST2730  5/26/2010    Overview Signed 5/26/2010 12:47 PM by Maria A Casarez MD     Urologist Dr Joseluis Pop             Sleep apnea, RENÉE ICD-10-CM: G47.30  ICD-9-CM: 780.57  4/5/2010    Overview Addendum 12/20/2010 12:14 PM by Maria A Casarez MD     2009; cpap             Anxiety ICD-10-CM: F41.9  ICD-9-CM: 300.00  4/5/2010        Carpal tunnel syndrome ICD-10-CM: G56.00  ICD-9-CM: 354.0  4/5/2010        Mild Dependent Ankle edema, B ICD-10-CM: M25.473  ICD-9-CM: 719.07  4/5/2010    Overview Signed 4/5/2010  3:52 PM by Angeles Penaloza     mild             Perimenopausal ICD-10-CM: N95.1  ICD-9-CM: 627.2  4/5/2010    Overview Signed 4/5/2010  3:52 PM by Angeles Penaloza     2007             ? Passed Kidney stone ICD-10-CM: N20.0  ICD-9-CM: 592.0  4/5/2010    Overview Signed 5/26/2010 12:47 PM by Maria A Casarez MD     2/2010                       PAST SURGICAL HISTORY     Past Surgical History:   Procedure Laterality Date    EKG ORDERABLE  5/2009    NSR, rate 84, normal EKG    HX BLADDER SUSPENSION  ~2010    Dr Aishwarya Munoz Left 09/23/2019    [facility].  Dr. Kandi Su, [facility] Mastectomy Implant Infection Left Breast, Replaced Implant, IV abx    HX BREAST BIOPSY Right 11/2011    VPI, right breast biopsy negative    HX BREAST BIOPSY Right 04/22/2019    invasive ductal carcinoma    HX BREAST LUMPECTOMY Right 6/13/2019    RIGHT BREAST LUMPECTOMY ( x2 ) WTIH ULTRASOUND , RIGHT SENTINAL NODE BIOPSY performed by Lore Smiley MD at [facility]    HX BREAST RECONSTRUCTION Bilateral 8/27/2019    B MASTECTOMY AND IMPLANTS, IMMEDIATE BILATERAL BREAST RECONSTRUCTION WITH DIRECT TO IMPLANT USE OF ALLODERM AND BILATERAL BREAST RECONSTRUCTION INTRA-OPERATIVE ASSESMENT OF BLOOD FLOW performed by Olimpia Mccormick MD at [facility]    HX [address] Right 03/2010    Dr Janina Maciel CERVICAL POLYPECTOMY  2009, 2015    Dr Rony Reed    HX COLONOSCOPY  04/08/2016    Dr Sanjuanita Hannon-Internal Hemorrhoids- Normal mucosa in the whole colon - Repeat colonoscopy in 5 years    HX COLONOSCOPY  2016    HX COLONOSCOPY  10/2005    benign polyp; repeat 5 yr per Dr Sheila Richey HX COLONOSCOPY  11/2010    Normal, f/u 5 yrs, Dr Steve Cheung  7/2005    AUB, benign/neg bx; Dr Molly Singh  10/2012    Dr Rony Reed, AUB    HX HEENT  2019    Nasal Abscess I&D, Dr. Hung Coker HX HYSTERECTOMY  2013    HX MASTECTOMY Bilateral 8/27/2019    BILATERAL BREAST SKIN SPARING MASTECTOMIES performed by Sury Rene MD at [facility] Right 2010    CARPAL TUNNEL    HX PARTIAL HYSTERECTOMY  4/26/13    Supracervical hysterectomy for fibroids, Dr Mirna Angel HX UROLOGICAL  2010    Murel Jasper HX Ey[address] M[address] Bilateral 10/15/2019    MCV Dr. Stratton Sports, B Breast Implant pain and recurrent left breast infection; on wound vac w/ iv abx; wound pump removed 12-4-2019         MEDICATIONS     Current Outpatient Medications   Medication Sig    TURMERIC PO Take  by mouth daily.  buPROPion XL (WELLBUTRIN XL) 150 mg tablet Take 1 Tablet by mouth Daily (before breakfast).  losartan (COZAAR) 100 mg tablet Take 1 Tablet by mouth daily.  levothyroxine (SYNTHROID) 175 mcg tablet Take 1 Tablet by mouth Daily (before breakfast).  rosuvastatin (CRESTOR) 10 mg tablet Take 1 Tablet by mouth nightly.     anastrozole (ARIMIDEX) 1 mg tablet TAKE 1 TABLET BY MOUTH EVERY DAY    FLUoxetine (PROzac) 20 mg capsule TAKE 1 CAPSULE BY MOUTH DAILY    ascorbic acid (VITAMIN C PO) Take  by mouth.  multivitamin (ONE A DAY) tablet Take 1 Tab by mouth daily.  cholecalciferol, vitamin D3, (Vitamin D3) 50 mcg (2,000 unit) tab Take 1 Tab by mouth daily.  OTHER Portable CPAP machine for RENÉE, with new mask and tubing but no change to mask or settings; G47.30 GARO 99m prog good     No current facility-administered medications for this visit.           ALLERGIES     Allergies   Allergen Reactions    Pcn [Penicillins] Hives    Sulfa (Sulfonamide Antibiotics) Hives    Zocor [Simvastatin] Myalgia    Lisinopril Cough          SOCIAL HISTORY     Social History     Tobacco Use    Smoking status: Former Smoker     Packs/day: 0.50     Years: 25.00     Pack years: 12.50     Quit date: 2005     Years since quittin.7    Smokeless tobacco: Never Used   Substance Use Topics    Alcohol use: Not Currently     Comment: 2 shots of Rum ~ 5 nights a week     Social History     Social History Narrative     1 biological daughter, 1 \"adopted\", and 2 step children    Retired 2018    Works for her [employer]: nothing special, admits likes sweets, cheese, not controlling her portions    Caffeine: no coffee or tea, occ decaff sodas diet coke or ginger ale    Exercise: stationary bike x 45 minutes every day     Weight: ranges in the 280's-290's over time         Social History     Substance and Sexual Activity   Sexual Activity Not Currently    Partners: Male    Comment: not sexually active x many years       IMMUNIZATIONS     Immunization History   Administered Date(s) Administered    COVID-19, PFIZER, MRNA, LNP-S, PF, 30MCG/0.3ML DOSE 2021, 2021, 2021    Influenza Vaccine 10/03/2014, 11/10/2015, 2016, 2017, 2018, 2021    Influenza Vaccine Plusmo) PF (>6 Mo Flulaval, Fluarix, and >3 Yrs [place], [facility]) 2018, 2019  Influenza Vaccine (Tri) Adjuvanted (>65 Yrs FLUAD TRI 14037) 2020    Influenza Vaccine Split 2010, 10/06/2011    Meningococcal ACWY Vaccine 2013    Pneumococcal Polysaccharide (PPSV-23) 2020    TD Vaccine 1999, 2009    Td 2017    Tdap 2016, 2017    Zoster Recombinant 2018, 2019    Zoster Vaccine, Live 2013         FAMILY HISTORY     Family History   Problem Relation Age of Onset    Arthritis-osteo Father     Stroke Father         TIA's   [facility] Cancer Father         melanoma on back removed    Pacemaker Father 80    Heart Disease Father          79 yo in     Hypertension Mother     Arthritis-osteo Mother     Heart Disease Mother         valve disease,      Heart Attack Maternal Grandmother 76    No Known Problems Sister     No Known Problems Brother     Depression Daughter     Substance Abuse Daughter     Alcohol abuse Daughter     Anxiety Daughter          VITALS     Visit Vitals  /78 (BP 1 Location: Left upper arm, BP Patient Position: Sitting, BP Cuff Size: Large adult)   Pulse 76   Temp 98.3 °F (36.8 °C) (Oral)   Resp 16   Ht 5' 8\" (1.727 m)   Wt 290 lb 12.8 oz (131.9 kg)   LMP 2011   SpO2 98%   BMI 44.22 kg/m²          PHYSICAL EXAMINATION   Physical Exam  Vitals reviewed. Constitutional:       General: She is not in acute distress. Appearance: Normal appearance. HENT:      Right Ear: Tympanic membrane normal.      Left Ear: Tympanic membrane normal.   Eyes:      Conjunctiva/sclera: Conjunctivae normal.   Neck:      Thyroid: No thyroid mass, thyromegaly or thyroid tenderness. Vascular: No carotid bruit. Cardiovascular:      Rate and Rhythm: Normal rate and regular rhythm. Heart sounds: Normal heart sounds. Pulmonary:      Effort: Pulmonary effort is normal.      Breath sounds: Normal breath sounds. Abdominal:      Palpations: Abdomen is soft. Tenderness:  There is no abdominal tenderness. Musculoskeletal:         General: No swelling or tenderness. Cervical back: Neck supple. Lymphadenopathy:      Cervical: No cervical adenopathy. Skin:     General: Skin is warm and dry. Neurological:      General: No focal deficit present. Mental Status: She is alert and oriented to person, place, and time. Gait: Gait normal.   Psychiatric:         Mood and Affect: Mood and affect normal.                LABORATORY DATA/ANCILLARY/IMAGING     Results for orders placed or performed in visit on 09/24/21   CBC WITH AUTOMATED DIFF   Result Value Ref Range    WBC 5.7 3.6 - 11.0 K/uL    RBC 4.13 3.80 - 5.20 M/uL    HGB 12.5 11.5 - 16.0 g/dL    HCT 40.7 35.0 - 47.0 %    MCV 98.5 80.0 - 99.0 FL    MCH 30.3 26.0 - 34.0 PG    MCHC 30.7 30.0 - 36.5 g/dL    RDW 14.3 11.5 - 14.5 %    PLATELET 499 249 - 364 K/uL    MPV 11.3 8.9 - 12.9 FL    NRBC 0.0 0  WBC    ABSOLUTE NRBC 0.00 0.00 - 0.01 K/uL    NEUTROPHILS 69 32 - 75 %    LYMPHOCYTES 21 12 - 49 %    MONOCYTES 9 5 - 13 %    EOSINOPHILS 0 0 - 7 %    BASOPHILS 0 0 - 1 %    IMMATURE GRANULOCYTES 1 (H) 0.0 - 0.5 %    ABS. NEUTROPHILS 3.9 1.8 - 8.0 K/UL    ABS. LYMPHOCYTES 1.2 0.8 - 3.5 K/UL    ABS. MONOCYTES 0.5 0.0 - 1.0 K/UL    ABS. EOSINOPHILS 0.0 0.0 - 0.4 K/UL    ABS. BASOPHILS 0.0 0.0 - 0.1 K/UL    ABS. IMM.  GRANS. 0.0 0.00 - 0.04 K/UL    DF AUTOMATED     LIPID PANEL   Result Value Ref Range    Cholesterol, total 219 (H) <200 MG/DL    Triglyceride 117 <150 MG/DL    HDL Cholesterol 73 MG/DL    LDL, calculated 122.6 (H) 0 - 100 MG/DL    VLDL, calculated 23.4 MG/DL    CHOL/HDL Ratio 3.0 0.0 - 5.0     METABOLIC PANEL, COMPREHENSIVE   Result Value Ref Range    Sodium 139 136 - 145 mmol/L    Potassium 4.5 3.5 - 5.1 mmol/L    Chloride 107 97 - 108 mmol/L    CO2 28 21 - 32 mmol/L    Anion gap 4 (L) 5 - 15 mmol/L    Glucose 94 65 - 100 mg/dL    BUN 22 (H) 6 - 20 MG/DL    Creatinine 0.84 0.55 - 1.02 MG/DL    BUN/Creatinine ratio 26 (H) 12 - 20      GFR est AA >60 >60 ml/min/1.73m2    GFR est non-AA >60 >60 ml/min/1.73m2    Calcium 9.6 8.5 - 10.1 MG/DL    Bilirubin, total 0.9 0.2 - 1.0 MG/DL    ALT (SGPT) 28 12 - 78 U/L    AST (SGOT) 16 15 - 37 U/L    Alk. phosphatase 86 45 - 117 U/L    Protein, total 6.4 6.4 - 8.2 g/dL    Albumin 3.5 3.5 - 5.0 g/dL    Globulin 2.9 2.0 - 4.0 g/dL    A-G Ratio 1.2 1.1 - 2.2     TSH 3RD GENERATION   Result Value Ref Range    TSH 1.37 0.36 - 3.74 uIU/mL   T4, FREE   Result Value Ref Range    T4, Free 1.0 0.8 - 1.5 NG/DL             ASSESSMENT & PLAN   Diagnoses and all orders for this visit:    1. Medicare annual wellness visit, subsequent  See separate note under this same encounter visit for Medicare Wellness note. 2. Mixed hyperlipidemia, maintained on statin therapy  -     rosuvastatin (CRESTOR) 10 mg tablet; Take 1 Tablet by mouth nightly. Recent fasting labs, cardiovascular risk and specific lipid/LDL goals reviewed   Reviewed diet, nutrition, exercise, weight management, BMI/goals. 3. Benign essential hypertension, controlled, stable  -     losartan (COZAAR) 100 mg tablet; Take 1 Tablet by mouth daily. 4. Hypothyroidism, stable on TRT  -     levothyroxine (SYNTHROID) 175 mcg tablet; Take 1 Tablet by mouth Daily (before breakfast). Lab Results   Component Value Date/Time    TSH 1.37 09/24/2021 09:24 AM    T4, Free 1.0 09/24/2021 09:24 AM     5. Chronic depression, well controlled on Wellbutrin and Prozac daily  -     buPROPion XL (WELLBUTRIN XL) 150 mg tablet; Take 1 Tablet by mouth Daily (before breakfast). 6. Obesity, Class III, BMI 40-49.9 (morbid obesity) ([facility] [address]  Reviewed diet, nutrition, exercise, weight management, BMI/goals. Age/risk based screening recommendations, health maintenance & prevention counseling. Cancer screening USPTFS guidelines reviewed w/ pt today. Discussed benefits/positive/negative outcomes of screening based on age/risk stratification.  Informed consent for/against screening based on pt's personal hx/risk factors. Updated in history above and health maintenance. She is scheduled for 5 yr colonoscopy follow up on 10-20-21  Reviewed medications and side effects. Doing well on current regimen, no changes at this time. Renewed the rx's she needed today. RTC 1 yr for annual well and fasting follow up w/ labs.

## 2021-10-06 NOTE — PROGRESS NOTES
This is the Subsequent Medicare Annual Wellness Exam, performed 12 months or more after the Initial AWV or the last Subsequent AWV    I have reviewed the patient's medical history in detail and updated the computerized patient record. Assessment/Plan   Education and counseling provided:  Are appropriate based on today's review and evaluation    1. Medicare annual wellness visit, subsequent         Depression Risk Factor Screening     3 most recent PHQ Screens 10/6/2021   PHQ Not Done -   Little interest or pleasure in doing things Not at all   Feeling down, depressed, irritable, or hopeless Not at all   Total Score PHQ 2 0       Alcohol Risk Screen    Do you average more than 1 drink per night or more than 7 drinks a week:  No    On any one occasion in the past three months have you have had more than 3 drinks containing alcohol:  No        Functional Ability and Level of Safety    Hearing: Hearing is good. Activities of Daily Living: The home contains: no safety equipment. Patient does total self care  ADL Assessment 10/6/2021   Feeding yourself No Help Needed   Getting from bed to chair No Help Needed   Getting dressed No Help Needed   Bathing or showering No Help Needed   Walk across the room (includes cane/walker) No Help Needed   Using the telphone No Help Needed   Taking your medications No Help Needed   Preparing meals No Help Needed   Managing money (expenses/bills) No Help Needed   Moderately strenuous housework (laundry) No Help Needed   Shopping for personal items (toiletries/medicines) No Help Needed   Shopping for groceries No Help Needed   Driving No Help Needed   Climbing a flight of stairs No Help Needed   Getting to places beyond walking distances No Help Needed         Ambulation: with no difficulty     Fall Risk:  Fall Risk Assessment, last 12 mths 10/6/2021   Able to walk? Yes   Fall in past 12 months? 0   Do you feel unsteady?  0   Are you worried about falling 0      Abuse Screen:  Patient is not abused       Cognitive Screening    Has your family/caregiver stated any concerns about your memory: no         Health Maintenance Due     Health Maintenance Due   Topic Date Due    Colorectal Cancer Screening Combo  2021       Patient Care Team   Patient Care Team:  Ricky Esquivel MD as PCP - General  Ricky Esquivel [facility] P[facility]  Tamiko Uriarte MD (Breast Surgery)  Matilde Medrano MD as Physician (Plastic Surgery)  Krishan Morrell Master, DO as Physician (Hematology and Oncology)  Tamiko Uriarte MD (Breast Surgery)    History     Patient Active Problem List   Diagnosis Code    Sleep apnea, RENÉE G47.30    Anxiety F41.9    Carpal tunnel syndrome G56.00    Mild Dependent Ankle edema, B M25.473    Perimenopausal N95.1    ? Passed Kidney stone N20.0    Stress incontinence N39.3    S/P benign cervical polypectomy Z98.890, Z87.42    H/O Benign Colon Polyp K63.5    H/O Uterine Fibroids D21.9    Hypercholesterolemia E78.00    Benign essential hypertension I10    Hypothyroidism E03.9    Primary osteoarthritis of knees, bilateral M17.0    Severe obesity (HCC) E66.01    Cancer of overlapping sites of right breast ([address] C50.811    H/O bilateral mastectomy Z90.13    H/O breast reconstruction Z98.890    Arthritis M19.90    Status post right breast lumpectomy Z98.890    Chronic depression F32. A    Adjustment disorder with mixed anxiety and depressed mood F43.23    Aromatase inhibitor use Z79.811    Mixed hyperlipidemia E78.2     Past Medical History:   Diagnosis Date    Adjustment disorder with mixed anxiety and depressed mood 2020    After death of father  and personal diagnosis of/treatment for breast cancer [id number]; Counselin:  [place] [employer] [address] , LCSW Doretha     Ankle edema 2010    Anxiety 2010    Benign essential hypertension 2016     Carpal tunnel syndrome 4/5/2010    Chronic depression 1/30/2020    H/O Benign Colon Polyp 12/20/2010    H/O bilateral mastectomy 12/2/2019    Double Mastectomy (right breast cancer and elective left breast prophylactically); No chemo, No radiation    H/O Uterine Fibroids 12/20/2010    Hypercholesterolemia 6/25/2013    ~2006    Hypothyroidism 5/26/2016 7/1999    Kidney stone 4/5/2010    Mixed hyperlipidemia 7/6/2020    Perimenopausal 4/5/2010    Primary osteoarthritis of knees, bilateral 5/26/2016    Extensive; Ortho (needs knee replacement), CSI x 2, ; CSI 8/23/21 Dr. Ki Padilla Primary osteoarthritis of left knee 5/26/2016    Extensive; Ortho (needs knee replacement), CSI x 2,     S/P benign cervical polypectomy 12/20/2010    Sleep apnea 4/5/2010    USES CPAP    Status post right breast lumpectomy 1/30/2020 6-, Invasive ductal carcinoma--->Mastectomy 8-, no chemo, no radiation, started on Arimidex    Stress incontinence 5/26/2010      Past Surgical History:   Procedure Laterality Date    EKG ORDERABLE  5/2009    NSR, rate 84, normal EKG    HX BLADDER SUSPENSION  ~2010    Dr Cheryle Box Left 09/23/2019    [facility].  Dr. Puja Lim, [address] [place] [address] Mastectomy Implant Infection Left Breast, Replaced Implant, IV abx    HX BREAST BIOPSY Right 11/2011    VPI, right breast biopsy negative    HX BREAST BIOPSY Right 04/22/2019    invasive ductal carcinoma    HX BREAST LUMPECTOMY Right 6/13/2019    RIGHT BREAST LUMPECTOMY ( x2 ) WTIH ULTRASOUND , RIGHT SENTINAL NODE BIOPSY performed by Cat Glasgow [facility] at [facility]    HX BREAST RECONSTRUCTION Bilateral 8/27/2019    B MASTECTOMY AND IMPLANTS, IMMEDIATE BILATERAL BREAST RECONSTRUCTION WITH DIRECT TO IMPLANT USE OF ALLODERM AND BILATERAL BREAST RECONSTRUCTION INTRA-OPERATIVE ASSESMENT OF BLOOD FLOW performed by Moses Galeano MD at [place] [address] Right 03/2010    Dr Kandis Deutsch POLYPECTOMY  2009, 2015    Dr Donis Amado    HX COLONOSCOPY  04/08/2016    Dr Chip Hannon-Internal Hemorrhoids- Normal mucosa in the whole colon - Repeat colonoscopy in 5 years    HX COLONOSCOPY  2016    HX COLONOSCOPY  10/2005    benign polyp; repeat 5 yr per Dr Rd Henao HX COLONOSCOPY  11/2010    Normal, f/u 5 yrs, Dr Koleen Boxer  7/2005    AUB, benign/neg bx; Dr Kebede Kras  10/2012    Dr Donis Amado, AUB    HX HEENT  2019    Nasal Abscess I&D, Dr. Clay Cruz HX HYSTERECTOMY  2013    HX MASTECTOMY Bilateral 8/27/2019    BILATERAL BREAST SKIN SPARING MASTECTOMIES [facility]rell [facility] [address] [facility] [address] [facility] [address] [facility] 2010    CARPAL TUNNEL    HX PARTIAL HYSTERECTOMY  4/26/13    Supracervical hysterectomy for fibroids, Dr Noni Disla HX UROLOGICAL  2010    Chanetta Harder Bilateral 10/15/2019    [facility] Dr. Yong Castaneda, B Breast Implant pain and recurrent left breast infection; on wound vac w/ iv abx; wound pump removed 12-4-2019     Current Outpatient Medications   Medication Sig Dispense Refill    TURMERIC PO Take  by mouth daily.  anastrozole (ARIMIDEX) 1 mg tablet TAKE 1 TABLET BY MOUTH EVERY DAY 90 Tablet 3    buPROPion XL (WELLBUTRIN XL) 150 mg tablet TAKE 1 TABLET BY MOUTH EVERY DAY BEFORE BREAKFAST 30 Tablet 0    losartan (COZAAR) 100 mg tablet TAKE 1 TABLET BY MOUTH DAILY 30 Tablet 0    levothyroxine (SYNTHROID) 175 mcg tablet TAKE 1 TABLET BY MOUTH DAILY BEFORE BREAKFAST 30 Tablet 0    rosuvastatin (CRESTOR) 10 mg tablet TAKE 1 TABLET BY MOUTH EVERY NIGHT 30 Tablet 0    FLUoxetine (PROzac) 20 mg capsule TAKE 1 CAPSULE BY MOUTH DAILY 90 Capsule 1    ascorbic acid (VITAMIN C PO) Take  by mouth.  multivitamin (ONE A DAY) tablet Take 1 Tab by mouth daily.       cholecalciferol, vitamin D3, (Vitamin D3) 50 mcg (2,000 unit) tab Take 1 Tab by mouth daily.       OTHER Portable CPAP machine for RENÉE, with new mask and tubing but no change to mask or settings; G47.30 GARO 99m prog good 1 Each 0     Allergies   Allergen Reactions    Pcn [Penicillins] Hives    Sulfa (Sulfonamide Antibiotics) Hives    Zocor [Simvastatin] Myalgia    Lisinopril Cough       Family History   Problem Relation Age of Onset    Arthritis-osteo Father     Stroke Father         TIA's    Cancer Father         melanoma on back removed    Pacemaker Father 80    Heart Disease Father          79 yo in     Hypertension Mother     Arthritis-osteo Mother     Heart Disease Mother         valve disease,  2013    Heart Attack Maternal Grandmother 76    No Known Problems Sister     No Known Problems Brother     Depression Daughter     Substance Abuse Daughter     Alcohol abuse Daughter     Anxiety Daughter      Social History     Tobacco Use    Smoking status: Former Smoker     Packs/day: 0.50     Years: 25.00     Pack years: 12.50     Quit date: 2005     Years since quittin.7    Smokeless tobacco: Never Used   Substance Use Topics    Alcohol use: Not Currently     Comment: 2 shots of Rum ~ 5 nights a week         Jd Hopper MD

## 2021-10-08 DIAGNOSIS — F32.A CHRONIC DEPRESSION: ICD-10-CM

## 2021-10-08 DIAGNOSIS — I10 BENIGN ESSENTIAL HYPERTENSION: ICD-10-CM

## 2021-10-08 DIAGNOSIS — E03.9 HYPOTHYROIDISM, UNSPECIFIED TYPE: ICD-10-CM

## 2021-10-08 NOTE — TELEPHONE ENCOUNTER
How can Courtney be sending me all these when I just did them 2 days ago all for 90 day supply x 1 yr? Good grief? Did they not go through?

## 2021-10-11 RX ORDER — LEVOTHYROXINE SODIUM 175 UG/1
TABLET ORAL
Qty: 90 TABLET | Refills: 3 | OUTPATIENT
Start: 2021-10-11

## 2021-10-11 RX ORDER — LOSARTAN POTASSIUM 100 MG/1
100 TABLET ORAL DAILY
Qty: 90 TABLET | Refills: 3 | OUTPATIENT
Start: 2021-10-11

## 2021-10-11 RX ORDER — BUPROPION HYDROCHLORIDE 150 MG/1
TABLET ORAL
Qty: 30 TABLET | OUTPATIENT
Start: 2021-10-11

## 2021-10-25 ENCOUNTER — HOSPITAL ENCOUNTER (OUTPATIENT)
Dept: PREADMISSION TESTING | Age: 66
Discharge: HOME | End: 2021-10-25
Payer: MEDICARE

## 2021-10-25 ENCOUNTER — TRANSCRIBE ORDER (OUTPATIENT)
Dept: REGISTRATION | Age: 66
End: 2021-10-25

## 2021-10-25 DIAGNOSIS — Z01.812 PRE-PROCEDURE LAB EXAM: ICD-10-CM

## 2021-10-25 DIAGNOSIS — Z01.812 PRE-PROCEDURE LAB EXAM: Primary | ICD-10-CM

## 2021-10-25 PROCEDURE — U0005 INFEC AGEN DETEC AMPLI PROBE: HCPCS

## 2021-10-26 LAB
SARS-COV-2, XPLCVT: NOT DETECTED
SOURCE, COVRS: NORMAL

## 2021-10-28 ENCOUNTER — HOSPITAL ENCOUNTER (OUTPATIENT)
Age: 66
Setting detail: OUTPATIENT SURGERY
Discharge: HOME | End: 2021-10-28
Attending: INTERNAL MEDICINE | Admitting: INTERNAL MEDICINE
Payer: MEDICARE

## 2021-10-28 ENCOUNTER — ANESTHESIA EVENT (OUTPATIENT)
Dept: ENDOSCOPY | Age: 66
End: 2021-10-28
Payer: MEDICARE

## 2021-10-28 ENCOUNTER — ANESTHESIA (OUTPATIENT)
Dept: ENDOSCOPY | Age: 66
End: 2021-10-28
Payer: MEDICARE

## 2021-10-28 VITALS
WEIGHT: 280 LBS | SYSTOLIC BLOOD PRESSURE: 137 MMHG | DIASTOLIC BLOOD PRESSURE: 60 MMHG | RESPIRATION RATE: 17 BRPM | BODY MASS INDEX: 42.44 KG/M2 | HEIGHT: 68 IN | HEART RATE: 67 BPM | OXYGEN SATURATION: 97 %

## 2021-10-28 PROCEDURE — 76060000032 HC ANESTHESIA 0.5 TO 1 HR: Performed by: INTERNAL MEDICINE

## 2021-10-28 PROCEDURE — 76040000007: Performed by: INTERNAL MEDICINE

## 2021-10-28 PROCEDURE — 74011250636 HC RX REV CODE- 250/636: Performed by: NURSE ANESTHETIST, CERTIFIED REGISTERED

## 2021-10-28 PROCEDURE — 74011000250 HC RX REV CODE- 250: Performed by: NURSE ANESTHETIST, CERTIFIED REGISTERED

## 2021-10-28 PROCEDURE — 88305 TISSUE EXAM BY PATHOLOGIST: CPT

## 2021-10-28 PROCEDURE — 77030021593 HC FCPS BIOP ENDOSC BSC -A: Performed by: INTERNAL MEDICINE

## 2021-10-28 RX ORDER — SODIUM CHLORIDE 9 MG/ML
50 INJECTION, SOLUTION INTRAVENOUS CONTINUOUS
Status: DISCONTINUED | OUTPATIENT
Start: 2021-10-28 | End: 2021-10-28 | Stop reason: HOSPADM

## 2021-10-28 RX ORDER — MIDAZOLAM HYDROCHLORIDE 1 MG/ML
.25-5 INJECTION, SOLUTION INTRAMUSCULAR; INTRAVENOUS
Status: DISCONTINUED | OUTPATIENT
Start: 2021-10-28 | End: 2021-10-28 | Stop reason: HOSPADM

## 2021-10-28 RX ORDER — NALOXONE HYDROCHLORIDE 0.4 MG/ML
0.4 INJECTION, SOLUTION INTRAMUSCULAR; INTRAVENOUS; SUBCUTANEOUS
Status: DISCONTINUED | OUTPATIENT
Start: 2021-10-28 | End: 2021-10-28 | Stop reason: HOSPADM

## 2021-10-28 RX ORDER — SODIUM CHLORIDE 0.9 % (FLUSH) 0.9 %
5-40 SYRINGE (ML) INJECTION EVERY 8 HOURS
Status: DISCONTINUED | OUTPATIENT
Start: 2021-10-28 | End: 2021-10-28 | Stop reason: HOSPADM

## 2021-10-28 RX ORDER — SODIUM CHLORIDE 0.9 % (FLUSH) 0.9 %
5-40 SYRINGE (ML) INJECTION AS NEEDED
Status: DISCONTINUED | OUTPATIENT
Start: 2021-10-28 | End: 2021-10-28 | Stop reason: HOSPADM

## 2021-10-28 RX ORDER — SODIUM CHLORIDE 9 MG/ML
INJECTION, SOLUTION INTRAVENOUS
Status: DISCONTINUED | OUTPATIENT
Start: 2021-10-28 | End: 2021-10-28 | Stop reason: HOSPADM

## 2021-10-28 RX ORDER — PROPOFOL 10 MG/ML
INJECTION, EMULSION INTRAVENOUS AS NEEDED
Status: DISCONTINUED | OUTPATIENT
Start: 2021-10-28 | End: 2021-10-28 | Stop reason: HOSPADM

## 2021-10-28 RX ORDER — FENTANYL CITRATE 50 UG/ML
25-200 INJECTION, SOLUTION INTRAMUSCULAR; INTRAVENOUS
Status: DISCONTINUED | OUTPATIENT
Start: 2021-10-28 | End: 2021-10-28 | Stop reason: HOSPADM

## 2021-10-28 RX ORDER — LIDOCAINE HYDROCHLORIDE 20 MG/ML
INJECTION, SOLUTION EPIDURAL; INFILTRATION; INTRACAUDAL; PERINEURAL AS NEEDED
Status: DISCONTINUED | OUTPATIENT
Start: 2021-10-28 | End: 2021-10-28 | Stop reason: HOSPADM

## 2021-10-28 RX ORDER — FLUMAZENIL 0.1 MG/ML
0.2 INJECTION INTRAVENOUS
Status: DISCONTINUED | OUTPATIENT
Start: 2021-10-28 | End: 2021-10-28 | Stop reason: HOSPADM

## 2021-10-28 RX ORDER — DEXTROMETHORPHAN/PSEUDOEPHED 2.5-7.5/.8
1.2 DROPS ORAL
Status: DISCONTINUED | OUTPATIENT
Start: 2021-10-28 | End: 2021-10-28 | Stop reason: HOSPADM

## 2021-10-28 RX ORDER — EPINEPHRINE 0.1 MG/ML
1 INJECTION INTRACARDIAC; INTRAVENOUS
Status: DISCONTINUED | OUTPATIENT
Start: 2021-10-28 | End: 2021-10-28 | Stop reason: HOSPADM

## 2021-10-28 RX ORDER — ATROPINE SULFATE 0.1 MG/ML
0.5 INJECTION INTRAVENOUS
Status: DISCONTINUED | OUTPATIENT
Start: 2021-10-28 | End: 2021-10-28 | Stop reason: HOSPADM

## 2021-10-28 RX ADMIN — PROPOFOL 20 MG: 10 INJECTION, EMULSION INTRAVENOUS at 08:29

## 2021-10-28 RX ADMIN — PROPOFOL 20 MG: 10 INJECTION, EMULSION INTRAVENOUS at 08:26

## 2021-10-28 RX ADMIN — PROPOFOL 30 MG: 10 INJECTION, EMULSION INTRAVENOUS at 08:32

## 2021-10-28 RX ADMIN — PROPOFOL 50 MG: 10 INJECTION, EMULSION INTRAVENOUS at 08:18

## 2021-10-28 RX ADMIN — PROPOFOL 50 MG: 10 INJECTION, EMULSION INTRAVENOUS at 08:23

## 2021-10-28 RX ADMIN — LIDOCAINE HYDROCHLORIDE 50 MG: 20 INJECTION, SOLUTION EPIDURAL; INFILTRATION; INTRACAUDAL; PERINEURAL at 08:18

## 2021-10-28 RX ADMIN — PROPOFOL 30 MG: 10 INJECTION, EMULSION INTRAVENOUS at 08:24

## 2021-10-28 RX ADMIN — PROPOFOL 20 MG: 10 INJECTION, EMULSION INTRAVENOUS at 08:19

## 2021-10-28 RX ADMIN — PROPOFOL 30 MG: 10 INJECTION, EMULSION INTRAVENOUS at 08:20

## 2021-10-28 RX ADMIN — PROPOFOL 20 MG: 10 INJECTION, EMULSION INTRAVENOUS at 08:25

## 2021-10-28 RX ADMIN — SODIUM CHLORIDE: 900 INJECTION, SOLUTION INTRAVENOUS at 08:03

## 2021-10-28 NOTE — DISCHARGE INSTRUCTIONS
[address] [facility]  174 [facility], [address] [place] [address]    COLON DISCHARGE INSTRUCTIONS    Divina Valencia  [id number]  1955    Discomfort:  Redness at IV site- apply warm compress to area; if redness or soreness persist- contact your physician  There may be a slight amount of blood passed from the rectum  Gaseous discomfort- walking, belching will help relieve any discomfort  You may not operate a vehicle for 12 hours  You may not engage in an occupation involving machinery or appliances for rest of today  You may not drink alcoholic beverages for at least 12 hours  Avoid making any critical decisions for at least 24 hour  DIET:  You may resume your regular diet - however -  remember your colon is empty and a heavy meal will produce gas. Avoid these foods:  vegetables, fried / greasy foods, carbonated drinks     ACTIVITY:  You may  resume your normal daily activities it is recommended that you spend the remainder of the day resting -  avoid any strenuous activity. CALL M.D. ANY SIGN OF:   Increasing pain, nausea, vomiting  Abdominal distension (swelling)  New increased bleeding (oral or rectal)  Fever (chills)  Pain in chest area  Bloody discharge from nose or mouth  Shortness of breath      Follow-up Instructions:   Call Dr. Carrie Jeffrey for any questions or problems at [address]:   Your colonoscopy showed one polyp, which was removed. We will contact you about the pathology results. Your next colonoscopy will be due in 5 years. Please maintain a high fiber diet. Telephone # [phone]      Signed By: J Carlos Yi.  Olya Laura MD     10/28/2021  8:38 AM       DISCHARGE SUMMARY from Nurse    The following personal items collected during your admission are returned to you:   Dental Appliance: Dental Appliances: None  Vision: Visual Aid: Glasses  Hearing Aid:    Jewelry:    Clothing:    Other Valuables:    Valuables sent to safe:      Learning About Coronavirus (COVID-19)  Coronavirus (COVID-19): Overview  What is coronavirus ([id number])? The coronavirus disease (COVID-19) is caused by a virus. It is an illness that was first found in [place], [place], in December 2019. It has since spread worldwide. The virus can cause fever, cough, and trouble breathing. In severe cases, it can cause pneumonia and make it hard to breathe without help. It can cause death. Coronaviruses are a large group of viruses. They cause the common cold. They also cause more serious illnesses like Middle East respiratory syndrome (MERS) and severe acute respiratory syndrome (SARS). COVID-19 is caused by a novel coronavirus. That means it's a new type that has not been seen in people before. This virus spreads person-to-person through droplets from coughing and sneezing. It can also spread when you are close to someone who is infected. And it can spread when you touch something that has the virus on it, such as a doorknob or a tabletop. What can you do to protect yourself from coronavirus (COVID-19)? The best way to protect yourself from getting sick is to:  · Avoid areas where there is an outbreak. · Avoid contact with people who may be infected. · Wash your hands often with soap or alcohol-based hand sanitizers. · Avoid crowds and try to stay at least 6 feet away from other people. · Wash your hands often, especially after you cough or sneeze. Use soap and water, and scrub for at least 20 seconds. If soap and water aren't available, use an alcohol-based hand . · Avoid touching your mouth, nose, and eyes. What can you do to avoid spreading the virus to others? To help avoid spreading the virus to others:  · Cover your mouth with a tissue when you cough or sneeze. Then throw the tissue in the trash. · Use a disinfectant to clean things that you touch often. · Stay home if you are sick or have been exposed to the virus. Don't go to school, work, or public areas.  And don't use public transportation. · If you are sick:  ? Leave your home only if you need to get medical care. But call the doctor's office first so they know you're coming. And wear a face mask, if you have one.  ? If you have a face mask, wear it whenever you're around other people. It can help stop the spread of the virus when you cough or sneeze. ? Clean and disinfect your home every day. Use household  and disinfectant wipes or sprays. Take special care to clean things that you grab with your hands. These include doorknobs, remote controls, phones, and handles on your refrigerator and microwave. And don't forget countertops, tabletops, bathrooms, and computer keyboards. When to call for help  Call 911 anytime you think you may need emergency care. For example, call if:  · You have severe trouble breathing. (You can't talk at all.)  · You have constant chest pain or pressure. · You are severely dizzy or lightheaded. · You are confused or can't think clearly. · Your face and lips have a blue color. · You pass out (lose consciousness) or are very hard to wake up. Call your doctor now if you develop symptoms such as:  · Shortness of breath. · Fever. · Cough. If you need to get care, call ahead to the doctor's office for instructions before you go. Make sure you wear a face mask, if you have one, to prevent exposing other people to the virus. Where can you get the latest information? The following health organizations are tracking and studying this virus. Their websites contain the most up-to-date information. Mariella Tamayo also learn what to do if you think you may have been exposed to the virus. · U.S. Centers for Disease Control and Prevention (CDC): The CDC provides updated news about the disease and travel advice. The website also tells you how to prevent the spread of infection. www.cdc.gov  · World Health Organization [facility]): WHO offers information about the virus outbreaks. WHO also has travel advice. www.who.int  Current as of: April 1, 2020               Content Version: 12.4  © 4754-6913 [employer], [employer]orporated. Care instructions adapted under license by your healthcare professional. If you have questions about a medical condition or this instruction, always ask your healthcare professional. [address] any warranty or liability for your use of this information.

## 2021-10-28 NOTE — PROCEDURES
[address]  174 [facility], [address] [place], [place] [address]      Colonoscopy Operative Report    Bindu Reza  [id number]  1955      Procedure Type:   Colonoscopy with polypectomy (cold biopsy)     Indications:    Personal history of colon polyps (screening only)         Pre-operative Diagnosis: see indication above    Post-operative Diagnosis:  See findings below    :  Jennifer Ferguson. Hal Bella MD    Staff: Endoscopy Technician-1: Corine Valentino  Endoscopy RN-1: Jennifer Ferguson RN     Referring Provider: Chani Singletary MD      Sedation:  MAC anesthesia Propofol      Procedure Details:  After informed consent was obtained with all risks and benefits of procedure explained and preoperative exam completed, the patient was taken to the endoscopy suite and placed in the left lateral decubitus position. Upon sequential sedation as per above, a digital rectal exam was performed demonstrating internal hemorrhoids. The Olympus pediatric videocolonoscope  was inserted in the rectum and carefully advanced to the cecum, which was identified by the ileocecal valve and appendiceal orifice. The cecum was identified by the ileocecal valve and appendiceal orifice. The quality of preparation was good. The colonoscope was slowly withdrawn with careful evaluation between folds. Retroflexion in the rectum was completed . Findings:   Rectum: normal  Sigmoid: mild diverticulosis  Descending Colon: normal  Transverse Colon: normal  Ascending Colon: single 2-3 mm sessile polyp - removed  Cecum: normal  Terminal Ileum: not intubated      Specimen Removed: 1. Ascending colon    Complications: None. EBL:  None. Impression:    As above    Recommendations:  1. Follow up surgical pathology  2. Repeat colonoscopy in 5 years  3. High fiber diet education    Signed By: Jennifer Ferguson.  Hal Bella MD     10/28/2021  8:39 AM

## 2021-10-28 NOTE — H&P
[address]  [address] [facility]      History and Physical       NAME:  Keiry Whitaker   :   1955   MRN:   [id number]             History of Present Illness:  Patient is a 77 y.o. who is seen for history of colon polyps. Last colonoscopy was in  and no polyps were removed. PMH:  Past Medical History:   Diagnosis Date    Adjustment disorder with mixed anxiety and depressed mood 2020    After death of father  and personal diagnosis of/treatment for breast cancer [id number]; Counselin:  [place] [address] [facility]     Ankle edema 2010    Anxiety 2010    Benign essential hypertension 2016     Carpal tunnel syndrome 2010    Chronic depression 2020    H/O Benign Colon Polyp 2010    H/O bilateral mastectomy 2019    Double Mastectomy (right breast cancer and elective left breast prophylactically); No chemo, No radiation    H/O Uterine Fibroids 2010    Hypercholesterolemia 2013    ~    Hypothyroidism 2016    Kidney stone 2010    Mixed hyperlipidemia 2020    Perimenopausal 2010    Primary osteoarthritis of knees, bilateral 2016    Extensive; Ortho (needs knee replacement), CSI x 2, ; CSI 21 Dr. Aimee Crouch Primary osteoarthritis of left knee 2016    Extensive; Ortho (needs knee replacement), CSI x 2,     S/P benign cervical polypectomy 2010    Sleep apnea 2010    USES CPAP    Status post right breast lumpectomy 2020, Invasive ductal carcinoma--->Mastectomy 2019, no chemo, no radiation, started on Arimidex    Stress incontinence 2010       PSH:  Past Surgical History:   Procedure Laterality Date    EKG ORDERABLE  2009    NSR, rate 84, normal EKG    HX BLADDER SUSPENSION  ~    Dr Oniel Felix Left 2019    [facility].  Dr. Mateus Stokes, Post Mastectomy Implant Infection Left Breast, Replaced Implant, IV abx    HX BREAST BIOPSY Right 11/2011    VPI, right breast biopsy negative    HX BREAST BIOPSY Right 04/22/2019    invasive ductal carcinoma    HX BREAST LUMPECTOMY Right 6/13/2019    RIGHT BREAST LUMPECTOMY ( x2 ) WTIH ULTRASOUND , RIGHT SENTINAL NODE BIOPSY performed by Danielle Thurston MD at \[facility]\"" AMBULATORY OR    HX BREAST RECONSTRUCTION Bilateral 8/27/2019    B MASTECTOMY AND IMPLANTS, IMMEDIATE BILATERAL BREAST RECONSTRUCTION WITH DIRECT TO IMPLANT USE OF ALLODERM AND BILATERAL BREAST RECONSTRUCTION INTRA-OPERATIVE ASSESMENT OF BLOOD FLOW performed by Marsha Baca MD at [place] [address] Right 03/2010    Dr Marcella Kenny CERVICAL POLYPECTOMY  2009, 2015    Dr Shannon Roach    HX COLONOSCOPY  04/08/2016    Dr Andrzej Hannon-Internal Hemorrhoids- Normal mucosa in the whole colon - Repeat colonoscopy in 5 years    HX COLONOSCOPY  2016    HX COLONOSCOPY  10/2005    benign polyp; repeat 5 yr per Dr Sarina Tan HX COLONOSCOPY  11/2010    Normal, f/u 5 yrs, Dr Moses Paredes  Scheduled 10-    HX Taffy Sluder  7/2005    AUB, benign/neg bx; Dr Niki Tolbert  10/2012    Dr Shannon Roach, AUB    HX HEENT  2019    Nasal Abscess I&D, Dr. Elizabeth Castle HX HYSTERECTOMY  2013    HX MASTECTOMY Bilateral 8/27/2019    BILATERAL BREAST SKIN SPARING MASTECTOMIES performed by Danielle Thurston MD at [address] [place] [address] Right 2010    CARPAL TUNNEL    HX PARTIAL HYSTERECTOMY  4/26/13    Supracervical hysterectomy for fibroids, Dr Maximiliano Myers HX UROLOGICAL  2010    BLADDER SLING     HX Eyad Marek 50 Bilateral 10/15/2019    [facility] JON Gilliam Breast Implant pain and recurrent left breast infection; on wound vac w/ iv abx; wound pump removed 12-4-2019 Allergies: Allergies   Allergen Reactions    Pcn [Penicillins] Hives    Sulfa (Sulfonamide Antibiotics) Hives    Zocor [Simvastatin] Myalgia    Lisinopril Cough       Home Medications:  Prior to Admission Medications   Prescriptions Last Dose Informant Patient Reported? Taking? FLUoxetine (PROzac) 20 mg capsule 10/27/2021 at Unknown time  No Yes   Sig: TAKE 1 CAPSULE BY MOUTH DAILY   OTHER 10/28/2021 at Unknown time  No Yes   Sig: Portable CPAP machine for RENÉE, with new mask and tubing but no change to mask or settings; G47.30 GARO 99m prog good   TURMERIC PO 10/27/2021 at Unknown time  Yes Yes   Sig: Take  by mouth daily. anastrozole (ARIMIDEX) 1 mg tablet 10/27/2021 at Unknown time  No Yes   Sig: TAKE 1 TABLET BY MOUTH EVERY DAY   ascorbic acid (VITAMIN C PO) 10/27/2021 at Unknown time  Yes Yes   Sig: Take  by mouth. buPROPion XL (WELLBUTRIN XL) 150 mg tablet 10/27/2021 at Unknown time  No Yes   Sig: Take 1 Tablet by mouth Daily (before breakfast). cholecalciferol, vitamin D3, (Vitamin D3) 50 mcg (2,000 unit) tab 10/27/2021 at Unknown time  Yes Yes   Sig: Take 1 Tab by mouth daily. levothyroxine (SYNTHROID) 175 mcg tablet 10/27/2021 at Unknown time  No Yes   Sig: Take 1 Tablet by mouth Daily (before breakfast). losartan (COZAAR) 100 mg tablet 10/27/2021 at Unknown time  No Yes   Sig: Take 1 Tablet by mouth daily. multivitamin (ONE A DAY) tablet 10/21/2021 at Unknown time  Yes Yes   Sig: Take 1 Tab by mouth daily. rosuvastatin (CRESTOR) 10 mg tablet 10/27/2021 at Unknown time  No Yes   Sig: TAKE 1 TABLET BY MOUTH EVERY NIGHT   rosuvastatin (CRESTOR) 10 mg tablet   No No   Sig: Take 1 Tablet by mouth nightly.       Facility-Administered Medications: None       Hospital Medications:  Current Facility-Administered Medications   Medication Dose Route Frequency    0.9% sodium chloride infusion  50 mL/hr IntraVENous CONTINUOUS    sodium chloride (NS) flush 5-40 mL  5-40 mL IntraVENous Q8H    sodium chloride (NS) flush 5-40 mL  5-40 mL IntraVENous PRN    midazolam (VERSED) injection 0.25-5 mg  0.25-5 mg IntraVENous Multiple    fentaNYL citrate (PF) injection  mcg   mcg IntraVENous Multiple    naloxone (NARCAN) injection 0.4 mg  0.4 mg IntraVENous Multiple    flumazeniL (ROMAZICON) 0.1 mg/mL injection 0.2 mg  0.2 mg IntraVENous Multiple    simethicone (MYLICON) 14WA/3.7VD oral drops 80 mg  1.2 mL Oral Multiple    atropine injection 0.5 mg  0.5 mg IntraVENous ONCE PRN    EPINEPHrine (ADRENALIN) 0.1 mg/mL syringe 1 mg  1 mg Endoscopically ONCE PRN       Social History:  Social History     Tobacco Use    Smoking status: Former Smoker     Packs/day: 0.50     Years: 25.00     Pack years: 12.50     Quit date: 2005     Years since quittin.8    Smokeless tobacco: Never Used   Substance Use Topics    Alcohol use: Not Currently     Comment: 2 shots of Rum ~ 5 nights a week       Family History:  Family History   Problem Relation Age of Onset    Arthritis-osteo Father     Stroke Father         TIA's    Cancer Father         melanoma on back removed    Pacemaker Father 80    Heart Disease Father          79 yo in     Hypertension Mother     Arthritis-osteo Mother     Heart Disease Mother         valve disease,  2013    Heart Attack Maternal Grandmother 76    No Known Problems Sister     No Known Problems Brother     Depression Daughter     Substance Abuse Daughter     Alcohol abuse Daughter     Anxiety Daughter              Review of Systems:      Constitutional: negative fever, negative chills, negative weight loss  Eyes:   negative visual changes  ENT:   negative sore throat, tongue or lip swelling  Respiratory:  negative cough, negative dyspnea  Cards:  negative for chest pain, palpitations, lower extremity edema  GI:   See HPI  :  negative for frequency, dysuria  Integument:  negative for rash and pruritus  Heme:  negative for easy bruising and gum/nose bleeding  Musculoskel: negative for myalgias,  back pain and muscle weakness  Neuro: negative for headaches, dizziness, vertigo  Psych:  negative for feelings of anxiety, depression       Objective:     Patient Vitals for the past 8 hrs:   BP Pulse Resp SpO2 Height Weight   10/28/21 0737 (!) 145/70 70 16 98 % 5' 8\" (1.727 m) 127 kg (280 lb)     No intake/output data recorded. No intake/output data recorded. EXAM:     NEURO-a&o   HEENT-wnl   LUNGS-clear    COR-regular rate and rhythym     ABD-soft , no tenderness, no rebound, good bs     EXT-no edema     Data Review     No results for input(s): WBC, HGB, HCT, PLT, HGBEXT, HCTEXT, PLTEXT in the last 72 hours. No results for input(s): NA, K, CL, CO2, BUN, CREA, GLU, PHOS, CA in the last 72 hours. No results for input(s): AP, TBIL, TP, ALB, GLOB, GGT, AML, LPSE in the last 72 hours. No lab exists for component: SGOT, GPT, AMYP, HLPSE  No results for input(s): INR, PTP, APTT, INREXT in the last 72 hours. Assessment:     · History of colon polyps     Patient Active Problem List   Diagnosis Code    Sleep apnea, RENÉE G47.30    Anxiety F41.9    Carpal tunnel syndrome G56.00    Mild Dependent Ankle edema, B M25.473    Perimenopausal N95.1    ? Passed Kidney stone N20.0    Stress incontinence N39.3    S/P benign cervical polypectomy Z98.890, Z87.42    H/O Benign Colon Polyp K63.5    H/O Uterine Fibroids D21.9    Hypercholesterolemia E78.00    Benign essential hypertension I10    Hypothyroidism E03.9    Primary osteoarthritis of knees, bilateral M17.0    Severe obesity (HCC) E66.01    Cancer of overlapping sites of right breast ([facility] [address] C50.811    H/O bilateral mastectomy Z90.13    H/O breast reconstruction Z98.890    Arthritis M19.90    Status post right breast lumpectomy Z98.890    Chronic depression F32. A    Adjustment disorder with mixed anxiety and depressed mood F43.23    Aromatase inhibitor use Z79.811    Mixed hyperlipidemia E78.2     Plan:   · The patient was counseled at length about the risks of kevin Covid-19 in the kareem-operative and post-operative states including the recovery window of their procedure. The patient was made aware that kevin Covid-19 after a surgical procedure may worsen their prognosis for recovering from the virus and lend to a higher morbidity and or mortality risk. The patient was given the options of postponing their procedure. All of the risks, benefits, and alternatives were discussed. The patient does wish to proceed with the procedure. · Endoscopic procedure with MAC     Signed By: Silvia Alba.  Nomi Mohan MD     10/28/2021  8:14 AM

## 2021-10-28 NOTE — ANESTHESIA POSTPROCEDURE EVALUATION
Post-Anesthesia Evaluation and Assessment    Patient: Randolph Rios MRN: [id number]  SSN: [id number]    YOB: 1955  Age: 77 y.o. Sex: female      I have evaluated the patient and they are stable and ready for discharge from the PACU. Cardiovascular Function/Vital Signs  Visit Vitals  /60   Pulse 67   Resp 17   Ht 5' 8\" (1.727 m)   Wt 127 kg (280 lb)   SpO2 97%   Breastfeeding No   BMI 42.57 kg/m²       Patient is status post MAC anesthesia for Procedure(s):  COLONOSCOPY   :-  ENDOSCOPIC POLYPECTOMY. Nausea/Vomiting: None    Postoperative hydration reviewed and adequate. Pain:  Pain Scale 1: Numeric (0 - 10) (10/28/21 0737)  Pain Intensity 1: 0 (10/28/21 0737)   Managed    Neurological Status: At baseline    Mental Status, Level of Consciousness: Alert and  oriented to person, place, and time    Pulmonary Status:   O2 Device: CO2 nasal cannula (10/28/21 0835)   Adequate oxygenation and airway patent    Complications related to anesthesia: None    Post-anesthesia assessment completed. No concerns    Signed By: Nikkie Jimenez MD     October 28, 2021              Procedure(s):  COLONOSCOPY   :-  ENDOSCOPIC POLYPECTOMY. MAC    <BSHSIANPOST>    INITIAL Post-op Vital signs:   Vitals Value Taken Time   /60 10/28/21 0855   Temp     Pulse 65 10/28/21 0858   Resp 20 10/28/21 0858   SpO2 98 % 10/28/21 0858   Vitals shown include unvalidated device data.

## 2021-10-28 NOTE — PERIOP NOTES
.Patient has been evaluated by anesthesia pre-procedure. Patient alert and oriented. Vital signs will not be charted by the Endoscopy nurse. All vitals, airway, and loc are monitored by anesthesia staff throughout procedure. .Endoscope will  Be pre-cleaned at bedside immediately following procedure by  CT.

## 2021-10-28 NOTE — ANESTHESIA PREPROCEDURE EVALUATION
Relevant Problems   RESPIRATORY SYSTEM   (+) Sleep apnea, RENÉE      NEUROLOGY   (+) Adjustment disorder with mixed anxiety and depressed mood   (+) Chronic depression      CARDIOVASCULAR   (+) Benign essential hypertension      RENAL FAILURE   (+) ? Passed Kidney stone      ENDOCRINE   (+) Arthritis   (+) Hypothyroidism   (+) Severe obesity (HCC)      PERSONAL HX & FAMILY HX OF CANCER   (+) Cancer of overlapping sites of right breast [facility])       Anesthetic History   No history of anesthetic complications            Review of Systems / Medical History  Patient summary reviewed, nursing notes reviewed and pertinent labs reviewed    Pulmonary        Sleep apnea: CPAP           Neuro/Psych         Psychiatric history (anxiety)     Cardiovascular    Hypertension: well controlled              Exercise tolerance: >4 METS     GI/Hepatic/Renal  Within defined limits              Endo/Other      Hypothyroidism: well controlled  Morbid obesity, arthritis ( knees) and cancer (right breast)  Pertinent negatives: No obesity   Other Findings              Physical Exam    Airway  Mallampati: I  TM Distance: 4 - 6 cm  Neck ROM: normal range of motion   Mouth opening: Normal     Cardiovascular    Rhythm: regular  Rate: normal      Pertinent negatives: No murmur   Dental    Dentition: Caps/crowns  Comments:  On molars   Pulmonary  Breath sounds clear to auscultation               Abdominal  GI exam deferred       Other Findings            Anesthetic Plan    ASA: 3  Anesthesia type: MAC          Induction: Intravenous  Anesthetic plan and risks discussed with: Patient

## 2021-12-02 ENCOUNTER — OFFICE VISIT (OUTPATIENT)
Dept: ORTHOPEDIC SURGERY | Age: 66
End: 2021-12-02
Payer: MEDICARE

## 2021-12-02 VITALS — WEIGHT: 275 LBS | HEIGHT: 69 IN | BODY MASS INDEX: 40.73 KG/M2

## 2021-12-02 DIAGNOSIS — M17.0 PRIMARY OSTEOARTHRITIS OF KNEES, BILATERAL: ICD-10-CM

## 2021-12-02 DIAGNOSIS — M25.561 ACUTE PAIN OF RIGHT KNEE: Primary | ICD-10-CM

## 2021-12-02 DIAGNOSIS — M25.562 ACUTE PAIN OF LEFT KNEE: ICD-10-CM

## 2021-12-02 PROCEDURE — G9708 BILAT MAST/HX BI /UNILAT MAS: HCPCS | Performed by: ORTHOPAEDIC SURGERY

## 2021-12-02 PROCEDURE — 1101F PT FALLS ASSESS-DOCD LE1/YR: CPT | Performed by: ORTHOPAEDIC SURGERY

## 2021-12-02 PROCEDURE — G9717 DOC PT DX DEP/BP F/U NT REQ: HCPCS | Performed by: ORTHOPAEDIC SURGERY

## 2021-12-02 PROCEDURE — G8536 NO DOC ELDER MAL SCRN: HCPCS | Performed by: ORTHOPAEDIC SURGERY

## 2021-12-02 PROCEDURE — 99214 OFFICE O/P EST MOD 30 MIN: CPT | Performed by: ORTHOPAEDIC SURGERY

## 2021-12-02 PROCEDURE — 20610 DRAIN/INJ JOINT/BURSA W/O US: CPT | Performed by: ORTHOPAEDIC SURGERY

## 2021-12-02 PROCEDURE — G8399 PT W/DXA RESULTS DOCUMENT: HCPCS | Performed by: ORTHOPAEDIC SURGERY

## 2021-12-02 PROCEDURE — G8756 NO BP MEASURE DOC: HCPCS | Performed by: ORTHOPAEDIC SURGERY

## 2021-12-02 PROCEDURE — G8427 DOCREV CUR MEDS BY ELIG CLIN: HCPCS | Performed by: ORTHOPAEDIC SURGERY

## 2021-12-02 PROCEDURE — G8417 CALC BMI ABV UP PARAM F/U: HCPCS | Performed by: ORTHOPAEDIC SURGERY

## 2021-12-02 PROCEDURE — 3017F COLORECTAL CA SCREEN DOC REV: CPT | Performed by: ORTHOPAEDIC SURGERY

## 2021-12-02 PROCEDURE — 1090F PRES/ABSN URINE INCON ASSESS: CPT | Performed by: ORTHOPAEDIC SURGERY

## 2021-12-02 RX ORDER — METHYLPREDNISOLONE ACETATE 40 MG/ML
80 INJECTION, SUSPENSION INTRA-ARTICULAR; INTRALESIONAL; INTRAMUSCULAR; SOFT TISSUE ONCE
Status: COMPLETED | OUTPATIENT
Start: 2021-12-02 | End: 2021-12-02

## 2021-12-02 RX ORDER — LIDOCAINE HYDROCHLORIDE 10 MG/ML
3 INJECTION INFILTRATION; PERINEURAL ONCE
Status: COMPLETED | OUTPATIENT
Start: 2021-12-02 | End: 2021-12-02

## 2021-12-02 RX ORDER — BUPIVACAINE HYDROCHLORIDE 2.5 MG/ML
3 INJECTION, SOLUTION INFILTRATION; PERINEURAL ONCE
Status: COMPLETED | OUTPATIENT
Start: 2021-12-02 | End: 2021-12-02

## 2021-12-02 RX ADMIN — METHYLPREDNISOLONE ACETATE 80 MG: 40 INJECTION, SUSPENSION INTRA-ARTICULAR; INTRALESIONAL; INTRAMUSCULAR; SOFT TISSUE at 12:39

## 2021-12-02 RX ADMIN — LIDOCAINE HYDROCHLORIDE 3 ML: 10 INJECTION INFILTRATION; PERINEURAL at 12:39

## 2021-12-02 RX ADMIN — BUPIVACAINE HYDROCHLORIDE 7.5 MG: 2.5 INJECTION, SOLUTION INFILTRATION; PERINEURAL at 12:38

## 2021-12-02 NOTE — PROGRESS NOTES
Shannon George (: 1955) is a 77 y.o. female, established patient, here for evaluation of the following chief complaint(s):  Knee Pain       ASSESSMENT/PLAN:  Below is the assessment and plan developed based on review of pertinent history, physical exam, labs, studies, and medications. She elected to try corticosteroid injection for bilateral knees once again today. After a sterile preparation, 3 cc of lidocaine, 3 cc of bupivacaine, and 1 cc of 80 mg/mL Depo-Medrol were injected into the right knee and left knee under sterile technique. The patient tolerated the procedure well and there were no complications. Post injection pain, blood sugar elevation, skin discoloration, fatty atrophy and the signs of infection were discussed in detail. The patient was instructed to contact us if there were any questions or concerns prior to their follow up appointment. 1. Acute pain of right knee  2. Acute pain of left knee  3. Primary osteoarthritis of knees, bilateral  -     bupivacaine HCl (MARCAINE) 0.25 % (2.5 mg/mL) injection 7.5 mg; 7.5 mg (3 mL), Other, ONCE, 1 dose, On Thu 21 at 1300  -     lidocaine (XYLOCAINE) 10 mg/mL (1 %) injection 3 mL; 3 mL, Intra artICUlar, ONCE, 1 dose, On Thu 21 at 1300  -     methylPREDNISolone acetate (DEPO-MEDROL) 40 mg/mL injection 80 mg; 80 mg, Intra artICUlar, ONCE, 1 dose, On Thu 21 at 1300      Return in about 3 months (around 3/2/2022), or if symptoms worsen or fail to improve. SUBJECTIVE/OBJECTIVE:  Shannon George (: 1955) is a 77 y.o. female. She notes relief with previous injections. She notes recurrent clicking and popping in the knees.   No new injuries        Allergies   Allergen Reactions    Pcn [Penicillins] Hives    Sulfa (Sulfonamide Antibiotics) Hives    Zocor [Simvastatin] Myalgia    Lisinopril Cough       Current Outpatient Medications   Medication Sig    rosuvastatin (CRESTOR) 10 mg tablet TAKE 1 TABLET BY MOUTH EVERY NIGHT    TURMERIC PO Take  by mouth daily.  buPROPion XL (WELLBUTRIN XL) 150 mg tablet Take 1 Tablet by mouth Daily (before breakfast).  losartan (COZAAR) 100 mg tablet Take 1 Tablet by mouth daily.  levothyroxine (SYNTHROID) 175 mcg tablet Take 1 Tablet by mouth Daily (before breakfast).  anastrozole (ARIMIDEX) 1 mg tablet TAKE 1 TABLET BY MOUTH EVERY DAY    FLUoxetine (PROzac) 20 mg capsule TAKE 1 CAPSULE BY MOUTH DAILY    ascorbic acid (VITAMIN C PO) Take  by mouth.  multivitamin (ONE A DAY) tablet Take 1 Tab by mouth daily.  cholecalciferol, vitamin D3, (Vitamin D3) 50 mcg (2,000 unit) tab Take 1 Tab by mouth daily.  OTHER Portable CPAP machine for RENÉE, with new mask and tubing but no change to mask or settings; G47.30 GARO 99m prog good     No current facility-administered medications for this visit.        Social History     Socioeconomic History    Marital status:      Spouse name: Not on file    Number of children: 1    Years of education: Not on file    Highest education level: Not on file   Occupational History    Occupation: [address] Occupation: Retired    Tobacco Use    Smoking status: Former Smoker     Packs/day: 0.50     Years: 25.00     Pack years: 12.50     Quit date: 2005     Years since quittin.9    Smokeless tobacco: Never Used   Substance and Sexual Activity    Alcohol use: Not Currently     Comment: 2 shots of Rum ~ 5 nights a week    Drug use: No    Sexual activity: Not Currently     Partners: Male     Comment: not sexually active x many years   Other Topics Concern     Service Not Asked    Blood Transfusions Not Asked    Caffeine Concern No     Comment: no coffee, tea and cut out her diet Cokes    Occupational Exposure Not Asked    Hobby Hazards Not Asked    Sleep Concern Not Asked    Stress Concern Not Asked    Weight Concern Not Asked    Special Diet No     Comment: \"I eat way too much\"    Back Care Not Asked  Exercise Yes     Comment: home stationary bike x 40-45 minutes a day, stretches daily, yardwork once a week    Bike Helmet Not Asked   [address] Not Asked    Self-Exams Not Asked   Social History Narrative     1 biological daughter (lives in [facility] now), 1 \"adopted\", and 2 step children    Retired 2018    Works for her [employer]    Diet: nothing special, admits likes sweets, cheese, not controlling her portions    Caffeine: no coffee or tea, occ decaff sodas diet coke or ginger ale    Exercise: stationary bike x 45 minutes every day     Weight: ranges in the 280's-290's over time         Social Determinants of Health     Financial Resource Strain:     Difficulty of Paying Living Expenses: Not on file   Food Insecurity:     Worried About [address] in the Last Year: Not on file    [address] in the Last Year: Not on file   Transportation Needs:     Lack of Transportation (Medical): Not on file    Lack of Transportation (Non-Medical):  Not on file   Physical Activity:     Days of Exercise per Week: Not on file    Minutes of Exercise per Session: Not on file   Stress:     Feeling of Stress : Not on file   Social Connections:     Frequency of Communication with Friends and Family: Not on file    Frequency of Social Gatherings with Friends and Family: Not on file    Attends Judaism Services: Not on file    Active Member of [address] [place] [address] or Organizations: Not on file    Attends Club or Organization Meetings: Not on file    Marital Status: Not on file   Intimate Partner Violence:     Fear of Current or Ex-Partner: Not on file    Emotionally Abused: Not on file    Physically Abused: Not on file    Sexually Abused: Not on file   Housing Stability:     Unable to Pay for Housing in the Last Year: Not on file    Number of [place] in the Last Year: Not on file    Unstable Housing in the Last Year: Not on file       Past Surgical History:   Procedure Laterality Date    COLONOSCOPY N/A 10/28/2021    COLONOSCOPY   :- performed by Anushka L[facility]pez MD at [facility] ENDOSCOPY    EKG ORDERABLE  5/2009    NSR, rate 84, normal EKG    HX BLADDER SUSPENSION  ~2010    Dr Travis Villanueva Left 09/23/2019    [facility].  Dr. Lenka Salmon, [facility] Mastectomy Implant Infection Left Breast, Replaced Implant, IV abx    HX BREAST BIOPSY Right 11/2011    VPI, right breast biopsy negative    HX BREAST BIOPSY Right 04/22/2019    invasive ductal carcinoma    HX BREAST LUMPECTOMY Right 6/13/2019    RIGHT BREAST LUMPECTOMY ( x2 ) WTIH ULTRASOUND , RIGHT SENTINAL NODE BIOPSY performed by Toyin Miranda MD at hospitals AMBULATORY OR    HX BREAST RECONSTRUCTION Bilateral 8/27/2019    B MASTECTOMY AND IMPLANTS, IMMEDIATE BILATERAL BREAST RECONSTRUCTION WITH DIRECT TO IMPLANT USE OF ALLODERM AND BILATERAL BREAST RECONSTRUCTION INTRA-OPERATIVE ASSESMENT OF BLOOD FLOW performed by Gilles Harkins MD at [place] [address] Right 03/2010    Dr Kalani Phillips CERVICAL POLYPECTOMY  2009, 2015    Dr Iona Joe    HX COLONOSCOPY  04/08/2016    Dr Samreen Jerome Talreja-Internal Hemorrhoids- Normal mucosa in the whole colon - Repeat colonoscopy in 5 years    HX COLONOSCOPY  2016    HX COLONOSCOPY  10/2005    benign polyp; repeat 5 yr per Dr Dorn Spatz HX COLONOSCOPY  11/2010    Normal, f/u 5 yrs, Dr Ad Ruiz  Scheduled 10-    HX Vilinda Alva  7/2005    AUB, benign/neg bx; Dr Dariela Washington  10/2012    Dr Iona Joe, AUB    HX HEENT  2019    Nasal Abscess I&D, Dr. Venancio Serra HX HYSTERECTOMY  2013    HX MASTECTOMY Bilateral 8/27/2019    BILATERAL BREAST SKIN SPARING MASTECTOMIES performed by Toyin Miranda MD at [address] Right 2010    CARPAL TUNNEL    HX PARTIAL HYSTERECTOMY  4/26/13    Supracervical hysterectomy for fibroids, Dr Priya Hand [address]  2010 BLADDER SLING     HX WISDOM TEETH EXTRACTION      [place] REMOVAL INTACT BREAST IMPLANT Bilateral 10/15/2019    [facility] Dr. Sparkle Galeano, JON Breast Implant pain and recurrent left breast infection; on wound vac w/ iv abx; wound pump removed 2019       Family History   Problem Relation Age of Onset    Arthritis-osteo Father     Stroke Father         TIA's    Cancer Father         melanoma on back removed    Pacemaker Father 80    Heart Disease Father          81 yo in     Hypertension Mother     Arthritis-osteo Mother     Heart Disease Mother         valve disease,  2013    Heart Attack Maternal Grandmother 76    No Known Problems Sister     No Known Problems Brother     Depression Daughter     Substance Abuse Daughter     Alcohol abuse Daughter     Anxiety Daughter         OB History        1    Para   1    Term                AB        Living           SAB        IAB        Ectopic        Molar        Multiple        Live Births              Obstetric Comments    x 1; Previous Gyn Dr Carr Console  Menarche:  10. LMP: ? .  # of Children:  1. Age at Delivery of First Child:  34.   Hysterectomy/oophorectomy:  YES/YES. Breast Bx:  yes. Hx of Breast Feeding:  yes. BCP:  yes. Hormone therapy:  no.                     REVIEW OF SYSTEMS:  ROS     Positive for: Musculoskeletal    Last edited by Virgil Echols on 2021 11:58 AM. (History)        Patient denies any recent fever, chills, nausea, vomiting, chest pain, or shortness of breath. Vitals:  Ht 5' 9\" (1.753 m)   Wt 275 lb (124.7 kg)   LMP 2011   BMI 40.61 kg/m²    Body mass index is 40.61 kg/m². PHYSICAL EXAM:  General exam: Patient is awake, alert, and oriented x3. Well-appearing. No acute distress. Ambulates with an antalgic gait    Bilateral knees: Neurovascular and sensory intact. Effusion is present. Crepitus is noted with range of motion of both knees.   There is tenderness palpation at the medial and lateral joint lines. Jose David's maneuver creates mild pain. Normal stability on ligamentous testing including Lachman's exam.  Stable anterior and posterior drawer. No erythema or ecchymosis. IMAGING:  X-rays of the knees done previously show evidence of joint space narrowing. XR Results (most recent):  Results from Hospital Encounter encounter on 12/29/19    XR FOOT RT MIN 3 V    Narrative  EXAM: XR FOOT RT MIN 3 V    INDICATION: injury. Right foot pain since yesterday when she heard a pop walking  around a medium. Pain dorsal lateral aspect. COMPARISON: None. FINDINGS: Three views of the right foot demonstrate a small ossific fragment or  fragments at the lateral aspect of the right midfoot, with mild prominence of  soft tissue but no soft tissue mass. There is focal interruption of posterior  calcaneal spur formation, age uncertain. The Achilles tendon is thought to be  intact. .    Impression  IMPRESSION: No acute bony abnormality is confirmed. However, there are 2 ossific  fragments in the soft tissues at the lateral aspect of the midfoot, thought to  most likely be nonacute. Correlate with physical examination in this region. Comparison to any prior radiographs of the right foot would be most helpful. Otherwise, follow-up radiographs in 7-14 days may be helpful if no further  diagnosis is made on orthopedic consultation/follow-up. Fragmentation of  posterior calcaneal spur formation is thought to be nonacute, but correlate with  physical examination. Results from [place] encounter on 06/25/15    XR KNEE LT 3 V    Narrative  **Final Report**      ICD Codes / Adm. Diagnosis: 719.46   / Pain in joint, lower leg  Examination:  CR KNEE 3 VWS LT  - [id number] - Jun 25 2015 11:19AM  Accession No:  [id number]  Reason:  left knee pain      REPORT:  EXAM:  CR KNEE 3 VWS LT  Clinical history left knee pain  INDICATION:   left knee pain    COMPARISON: None.     FINDINGS: Three views of the left knee demonstrate no fracture or other  acute osseous or articular abnormality. There is no effusion. Extensive  osteophyte formation. Patellofemoral joint space narrowing. Impression  :    Extensive degenerative change. Osteoarthritis. No acute abnormality. Signing/Reading Doctor: Mahesh Garland ([id number])  Approved: Mahesh Garland ([id number])  Jun 25 2015 11:43AM      Results from Abstract encounter on 02/14/12    SILVANO MAMMOGRAM DIAGNOSTIC LEFT ADDL VIEW         Orders Placed This Encounter    bupivacaine HCl (MARCAINE) 0.25 % (2.5 mg/mL) injection 7.5 mg    lidocaine (XYLOCAINE) 10 mg/mL (1 %) injection 3 mL    methylPREDNISolone acetate (DEPO-MEDROL) 40 mg/mL injection 80 mg              An electronic signature was used to authenticate this note.   -- Travis Magana, DO

## 2021-12-02 NOTE — LETTER
12/2/2021    Patient: Coolidge Nageotte   YOB: 1955   Date of Visit: 12/2/2021     Aashish Rutherford MD  [facility]    Dear Aashish Rutherford MD,      Thank you for referring Ms. Timothy Severe to [facility] for evaluation. My notes for this consultation are attached. If you have questions, please do not hesitate to call me. I look forward to following your patient along with you.       Sincerely,    Magno Mohamud, DO

## 2021-12-07 DIAGNOSIS — F43.23 ADJUSTMENT REACTION WITH ANXIETY AND DEPRESSION: ICD-10-CM

## 2021-12-07 DIAGNOSIS — F33.9 CHRONIC MAJOR DEPRESSIVE DISORDER, RECURRENT EPISODE (HCC): ICD-10-CM

## 2021-12-07 RX ORDER — FLUOXETINE HYDROCHLORIDE 20 MG/1
CAPSULE ORAL
Qty: 90 CAPSULE | Refills: 3 | Status: SHIPPED | OUTPATIENT
Start: 2021-12-07

## 2022-01-18 ENCOUNTER — OFFICE VISIT (OUTPATIENT)
Dept: SURGERY | Age: 67
End: 2022-01-18
Payer: MEDICARE

## 2022-01-18 VITALS
BODY MASS INDEX: 42.21 KG/M2 | DIASTOLIC BLOOD PRESSURE: 51 MMHG | HEIGHT: 69 IN | SYSTOLIC BLOOD PRESSURE: 130 MMHG | HEART RATE: 77 BPM | WEIGHT: 285 LBS

## 2022-01-18 DIAGNOSIS — C50.811 CANCER OF OVERLAPPING SITES OF RIGHT BREAST (HCC): Primary | ICD-10-CM

## 2022-01-18 DIAGNOSIS — Z85.3 HISTORY OF BREAST CANCER IN FEMALE: ICD-10-CM

## 2022-01-18 DIAGNOSIS — Z90.13 S/P MASTECTOMY, BILATERAL: ICD-10-CM

## 2022-01-18 PROCEDURE — 99213 OFFICE O/P EST LOW 20 MIN: CPT | Performed by: NURSE PRACTITIONER

## 2022-01-18 NOTE — PATIENT INSTRUCTIONS

## 2022-01-18 NOTE — PROGRESS NOTES
HISTORY OF PRESENT ILLNESS  Vikram Greenfield is a 77 y.o. female. HPI ESTABLISHED patient here for follow up RIGHT breast cancer, s/p bilateral mastectomies. No breast concerns and no pain.        Breast cancer history -  Referring - Dr. 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 Kisha Barnett  · 19 - BILATERAL mastectomies with reconstruction. Dr. Vero Arias did direct saline implants. - Dr. Kisha Barnett  · Mammaprint - low risk  · Surg path showed LEFT breast benign, residual IDC on RIGHT breast - 0.8cm, ER pos, SC pos, HER2 neg   · 10/15/19 - Removal of bilateral breast implants. Complicated breast cancer course including multiple surgeries and hospitalizations for wound infections.   · 2019 - LEFT lymph node biopsy - Dr. Kaylie Ellis  Left axillary lymph node, core biopsy:   Fat necrosis with dense mixed inflammation and abscess formation   No lymph node tissue or malignancy identified   · 2019 - started anastrozole - Dr. Martha Maciel        Family history -   No family history of breast or ovarian cancer. Father had melanoma. OB History        1    Para   1    Term                AB        Living           SAB        IAB        Ectopic        Molar        Multiple        Live Births              Obstetric Comments    x 1; Previous Gyn Dr Porter Outlharsha  Menarche:  10. LMP: ? .  # of Children:  1. Age at Delivery of First Child:  34.   Hysterectomy/oophorectomy:  YES/YES. Breast Bx:  yes. Hx of Breast Feeding:  yes. BCP:  yes. Hormone therapy:  no.                    Past Surgical History:   Procedure Laterality Date    COLONOSCOPY N/A 10/28/2021    COLONOSCOPY   :- performed by Emir Bardales MD at [facility] ENDOSCOPY    EKG ORDERABLE  2009    NSR, rate 84, normal EKG    HX BLADDER SUSPENSION  ~    Dr Eladia Ruelas Left 2019    [facility].  Dr. Vero Arias, [facility] Mastectomy Implant Infection Left Breast, Replaced Implant, IV abx    HX BREAST BIOPSY Right 11/2011    VPI, right breast biopsy negative    HX BREAST BIOPSY Right 04/22/2019    invasive ductal carcinoma    HX BREAST LUMPECTOMY Right 6/13/2019    RIGHT BREAST LUMPECTOMY ( x2 ) WTIH ULTRASOUND , RIGHT SENTINAL NODE BIOPSY performed by Felisa Chambers MD [facility] AMBULATORY OR    HX BREAST RECONSTRUCTION Bilateral 8/27/2019    B MASTECTOMY AND IMPLANTS, IMMEDIATE BILATERAL BREAST RECONSTRUCTION WITH DIRECT TO IMPLANT USE OF ALLODERM AND BILATERAL BREAST RECONSTRUCTION INTRA-OPERATIVE ASSESMENT OF BLOOD FLOW performed by Jenny Byrd MD at [place] [address] Right 03/2010    Dr Annie Yip CERVICAL POLYPECTOMY  2009, 2015    Dr Alberto Hutchinson    HX COLONOSCOPY  04/08/2016    Dr Chilo Hannon-Internal Hemorrhoids- Normal mucosa in the whole colon - Repeat colonoscopy in 5 years    HX COLONOSCOPY  2016    HX COLONOSCOPY  10/2005    benign polyp; repeat 5 yr per Dr Krysta Mora HX COLONOSCOPY  11/2010    Normal, f/u 5 yrs, Dr Rosibel Tyson  Scheduled 10-    HX [address]  7/2005    AUB, benign/neg bx; Dr Berkowitz Stager  10/2012    Dr Alberto Hutchinson, AUB    HX HEENT  2019    Nasal Abscess I&D, Dr. Jenifer Ballesteros HX HYSTERECTOMY  2013    HX MASTECTOMY Bilateral 8/27/2019    BILATERAL BREAST SKIN SPARING MASTECTOMIES performed by Felisa Chambers MD at [address] [place] [address] Right 2010    CARPAL TUNNEL    HX PARTIAL HYSTERECTOMY  4/26/13    Supracervical hysterectomy for fibroids, Dr Shaggy De León HX UROLOGICAL  2010    BLADDER SLING     HX Eyad Marek 50 Bilateral 10/15/2019    [facility] Dr. Cameron Stewart B Breast Implant pain and recurrent left breast infection; on wound vac w/ iv abx; wound pump removed 12-4-2019         ROS    Physical Exam  Constitutional: Appearance: Normal appearance. Chest:   Breasts:      Right: Absent. No axillary adenopathy or supraclavicular adenopathy. Left: Absent. No axillary adenopathy or supraclavicular adenopathy. Musculoskeletal:      Comments: FROM - UE x 2   Lymphadenopathy:      Upper Body:      Right upper body: No supraclavicular or axillary adenopathy. Left upper body: No supraclavicular or axillary adenopathy. Neurological:      Mental Status: She is alert. Psychiatric:         Attention and Perception: Attention normal.         Mood and Affect: Mood normal.         Speech: Speech normal.         Behavior: Behavior normal.         Visit Vitals  BP (!) 130/51 (BP 1 Location: Left upper arm, BP Patient Position: Sitting, BP Cuff Size: Adult)   Pulse 77   Ht 5' 9\" (1.753 m)   Wt 285 lb (129.3 kg)   LMP 05/01/2011   BMI 42.09 kg/m²         ASSESSMENT and PLAN    ICD-10-CM ICD-9-CM    1. [facility]  C50.811 174.8    2. S/P mastectomy, bilateral  Z90.13 V45.71    3. History of breast cancer in female  Z85.3 V10.3         Normal exam with no evidence of local recurrence across the chest wall. Continues on AI and is tolerating well. BILATERAL mastectomies - no routine breast imaging. RTC in 1 year or sooner PRN. She is comfortable with this plan. All questions answered and she stated understanding.           Total time spent for this patient - 20 minutes.

## 2022-03-01 ENCOUNTER — OFFICE VISIT (OUTPATIENT)
Dept: ONCOLOGY | Age: 67
End: 2022-03-01
Payer: MEDICARE

## 2022-03-01 VITALS
BODY MASS INDEX: 43.12 KG/M2 | TEMPERATURE: 96.6 F | HEART RATE: 82 BPM | SYSTOLIC BLOOD PRESSURE: 141 MMHG | WEIGHT: 291.1 LBS | OXYGEN SATURATION: 96 % | HEIGHT: 69 IN | DIASTOLIC BLOOD PRESSURE: 79 MMHG

## 2022-03-01 DIAGNOSIS — Z98.890 H/O BREAST RECONSTRUCTION: ICD-10-CM

## 2022-03-01 DIAGNOSIS — Z79.811 AROMATASE INHIBITOR USE: ICD-10-CM

## 2022-03-01 DIAGNOSIS — I10 ESSENTIAL HYPERTENSION: ICD-10-CM

## 2022-03-01 DIAGNOSIS — Z90.13 H/O BILATERAL MASTECTOMY: ICD-10-CM

## 2022-03-01 DIAGNOSIS — C50.811 CANCER OF OVERLAPPING SITES OF RIGHT BREAST (HCC): Primary | ICD-10-CM

## 2022-03-01 PROCEDURE — G0463 HOSPITAL OUTPT CLINIC VISIT: HCPCS | Performed by: INTERNAL MEDICINE

## 2022-03-01 PROCEDURE — G8754 DIAS BP LESS 90: HCPCS | Performed by: INTERNAL MEDICINE

## 2022-03-01 PROCEDURE — 1090F PRES/ABSN URINE INCON ASSESS: CPT | Performed by: INTERNAL MEDICINE

## 2022-03-01 PROCEDURE — G9717 DOC PT DX DEP/BP F/U NT REQ: HCPCS | Performed by: INTERNAL MEDICINE

## 2022-03-01 PROCEDURE — 99213 OFFICE O/P EST LOW 20 MIN: CPT | Performed by: INTERNAL MEDICINE

## 2022-03-01 PROCEDURE — G8427 DOCREV CUR MEDS BY ELIG CLIN: HCPCS | Performed by: INTERNAL MEDICINE

## 2022-03-01 PROCEDURE — 3017F COLORECTAL CA SCREEN DOC REV: CPT | Performed by: INTERNAL MEDICINE

## 2022-03-01 PROCEDURE — 1101F PT FALLS ASSESS-DOCD LE1/YR: CPT | Performed by: INTERNAL MEDICINE

## 2022-03-01 PROCEDURE — G8536 NO DOC ELDER MAL SCRN: HCPCS | Performed by: INTERNAL MEDICINE

## 2022-03-01 PROCEDURE — G8417 CALC BMI ABV UP PARAM F/U: HCPCS | Performed by: INTERNAL MEDICINE

## 2022-03-01 PROCEDURE — G9708 BILAT MAST/HX BI /UNILAT MAS: HCPCS | Performed by: INTERNAL MEDICINE

## 2022-03-01 PROCEDURE — G8753 SYS BP > OR = 140: HCPCS | Performed by: INTERNAL MEDICINE

## 2022-03-01 PROCEDURE — G8399 PT W/DXA RESULTS DOCUMENT: HCPCS | Performed by: INTERNAL MEDICINE

## 2022-03-01 NOTE — PROGRESS NOTES
[facility] [place] [address] [address], [address]: [phone]  F: [phone]      Reason for Visit:   Kimberly Tyson is a 77 y.o. female who is seen for follow up of breast cancer on adjuvant Anastrozole     Treatment History:   · 19 - RIGHT breast lumpectomy x 2 sites and RIGHT SLNB  · 19 - BILATERAL mastectomies with reconstruction. Dr. Juan Jose Olivera did direct saline implants. Surg path showed LEFT breast benign, residual IDC on RIGHT breast. ER+ HER2 negative. 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 Tolerating AI fine. No new issues. Has occ hot flashes. Tolerable. Labs with PCP. No new issues today. No fevers/ chills/ chest pain/ SOB/ nausea/ vomiting/diarrhea/ pain/fatigue        Past Medical History:   Diagnosis Date    Adjustment disorder with mixed anxiety and depressed mood 2020    After death of father  and personal diagnosis of/treatment for breast cancer [id number]; Counselin:  [place] [employer] [facility] [address] , [address] Ankle edema 2010    Anxiety 2010    Benign essential hypertension 2016    2006     Carpal tunnel syndrome 2010    Chronic depression 2020    H/O Benign Colon Polyp 2010    H/O bilateral mastectomy 2019    Double Mastectomy (right breast cancer and elective left breast prophylactically);  No chemo, No radiation    H/O Uterine Fibroids 2010    Hypercholesterolemia 2013    ~    Hypothyroidism 2016 7/1999    Kidney stone 4/5/2010    Mixed hyperlipidemia 7/6/2020    Perimenopausal 4/5/2010    Primary osteoarthritis of knees, bilateral 5/26/2016    Extensive; Ortho (needs knee replacement), CSI x 2, ; CSI 8/23/21 Dr. Carter Moya Primary osteoarthritis of left knee 5/26/2016    Extensive; Ortho (needs knee replacement), CSI x 2,     S/P benign cervical polypectomy 12/20/2010    Sleep apnea 4/5/2010    USES CPAP    Status post right breast lumpectomy 1/30/2020 6-, Invasive ductal carcinoma--->Mastectomy 8-, no chemo, no radiation, started on Arimidex    Stress incontinence 5/26/2010      Past Surgical History:   Procedure Laterality Date    COLONOSCOPY N/A 10/28/2021    COLONOSCOPY   :- performed by Silvia Trimble MD at [address] EKG ORDERABLE  5/2009    NSR, rate 84, normal EKG    HX BLADDER SUSPENSION  ~2010    Dr Yanez Press Left 09/23/2019    [facility]V.  Dr. Darron Burton, [facility] Mastectomy Implant Infection Left Breast, Replaced Implant, IV abx    HX BREAST BIOPSY Right 11/2011    VPI, right breast biopsy negative    HX BREAST BIOPSY Right 04/22/2019    invasive ductal carcinoma    HX BREAST LUMPECTOMY Right 6/13/2019    RIGHT BREAST LUMPECTOMY ( x2 ) WTIH ULTRASOUND , RIGHT SENTINAL NODE BIOPSY performed by Nelida Hodges MD at [facility] AMBULATORY OR    HX BREAST RECONSTRUCTION Bilateral 8/27/2019    B MASTECTOMY AND IMPLANTS, IMMEDIATE BILATERAL BREAST RECONSTRUCTION WITH DIRECT TO IMPLANT USE OF ALLODERM AND BILATERAL BREAST RECONSTRUCTION INTRA-OPERATIVE ASSESMENT OF BLOOD FLOW performed by Mikey Denny MD at [place] [address] Right 03/2010    Dr Jarrett Osborne CERVICAL POLYPECTOMY  2009, 2015    Dr Luana Graham    HX COLONOSCOPY  04/08/2016    Dr Marisa Hannon-Internal Hemorrhoids- Normal mucosa in the whole colon - Repeat colonoscopy in 5 years    HX COLONOSCOPY  2016    HX COLONOSCOPY  10/2005    benign polyp; repeat 5 yr per Dr Victorino Villar HX COLONOSCOPY  2010    Normal, f/u 5 yrs, Dr Boy Ralph  Scheduled 10-    HX Onofre Clovisjacque  2005    AUB, benign/neg bx; Dr Alcaraz Parent  10/2012    Dr Micheal Gifford, AUB    HX HEENT      Nasal Abscess I&D, Dr. Bk Lopez HX HYSTERECTOMY  2013    HX MASTECTOMY Bilateral 2019    BILATERAL BREAST SKIN SPARING MASTECTOMIES performed by Yamil Holley MD at [address] HX ORTHOPAEDIC Right 2010    CARPAL TUNNEL    HX PARTIAL HYSTERECTOMY  13    Supracervical hysterectomy for fibroids, Dr Shawna Crane HX UROLOGICAL  2010    BLADDER SLING     HX Eyad Marek 50 Bilateral 10/15/2019    MCV JON Cardoza Breast Implant pain and recurrent left breast infection; on wound vac w/ iv abx; wound pump removed 2019      Social History     Tobacco Use    Smoking status: Former Smoker     Packs/day: 0.50     Years: 25.00     Pack years: 12.50     Quit date: 2005     Years since quittin.1    Smokeless tobacco: Never Used   Substance Use Topics    Alcohol use: Not Currently     Comment: 2 shots of Rum ~ 5 nights a week      Family History   Problem Relation Age of Onset    OSTEOARTHRITIS Father     Stroke Father         TIA's    Cancer Father         melanoma on back removed    Pacemaker Father 80    Heart Disease Father          79 yo in     Hypertension Mother     OSTEOARTHRITIS Mother     Heart Disease Mother         valve disease,  2013    Heart Attack Maternal Grandmother 76    No Known Problems Sister     No Known Problems Brother     Depression Daughter     Substance Abuse Daughter     Alcohol abuse Daughter     Anxiety Daughter      Current Outpatient Medications   Medication Sig    FLUoxetine (PROzac) 20 mg capsule TAKE 1 CAPSULE BY MOUTH DAILY    rosuvastatin (CRESTOR) 10 mg tablet TAKE 1 TABLET BY MOUTH EVERY NIGHT    TURMERIC PO Take  by mouth daily.  buPROPion XL (WELLBUTRIN XL) 150 mg tablet Take 1 Tablet by mouth Daily (before breakfast).  losartan (COZAAR) 100 mg tablet Take 1 Tablet by mouth daily.  levothyroxine (SYNTHROID) 175 mcg tablet Take 1 Tablet by mouth Daily (before breakfast).  anastrozole (ARIMIDEX) 1 mg tablet TAKE 1 TABLET BY MOUTH EVERY DAY    ascorbic acid (VITAMIN C PO) Take  by mouth.  multivitamin (ONE A DAY) tablet Take 1 Tab by mouth daily.  cholecalciferol, vitamin D3, (Vitamin D3) 50 mcg (2,000 unit) tab Take 1 Tab by mouth daily.  OTHER Portable CPAP machine for RENÉE, with new mask and tubing but no change to mask or settings; G47.30 GARO 99m prog good     No current facility-administered medications for this visit. Allergies   Allergen Reactions    Pcn [Penicillins] Hives    Sulfa (Sulfonamide Antibiotics) Hives    Zocor [Simvastatin] Myalgia    Lisinopril Cough      A complete review of systems was obtained, negative except as described above and as reported on ROS sheet scanned into system. Physical Exam:     General: alert, cooperative, no distress   Mental  status: normal mood, behavior, speech, dress, motor activity, and thought processes, able to follow commands   HENT: NCAT   Neck: no visualized mass   Resp: no respiratory distress, lungs clear  CV regular  G soft NT  Ext no edema   Neuro: no gross deficits   Skin: no discoloration or lesions of concern on visible areas   Psychiatric: normal affect, consistent with stated mood, no evidence of hallucinations     Breast b/l mastectomy/ no lesions/masses        Results:     Lab Results   Component Value Date/Time    WBC 5.7 09/24/2021 09:24 AM    HGB 12.5 09/24/2021 09:24 AM    HCT 40.7 09/24/2021 09:24 AM    PLATELET 783 93/08/9094 09:24 AM    MCV 98.5 09/24/2021 09:24 AM    ABS.  NEUTROPHILS 3.9 09/24/2021 09:24 AM     Lab Results   Component Value Date/Time    Sodium 139 09/24/2021 09:24 AM    Potassium 4.5 09/24/2021 09:24 AM    Chloride 107 09/24/2021 09:24 AM    CO2 28 09/24/2021 09:24 AM    Glucose 94 09/24/2021 09:24 AM    BUN 22 (H) 09/24/2021 09:24 AM    Creatinine 0.84 09/24/2021 09:24 AM    GFR est AA >60 09/24/2021 09:24 AM    GFR est non-AA >60 09/24/2021 09:24 AM    Calcium 9.6 09/24/2021 09:24 AM     Lab Results   Component Value Date/Time    Bilirubin, total 0.9 09/24/2021 09:24 AM    ALT (SGPT) 28 09/24/2021 09:24 AM    Alk. phosphatase 86 09/24/2021 09:24 AM    Protein, total 6.4 09/24/2021 09:24 AM    Albumin 3.5 09/24/2021 09:24 AM    Globulin 2.9 09/24/2021 09:24 AM     MRI Results (most recent):  Results from Hospital Encounter encounter on 05/17/19    MRI BREAST BI W WO CONT    Narrative  INDICATION: Right invasive ductal carcinoma, grade 2, ER/PA positive, HER-2/keli  negative. COMPARISON: Multiple prior breast imaging studies dating back to 2015. TECHNIQUE:  Multisequence, multiplanar, bilateral breast MRI was performed in prone position  using a dedicated breast coil. Images were obtained without contrast and dynamic  postcontrast images were obtained in multiple phases. 10 mL IV Gadavist was  administered. Subtraction images were reconstructed. Postcontrast images were  reviewed with dedicated kinetic analysis software. FINDINGS:  There is mild background parenchymal enhancement and heterogeneous  fibroglandular tissue. Numerous sub-5 mm foci of enhancement are scattered in  the bilateral breasts. Right breast:  A 16 x 13 x 16 mm, spiculated mass with a biopsy clip and mixed kinetics at 6:00  in the middle third of the right breast corresponds to the biopsy-proven  invasive ductal carcinoma. There is a small hematoma in the retroareolar biopsy site in the anterior third.   A 6 x 7 x 8 mm, irregularly marginated, enhancing nodule with mixed kinetics at  the superior aspect of the hematoma corresponds roughly in size and morphology  to the hypoechoic, shadowing mass seen on prior ultrasound at 3:00 in the  periareolar region. Incidental note is made of an intramammary lymph node at 1:00 in the middle  third. No axillary or internal mammary chain lymphadenopathy. Left breast:  No suspicious enhancing foci. No axillary or internal mammary chain  lymphadenopathy. A summary portfolio with key images has been sent from kinetic analysis software  to PACS. Impression  IMPRESSION:  1. Invasive ductal carcinoma in the right breast at 6:00.  2. Small enhancing nodule associated with a hematoma in the retroareolar right  breast biopsy site. 3. No suspicious enhancing foci in the left breast.  4. No lymphadenopathy. 5. BI-RADS Assessment Category 6: Known biopsy proven malignancy. Records reviewed and summarized above. Pathology report(s) reviewed above. Radiology report(s) reviewed above. Assessment/PLAN:     1) Stage 1 T1cN0 ER+ HER2 Negative Mammaprint low risk breast cancer post b/l mastectomy/reconstruction  Patient has two separate tumors with different path types. 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 5 o clock ER+ HER2 negative mammaprint low risk. She had a lumpectomy 6/13/19 with + margin then had b/l mastectomy/reconstruction on 8/27/19. Then infection and implants removed. no adjuvant chemo      Pt seen today for fu. She started adjuvant Anastrozole in 12/19. She is tolerating Anastrozole well overall - no side effects. Continue Anastrozole for total 5 years. sees PCP. Still sees surgery also. routine labs with her PCP. 2) hx of mastectomy/reconstruction infection   She was on IV abx and then had implants removed on 10/15/19. She was hospitalized on 10/30/19 for infection/fever and d/c'd on 11/4/19. She completed IV more abx on 11/21/19 and had a wound vac.  management per surgery/plastics. 3) HTN/Depression/Hypothyroid/Arthritis/Obesity  Management per PCP.      4) ear infection per PCP/ ENT. 5) Psychosocial  Mood good, coping well overall. SW for support as needed. Call if questions. Follow up here in 12 months / sees surgery also. I appreciate the opportunity to participate in Ms. Dalton Willingham's care.     Signed By: Mayra Fam DO

## 2022-03-01 NOTE — PROGRESS NOTES
Sol Jacobs is a 77 y.o. female  Chief Complaint   Patient presents with    Follow-up    Breast Cancer     1. Have you been to the ER, urgent care clinic since your last visit? Hospitalized since your last visit? No.    2. Have you seen or consulted any other health care providers outside of the [address] [place] [address] since your last visit? Include any pap smears or colon screening.  No.

## 2022-03-07 ENCOUNTER — OFFICE VISIT (OUTPATIENT)
Dept: ORTHOPEDIC SURGERY | Age: 67
End: 2022-03-07
Payer: MEDICARE

## 2022-03-07 VITALS — BODY MASS INDEX: 43.1 KG/M2 | HEIGHT: 69 IN | WEIGHT: 291 LBS

## 2022-03-07 DIAGNOSIS — M17.0 PRIMARY OSTEOARTHRITIS OF KNEES, BILATERAL: Primary | ICD-10-CM

## 2022-03-07 PROCEDURE — 20610 DRAIN/INJ JOINT/BURSA W/O US: CPT | Performed by: ORTHOPAEDIC SURGERY

## 2022-03-07 PROCEDURE — G8427 DOCREV CUR MEDS BY ELIG CLIN: HCPCS | Performed by: ORTHOPAEDIC SURGERY

## 2022-03-07 PROCEDURE — G8417 CALC BMI ABV UP PARAM F/U: HCPCS | Performed by: ORTHOPAEDIC SURGERY

## 2022-03-07 PROCEDURE — G8536 NO DOC ELDER MAL SCRN: HCPCS | Performed by: ORTHOPAEDIC SURGERY

## 2022-03-07 PROCEDURE — 3017F COLORECTAL CA SCREEN DOC REV: CPT | Performed by: ORTHOPAEDIC SURGERY

## 2022-03-07 PROCEDURE — 1101F PT FALLS ASSESS-DOCD LE1/YR: CPT | Performed by: ORTHOPAEDIC SURGERY

## 2022-03-07 PROCEDURE — G8399 PT W/DXA RESULTS DOCUMENT: HCPCS | Performed by: ORTHOPAEDIC SURGERY

## 2022-03-07 PROCEDURE — 1090F PRES/ABSN URINE INCON ASSESS: CPT | Performed by: ORTHOPAEDIC SURGERY

## 2022-03-07 PROCEDURE — G8756 NO BP MEASURE DOC: HCPCS | Performed by: ORTHOPAEDIC SURGERY

## 2022-03-07 PROCEDURE — G9708 BILAT MAST/HX BI /UNILAT MAS: HCPCS | Performed by: ORTHOPAEDIC SURGERY

## 2022-03-07 PROCEDURE — G9717 DOC PT DX DEP/BP F/U NT REQ: HCPCS | Performed by: ORTHOPAEDIC SURGERY

## 2022-03-07 RX ORDER — TRIAMCINOLONE ACETONIDE 40 MG/ML
40 INJECTION, SUSPENSION INTRA-ARTICULAR; INTRAMUSCULAR ONCE
Status: COMPLETED | OUTPATIENT
Start: 2022-03-07 | End: 2022-03-07

## 2022-03-07 RX ORDER — BUPIVACAINE HYDROCHLORIDE 2.5 MG/ML
6 INJECTION, SOLUTION INFILTRATION; PERINEURAL ONCE
Status: COMPLETED | OUTPATIENT
Start: 2022-03-07 | End: 2022-03-07

## 2022-03-07 RX ADMIN — TRIAMCINOLONE ACETONIDE 40 MG: 40 INJECTION, SUSPENSION INTRA-ARTICULAR; INTRAMUSCULAR at 15:00

## 2022-03-07 RX ADMIN — BUPIVACAINE HYDROCHLORIDE 15 MG: 2.5 INJECTION, SOLUTION INFILTRATION; PERINEURAL at 15:00

## 2022-03-07 NOTE — PROGRESS NOTES
Laura Rogers (: 1955) is a 77 y.o. female, established patient, here for evaluation of the following chief complaint(s):  Knee Pain (bilateral knees wants injections)       ASSESSMENT/PLAN:  Below is the assessment and plan developed based on review of pertinent history, physical exam, labs, studies, and medications. Findings were discussed with the patient today. She elected to try corticosteroid injections for both knees. We discussed the risks and benefits of injection and informed consent was obtained. After a sterile preparation, 6 cc of bupivicaine and 40 mg of Kenalog were injected into the bilateral knees. The patient tolerated the procedure well and there were no complications. Post injection pain, blood sugar elevation, skin discoloration, fatty atrophy and the signs of infection were discussed in detail. The patient was instructed to contact us if there were any questions or concerns prior to their follow up appointment. 1. Primary osteoarthritis of knees, bilateral  -     bupivacaine HCl (MARCAINE) 0.25 % (2.5 mg/mL) injection 15 mg; 15 mg (6 mL), Other, ONCE, 1 dose, On Mon 3/7/22 at 1500  -     triamcinolone acetonide (KENALOG-40) 40 mg/mL injection 40 mg; 40 mg, Intra artICUlar, ONCE, 1 dose, On Mon 3/7/22 at 1500  -     bupivacaine HCl (MARCAINE) 0.25 % (2.5 mg/mL) injection 15 mg; 15 mg (6 mL), Other, ONCE, 1 dose, On Mon 3/7/22 at 1500  -     triamcinolone acetonide (KENALOG-40) 40 mg/mL injection 40 mg; 40 mg, Intra artICUlar, ONCE, 1 dose, On Mon 3/7/22 at 1500      Return in about 3 months (around 2022), or if symptoms worsen or fail to improve. SUBJECTIVE/OBJECTIVE:  Laura Rogers (: 1955) is a 77 y.o. female. She notes aching pain and clicking in both knees. This has limited her activities recently.         Allergies   Allergen Reactions    Pcn [Penicillins] Hives    Sulfa (Sulfonamide Antibiotics) Hives    Zocor [Simvastatin] Myalgia    Lisinopril Cough       Current Outpatient Medications   Medication Sig    FLUoxetine (PROzac) 20 mg capsule TAKE 1 CAPSULE BY MOUTH DAILY    rosuvastatin (CRESTOR) 10 mg tablet TAKE 1 TABLET BY MOUTH EVERY NIGHT    TURMERIC PO Take  by mouth daily.  buPROPion XL (WELLBUTRIN XL) 150 mg tablet Take 1 Tablet by mouth Daily (before breakfast).  losartan (COZAAR) 100 mg tablet Take 1 Tablet by mouth daily.  levothyroxine (SYNTHROID) 175 mcg tablet Take 1 Tablet by mouth Daily (before breakfast).  anastrozole (ARIMIDEX) 1 mg tablet TAKE 1 TABLET BY MOUTH EVERY DAY    ascorbic acid (VITAMIN C PO) Take  by mouth.  multivitamin (ONE A DAY) tablet Take 1 Tab by mouth daily.  cholecalciferol, vitamin D3, (Vitamin D3) 50 mcg (2,000 unit) tab Take 1 Tab by mouth daily.     OTHER Portable CPAP machine for RENÉE, with new mask and tubing but no change to mask or settings; G47.30 GARO 99m prog good     Current Facility-Administered Medications   Medication    bupivacaine HCl (MARCAINE) 0.25 % (2.5 mg/mL) injection 15 mg    triamcinolone acetonide (KENALOG-40) 40 mg/mL injection 40 mg    bupivacaine HCl (MARCAINE) 0.25 % (2.5 mg/mL) injection 15 mg    triamcinolone acetonide (KENALOG-40) 40 mg/mL injection 40 mg       Social History     Socioeconomic History    Marital status:      Spouse name: Not on file    Number of children: 1    Years of education: Not on file    Highest education level: Not on file   Occupational History    Occupation: [address] Occupation: Retired    Tobacco Use    Smoking status: Former Smoker     Packs/day: 0.50     Years: 25.00     Pack years: 12.50     Quit date: 2005     Years since quittin.1    Smokeless tobacco: Never Used   Substance and Sexual Activity    Alcohol use: Yes     Comment: 2 shots of Rum ~ 5 nights a week    Drug use: No    Sexual activity: Not Currently     Partners: Male     Comment: not sexually active x many years   Other Topics Concern     Service Not Asked    Blood Transfusions Not Asked    Caffeine Concern No     Comment: no coffee, tea and cut out her diet Cokes    Occupational Exposure Not Asked    Hobby Hazards Not Asked    Sleep Concern Not Asked    Stress Concern Not Asked    Weight Concern Not Asked    Special Diet No     Comment: \"I eat way too much\"    Back Care Not Asked    Exercise Yes     Comment: home stationary bike x 40-45 minutes a day, stretches daily, yardwork once a week    Bike Helmet Not Asked    Seat Belt Not Asked    Self-Exams Not Asked   Social History Narrative     1 biological daughter (lives in [facility] now), 1 \"adopted\", and 2 step children    Retired 2018    Works for her [employer]    Diet: nothing special, admits likes sweets, cheese, not controlling her portions    Caffeine: no coffee or tea, occ decaff sodas diet coke or ginger ale    Exercise: stationary bike x 45 minutes every day     Weight: ranges in the 280's-290's over time         Social Determinants of Health     Financial Resource Strain:     Difficulty of Paying Living Expenses: Not on file   Food Insecurity:     Worried About Running Out of Food in the Last Year: Not on file    Marilyn of Food in the Last Year: Not on file   Transportation Needs:     Lack of Transportation (Medical): Not on file    Lack of Transportation (Non-Medical):  Not on file   Physical Activity:     Days of Exercise per Week: Not on file    Minutes of Exercise per Session: Not on file   Stress:     Feeling of Stress : Not on file   Social Connections:     Frequency of Communication with Friends and Family: Not on file    Frequency of Social Gatherings with Friends and Family: Not on file    Attends [employer] Services: Not on file    Active Member of Clubs or Organizations: Not on file    Attends Club or Organization Meetings: Not on file    Marital Status: Not on file   Intimate Partner Violence:     Fear of Current or Ex-Partner: Not on file    Emotionally Abused: Not on file    Physically Abused: Not on file    Sexually Abused: Not on file   Housing Stability:     Unable to Pay for Housing in the Last Year: Not on file    Number of Places Lived in the Last Year: Not on file    Unstable Housing in the Last Year: Not on file       Past Surgical History:   Procedure Laterality Date    COLONOSCOPY N/A 10/28/2021    COLONOSCOPY   :- performed by Gregorio Quintero MD at [address] EKG ORDERABLE  5/2009    NSR, rate 84, normal EKG    HX BLADDER SUSPENSION  ~2010    Dr Eduar Durham Left 09/23/2019    [facility].  Dr. Dia Coleman, [address] [facility] Mastectomy Implant Infection Left Breast, Replaced Implant, IV abx    HX BREAST BIOPSY Right 11/2011    VPI, right breast biopsy negative    HX BREAST BIOPSY Right 04/22/2019    invasive ductal carcinoma    HX BREAST LUMPECTOMY Right 6/13/2019    RIGHT BREAST LUMPECTOMY ( x2 ) WTIH ULTRASOUND , RIGHT SENTINAL NODE BIOPSY performed by Ti Aparicio MD at [facility] AMBUL[facility]ORY OR    HX BREAST RECONSTRUCTION Bilateral 8/27/2019    B MASTECTOMY AND IMPLANTS, IMMEDIATE BILATERAL BREAST RECONSTRUCTION WITH DIRECT TO IMPLANT USE OF ALLODERM AND BILATERAL BREAST RECONSTRUCTION INTRA-OPERATIVE ASSESMENT OF BLOOD FLOW performed by Vale Galvez MD at [place] [address] Right 03/2010    Dr Puma Bland CERVICAL POLYPECTOMY  2009, 2015    Dr Mayo Maharaj    HX COLONOSCOPY  04/08/2016    Dr Blanca Hannon-Internal Hemorrhoids- Normal mucosa in the whole colon - Repeat colonoscopy in 5 years    HX COLONOSCOPY  2016    HX COLONOSCOPY  10/2005    benign polyp; repeat 5 yr per Dr Veronica Arreaga HX COLONOSCOPY  11/2010    Normal, f/u 5 yrs, Dr Cedeño [facility]  Scheduled 10-    HX DILATION AND CURETTAGE  7/2005    AUB, benign/neg bx; Dr Jalen Ruiz  10/2012    Dr Mayo Maharaj, [facility]    HX HEENT  2019    Nasal Abscess I&D, Dr. Carter Thorpe HX HYSTERECTOMY  2013    HX MASTECTOMY Bilateral 2019    BILATERAL BREAST SKIN SPARING MASTECTOMIES performed by Bryan Rose MD at [address] HX ORTHOPAEDIC Right 2010    CARPAL TUNNEL    HX PARTIAL HYSTERECTOMY  13    Supracervical hysterectomy for fibroids, Dr Vasiliy Mcneill HX UROLOGICAL  2010    BLADDER SLING      [address] REMOVAL INTACT BREAST IMPLANT Bilateral 10/15/2019    [facility] Dr. Les Lamar, B Breast Implant pain and recurrent left breast infection; on wound vac w/ iv abx; wound pump removed 2019       Family History   Problem Relation Age of Onset    OSTEOARTHRITIS Father     Stroke Father         TIA's    Cancer Father         melanoma on back removed    Pacemaker Father 80    Heart Disease Father          79 yo in     Hypertension Mother     OSTEOARTHRITIS Mother     Heart Disease Mother         valve disease,      Heart Attack Maternal Grandmother 76    No Known Problems Sister     No Known Problems Brother     Depression Daughter     Substance Abuse Daughter     Alcohol abuse Daughter     Anxiety Daughter         OB History        1    Para   1    Term                AB        Living           SAB        IAB        Ectopic        Molar        Multiple        Live Births              Obstetric Comments    x 1; Previous Gyn Dr Jian Sahu  Menarche:  10. LMP: ? .  # of Children:  1. Age at Delivery of First Child:  34.   Hysterectomy/oophorectomy:  YES/YES. Breast Bx:  yes. Hx of Breast Feeding:  yes. BCP:  yes. Hormone therapy:  no.                     REVIEW OF SYSTEMS:    Patient denies any recent fever, chills, nausea, vomiting, chest pain, or shortness of breath. Vitals:  Ht 5' 9\" (1.753 m)   Wt 291 lb (132 kg)   LMP 2011   BMI 42.97 kg/m²    Body mass index is 42.97 kg/m².        PHYSICAL EXAM:  General exam: Patient is awake, alert, and oriented x3. Well-appearing. No acute distress. Ambulates with an antalgic gait    Bilateral knees: Neurovascular and sensory intact. Effusion is present. Crepitus is noted with range of motion of both knees. There is tenderness palpation at the medial and lateral joint lines. Jose David's maneuver creates mild pain. Normal stability on ligamentous testing including Lachman's exam.  Stable anterior and posterior drawer. No erythema or ecchymosis. IMAGING:    XR Results (most recent):  Results from Hospital Encounter encounter on 12/29/19    XR FOOT RT MIN 3 V    Narrative  EXAM: XR FOOT RT MIN 3 V    INDICATION: injury. Right foot pain since yesterday when she heard a pop walking  around a medium. Pain dorsal lateral aspect. COMPARISON: None. FINDINGS: Three views of the right foot demonstrate a small ossific fragment or  fragments at the lateral aspect of the right midfoot, with mild prominence of  soft tissue but no soft tissue mass. There is focal interruption of posterior  calcaneal spur formation, age uncertain. The Achilles tendon is thought to be  intact. .    Impression  IMPRESSION: No acute bony abnormality is confirmed. However, there are 2 ossific  fragments in the soft tissues at the lateral aspect of the midfoot, thought to  most likely be nonacute. Correlate with physical examination in this region. Comparison to any prior radiographs of the right foot would be most helpful. Otherwise, follow-up radiographs in 7-14 days may be helpful if no further  diagnosis is made on orthopedic consultation/follow-up. Fragmentation of  posterior calcaneal spur formation is thought to be nonacute, but correlate with  physical examination. Results from [place] encounter on 06/25/15    XR KNEE LT 3 V    Narrative  **Final Report**      ICD Codes / Adm. Diagnosis: 719.46   / Pain in joint, lower leg  Examination:  CR KNEE 3 VWS LT  - [id number] - Jun 25 2015 11: 19AM  Accession No:  [id number]  Reason:  left knee pain      REPORT:  EXAM:  CR KNEE 3 VWS LT  Clinical history left knee pain  INDICATION:   left knee pain    COMPARISON: None. FINDINGS: Three views of the left knee demonstrate no fracture or other  acute osseous or articular abnormality. There is no effusion. Extensive  osteophyte formation. Patellofemoral joint space narrowing. Impression  :    Extensive degenerative change. Osteoarthritis. No acute abnormality. Signing/Reading Doctor: Karen Weston ([id number])  Approved: Karen Weston ([id number])  Jun 25 2015 11:43AM      Results from Abstract encounter on 02/14/12    SILVANO MAMMOGRAM DIAGNOSTIC LEFT ADDL VIEW         Orders Placed This Encounter    bupivacaine HCl (MARCAINE) 0.25 % (2.5 mg/mL) injection 15 mg    triamcinolone acetonide (KENALOG-40) 40 mg/mL injection 40 mg    bupivacaine HCl (MARCAINE) 0.25 % (2.5 mg/mL) injection 15 mg    triamcinolone acetonide (KENALOG-40) 40 mg/mL injection 40 mg              An electronic signature was used to authenticate this note.   -- Jadiel Garcia, DO

## 2022-03-07 NOTE — LETTER
3/7/2022    Patient: Guido Zavaleta   YOB: 1955   Date of Visit: 3/7/2022     Diane Tyler MD  [address] [facility]    Dear Diane Tyler MD,      Thank you for referring Ms. Brenda Flores to [facility] for evaluation. My notes for this consultation are attached. If you have questions, please do not hesitate to call me. I look forward to following your patient along with you.       Sincerely,    Meghann Diana, DO

## 2022-03-18 PROBLEM — Z90.13 H/O BILATERAL MASTECTOMY: Status: ACTIVE | Noted: 2019-12-02

## 2022-03-18 PROBLEM — C50.811 CANCER OF OVERLAPPING SITES OF RIGHT BREAST (HCC): Status: ACTIVE | Noted: 2019-08-27

## 2022-03-18 PROBLEM — M19.90 ARTHRITIS: Status: ACTIVE | Noted: 2019-12-02

## 2022-03-19 PROBLEM — Z98.890 H/O BREAST RECONSTRUCTION: Status: ACTIVE | Noted: 2019-12-02

## 2022-03-19 PROBLEM — Z79.811 AROMATASE INHIBITOR USE: Status: ACTIVE | Noted: 2020-03-03

## 2022-03-19 PROBLEM — E78.2 MIXED HYPERLIPIDEMIA: Status: ACTIVE | Noted: 2020-07-06

## 2022-03-19 PROBLEM — F32.A CHRONIC DEPRESSION: Status: ACTIVE | Noted: 2020-01-30

## 2022-03-19 PROBLEM — F43.23 ADJUSTMENT DISORDER WITH MIXED ANXIETY AND DEPRESSED MOOD: Status: ACTIVE | Noted: 2020-01-30

## 2022-03-20 PROBLEM — Z98.890 STATUS POST RIGHT BREAST LUMPECTOMY: Status: ACTIVE | Noted: 2020-01-30

## 2022-03-20 PROBLEM — E66.01 SEVERE OBESITY (HCC): Status: ACTIVE | Noted: 2018-11-28

## 2022-03-29 ENCOUNTER — TELEPHONE (OUTPATIENT)
Dept: FAMILY MEDICINE CLINIC | Age: 67
End: 2022-03-29

## 2022-03-29 DIAGNOSIS — F33.9 CHRONIC MAJOR DEPRESSIVE DISORDER, RECURRENT EPISODE (HCC): ICD-10-CM

## 2022-03-29 DIAGNOSIS — F43.23 ADJUSTMENT REACTION WITH ANXIETY AND DEPRESSION: ICD-10-CM

## 2022-03-29 RX ORDER — DOXYCYCLINE 100 MG/1
100 TABLET ORAL 2 TIMES DAILY
Qty: 20 TABLET | Refills: 0 | Status: SHIPPED | OUTPATIENT
Start: 2022-03-29 | End: 2022-04-08

## 2022-03-29 RX ORDER — BENZONATATE 200 MG/1
200 CAPSULE ORAL
Qty: 21 CAPSULE | Refills: 0 | Status: SHIPPED | OUTPATIENT
Start: 2022-03-29 | End: 2022-04-04 | Stop reason: SDUPTHER

## 2022-03-29 NOTE — TELEPHONE ENCOUNTER
Patient called and stated that she sent a message via My Chart. Patient also stated that she needs an antiobiotic. She has a severe cold/sinus infection.     Requesting a call back    Best call back# [phone]

## 2022-03-29 NOTE — TELEPHONE ENCOUNTER
Started having symptoms 3/24/22 while in [facility] visiting daughter. Got much worse an I was supposed to drive back to [place] Monday, so went to Immediate care near by on sunday. Was prescribed: Maalox, Ipratropium Bromide, Zyrtec & Flonase. I got:  Ipratropium, Cetirizine & Fluticasone Propionate. Was not able to leave to go home, because symptoms have not gotten much better and I have only slept an hour at most at a time, because of the major coughing an blowing my nose. My face has tightness and pain. So I believe I have a sinus infection. Fever comes and goes. Nose is raw inside and out. Throat is so sore. Is there any way you can help? I really need to get home for an early Friday appointment. I think I need an antibiotic to kick this. Please letme know if there is any way you can help. I am so tired and miserable . Thanks so much for getting back so quickly. I was not notified of message so. Just saw it. Urgent care said it was probably seasonal allergies. Negative for flu and covid  Temp has varied, 99 to 100.1 with scan. Was not coughing when saw doctor, but told them it was a yellow beige sludge looking stuff  Blowing nose has been yellow with redish spots. .  Along with what they prescribed. I have tried Tylenol cold sinus, Sudafed,  Niquil/Dayquil gell.       If you are able to help with an antibiotic or whatever, the closest [facility] to me is  [address] [place] [address].    [phone]  I looked at nasal wash and I am sure I could find something

## 2022-04-04 ENCOUNTER — VIRTUAL VISIT (OUTPATIENT)
Dept: FAMILY MEDICINE CLINIC | Age: 67
End: 2022-04-04
Payer: MEDICARE

## 2022-04-04 DIAGNOSIS — J04.0 LARYNGITIS: Primary | ICD-10-CM

## 2022-04-04 DIAGNOSIS — R09.81 COUGH WITH CONGESTION OF PARANASAL SINUS: ICD-10-CM

## 2022-04-04 DIAGNOSIS — R05.8 COUGH WITH CONGESTION OF PARANASAL SINUS: ICD-10-CM

## 2022-04-04 PROCEDURE — G0463 HOSPITAL OUTPT CLINIC VISIT: HCPCS | Performed by: STUDENT IN AN ORGANIZED HEALTH CARE EDUCATION/TRAINING PROGRAM

## 2022-04-04 PROCEDURE — 99213 OFFICE O/P EST LOW 20 MIN: CPT | Performed by: STUDENT IN AN ORGANIZED HEALTH CARE EDUCATION/TRAINING PROGRAM

## 2022-04-04 RX ORDER — CETIRIZINE HYDROCHLORIDE 5 MG/1
5 TABLET ORAL DAILY
Qty: 90 TABLET | Refills: 1 | Status: SHIPPED | OUTPATIENT
Start: 2022-04-04 | End: 2022-10-24

## 2022-04-04 RX ORDER — GUAIFENESIN, PSEUDOEPHEDRINE HYDROCHLORIDE 600; 60 MG/1; MG/1
1 TABLET, EXTENDED RELEASE ORAL EVERY 12 HOURS
Qty: 30 TABLET | Refills: 0 | Status: SHIPPED | OUTPATIENT
Start: 2022-04-04 | End: 2022-04-09

## 2022-04-04 RX ORDER — BENZONATATE 200 MG/1
200 CAPSULE ORAL
Qty: 30 CAPSULE | Refills: 0 | Status: SHIPPED | OUTPATIENT
Start: 2022-04-04 | End: 2022-04-11 | Stop reason: SDUPTHER

## 2022-04-04 NOTE — PROGRESS NOTES
Solis Pedersen  79 y.o. female  1955  [address] [id number]  [id number]   [facility]  Telemedicine Progress Note  Raj Rush MD       Encounter Date and Time: April 4, 2022 at 9:20 AM    Consent:  She and/or the health care decision maker is aware that that she may receive a bill for this telephone service, depending on her insurance coverage, and has provided verbal consent to proceed: YES    Chief Complaint   Patient presents with    Cough     History of Present Illness   Solis Pedersen is a 79 y.o. female was evaluated by synchronous (real-time) audio-video technology from home, through the [employer] Patient Portal.    Started with sore throat a little over 1 week ago. Was seen at urgent care in [facility] and started on ipratropium, fluticasone and zyrtec. Symptoms did not improved and was having sinus fullness and HA so started Doxycyline for sinusitis 5 days ago. Still has persistent dry cough and is losing her voice. Denies colorful drainage from sinuses, HA, fever, SOB. Review of Systems   Review of Systems   Constitutional: Negative for chills, fever and malaise/fatigue. HENT: Positive for congestion and sore throat. Negative for ear discharge, ear pain and sinus pain. Muffled voice   Respiratory: Positive for cough and sputum production. Negative for shortness of breath and wheezing. Cardiovascular: Negative for chest pain. Neurological: Negative for headaches.        Vitals/Objective:     General: alert, cooperative, no distress   Mental  status: mental status: alert, oriented to person, place, and time, normal mood, behavior, speech, dress, motor activity, and thought processes   Resp: resp: normal effort, no respiratory distress, coughing - dry and difficult to hear, no increased WOB   Neuro: neuro: no gross deficits   Skin: skin: no discoloration or lesions of concern on visible areas   Due to this being a TeleHealth evaluation, many elements of the physical examination are unable to be assessed. Assessment and Plan:     68yo F with hx of HLD, hypothyroiidism, Depression, Breast cancer on anastrazole, RENÉE presenting for over a week of cold/allergy symptoms treated for allergic rhinitis then sinusitis now with predominantly laryngitis symptoms. Complete abx for sinusitis     1. Laryngitis  - strict vocal rest 3-5d, warm liquids  - benzonatate (TESSALON) 200 mg capsule; Take 1 Capsule by mouth three (3) times daily as needed for Cough for up to 10 days. Dispense: 30 Capsule; Refill: 0    2. Cough with congestion of paranasal sinus  - PSEUDOEPHEDRINE-guaiFENesin (Mucinex D)  mg per tablet; Take 1 Tablet by mouth every twelve (12) hours for 5 days. Then up to twice a day as needed for congestion  Dispense: 30 Tablet; Refill: 0  - benzonatate (TESSALON) 200 mg capsule; Take 1 Capsule by mouth three (3) times daily as needed for Cough for up to 10 days. Dispense: 30 Capsule; Refill: 0  - cetirizine (ZYRTEC) 5 mg tablet; Take 1 Tablet by mouth daily. Dispense: 90 Tablet; Refill: 1  - continue flonase 1-2 sprays\/nostril BID though allergy season        Time spent in direct conversation with the patient to include medical condition(s) discussed, assessment and treatment plan:       We discussed the expected course, resolution and complications of the diagnosis(es) in detail. Medication risks, benefits, costs, interactions, and alternatives were discussed as indicated. I advised her to contact the office if her condition worsens, changes or fails to improve as anticipated. She expressed understanding with the diagnosis(es) and plan. Patient understands that this encounter was a temporary measure, and the importance of further follow up and examination was emphasized. Patient verbalized understanding. Patient informed to follow up:    Follow-up and Dispositions  ·   Return in about 4 days (around 4/8/2022) for evaluation in clinic if symptoms fail to improve . Electronically Signed: Joe Falcon MD    Providers location when delivering service: home      Pursuant to the emergency declaration under the [address] [place] [address] waiver authority and the [employer] Act, this Virtual  Visit was conducted, with patient's consent, to reduce the patient's risk of exposure to COVID-19 and provide continuity of care for an established patient. Services were provided through a video synchronous discussion virtually to substitute for in-person clinic visit. History   Patients past medical, surgical and family histories were reviewed and updated. Past Medical History:   Diagnosis Date    Adjustment disorder with mixed anxiety and depressed mood 2020    After death of father  and personal diagnosis of/treatment for breast cancer [id number]; Counselin:  [employer] [employer] [facility] [address]     Ankle edema 2010    Anxiety 2010    Benign essential hypertension 2016    2006     Carpal tunnel syndrome 2010    Chronic depression 2020    H/O Benign Colon Polyp 2010    H/O bilateral mastectomy 2019    Double Mastectomy (right breast cancer and elective left breast prophylactically); No chemo, No radiation    H/O Uterine Fibroids 2010    Hypercholesterolemia 2013    ~    Hypothyroidism 2016    Kidney stone 2010    Mixed hyperlipidemia 2020    Perimenopausal 2010    Primary osteoarthritis of knees, bilateral 2016    Extensive; Ortho (needs knee replacement), CSI x 2, ; [facility] 21 Dr. Viktor Hicks Primary osteoarthritis of left knee 2016    Extensive;  Ortho (needs knee replacement), CSI x 2,     S/P benign cervical polypectomy 2010    Sleep apnea 2010    USES CPAP    Status post right breast lumpectomy 2020 6-, Invasive ductal carcinoma--->Mastectomy 8-, no chemo, no radiation, started on Arimidex    Stress incontinence 5/26/2010     Past Surgical History:   Procedure Laterality Date    COLONOSCOPY N/A 10/28/2021    COLONOSCOPY   :- performed by Pranav Stevenson MD at [facility] ENDOSCOPY    EKG ORDERABLE  5/2009    NSR, rate 84, normal EKG    HX BLADDER SUSPENSION  ~2010    Dr Jocelyne Padilla Left 09/23/2019    [facility].  Dr. Iglesia Haddad, [facility] Mastectomy Implant Infection Left Breast, Replaced Implant, IV abx    HX BREAST BIOPSY Right 11/2011    VPI, right breast biopsy negative    HX BREAST BIOPSY Right 04/22/2019    invasive ductal carcinoma    HX BREAST LUMPECTOMY Right 6/13/2019    RIGHT BREAST LUMPECTOMY ( x2 ) WTIH ULTRASOUND , RIGHT SENTINAL NODE BIOPSY performed by [facility]zman [facility] at [facility] AMBUL[facility]ORY [facility]    HX BREAST RECONSTRUCTION Bilateral 8/27/2019    B MASTECTOMY AND IMPLANTS, IMMEDIATE BILATERAL BREAST RECONSTRUCTION WITH DIRECT TO IMPLANT USE OF ALLODERM AND BILATERAL BREAST RECONSTRUCTION INTRA-OPERATIVE ASSESMENT OF BLOOD FLOW performed by Pattie Bhatia MD at [place] [id number] Right 03/2010    Dr Loli Polanco CERVICAL POLYPECTOMY  2009, 2015    Dr Wu Crury    HX COLONOSCOPY  04/08/2016    Dr Jose Hannon-Internal Hemorrhoids- Normal mucosa in the whole colon - Repeat colonoscopy in 5 years    HX COLONOSCOPY  2016    HX COLONOSCOPY  10/2005    benign polyp; repeat 5 yr per Dr Katherin Richards HX COLONOSCOPY  11/2010    Normal, f/u 5 yrs, Dr Deanna Montoya  Scheduled 10-    HX Deo Rome  7/2005    AUB, benign/neg bx; Dr Armida Harrison  10/2012    Dr Wu Curry, [facility]    HX HEENT  2019    Nasal Abscess I&D, Dr. Kathy Ge HX HYSTERECTOMY  2013    HX MASTECTOMY Bilateral 8/27/2019    BILATERAL BREAST SKIN SPARING MASTECTOMIES performed by Michael De Guzman MD at [place] [address]  HX ORTHOPAEDIC Right 2010    CARPAL TUNNEL    HX PARTIAL HYSTERECTOMY  13    Supracervical hysterectomy for fibroids, Dr Awilda Lund HX UROLOGICAL  2010    BLADDER SLING     HX WISDOM TEETH EXTRACTION      NV REMOVAL INTACT BREAST IMPLANT Bilateral 10/15/2019    [facility] Dr. Peterson Ybarra B Breast Implant pain and recurrent left breast infection; on wound vac w/ iv abx; wound pump removed 2019     Family History   Problem Relation Age of Onset    OSTEOARTHRITIS Father     Stroke Father         TIA's    Cancer Father         melanoma on back removed    Pacemaker Father 80    Heart Disease Father          79 yo in     Hypertension Mother     OSTEOARTHRITIS Mother     Heart Disease Mother         valve disease,      Heart Attack Maternal Grandmother 76    No Known Problems Sister     No Known Problems Brother     Depression Daughter     Substance Abuse Daughter     Alcohol abuse Daughter     Anxiety Daughter      Social History     Socioeconomic History    Marital status:      Spouse name: Not on file    Number of children: 1    Years of education: Not on file    Highest education level: Not on file   Occupational History    Occupation: [address] Occupation: Retired    Tobacco Use    Smoking status: Former Smoker     Packs/day: 0.50     Years: 25.00     Pack years: 12.50     Quit date: 2005     Years since quittin.2    Smokeless tobacco: Never Used   Substance and Sexual Activity    Alcohol use: Yes     Comment: 2 shots of Rum ~ 5 nights a week    Drug use: No    Sexual activity: Not Currently     Partners: Male     Comment: not sexually active x many years   Other Topics Concern     Service Not Asked    Blood Transfusions Not Asked    Caffeine Concern No     Comment: no coffee, tea and cut out her diet Cokes    Occupational Exposure Not Asked    Hobby Hazards Not Asked    Sleep Concern Not Asked    Stress Concern Not Asked    Weight Concern Not Asked    Special Diet No     Comment: \"I eat way too much\"    Back Care Not Asked    Exercise Yes     Comment: home stationary bike x 40-45 minutes a day, stretches daily, yardwork once a week    Bike Helmet Not Asked    Seat Belt Not Asked    Self-Exams Not Asked   Social History Narrative     1 biological daughter (lives in [facility] now), 1 \"adopted\", and 2 step children    Retired 2018    Works for her [employer]    Diet: nothing special, admits likes sweets, cheese, not controlling her portions    Caffeine: no coffee or tea, occ decaff sodas diet coke or ginger ale    Exercise: stationary bike x 45 minutes every day     Weight: ranges in the 280's-290's over time         Social Determinants of Health     Financial Resource Strain:     Difficulty of Paying Living Expenses: Not on file   Food Insecurity:     Worried About [address] in the Last Year: Not on file    [address] in the Last Year: Not on file   Transportation Needs:     Lack of Transportation (Medical): Not on file    Lack of Transportation (Non-Medical):  Not on file   Physical Activity:     Days of Exercise per Week: Not on file    Minutes of Exercise per Session: Not on file   Stress:     Feeling of Stress : Not on file   Social Connections:     Frequency of Communication with Friends and Family: Not on file    Frequency of Social Gatherings with Friends and Family: Not on file    Attends Yazdanism Services: Not on file    Active Member of Clubs or Organizations: Not on file    Attends Club or Organization Meetings: Not on file    Marital Status: Not on file   Intimate Partner Violence:     Fear of Current or Ex-Partner: Not on file    Emotionally Abused: Not on file    Physically Abused: Not on file    Sexually Abused: Not on file   Housing Stability:     Unable to Pay for Housing in the Last Year: Not on file    [facility] in the Last Year: Not on file    Unstable Housing in the Last Year: Not on file     Patient Active Problem List   Diagnosis Code    Sleep apnea, RENÉE G47.30    Anxiety F41.9    Carpal tunnel syndrome G56.00    Mild Dependent Ankle edema, B M25.473    Perimenopausal N95.1    ? Passed Kidney stone N20.0    Stress incontinence N39.3    S/P benign cervical polypectomy Z98.890, Z87.42    H/O Benign Colon Polyp K63.5    H/O Uterine Fibroids D21.9    Hypercholesterolemia E78.00    Benign essential hypertension I10    Hypothyroidism E03.9    Primary osteoarthritis of knees, bilateral M17.0    Severe obesity (HCC) E66.01    Cancer of overlapping sites of right breast ([facility] [address] C50.811    H/O bilateral mastectomy Z90.13    H/O breast reconstruction Z98.890    Arthritis M19.90    Status post right breast lumpectomy Z98.890    Chronic depression F32. A    Adjustment disorder with mixed anxiety and depressed mood F43.23    Aromatase inhibitor use Z79.811    Mixed hyperlipidemia E78.2          Current Medications/Allergies   Medications and Allergies reviewed:    Current Outpatient Medications   Medication Sig Dispense Refill    PSEUDOEPHEDRINE-guaiFENesin (Mucinex D)  mg per tablet Take 1 Tablet by mouth every twelve (12) hours for 5 days. Then up to twice a day as needed for congestion 30 Tablet 0    benzonatate (TESSALON) 200 mg capsule Take 1 Capsule by mouth three (3) times daily as needed for Cough for up to 10 days. 30 Capsule 0    cetirizine (ZYRTEC) 5 mg tablet Take 1 Tablet by mouth daily. 90 Tablet 1    doxycycline (ADOXA) 100 mg tablet Take 1 Tablet by mouth two (2) times a day for 10 days. 20 Tablet 0    FLUoxetine (PROzac) 20 mg capsule TAKE 1 CAPSULE BY MOUTH DAILY 90 Capsule 3    rosuvastatin (CRESTOR) 10 mg tablet TAKE 1 TABLET BY MOUTH EVERY NIGHT 90 Tablet 3    TURMERIC PO Take  by mouth daily.       buPROPion XL (WELLBUTRIN XL) 150 mg tablet Take 1 Tablet by mouth Daily (before breakfast). 90 Tablet 3    losartan (COZAAR) 100 mg tablet Take 1 Tablet by mouth daily. 90 Tablet 3    levothyroxine (SYNTHROID) 175 mcg tablet Take 1 Tablet by mouth Daily (before breakfast). 90 Tablet 3    anastrozole (ARIMIDEX) 1 mg tablet TAKE 1 TABLET BY MOUTH EVERY DAY 90 Tablet 3    ascorbic acid (VITAMIN C PO) Take  by mouth.  multivitamin (ONE A DAY) tablet Take 1 Tab by mouth daily.  cholecalciferol, vitamin D3, (Vitamin D3) 50 mcg (2,000 unit) tab Take 1 Tab by mouth daily.       OTHER Portable CPAP machine for RENÉE, with new mask and tubing but no change to mask or settings; G47.30 GARO 99m prog good 1 Each 0     Allergies   Allergen Reactions    Pcn [Penicillins] Hives    Sulfa (Sulfonamide Antibiotics) Hives    Zocor [Simvastatin] Myalgia    Lisinopril Cough

## 2022-04-04 NOTE — PATIENT INSTRUCTIONS
Laryngitis: Care Instructions  Your Care Instructions     Laryngitis is an inflammation of the voice box (larynx) that causes your voice to become raspy or hoarse. Most of the time, laryngitis comes on quickly and lasts as long as 2 weeks. It is caused by overuse, irritation, or infection of the vocal cords inside the larynx. Some of the most common causes are a cold, the flu, or allergies. Loud talking, shouting, cheering, or singing also can cause laryngitis. Stomach acid that backs up into the throat also can make you lose your voice. Resting your voice and taking other steps at home can help you get your voice back. Follow-up care is a key part of your treatment and safety. Be sure to make and go to all appointments, and call your doctor if you are having problems. It's also a good idea to know your test results and keep a list of the medicines you take. How can you care for yourself at home? · Rest your voice. You do not have to stop speaking, but use your voice as little as possible. Speak softly but do not whisper; whispering can bother your larynx more than speaking softly. Avoid talking on the telephone or trying to speak loudly. · Drink plenty of water to keep your throat moist.  · Before you use cough and cold medicines, check the label. They may not be safe for young children or for people with certain health problems. · Try to keep stomach acid from backing up into your throat. Do not eat just before bedtime. Reduce the amount of coffee and alcohol you drink, and eat healthy foods. Taking over-the-counter acid reducers can help when these steps are not enough. In some cases, you may need prescription medicine. · Try not to clear your throat. This can cause more irritation of your larynx. Take an over-the-counter cough suppressant (if your doctor recommends it) if you have a dry cough that does not produce mucus.   · Use saline (saltwater) nasal washes to help keep your nasal passages open and wash out mucus and bacteria. You can buy saline nose drops at a grocery store or drugstore. Or, you can make your own at home by mixing ½ teaspoon salt, 1 cup water (at room temperature), and ½ teaspoon baking soda. If you make your own, fill a bulb syringe with the solution, insert the tip into your nostril, and squeeze gently. Shan Like your nose. · Follow your doctor's directions for treating the condition that caused you to lose your voice. If your doctor prescribed antibiotics, take them as directed. Do not stop taking them just because you feel better. You need to take the full course of antibiotics. · Do not smoke or allow others to smoke around you. If you need help quitting, talk to your doctor about stop-smoking programs and medicines. These can increase your chances of quitting for good. When should you call for help? Call 911 anytime you think you may need emergency care. For example, call if:    · You have trouble breathing. Call your doctor now or seek immediate medical care if:    · You have new or worse pain.     · You have trouble swallowing. Watch closely for changes in your health, and be sure to contact your doctor if:    · You do not get better as expected. Where can you learn more? Go to http://www.gray.com/  Enter A905 in the search box to learn more about \"Laryngitis: Care Instructions. \"  Current as of: September 8, 2021               Content Version: 13.2  © 1870-8474 [employer]. Care instructions adapted under license by [employer] (which disclaims liability or warranty for this information). If you have questions about a medical condition or this instruction, always ask your healthcare professional. [place] [address] any warranty or liability for your use of this information.

## 2022-04-11 ENCOUNTER — VIRTUAL VISIT (OUTPATIENT)
Dept: FAMILY MEDICINE CLINIC | Age: 67
End: 2022-04-11
Payer: MEDICARE

## 2022-04-11 DIAGNOSIS — E66.01 OBESITY, CLASS III, BMI 40-49.9 (MORBID OBESITY) (HCC): ICD-10-CM

## 2022-04-11 DIAGNOSIS — J04.0 LARYNGITIS: ICD-10-CM

## 2022-04-11 DIAGNOSIS — R09.81 COUGH WITH CONGESTION OF PARANASAL SINUS: ICD-10-CM

## 2022-04-11 DIAGNOSIS — J01.10 SUBACUTE FRONTAL SINUSITIS: Primary | ICD-10-CM

## 2022-04-11 DIAGNOSIS — R05.8 COUGH WITH CONGESTION OF PARANASAL SINUS: ICD-10-CM

## 2022-04-11 PROCEDURE — G0463 HOSPITAL OUTPT CLINIC VISIT: HCPCS | Performed by: STUDENT IN AN ORGANIZED HEALTH CARE EDUCATION/TRAINING PROGRAM

## 2022-04-11 PROCEDURE — 99214 OFFICE O/P EST MOD 30 MIN: CPT | Performed by: STUDENT IN AN ORGANIZED HEALTH CARE EDUCATION/TRAINING PROGRAM

## 2022-04-11 RX ORDER — AZITHROMYCIN 250 MG/1
TABLET, FILM COATED ORAL
Qty: 6 TABLET | Refills: 0 | Status: SHIPPED | OUTPATIENT
Start: 2022-04-11 | End: 2022-04-16

## 2022-04-11 RX ORDER — FLUTICASONE PROPIONATE 50 MCG
1 SPRAY, SUSPENSION (ML) NASAL DAILY
Qty: 48 G | Refills: 1 | Status: SHIPPED | OUTPATIENT
Start: 2022-04-11 | End: 2022-10-10

## 2022-04-11 RX ORDER — BENZONATATE 200 MG/1
200 CAPSULE ORAL
Qty: 30 CAPSULE | Refills: 0 | Status: SHIPPED | OUTPATIENT
Start: 2022-04-11 | End: 2022-04-21

## 2022-04-11 NOTE — Clinical Note
Please schedule Mrs Leah Torres with me on Thursday 4/14 at 10:45 for in office visit for \"persistent cough/congestion\". She is without fever and no concern for COVID. She is aware of this visit and has MyChart so no need to call to confirm. Thank you!

## 2022-04-11 NOTE — PROGRESS NOTES
Shin Phelps  79 y.o. female  1955  [address] [id number]  [id number]   [address] Progress Note  Giselle Osullivan MD       Encounter Date and Time: April 11, 2022 at 11:14 AM    Consent:  She and/or the health care decision maker is aware that that she may receive a bill for this telephone service, depending on her insurance coverage, and has provided verbal consent to proceed: Yes    Chief Complaint   Patient presents with    Cough    Cold Symptoms     History of Present Illness   Shin Phelps is a 79 y.o. female was evaluated by synchronous (real-time) audio-video technology from home, through the [facility].    Having persistent drainage from her nose and coughing up yellowish mucus. Does feel chilled intermittently. Has not had She has been using tessalon for cough, flonase and zyrtec daily and mucinex. She completed 10-day course of doxycycyline. She does feel exhausted. Hoarsenss and cough are improved overall but has pressure in her forehead and over cheeks with intermittent cough and fatigue    Review of Systems   Review of Systems   Constitutional: Negative for chills and fever. HENT: Positive for congestion and sinus pain. Respiratory: Positive for cough, sputum production and shortness of breath. Negative for wheezing. Cardiovascular: Negative for chest pain and palpitations. Neurological: Negative for dizziness and headaches.        Vitals/Objective:     General: alert, cooperative, no distress   Mental  status: mental status: alert, oriented to person, place, and time, normal mood, behavior, speech, dress, motor activity, and thought processes   Resp: resp: normal effort, no respiratory distress and no cough appreciated during visit   Neuro: neuro: no gross deficits   Skin: skin: no discoloration or lesions of concern on visible areas   Due to this being a TeleHealth evaluation, many elements of the physical examination are unable to be assessed. Assessment and Plan:   Mrs Tono Sandoval is a 68yo F with hx of breast cancer on anastrazole, HTN, hypothyroidism, RENÉE, anxiety presenting with persistent cough and congestions with new sinus pressure. Completed doxycycline x 10dy (should cover CAP) and treating allergy symptoms with flonase and zyrtec however symptoms are persistent. Suspect may have developed sinusitis. 1. Subacute frontal sinusitis  - START azithromycin (ZITHROMAX) 250 mg tablet; Take 2 tablets today, then take 1 tablet daily  Dispense: 6 Tablet; Refill: 0  - CONTINUE fluticasone propionate (FLONASE) 50 mcg/actuation nasal spray; 1 [place] by Both Nostrils route daily. Indications: inflammation of the nose due to an allergy  Dispense: 48 g; Refill: 1  - Continue daily zyrtec  - if no improvement will see in office 4/14 at 10:45 for CXR and exam    2. Laryngitis: improved though still with hoarsenss  - benzonatate (TESSALON) 200 mg capsule; Take 1 Capsule by mouth three (3) times daily as needed for Cough for up to 10 days. Dispense: 30 Capsule; Refill: 0  - c/w vocal rest, warm liquids     3. Cough with congestion of paranasal sinus: likely allergic in nature. Will need seasonal antiH + nasal steroid. REviewed when and how to start thee annually  - benzonatate (TESSALON) 200 mg capsule; Take 1 Capsule by mouth three (3) times daily as needed for Cough for up to 10 days. Dispense: 30 Capsule; Refill: 0  - fluticasone propionate (FLONASE) 50 mcg/actuation nasal spray; 1 Vashon by Both Nostrils route daily. Indications: inflammation of the nose due to an allergy  Dispense: 48 g; Refill: 1      We discussed the expected course, resolution and complications of the diagnosis(es) in detail. Medication risks, benefits, costs, interactions, and alternatives were discussed as indicated. I advised her to contact the office if her condition worsens, changes or fails to improve as anticipated.  She expressed understanding with the diagnosis(es) and plan. Patient understands that this encounter was a temporary measure, and the importance of further follow up and examination was emphasized. Patient verbalized understanding. Patient informed to follow up: Follow-up and Dispositions  ·   Return in 3 days (on 2022) for in office exam and CXR at 10:45am.  Routing History         Electronically Signed: Ian He MD    Providers location when delivering service:home       Pursuant to the emergency declaration under the [facility] [place] 2[place], [facility] waiver authority and the [employer] Act, this Virtual  Visit was conducted, with patient's consent, to reduce the patient's risk of exposure to COVID-19 and provide continuity of care for an established patient. Services were provided through a video synchronous discussion virtually to substitute for in-person clinic visit. History   Patients past medical, surgical and family histories were reviewed and updated. Past Medical History:   Diagnosis Date    Adjustment disorder with mixed anxiety and depressed mood 2020    After death of father  and personal diagnosis of/treatment for breast cancer [id number]; Counselin:  Shakira Rosa of [address], [place]     Ankle edema 2010    Anxiety 2010    Benign essential hypertension 2016    2006     Carpal tunnel syndrome 2010    Chronic depression 2020    H/O Benign Colon Polyp 2010    H/O bilateral mastectomy 2019    Double Mastectomy (right breast cancer and elective left breast prophylactically);  No chemo, No radiation    H/O Uterine Fibroids 2010    Hypercholesterolemia 2013    ~2006    Hypothyroidism 2016    Kidney stone 2010    Mixed hyperlipidemia 2020    Perimenopausal 2010    Primary osteoarthritis of knees, bilateral 2016 Extensive; Ortho (needs knee replacement), CSI x 2, ; CSI 8/23/21 Dr. Lenin Wen Primary osteoarthritis of left knee 5/26/2016    Extensive; Ortho (needs knee replacement), CSI x 2,     S/P benign cervical polypectomy 12/20/2010    Sleep apnea 4/5/2010    USES CPAP    Status post right breast lumpectomy 1/30/2020 6-, Invasive ductal carcinoma--->Mastectomy 8-, no chemo, no radiation, started on Arimidex    Stress incontinence 5/26/2010     Past Surgical History:   Procedure Laterality Date    COLONOSCOPY N/A 10/28/2021    COLONOSCOPY   :- performed by Mu Arriaga MD at [address] EKG ORDERABLE  5/2009    NSR, rate 84, normal EKG    HX BLADDER SUSPENSION  ~2010    Dr Austin Osman Left 09/23/2019    [facility].  Dr. Geovanni Sharpe, [address] [place] [address] Mastectomy Implant Infection Left Breast, Replaced Implant, IV abx    HX BREAST BIOPSY Right 11/2011    VPI, right breast biopsy negative    HX BREAST BIOPSY Right 04/22/2019    invasive ductal carcinoma    HX BREAST LUMPECTOMY Right 6/13/2019    RIGHT BREAST LUMPECTOMY ( x2 ) WTIH ULTRASOUND , RIGHT SENTINAL NODE BIOPSY performed by [facility]udi Camara [facility] at [facility] AM[facility]    HX BREAST RECONSTRUCTION Bilateral 8/27/2019    B MASTECTOMY AND IMPLANTS, IMMEDIATE BILATERAL BREAST RECONSTRUCTION WITH DIRECT TO IMPLANT USE OF ALLODERM AND BILATERAL BREAST RECONSTRUCTION INTRA-OPERATIVE ASSESMENT OF BLOOD FLOW performed by Maulik Zarco MD at [place] [address] Right 03/2010    Dr Claudia Damon CERVICAL POLYPECTOMY  2009, 2015    Dr Anika Beatty    HX COLONOSCOPY  04/08/2016    Dr Brandon Hannon-Internal Hemorrhoids- Normal mucosa in the whole colon - Repeat colonoscopy in 5 years    HX COLONOSCOPY  2016    HX COLONOSCOPY  10/2005    benign polyp; repeat 5 yr per Dr Dario Bella HX COLONOSCOPY  11/2010    Normal, f/u 5 yrs, Dr Rich Ross    HX COLONOSCOPY  Scheduled 10-    HX Mary Beltran CURETTAGE  2005    AUB, benign/neg bx; Dr Zenobia Pickard  10/2012    Dr Maryln Essex, [facility]    HX HEENT      Nasal Abscess I&D, Dr. Prosper Aquino HX HYSTERECTOMY  2013    HX MASTECTOMY Bilateral 2019    BILATERAL BREAST SKIN SPARING MASTECTOMIES performed by Mitra Villeda MD at [address] HX ORTHOPAEDIC Right 2010    CARPAL TUNNEL    HX PARTIAL HYSTERECTOMY  13    Supracervical hysterectomy for fibroids, Dr Jade Arias HX UROLOGICAL  2010    BLADDER SLING     HX Eyad Marek 50 Bilateral 10/15/2019    [facility] JON Deleon Breast Implant pain and recurrent left breast infection; on wound vac w/ iv abx; wound pump removed 2019     Family History   Problem Relation Age of Onset    OSTEOARTHRITIS Father     Stroke Father         TIA's    Cancer Father         melanoma on back removed    Pacemaker Father 80    Heart Disease Father          79 yo in     Hypertension Mother     OSTEOARTHRITIS Mother     Heart Disease Mother         valve disease,      Heart Attack Maternal Grandmother 76    No Known Problems Sister     No Known Problems Brother     Depression Daughter     Substance Abuse Daughter     Alcohol abuse Daughter     Anxiety Daughter      Social History     Socioeconomic History    Marital status:      Spouse name: Not on file    Number of children: 1    Years of education: Not on file    Highest education level: Not on file   Occupational History    Occupation: [address] Occupation: Retired    Tobacco Use    Smoking status: Former Smoker     Packs/day: 0.50     Years: 25.00     Pack years: 12.50     Quit date: 2005     Years since quittin.2    Smokeless tobacco: Never Used   Substance and Sexual Activity    Alcohol use: Yes     Comment: 2 shots of Rum ~ 5 nights a week    Drug use: No    Sexual activity: Not Currently     Partners: Male     Comment: not sexually active x many years   Other Topics Concern     Service Not Asked    Blood Transfusions Not Asked    Caffeine Concern No     Comment: no coffee, tea and cut out her diet Cokes    Occupational Exposure Not Asked    Hobby Hazards Not Asked    Sleep Concern Not Asked    Stress Concern Not Asked    Weight Concern Not Asked    Special Diet No     Comment: \"I eat way too much\"    Back Care Not Asked    Exercise Yes     Comment: home stationary bike x 40-45 minutes a day, stretches daily, yardwork once a week    Bike Helmet Not Asked    Seat Belt Not Asked    Self-Exams Not Asked   Social History Narrative     1 biological daughter (lives in [facility] now), 1 \"adopted\", and 2 step children    Retired 2018    Works for her [employer]    Diet: nothing special, admits likes sweets, cheese, not controlling her portions    Caffeine: no coffee or tea, occ decaff sodas diet coke or ginger ale    Exercise: stationary bike x 45 minutes every day     Weight: ranges in the 280's-290's over time         Social Determinants of Health     Financial Resource Strain:     Difficulty of Paying Living Expenses: Not on file   Food Insecurity:     Worried About [address] in the Last Year: Not on file    [address] in the Last Year: Not on file   Transportation Needs:     Lack of Transportation (Medical): Not on file    Lack of Transportation (Non-Medical):  Not on file   Physical Activity:     Days of Exercise per Week: Not on file    Minutes of Exercise per Session: Not on file   Stress:     Feeling of Stress : Not on file   Social Connections:     Frequency of Communication with Friends and Family: Not on file    Frequency of Social Gatherings with Friends and Family: Not on file    Attends Bahai Services: Not on file    Active Member of Clubs or Organizations: Not on file    Attends Club or Organization Meetings: Not on file    Marital Status: Not on file   Intimate Partner Violence:     Fear of Current or Ex-Partner: Not on file    Emotionally Abused: Not on file    Physically Abused: Not on file    Sexually Abused: Not on file   Housing Stability:     Unable to Pay for Housing in the Last Year: Not on file    Number of [place] in the Last Year: Not on file    Unstable Housing in the Last Year: Not on file     Patient Active Problem List   Diagnosis Code    Sleep apnea, RENÉE G47.30    Anxiety F41.9    Carpal tunnel syndrome G56.00    Mild Dependent Ankle edema, B M25.473    Perimenopausal N95.1    ? Passed Kidney stone N20.0    Stress incontinence N39.3    S/P benign cervical polypectomy Z98.890, Z87.42    H/O Benign Colon Polyp K63.5    H/O Uterine Fibroids D21.9    Hypercholesterolemia E78.00    Benign essential hypertension I10    Hypothyroidism E03.9    Primary osteoarthritis of knees, bilateral M17.0    Severe obesity (HCC) E66.01    Cancer of overlapping sites of right breast ([facility] [address] C50.811    H/O bilateral mastectomy Z90.13    H/O breast reconstruction Z98.890    Arthritis M19.90    Status post right breast lumpectomy Z98.890    Chronic depression F32. A    Adjustment disorder with mixed anxiety and depressed mood F43.23    Aromatase inhibitor use Z79.811    Mixed hyperlipidemia E78.2          Current Medications/Allergies   Medications and Allergies reviewed:    Current Outpatient Medications   Medication Sig Dispense Refill    azithromycin (ZITHROMAX) 250 mg tablet Take 2 tablets today, then take 1 tablet daily 6 Tablet 0    benzonatate (TESSALON) 200 mg capsule Take 1 Capsule by mouth three (3) times daily as needed for Cough for up to 10 days. 30 Capsule 0    fluticasone propionate (FLONASE) 50 mcg/actuation nasal spray 1 [place] by Both Nostrils route daily.  Indications: inflammation of the nose due to an allergy 48 g 1    cetirizine (ZYRTEC) 5 mg tablet Take 1 Tablet by mouth daily. 90 Tablet 1    FLUoxetine (PROzac) 20 mg capsule TAKE 1 CAPSULE BY MOUTH DAILY 90 Capsule 3    rosuvastatin (CRESTOR) 10 mg tablet TAKE 1 TABLET BY MOUTH EVERY NIGHT 90 Tablet 3    TURMERIC PO Take  by mouth daily.  buPROPion XL (WELLBUTRIN XL) 150 mg tablet Take 1 Tablet by mouth Daily (before breakfast). 90 Tablet 3    losartan (COZAAR) 100 mg tablet Take 1 Tablet by mouth daily. 90 Tablet 3    levothyroxine (SYNTHROID) 175 mcg tablet Take 1 Tablet by mouth Daily (before breakfast). 90 Tablet 3    anastrozole (ARIMIDEX) 1 mg tablet TAKE 1 TABLET BY MOUTH EVERY DAY 90 Tablet 3    ascorbic acid (VITAMIN C PO) Take  by mouth.  multivitamin (ONE A DAY) tablet Take 1 Tab by mouth daily.  cholecalciferol, vitamin D3, (Vitamin D3) 50 mcg (2,000 unit) tab Take 1 Tab by mouth daily.       OTHER Portable CPAP machine for RENÉE, with new mask and tubing but no change to mask or settings; G47.30 GARO 99m prog good 1 Each 0     Allergies   Allergen Reactions    Pcn [Penicillins] Hives    Sulfa (Sulfonamide Antibiotics) Hives    Zocor [Simvastatin] Myalgia    Lisinopril Cough

## 2022-04-14 ENCOUNTER — OFFICE VISIT (OUTPATIENT)
Dept: FAMILY MEDICINE CLINIC | Age: 67
End: 2022-04-14
Payer: MEDICARE

## 2022-04-14 VITALS
DIASTOLIC BLOOD PRESSURE: 74 MMHG | TEMPERATURE: 97.5 F | SYSTOLIC BLOOD PRESSURE: 118 MMHG | WEIGHT: 283.8 LBS | RESPIRATION RATE: 18 BRPM | BODY MASS INDEX: 42.03 KG/M2 | OXYGEN SATURATION: 98 % | HEART RATE: 72 BPM | HEIGHT: 69 IN

## 2022-04-14 DIAGNOSIS — R05.3 PERSISTENT COUGH FOR 3 WEEKS OR LONGER: Primary | ICD-10-CM

## 2022-04-14 DIAGNOSIS — Z88.0 PENICILLIN ALLERGY: ICD-10-CM

## 2022-04-14 PROCEDURE — G0463 HOSPITAL OUTPT CLINIC VISIT: HCPCS | Performed by: STUDENT IN AN ORGANIZED HEALTH CARE EDUCATION/TRAINING PROGRAM

## 2022-04-14 PROCEDURE — 99214 OFFICE O/P EST MOD 30 MIN: CPT | Performed by: STUDENT IN AN ORGANIZED HEALTH CARE EDUCATION/TRAINING PROGRAM

## 2022-04-14 RX ORDER — IPRATROPIUM BROMIDE 42 UG/1
SPRAY, METERED NASAL
COMMUNITY
Start: 2022-03-27 | End: 2022-06-20

## 2022-04-14 NOTE — PROGRESS NOTES
Arthur Modi  79 y.o. female  1955  [address] [place] [address]  [id number]     RICKY Vincent 53       Chief Complaint: persistent cough  Source: self     Subjective  Arthur Modi is an 79 y.o. female who presents for followup of persistent cough and URI symptoms. Started at little over three weeks ago (3/24) with cough/allergy symptoms while in [place]. Initially started on combination inhaler, zyrtec and flonase with persistent symptoms. COVID and flu were negative at the time. After 5 days had increasing sinus pressure and started on Doxycycline as well as tessalon and mucinex for cough/congestion. Followed up several days later with persistent symptoms and continued mucinex, flonase, zyrtec and tessalon. After an additional 10 days of symptoms again with increased sinus pressure and started a Z pack which she is currently taking. Cough is less productive, now more productive and feels like an irritant cough. Quit smoking ~ 25years ago after 30yr 0.5-1ppd.  continued smoking until 10-15years ago so some 2nd hand exposure for 10-15years after. Allergies - reviewed: Allergies   Allergen Reactions    Pcn [Penicillins] Hives    Sulfa (Sulfonamide Antibiotics) Hives    Zocor [Simvastatin] Myalgia    Lisinopril Cough         Medications - reviewed:   Current Outpatient Medications   Medication Sig    azithromycin (ZITHROMAX) 250 mg tablet Take 2 tablets today, then take 1 tablet daily    benzonatate (TESSALON) 200 mg capsule Take 1 Capsule by mouth three (3) times daily as needed for Cough for up to 10 days.  fluticasone propionate (FLONASE) 50 mcg/actuation nasal spray 1 [place] by Both Nostrils route daily. Indications: inflammation of the nose due to an allergy    cetirizine (ZYRTEC) 5 mg tablet Take 1 Tablet by mouth daily.     FLUoxetine (PROzac) 20 mg capsule TAKE 1 CAPSULE BY MOUTH DAILY    rosuvastatin (CRESTOR) 10 mg tablet TAKE 1 TABLET BY MOUTH EVERY NIGHT    TURMERIC PO Take  by mouth daily.  buPROPion XL (WELLBUTRIN XL) 150 mg tablet Take 1 Tablet by mouth Daily (before breakfast).  losartan (COZAAR) 100 mg tablet Take 1 Tablet by mouth daily.  levothyroxine (SYNTHROID) 175 mcg tablet Take 1 Tablet by mouth Daily (before breakfast).  anastrozole (ARIMIDEX) 1 mg tablet TAKE 1 TABLET BY MOUTH EVERY DAY    ascorbic acid (VITAMIN C PO) Take  by mouth.  multivitamin (ONE A DAY) tablet Take 1 Tab by mouth daily.  cholecalciferol, vitamin D3, (Vitamin D3) 50 mcg (2,000 unit) tab Take 1 Tab by mouth daily.  OTHER Portable CPAP machine for RENÉE, with new mask and tubing but no change to mask or settings; G47.30 GARO 99m prog good    ipratropium (ATROVENT) 42 mcg (0.06 %) nasal spray SPRAY 1 SPRAY INTO EACH NOSTRIL TWICE A DAY     No current facility-administered medications for this visit. Past Medical History - reviewed:  Past Medical History:   Diagnosis Date    Adjustment disorder with mixed anxiety and depressed mood 2020    After death of father  and personal diagnosis of/treatment for breast cancer [id number]; Counselin:  [employer] [facility] [address]     Ankle edema 2010    Anxiety 2010    Benign essential hypertension 2016    2006     Carpal tunnel syndrome 2010    Chronic depression 2020    H/O Benign Colon Polyp 2010    H/O bilateral mastectomy 2019    Double Mastectomy (right breast cancer and elective left breast prophylactically); No chemo, No radiation    H/O Uterine Fibroids 2010    Hypercholesterolemia 2013    ~2006    Hypothyroidism 2016    Kidney stone 2010    Mixed hyperlipidemia 2020    Perimenopausal 2010    Primary osteoarthritis of knees, bilateral 2016    Extensive;  Ortho (needs knee replacement), CSI x 2, -; CSI 21 Dr. Thuan Dorado Primary osteoarthritis of left knee 5/26/2016    Extensive; Ortho (needs knee replacement), CSI x 2,     S/P benign cervical polypectomy 12/20/2010    Sleep apnea 4/5/2010    USES CPAP    Status post right breast lumpectomy 1/30/2020 6-, Invasive ductal carcinoma--->Mastectomy 8-, no chemo, no radiation, started on Arimidex    Stress incontinence 5/26/2010         Past Surgical History - reviewed:   Past Surgical History:   Procedure Laterality Date    COLONOSCOPY N/A 10/28/2021    COLONOSCOPY   :- performed by Crescencio Colunga MD at [address] EKG ORDERABLE  5/2009    NSR, rate 84, normal EKG    HX BLADDER SUSPENSION  ~2010    Dr Daly Service Left 09/23/2019    [facility].  Dr. Brian Dumont, [address] [place] [address] Mastectomy Implant Infection Left Breast, Replaced Implant, IV abx    HX BREAST BIOPSY Right 11/2011    VPI, right breast biopsy negative    HX BREAST BIOPSY Right 04/22/2019    invasive ductal carcinoma    HX BREAST LUMPECTOMY Right 6/13/2019    RIGHT BREAST LUMPECTOMY ( x2 ) WTIH ULTRASOUND , RIGHT SENTINAL NODE BIOPSY performed by Kaelyn Aguillon MD at [facility]    HX BREAST RECONSTRUCTION Bilateral 8/27/2019    B MASTECTOMY AND IMPLANTS, IMMEDIATE BILATERAL BREAST RECONSTRUCTION WITH DIRECT TO IMPLANT USE OF ALLODERM AND BILATERAL BREAST RECONSTRUCTION INTRA-OPERATIVE ASSESMENT OF BLOOD FLOW performed by Barbi Fernando MD at [place] [address] Right 03/2010    Dr Kathleen Hall CERVICAL POLYPECTOMY  2009, 2015    Dr Sarina Morales    HX COLONOSCOPY  04/08/2016    Dr Orin Hannon-Internal Hemorrhoids- Normal mucosa in the whole colon - Repeat colonoscopy in 5 years    HX COLONOSCOPY  2016    HX COLONOSCOPY  10/2005    benign polyp; repeat 5 yr per Dr Sarika Rosales HX COLONOSCOPY  11/2010    Normal, f/u 5 yrs, Dr Marquita Waite    HX COLONOSCOPY  Scheduled 10-    HX DILATION AND CURETTAGE  7/2005    AUB, benign/neg bx; Dr Vero Fonseca 10/2012    Dr Albina Huber, AUB    HX HEENT      Nasal Abscess I&D, Dr. Brandee Gaxiola HX HYSTERECTOMY  2013    HX MASTECTOMY Bilateral 2019    BILATERAL BREAST SKIN SPARING MASTECTOMIES performed by Geovanny Carrera MD at [address] HX ORTHOPAEDIC Right 2010    CARPAL TUNNEL    HX PARTIAL HYSTERECTOMY  13    Supracervical hysterectomy for fibroids, Dr Laura Arredondo HX UROLOGICAL  2010    BLADDER SLING      [facility] REMOVAL INTACT BREAST IMPLANT Bilateral 10/15/2019    [facility] JON Jimenez Breast Implant pain and recurrent left breast infection; on wound vac w/ iv abx; wound pump removed 2019         Social History - reviewed:  Social History     Socioeconomic History    Marital status:      Spouse name: Not on file    Number of children: 1    Years of education: Not on file    Highest education level: Not on file   Occupational History    Occupation: [address] Occupation: Retired    Tobacco Use    Smoking status: Former Smoker     Packs/day: 0.50     Years: 25.00     Pack years: 12.50     Quit date: 2005     Years since quittin.2    Smokeless tobacco: Never Used   Vaping Use    Vaping Use: Never used   Substance and Sexual Activity    Alcohol use: Yes     Comment: 2 shots of Rum ~ 5 nights a week    Drug use: No    Sexual activity: Not Currently     Partners: Male     Comment: not sexually active x many years   Other Topics Concern     Service Not Asked    Blood Transfusions Not Asked    Caffeine Concern No     Comment: no coffee, tea and cut out her diet Cokes    Occupational Exposure Not Asked    Hobby Hazards Not Asked    Sleep Concern Not Asked    Stress Concern Not Asked    Weight Concern Not Asked    Special Diet No     Comment: \"I eat way too much\"    Back Care Not Asked    Exercise Yes     Comment: home stationary bike x 40-45 minutes a day, stretches daily, yardwork once a week    Bike Helmet Not Asked   [address] Not Asked    Self-Exams Not Asked   Social History Narrative     1 biological daughter (lives in [facility] now), 1 \"adopted\", and 2 step children    Retired 2018    Works for her [employer]    Diet: nothing special, admits likes sweets, cheese, not controlling her portions    Caffeine: no coffee or tea, occ decaff sodas diet coke or ginger ale    Exercise: stationary bike x 45 minutes every day     Weight: ranges in the 280's-290's over time         Social Determinants of Health     Financial Resource Strain:     Difficulty of Paying Living Expenses: Not on file   Food Insecurity:     Worried About [address] in the Last Year: Not on file    Marilyn of Food in the Last Year: Not on file   Transportation Needs:     Lack of Transportation (Medical): Not on file    Lack of Transportation (Non-Medical):  Not on file   Physical Activity:     Days of Exercise per Week: Not on file    Minutes of Exercise per Session: Not on file   Stress:     Feeling of Stress : Not on file   Social Connections:     Frequency of Communication with Friends and Family: Not on file    Frequency of Social Gatherings with Friends and Family: Not on file    Attends [employer] Services: Not on file    Active Member of [address] [place] [address] or Organizations: Not on file    Attends Club or Organization Meetings: Not on file    Marital Status: Not on file   Intimate Partner Violence:     Fear of Current or Ex-Partner: Not on file    Emotionally Abused: Not on file    Physically Abused: Not on file    Sexually Abused: Not on file   Housing Stability:     Unable to Pay for Housing in the Last Year: Not on file    Number of [place] in the Last Year: Not on file    Unstable Housing in the Last Year: Not on file         Family History - reviewed:  Family History   Problem Relation Age of Onset    OSTEOARTHRITIS Father     Stroke Father         [place] Cancer Father         melanoma on back removed    Pacemaker Father 80    Heart Disease Father          81 yo in     Hypertension Mother     OSTEOARTHRITIS Mother     Heart Disease Mother         valve disease,  2013    Heart Attack Maternal Grandmother 76    No Known Problems Sister     No Known Problems Brother     Depression Daughter     Substance Abuse Daughter     Alcohol abuse Daughter     Anxiety Daughter          Immunizations - reviewed:   Immunization History   Administered Date(s) Administered    COVID-19, Pfizer Purple top, DILUTE for use, 12+ yrs, 30mcg/0.3mL dose 2021, 2021, 2021    Influenza Vaccine 10/03/2014, 11/10/2015, 2016, 2017, 2018, 2021    Influenza Vaccine (Quad) PF (>6 Mo Flulaval, Fluarix, and >3 Yrs Afluria, Fluzone 33918) 2018, 2019    Influenza Vaccine (Tri) Adjuvanted (>65 Yrs FLUAD TRI [id number]) 2020    Influenza Vaccine Split 2010, 10/06/2011    Meningococcal ACWY Vaccine 2013    Pneumococcal Polysaccharide (PPSV-23) 2020    TD Vaccine 1999, 2009    Td 2017    Tdap 2016, 2017    Zoster Recombinant 2018, 2019    Zoster Vaccine, Live 2013       Review of Systems   Constitutional: Negative for chills and fever. HENT: Positive for congestion. Negative for ear discharge, ear pain, sinus pain and sore throat.         + hoarseness    Respiratory: Positive for cough. Negative for sputum production, shortness of breath and wheezing. Cardiovascular: Negative for chest pain and palpitations. Neurological: Negative for dizziness and headaches.          Physical Exam  Visit Vitals  /74 (BP 1 Location: Left upper arm, BP Patient Position: Sitting, BP Cuff Size: Large adult)   Pulse 72   Temp 97.5 °F (36.4 °C) (Temporal)   Resp 18   Ht 5' 9\" (1.753 m)   Wt 283 lb 12.8 oz (128.7 kg)   LMP 2011   SpO2 98%   BMI 41.91 kg/m² Physical Exam  Vitals and nursing note reviewed. Constitutional:       General: She is not in acute distress. Appearance: She is obese. She is not toxic-appearing. HENT:      Head: Normocephalic and atraumatic. Right Ear: Hearing, ear canal and external ear normal. A middle ear effusion is present. There is no impacted cerumen. Tympanic membrane is not injected, erythematous or bulging. Left Ear: Hearing, ear canal and external ear normal. A middle ear effusion is present. There is no impacted cerumen. Tympanic membrane is not injected, erythematous or bulging. Nose:      Right Turbinates: Swollen and pale. Left Turbinates: Swollen and pale. Right Sinus: No maxillary sinus tenderness or frontal sinus tenderness. Left Sinus: No maxillary sinus tenderness or frontal sinus tenderness. Comments: Mildly swollen nasal turbinates bilaterally      Mouth/Throat:      Mouth: Mucous membranes are moist.      Pharynx: Oropharynx is clear. Uvula midline. No pharyngeal swelling, oropharyngeal exudate, posterior oropharyngeal erythema or uvula swelling. Tonsils: No tonsillar exudate or tonsillar abscesses. 1+ on the right. 1+ on the left. Pulmonary:      Effort: Pulmonary effort is normal. No tachypnea, bradypnea, accessory muscle usage, respiratory distress or retractions. Breath sounds: Normal breath sounds and air entry. No decreased air movement or transmitted upper airway sounds. No decreased breath sounds, wheezing, rhonchi or rales. Comments: Good air movement throughout lung fields both anteriorly and posteriorly   Neurological:      Mental Status: She is alert. Psychiatric:         Attention and Perception: Attention and perception normal.         Mood and Affect: Mood and affect normal.         Speech: Speech normal.         Behavior: Behavior normal. Behavior is cooperative.        IMAGING- personally reviewed images and agree with report below   CXR Results  (Last 48 hours)               04/14/22 1045  XR CHEST PA LAT Final result    Impression:  Normal two view chest x-ray. Narrative:  INDICATION: Persistent cough for 3 weeks or longer. EXAM: CXR 2 View       FINDINGS: Frontal and lateral views of the chest show clear lungs. Heart size is   normal. There is no pulmonary edema. There is no evident pneumothorax,   adenopathy or effusion. Assessment/Plan    ICD-10-CM ICD-9-CM    1. Persistent cough for 3 weeks or longer  R05.3 786.2 XR CHEST PA LAT   2. Penicillin allergy  Z88.0 V14.0 REFERRAL TO ALLERGY       [place] Violetta Rosales is an 79 y.o. female with hx of HTN, RENÉE, 30pk year smoking hx presenting for ongoing cough with URI symptoms that have improved after 2nd round of abx. CXR today without evidence of PNA, pleural effusion. She has a listed PCN allergy from childhood and would like to see allergy for oral tolerance testing as she has likely grown out of that allergy. 1. Persistent cough for 3 weeks or longer: due to seasonal allergies and recurrent sinusitis. Improving but not resolved. - XR CHEST PA LAT; Future  - continue nasal steroid and antihistamine daily through end of May and resume last week of August to prevent fall allergies  - mucinex PRN    2. Penicillin allergy  - REFERRAL TO ALLERGY for testing      Patient informed to follow up: Follow-up and Dispositions    · Return if symptoms worsen or fail to improve, for regularly scheduled visits with Dr Tanesha Hickey. I have discussed the diagnosis with the patient and the intended plan as seen in the above orders. Patient verbalized understanding of the plan and agrees with the plan. The patient has received an after-visit summary and questions were answered concerning future plans. I have discussed medication side effects and warnings with the patient as well. Informed patient to return to the office if new symptoms arise.       Alejandra Bell MD  [address] [place] [address]

## 2022-04-14 NOTE — PATIENT INSTRUCTIONS
Continue with flonase twice daily and zyrtec daily through end of May    Resume these medications the last week of August to mitigate allergy symptoms    Dr Susu Herrera --[place] --[address] [facility] [place]

## 2022-04-14 NOTE — PROGRESS NOTES
Chief Complaint   Patient presents with    Cough     Over A month. Patient is still on ABT. 1. \"Have you been to the ER, urgent care clinic since your last visit? Hospitalized since your last visit? \" No    2. \"Have you seen or consulted any other health care providers outside of the [address] [place] [address] since your last visit? \" No     3. For patients aged 39-70: Has the patient had a colonoscopy / FIT/ Cologuard? Yes - no Care Gap present      If the patient is female:    4. For patients aged 41-77: Has the patient had a mammogram within the past 2 years? No      5. For patients aged 21-65: Has the patient had a pap smear?  Yes - no Care Gap present

## 2022-04-28 ENCOUNTER — PATIENT MESSAGE (OUTPATIENT)
Dept: FAMILY MEDICINE CLINIC | Age: 67
End: 2022-04-28

## 2022-05-03 DIAGNOSIS — R05.3 PERSISTENT COUGH FOR 3 WEEKS OR LONGER: Primary | ICD-10-CM

## 2022-06-20 ENCOUNTER — OFFICE VISIT (OUTPATIENT)
Dept: ORTHOPEDIC SURGERY | Age: 67
End: 2022-06-20
Payer: MEDICARE

## 2022-06-20 VITALS — BODY MASS INDEX: 41.92 KG/M2 | WEIGHT: 283 LBS | HEIGHT: 69 IN

## 2022-06-20 DIAGNOSIS — M17.0 PRIMARY OSTEOARTHRITIS OF KNEES, BILATERAL: Primary | ICD-10-CM

## 2022-06-20 PROCEDURE — 20610 DRAIN/INJ JOINT/BURSA W/O US: CPT | Performed by: ORTHOPAEDIC SURGERY

## 2022-06-20 RX ORDER — BUPIVACAINE HYDROCHLORIDE 2.5 MG/ML
6 INJECTION, SOLUTION INFILTRATION; PERINEURAL ONCE
Status: COMPLETED | OUTPATIENT
Start: 2022-06-20 | End: 2022-06-20

## 2022-06-20 RX ORDER — TRIAMCINOLONE ACETONIDE 40 MG/ML
40 INJECTION, SUSPENSION INTRA-ARTICULAR; INTRAMUSCULAR ONCE
Status: COMPLETED | OUTPATIENT
Start: 2022-06-20 | End: 2022-06-20

## 2022-06-20 RX ORDER — DOXYCYCLINE 100 MG/1
CAPSULE ORAL
COMMUNITY
Start: 2022-06-18 | End: 2022-10-24 | Stop reason: ALTCHOICE

## 2022-06-20 RX ORDER — MUPIROCIN 20 MG/G
OINTMENT TOPICAL
COMMUNITY
Start: 2022-06-18 | End: 2022-10-24 | Stop reason: ALTCHOICE

## 2022-06-20 RX ADMIN — BUPIVACAINE HYDROCHLORIDE 15 MG: 2.5 INJECTION, SOLUTION INFILTRATION; PERINEURAL at 14:34

## 2022-06-20 RX ADMIN — TRIAMCINOLONE ACETONIDE 40 MG: 40 INJECTION, SUSPENSION INTRA-ARTICULAR; INTRAMUSCULAR at 14:34

## 2022-06-20 NOTE — LETTER
6/20/2022    Patient: Bruna Lujan   YOB: 1955   Date of Visit: 6/20/2022     Jason Rowley MD  [address] [facility]    Dear Jason Rowley MD,      Thank you for referring Ms. Sera Mckoy to [facility] for evaluation. My notes for this consultation are attached. If you have questions, please do not hesitate to call me. I look forward to following your patient along with you.       Sincerely,    Gurjit Mesa, DO

## 2022-06-20 NOTE — PROGRESS NOTES
Abiel Ugarte (: 1955) is a 79 y.o. female, established patient, here for evaluation of the following chief complaint(s):  Knee Pain (bilateral knees)       ASSESSMENT/PLAN:  Below is the assessment and plan developed based on review of pertinent history, physical exam, labs, studies, and medications. She elected to try corticosteroid injection for both knees today. We discussed the risks and benefits of injection and informed consent was obtained. After a sterile preparation, 6 cc of bupivicaine and 40 mg of Kenalog were injected into the bilateral knees. The patient tolerated the procedure well and there were no complications. Post injection pain, blood sugar elevation, skin discoloration, fatty atrophy and the signs of infection were discussed in detail. The patient was instructed to contact us if there were any questions or concerns prior to their follow up appointment. 1. Primary osteoarthritis of knees, bilateral  -     bupivacaine HCl (MARCAINE) 0.25 % (2.5 mg/mL) injection 15 mg; 15 mg (6 mL), Other, ONCE, 1 dose, On 22 at 1500  -     triamcinolone acetonide (KENALOG-40) 40 mg/mL injection 40 mg; 40 mg, Intra artICUlar, ONCE, 1 dose, On 22 at 1500  -     bupivacaine HCl (MARCAINE) 0.25 % (2.5 mg/mL) injection 15 mg; 15 mg (6 mL), Other, ONCE, 1 dose, On 22 at 1500  -     triamcinolone acetonide (KENALOG-40) 40 mg/mL injection 40 mg; 40 mg, Intra artICUlar, ONCE, 1 dose, On 22 at 1500      Return in about 3 months (around 2022), or if symptoms worsen or fail to improve. SUBJECTIVE/OBJECTIVE:  Abiel Ugarte (: 1955) is a 79 y.o. female. She notes that previous corticosteroid injection helped for about 2-1/2 to 3 months. Recurrent clicking and aching pain noted today.         Allergies   Allergen Reactions    Pcn [Penicillins] Hives    Sulfa (Sulfonamide Antibiotics) Hives    Zocor [Simvastatin] Myalgia    Lisinopril Cough Current Outpatient Medications   Medication Sig    doxycycline (VIBRAMYCIN) 100 mg capsule TAKE 1 CAPSULE BY MOUTH EVERY 12 HOURS    mupirocin (BACTROBAN) 2 % ointment APPLY TOPICALLY TO THE AFFECTED AREA THREE TIMES DAILY FOR 7 DAYS    fluticasone propionate (FLONASE) 50 mcg/actuation nasal spray 1 [place] by Both Nostrils route daily. Indications: inflammation of the nose due to an allergy    cetirizine (ZYRTEC) 5 mg tablet Take 1 Tablet by mouth daily.  FLUoxetine (PROzac) 20 mg capsule TAKE 1 CAPSULE BY MOUTH DAILY    rosuvastatin (CRESTOR) 10 mg tablet TAKE 1 TABLET BY MOUTH EVERY NIGHT    TURMERIC PO Take  by mouth daily.  buPROPion XL (WELLBUTRIN XL) 150 mg tablet Take 1 Tablet by mouth Daily (before breakfast).  losartan (COZAAR) 100 mg tablet Take 1 Tablet by mouth daily.  levothyroxine (SYNTHROID) 175 mcg tablet Take 1 Tablet by mouth Daily (before breakfast).  anastrozole (ARIMIDEX) 1 mg tablet TAKE 1 TABLET BY MOUTH EVERY DAY    ascorbic acid (VITAMIN C PO) Take  by mouth.  multivitamin (ONE A DAY) tablet Take 1 Tab by mouth daily.  cholecalciferol, vitamin D3, (Vitamin D3) 50 mcg (2,000 unit) tab Take 1 Tab by mouth daily.  OTHER Portable CPAP machine for RENÉE, with new mask and tubing but no change to mask or settings; G47.30 GARO 99m prog good     No current facility-administered medications for this visit.        Social History     Socioeconomic History    Marital status:      Spouse name: Not on file    Number of children: 1    Years of education: Not on file    Highest education level: Not on file   Occupational History    Occupation: [address] Occupation: Retired    Tobacco Use    Smoking status: Former Smoker     Packs/day: 0.50     Years: 25.00     Pack years: 12.50     Quit date: 2005     Years since quittin.4    Smokeless tobacco: Never Used   Vaping Use    Vaping Use: Never used   Substance and Sexual Activity    Alcohol use: Yes     Comment: 2 shots of Rum ~ 5 nights a week    Drug use: No    Sexual activity: Not Currently     Partners: Male     Comment: not sexually active x many years   Other Topics Concern     Service Not Asked    Blood Transfusions Not Asked    Caffeine Concern No     Comment: no coffee, tea and cut out her diet Cokes    Occupational Exposure Not Asked    Hobby Hazards Not Asked    Sleep Concern Not Asked    Stress Concern Not Asked    Weight Concern Not Asked    Special Diet No     Comment: \"I eat way too much\"    Back Care Not Asked    Exercise Yes     Comment: home stationary bike x 40-45 minutes a day, stretches daily, yardwork once a week    Bike Helmet Not Asked    Seat Belt Not Asked    Self-Exams Not Asked   Social History Narrative     1 biological daughter (lives in [facility] now), 1 \"adopted\", and 2 step children    Retired 2018    Works for her [employer]    Diet: nothing special, admits likes sweets, cheese, not controlling her portions    Caffeine: no coffee or tea, occ decaff sodas diet coke or ginger ale    Exercise: stationary bike x 45 minutes every day     Weight: ranges in the 280's-290's over time         Social Determinants of Health     Financial Resource Strain:     Difficulty of Paying Living Expenses: Not on file   Food Insecurity:     Worried About Running Out of Food in the Last Year: Not on file    Marilyn of Food in the Last Year: Not on file   Transportation Needs:     Lack of Transportation (Medical): Not on file    Lack of Transportation (Non-Medical):  Not on file   Physical Activity:     Days of Exercise per Week: Not on file    Minutes of Exercise per Session: Not on file   Stress:     Feeling of Stress : Not on file   Social Connections:     Frequency of Communication with Friends and Family: Not on file    Frequency of Social Gatherings with Friends and Family: Not on file    Attends Sikh Services: Not on file   Urvashi Larger Active Member of Clubs or Organizations: Not on file    Attends Club or Organization Meetings: Not on file    Marital Status: Not on file   Intimate Partner Violence:     Fear of Current or Ex-Partner: Not on file    Emotionally Abused: Not on file    Physically Abused: Not on file    Sexually Abused: Not on file   Housing Stability:     Unable to Pay for Housing in the Last Year: Not on file    Number of [place] in the Last Year: Not on file    Unstable Housing in the Last Year: Not on file       Past Surgical History:   Procedure Laterality Date    COLONOSCOPY N/A 10/28/2021    COLONOSCOPY   :- performed by Oneida Reynolds MD at [address] EKG ORDERABLE  5/2009    NSR, rate 84, normal EKG    HX BLADDER SUSPENSION  ~2010    Dr Lulu Souza Left 09/23/2019    [facility].  Dr. Jonna Haq, [address] [place] [address] Mastectomy Implant Infection Left Breast, Replaced Implant, IV abx    HX BREAST BIOPSY Right 11/2011    VPI, right breast biopsy negative    HX BREAST BIOPSY Right 04/22/2019    invasive ductal carcinoma    HX BREAST LUMPECTOMY Right 6/13/2019    RIGHT BREAST LUMPECTOMY ( x2 ) WTIH ULTRASOUND , RIGHT SENTINAL NODE BIOPSY performed by Fátima Castelan MD at [facility] AM[facility]    HX BREAST RECONSTRUCTION Bilateral 8/27/2019    B MASTECTOMY AND IMPLANTS, IMMEDIATE BILATERAL BREAST RECONSTRUCTION WITH DIRECT TO IMPLANT USE OF ALLODERM AND BILATERAL BREAST RECONSTRUCTION INTRA-OPERATIVE ASSESMENT OF BLOOD FLOW performed by Aj Carrillo MD at [place] [address] Right 03/2010    Dr Washington Bergeron CERVICAL POLYPECTOMY  2009, 2015    Dr Toya Zhao    HX COLONOSCOPY  04/08/2016    Dr Omar Hannon-Internal Hemorrhoids- Normal mucosa in the whole colon - Repeat colonoscopy in 5 years    HX COLONOSCOPY  2016    HX COLONOSCOPY  10/2005    benign polyp; repeat 5 yr per Dr Aimee Jacinto    HX COLONOSCOPY  11/2010    Normal, f/u 5 yrs, Dr Claudia Paniagua Scheduled 10-    HX DILATION AND CURETTAGE  2005    AUB, benign/neg bx; Dr Boby Parker    HX Michelle Shells  10/2012    Dr Boby Parker, [facility]    HX HEENT      Nasal Abscess I&D, Dr. Paco Leyva HX HYSTERECTOMY  2013    HX MASTECTOMY Bilateral 2019    BILATERAL BREAST SKIN SPARING MASTECTOMIES performed by Ruma Jean MD at [address] HX ORTHOPAEDIC Right 2010    CARPAL TUNNEL    HX PARTIAL HYSTERECTOMY  13    Supracervical hysterectomy for fibroids, Dr Nuno Im HX UROLOGICAL  2010    BLADDER SLING     HX Eyad Marek 50 Bilateral 10/15/2019    [facility] Dr. Michaela Watson, JON Breast Implant pain and recurrent left breast infection; on wound vac w/ iv abx; wound pump removed 2019       Family History   Problem Relation Age of Onset    OSTEOARTHRITIS Father     Stroke Father         TIA's    Cancer Father         melanoma on back removed    Pacemaker Father 80    Heart Disease Father          79 yo in     Hypertension Mother     OSTEOARTHRITIS Mother     Heart Disease Mother         valve disease,      Heart Attack Maternal Grandmother 76    No Known Problems Sister     No Known Problems Brother     Depression Daughter     Substance Abuse Daughter     Alcohol abuse Daughter     Anxiety Daughter         OB History        1    Para   1    Term                AB        Living           SAB        IAB        Ectopic        Molar        Multiple        Live Births              Obstetric Comments    x 1; Previous Gyn Dr Boby Parker  Menarche:  8. LMP: ? .  # of Children:  1. Age at Delivery of First Child:  34.   Hysterectomy/oophorectomy:  YES/YES. Breast Bx:  yes. Hx of Breast Feeding:  yes. BCP:  yes.  Hormone therapy:  no.                     REVIEW OF SYSTEMS:    Patient denies any recent fever, chills, nausea, vomiting, chest pain, or shortness of breath. Vitals:  Ht 5' 9\" (1.753 m)   Wt 283 lb (128.4 kg)   LMP 05/01/2011   BMI 41.79 kg/m²    Body mass index is 41.79 kg/m². PHYSICAL EXAM:  General exam: Patient is awake, alert, and oriented x3. Well-appearing. No acute distress. Ambulates with an antalgic gait    Bilateral knees: Neurovascular and sensory intact. Effusion is present. Crepitus is noted with range of motion of both knees. There is tenderness palpation at the medial and lateral joint lines. Jose David's maneuver creates mild pain. Normal stability on ligamentous testing including Lachman's exam.  Stable anterior and posterior drawer. No erythema or ecchymosis. IMAGING:    XR Results (most recent):  Results from Appointment encounter on 04/14/22    XR CHEST PA LAT    Narrative  INDICATION: Persistent cough for 3 weeks or longer. EXAM: CXR 2 View    FINDINGS: Frontal and lateral views of the chest show clear lungs. Heart size is  normal. There is no pulmonary edema. There is no evident pneumothorax,  adenopathy or effusion. Impression  Normal two view chest x-ray. Results from [place] encounter on 12/29/19    XR FOOT RT MIN 3 V    Narrative  EXAM: XR FOOT RT MIN 3 V    INDICATION: injury. Right foot pain since yesterday when she heard a pop walking  around a medium. Pain dorsal lateral aspect. COMPARISON: None. FINDINGS: Three views of the right foot demonstrate a small ossific fragment or  fragments at the lateral aspect of the right midfoot, with mild prominence of  soft tissue but no soft tissue mass. There is focal interruption of posterior  calcaneal spur formation, age uncertain. The Achilles tendon is thought to be  intact. .    Impression  IMPRESSION: No acute bony abnormality is confirmed. However, there are 2 ossific  fragments in the soft tissues at the lateral aspect of the midfoot, thought to  most likely be nonacute.  Correlate with physical examination in this region. Comparison to any prior radiographs of the right foot would be most helpful. Otherwise, follow-up radiographs in 7-14 days may be helpful if no further  diagnosis is made on orthopedic consultation/follow-up. Fragmentation of  posterior calcaneal spur formation is thought to be nonacute, but correlate with  physical examination. Results from [place] encounter on 06/25/15    XR KNEE LT 3 V    Narrative  **Final Report**      ICD Codes / Adm. Diagnosis: 719.46   / Pain in joint, lower leg  Examination:  CR KNEE 3 VWS LT  - [id number] - Jun 25 2015 11:19AM  Accession No:  [id number]  Reason:  left knee pain      REPORT:  EXAM:  CR KNEE 3 VWS LT  Clinical history left knee pain  INDICATION:   left knee pain    COMPARISON: None. FINDINGS: Three views of the left knee demonstrate no fracture or other  acute osseous or articular abnormality. There is no effusion. Extensive  osteophyte formation. Patellofemoral joint space narrowing. Impression  :    Extensive degenerative change. Osteoarthritis. No acute abnormality. Signing/Reading Doctor: Angel Christy ([id number])  Approved: HERMINIO RECINOS ([id number])  Jun 25 2015 11:43AM         Orders Placed This Encounter    bupivacaine HCl (MARCAINE) 0.25 % (2.5 mg/mL) injection 15 mg    triamcinolone acetonide (KENALOG-40) 40 mg/mL injection 40 mg    bupivacaine HCl (MARCAINE) 0.25 % (2.5 mg/mL) injection 15 mg    triamcinolone acetonide (KENALOG-40) 40 mg/mL injection 40 mg              An electronic signature was used to authenticate this note.   -- Cheryle Cordon, DO

## 2022-06-21 ENCOUNTER — OFFICE VISIT (OUTPATIENT)
Dept: FAMILY MEDICINE CLINIC | Age: 67
End: 2022-06-21
Payer: MEDICARE

## 2022-06-21 VITALS
HEIGHT: 69 IN | DIASTOLIC BLOOD PRESSURE: 78 MMHG | SYSTOLIC BLOOD PRESSURE: 116 MMHG | HEART RATE: 80 BPM | TEMPERATURE: 97.2 F | OXYGEN SATURATION: 98 % | RESPIRATION RATE: 18 BRPM | WEIGHT: 292.4 LBS | BODY MASS INDEX: 43.31 KG/M2

## 2022-06-21 DIAGNOSIS — Z91.09 ENVIRONMENTAL ALLERGIES: ICD-10-CM

## 2022-06-21 DIAGNOSIS — L08.9 SKIN INFECTION: Primary | ICD-10-CM

## 2022-06-21 PROCEDURE — 1123F ACP DISCUSS/DSCN MKR DOCD: CPT | Performed by: STUDENT IN AN ORGANIZED HEALTH CARE EDUCATION/TRAINING PROGRAM

## 2022-06-21 PROCEDURE — G0463 HOSPITAL OUTPT CLINIC VISIT: HCPCS | Performed by: STUDENT IN AN ORGANIZED HEALTH CARE EDUCATION/TRAINING PROGRAM

## 2022-06-21 PROCEDURE — 99213 OFFICE O/P EST LOW 20 MIN: CPT | Performed by: STUDENT IN AN ORGANIZED HEALTH CARE EDUCATION/TRAINING PROGRAM

## 2022-06-21 NOTE — PROGRESS NOTES
Chief Complaint   Patient presents with   [facility] ED Follow-up     [facility] 6/18/22 Starrted on ABT/ Ointment DX: Beata Infection  Inside of nose      1. \"Have you been to the ER, urgent care clinic since your last visit? Hospitalized since your last visit? \" Yes [facility]    2. \"Have you seen or consulted any other health care providers outside of the [address] [place] [address] since your last visit? \" No     3. For patients aged 39-70: Has the patient had a colonoscopy / FIT/ Cologuard? Yes - no Care Gap present      If the patient is female:          5. For patients aged 21-65: Has the patient had a pap smear?  Yes - no Care Gap present

## 2022-06-21 NOTE — PATIENT INSTRUCTIONS
Make a followup appointment with your ENT physician for recurrent sinusitis and facial skin infections

## 2022-06-21 NOTE — PROGRESS NOTES
Ruperto Finger  79 y.o. female  1955  [address] [place] [address]  [id number]     RICKY Vincent 53       Chief Complaint:  Source:    Sammy Leventhal is an 79 y.o. female who [facility] and was diagnosed with staph infection of the nose 4 days ago started on antibiotics - doxycycline for 10 days. No fevers, swelling improved. Last saw ENT about a year ago and had sinuses drained but no prior HEENT surgeries. Previously saw Dr Rad Madera at [address] [place] [address]    Saw allergist and has allergies to dust mites, mold. Having a test for PCN allergy later this month. Due to PCN and general allergy testing has not had a good trial of zyrtec and flonase up to this point. Allergies - reviewed: Allergies   Allergen Reactions    Pcn [Penicillins] Hives    Sulfa (Sulfonamide Antibiotics) Hives    Zocor [Simvastatin] Myalgia    Lisinopril Cough         Medications - reviewed:   Current Outpatient Medications   Medication Sig    doxycycline (VIBRAMYCIN) 100 mg capsule TAKE 1 CAPSULE BY MOUTH EVERY 12 HOURS    mupirocin (BACTROBAN) 2 % ointment APPLY TOPICALLY TO THE AFFECTED AREA THREE TIMES DAILY FOR 7 DAYS    fluticasone propionate (FLONASE) 50 mcg/actuation nasal spray 1 [place] by Both Nostrils route daily. Indications: inflammation of the nose due to an allergy    cetirizine (ZYRTEC) 5 mg tablet Take 1 Tablet by mouth daily.  FLUoxetine (PROzac) 20 mg capsule TAKE 1 CAPSULE BY MOUTH DAILY    rosuvastatin (CRESTOR) 10 mg tablet TAKE 1 TABLET BY MOUTH EVERY NIGHT    TURMERIC PO Take  by mouth daily.  buPROPion XL (WELLBUTRIN XL) 150 mg tablet Take 1 Tablet by mouth Daily (before breakfast).  losartan (COZAAR) 100 mg tablet Take 1 Tablet by mouth daily.  levothyroxine (SYNTHROID) 175 mcg tablet Take 1 Tablet by mouth Daily (before breakfast).     anastrozole (ARIMIDEX) 1 mg tablet TAKE 1 TABLET BY MOUTH EVERY DAY    ascorbic acid (VITAMIN C PO) Take  by mouth.  multivitamin (ONE A DAY) tablet Take 1 Tab by mouth daily.  cholecalciferol, vitamin D3, (Vitamin D3) 50 mcg (2,000 unit) tab Take 1 Tab by mouth daily.  OTHER Portable CPAP machine for RENÉE, with new mask and tubing but no change to mask or settings; G47.30 GARO 99m prog good     No current facility-administered medications for this visit. Past Medical History - reviewed:  Past Medical History:   Diagnosis Date    Adjustment disorder with mixed anxiety and depressed mood 2020    After death of father  and personal diagnosis of/treatment for breast cancer [id number]; Counselin:  [employer] [address] , Ivis [place]ne     Ankle edema 2010    Anxiety 2010    Benign essential hypertension 2016     Carpal tunnel syndrome 2010    Chronic depression 2020    H/O Benign Colon Polyp 2010    H/O bilateral mastectomy 2019    Double Mastectomy (right breast cancer and elective left breast prophylactically); No chemo, No radiation    H/O Uterine Fibroids 2010    Hypercholesterolemia 2013    ~    Hypothyroidism 2016    Kidney stone 2010    Mixed hyperlipidemia 2020    Perimenopausal 2010    Primary osteoarthritis of knees, bilateral 2016    Extensive; Ortho (needs knee replacement), CSI x 2, -; CSI 21 Dr. Ellie Mcfarland Primary osteoarthritis of left knee 2016    Extensive;  Ortho (needs knee replacement), CSI x 2, -    S/P benign cervical polypectomy 2010    Sleep apnea 2010    USES CPAP    Status post right breast lumpectomy 2020, Invasive ductal carcinoma--->Mastectomy 2019, no chemo, no radiation, started on Arimidex    Stress incontinence 2010         Past Surgical History - reviewed:   Past Surgical History:   Procedure Laterality Date    COLONOSCOPY N/A 10/28/2021 COLONOSCOPY   :- [facility]z [facility] ENDOSCOPY    EKG ORDERABLE  5/2009    NSR, rate 84, normal EKG    HX BLADDER SUSPENSION  ~2010    Dr Rohith Juarez Left 09/23/2019    [facility].  Dr. Jaden Krause, Alberteen Idler Mastectomy Implant Infection Left Breast, Replaced Implant, IV abx    HX BREAST BIOPSY Right 11/2011    VPI, right breast biopsy negative    HX BREAST BIOPSY Right 04/22/2019    invasive ductal carcinoma    HX BREAST LUMPECTOMY Right 6/13/2019    RIGHT BREAST LUMPECTOMY ( x2 ) WTIH ULTRASOUND , RIGHT SENTINAL NODE BIOPSY performed by [facility]fania [facility]ins [facility] at [facility]    HX BREAST RECONSTRUCTION Bilateral 8/27/2019    B MASTECTOMY AND IMPLANTS, IMMEDIATE BILATERAL BREAST RECONSTRUCTION WITH DIRECT TO IMPLANT USE OF ALLODERM AND BILATERAL BREAST RECONSTRUCTION INTRA-OPERATIVE ASSESMENT OF BLOOD FLOW performed by Corinna Barragan MD at [place] [address] Right 03/2010    Dr Argelia Marquez CERVICAL POLYPECTOMY  2009, 2015    Dr Gwen Taylor    HX COLONOSCOPY  04/08/2016    Dr Sera Hannon-Internal Hemorrhoids- Normal mucosa in the whole colon - Repeat colonoscopy in 5 years    HX COLONOSCOPY  2016    HX COLONOSCOPY  10/2005    benign polyp; repeat 5 yr per Dr Joselin Lubin HX COLONOSCOPY  11/2010    Normal, f/u 5 yrs, Dr Vasu Martin  Scheduled 10-    HX Demi Boros  7/2005    AUB, benign/neg bx; Dr Marisa Zhao  10/2012    Dr Gwen Taylor, [facility]    HX HEENT  2019    Nasal Abscess I&D, Dr. Abiodun Tony HX HYSTERECTOMY  2013    HX MASTECTOMY Bilateral 8/27/2019    BILATERAL BREAST SKIN SPARING MASTECTOMIES performed by Stefania Harkins MD at [address] [place] [address] Right 2010    CARPAL TUNNEL    HX PARTIAL HYSTERECTOMY  4/26/13    Supracervical hysterectomy for fibroids, Dr Laura Zhao TEETH EXTRACTION      GA REMOVAL INTACT BREAST IMPLANT Bilateral 10/15/2019    [facility] Dr. Maurer Sensor, B Breast Implant pain and recurrent left breast infection; on wound vac w/ iv abx; wound pump removed 2019         Social History - reviewed:  Social History     Socioeconomic History    Marital status:      Spouse name: Not on file    Number of children: 1    Years of education: Not on file    Highest education level: Not on file   Occupational History    Occupation: [address] Occupation: Retired    Tobacco Use    Smoking status: Former Smoker     Packs/day: 0.50     Years: 25.00     Pack years: 12.50     Quit date: 2005     Years since quittin.4    Smokeless tobacco: Never Used   Vaping Use    Vaping Use: Never used   Substance and Sexual Activity    Alcohol use: Yes     Comment: 2 shots of Rum ~ 5 nights a week    Drug use: No    Sexual activity: Not Currently     Partners: Male     Comment: not sexually active x many years   Other Topics Concern     Service Not Asked    Blood Transfusions Not Asked    Caffeine Concern No     Comment: no coffee, tea and cut out her diet Cokes    Occupational Exposure Not Asked    Hobby Hazards Not Asked    Sleep Concern Not Asked    Stress Concern Not Asked    Weight Concern Not Asked    Special Diet No     Comment: \"I eat way too much\"    Back Care Not Asked    Exercise Yes     Comment: home stationary bike x 40-45 minutes a day, stretches daily, yardwork once a week    Bike Helmet Not Asked    Seat Belt Not Asked    Self-Exams Not Asked   Social History Narrative     1 biological daughter (lives in [facility] now), 1 \"adopted\", and 2 step children    Retired 2018    Works for her [employer]    Diet: nothing special, admits likes sweets, cheese, not controlling her portions    Caffeine: no coffee or tea, occ decaff sodas diet coke or ginger ale    Exercise: stationary bike x 45 minutes every day Weight: ranges in the 280's-290's over time         Social Determinants of Health     Financial Resource Strain:     Difficulty of Paying Living Expenses: Not on file   Food Insecurity:     Worried About Running Out of Food in the Last Year: Not on file    Marilyn of Food in the Last Year: Not on file   Transportation Needs:     Lack of Transportation (Medical): Not on file    Lack of Transportation (Non-Medical):  Not on file   Physical Activity:     Days of Exercise per Week: Not on file    Minutes of Exercise per Session: Not on file   Stress:     Feeling of Stress : Not on file   Social Connections:     Frequency of Communication with Friends and Family: Not on file    Frequency of Social Gatherings with Friends and Family: Not on file    Attends Gnosticist Services: Not on file    Active Member of Clubs or Organizations: Not on file    Attends Club or Organization Meetings: Not on file    Marital Status: Not on file   Intimate Partner Violence:     Fear of Current or Ex-Partner: Not on file    Emotionally Abused: Not on file    Physically Abused: Not on file    Sexually Abused: Not on file   Housing Stability:     Unable to Pay for Housing in the Last Year: Not on file    Number of [place] in the Last Year: Not on file    Unstable Housing in the Last Year: Not on file         Family History - reviewed:  Family History   Problem Relation Age of Onset    OSTEOARTHRITIS Father     Stroke Father         TIA's    Cancer Father         melanoma on back removed    Pacemaker Father 80    Heart Disease Father          81 yo in     Hypertension Mother     OSTEOARTHRITIS Mother     Heart Disease Mother         valve disease,  2013    Heart Attack Maternal Grandmother 76    Cancer Sister     No Known Problems Brother     Depression Daughter     Substance Abuse Daughter     Alcohol abuse Daughter     Anxiety Daughter          Immunizations - reviewed:   Immunization History   Administered Date(s) Administered    COVID-19, Pfizer Purple top, DILUTE for use, 12+ yrs, 30mcg/0.3mL dose 03/01/2021, 03/22/2021, 09/25/2021    Influenza Vaccine 10/03/2014, 11/10/2015, 08/07/2016, 09/30/2017, 11/21/2018, 09/23/2021    Influenza Vaccine (Quad) PF (>6 Mo Flulaval, Fluarix, and >3 Yrs Afluria, Fluzone 85035) 11/21/2018, 12/05/2019    Influenza Vaccine (Tri) Adjuvanted (>65 Yrs FLUAD TRI 90558) 09/17/2020    Influenza Vaccine Split 12/20/2010, 10/06/2011    Meningococcal ACWY Vaccine 01/27/2013    Pneumococcal Polysaccharide (PPSV-23) 09/17/2020    TD Vaccine 06/16/1999, 05/27/2009    Td 06/25/2017    Tdap 05/26/2016, 06/25/2017    Zoster Recombinant 11/21/2018, 02/11/2019    Zoster Vaccine, Live 01/27/2013         Review of Systems   Constitutional: Negative for chills, fever, malaise/fatigue and weight loss. HENT: Negative for congestion, sinus pain and sore throat. Neurological: Negative for dizziness and headaches. Physical Exam  Visit Vitals  /78 (BP 1 Location: Right arm, BP Patient Position: Sitting, BP Cuff Size: Large adult)   Pulse 80   Temp 97.2 °F (36.2 °C) (Temporal)   Resp 18   Ht 5' 9\" (1.753 m)   Wt 292 lb 6.4 oz (132.6 kg)   LMP 05/01/2011   SpO2 98%   BMI 43.18 kg/m²       Physical Exam  Constitutional:       General: She is not in acute distress. Appearance: She is obese. She is not ill-appearing or toxic-appearing. HENT:      Head: Normocephalic and atraumatic. Salivary Glands: Right salivary gland is not diffusely enlarged or tender. Left salivary gland is not diffusely enlarged or tender. Right Ear: External ear normal.      Left Ear: External ear normal.      Nose: Mucosal edema present. No congestion or rhinorrhea. Right Nostril: No occlusion. Left Nostril: No occlusion. Right Turbinates: Swollen. Not enlarged or pale. Left Turbinates: Swollen. Not enlarged or pale.       Right Sinus: No maxillary sinus tenderness or frontal sinus tenderness. Left Sinus: No maxillary sinus tenderness or frontal sinus tenderness. Mouth/Throat:      Lips: Pink. No lesions. Mouth: Mucous membranes are moist. No oral lesions. Dentition: Normal dentition. Pharynx: Oropharynx is clear. No oropharyngeal exudate or posterior oropharyngeal erythema. Tonsils: No tonsillar exudate. Eyes:      Pupils: Pupils are equal, round, and reactive to light. Neurological:      Mental Status: She is alert. Assessment/Plan    ICD-10-CM ICD-9-CM    1. Skin infection  L08.9 686.9    2. Environmental allergies  Z91.09 V15.09        Milena Antonio is an 79 y.o. female here today for followup from [facility] where she was diagnosed over the weekend with a staph infection of the left nasal area. On exam today no obvious signs of infection, dentition normal, nasal turbinates are moderately swollen. Suspect with the number of times she has hd sinus issues this year should see ENT for followup. She has been to [facility] and will make an appointment there. 1. Skin infection  - continue course of doxycycline  - followup with ENT    2. Environmental Allergies  - continue for a good trial of daily antihistamine and nasal steroid following PCN testing. If no improvement, discuss allergy shots with allergist    Patient informed to follow up: Follow-up and Dispositions    · Return in about 4 months (around 10/21/2022) for medicare wellness visit. I have discussed the diagnosis with the patient and the intended plan as seen in the above orders. Patient verbalized understanding of the plan and agrees with the plan. The patient has received an after-visit summary and questions were answered concerning future plans. I have discussed medication side effects and warnings with the patient as well. Informed patient to return to the office if new symptoms arise.       Maliha Byrd MD  [address] [place] [address] Practice

## 2022-08-25 RX ORDER — ANASTROZOLE 1 MG/1
TABLET ORAL
Qty: 90 TABLET | Refills: 3 | Status: SHIPPED | OUTPATIENT
Start: 2022-08-25

## 2022-09-22 DIAGNOSIS — E78.2 MIXED HYPERLIPIDEMIA: ICD-10-CM

## 2022-09-22 RX ORDER — ROSUVASTATIN CALCIUM 10 MG/1
TABLET, COATED ORAL
Qty: 90 TABLET | Refills: 0 | Status: SHIPPED | OUTPATIENT
Start: 2022-09-22

## 2022-09-26 ENCOUNTER — OFFICE VISIT (OUTPATIENT)
Dept: ORTHOPEDIC SURGERY | Age: 67
End: 2022-09-26
Payer: MEDICARE

## 2022-09-26 DIAGNOSIS — M17.0 PRIMARY OSTEOARTHRITIS OF KNEES, BILATERAL: Primary | ICD-10-CM

## 2022-09-26 PROCEDURE — 20610 DRAIN/INJ JOINT/BURSA W/O US: CPT | Performed by: ORTHOPAEDIC SURGERY

## 2022-09-26 PROCEDURE — 99024 POSTOP FOLLOW-UP VISIT: CPT | Performed by: ORTHOPAEDIC SURGERY

## 2022-09-26 RX ORDER — TRIAMCINOLONE ACETONIDE 40 MG/ML
40 INJECTION, SUSPENSION INTRA-ARTICULAR; INTRAMUSCULAR ONCE
Status: COMPLETED | OUTPATIENT
Start: 2022-09-26 | End: 2022-09-26

## 2022-09-26 RX ORDER — BUPIVACAINE HYDROCHLORIDE 7.5 MG/ML
3 INJECTION, SOLUTION EPIDURAL; RETROBULBAR ONCE
Status: COMPLETED | OUTPATIENT
Start: 2022-09-26 | End: 2022-09-26

## 2022-09-26 RX ADMIN — BUPIVACAINE HYDROCHLORIDE 22.5 MG: 7.5 INJECTION, SOLUTION EPIDURAL; RETROBULBAR at 15:50

## 2022-09-26 RX ADMIN — TRIAMCINOLONE ACETONIDE 40 MG: 40 INJECTION, SUSPENSION INTRA-ARTICULAR; INTRAMUSCULAR at 15:50

## 2022-09-26 RX ADMIN — BUPIVACAINE HYDROCHLORIDE 22.5 MG: 7.5 INJECTION, SOLUTION EPIDURAL; RETROBULBAR at 15:49

## 2022-09-26 NOTE — PROGRESS NOTES
Tawanda Fonseca (: 1955) is a 79 y.o. female, established patient, here for evaluation of the following chief complaint(s):  No chief complaint on file. ASSESSMENT/PLAN:  Below is the assessment and plan developed based on review of pertinent history, physical exam, labs, studies, and medications. Findings were discussed with the patient today. She elected to try repeat corticosteroid injections as she has an upcoming trip out of town. We discussed the risks and benefits of injection and informed consent was obtained. After a sterile preparation, 4 cc of bupivicaine and 40 mg of Kenalog were injected into the bilateral knees. The patient tolerated the procedure well and there were no complications. Post injection pain, blood sugar elevation, skin discoloration, fatty atrophy and the signs of infection were discussed in detail. The patient was instructed to contact us if there were any questions or concerns prior to their follow up appointment. 1. Primary osteoarthritis of knees, bilateral      Return in about 3 months (around 2022), or if symptoms worsen or fail to improve. SUBJECTIVE/OBJECTIVE:  Tawanda Fonseca (: 1955) is a 79 y.o. female. She notes recurrent aching pain in the bilateral knees. Previous corticosteroid injection lasted about 3 months. She has occasional clicking and popping in the knees.         Allergies   Allergen Reactions    Pcn [Penicillins] Hives    Sulfa (Sulfonamide Antibiotics) Hives    Zocor [Simvastatin] Myalgia    Lisinopril Cough       Current Outpatient Medications   Medication Sig    rosuvastatin (CRESTOR) 10 mg tablet TAKE 1 TABLET BY MOUTH EVERY NIGHT    anastrozole (ARIMIDEX) 1 mg tablet TAKE 1 TABLET BY MOUTH EVERY DAY    doxycycline (VIBRAMYCIN) 100 mg capsule TAKE 1 CAPSULE BY MOUTH EVERY 12 HOURS    mupirocin (BACTROBAN) 2 % ointment APPLY TOPICALLY TO THE AFFECTED AREA THREE TIMES DAILY FOR 7 DAYS    fluticasone propionate (FLONASE) 50 mcg/actuation nasal spray 1 [place] by Both Nostrils route daily. Indications: inflammation of the nose due to an allergy    cetirizine (ZYRTEC) 5 mg tablet Take 1 Tablet by mouth daily. FLUoxetine (PROzac) 20 mg capsule TAKE 1 CAPSULE BY MOUTH DAILY    TURMERIC PO Take  by mouth daily. buPROPion XL (WELLBUTRIN XL) 150 mg tablet Take 1 Tablet by mouth Daily (before breakfast). losartan (COZAAR) 100 mg tablet Take 1 Tablet by mouth daily. levothyroxine (SYNTHROID) 175 mcg tablet Take 1 Tablet by mouth Daily (before breakfast). ascorbic acid (VITAMIN C PO) Take  by mouth.    multivitamin (ONE A DAY) tablet Take 1 Tab by mouth daily. cholecalciferol, vitamin D3, (Vitamin D3) 50 mcg (2,000 unit) tab Take 1 Tab by mouth daily. OTHER Portable CPAP machine for RENÉE, with new mask and tubing but no change to mask or settings; G47.30 GARO 99m prog good     No current facility-administered medications for this visit.        Social History     Socioeconomic History    Marital status:      Spouse name: Not on file    Number of children: 1    Years of education: Not on file    Highest education level: Not on file   Occupational History    Occupation: [employer]    Occupation: Retired    Tobacco Use    Smoking status: Former     Packs/day: 0.50     Years: 25.00     Pack years: 12.50     Types: Cigarettes     Quit date: 2005     Years since quittin.7    Smokeless tobacco: Never   Vaping Use    Vaping Use: Never used   Substance and Sexual Activity    Alcohol use: Yes     Comment: 2 shots of Rum ~ 5 nights a week    Drug use: No    Sexual activity: Not Currently     Partners: Male     Comment: not sexually active x many years   Other Topics Concern     Service Not Asked    Blood Transfusions Not Asked    Caffeine Concern No     Comment: no coffee, tea and cut out her diet Cokes    Occupational Exposure Not Asked    Hobby Hazards Not Asked    Sleep Concern Not Asked Stress Concern Not Asked    Weight Concern Not Asked    Special Diet No     Comment: \"I eat way too much\"    Back Care Not Asked    Exercise Yes     Comment: home stationary bike x 40-45 minutes a day, stretches daily, yardwork once a week    Bike Helmet Not Asked    Seat Belt Not Asked    Self-Exams Not Asked   Social History Narrative     1 biological daughter (lives in [facility] now), 1 \"adopted\", and 2 step children    Retired 2018    Works for her [employer]    Diet: nothing special, admits likes sweets, cheese, not controlling her portions    Caffeine: no coffee or tea, occ decaff sodas diet coke or ginger ale    Exercise: stationary bike x 45 minutes every day     Weight: ranges in the 280's-290's over time         Social Determinants of Health     Financial Resource Strain: Not on file   Food Insecurity: Not on file   Transportation Needs: Not on file   Physical Activity: Not on file   Stress: Not on file   Social Connections: Not on file   Intimate Partner Violence: Not on file   Housing Stability: Not on file       Past Surgical History:   Procedure Laterality Date    COLONOSCOPY N/A 10/28/2021    COLONOSCOPY   :- performed by Amalia Chang MD at [facility] ENDOSCOPY    EKG ORDERABLE  5/2009    NSR, rate 84, normal EKG    HX BLADDER SUSPENSION  ~2010    Dr Charles Reap    HX BREAST AUGMENTATION Left 09/23/2019    [facility].  Dr. Rosario Sherine, [address] [place] [address] Mastectomy Implant Infection Left Breast, Replaced Implant, IV abx    HX BREAST BIOPSY Right 11/2011    VPI, right breast biopsy negative    HX BREAST BIOPSY Right 04/22/2019    invasive ductal carcinoma    HX BREAST LUMPECTOMY Right 6/13/2019    RIGHT BREAST LUMPECTOMY ( x2 ) WTIH ULTRASOUND , RIGHT SENTINAL NODE BIOPSY performed by Shalini Freeman MD at [facility] AMBULATORY OR    HX BREAST RECONSTRUCTION Bilateral 8/27/2019    B MASTECTOMY AND IMPLANTS, IMMEDIATE BILATERAL BREAST RECONSTRUCTION WITH DIRECT TO IMPLANT USE OF ALLODERM AND BILATERAL BREAST RECONSTRUCTION INTRA-OPERATIVE ASSESMENT OF BLOOD FLOW performed by Ken Wynn MD at [address] Right 2010    Dr Nolan Hernández POLYPECTOMY  ,     Dr Estela Klein    HX COLONOSCOPY  2016    Dr Javed Hannon-Internal Hemorrhoids- Normal mucosa in the whole colon - Repeat colonoscopy in 5 years    HX COLONOSCOPY  2016    HX COLONOSCOPY  10/2005    benign polyp; repeat 5 yr per Dr Ifeanyi Tai COLONOSCOPY  2010    Normal, f/u 5 yrs, Dr Michael Hoang    HX COLONOSCOPY  Scheduled 10-    HX DILATION AND CURETTAGE  2005    AUB, benign/neg bx; Dr Estela Klein    HX [address]  10/2012    Dr Estela Klein, AUB    HX HEENT      Nasal Abscess I&D, Dr. Yamileth Mariee HYSTERECTOMY  2013    HX MASTECTOMY Bilateral 2019    BILATERAL BREAST SKIN SPARING MASTECTOMIES performed by Gema Betancur MD at [facility]    HX ORTHOPAEDIC Right 2010    CARPAL TUNNEL    HX PARTIAL HYSTERECTOMY  13    Supracervical hysterectomy for fibroids, Dr Estela Klein    HX [address]    HX UROLOGICAL  2010    BLADDER SLING     HX [address] Bilateral 10/15/2019    [facility] Dr. Mer Roman, JON Breast Implant pain and recurrent left breast infection; on wound vac w/ iv abx; wound pump removed 2019       Family History   Problem Relation Age of Onset    OSTEOARTHRITIS Father     Stroke Father         TIA's    Cancer Father         melanoma on back removed    Pacemaker Father 80    Heart Disease Father          79 yo in     Hypertension Mother     OSTEOARTHRITIS Mother     Heart Disease Mother         valve disease,      Heart Attack Maternal Grandmother 76    Cancer Sister     No Known Problems Brother     Depression Daughter     Substance Abuse Daughter     Alcohol abuse Daughter     Anxiety Daughter         OB History          1    Para   1    Term                AB Living             SAB        IAB        Ectopic        Molar        Multiple        Live Births              Obstetric Comments    x 1; Previous Gyn Dr Donis Amado  Menarche:  10. LMP: ? .  # of Children:  1. Age at Delivery of First Child:  34.   Hysterectomy/oophorectomy:  YES/YES. Breast Bx:  yes. Hx of Breast Feeding:  yes. BCP:  yes. Hormone therapy:  no.                       REVIEW OF SYSTEMS:    Patient denies any recent fever, chills, nausea, vomiting, chest pain, or shortness of breath. Vitals:  LMP 2011    There is no height or weight on file to calculate BMI. PHYSICAL EXAM:  General exam: Patient is awake, alert, and oriented x3. Well-appearing. No acute distress. Ambulates with an antalgic gait    Bilateral knees: Neurovascular and sensory intact. Effusion is present. Crepitus is noted with range of motion of both knees. There is tenderness palpation at the medial and lateral joint lines. Jose David's maneuver creates mild pain. Normal stability on ligamentous testing including Lachman's exam.  Stable anterior and posterior drawer. No erythema or ecchymosis. IMAGING:    XR Results (most recent):  Results from Appointment encounter on 22    XR CHEST PA LAT    Narrative  INDICATION: Persistent cough for 3 weeks or longer. EXAM: CXR 2 View    FINDINGS: Frontal and lateral views of the chest show clear lungs. Heart size is  normal. There is no pulmonary edema. There is no evident pneumothorax,  adenopathy or effusion. Impression  Normal two view chest x-ray. Results from [place] encounter on 19    XR FOOT RT MIN 3 V    Narrative  EXAM: XR FOOT RT MIN 3 V    INDICATION: injury. Right foot pain since yesterday when she heard a pop walking  around a medium. Pain dorsal lateral aspect. COMPARISON: None.     FINDINGS: Three views of the right foot demonstrate a small ossific fragment or  fragments at the lateral aspect of the right midfoot, with mild prominence of  soft tissue but no soft tissue mass. There is focal interruption of posterior  calcaneal spur formation, age uncertain. The Achilles tendon is thought to be  intact. .    Impression  IMPRESSION: No acute bony abnormality is confirmed. However, there are 2 ossific  fragments in the soft tissues at the lateral aspect of the midfoot, thought to  most likely be nonacute. Correlate with physical examination in this region. Comparison to any prior radiographs of the right foot would be most helpful. Otherwise, follow-up radiographs in 7-14 days may be helpful if no further  diagnosis is made on orthopedic consultation/follow-up. Fragmentation of  posterior calcaneal spur formation is thought to be nonacute, but correlate with  physical examination. Results from [place] encounter on 06/25/15    XR KNEE LT 3 V    Narrative  **Final Report**      ICD Codes / Adm. Diagnosis: 719.46   / Pain in joint, lower leg  Examination:  CR KNEE 3 VWS LT  - [id number] - Jun 25 2015 11:19AM  Accession No:  [id number]  Reason:  left knee pain      REPORT:  EXAM:  CR KNEE 3 VWS LT  Clinical history left knee pain  INDICATION:   left knee pain    COMPARISON: None. FINDINGS: Three views of the left knee demonstrate no fracture or other  acute osseous or articular abnormality. There is no effusion. Extensive  osteophyte formation. Patellofemoral joint space narrowing. Impression  :    Extensive degenerative change. Osteoarthritis. No acute abnormality. Signing/Reading Doctor: Loi Olmos ([id number])  Approved: Loi Olmos ([id number])  Jun 25 2015 11:43AM         No orders of the defined types were placed in this encounter. An electronic signature was used to authenticate this note.   -- Diann Fishman, DO

## 2022-09-26 NOTE — LETTER
9/26/2022    Patient: Tammy Bonilla   YOB: 1955   Date of Visit: 9/26/2022     Zachariah Ugalde MD  [address] [facility]    Dear Zachariah Ugalde MD,      Thank you for referring Ms. Nicholas Disla to [facility] for evaluation. My notes for this consultation are attached. If you have questions, please do not hesitate to call me. I look forward to following your patient along with you.       Sincerely,    Denese Lesches, DO

## 2022-10-03 DIAGNOSIS — I10 BENIGN ESSENTIAL HYPERTENSION: ICD-10-CM

## 2022-10-03 DIAGNOSIS — E03.9 HYPOTHYROIDISM, UNSPECIFIED TYPE: ICD-10-CM

## 2022-10-03 DIAGNOSIS — F32.A CHRONIC DEPRESSION: ICD-10-CM

## 2022-10-04 NOTE — TELEPHONE ENCOUNTER
Upcoming appt 10-24-22. Getting refill request for these 3 meds for 90 day supply for her local pharmacy. Please see if patient will have enough to last until then and if not can send in 30 day supply to hold til then.

## 2022-10-07 RX ORDER — BUPROPION HYDROCHLORIDE 150 MG/1
TABLET ORAL
Qty: 90 TABLET | Refills: 3 | OUTPATIENT
Start: 2022-10-07

## 2022-10-07 RX ORDER — LOSARTAN POTASSIUM 100 MG/1
100 TABLET ORAL DAILY
Qty: 90 TABLET | Refills: 3 | OUTPATIENT
Start: 2022-10-07

## 2022-10-07 RX ORDER — LEVOTHYROXINE SODIUM 175 UG/1
TABLET ORAL
Qty: 30 TABLET | Refills: 0 | Status: SHIPPED | OUTPATIENT
Start: 2022-10-07 | End: 2022-10-30

## 2022-10-07 NOTE — TELEPHONE ENCOUNTER
Called pt and she said that she has enough of the of the losartan and Wellbutrin. She needs the levothyroxine.

## 2022-10-10 DIAGNOSIS — J01.10 SUBACUTE FRONTAL SINUSITIS: ICD-10-CM

## 2022-10-10 DIAGNOSIS — R09.81 COUGH WITH CONGESTION OF PARANASAL SINUS: ICD-10-CM

## 2022-10-10 DIAGNOSIS — R05.8 COUGH WITH CONGESTION OF PARANASAL SINUS: ICD-10-CM

## 2022-10-10 RX ORDER — FLUTICASONE PROPIONATE 50 MCG
SPRAY, SUSPENSION (ML) NASAL
Qty: 48 G | Refills: 1 | Status: SHIPPED | OUTPATIENT
Start: 2022-10-10

## 2022-10-24 ENCOUNTER — OFFICE VISIT (OUTPATIENT)
Dept: FAMILY MEDICINE CLINIC | Age: 67
End: 2022-10-24
Payer: MEDICARE

## 2022-10-24 VITALS
WEIGHT: 293 LBS | HEART RATE: 81 BPM | SYSTOLIC BLOOD PRESSURE: 138 MMHG | TEMPERATURE: 98.6 F | RESPIRATION RATE: 16 BRPM | OXYGEN SATURATION: 98 % | BODY MASS INDEX: 43.4 KG/M2 | HEIGHT: 69 IN | DIASTOLIC BLOOD PRESSURE: 88 MMHG

## 2022-10-24 DIAGNOSIS — I10 BENIGN ESSENTIAL HYPERTENSION: ICD-10-CM

## 2022-10-24 DIAGNOSIS — F43.23 ADJUSTMENT REACTION WITH ANXIETY AND DEPRESSION: ICD-10-CM

## 2022-10-24 DIAGNOSIS — Z23 NEEDS FLU SHOT: ICD-10-CM

## 2022-10-24 DIAGNOSIS — Z00.00 MEDICARE ANNUAL WELLNESS VISIT, SUBSEQUENT: Primary | ICD-10-CM

## 2022-10-24 DIAGNOSIS — Z23 ENCOUNTER FOR IMMUNIZATION: ICD-10-CM

## 2022-10-24 DIAGNOSIS — E03.9 ACQUIRED HYPOTHYROIDISM: ICD-10-CM

## 2022-10-24 DIAGNOSIS — E78.2 MIXED HYPERLIPIDEMIA: ICD-10-CM

## 2022-10-24 PROCEDURE — 1090F PRES/ABSN URINE INCON ASSESS: CPT | Performed by: FAMILY MEDICINE

## 2022-10-24 PROCEDURE — G9708 BILAT MAST/HX BI /UNILAT MAS: HCPCS | Performed by: FAMILY MEDICINE

## 2022-10-24 PROCEDURE — G9717 DOC PT DX DEP/BP F/U NT REQ: HCPCS | Performed by: FAMILY MEDICINE

## 2022-10-24 PROCEDURE — G8417 CALC BMI ABV UP PARAM F/U: HCPCS | Performed by: FAMILY MEDICINE

## 2022-10-24 PROCEDURE — 3017F COLORECTAL CA SCREEN DOC REV: CPT | Performed by: FAMILY MEDICINE

## 2022-10-24 PROCEDURE — 90694 VACC AIIV4 NO PRSRV 0.5ML IM: CPT | Performed by: FAMILY MEDICINE

## 2022-10-24 PROCEDURE — G8536 NO DOC ELDER MAL SCRN: HCPCS | Performed by: FAMILY MEDICINE

## 2022-10-24 PROCEDURE — 1101F PT FALLS ASSESS-DOCD LE1/YR: CPT | Performed by: FAMILY MEDICINE

## 2022-10-24 PROCEDURE — 99213 OFFICE O/P EST LOW 20 MIN: CPT | Performed by: FAMILY MEDICINE

## 2022-10-24 PROCEDURE — G8754 DIAS BP LESS 90: HCPCS | Performed by: FAMILY MEDICINE

## 2022-10-24 PROCEDURE — G8427 DOCREV CUR MEDS BY ELIG CLIN: HCPCS | Performed by: FAMILY MEDICINE

## 2022-10-24 PROCEDURE — G0439 PPPS, SUBSEQ VISIT: HCPCS | Performed by: FAMILY MEDICINE

## 2022-10-24 PROCEDURE — 1123F ACP DISCUSS/DSCN MKR DOCD: CPT | Performed by: FAMILY MEDICINE

## 2022-10-24 PROCEDURE — G8752 SYS BP LESS 140: HCPCS | Performed by: FAMILY MEDICINE

## 2022-10-24 PROCEDURE — G8399 PT W/DXA RESULTS DOCUMENT: HCPCS | Performed by: FAMILY MEDICINE

## 2022-10-24 PROCEDURE — G0463 HOSPITAL OUTPT CLINIC VISIT: HCPCS | Performed by: FAMILY MEDICINE

## 2022-10-24 RX ORDER — CETIRIZINE HYDROCHLORIDE 10 MG/1
10 TABLET ORAL DAILY
COMMUNITY

## 2022-10-24 NOTE — PROGRESS NOTES
Chief Complaint   Patient presents with    Annual Wellness Visit    Cholesterol Problem     Fasting    Hypertension    Thyroid Problem       HISTORY OF PRESENT ILLNESS   Fasting follow up hypercholesterolemia, hypertension, hypothyroidism, labs and medication check. Complying routinely w/ medications and tolerating w/o reaction or side effects. Diet: nothing special, admits likes sweets, cheese, not controlling her portions  Caffeine: no coffee or tea, occ decaff sodas diet coke or ginger ale  Exercise: not as much since summer 2022 but was riding stationary bike x 45 minutes every day; has since lost motivation  Weight: ranges in the 280's-290's over time, currently on the higher end since not exercising as much lately and not eating healthy  She is wondering if the Prozac has helped her anxiety \"too much\" because ever since taking it, she feels laid back, more carefree and is wondering if that is what is making her feel less motivated or it is just \"age\" and or stress. Stress over worrying about daughter in [place] and 11 month old grandbaby (good health, happy, healthy) but daughter not in a good relationship and that worries patient. REVIEW OF SYMPTOMS   Review of Systems   Constitutional: Negative. HENT: Negative. Eyes: Negative. Respiratory: Negative. Cardiovascular: Negative. Gastrointestinal: Negative. Genitourinary: Negative. Musculoskeletal: Negative. Neurological: Negative. Endo/Heme/Allergies: Negative.           PROBLEM LIST/MEDICAL HISTORY     Problem List  Date Reviewed: 10/24/2022            Codes Class Noted    Environmental allergies ICD-10-CM: Z91.09  ICD-9-CM: V15.09  6/21/2022        Mixed hyperlipidemia ICD-10-CM: E78.2  ICD-9-CM: 272.2  7/6/2020        Aromatase inhibitor use ICD-10-CM: Z79.811  ICD-9-CM: V07.52  3/3/2020        Status post right breast lumpectomy ICD-10-CM: H10.237  ICD-9-CM: V45.89  1/30/2020    Overview Addendum 1/30/2020  8:50 PM by Aide Arguello MD     2019, Invasive ductal carcinoma--->Mastectomy 2019, no chemo, no radiation, started on Arimidex             Chronic depression ICD-10-CM: F32. A  ICD-9-CM: 289  2020        Adjustment disorder with mixed anxiety and depressed mood ICD-10-CM: F43.23  ICD-9-CM: 309.28  2020    Overview Signed 2020  9:31 PM by Aide Arguello MD     After death of father  and personal diagnosis of/treatment for breast cancer [id number]; Counselin:  Clare Brewer of [address] [place] [address] Dr, [facility]              H/O bilateral mastectomy ICD-10-CM: Z90.13  ICD-9-CM: V45.71  2019    Overview Addendum 2020  8:59 PM by Aide Arguello MD     Double Mastectomy (right breast cancer and elective left breast prophylactically); No chemo, No radiation; Started on Arimidex, followed by Dr. Edilberto Stewart             H/O breast reconstruction ICD-10-CM: B29.082  ICD-9-CM: V43.82  2019        Arthritis ICD-10-CM: M19.90  ICD-9-CM: 716.90  2019        [facility] of overlapping sites [facility] ICD-10-CM: C50.811  ICD-9-CM: 174.8  2019        Benign essential hypertension ICD-10-CM: I10  ICD-9-CM: 401.1  2016    Overview Signed 2016  9:26 AM by Aide Arguello MD                  Hypothyroidism ICD-10-CM: E03.9  ICD-9-CM: 244.9  2016    Overview Signed 2016  9:27 AM by Aide Arguello MD     1999             Primary osteoarthritis of knees, bilateral ICD-10-CM: M17.0  ICD-9-CM: 715.16  2016    Overview Addendum 10/6/2021  1:28 PM by Aide Arguello MD     Extensive;  Ortho (needs knee replacement), CSI x 2, ; CSI 21 Dr. Meryl Avila             Hypercholesterolemia ICD-10-CM: E78.00  ICD-9-CM: 272.0  2013    Overview Signed 2013  8:29 AM by Aide Arguello MD     ~4637             S/P benign cervical polypectomy ICD-10-CM: Q27.568, C88.87  ICD-9-CM: V45.89  12/20/2010    Overview Addendum 5/26/2016  9:42 AM by Ching Dominique MD     2009, 2015; Gyn Dr Quin Acosta             H/O Benign Colon Polyp ICD-10-CM: K63.5  ICD-9-CM: 211.3  12/20/2010    Overview Signed 12/20/2010 12:10 PM by Ching Dominique MD     Colonoscopy 10/2005, 11/2010  q5yrs  Dr Kevin Diop             H/O Uterine Fibroids ICD-10-CM: D21.9  ICD-9-CM: 215.9  12/20/2010    Overview Signed 12/20/2010 12:13 PM by Ching Dominique MD     2005; no surgical intervention  Gyn Dr Clay Biggs incontinence ICD-10-CM: N39.3  ICD-9-CM: [id number]  5/26/2010    Overview Signed 5/26/2010 12:47 PM by Ching Dominique MD     Urologist Dr Enrique Patterson             Sleep apnea, RENÉE ICD-10-CM: G47.30  ICD-9-CM: 780.57  4/5/2010    Overview Addendum 12/20/2010 12:14 PM by Ching Dominique MD     2009; cpap             Anxiety ICD-10-CM: F41.9  ICD-9-CM: 300.00  4/5/2010        Carpal tunnel syndrome ICD-10-CM: G56.00  ICD-9-CM: 354.0  4/5/2010        Mild Dependent Ankle edema, B ICD-10-CM: M25.473  ICD-9-CM: 719.07  4/5/2010    Overview Signed 4/5/2010  3:52 PM by Sari Hyde     mild             Perimenopausal ICD-10-CM: N95.1  ICD-9-CM: 627.2  4/5/2010    Overview Signed 4/5/2010  3:52 PM by Sari Hyde     2007             ? Passed Kidney stone ICD-10-CM: N20.0  ICD-9-CM: 592.0  4/5/2010    Overview Signed 5/26/2010 12:47 PM by Ching Dominique MD     2/2010                    PAST SURGICAL HISTORY     Past Surgical History:   Procedure Laterality Date    COLONOSCOPY N/A 10/28/2021    Polypectomy, Repeat 5 yrs, Dr. Ermias Graham EKG ORDERABLE  05/2009    NSR, rate 84, normal EKG    HX BLADDER SUSPENSION  ~2010    Dr Brannon Gonzalez Left 09/23/2019    [facility].  Dr. Joni Chakraborty, Post Mastectomy Implant Infection Left Breast, Replaced Implant, IV abx    HX BREAST BIOPSY Right 11/2011    VPI, right breast biopsy negative    HX BREAST BIOPSY Right 04/22/2019    invasive ductal carcinoma    HX BREAST LUMPECTOMY Right 06/13/2019    RIGHT BREAST LUMPECTOMY ( x2 ) WT ULTRASOUND , RIGHT SENTINAL NODE BIOPSY performed by Junior Celia MD at \[facility]\"" AMBULATORY OR    HX BREAST RECONSTRUCTION Bilateral 08/27/2019    B MASTECTOMY AND IMPLANTS, IMMEDIATE BILATERAL BREAST RECONSTRUCTION WITH DIRECT TO IMPLANT USE OF ALLODERM AND BILATERAL BREAST RECONSTRUCTION INTRA-OPERATIVE ASSESMENT OF BLOOD FLOW performed by Paty Meehan MD at [place] [address]    HX [address] Right 03/2010    Dr Henao Late CERVICAL POLYPECTOMY  2009, 2015    Dr Quin Acosta    HX COLONOSCOPY  04/08/2016    Dr Roc Hannon-Internal Hemorrhoids- Normal mucosa in the whole colon - Repeat colonoscopy in 5 years    HX COLONOSCOPY  2016    HX COLONOSCOPY  10/2005    benign polyp; repeat 5 yr per Dr Daisha Martínez HX COLONOSCOPY  11/2010    Normal, f/u 5 yrs, Dr Boubacar Waggoner  Scheduled 10-    Renaee Tiffani  07/2005    AUB, benign/neg bx; Dr Winn Lab  10/2012    Dr Quin Acosta, AUB    HX HEENT  2019    Nasal Abscess I&D, Dr. Jericho Godoy HX HYSTERECTOMY  2013    HX MASTECTOMY Bilateral 08/27/2019    BILATERAL BREAST SKIN SPARING MASTECTOMIES performed by Junior Celia MD at [facility] Right 2010    CARPAL TUNNEL    HX PARTIAL HYSTERECTOMY  04/26/2013    Supracervical hysterectomy for fibroids, Dr Elizabeth Trevino HX UROLOGICAL  2010    BLADDER SLING     HX Eyad Marek 50 Bilateral 10/15/2019    [facility] Dr. Joni Chakraborty, B Breast Implant pain and recurrent left breast infection; on wound vac w/ iv abx; wound pump removed 12-4-2019         MEDICATIONS     Current Outpatient Medications   Medication Sig    cetirizine (ZyrTEC) 10 mg tablet Take 10 mg by mouth daily.     fluticasone propionate (FLONASE) 50 mcg/actuation nasal spray SPRAY ONCE IN EACH NOSTRIL EVERY DAY    levothyroxine (SYNTHROID) 175 mcg tablet TAKE 1 TABLET BY MOUTH DAILY BEFORE BREAKFAST    rosuvastatin (CRESTOR) 10 mg tablet TAKE 1 TABLET BY MOUTH EVERY NIGHT    anastrozole (ARIMIDEX) 1 mg tablet TAKE 1 TABLET BY MOUTH EVERY DAY    FLUoxetine (PROzac) 20 mg capsule TAKE 1 CAPSULE BY MOUTH DAILY    TURMERIC PO Take  by mouth daily.  buPROPion XL (WELLBUTRIN XL) 150 mg tablet Take 1 Tablet by mouth Daily (before breakfast).  losartan (COZAAR) 100 mg tablet Take 1 Tablet by mouth daily.  ascorbic acid (VITAMIN C PO) Take  by mouth daily.  multivitamin (ONE A DAY) tablet Take 1 Tab by mouth daily.  cholecalciferol, vitamin D3, 50 mcg (2,000 unit) tab Take 1 Tab by mouth daily.  OTHER Portable CPAP machine for RENÉE, with new mask and tubing but no change to mask or settings; G47.30 GARO 99m prog good     No current facility-administered medications for this visit.           ALLERGIES     Allergies   Allergen Reactions    Pcn [Penicillins] Hives    Sulfa (Sulfonamide Antibiotics) Hives    Zocor [Simvastatin] Myalgia    Lisinopril Cough          SOCIAL HISTORY     Social History     Tobacco Use    Smoking status: Former     Packs/day: 0.50     Years: 25.00     Pack years: 12.50     Types: Cigarettes     Quit date: 2005     Years since quittin.8    Smokeless tobacco: Never   Substance Use Topics    Alcohol use: Yes     Comment: 2 shots of Rum ~ 5 nights a week     Social History     Social History Narrative     1 biological daughter (lives in [facility] now), 1 \"adopted\", and 2 step children    Retired 2018    Works for her [employer]    Diet: nothing special, admits likes sweets, cheese, not controlling her portions    Caffeine: no coffee or tea, occ decaff sodas diet coke or ginger ale    Exercise: not as much since summer 2022 but was riding stationary bike x 45 minutes every day     Weight: ranges in the 280's-290's over time, currently on the higher end since not exercising as much lately and not eating healthy         Social History     Substance and Sexual Activity   Sexual Activity Not Currently    Partners: Male    Comment: not sexually active x many years       IMMUNIZATIONS     Immunization History   Administered Date(s) Administered    COVID-19, PFIZER PURPLE top, DILUTE for use, (age 15 y+), IM, 30mcg/0.3mL 2021, 2021, 2021, 2022, 2022    Influenza Vaccine 10/03/2014, 11/10/2015, 2016, 2017, 2018, 2021    Influenza Vaccine (Tri) Adjuvanted (>65 Yrs FLUAD TRI 15203) 2020    Influenza Vaccine Split 2010, 10/06/2011    Influenza, FLUAD, (age 72 y+), Adjuvanted 10/24/2022    Influenza, FLUARIX, FLULAVAL, FLUZONE (age 10 mo+) AND AFLURIA, (age 1 y+), PF, 0.5mL 2018, 2019    Meningococcal ACWY Vaccine 2013    Pneumococcal Polysaccharide (PPSV-23) 2020    TD Vaccine 1999, 2009    Td 2017    Tdap 2016, 2017    Zoster Recombinant 2018, 2019    Zoster Vaccine, Live 2013         FAMILY HISTORY     Family History   Problem Relation Age of Onset    OSTEOARTHRITIS Father     Stroke Father         TIA's    Cancer Father         melanoma on back removed    Pacemaker Father 80    Heart Disease Father          81 yo in     Hypertension Mother     OSTEOARTHRITIS Mother     Heart Disease Mother         valve disease,      Heart Attack Maternal Grandmother 76    Cancer Sister     No Known Problems Brother     Depression Daughter     Substance Abuse Daughter     Alcohol abuse Daughter     Anxiety Daughter          VITALS   Visit Vitals  /88 (BP 1 Location: Left upper arm, BP Patient Position: Sitting, BP Cuff Size: Large adult)   Pulse 81   Temp 98.6 °F (37 °C) (Oral)   Resp 16   Ht 5' 9\" (1.753 m)   Wt 305 lb 9.6 oz (138.6 kg)   LMP 05/01/2011   SpO2 98%   BMI 45.13 kg/m²          PHYSICAL EXAMINATION   Physical Exam  Vitals reviewed. Constitutional:       General: She is not in acute distress. HENT:      Right Ear: Tympanic membrane normal.      Left Ear: Tympanic membrane normal.   Eyes:      Conjunctiva/sclera: Conjunctivae normal.   Neck:      Thyroid: No thyroid mass, thyromegaly or thyroid tenderness. Vascular: No carotid bruit. Cardiovascular:      Rate and Rhythm: Normal rate and regular rhythm. Heart sounds: Normal heart sounds. Pulmonary:      Effort: Pulmonary effort is normal.      Breath sounds: Normal breath sounds. Abdominal:      Palpations: Abdomen is soft. Tenderness: There is no abdominal tenderness. Musculoskeletal:         General: No tenderness. Cervical back: Neck supple. Comments: Mild non pitting edema B ankles. Skin warm, dry, intact. Neurological:      General: No focal deficit present. Mental Status: She is alert and oriented to person, place, and time. Mental status is at baseline. Cranial Nerves: No cranial nerve deficit. Psychiatric:         Mood and Affect: Mood and affect normal.         Thought Content: Thought content does not include suicidal ideation. Cognition and Memory: Cognition and memory normal.          ASSESSMENT & PLAN   Diagnoses and all orders for this visit:    1. Medicare annual wellness visit, subsequent  See separate note under this same encounter visit for Medicare Wellness note. 2. Encounter for immunization  -     MT IMMUNIZ ADMIN,1 SINGLE/COMB VAC/TOXOID  -     INFLUENZA, FLUAD, (AGE 65 Y+), IM, PF, 0.5 ML    3. Needs flu shot  -     MT IMMUNIZ ADMIN,1 SINGLE/COMB VAC/TOXOID  -     INFLUENZA, FLUAD, (AGE 65 Y+), IM, PF, 0.5 ML    4.  Mixed hyperlipidemia  LDL (\"bad cholesterol\") not at goal but improved over time w/ statin therapy  Continue current regimen of Crestor 10 mg daily for now  Cardiovascular risk and specific lipid/LDL goals assessed  Reviewed diet, nutrition, exercise, weight management, BMI/goals  Further recommendations pending review of today's labs  -     LIPID PANEL; Future    5. Benign essential hypertension  BP goals, diet, exercise counseling  Continue regimen of Cozaar 100 mg daily for now  -     CBC W/O DIFF; Future  -     METABOLIC PANEL, COMPREHENSIVE; Future  -     LIPID PANEL; Future    6. Acquired hypothyroidism  Weight gain  Check TFT's today  Continue current regimen of Synthroid 175 mcg as directed for now  Further recommendations after review of today's labs  -     TSH 3RD GENERATION; Future  -     T4, FREE; Future    7. Prolonged adjustment reaction/stress with anxiety and reactive depression  Will continue regimen of Wellbutrin  mg and Prozac 20 mg daily for now and follow up if not improving, sooner if anything worsens in the interim      Fasting labs checked today  Reviewed diet, nutrition, exercise, weight management, BMI/goals. Age/risk based screening recommendations, health maintenance & prevention counseling. Cancer screening USPTFS guidelines reviewed w/ pt today. Discussed benefits/positive/negative outcomes of screening based on age/risk stratification. Informed consent for/against screening based on pt's personal hx/risk factors. Updated in history above and health maintenance.    Reviewed medications and side effects  Further follow up & other recommendations pending review of labs and interim course  She will need rx's renewed after review of today's labs

## 2022-10-24 NOTE — PATIENT INSTRUCTIONS

## 2022-10-24 NOTE — PROGRESS NOTES
Chief Complaint   Patient presents with    Annual Wellness Visit    Cholesterol Problem     fasting    Hypertension    Thyroid Problem     1. \"Have you been to the ER, urgent care clinic since your last visit? Hospitalized since your last visit? \" [facility] ~ 4 months ago for spot on nose    2. \"Have you seen or consulted any other health care providers outside of the [address] [place] [address] since your last visit? \" Tabby Lujan 9/26/22,    3. For patients aged 39-70: Has the patient had a colonoscopy / FIT/ Cologuard? Yes - no Care Gap present 10/2021 COLONOSCOPY   :- performed by Darlene Boeck., MD at [facility] ENDOSCOPY      If the patient is female:    4. For patients aged 41-77: Has the patient had a mammogram within the past 2 years? NA - based on age or sex      11. For patients aged 21-65: Has the patient had a pap smear? Dr Tory Driscoll 4/25/22

## 2022-10-25 LAB
ALBUMIN SERPL-MCNC: 3.7 G/DL (ref 3.5–5)
ALBUMIN/GLOB SERPL: 1.3 {RATIO} (ref 1.1–2.2)
ALP SERPL-CCNC: 78 U/L (ref 45–117)
ALT SERPL-CCNC: 26 U/L (ref 12–78)
ANION GAP SERPL CALC-SCNC: 7 MMOL/L (ref 5–15)
AST SERPL-CCNC: 8 U/L (ref 15–37)
BILIRUB SERPL-MCNC: 0.8 MG/DL (ref 0.2–1)
BUN SERPL-MCNC: 20 MG/DL (ref 6–20)
BUN/CREAT SERPL: 21 (ref 12–20)
CALCIUM SERPL-MCNC: 9.6 MG/DL (ref 8.5–10.1)
CHLORIDE SERPL-SCNC: 106 MMOL/L (ref 97–108)
CHOLEST SERPL-MCNC: 191 MG/DL
CO2 SERPL-SCNC: 27 MMOL/L (ref 21–32)
CREAT SERPL-MCNC: 0.95 MG/DL (ref 0.55–1.02)
ERYTHROCYTE [DISTWIDTH] IN BLOOD BY AUTOMATED COUNT: 14 % (ref 11.5–14.5)
GLOBULIN SER CALC-MCNC: 2.9 G/DL (ref 2–4)
GLUCOSE SERPL-MCNC: 88 MG/DL (ref 65–100)
HCT VFR BLD AUTO: 42.7 % (ref 35–47)
HDLC SERPL-MCNC: 73 MG/DL
HDLC SERPL: 2.6 {RATIO} (ref 0–5)
HGB BLD-MCNC: 13.1 G/DL (ref 11.5–16)
LDLC SERPL CALC-MCNC: 94 MG/DL (ref 0–100)
MCH RBC QN AUTO: 30 PG (ref 26–34)
MCHC RBC AUTO-ENTMCNC: 30.7 G/DL (ref 30–36.5)
MCV RBC AUTO: 97.9 FL (ref 80–99)
NRBC # BLD: 0 K/UL (ref 0–0.01)
NRBC BLD-RTO: 0 PER 100 WBC
PLATELET # BLD AUTO: 205 K/UL (ref 150–400)
PMV BLD AUTO: 11 FL (ref 8.9–12.9)
POTASSIUM SERPL-SCNC: 4.4 MMOL/L (ref 3.5–5.1)
PROT SERPL-MCNC: 6.6 G/DL (ref 6.4–8.2)
RBC # BLD AUTO: 4.36 M/UL (ref 3.8–5.2)
SODIUM SERPL-SCNC: 140 MMOL/L (ref 136–145)
T4 FREE SERPL-MCNC: 1.1 NG/DL (ref 0.8–1.5)
TRIGL SERPL-MCNC: 120 MG/DL (ref ?–150)
TSH SERPL DL<=0.05 MIU/L-ACNC: 4.71 UIU/ML (ref 0.36–3.74)
VLDLC SERPL CALC-MCNC: 24 MG/DL
WBC # BLD AUTO: 7.2 K/UL (ref 3.6–11)

## 2022-10-29 ENCOUNTER — PATIENT MESSAGE (OUTPATIENT)
Dept: FAMILY MEDICINE CLINIC | Age: 67
End: 2022-10-29

## 2022-10-30 DIAGNOSIS — E03.9 HYPOTHYROIDISM, UNSPECIFIED TYPE: ICD-10-CM

## 2022-10-30 DIAGNOSIS — I10 BENIGN ESSENTIAL HYPERTENSION: ICD-10-CM

## 2022-10-30 DIAGNOSIS — F32.A CHRONIC DEPRESSION: ICD-10-CM

## 2022-10-30 RX ORDER — LOSARTAN POTASSIUM 100 MG/1
100 TABLET ORAL DAILY
Qty: 90 TABLET | Refills: 1 | Status: SHIPPED | OUTPATIENT
Start: 2022-10-30

## 2022-10-30 RX ORDER — LEVOTHYROXINE SODIUM 200 UG/1
200 TABLET ORAL
Qty: 90 TABLET | Refills: 0 | Status: SHIPPED | OUTPATIENT
Start: 2022-10-30

## 2022-10-30 RX ORDER — BUPROPION HYDROCHLORIDE 150 MG/1
150 TABLET ORAL
Qty: 90 TABLET | Refills: 1 | Status: SHIPPED | OUTPATIENT
Start: 2022-10-30

## 2022-11-21 DIAGNOSIS — E03.9 HYPOTHYROIDISM, UNSPECIFIED TYPE: ICD-10-CM

## 2022-11-21 RX ORDER — LEVOTHYROXINE SODIUM 175 UG/1
TABLET ORAL
Qty: 90 TABLET | OUTPATIENT
Start: 2022-11-21

## 2022-12-02 DIAGNOSIS — F43.23 ADJUSTMENT REACTION WITH ANXIETY AND DEPRESSION: ICD-10-CM

## 2022-12-02 DIAGNOSIS — F33.9 CHRONIC MAJOR DEPRESSIVE DISORDER, RECURRENT EPISODE (HCC): ICD-10-CM

## 2022-12-02 RX ORDER — FLUOXETINE HYDROCHLORIDE 20 MG/1
CAPSULE ORAL
Qty: 90 CAPSULE | Refills: 0 | Status: SHIPPED | OUTPATIENT
Start: 2022-12-02

## 2022-12-22 ENCOUNTER — OFFICE VISIT (OUTPATIENT)
Dept: ORTHOPEDIC SURGERY | Age: 67
End: 2022-12-22
Payer: MEDICARE

## 2022-12-22 VITALS — WEIGHT: 293 LBS | HEIGHT: 69 IN | BODY MASS INDEX: 43.4 KG/M2

## 2022-12-22 DIAGNOSIS — M17.0 PRIMARY OSTEOARTHRITIS OF KNEES, BILATERAL: Primary | ICD-10-CM

## 2022-12-22 RX ORDER — BUPIVACAINE HYDROCHLORIDE 7.5 MG/ML
3 INJECTION, SOLUTION EPIDURAL; RETROBULBAR ONCE
Status: COMPLETED | OUTPATIENT
Start: 2022-12-22 | End: 2022-12-22

## 2022-12-22 RX ORDER — METHYLPREDNISOLONE ACETATE 40 MG/ML
40 INJECTION, SUSPENSION INTRA-ARTICULAR; INTRALESIONAL; INTRAMUSCULAR; SOFT TISSUE ONCE
Status: COMPLETED | OUTPATIENT
Start: 2022-12-22 | End: 2022-12-22

## 2022-12-22 RX ADMIN — METHYLPREDNISOLONE ACETATE 40 MG: 40 INJECTION, SUSPENSION INTRA-ARTICULAR; INTRALESIONAL; INTRAMUSCULAR; SOFT TISSUE at 08:38

## 2022-12-22 RX ADMIN — BUPIVACAINE HYDROCHLORIDE 22.5 MG: 7.5 INJECTION, SOLUTION EPIDURAL; RETROBULBAR at 08:37

## 2022-12-22 NOTE — LETTER
12/22/2022    Patient: Bambi Mclaughlin   YOB: 1955   Date of Visit: 12/22/2022     Alexis Merlin, MD  [address] [facility]    Dear Alexis Merlin, MD,      Thank you for referring Ms. Lavinia Escobar to [facility] for evaluation. My notes for this consultation are attached. If you have questions, please do not hesitate to call me. I look forward to following your patient along with you.       Sincerely,    Amaury Tellez, DO

## 2022-12-22 NOTE — PROGRESS NOTES
Herlinda Campos (: 1955) is a 79 y.o. female, established patient, here for evaluation of the following chief complaint(s):  Knee Pain (bilateral)       ASSESSMENT/PLAN:  Below is the assessment and plan developed based on review of pertinent history, physical exam, labs, studies, and medications. We discussed possibility of viscosupplementation injection if she has limited relief from this corticosteroid injection. We discussed the risks and benefits of injection and informed consent was obtained. After a sterile preparation, 3 cc of bupivicaine and 40 mg of Depo-Medrol were injected into the bilateral knees. The patient tolerated the procedure well and there were no complications. Post injection pain, blood sugar elevation, skin discoloration, fatty atrophy and the signs of infection were discussed in detail. The patient was instructed to contact us if there were any questions or concerns prior to their follow up appointment. 1. Primary osteoarthritis of knees, bilateral      Return in about 3 months (around 3/22/2023), or if symptoms worsen or fail to improve. SUBJECTIVE/OBJECTIVE:  Herlinda Campos (: 1955) is a 79 y.o. female. She notes recurrent aching pain and clicking in both knees. She has had difficulty obtaining full extension of the left knee due to crepitus. She notes difficulty with stairs. No new injuries        Allergies   Allergen Reactions    Pcn [Penicillins] Hives    Sulfa (Sulfonamide Antibiotics) Hives    Zocor [Simvastatin] Myalgia    Lisinopril Cough       Current Outpatient Medications   Medication Sig    FLUoxetine (PROzac) 20 mg capsule TAKE 1 CAPSULE BY MOUTH DAILY    levothyroxine (SYNTHROID) 200 mcg tablet Take 1 Tablet by mouth Daily (before breakfast). buPROPion XL (WELLBUTRIN XL) 150 mg tablet Take 1 Tablet by mouth Daily (before breakfast). losartan (COZAAR) 100 mg tablet Take 1 Tablet by mouth daily.     cetirizine (ZyrTEC) 10 mg tablet Take 10 mg by mouth daily. fluticasone propionate (FLONASE) 50 mcg/actuation nasal spray SPRAY ONCE IN EACH NOSTRIL EVERY DAY    rosuvastatin (CRESTOR) 10 mg tablet TAKE 1 TABLET BY MOUTH EVERY NIGHT    anastrozole (ARIMIDEX) 1 mg tablet TAKE 1 TABLET BY MOUTH EVERY DAY    TURMERIC PO Take  by mouth daily. ascorbic acid (VITAMIN C PO) Take  by mouth daily. multivitamin (ONE A DAY) tablet Take 1 Tab by mouth daily. cholecalciferol, vitamin D3, 50 mcg (2,000 unit) tab Take 1 Tab by mouth daily.     OTHER Portable CPAP machine for RENÉE, with new mask and tubing but no change to mask or settings; G47.30 GARO 99m prog good     Current Facility-Administered Medications   Medication    methylPREDNISolone acetate (DEPO-MEDROL) 40 mg/mL injection 40 mg    bupivacaine (PF) (MARCAINE) 0.75 % (7.5 mg/mL) injection 22.5 mg    methylPREDNISolone acetate (DEPO-MEDROL) 40 mg/mL injection 40 mg    bupivacaine (PF) (MARCAINE) 0.75 % (7.5 mg/mL) injection 22.5 mg       Social History     Socioeconomic History    Marital status:      Spouse name: Not on file    Number of children: 1    Years of education: Not on file    Highest education level: Not on file   Occupational History    Occupation: [address]    Occupation: Retired    Tobacco Use    Smoking status: Former     Packs/day: 0.50     Years: 25.00     Pack years: 12.50     Types: Cigarettes     Quit date: 2005     Years since quittin.9    Smokeless tobacco: Never   Vaping Use    Vaping Use: Never used   Substance and Sexual Activity    Alcohol use: Yes     Comment: 2 shots of Rum ~ 5 nights a week    Drug use: No    Sexual activity: Not Currently     Partners: Male     Comment: not sexually active x many years   Other Topics Concern     Service Not Asked    Blood Transfusions Not Asked    Caffeine Concern No     Comment: no coffee, tea and cut out her diet Cokes    Occupational Exposure Not Asked    Hobby Hazards Not Asked    Sleep Concern Not Asked    Stress Concern Not Asked    Weight Concern Not Asked    Special Diet No     Comment: \"I eat way too much\"    Back Care Not Asked    Exercise Yes     Comment: home stationary bike x 40-45 minutes a day, stretches daily, yardwork once a week    Bike Helmet Not Asked    Seat Belt Not Asked    Self-Exams Not Asked   Social History Narrative     1 biological daughter (lives in [facility] now), 1 \"adopted\", and 2 step children    Retired 2018    Works for her [employer]: nothing special, admits likes sweets, cheese, not controlling her portions    Caffeine: no coffee or tea, occ decaff sodas diet coke or ginger ale    Exercise: not as much since summer 2022 but was riding stationary bike x 45 minutes every day; has since lost motivation    Weight: ranges in the 280's-290's over time, currently on the higher end since not exercising as much lately and not eating healthy         Social Determinants of Health     Financial Resource Strain: Low Risk     Difficulty of Paying Living Expenses: Not hard at all   Food Insecurity: No Food Insecurity    Worried About Running Out of Food in the Last Year: Never true    Ran Out of Food in the Last Year: Never true   Transportation Needs: Not on file   Physical Activity: Not on file   Stress: Not on file   Social Connections: Not on file   Intimate Partner Violence: Not on file   Housing Stability: Not on file       Past Surgical History:   Procedure Laterality Date    COLONOSCOPY N/A 10/28/2021    Polypectomy, Repeat 5 yrs, Dr. Yokasta Hunter    EKG ORDERABLE  05/2009    NSR, rate 84, normal EKG    HX BLADDER SUSPENSION  ~2010    Dr Iain Romo    HX BREAST AUGMENTATION Left 09/23/2019    [facility].  Dr. Ira Shoemaker, Post Mastectomy Implant Infection Left Breast, Replaced Implant, IV abx    HX BREAST BIOPSY Right 11/2011    VPI, right breast biopsy negative    HX BREAST BIOPSY Right 04/22/2019    invasive ductal carcinoma    HX BREAST LUMPECTOMY Right 06/13/2019    RIGHT BREAST LUMPECTOMY ( x2 ) [facility] ULTRASOUND , RIGHT SENTINAL NODE BIOPSY performed by Jacklyn Suarez MD at [facility] AMBULATORY OR    HX BREAST RECONSTRUCTION Bilateral 2019    B MASTECTOMY AND IMPLANTS, IMMEDIATE BILATERAL BREAST RECONSTRUCTION WITH DIRECT TO IMPLANT USE OF ALLODERM AND BILATERAL BREAST RECONSTRUCTION INTRA-OPERATIVE ASSESMENT OF BLOOD FLOW performed by Elva Rock MD at [place] [address]    HX [address] Right 2010    Dr Jayla Santos CERVICAL POLYPECTOMY  ,     Dr Vinny Garcia    HX COLONOSCOPY  2016    Dr Percy Hannon-Internal Hemorrhoids- Normal mucosa in the whole colon - Repeat colonoscopy in 5 years    HX COLONOSCOPY      HX COLONOSCOPY  10/2005    benign polyp; repeat 5 yr per Dr Pratima [place] COLONOSCOPY  2010    Normal, f/u 5 yrs, Dr Krish Lindo    HX COLONOSCOPY  Scheduled 10-    HX DILATION AND CURETTAGE  2005    AUB, benign/neg bx; Dr Vinny Garcia    HX [address]  10/2012    Dr Vinny Garcia, AUB    HX HEENT      Nasal Abscess I&D, Dr. Cardenas [address]pt HYSTERECTOMY  2013    HX MASTECTOMY Bilateral 2019    BILATERAL BREAST SKIN SPARING MASTECTOMIES performed by Jacklyn Suarez MD at [place] [address]    HX ORTHOPAEDIC Right 2010    CARPAL TUNNEL    HX PARTIAL HYSTERECTOMY  2013    Supracervical hysterectomy for fibroids, Dr Vinny Garcia    HX [address]    HX UROLOGICAL  2010    BLADDER SLING     HX [address] Bilateral 10/15/2019    [facility] JON Lopez Breast Implant pain and recurrent left breast infection; on wound vac w/ iv abx; wound pump removed 2019       Family History   Problem Relation Age of Onset    OSTEOARTHRITIS Father     Stroke Father         TIA's    Cancer Father         melanoma on back removed    Pacemaker Father 80    Heart Disease Father          79 yo in     Hypertension Mother     OSTEOARTHRITIS Mother     Heart Disease Mother         valve disease,  2013    Heart Attack Maternal Grandmother 76    Cancer Sister     No Known Problems Brother     Depression Daughter     Substance Abuse Daughter     Alcohol abuse Daughter     Anxiety Daughter         OB History          1    Para   1    Term                AB        Living             SAB        IAB        Ectopic        Molar        Multiple        Live Births              Obstetric Comments    x 1; Previous Gyn Dr Radha Dos Santos  Menarche:  10. LMP: ? .  # of Children:  1. Age at Delivery of First Child:  34.   Hysterectomy/oophorectomy:  YES/YES. Breast Bx:  yes. Hx of Breast Feeding:  yes. BCP:  yes. Hormone therapy:  no.                       REVIEW OF SYSTEMS:    Patient denies any recent fever, chills, nausea, vomiting, chest pain, or shortness of breath. Vitals:  Ht 5' 9\" (1.753 m)   Wt 305 lb (138.3 kg)   LMP 2011   BMI 45.04 kg/m²    Body mass index is 45.04 kg/m². PHYSICAL EXAM:  General exam: Patient is awake, alert, and oriented x3. Well-appearing. No acute distress. Ambulates with an antalgic gait. Heart/Lungs:  no respiratory distress, palpable pulses    Bilateral knees: Neurovascular and sensory intact. Effusion is present. Crepitus is noted with range of motion of both knees. There is tenderness palpation at the medial and lateral joint lines. Jose David's maneuver creates mild pain. Normal stability on ligamentous testing including Lachman's exam.  Stable anterior and posterior drawer. No erythema or ecchymosis. IMAGING:    XR Results (most recent):  Results from Appointment encounter on 22    XR CHEST PA LAT    Narrative  INDICATION: Persistent cough for 3 weeks or longer. EXAM: CXR 2 View    FINDINGS: Frontal and lateral views of the chest show clear lungs. Heart size is  normal. There is no pulmonary edema. There is no evident pneumothorax,  adenopathy or effusion.     Impression  Normal two view chest x-ray. Results from [place] encounter on 12/29/19    XR FOOT RT MIN 3 V    Narrative  EXAM: XR FOOT RT MIN 3 V    INDICATION: injury. Right foot pain since yesterday when she heard a pop walking  around a medium. Pain dorsal lateral aspect. COMPARISON: None. FINDINGS: Three views of the right foot demonstrate a small ossific fragment or  fragments at the lateral aspect of the right midfoot, with mild prominence of  soft tissue but no soft tissue mass. There is focal interruption of posterior  calcaneal spur formation, age uncertain. The Achilles tendon is thought to be  intact. .    Impression  IMPRESSION: No acute bony abnormality is confirmed. However, there are 2 ossific  fragments in the soft tissues at the lateral aspect of the midfoot, thought to  most likely be nonacute. Correlate with physical examination in this region. Comparison to any prior radiographs of the right foot would be most helpful. Otherwise, follow-up radiographs in 7-14 days may be helpful if no further  diagnosis is made on orthopedic consultation/follow-up. Fragmentation of  posterior calcaneal spur formation is thought to be nonacute, but correlate with  physical examination. Results from [place] encounter on 06/25/15    XR KNEE LT 3 V    Narrative  **Final Report**      ICD Codes / Adm. Diagnosis: 719.46   / Pain in joint, lower leg  Examination:  CR KNEE 3 VWS LT  - [id number] - Jun 25 2015 11:19AM  Accession No:  [id number]  Reason:  left knee pain      REPORT:  EXAM:  CR KNEE 3 VWS LT  Clinical history left knee pain  INDICATION:   left knee pain    COMPARISON: None. FINDINGS: Three views of the left knee demonstrate no fracture or other  acute osseous or articular abnormality. There is no effusion. Extensive  osteophyte formation. Patellofemoral joint space narrowing. Impression  :    Extensive degenerative change. Osteoarthritis. No acute abnormality.           Signing/Reading Doctor: Clifton Seip ([id number])  Approved: Clifton Seip ([id number])  Jun 25 2015 11:43AM         Orders Placed This Encounter    methylPREDNISolone acetate (DEPO-MEDROL) 40 mg/mL injection 40 mg    bupivacaine (PF) (MARCAINE) 0.75 % (7.5 mg/mL) injection 22.5 mg    methylPREDNISolone acetate (DEPO-MEDROL) 40 mg/mL injection 40 mg    bupivacaine (PF) (MARCAINE) 0.75 % (7.5 mg/mL) injection 22.5 mg              An electronic signature was used to authenticate this note.   -- Magali Castro, DO

## 2022-12-31 DIAGNOSIS — E78.2 MIXED HYPERLIPIDEMIA: ICD-10-CM

## 2022-12-31 RX ORDER — ROSUVASTATIN CALCIUM 10 MG/1
TABLET, COATED ORAL
Qty: 90 TABLET | Refills: 2 | Status: SHIPPED | OUTPATIENT
Start: 2022-12-31

## 2023-01-19 ENCOUNTER — OFFICE VISIT (OUTPATIENT)
Dept: SURGERY | Age: 68
End: 2023-01-19
Payer: MEDICARE

## 2023-01-19 VITALS — HEIGHT: 69 IN | WEIGHT: 293 LBS | BODY MASS INDEX: 43.4 KG/M2

## 2023-01-19 DIAGNOSIS — C50.811 CANCER OF OVERLAPPING SITES OF RIGHT BREAST (HCC): Primary | ICD-10-CM

## 2023-01-19 DIAGNOSIS — Z90.13 S/P MASTECTOMY, BILATERAL: ICD-10-CM

## 2023-01-19 DIAGNOSIS — Z85.3 HISTORY OF BREAST CANCER IN FEMALE: ICD-10-CM

## 2023-01-19 NOTE — PROGRESS NOTES
HISTORY OF PRESENT ILLNESS  Blanka Gonzalez  is a 79 y.o. female. HPI ESTABLISHED patient here for follow up RIGHT breast cancer, s/p bilateral mastectomies. No breast concerns and no pain. Breast cancer history -  Referring - Dr. 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 Sunshine Limon  19 - BILATERAL mastectomies with reconstruction. Dr. Akira Johnson did direct saline implants. - Dr. Yosef Hoty - low risk  Surg path showed LEFT breast benign, residual IDC on RIGHT breast - 0.8cm, ER pos, LA pos, HER2 neg   10/15/19 - Removal of bilateral breast implants. Complicated breast cancer course including multiple surgeries and hospitalizations for wound infections. 2019 - LEFT lymph node biopsy - Dr. Zoë Will  Left axillary lymph node, core biopsy:   Fat necrosis with dense mixed inflammation and abscess formation   No lymph node tissue or malignancy identified   2019 - started anastrozole - Dr. Chrissy Ghotra        Family history -   No family history of breast or ovarian cancer. Father had melanoma. OB History          1    Para   1    Term                AB        Living             SAB        IAB        Ectopic        Molar        Multiple        Live Births              Obstetric Comments    x 1; Previous Gyn Dr Tate Nuñez  Menarche:  10. LMP: ? .  # of Children:  1. Age at Delivery of First Child:  34.   Hysterectomy/oophorectomy:  YES/YES. Breast Bx:  yes. Hx of Breast Feeding:  yes. BCP:  yes. Hormone therapy:  no.                      Past Surgical History:   Procedure Laterality Date    COLONOSCOPY N/A 10/28/2021    Polypectomy, Repeat 5 yrs, Dr. Robi Garcia    EKG ORDERABLE  2009    NSR, rate 84, normal EKG    HX BLADDER SUSPENSION  ~    Dr Velazquez Redo BREAST AUGMENTATION Left 2019    [facility].  Dr. Akira Johnson, Post Mastectomy Implant Infection Left Breast, Replaced Implant, IV abx    HX BREAST BIOPSY Right 11/2011    VPI, right breast biopsy negative    HX BREAST BIOPSY Right 04/22/2019    invasive ductal carcinoma    HX BREAST LUMPECTOMY Right 06/13/2019    RIGHT BREAST LUMPECTOMY ( x2 ) [facility] ULTRASOUND , RIGHT SENTINAL NODE BIOPSY performed by Steven Barbosa MD at [facility] AMBULATORY OR    HX BREAST RECONSTRUCTION Bilateral 08/27/2019    B MASTECTOMY AND IMPLANTS, IMMEDIATE BILATERAL BREAST RECONSTRUCTION WITH DIRECT TO IMPLANT USE OF ALLODERM AND BILATERAL BREAST RECONSTRUCTION INTRA-OPERATIVE ASSESMENT OF BLOOD FLOW performed by Ovi Childress MD at [address] Right 03/2010    Dr Ian Humphrey POLYPECTOMY  2009, 2015    Dr Janel Vizcarra    HX COLONOSCOPY  04/08/2016    Dr Amisha Hannon-Internal Hemorrhoids- Normal mucosa in the whole colon - Repeat colonoscopy in 5 years    HX COLONOSCOPY  2016    HX COLONOSCOPY  10/2005    benign polyp; repeat 5 yr per Dr Webber [facility] COLONOSCOPY  11/2010    Normal, f/u 5 yrs, Dr Michelle Simmons    HX COLONOSCOPY  Scheduled 10-    HX DILATION AND CURETTAGE  07/2005    AUB, benign/neg bx; Dr Janel Vizcarra    HX [address]  10/2012    Dr Janel Vizcarra, AUB    HX HEENT  2019    Nasal Abscess I&D, Dr. Sridevi Willams HYSTERECTOMY  2013    HX MASTECTOMY Bilateral 08/27/2019    BILATERAL BREAST SKIN SPARING MASTECTOMIES performed by Steven Barbosa MD at [address] Right 2010    CARPAL TUNNEL    HX PARTIAL HYSTERECTOMY  04/26/2013    Supracervical hysterectomy for fibroids, Dr Yomaira Brewer    HX UROLOGICAL  2010    BLADDER SLING     HX [address] Bilateral 10/15/2019    [facility] Dr. Wanda Ely B Breast Implant pain and recurrent left breast infection; on wound vac w/ iv abx; wound pump removed 12-4-2019         ROS    Physical Exam  Constitutional:       Appearance: Normal appearance.    Chest:   Breasts: Right: Absent. Left: Absent. Musculoskeletal:      Comments: FROM - UE x 2   Lymphadenopathy:      Upper Body:      Right upper body: No supraclavicular or axillary adenopathy. Left upper body: No supraclavicular or axillary adenopathy. Neurological:      Mental Status: She is alert. Psychiatric:         Attention and Perception: Attention normal.         Mood and Affect: Mood normal.         Speech: Speech normal.         Behavior: Behavior normal.       Visit Vitals  Ht 5' 9\" (1.753 m)   Wt 305 lb (138.3 kg)   LMP 05/01/2011   BMI 45.04 kg/m²         ASSESSMENT and PLAN    ICD-10-CM ICD-9-CM    1. Cancer of overlapping sites of right breast ([facility] [address]  C50.811 174.8       2. S/P mastectomy, bilateral  Z90.13 V45.71       3. History of breast cancer in female  Z85.3 V10.3           Normal exam with no evidence of local recurrence across the chest wall. Continues on AI and is tolerating well. Declined RX for bras/prosthesis. RTC in 1 year or sooner PRN. Anticipate PRN follow-up at that time as she will be close to 5 years from diagnosis. She is comfortable with this plan. All questions answered and she stated understanding. Total time spent for this patient - 20 minutes.

## 2023-01-21 DIAGNOSIS — E03.9 HYPOTHYROIDISM, UNSPECIFIED TYPE: ICD-10-CM

## 2023-01-21 RX ORDER — LEVOTHYROXINE SODIUM 200 UG/1
TABLET ORAL
Qty: 30 TABLET | Refills: 0 | Status: SHIPPED | OUTPATIENT
Start: 2023-01-21

## 2023-01-30 ENCOUNTER — OFFICE VISIT (OUTPATIENT)
Dept: FAMILY MEDICINE CLINIC | Age: 68
End: 2023-01-30
Payer: MEDICARE

## 2023-01-30 VITALS
BODY MASS INDEX: 43.4 KG/M2 | WEIGHT: 293 LBS | TEMPERATURE: 98.7 F | DIASTOLIC BLOOD PRESSURE: 76 MMHG | SYSTOLIC BLOOD PRESSURE: 132 MMHG | OXYGEN SATURATION: 97 % | HEIGHT: 69 IN | HEART RATE: 92 BPM | RESPIRATION RATE: 16 BRPM

## 2023-01-30 DIAGNOSIS — E03.9 ACQUIRED HYPOTHYROIDISM: Primary | ICD-10-CM

## 2023-01-30 PROCEDURE — 3075F SYST BP GE 130 - 139MM HG: CPT | Performed by: FAMILY MEDICINE

## 2023-01-30 PROCEDURE — 1101F PT FALLS ASSESS-DOCD LE1/YR: CPT | Performed by: FAMILY MEDICINE

## 2023-01-30 PROCEDURE — G9717 DOC PT DX DEP/BP F/U NT REQ: HCPCS | Performed by: FAMILY MEDICINE

## 2023-01-30 PROCEDURE — G8399 PT W/DXA RESULTS DOCUMENT: HCPCS | Performed by: FAMILY MEDICINE

## 2023-01-30 PROCEDURE — G8427 DOCREV CUR MEDS BY ELIG CLIN: HCPCS | Performed by: FAMILY MEDICINE

## 2023-01-30 PROCEDURE — G9708 BILAT MAST/HX BI /UNILAT MAS: HCPCS | Performed by: FAMILY MEDICINE

## 2023-01-30 PROCEDURE — 3078F DIAST BP <80 MM HG: CPT | Performed by: FAMILY MEDICINE

## 2023-01-30 PROCEDURE — G8536 NO DOC ELDER MAL SCRN: HCPCS | Performed by: FAMILY MEDICINE

## 2023-01-30 PROCEDURE — G0463 HOSPITAL OUTPT CLINIC VISIT: HCPCS | Performed by: FAMILY MEDICINE

## 2023-01-30 PROCEDURE — 3017F COLORECTAL CA SCREEN DOC REV: CPT | Performed by: FAMILY MEDICINE

## 2023-01-30 PROCEDURE — 1123F ACP DISCUSS/DSCN MKR DOCD: CPT | Performed by: FAMILY MEDICINE

## 2023-01-30 PROCEDURE — G8417 CALC BMI ABV UP PARAM F/U: HCPCS | Performed by: FAMILY MEDICINE

## 2023-01-30 PROCEDURE — 1090F PRES/ABSN URINE INCON ASSESS: CPT | Performed by: FAMILY MEDICINE

## 2023-01-30 PROCEDURE — 99213 OFFICE O/P EST LOW 20 MIN: CPT | Performed by: FAMILY MEDICINE

## 2023-01-30 RX ORDER — AZELASTINE HCL 205.5 UG/1
1 SPRAY NASAL 2 TIMES DAILY
COMMUNITY

## 2023-01-30 NOTE — PROGRESS NOTES
Chief Complaint   Patient presents with    Thyroid Problem     3 month follow up    Medication Evaluation       HISTORY OF PRESENT ILLNESS   Patient presents for 3 month follow up thyroid check. After her last visit 10-24-22, we increased her Synthroid dose from 175 mcg daily to 200 mcg daily due to slightly elevated TSH of 4.71 which was up some from her previous check. She is here today for recheck. She is frustrated to see that her weight is actually up 4 more lbs but also states her eating habits arent good, eating more fast foods, and not exercising. Her  was hospitalized w/ sepsis and is now in [place] [address] for a while. She has been busy going back and forth w/ him. Not getting much rest due to hectic schedule. Still lacks motivation to eat healthy or exercise even though she knows that is what she needs to do. REVIEW OF SYMPTOMS   Review of Systems   Respiratory: Negative. Cardiovascular: Negative. Gastrointestinal: Negative. PROBLEM LIST/MEDICAL HISTORY     Problem List  Date Reviewed: 1/30/2023            Codes Class Noted    Environmental allergies ICD-10-CM: Z91.09  ICD-9-CM: V15.09  6/21/2022        Mixed hyperlipidemia ICD-10-CM: E78.2  ICD-9-CM: 272.2  7/6/2020        Aromatase inhibitor use ICD-10-CM: Z79.811  ICD-9-CM: V07.52  3/3/2020        Status post right breast lumpectomy ICD-10-CM: Z98.890  ICD-9-CM: V45.89  1/30/2020    Overview Addendum 1/30/2020  8:50 PM by Patsy Cyr MD     6-, Invasive ductal carcinoma--->Mastectomy 8-, no chemo, no radiation, started on Arimidex             Chronic depression ICD-10-CM: F32. A  ICD-9-CM: 351  1/30/2020        Adjustment disorder with mixed anxiety and depressed mood ICD-10-CM: F43.23  ICD-9-CM: 309.28  1/30/2020    Overview Signed 1/30/2020  9:31 PM by Patsy Cyr MD     After death of father  and personal diagnosis of/treatment for breast cancer [id number]-; Counselin:  [employer] [address] , LCSW Doretha              H/O bilateral mastectomy ICD-10-CM: Z90.13  ICD-9-CM: V45.71  2019    Overview Addendum 2020  8:59 PM by Amarjit Eduardo MD     Double Mastectomy (right breast cancer and elective left breast prophylactically); No chemo, No radiation; Started on Arimidex, followed by Dr. Priti Rice             H/O breast reconstruction ICD-10-CM: P16.277  ICD-9-CM: V43.82  2019        Arthritis ICD-10-CM: M19.90  ICD-9-CM: 716.90  2019        Cancer of overlapping sites of right breast ([address] ICD-10-CM: C50.811  ICD-9-CM: 174.8  2019        Benign essential hypertension ICD-10-CM: I10  ICD-9-CM: 401.1  2016    Overview Signed 2016  9:26 AM by Amarjit Eduardo MD     2006             Acquired hypothyroidism ICD-10-CM: E03.9  ICD-9-CM: 244.9  2016    Overview Signed 2016  9:27 AM by Amarjit Eduardo MD     1999             Primary osteoarthritis of knees, bilateral ICD-10-CM: M17.0  ICD-9-CM: 715.16  2016    Overview Addendum 10/6/2021  1:28 PM by Amarjit Eduardo MD     Extensive;  Ortho (needs knee replacement), CSI x 2, -; CSI 21 Dr. Anil Carrizales             Hypercholesterolemia ICD-10-CM: E78.00  ICD-9-CM: 272.0  2013    Overview Signed 2013  8:29 AM by Amarjit Eduardo MD     ~1625             S/P benign cervical polypectomy ICD-10-CM: I73.113, Z87.42  ICD-9-CM: V45.89  2010    Overview Addendum 2016  9:42 AM by Amarjit Eduardo MD     , ; Gyn Dr Pasha Ashley             H/O Benign Colon Polyp ICD-10-CM: K63.5  ICD-9-CM: 211.3  2010    Overview Signed 2010 12:10 PM by Amarjit Eduardo MD     Colonoscopy 10/2005, 2010  q5yrs  Dr Eliza Palma             H/O Uterine Fibroids ICD-10-CM: D21.9  ICD-9-CM: 215.9  2010    Overview Signed 2010 12:13 PM by Amarjit Eduardo MD 2005; no surgical intervention  Gyn Dr Echo Olea incontinence ICD-10-CM: N39.3  ICD-9-CM: NIP0156  5/26/2010    Overview Signed 5/26/2010 12:47 PM by Brice Alba MD     Urologist Dr Wendy Lui             Sleep apnea, RENÉE ICD-10-CM: G47.30  ICD-9-CM: 780.57  4/5/2010    Overview Addendum 12/20/2010 12:14 PM by Brice Alba MD     2009; cpap             Anxiety ICD-10-CM: F41.9  ICD-9-CM: 300.00  4/5/2010        Carpal tunnel syndrome ICD-10-CM: G56.00  ICD-9-CM: 354.0  4/5/2010        Mild Dependent Ankle edema, B ICD-10-CM: M25.473  ICD-9-CM: 719.07  4/5/2010    Overview Signed 4/5/2010  3:52 PM by Al Caldwell     mild             Perimenopausal ICD-10-CM: N95.1  ICD-9-CM: 627.2  4/5/2010    Overview Signed 4/5/2010  3:52 PM by Al Caldwell     2007             ? Passed Kidney stone ICD-10-CM: N20.0  ICD-9-CM: 592.0  4/5/2010    Overview Signed 5/26/2010 12:47 PM by Brice Alba MD     2/2010                    PAST SURGICAL HISTORY     Past Surgical History:   Procedure Laterality Date    COLONOSCOPY N/A 10/28/2021    Polypectomy, Repeat 5 yrs, Dr. Dede Gr    EKG ORDERABLE  05/2009    NSR, rate 84, normal EKG    HX BLADDER SUSPENSION  ~2010    Dr Jaime Hatch BREAST AUGMENTATION Left 09/23/2019    [facility].  Dr. Miah Savage, [address] [facility] Mastectomy Implant Infection Left Breast, Replaced Implant, IV abx    HX BREAST BIOPSY Right 11/2011    VPI, right breast biopsy negative    HX BREAST BIOPSY Right 04/22/2019    invasive ductal carcinoma    HX BREAST LUMPECTOMY Right 06/13/2019    RIGHT BREAST LUMPECTOMY ( x2 ) WTIH ULTRASOUND , RIGHT SENTINAL NODE BIOPSY performed by Marla Jesus MD at [facility] AMBULATORY OR    HX BREAST RECONSTRUCTION Bilateral 08/27/2019    B MASTECTOMY AND IMPLANTS, IMMEDIATE BILATERAL BREAST RECONSTRUCTION WITH DIRECT TO IMPLANT USE OF ALLODERM AND BILATERAL BREAST RECONSTRUCTION INTRA-OPERATIVE ASSESMENT OF BLOOD FLOW performed by Nic Falcon MD at [facility] AMBULATORY OR    HX CARPAL TUNNEL RELEASE Right 03/2010    Dr Nneka Crane CERVICAL POLYPECTOMY  2009, 2015    Dr Ellie Swanson    HX COLONOSCOPY  04/08/2016    Dr Garcia Hannon-Internal Hemorrhoids- Normal mucosa in the whole colon - Repeat colonoscopy in 5 years    HX COLONOSCOPY  2016    HX COLONOSCOPY  10/2005    benign polyp; repeat 5 yr per Dr Maxine Wick COLONOSCOPY  11/2010    Normal, f/u 5 yrs, Dr St Old    HX COLONOSCOPY  Scheduled 10-    HX DILATION AND CURETTAGE  07/2005    AUB, benign/neg bx; Dr Ellie Swanson    [facility]    Dr Ellie Swanson, AUB    HX HEENT  2019    Nasal Abscess I&D, Dr. Lucero Hodge HYSTERECTOMY  2013    HX MASTECTOMY Bilateral 08/27/2019    BILATERAL BREAST SKIN SPARING MASTECTOMIES performed by Adelaida Schwab, MD at [address] Right 2010    CARPAL TUNNEL    HX PARTIAL HYSTERECTOMY  04/26/2013    Supracervical hysterectomy for fibroids, Dr Damian Moh Bilateral 10/15/2019    [facility] Dr. Rosanna Encinas B Breast Implant pain and recurrent left breast infection; on wound vac w/ iv abx; wound pump removed 12-4-2019         MEDICATIONS     Current Outpatient Medications   Medication Sig    azelastine (ASTEPRO) 205.5 mcg (0.15 %) 1 [place] by Both Nostrils route two (2) times a day. levothyroxine (SYNTHROID) 200 mcg tablet TAKE 1 TABLET BY MOUTH DAILY BEFORE BREAKFAST    rosuvastatin (CRESTOR) 10 mg tablet TAKE 1 TABLET BY MOUTH EVERY NIGHT    FLUoxetine (PROzac) 20 mg capsule TAKE 1 CAPSULE BY MOUTH DAILY    buPROPion XL (WELLBUTRIN XL) 150 mg tablet Take 1 Tablet by mouth Daily (before breakfast). losartan (COZAAR) 100 mg tablet Take 1 Tablet by mouth daily. cetirizine (ZYRTEC) 10 mg tablet Take 10 mg by mouth daily.     anastrozole (ARIMIDEX) 1 mg tablet TAKE 1 TABLET BY MOUTH EVERY DAY TURMERIC PO Take  by mouth daily. ascorbic acid (VITAMIN C PO) Take  by mouth daily. multivitamin (ONE A DAY) tablet Take 1 Tab by mouth daily. cholecalciferol, vitamin D3, 50 mcg (2,000 unit) tab Take 1 Tab by mouth daily. OTHER Portable CPAP machine for RENÉE, with new mask and tubing but no change to mask or settings; G47.30 GARO 99m prog good     No current facility-administered medications for this visit.           ALLERGIES     Allergies   Allergen Reactions    Pcn [Penicillins] Hives    Sulfa (Sulfonamide Antibiotics) Hives    Zocor [Simvastatin] Myalgia    Lisinopril Cough          SOCIAL HISTORY     Social History     Tobacco Use    Smoking status: Former     Packs/day: 0.50     Years: 25.00     Pack years: 12.50     Types: Cigarettes     Quit date: 2005     Years since quittin.0    Smokeless tobacco: Never   Substance Use Topics    Alcohol use: Yes     Comment: 2 shots of Rum ~ 5 nights a week     Social History     Social History Narrative     1 biological daughter (lives in [facility] now), 1 \"adopted\", and 2 step children    Retired 2018    Works for her [employer]    Diet: nothing special, admits likes sweets, cheese, not controlling her portions, eating more fast foods lately    Caffeine: no coffee or tea, occ decaff sodas diet coke or ginger ale    Exercise: none; in the summer  was riding stationary bike x 45 minutes every day; has since lost motivation and also twisted her left knee, seeing ortho, wearing a brace    Weight: ranges in the 280's-290's over time, currently on the higher end since not exercising as much lately and not eating healthy; currently at 309 lbs         Social History     Substance and Sexual Activity   Sexual Activity Not Currently    Partners: Male    Comment: not sexually active x many years       IMMUNIZATIONS     Immunization History   Administered Date(s) Administered    COVID-19, PFIZER PURPLE top, DILUTE for use, (age 15 y+), IM, 30mcg/0.3mL 2021, 2021, 2021, 2022, 2022    Influenza Vaccine 10/03/2014, 11/10/2015, 2016, 2017, 2018, 2021    Influenza Vaccine (Tri) Adjuvanted (>65 Yrs FLUAD TRI 97150) 2020    Influenza Vaccine Split 2010, 10/06/2011    Influenza, FLUAD, (age 72 y+), Adjuvanted 10/24/2022    Influenza, FLUARIX, FLULAVAL, FLUZONE (age 10 mo+) AND AFLURIA, (age 1 y+), PF, 0.5mL 2018, 2019    Meningococcal ACWY Vaccine 2013    Pneumococcal Polysaccharide (PPSV-23) 2020    TD Vaccine 1999, 2009    Td 2017    Tdap 2016, 2017    Zoster Recombinant 2018, 2019    Zoster Vaccine, Live 2013         FAMILY HISTORY     Family History   Problem Relation Age of Onset    OSTEOARTHRITIS Father     Stroke Father         TIA's    Cancer Father         melanoma on back removed    Pacemaker Father 80    Heart Disease Father          81 yo in     Hypertension Mother     OSTEOARTHRITIS Mother     Heart Disease Mother         valve disease,  2013    Heart Attack Maternal Grandmother 76    Cancer Sister     No Known Problems Brother     Depression Daughter     Substance Abuse Daughter     Alcohol abuse Daughter     Anxiety Daughter          VITALS   Visit Vitals  /76 (BP 1 Location: Left upper arm, BP Patient Position: Sitting, BP Cuff Size: Large adult)   Pulse 92   Temp 98.7 °F (37.1 °C) (Oral)   Resp 16   Ht 5' 9\" (1.753 m)   Wt 309 lb 12.8 oz (140.5 kg)   LMP 2011   SpO2 97%   BMI 45.75 kg/m²          PHYSICAL EXAMINATION   Physical Exam  Vitals reviewed. Constitutional:       General: She is not in acute distress. Cardiovascular:      Rate and Rhythm: Normal rate and regular rhythm. Heart sounds: Normal heart sounds. Pulmonary:      Effort: Pulmonary effort is normal.      Breath sounds: Normal breath sounds. Skin:     General: Skin is warm and dry.    Neurological: Mental Status: She is alert and oriented to person, place, and time. LABORATORY DATA/ANCILLARY/IMAGING     Results for orders placed or performed in visit on 10/24/22   T4, FREE   Result Value Ref Range    T4, Free 1.1 0.8 - 1.5 NG/DL   TSH 3RD GENERATION   Result Value Ref Range    TSH 4.71 (H) 0.36 - 3.74 uIU/mL   LIPID PANEL   Result Value Ref Range    Cholesterol, total 191 <200 MG/DL    Triglyceride 120 <150 MG/DL    HDL Cholesterol 73 MG/DL    LDL, calculated 94 0 - 100 MG/DL    VLDL, calculated 24 MG/DL    CHOL/HDL Ratio 2.6 0.0 - 5.0     METABOLIC PANEL, COMPREHENSIVE   Result Value Ref Range    Sodium 140 136 - 145 mmol/L    Potassium 4.4 3.5 - 5.1 mmol/L    Chloride 106 97 - 108 mmol/L    CO2 27 21 - 32 mmol/L    Anion gap 7 5 - 15 mmol/L    Glucose 88 65 - 100 mg/dL    BUN 20 6 - 20 MG/DL    Creatinine 0.95 0.55 - 1.02 MG/DL    BUN/Creatinine ratio 21 (H) 12 - 20      eGFR >60 >60 ml/min/1.73m2    Calcium 9.6 8.5 - 10.1 MG/DL    Bilirubin, total 0.8 0.2 - 1.0 MG/DL    ALT (SGPT) 26 12 - 78 U/L    AST (SGOT) 8 (L) 15 - 37 U/L    Alk. phosphatase 78 45 - 117 U/L    Protein, total 6.6 6.4 - 8.2 g/dL    Albumin 3.7 3.5 - 5.0 g/dL    Globulin 2.9 2.0 - 4.0 g/dL    A-G Ratio 1.3 1.1 - 2.2     CBC W/O DIFF   Result Value Ref Range    WBC 7.2 3.6 - 11.0 K/uL    RBC 4.36 3.80 - 5.20 M/uL    HGB 13.1 11.5 - 16.0 g/dL    HCT 42.7 35.0 - 47.0 %    MCV 97.9 80.0 - 99.0 FL    MCH 30.0 26.0 - 34.0 PG    MCHC 30.7 30.0 - 36.5 g/dL    RDW 14.0 11.5 - 14.5 %    PLATELET 724 151 - 801 K/uL    MPV 11.0 8.9 - 12.9 FL    NRBC 0.0 0  WBC    ABSOLUTE NRBC 0.00 0.00 - 0.01 K/uL             ASSESSMENT & PLAN   Diagnoses and all orders for this visit:    1. Acquired hypothyroidism  -     T4, FREE; Future  -     TSH 3RD GENERATION; Future    Patient presents for 3 month follow up thyroid check.  After her last visit 10-24-22, we increased her Synthroid dose from 175 mcg daily to 200 mcg daily due to slightly elevated TSH of 4.71 which was up some from her previous check. She is here today for recheck. Reviewed diet, nutrition, exercise, weight management, BMI/goals. Further follow up & other recommendations pending review of today's labs.

## 2023-01-30 NOTE — PROGRESS NOTES
Chief Complaint   Patient presents with    Thyroid Problem    Medication Evaluation     1. \"Have you been to the ER, urgent care clinic since your last visit? Hospitalized since your last visit? \" No    2. \"Have you seen or consulted any other health care providers outside of the [address] [place] [address] since your last visit? \" Jacky Mchugh, surgeon Dr Leslie Rose,  ortho Dr Robin Bills    3. For patients aged 39-70: Has the patient had a colonoscopy / FIT/ Cologuard? Yes - no Care Gap present 10/2021 Polypectomy, Repeat 5 yrs, Dr. Patrick Rogers      If the patient is female:    4. For patients aged 41-77: Has the patient had a mammogram within the past 2 years? NA - based on age or sex      11. For patients aged 21-65: Has the patient had a pap smear?  Yes - no Care Gap present  gyn Dr Estefana Rubinstein within the last year    Eye exam Dr Mejias Devoid

## 2023-01-31 LAB
T4 FREE SERPL-MCNC: 1.2 NG/DL (ref 0.8–1.5)
TSH SERPL DL<=0.05 MIU/L-ACNC: 0.41 UIU/ML (ref 0.36–3.74)

## 2023-02-12 DIAGNOSIS — F33.9 CHRONIC MAJOR DEPRESSIVE DISORDER, RECURRENT EPISODE (HCC): ICD-10-CM

## 2023-02-12 DIAGNOSIS — F43.23 ADJUSTMENT REACTION WITH ANXIETY AND DEPRESSION: ICD-10-CM

## 2023-02-12 RX ORDER — FLUOXETINE HYDROCHLORIDE 20 MG/1
CAPSULE ORAL
Qty: 90 CAPSULE | Refills: 1 | Status: SHIPPED | OUTPATIENT
Start: 2023-02-12

## 2023-02-18 DIAGNOSIS — E03.9 HYPOTHYROIDISM, UNSPECIFIED TYPE: ICD-10-CM

## 2023-02-19 ENCOUNTER — HOSPITAL ENCOUNTER (EMERGENCY)
Age: 68
Discharge: HOME | End: 2023-02-19
Attending: EMERGENCY MEDICINE
Payer: MEDICARE

## 2023-02-19 ENCOUNTER — APPOINTMENT (OUTPATIENT)
Dept: GENERAL RADIOLOGY | Age: 68
End: 2023-02-19
Attending: EMERGENCY MEDICINE
Payer: MEDICARE

## 2023-02-19 VITALS
DIASTOLIC BLOOD PRESSURE: 88 MMHG | SYSTOLIC BLOOD PRESSURE: 161 MMHG | HEART RATE: 98 BPM | RESPIRATION RATE: 16 BRPM | OXYGEN SATURATION: 97 % | HEIGHT: 69 IN | WEIGHT: 293 LBS | BODY MASS INDEX: 43.4 KG/M2 | TEMPERATURE: 97.1 F

## 2023-02-19 DIAGNOSIS — J06.9 VIRAL UPPER RESPIRATORY TRACT INFECTION: Primary | ICD-10-CM

## 2023-02-19 PROCEDURE — 94664 DEMO&/EVAL PT USE INHALER: CPT

## 2023-02-19 PROCEDURE — 71046 X-RAY EXAM CHEST 2 VIEWS: CPT

## 2023-02-19 PROCEDURE — 77030029684 HC NEB SM VOL KT MONA -A

## 2023-02-19 PROCEDURE — 74011000250 HC RX REV CODE- 250: Performed by: NURSE PRACTITIONER

## 2023-02-19 PROCEDURE — 99283 EMERGENCY DEPT VISIT LOW MDM: CPT

## 2023-02-19 PROCEDURE — 94640 AIRWAY INHALATION TREATMENT: CPT

## 2023-02-19 PROCEDURE — 94760 N-INVAS EAR/PLS OXIMETRY 1: CPT

## 2023-02-19 RX ORDER — BENZONATATE 100 MG/1
100 CAPSULE ORAL
Qty: 21 CAPSULE | Refills: 0 | Status: SHIPPED | OUTPATIENT
Start: 2023-02-19 | End: 2023-02-19 | Stop reason: SDUPTHER

## 2023-02-19 RX ORDER — LEVOTHYROXINE SODIUM 200 UG/1
TABLET ORAL
Qty: 90 TABLET | Refills: 1 | Status: SHIPPED | OUTPATIENT
Start: 2023-02-19

## 2023-02-19 RX ORDER — BENZONATATE 100 MG/1
100 CAPSULE ORAL
Qty: 21 CAPSULE | Refills: 0 | Status: SHIPPED | OUTPATIENT
Start: 2023-02-19 | End: 2023-02-26

## 2023-02-19 RX ADMIN — ALBUTEROL SULFATE 1 DOSE: 2.5 SOLUTION RESPIRATORY (INHALATION) at 16:23

## 2023-02-19 NOTE — Clinical Note
[address]  [address] [facility] [address]  [phone]    Work/School Note    Date: 2/19/2023    To Whom It May concern:    Delvin Mckeon was seen and treated today in the emergency room by the following provider(s):  Attending Provider: Mel Ronquillo MD  Nurse Practitioner: Emiliana Barnett NP. Delvin Mckeon is excused from work/school on 02/19/23 and 02/20/23. She is medically clear to return to work/school on 2/21/2023.        Sincerely,          Gena Espana LPN

## 2023-02-19 NOTE — ED PROVIDER NOTES
HPI   Patient is a 79 y.o. F with past medical history of breast cancer s/p bilateral double mastectomy, carpal tunnel hyperlipidemia, kidney stones, sleep apnea on CPAP  who presents today with complaints of cough. Patient states her symptoms started 4 days ago she has associated nasal congestion and wheezing. Denies past medical history of lung diseases such as COPD or asthma but states she does have a remote smoking history. Eating and drinking. Denies any change in voice, difficulty handling secretions, no facial or oral swelling. Denies any syncope, seizure, focal wakness. Denies any difficulty breathing, choking,  dyspnea. Denies fever, abdominal pain, nausea, vomiting, dysuria, chest pain. [facility] and started on doxycycline and given an albuterol inhaler. She states she will not receive a chest x-ray. She does not know why she is on doxycycline and does not have her paperwork from [facility] with her. She is requesting a chest x-ray. There are no other complaints, changes or physical findings at this time. ALLERGIES: Sulfa (sulfonamide antibiotics), Zocor [simvastatin], and Lisinopril    Past Medical History:   Diagnosis Date    Adjustment disorder with mixed anxiety and depressed mood 2020    After death of father  and personal diagnosis of/treatment for breast cancer [id number] Counselin:  [place] [facility] [address] [facility]     Ankle edema 2010    Anxiety 2010    Benign essential hypertension 2016    2006     Carpal tunnel syndrome 2010    Chronic depression 2020    H/O Benign Colon Polyp 2010    H/O bilateral mastectomy 2019    Double Mastectomy (right breast cancer and elective left breast prophylactically);  No chemo, No radiation    H/O Uterine Fibroids 2010    Hypercholesterolemia 2013    ~2006    Hypothyroidism 2016    Kidney stone 2010 Mixed hyperlipidemia 7/6/2020    Perimenopausal 4/5/2010    Primary osteoarthritis of knees, bilateral 5/26/2016    Extensive; Ortho (needs knee replacement), CSI x 2, ; CSI 8/23/21 Dr. Zunilda Tam    Primary osteoarthritis of left knee 5/26/2016    Extensive; Ortho (needs knee replacement), CSI x 2,     S/P benign cervical polypectomy 12/20/2010    Sleep apnea 4/5/2010    USES CPAP    Status post right breast lumpectomy 1/30/2020 6-, Invasive ductal carcinoma--->Mastectomy 8-, no chemo, no radiation, started on Arimidex    Stress incontinence 5/26/2010     Past Surgical History:   Procedure Laterality Date    COLONOSCOPY N/A 10/28/2021    Polypectomy, Repeat 5 yrs, Dr. Jt Valderrama    EKG ORDERABLE  05/2009    NSR, rate 84, normal EKG    HX BLADDER SUSPENSION  ~2010    Dr Cherrie Smith BREAST AUGMENTATION Left 09/23/2019    [facility].  Dr. Mihir Duque, [facility] Mastectomy Implant Infection Left Breast, Replaced Implant, IV abx    HX BREAST BIOPSY Right 11/2011    VPI, right breast biopsy negative    HX BREAST BIOPSY Right 04/22/2019    invasive ductal carcinoma    HX BREAST LUMPECTOMY Right 06/13/2019    RIGHT BREAST LUMPECTOMY ( x2 ) WTIH ULTRASOUND , RIGHT SENTINAL NODE BIOPSY performed by Gerry Norris MD at [facility] AMBULATORY OR    HX BREAST RECONSTRUCTION Bilateral 08/27/2019    B MASTECTOMY AND IMPLANTS, IMMEDIATE BILATERAL BREAST RECONSTRUCTION WITH DIRECT TO IMPLANT USE OF ALLODERM AND BILATERAL BREAST RECONSTRUCTION INTRA-OPERATIVE ASSESMENT OF BLOOD FLOW performed by Franklin Mejia MD at [address] [place] [address] Right 03/2010    Dr Guzman Me POLYPECTOMY  2009, 2015    Dr Jignesh Ojeda    HX COLONOSCOPY  04/08/2016    Dr Lyric Hannon-Internal Hemorrhoids- Normal mucosa in the whole colon - Repeat colonoscopy in 5 years    HX COLONOSCOPY  2016    HX COLONOSCOPY  10/2005    benign polyp; repeat 5 yr per Dr Sammie Zelaya    HX COLONOSCOPY  11/2010    Normal, f/u 5 yrs, Dr Snyder Pill COLONOSCOPY  Scheduled 10-    HX DILATION AND CURETTAGE  2005    AUB, benign/neg bx; Dr Jazmín Song    HX Tanmay Bring  10/2012    Dr Jazmín Song, AUB    HX HEENT      Nasal Abscess I&D, Dr. Espino Angle HYSTERECTOMY  2013    HX MASTECTOMY Bilateral 2019    BILATERAL BREAST SKIN SPARING MASTECTOMIES performed by Cameron Benito MD at [place] [address]    HX ORTHOPAEDIC Right 2010    CARPAL TUNNEL    HX PARTIAL HYSTERECTOMY  2013    Supracervical hysterectomy for fibroids, Dr Rene Bueno    HX UROLOGICAL  2010    Vanessa Slipper Bilateral 10/15/2019    [facility] Dr. Diaz Glasgow B Breast Implant pain and recurrent left breast infection; on wound vac w/ iv abx; wound pump removed 2019     Family History:   Problem Relation Age of Onset    OSTEOARTHRITIS Father     Stroke Father         TIA's    Cancer Father         melanoma on back removed    Pacemaker Father 80    Heart Disease Father          81 yo in     Hypertension Mother     OSTEOARTHRITIS Mother     Heart Disease Mother         valve disease,      Heart Attack Maternal Grandmother 76    Cancer Sister     No Known Problems Brother     Depression Daughter     Substance Abuse Daughter     Alcohol abuse Daughter     Anxiety Daughter      Social History     Socioeconomic History    Marital status:      Spouse name: Not on file    Number of children: 1    Years of education: Not on file    Highest education level: Not on file   Occupational History    Occupation: [address]    Occupation: Retired    Tobacco Use    Smoking status: Former     Packs/day: 0.50     Years: 25.00     Pack years: 12.50     Types: Cigarettes     Quit date: 2005     Years since quittin.1    Smokeless tobacco: Never   Vaping Use    Vaping Use: Never used   Substance and Sexual Activity    Alcohol use: Yes     Comment: 2 shots of Rum ~ 5 nights a week    Drug use: No    Sexual activity: Not Currently     Partners: Male     Comment: not sexually active x many years   Other Topics Concern     Service Not Asked    Blood Transfusions Not Asked    Caffeine Concern No     Comment: no coffee, tea and cut out her diet Cokes    Occupational Exposure Not Asked    Hobby Hazards Not Asked    Sleep Concern Not Asked    Stress Concern Not Asked    Weight Concern Not Asked    Special Diet No     Comment: \"I eat way too much\"    Back Care Not Asked    Exercise Yes     Comment: home stationary bike x 40-45 minutes a day, stretches daily, yardwork once a week    Bike Helmet Not Asked    Seat Belt Not Asked    Self-Exams Not Asked   Social History Narrative     1 biological daughter (lives in [facility] now), 1 \"adopted\", and 2 step children    Retired 2018    Works for her [employer]    Diet: nothing special, admits likes sweets, cheese, not controlling her portions, eating more fast foods lately    Caffeine: no coffee or tea, occ decaff sodas diet coke or ginger ale    Exercise: none; in the summer 2022 was riding stationary bike x 45 minutes every day; has since lost motivation and also twisted her left knee, seeing ortho, wearing a brace    Weight: ranges in the 280's-290's over time, currently on the higher end since not exercising as much lately and not eating healthy; currently at 309 lbs         Social Determinants of Health     Financial Resource Strain: Low Risk     Difficulty of Paying Living Expenses: Not hard at all   Food Insecurity: No Food Insecurity    Worried About Running Out of Food in the Last Year: Never true    Ran Out of Food in the Last Year: Never true   Transportation Needs: Not on file   Physical Activity: Not on file   Stress: Not on file   Social Connections: Not on file   Intimate Partner Violence: Not on file   Housing Stability: Not on file           Review of Systems Constitutional:  Negative for fever. HENT:  Positive for congestion. Eyes:  Negative for discharge. Respiratory:  Positive for cough and wheezing. Negative for shortness of breath. Cardiovascular:  Negative for chest pain. Gastrointestinal:  Negative for nausea. Genitourinary:  Negative for dysuria. Musculoskeletal:  Negative for myalgias. Neurological:  Negative for seizures. Psychiatric/Behavioral:  Negative for behavioral problems. Vitals:    02/19/23 1600 02/19/23 1623   BP: (!) 161/88    Pulse: 98    Resp: 16    Temp: 97.1 °F (36.2 °C)    SpO2: 96% 97%   Weight: 136.1 kg (300 lb)    Height: 5' 8.5\" (1.74 m)             Physical Exam  Constitutional:       Appearance: Normal appearance. HENT:      Head: Normocephalic and atraumatic. Eyes:      Pupils: Pupils are equal, round, and reactive to light. Cardiovascular:      Rate and Rhythm: Normal rate and regular rhythm. Pulmonary:      Effort: Pulmonary effort is normal.      Breath sounds: No stridor or decreased air movement. Examination of the left-upper field reveals wheezing. Wheezing present. Comments: Wheezes to left upper lobe; all lung fields clear to auscultation. Patient reassessed status post breathing treatment, all lung  fields clear  Abdominal:      General: Abdomen is flat. Musculoskeletal:      Cervical back: Neck supple. Skin:     General: Skin is dry. Neurological:      Mental Status: She is alert. Mental status is at baseline. Psychiatric:         Mood and Affect: Mood normal.         Behavior: Behavior normal.            LABORATORY RESULTS:  No results found for this or any previous visit (from the past 24 hour(s)). IMAGING RESULTS:  XR CHEST PA LAT    Result Date: 2/19/2023  Clear lungs. MEDICATIONS GIVEN:  Medications   albuterol 5mg / ipratropium 0.5mg neb solution (1 Dose Nebulization Given 2/19/23 1623)          Medical Decision Making  Seen today for upper respiratory symptoms.   No difficulty breathing, seizure, syncope. Eating and drinking appropriately. Physical exam is unremarkable, in no acute distress, normal inspiratory and expiratory effort on first assessment wheezing to left upper lobe, and after nebulizer treatment lungs CTA bilaterally. Oxygen saturation is 97% on room air. Considered differentials including strep pharyngitis, PTA, AOM, pneumonia. No findings on physical exam to suggest these diagnoses. Patient requested chest x-ray and performed unremarkable for any acute process. History and exam consistent with viral URI. Discharged with symptomatic management, Tylenol Motrin for any fevers/myalgias/pain. Recommended OTC medications for symptomatic management. Recommend follow-up with PCP and given return precautions. Recommended drinking plenty of fluids, resting, and staying home until fever free for 24 hours without the use of antipyretics. Patient does states she feels like the albuterol helps but has a hard time breathing and in, given spacer. Also given prescription for Tessalon Perles. Patient is inquiring regarding the doxycycline prescription that she gave was given at urgent care, states she is not sure why she was given it. I do not have any exam findings here today to support the continuation of this antibiotic, I counseled patient to refer back to her paperwork from urgent care but if the antibiotic is in relation to her upper respiratory symptoms there is no need to continue at this time she has no signs of bacterial infection. Amount and/or Complexity of Data Reviewed  Radiology: ordered. Risk  Prescription drug management. Discussed results and work-up with patient and answered all questions, the patient expresses understanding and agrees with the care plan and disposition. The patient was given an opportunity to ask questions and all concerns raised were addressed prior to discharge.   Recommended patient follow-up with provider as listed below. Counseled patient on standard home and self-care measures. Specifically explained the emergent conditions that could arise and clearly instructed the patient to return to the emergency department for those and any other new, worsening, or concerning symptoms. Patient stable and ready for discharge. IMPRESSION:  1. Viral upper respiratory tract infection        DISPOSITION:  Discharge    PLAN:  Follow-up Information       Follow up With Specialties Details Why Contact Info    Brad Mirza MD Family Medicine Schedule an appointment as soon as possible for a visit   [facility] [phone]      [address] [facility] [address] [facility]  As needed, If symptoms worsen [address] [facility]  [phone]          Current Discharge Medication List        START taking these medications    Details   benzonatate (Tessalon Perles) 100 mg capsule Take 1 Capsule by mouth three (3) times daily as needed for Cough for up to 7 days. Qty: 21 Capsule, Refills: 0  Start date: 2/19/2023, End date: 2/26/2023      inhalational spacing device 1 Each by Does Not Apply route as needed for Wheezing. Qty: 1 Each, Refills: 0  Start date: 2/19/2023             Please note that this dictation was completed with [employer], the computer voice recognition software. Quite often unanticipated grammatical, syntax, homophones, and other interpretive errors are inadvertently transcribed by the computer software. Please disregard these errors. Please excuse any errors that have escaped final proofreading.

## 2023-02-19 NOTE — Clinical Note
[address]  [address] [facility] [address]  [phone]    Work/School Note    Date: 2/19/2023    To Whom It May concern:    Zachery Thacker was seen and treated today in the emergency room by the following provider(s):  Attending Provider: Eileen Johnson MD  Nurse Practitioner: Mary Arango NP. Zachery Thacker is excused from work/school on 02/19/23 and 02/20/23. She is medically clear to return to work/school on 2/21/2023.        Sincerely,          Lieutenant Noah NP

## 2023-02-19 NOTE — ED TRIAGE NOTES
Patient arrives to ED complaining of cough and congestion for 4 days. Patient reports covid negative at [place], saw [facility] yesterday and given Albuterol and doxycycline without improvement.

## 2023-02-19 NOTE — Clinical Note
[address]  [address] [facility] [address]  [phone]    Work/School Note    Date: 2/19/2023    To Whom It May concern:    Blanca Little was seen and treated today in the emergency room by the following provider(s):  Attending Provider: Almita Fan MD  Nurse Practitioner: Josemanuel Taylor NP. Blanca Little is excused from work/school on 02/19/23 and 02/20/23. She is medically clear to return to work/school on 2/21/2023.        Sincerely,          Kaylene Alonso NP

## 2023-02-28 NOTE — PROGRESS NOTES
[facility] [place] [address] [address], [address], [place]: [phone]  F: [phone]      Reason for Visit:   Meliza Silver is a 79 y.o. female who is seen for follow up of breast cancer on adjuvant Anastrozole     Treatment History:   19 - RIGHT breast lumpectomy x 2 sites and RIGHT SLNB  19 - BILATERAL mastectomies with reconstruction. Dr. Karyn Callahan did direct saline implants. Surg path showed LEFT breast benign, residual IDC on RIGHT breast. ER+ HER2 negative. 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 Tolerating AI fine. No new issues. Taking care of . No new issues today. No fevers/ chills/ chest pain/ SOB/ nausea/ vomiting/diarrhea/ pain/fatigue        Past Medical History:   Diagnosis Date    Adjustment disorder with mixed anxiety and depressed mood 2020    After death of father  and personal diagnosis of/treatment for breast cancer [id number]; Counselin:  [facility] [address] [facility]     Ankle edema 2010    Anxiety 2010    Benign essential hypertension 2016    2006     Carpal tunnel syndrome 2010    Chronic depression 2020    H/O Benign Colon Polyp 2010    H/O bilateral mastectomy 2019    Double Mastectomy (right breast cancer and elective left breast prophylactically);  No chemo, No radiation    H/O Uterine Fibroids 2010    Hypercholesterolemia 2013    ~2006    Hypothyroidism 2016    Kidney stone 2010    Mixed hyperlipidemia 2020 Perimenopausal 4/5/2010    Primary osteoarthritis of knees, bilateral 5/26/2016    Extensive; Ortho (needs knee replacement), CSI x 2, ; CSI 8/23/21 Dr. Doug Henley    Primary osteoarthritis of left knee 5/26/2016    Extensive; Ortho (needs knee replacement), CSI x 2,     S/P benign cervical polypectomy 12/20/2010    Sleep apnea 4/5/2010    USES CPAP    Status post right breast lumpectomy 1/30/2020 6-, Invasive ductal carcinoma--->Mastectomy 8-, no chemo, no radiation, started on Arimidex    Stress incontinence 5/26/2010      Past Surgical History:   Procedure Laterality Date    COLONOSCOPY N/A 10/28/2021    Polypectomy, Repeat 5 yrs, Dr. Adriano Terrazas    EKG ORDERABLE  05/2009    NSR, rate 84, normal EKG    HX BLADDER SUSPENSION  ~2010    Dr Analy Gerber BREAST AUGMENTATION Left 09/23/2019    MCV.  Dr. Fabian Michael, [facility] Mastectomy Implant Infection Left Breast, Replaced Implant, IV abx    HX BREAST BIOPSY Right 11/2011    VPI, right breast biopsy negative    HX BREAST BIOPSY Right 04/22/2019    invasive ductal carcinoma    HX BREAST LUMPECTOMY Right 06/13/2019    RIGHT BREAST LUMPECTOMY ( x2 ) [facility] ULTRASOUND , RIGHT SENTINAL NODE BIOPSY performed by Roberto Snow MD at [facility] AMBULATORY OR    HX BREAST RECONSTRUCTION Bilateral 08/27/2019    B MASTECTOMY AND IMPLANTS, IMMEDIATE BILATERAL BREAST RECONSTRUCTION WITH DIRECT TO IMPLANT USE OF ALLODERM AND BILATERAL BREAST RECONSTRUCTION INTRA-OPERATIVE ASSESMENT OF BLOOD FLOW performed by Nicol Marin MD at [facility] Right 03/2010    Dr Kristen Su POLYPECTOMY  2009, 2015    Dr Jenn Lizama    HX COLONOSCOPY  04/08/2016    Dr Beronica Hannon-Internal Hemorrhoids- Normal mucosa in the whole colon - Repeat colonoscopy in 5 years    HX COLONOSCOPY  2016    HX COLONOSCOPY  10/2005    benign polyp; repeat 5 yr per Dr Anthon Burkitt COLONOSCOPY  11/2010    Normal, f/u 5 yrs, Dr Kleber Andrade    HX COLONOSCOPY Scheduled 10-    HX DILATION AND CURETTAGE  2005    AUJON, benign/neg bx; Dr Jimmy Capellan    HX [address]  10/2012    Dr Jimmy Capellan, AUB    HX HEENT      Nasal Abscess I&D, Dr. Hayes Mini HYSTERECTOMY  2013    HX MASTECTOMY Bilateral 2019    BILATERAL BREAST SKIN SPARING MASTECTOMIES performed by Keila Downs MD at [place] [address]    HX ORTHOPAEDIC Right 2010    CARPAL TUNNEL    HX PARTIAL HYSTERECTOMY  2013    Supracervical hysterectomy for fibroids, Dr Elizabeth Ho    HX UROLOGICAL  2010    Bosque Coil INTACT BREAST IMPLANT Bilateral 10/15/2019    MCV [facility] [place] Breast Implant pain and recurrent left breast infection; on wound vac w/ iv abx; wound pump removed 2019      Social History     Tobacco Use    Smoking status: Former     Packs/day: 0.50     Years: 25.00     Pack years: 12.50     Types: Cigarettes     Quit date: 2005     Years since quittin.1    Smokeless tobacco: Never   Substance Use Topics    Alcohol use: Yes     Comment: 2 shots of Rum ~ 5 nights a week      Family History   Problem Relation Age of Onset    OSTEOARTHRITIS Father     Stroke Father         TIA's    Cancer Father         melanoma on back removed    Pacemaker Father 80    Heart Disease Father          81 yo in     Hypertension Mother     OSTEOARTHRITIS Mother     Heart Disease Mother         valve disease,  2013    Heart Attack Maternal Grandmother 76    Cancer Sister     No Known Problems Brother     Depression Daughter     Substance Abuse Daughter     Alcohol abuse Daughter     Anxiety Daughter      Current Outpatient Medications   Medication Sig    levothyroxine (SYNTHROID) 200 mcg tablet TAKE 1 TABLET BY MOUTH DAILY BEFORE BREAKFAST    inhalational spacing device 1 Each by Does Not Apply route as needed for Wheezing.     FLUoxetine (PROzac) 20 mg capsule TAKE 1 CAPSULE BY MOUTH DAILY    azelastine (ASTEPRO) 205.5 mcg (0.15 %) 1 [place] by Both Nostrils route two (2) times a day. rosuvastatin (CRESTOR) 10 mg tablet TAKE 1 TABLET BY MOUTH EVERY NIGHT    buPROPion XL (WELLBUTRIN XL) 150 mg tablet Take 1 Tablet by mouth Daily (before breakfast). losartan (COZAAR) 100 mg tablet Take 1 Tablet by mouth daily. cetirizine (ZYRTEC) 10 mg tablet Take 10 mg by mouth daily. anastrozole (ARIMIDEX) 1 mg tablet TAKE 1 TABLET BY MOUTH EVERY DAY    TURMERIC PO Take  by mouth daily. ascorbic acid (VITAMIN C PO) Take  by mouth daily. multivitamin (ONE A DAY) tablet Take 1 Tab by mouth daily. cholecalciferol, vitamin D3, 50 mcg (2,000 unit) tab Take 1 Tab by mouth daily. OTHER Portable CPAP machine for RENÉE, with new mask and tubing but no change to mask or settings; G47.30 GARO 99m prog good     No current facility-administered medications for this visit. Allergies   Allergen Reactions    Sulfa (Sulfonamide Antibiotics) Hives    Zocor [Simvastatin] Myalgia    Lisinopril Cough      A complete review of systems was obtained, negative except as described above and as reported on ROS sheet scanned into system. Physical Exam:     General: alert, cooperative, no distress   Mental  status: normal mood, behavior, speech, dress, motor activity, and thought processes, able to follow commands   HENT: NCAT   Neck: no visualized mass   Resp: no respiratory distress, lungs clear  CV regular  G soft NT  Ext no edema   Neuro: no gross deficits   Skin: no discoloration or lesions of concern on visible areas   Psychiatric: normal affect, consistent with stated mood, no evidence of hallucinations     Breast b/l mastectomy/ no lesions/masses        Results:     Lab Results   Component Value Date/Time    WBC 7.2 10/24/2022 02:05 PM    HGB 13.1 10/24/2022 02:05 PM    HCT 42.7 10/24/2022 02:05 PM    PLATELET 760 90/07/6420 02:05 PM    MCV 97.9 10/24/2022 02:05 PM    ABS.  NEUTROPHILS 3.9 09/24/2021 09:24 AM     Lab Results   Component Value Date/Time    Sodium 140 10/24/2022 02:05 PM    Potassium 4.4 10/24/2022 02:05 PM    Chloride 106 10/24/2022 02:05 PM    CO2 27 10/24/2022 02:05 PM    Glucose 88 10/24/2022 02:05 PM    BUN 20 10/24/2022 02:05 PM    Creatinine 0.95 10/24/2022 02:05 PM    GFR est AA >60 09/24/2021 09:24 AM    GFR est non-AA >60 09/24/2021 09:24 AM    Calcium 9.6 10/24/2022 02:05 PM     Lab Results   Component Value Date/Time    Bilirubin, total 0.8 10/24/2022 02:05 PM    ALT (SGPT) 26 10/24/2022 02:05 PM    Alk. phosphatase 78 10/24/2022 02:05 PM    Protein, total 6.6 10/24/2022 02:05 PM    Albumin 3.7 10/24/2022 02:05 PM    Globulin 2.9 10/24/2022 02:05 PM     MRI Results (most recent):  Results from Hospital Encounter encounter on 05/17/19    MRI BREAST BI W WO CONT    Narrative  INDICATION: Right invasive ductal carcinoma, grade 2, ER/OK positive, HER-2/keli  negative. COMPARISON: Multiple prior breast imaging studies dating back to 2015. TECHNIQUE:  Multisequence, multiplanar, bilateral breast MRI was performed in prone position  using a dedicated breast coil. Images were obtained without contrast and dynamic  postcontrast images were obtained in multiple phases. 10 mL IV Gadavist was  administered. Subtraction images were reconstructed. Postcontrast images were  reviewed with dedicated kinetic analysis software. FINDINGS:  There is mild background parenchymal enhancement and heterogeneous  fibroglandular tissue. Numerous sub-5 mm foci of enhancement are scattered in  the bilateral breasts. Right breast:  A 16 x 13 x 16 mm, spiculated mass with a biopsy clip and mixed kinetics at 6:00  in the middle third of the right breast corresponds to the biopsy-proven  invasive ductal carcinoma. There is a small hematoma in the retroareolar biopsy site in the anterior third.   A 6 x 7 x 8 mm, irregularly marginated, enhancing nodule with mixed kinetics at  the superior aspect of the hematoma corresponds roughly in size and morphology  to the hypoechoic, shadowing mass seen on prior ultrasound at 3:00 in the  periareolar region. Incidental note is made of an intramammary lymph node at 1:00 in the middle  third. No axillary or internal mammary chain lymphadenopathy. Left breast:  No suspicious enhancing foci. No axillary or internal mammary chain  lymphadenopathy. A summary portfolio with key images has been sent from kinetic analysis software  to PACS. Impression  IMPRESSION:  1. Invasive ductal carcinoma in the right breast at 6:00.  2. Small enhancing nodule associated with a hematoma in the retroareolar right  breast biopsy site. 3. No suspicious enhancing foci in the left breast.  4. No lymphadenopathy. 5. BI-RADS Assessment Category 6: Known biopsy proven malignancy. Records reviewed and summarized above. Pathology report(s) reviewed above. Radiology report(s) reviewed above. Assessment/PLAN:     1) Stage 1 T1cN0 ER+ HER2 Negative Mammaprint low risk breast cancer post b/l mastectomy/reconstruction  Patient has two separate tumors with different path types. 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 5 o clock ER+ HER2 negative mammaprint low risk. She had a lumpectomy 6/13/19 with + margin then had b/l mastectomy/reconstruction on 8/27/19. Then infection and implants removed. no adjuvant chemo      Pt seen today for fu. Doing well overall. She started adjuvant Anastrozole in 12/19. She is tolerating Anastrozole well overall - no side effects. Continue Anastrozole for total 5 years. sees PCP. Still sees surgery also. routine labs and HM with her PCP. 2) hx of mastectomy/reconstruction infection   She was on IV abx and then had implants removed on 10/15/19. She was hospitalized on 10/30/19 for infection/fever and d/c'd on 11/4/19. She completed IV more abx on 11/21/19 and had a wound vac.  management per surgery/plastics.      3) HTN/Depression/Hypothyroid/Arthritis/Obesity  Management per PCP. 4) bronchitis. per PCP/ ENT. 5) Psychosocial  Mood good, coping well overall. SW for support as needed. Caring for . Call if questions. Follow up here in 12 months / sees surgery also. I appreciate the opportunity to participate in Ms. Sabi Willinghma's care.     Signed By: Jarred Michael DO

## 2023-03-01 ENCOUNTER — OFFICE VISIT (OUTPATIENT)
Dept: ONCOLOGY | Age: 68
End: 2023-03-01
Payer: MEDICARE

## 2023-03-01 VITALS
SYSTOLIC BLOOD PRESSURE: 127 MMHG | TEMPERATURE: 98 F | WEIGHT: 293 LBS | HEART RATE: 95 BPM | OXYGEN SATURATION: 96 % | DIASTOLIC BLOOD PRESSURE: 78 MMHG | RESPIRATION RATE: 17 BRPM | BODY MASS INDEX: 44.95 KG/M2

## 2023-03-01 DIAGNOSIS — E66.01 OBESITY, CLASS III, BMI 40-49.9 (MORBID OBESITY) (HCC): ICD-10-CM

## 2023-03-01 DIAGNOSIS — C50.811 CANCER OF OVERLAPPING SITES OF RIGHT BREAST (HCC): Primary | ICD-10-CM

## 2023-03-01 DIAGNOSIS — I10 ESSENTIAL HYPERTENSION: ICD-10-CM

## 2023-03-01 DIAGNOSIS — Z98.890 H/O BREAST RECONSTRUCTION: ICD-10-CM

## 2023-03-01 DIAGNOSIS — Z90.13 H/O BILATERAL MASTECTOMY: ICD-10-CM

## 2023-03-01 PROBLEM — E66.813 OBESITY, CLASS III, BMI 40-49.9 (MORBID OBESITY) (HCC): Status: ACTIVE | Noted: 2023-03-01

## 2023-03-01 PROCEDURE — 1090F PRES/ABSN URINE INCON ASSESS: CPT | Performed by: INTERNAL MEDICINE

## 2023-03-01 PROCEDURE — 99213 OFFICE O/P EST LOW 20 MIN: CPT | Performed by: INTERNAL MEDICINE

## 2023-03-01 PROCEDURE — 3074F SYST BP LT 130 MM HG: CPT | Performed by: INTERNAL MEDICINE

## 2023-03-01 PROCEDURE — G0463 HOSPITAL OUTPT CLINIC VISIT: HCPCS | Performed by: INTERNAL MEDICINE

## 2023-03-01 PROCEDURE — G8417 CALC BMI ABV UP PARAM F/U: HCPCS | Performed by: INTERNAL MEDICINE

## 2023-03-01 PROCEDURE — G8427 DOCREV CUR MEDS BY ELIG CLIN: HCPCS | Performed by: INTERNAL MEDICINE

## 2023-03-01 PROCEDURE — 1123F ACP DISCUSS/DSCN MKR DOCD: CPT | Performed by: INTERNAL MEDICINE

## 2023-03-01 PROCEDURE — G8536 NO DOC ELDER MAL SCRN: HCPCS | Performed by: INTERNAL MEDICINE

## 2023-03-01 PROCEDURE — G9708 BILAT MAST/HX BI /UNILAT MAS: HCPCS | Performed by: INTERNAL MEDICINE

## 2023-03-01 PROCEDURE — G8399 PT W/DXA RESULTS DOCUMENT: HCPCS | Performed by: INTERNAL MEDICINE

## 2023-03-01 PROCEDURE — G9717 DOC PT DX DEP/BP F/U NT REQ: HCPCS | Performed by: INTERNAL MEDICINE

## 2023-03-01 PROCEDURE — 3078F DIAST BP <80 MM HG: CPT | Performed by: INTERNAL MEDICINE

## 2023-03-01 PROCEDURE — 1101F PT FALLS ASSESS-DOCD LE1/YR: CPT | Performed by: INTERNAL MEDICINE

## 2023-03-01 PROCEDURE — 3017F COLORECTAL CA SCREEN DOC REV: CPT | Performed by: INTERNAL MEDICINE

## 2023-03-01 RX ORDER — ANASTROZOLE 1 MG/1
1 TABLET ORAL DAILY
Qty: 90 TABLET | Refills: 3 | Status: SHIPPED | OUTPATIENT
Start: 2023-03-01

## 2023-03-01 NOTE — PROGRESS NOTES
Noemy Lopez is a 79 y.o. female    breast cancer    1. Have you been to the ER, urgent care clinic since your last visit? Hospitalized since your last visit? No    2. Have you seen or consulted any other health care providers outside of the [address] [place] [address] since your last visit? Include any pap smears or colon screening.  No

## 2023-03-14 ENCOUNTER — TRANSCRIBE ORDER (OUTPATIENT)
Dept: SCHEDULING | Age: 68
End: 2023-03-14

## 2023-03-25 DIAGNOSIS — Z91.09 ENVIRONMENTAL ALLERGIES: Primary | ICD-10-CM

## 2023-03-25 DIAGNOSIS — R09.81 COUGH WITH CONGESTION OF PARANASAL SINUS: ICD-10-CM

## 2023-03-25 DIAGNOSIS — R05.8 COUGH WITH CONGESTION OF PARANASAL SINUS: ICD-10-CM

## 2023-03-25 DIAGNOSIS — J01.10 SUBACUTE FRONTAL SINUSITIS: ICD-10-CM

## 2023-03-27 RX ORDER — FLUTICASONE PROPIONATE 50 MCG
SPRAY, SUSPENSION (ML) NASAL
Qty: 48 G | Refills: 3 | Status: SHIPPED | OUTPATIENT
Start: 2023-03-27

## 2023-04-17 ENCOUNTER — OFFICE VISIT (OUTPATIENT)
Dept: ORTHOPEDIC SURGERY | Age: 68
End: 2023-04-17
Payer: MEDICARE

## 2023-04-17 VITALS — WEIGHT: 293 LBS | HEIGHT: 69 IN | BODY MASS INDEX: 43.4 KG/M2

## 2023-04-17 DIAGNOSIS — M17.0 PRIMARY OSTEOARTHRITIS OF KNEES, BILATERAL: Primary | ICD-10-CM

## 2023-04-17 PROCEDURE — 20610 DRAIN/INJ JOINT/BURSA W/O US: CPT | Performed by: ORTHOPAEDIC SURGERY

## 2023-04-17 RX ORDER — METHYLPREDNISOLONE ACETATE 40 MG/ML
80 INJECTION, SUSPENSION INTRA-ARTICULAR; INTRALESIONAL; INTRAMUSCULAR; SOFT TISSUE ONCE
Status: COMPLETED | OUTPATIENT
Start: 2023-04-17 | End: 2023-04-17

## 2023-04-17 RX ORDER — BUPIVACAINE HYDROCHLORIDE 5 MG/ML
3 INJECTION, SOLUTION EPIDURAL; INTRACAUDAL ONCE
Status: COMPLETED | OUTPATIENT
Start: 2023-04-17 | End: 2023-04-17

## 2023-04-17 RX ADMIN — METHYLPREDNISOLONE ACETATE 80 MG: 40 INJECTION, SUSPENSION INTRA-ARTICULAR; INTRALESIONAL; INTRAMUSCULAR; SOFT TISSUE at 13:58

## 2023-04-17 RX ADMIN — BUPIVACAINE HYDROCHLORIDE 15 MG: 5 INJECTION, SOLUTION EPIDURAL; INTRACAUDAL at 13:57

## 2023-04-17 NOTE — PROGRESS NOTES
Beba Morgan (: 1955) is a 76 y.o. female, established patient, here for evaluation of the following chief complaint(s):  Knee Pain       ASSESSMENT/PLAN:  Below is the assessment and plan developed based on review of pertinent history, physical exam, labs, studies, and medications. She elected to try corticosteroid injection for the bilateral knees today. We discussed the risks and benefits of injection and informed consent was obtained. After a sterile preparation, 3 cc of bupivicaine and 80 mg of Depo-Medrol were injected into the bilateral knees. The patient tolerated the procedure well and there were no complications. Post injection pain, blood sugar elevation, skin discoloration, fatty atrophy and the signs of infection were discussed in detail. The patient was instructed to contact us if there were any questions or concerns prior to their follow up appointment. 1. Primary osteoarthritis of knees, bilateral      Return in about 3 months (around 2023), or if symptoms worsen or fail to improve. SUBJECTIVE/OBJECTIVE:  Beba Morgan (: 1955) is a 76 y.o. female. She presents today with recurrent aching pain in both knees. Ice and anti-inflammatories provide mild relief. Previous injections helped for few months. Allergies   Allergen Reactions    Sulfa (Sulfonamide Antibiotics) Hives    Zocor [Simvastatin] Myalgia    Lisinopril Cough       Current Outpatient Medications   Medication Sig    buPROPion XL (WELLBUTRIN XL) 150 mg tablet TAKE 1 TABLET BY MOUTH DAILY BEFORE BREAKFAST    losartan (COZAAR) 100 mg tablet TAKE 1 TABLET BY MOUTH DAILY    fluticasone propionate (FLONASE) 50 mcg/actuation nasal spray SPRAY ONCE IN EACH NOSTRIL EVERY DAY    anastrozole (ARIMIDEX) 1 mg tablet Take 1 mg by mouth daily.     levothyroxine (SYNTHROID) 200 mcg tablet TAKE 1 TABLET BY MOUTH DAILY BEFORE BREAKFAST    FLUoxetine (PROzac) 20 mg capsule TAKE 1 CAPSULE BY MOUTH DAILY azelastine (ASTEPRO) 205.5 mcg (0.15 %) 1 [place] by Both Nostrils route two (2) times a day. rosuvastatin (CRESTOR) 10 mg tablet TAKE 1 TABLET BY MOUTH EVERY NIGHT    cetirizine (ZYRTEC) 10 mg tablet Take 1 Tablet by mouth daily. TURMERIC PO Take  by mouth daily. ascorbic acid (VITAMIN C PO) Take  by mouth daily. multivitamin (ONE A DAY) tablet Take 1 Tablet by mouth daily. cholecalciferol, vitamin D3, 50 mcg (2,000 unit) tab Take 1 Tablet by mouth daily. OTHER Portable CPAP machine for RENÉE, with new mask and tubing but no change to mask or settings; G47.30 GARO 99m prog good    inhalational spacing device 1 Each by Does Not Apply route as needed for Wheezing. anastrozole (ARIMIDEX) 1 mg tablet TAKE 1 TABLET BY MOUTH EVERY DAY     No current facility-administered medications for this visit.        Social History     Socioeconomic History    Marital status:      Spouse name: Not on file    Number of children: 1    Years of education: Not on file    Highest education level: Not on file   Occupational History    Occupation: [employer]    Occupation: Retired    Tobacco Use    Smoking status: Former     Packs/day: 0.50     Years: 25.00     Pack years: 12.50     Types: Cigarettes     Quit date: 2005     Years since quittin.3    Smokeless tobacco: Never   Vaping Use    Vaping Use: Never used   Substance and Sexual Activity    Alcohol use: Yes     Comment: 2 shots of Rum ~ 5 nights a week    Drug use: No    Sexual activity: Not Currently     Partners: Male     Comment: not sexually active x many years   Other Topics Concern     Service Not Asked    Blood Transfusions Not Asked    Caffeine Concern No     Comment: no coffee, tea and cut out her diet Cokes    Occupational Exposure Not Asked    Hobby Hazards Not Asked    Sleep Concern Not Asked    Stress Concern Not Asked    Weight Concern Not Asked    Special Diet No     Comment: \"I eat way too much\"    Back Care Not Asked Exercise Yes     Comment: home stationary bike x 40-45 minutes a day, stretches daily, yardwork once a week    Bike Helmet Not Asked    Seat Belt Not Asked    Self-Exams Not Asked   Social History Narrative     1 biological daughter (lives in [facility] now), 1 \"adopted\", and 2 step children    Retired 2018    Works for her [employer]    Diet: nothing special, admits likes sweets, cheese, not controlling her portions, eating more fast foods lately    Caffeine: no coffee or tea, occ decaff sodas diet coke or ginger ale    Exercise: none; in the summer 2022 was riding stationary bike x 45 minutes every day; has since lost motivation and also twisted her left knee, seeing ortho, wearing a brace    Weight: ranges in the 280's-290's over time, currently on the higher end since not exercising as much lately and not eating healthy; currently at 309 lbs         Social Determinants of Health     Financial Resource Strain: Low Risk     Difficulty of Paying Living Expenses: Not hard at all   Food Insecurity: No Food Insecurity    Worried About Running Out of Food in the Last Year: Never true    Ran Out of Food in the Last Year: Never true   Transportation Needs: Not on file   Physical Activity: Not on file   Stress: Not on file   Social Connections: Not on file   Intimate Partner Violence: Not on file   Housing Stability: Not on file       Past Surgical History:   Procedure Laterality Date    COLONOSCOPY N/A 10/28/2021    Polypectomy, Repeat 5 yrs, Dr. Bill Citizen    EKG ORDERABLE  05/2009    NSR, rate 84, normal EKG    HX BLADDER SUSPENSION  ~2010    Dr Morris Gene    HX BREAST AUGMENTATION Left 09/23/2019    MCV.  Dr. Lazaro Diego, Post Mastectomy Implant Infection Left Breast, Replaced Implant, IV abx    HX BREAST BIOPSY Right 11/2011    VPI, right breast biopsy negative    HX BREAST BIOPSY Right 04/22/2019    invasive ductal carcinoma    HX BREAST LUMPECTOMY Right 06/13/2019    RIGHT BREAST LUMPECTOMY ( x2 ) [facility] ULTRASOUND , RIGHT SENTINAL NODE BIOPSY performed by Nimco Marie MD at [facility] AMBULATORY OR    HX BREAST RECONSTRUCTION Bilateral 2019    B MASTECTOMY AND IMPLANTS, IMMEDIATE BILATERAL BREAST RECONSTRUCTION WITH DIRECT TO IMPLANT USE OF ALLODERM AND BILATERAL BREAST RECONSTRUCTION INTRA-OPERATIVE ASSESMENT OF BLOOD FLOW performed by Tiera Sandhu MD at [place] [address]    HX [address] Right 2010    Dr Calvin Mancera CERVICAL POLYPECTOMY  ,     Dr Leeanna Gomez    HX COLONOSCOPY  2016    Dr Nathan Hannon-Internal Hemorrhoids- Normal mucosa in the whole colon - Repeat colonoscopy in 5 years    HX COLONOSCOPY      HX COLONOSCOPY  10/2005    benign polyp; repeat 5 yr per Dr Shonda Juárez COLONOSCOPY  2010    Normal, f/u 5 yrs, Dr Maco Mchugh    HX COLONOSCOPY  Scheduled 10-    HX DILATION AND CURETTAGE  2005    AUB, benign/neg bx; Dr Leeanna Gomez    HX [address]  10/2012    Dr Leeanna Gomez, AUB    HX ENT      Nasal Abscess I&D, Dr. Diane Susi HYSTERECTOMY      HX MASTECTOMY Bilateral 2019    BILATERAL BREAST SKIN SPARING MASTECTOMIES performed by Nimco Marie MD at [place] [address]    HX ORTHOPAEDIC Right 2010    CARPAL TUNNEL    HX PARTIAL HYSTERECTOMY  2013    Supracervical hysterectomy for fibroids, Dr Leeanna Gomez    HX [address]    HX UROLOGICAL  2010    BLADDER SLING     HX [address] Bilateral 10/15/2019    MCV [facility] [place] Breast Implant pain and recurrent left breast infection; on wound vac w/ iv abx; wound pump removed 2019       Family History   Problem Relation Age of Onset    OSTEOARTHRITIS Father     Stroke Father         TIA's    Cancer Father         melanoma on back removed    Pacemaker Father 80    Heart Disease Father          79 yo in     Hypertension Mother     OSTEOARTHRITIS Mother     Heart Disease Mother         valve disease,  2013    Heart Attack Maternal Grandmother 76    Cancer Sister     No Known Problems Brother     Depression Daughter     Substance Abuse Daughter     Alcohol abuse Daughter     Anxiety Daughter         OB History          1    Para   1    Term                AB        Living             SAB        IAB        Ectopic        Molar        Multiple        Live Births              Obstetric Comments    x 1; Previous Gyn Dr Wilfred Anderson  Menarche:  10. LMP: ? .  # of Children:  1. Age at Delivery of First Child:  34.   Hysterectomy/oophorectomy:  YES/YES. Breast Bx:  yes. Hx of Breast Feeding:  yes. BCP:  yes. Hormone therapy:  no.                       REVIEW OF SYSTEMS:  ROS    Positive for: Musculoskeletal  Last edited by Irlanda Osborne on 2023  1:30 PM.        Patient denies any recent fever, chills, nausea, vomiting, chest pain, or shortness of breath. Vitals:  Ht 5' 8.5\" (1.74 m)   Wt 300 lb (136.1 kg)   LMP 2011   BMI 44.95 kg/m²    Body mass index is 44.95 kg/m². PHYSICAL EXAM:  General exam: Patient is awake, alert, and oriented x3. Well-appearing. No acute distress. Ambulates with an antalgic gait. Heart/Lungs:  no respiratory distress, palpable pulses    Bilateral knees: Neurovascular and sensory intact. Effusion is present. Crepitus is noted with range of motion of both knees. There is tenderness palpation at the medial and lateral joint lines. Jose David's maneuver creates mild pain. Normal stability on ligamentous testing including Lachman's exam.  Stable anterior and posterior drawer. No erythema or ecchymosis. IMAGING:    XR Results (most recent):  Results from Hospital Encounter encounter on 23    XR CHEST PA LAT    Narrative  PA AND LATERAL CHEST RADIOGRAPHS: 2023 4:40 PM    INDICATION: Cough. COMPARISON: 2022. TECHNIQUE: Frontal and lateral radiographs of the chest.    FINDINGS:  The lungs are clear.  The central airways are patent. The heart size is normal.  No pneumothorax or pleural effusion. Impression  Clear lungs. Results from Appointment encounter on 04/14/22    XR CHEST PA LAT    Narrative  INDICATION: Persistent cough for 3 weeks or longer. EXAM: CXR 2 View    FINDINGS: Frontal and lateral views of the chest show clear lungs. Heart size is  normal. There is no pulmonary edema. There is no evident pneumothorax,  adenopathy or effusion. Impression  Normal two view chest x-ray. Results from [place] encounter on 12/29/19    XR FOOT RT MIN 3 V    Narrative  EXAM: XR FOOT RT MIN 3 V    INDICATION: injury. Right foot pain since yesterday when she heard a pop walking  around a medium. Pain dorsal lateral aspect. COMPARISON: None. FINDINGS: Three views of the right foot demonstrate a small ossific fragment or  fragments at the lateral aspect of the right midfoot, with mild prominence of  soft tissue but no soft tissue mass. There is focal interruption of posterior  calcaneal spur formation, age uncertain. The Achilles tendon is thought to be  intact. .    Impression  IMPRESSION: No acute bony abnormality is confirmed. However, there are 2 ossific  fragments in the soft tissues at the lateral aspect of the midfoot, thought to  most likely be nonacute. Correlate with physical examination in this region. Comparison to any prior radiographs of the right foot would be most helpful. Otherwise, follow-up radiographs in 7-14 days may be helpful if no further  diagnosis is made on orthopedic consultation/follow-up. Fragmentation of  posterior calcaneal spur formation is thought to be nonacute, but correlate with  physical examination. No orders of the defined types were placed in this encounter. An electronic signature was used to authenticate this note.   -- Bishop Richard DO

## 2023-05-18 DIAGNOSIS — E03.9 ACQUIRED HYPOTHYROIDISM: Primary | ICD-10-CM

## 2023-05-19 RX ORDER — LEVOTHYROXINE SODIUM 0.2 MG/1
TABLET ORAL
Qty: 90 TABLET | Refills: 1 | Status: SHIPPED | OUTPATIENT
Start: 2023-05-19

## 2023-06-25 ENCOUNTER — OFFICE VISIT (OUTPATIENT)
Age: 68
End: 2023-06-25

## 2023-06-25 VITALS
OXYGEN SATURATION: 96 % | WEIGHT: 293 LBS | RESPIRATION RATE: 15 BRPM | SYSTOLIC BLOOD PRESSURE: 138 MMHG | TEMPERATURE: 98.4 F | HEIGHT: 69 IN | BODY MASS INDEX: 43.4 KG/M2 | DIASTOLIC BLOOD PRESSURE: 82 MMHG | HEART RATE: 104 BPM

## 2023-06-25 DIAGNOSIS — J34.0 NASAL FURUNCLE: Primary | ICD-10-CM

## 2023-06-25 RX ORDER — CLINDAMYCIN HYDROCHLORIDE 300 MG/1
300 CAPSULE ORAL 3 TIMES DAILY
Qty: 21 CAPSULE | Refills: 0 | Status: SHIPPED | OUTPATIENT
Start: 2023-06-25 | End: 2023-07-02

## 2023-07-03 ENCOUNTER — TELEPHONE (OUTPATIENT)
Age: 68
End: 2023-07-03

## 2023-07-03 NOTE — TELEPHONE ENCOUNTER
Patient called stated she is have surgery on 07/11/2023 and she need a EKG because of her age.     Requesting a call back    Best call back # [phone]

## 2023-07-05 ENCOUNTER — TELEPHONE (OUTPATIENT)
Age: 68
End: 2023-07-05

## 2023-07-05 NOTE — TELEPHONE ENCOUNTER
Marquita Body called stated patient is having surgery on 07/11/2023 and want to know if patient could get in to have a EKG    Requesting a call back    Best call back #[phone]

## 2023-07-07 NOTE — TELEPHONE ENCOUNTER
I called pt to see if she had preop papers that we need to complete and pt said that surgeon didn't need a preop clearance only an EKG is needed. Advised that she will need to make an appt to have the EKG but I need to make sure that nothing else is needed. LVM for Marybeth Hayden to see what is needed.

## 2023-07-10 ENCOUNTER — HOSPITAL ENCOUNTER (OUTPATIENT)
Facility: HOSPITAL | Age: 68
Discharge: HOME | End: 2023-07-13

## 2023-07-10 DIAGNOSIS — Z01.818 PREOP EXAMINATION: ICD-10-CM

## 2023-07-10 LAB
EKG ATRIAL RATE: 91 BPM
EKG DIAGNOSIS: NORMAL
EKG P AXIS: 65 DEGREES
EKG P-R INTERVAL: 156 MS
EKG Q-T INTERVAL: 348 MS
EKG QRS DURATION: 96 MS
EKG QTC CALCULATION (BAZETT): 428 MS
EKG R AXIS: 16 DEGREES
EKG T AXIS: 57 DEGREES
EKG VENTRICULAR RATE: 91 BPM

## 2023-08-12 RX ORDER — FLUOXETINE HYDROCHLORIDE 20 MG/1
CAPSULE ORAL
Qty: 90 CAPSULE | Refills: 0 | Status: SHIPPED | OUTPATIENT
Start: 2023-08-12

## 2023-08-16 RX ORDER — ANASTROZOLE 1 MG/1
TABLET ORAL
Qty: 90 TABLET | Refills: 2 | Status: SHIPPED | OUTPATIENT
Start: 2023-08-16

## 2023-09-07 ENCOUNTER — OFFICE VISIT (OUTPATIENT)
Age: 68
End: 2023-09-07

## 2023-09-07 VITALS
OXYGEN SATURATION: 96 % | BODY MASS INDEX: 43.4 KG/M2 | WEIGHT: 293 LBS | SYSTOLIC BLOOD PRESSURE: 157 MMHG | HEART RATE: 93 BPM | TEMPERATURE: 99.5 F | DIASTOLIC BLOOD PRESSURE: 90 MMHG | HEIGHT: 69 IN

## 2023-09-07 DIAGNOSIS — N39.0 URINARY TRACT INFECTION WITHOUT HEMATURIA, SITE UNSPECIFIED: Primary | ICD-10-CM

## 2023-09-07 LAB
BILIRUBIN, URINE, POC: NEGATIVE
BLOOD URINE, POC: ABNORMAL
GLUCOSE URINE, POC: NEGATIVE
KETONES, URINE, POC: NEGATIVE
LEUKOCYTE ESTERASE, URINE, POC: ABNORMAL
NITRITE, URINE, POC: NEGATIVE
PH, URINE, POC: 7 (ref 4.6–8)
PROTEIN,URINE, POC: NEGATIVE
SPECIFIC GRAVITY, URINE, POC: 1.01 (ref 1–1.03)
URINALYSIS CLARITY, POC: ABNORMAL
URINALYSIS COLOR, POC: YELLOW
UROBILINOGEN, POC: ABNORMAL

## 2023-09-07 RX ORDER — NITROFURANTOIN 25; 75 MG/1; MG/1
100 CAPSULE ORAL 2 TIMES DAILY
Qty: 14 CAPSULE | Refills: 0 | Status: SHIPPED | OUTPATIENT
Start: 2023-09-07 | End: 2023-09-14

## 2023-09-07 RX ORDER — PHENAZOPYRIDINE HYDROCHLORIDE 200 MG/1
200 TABLET, FILM COATED ORAL 3 TIMES DAILY PRN
Qty: 10 TABLET | Refills: 0 | Status: SHIPPED | OUTPATIENT
Start: 2023-09-07 | End: 2023-09-10

## 2023-09-07 ASSESSMENT — ENCOUNTER SYMPTOMS: NAUSEA: 0

## 2023-09-07 NOTE — PATIENT INSTRUCTIONS
Results for orders placed or performed in visit on 09/07/23   AMB POC URINALYSIS DIP STICK AUTO W/O MICRO   Result Value Ref Range    Color, Urine, POC Yellow     Clarity, Urine, POC Cloudy     Glucose, Urine, POC Negative Negative    Bilirubin, Urine, POC Negative Negative    Ketones, Urine, POC Negative Negative    Specific Gravity, Urine, POC 1.010 1.001 - 1.035    Blood, Urine, POC 2+ (H) Negative    pH, Urine, POC 7.0 4.6 - 8.0    Protein, Urine, POC Negative Negative    Urobilinogen, POC 0.2 mg/dL     Nitrite, Urine, POC Negative Negative    Leukocyte Esterase, Urine, POC 3+ (H) Negative

## 2023-09-07 NOTE — PROGRESS NOTES
Chief Complaint   Patient presents with    Urinary Tract Infection     UTI symptoms         Dysuria   This is a new problem. The current episode started today. The problem occurs every urination. The problem has been gradually worsening. The quality of the pain is described as burning. The pain is mild. There has been no fever. There is No history of pyelonephritis. Associated symptoms include frequency and urgency. Pertinent negatives include no chills, hematuria, nausea or possible pregnancy. She has tried nothing for the symptoms. There is no history of recurrent UTIs. Past Medical History:   Diagnosis Date    Adjustment disorder with mixed anxiety and depressed mood 2020    After death of father  and personal diagnosis of/treatment for breast cancer [id number]; Counselin:  [place] [facility] [address]     Ankle edema 2010    Anxiety 2010    Benign essential hypertension 2016    2006     Carpal tunnel syndrome 2010    Chronic depression 2020    Colon polyp 2010    Fibroids 2010    H/O bilateral mastectomy 2019    Double Mastectomy (right breast cancer and elective left breast prophylactically); No chemo, No radiation    Hypercholesterolemia 2013    Hypothyroidism 2016    Kidney stone 2010    Mixed hyperlipidemia 2020    Perimenopausal 2010    Primary osteoarthritis of knees, bilateral 2016    Extensive; Ortho (needs knee replacement), CSI x 2, -; CSI 21 Dr. Mona Kelly    Primary osteoarthritis of left knee 2016    Extensive;  Ortho (needs knee replacement), CSI x 2, -    S/P cervical polypectomy 2010    Sleep apnea 2010    USES CPAP    Status post right breast lumpectomy 2020, Invasive ductal carcinoma--->Mastectomy 2019, no chemo, no radiation, started on Arimidex    Stress incontinence 2010       Past Surgical History:
02-Dec-2018 18:11

## 2023-09-15 ENCOUNTER — OFFICE VISIT (OUTPATIENT)
Age: 68
End: 2023-09-15

## 2023-09-15 VITALS
TEMPERATURE: 100.2 F | SYSTOLIC BLOOD PRESSURE: 145 MMHG | OXYGEN SATURATION: 96 % | HEIGHT: 69 IN | BODY MASS INDEX: 43.4 KG/M2 | WEIGHT: 293 LBS | HEART RATE: 92 BPM | DIASTOLIC BLOOD PRESSURE: 84 MMHG

## 2023-09-15 DIAGNOSIS — N39.0 URINARY TRACT INFECTION WITHOUT HEMATURIA, SITE UNSPECIFIED: Primary | ICD-10-CM

## 2023-09-15 LAB
BILIRUBIN, URINE, POC: NEGATIVE
BLOOD URINE, POC: ABNORMAL
GLUCOSE URINE, POC: NEGATIVE
KETONES, URINE, POC: NEGATIVE
LEUKOCYTE ESTERASE, URINE, POC: ABNORMAL
NITRITE, URINE, POC: NEGATIVE
PH, URINE, POC: 7 (ref 4.6–8)
PROTEIN,URINE, POC: ABNORMAL
SPECIFIC GRAVITY, URINE, POC: 1.02 (ref 1–1.03)
URINALYSIS CLARITY, POC: ABNORMAL
URINALYSIS COLOR, POC: ABNORMAL
UROBILINOGEN, POC: ABNORMAL

## 2023-09-15 RX ORDER — CEPHALEXIN 500 MG/1
500 CAPSULE ORAL 2 TIMES DAILY
Qty: 14 CAPSULE | Refills: 0 | Status: SHIPPED | OUTPATIENT
Start: 2023-09-15 | End: 2023-09-22

## 2023-09-15 NOTE — PATIENT INSTRUCTIONS
Results for orders placed or performed in visit on 09/15/23   AMB POC URINALYSIS DIP STICK AUTO W/O MICRO   Result Value Ref Range    Color, Urine, POC Light Yellow     Clarity, Urine, POC Cloudy     Glucose, Urine, POC Negative Negative    Bilirubin, Urine, POC Negative Negative    Ketones, Urine, POC Negative Negative    Specific Gravity, Urine, POC 1.020 1.001 - 1.035    Blood, Urine, POC Trace-intact (A) Negative    pH, Urine, POC 7.0 4.6 - 8.0    Protein, Urine, POC Trace (A) Negative    Urobilinogen, POC 0.2 mg/dL     Nitrite, Urine, POC Negative Negative    Leukocyte Esterase, Urine, POC 1+ Negative

## 2023-09-15 NOTE — PROGRESS NOTES
Chief Complaint   Patient presents with    Urinary Tract Infection         History provided by:  Patient  Urinary Tract Infection  This is a recurrent problem. The current episode started in the past 7 days. Progression since onset: didn't imprive completely from last week. Associated symptoms include pain. The pain is present in the bladder. Her pain is at a severity of 4/10. Past Medical History:   Diagnosis Date    Adjustment disorder with mixed anxiety and depressed mood 2020    After death of father  and personal diagnosis of/treatment for breast cancer [id number]; Counselin:  [employer] [facility] [address]     Ankle edema 2010    Anxiety 2010    Benign essential hypertension 2016    2006     Carpal tunnel syndrome 2010    Chronic depression 2020    Colon polyp 2010    Fibroids 2010    H/O bilateral mastectomy 2019    Double Mastectomy (right breast cancer and elective left breast prophylactically); No chemo, No radiation    Hypercholesterolemia 2013    Hypothyroidism 2016    Kidney stone 2010    Mixed hyperlipidemia 2020    Perimenopausal 2010    Primary osteoarthritis of knees, bilateral 2016    Extensive; Ortho (needs knee replacement), CSI x 2, ; CSI 21 Dr. Peng Marie    Primary osteoarthritis of left knee 2016    Extensive;  Ortho (needs knee replacement), CSI x 2, -    S/P cervical polypectomy 2010    Sleep apnea 2010    USES CPAP    Status post right breast lumpectomy 2020, Invasive ductal carcinoma--->Mastectomy 2019, no chemo, no radiation, started on Arimidex    Stress incontinence 2010       Past Surgical History:   Procedure Laterality Date    BLADDER SUSPENSION      2010    BREAST BIOPSY Right 2011    VPI, right breast biopsy negative    BREAST BIOPSY Right 2019    invasive ductal carcinoma    BREAST

## 2023-09-17 LAB
BACTERIA SPEC CULT: ABNORMAL
BACTERIA SPEC CULT: ABNORMAL
CC UR VC: ABNORMAL
SERVICE CMNT-IMP: ABNORMAL

## 2023-09-17 RX ORDER — ROSUVASTATIN CALCIUM 10 MG/1
TABLET, COATED ORAL
Qty: 90 TABLET | Refills: 0 | Status: SHIPPED | OUTPATIENT
Start: 2023-09-17

## 2023-09-21 LAB
BACTERIA SPEC CULT: ABNORMAL
CC UR VC: ABNORMAL
SERVICE CMNT-IMP: ABNORMAL

## 2023-10-10 RX ORDER — LOSARTAN POTASSIUM 100 MG/1
100 TABLET ORAL DAILY
Qty: 30 TABLET | Refills: 0 | Status: SHIPPED | OUTPATIENT
Start: 2023-10-10

## 2023-10-10 RX ORDER — BUPROPION HYDROCHLORIDE 150 MG/1
150 TABLET ORAL
Qty: 30 TABLET | Refills: 0 | Status: SHIPPED | OUTPATIENT
Start: 2023-10-10

## 2023-10-20 SDOH — HEALTH STABILITY: PHYSICAL HEALTH: ON AVERAGE, HOW MANY MINUTES DO YOU ENGAGE IN EXERCISE AT THIS LEVEL?: 0 MIN

## 2023-10-20 SDOH — ECONOMIC STABILITY: INCOME INSECURITY: HOW HARD IS IT FOR YOU TO PAY FOR THE VERY BASICS LIKE FOOD, HOUSING, MEDICAL CARE, AND HEATING?: NOT VERY HARD

## 2023-10-20 SDOH — ECONOMIC STABILITY: FOOD INSECURITY: WITHIN THE PAST 12 MONTHS, YOU WORRIED THAT YOUR FOOD WOULD RUN OUT BEFORE YOU GOT MONEY TO BUY MORE.: NEVER TRUE

## 2023-10-20 SDOH — HEALTH STABILITY: PHYSICAL HEALTH: ON AVERAGE, HOW MANY DAYS PER WEEK DO YOU ENGAGE IN MODERATE TO STRENUOUS EXERCISE (LIKE A BRISK WALK)?: 0 DAYS

## 2023-10-20 SDOH — ECONOMIC STABILITY: HOUSING INSECURITY
IN THE LAST 12 MONTHS, WAS THERE A TIME WHEN YOU DID NOT HAVE A STEADY PLACE TO SLEEP OR SLEPT IN A SHELTER (INCLUDING NOW)?: NO

## 2023-10-20 SDOH — ECONOMIC STABILITY: TRANSPORTATION INSECURITY
IN THE PAST 12 MONTHS, HAS LACK OF TRANSPORTATION KEPT YOU FROM MEETINGS, WORK, OR FROM GETTING THINGS NEEDED FOR DAILY LIVING?: NO

## 2023-10-20 ASSESSMENT — PATIENT HEALTH QUESTIONNAIRE - PHQ9
1. LITTLE INTEREST OR PLEASURE IN DOING THINGS: 1
SUM OF ALL RESPONSES TO PHQ9 QUESTIONS 1 & 2: 2
SUM OF ALL RESPONSES TO PHQ QUESTIONS 1-9: 2
2. FEELING DOWN, DEPRESSED OR HOPELESS: 1

## 2023-10-20 ASSESSMENT — LIFESTYLE VARIABLES
HAVE YOU OR SOMEONE ELSE BEEN INJURED AS A RESULT OF YOUR DRINKING: 0
HOW MANY STANDARD DRINKS CONTAINING ALCOHOL DO YOU HAVE ON A TYPICAL DAY: 1 OR 2
HOW OFTEN DO YOU HAVE A DRINK CONTAINING ALCOHOL: 5
HOW OFTEN DURING THE LAST YEAR HAVE YOU FAILED TO DO WHAT WAS NORMALLY EXPECTED FROM YOU BECAUSE OF DRINKING: 0
HOW OFTEN DURING THE LAST YEAR HAVE YOU HAD A FEELING OF GUILT OR REMORSE AFTER DRINKING: NEVER
HOW OFTEN DURING THE LAST YEAR HAVE YOU HAD A FEELING OF GUILT OR REMORSE AFTER DRINKING: NEVER
HOW OFTEN DO YOU HAVE SIX OR MORE DRINKS ON ONE OCCASION: 1
HOW OFTEN DURING THE LAST YEAR HAVE YOU BEEN UNABLE TO REMEMBER WHAT HAPPENED THE NIGHT BEFORE BECAUSE YOU HAD BEEN DRINKING: NEVER
HOW OFTEN DURING THE LAST YEAR HAVE YOU BEEN UNABLE TO REMEMBER WHAT HAPPENED THE NIGHT BEFORE BECAUSE YOU HAD BEEN DRINKING: 0
HOW OFTEN DURING THE LAST YEAR HAVE YOU HAD A FEELING OF GUILT OR REMORSE AFTER DRINKING: 0
HOW OFTEN DO YOU HAVE A DRINK CONTAINING ALCOHOL: 4 OR MORE TIMES A WEEK
HOW OFTEN DURING THE LAST YEAR HAVE YOU NEEDED AN ALCOHOLIC DRINK FIRST THING IN THE MORNING TO GET YOURSELF GOING AFTER A NIGHT OF HEAVY DRINKING: NEVER
HOW OFTEN DURING THE LAST YEAR HAVE YOU BEEN UNABLE TO REMEMBER WHAT HAPPENED THE NIGHT BEFORE BECAUSE YOU HAD BEEN DRINKING: NEVER
HOW OFTEN DURING THE LAST YEAR HAVE YOU FOUND THAT YOU WERE NOT ABLE TO STOP DRINKING ONCE YOU HAD STARTED: 0
HAVE YOU OR SOMEONE ELSE BEEN INJURED AS A RESULT OF YOUR DRINKING: NO
HOW OFTEN DURING THE LAST YEAR HAVE YOU NEEDED AN ALCOHOLIC DRINK FIRST THING IN THE MORNING TO GET YOURSELF GOING AFTER A NIGHT OF HEAVY DRINKING: 0
HOW OFTEN DURING THE LAST YEAR HAVE YOU FAILED TO DO WHAT WAS NORMALLY EXPECTED FROM YOU BECAUSE OF DRINKING: NEVER
HOW OFTEN DURING THE LAST YEAR HAVE YOU FAILED TO DO WHAT WAS NORMALLY EXPECTED FROM YOU BECAUSE OF DRINKING: NEVER
HOW OFTEN DO YOU HAVE SIX OR MORE DRINKS ON ONE OCCASION: 1
HOW OFTEN DURING THE LAST YEAR HAVE YOU FOUND THAT YOU WERE NOT ABLE TO STOP DRINKING ONCE YOU HAD STARTED: NEVER
HAS A RELATIVE, FRIEND, DOCTOR, OR ANOTHER HEALTH PROFESSIONAL EXPRESSED CONCERN ABOUT YOUR DRINKING OR SUGGESTED YOU CUT DOWN: 0
HOW OFTEN DURING THE LAST YEAR HAVE YOU NEEDED AN ALCOHOLIC DRINK FIRST THING IN THE MORNING TO GET YOURSELF GOING AFTER A NIGHT OF HEAVY DRINKING: NEVER
HOW OFTEN DURING THE LAST YEAR HAVE YOU FOUND THAT YOU WERE NOT ABLE TO STOP DRINKING ONCE YOU HAD STARTED: NEVER
HAS A RELATIVE, FRIEND, DOCTOR, OR ANOTHER HEALTH PROFESSIONAL EXPRESSED CONCERN ABOUT YOUR DRINKING OR SUGGESTED YOU CUT DOWN: NO
HAS A RELATIVE, FRIEND, DOCTOR, OR ANOTHER HEALTH PROFESSIONAL EXPRESSED CONCERN ABOUT YOUR DRINKING OR SUGGESTED YOU CUT DOWN: NO
HOW MANY STANDARD DRINKS CONTAINING ALCOHOL DO YOU HAVE ON A TYPICAL DAY: 1
HOW MANY STANDARD DRINKS CONTAINING ALCOHOL DO YOU HAVE ON A TYPICAL DAY: 1
HOW OFTEN DO YOU HAVE A DRINK CONTAINING ALCOHOL: 5
HAVE YOU OR SOMEONE ELSE BEEN INJURED AS A RESULT OF YOUR DRINKING: NO

## 2023-10-23 ENCOUNTER — OFFICE VISIT (OUTPATIENT)
Age: 68
End: 2023-10-23
Payer: MEDICARE

## 2023-10-23 VITALS
HEIGHT: 69 IN | BODY MASS INDEX: 43.4 KG/M2 | SYSTOLIC BLOOD PRESSURE: 136 MMHG | TEMPERATURE: 98.2 F | OXYGEN SATURATION: 96 % | HEART RATE: 80 BPM | WEIGHT: 293 LBS | DIASTOLIC BLOOD PRESSURE: 86 MMHG | RESPIRATION RATE: 16 BRPM

## 2023-10-23 DIAGNOSIS — I10 BENIGN ESSENTIAL HYPERTENSION: ICD-10-CM

## 2023-10-23 DIAGNOSIS — F32.A ANXIETY AND DEPRESSION: ICD-10-CM

## 2023-10-23 DIAGNOSIS — E03.9 ACQUIRED HYPOTHYROIDISM: ICD-10-CM

## 2023-10-23 DIAGNOSIS — E78.2 MIXED HYPERLIPIDEMIA: Primary | ICD-10-CM

## 2023-10-23 DIAGNOSIS — F41.9 ANXIETY AND DEPRESSION: ICD-10-CM

## 2023-10-23 PROBLEM — F43.23 ADJUSTMENT DISORDER WITH MIXED ANXIETY AND DEPRESSED MOOD: Status: ACTIVE | Noted: 2020-01-30

## 2023-10-23 PROCEDURE — 1036F TOBACCO NON-USER: CPT | Performed by: FAMILY MEDICINE

## 2023-10-23 PROCEDURE — 3017F COLORECTAL CA SCREEN DOC REV: CPT | Performed by: FAMILY MEDICINE

## 2023-10-23 PROCEDURE — 1123F ACP DISCUSS/DSCN MKR DOCD: CPT | Performed by: FAMILY MEDICINE

## 2023-10-23 PROCEDURE — 3075F SYST BP GE 130 - 139MM HG: CPT | Performed by: FAMILY MEDICINE

## 2023-10-23 PROCEDURE — 99214 OFFICE O/P EST MOD 30 MIN: CPT | Performed by: FAMILY MEDICINE

## 2023-10-23 PROCEDURE — G8417 CALC BMI ABV UP PARAM F/U: HCPCS | Performed by: FAMILY MEDICINE

## 2023-10-23 PROCEDURE — 3079F DIAST BP 80-89 MM HG: CPT | Performed by: FAMILY MEDICINE

## 2023-10-23 PROCEDURE — 1090F PRES/ABSN URINE INCON ASSESS: CPT | Performed by: FAMILY MEDICINE

## 2023-10-23 PROCEDURE — G8484 FLU IMMUNIZE NO ADMIN: HCPCS | Performed by: FAMILY MEDICINE

## 2023-10-23 PROCEDURE — G8427 DOCREV CUR MEDS BY ELIG CLIN: HCPCS | Performed by: FAMILY MEDICINE

## 2023-10-23 PROCEDURE — G8399 PT W/DXA RESULTS DOCUMENT: HCPCS | Performed by: FAMILY MEDICINE

## 2023-10-23 RX ORDER — MULTIVIT WITH MINERALS/LUTEIN
1000 TABLET ORAL DAILY
COMMUNITY

## 2023-10-23 RX ORDER — CLOBETASOL PROPIONATE 0.5 MG/G
CREAM TOPICAL
COMMUNITY
Start: 2023-10-16

## 2023-10-23 RX ORDER — CEPHALEXIN 500 MG/1
CAPSULE ORAL
COMMUNITY
Start: 2023-10-19

## 2023-10-23 ASSESSMENT — ENCOUNTER SYMPTOMS
RESPIRATORY NEGATIVE: 1
GASTROINTESTINAL NEGATIVE: 1

## 2023-10-24 LAB
ALBUMIN SERPL-MCNC: 4 G/DL (ref 3.5–5)
ALBUMIN/GLOB SERPL: 1.5 (ref 1.1–2.2)
ALP SERPL-CCNC: 81 U/L (ref 45–117)
ALT SERPL-CCNC: 51 U/L (ref 12–78)
ANION GAP SERPL CALC-SCNC: 7 MMOL/L (ref 5–15)
AST SERPL-CCNC: 18 U/L (ref 15–37)
BILIRUB SERPL-MCNC: 0.7 MG/DL (ref 0.2–1)
BUN SERPL-MCNC: 21 MG/DL (ref 6–20)
BUN/CREAT SERPL: 24 (ref 12–20)
CALCIUM SERPL-MCNC: 9.7 MG/DL (ref 8.5–10.1)
CHLORIDE SERPL-SCNC: 108 MMOL/L (ref 97–108)
CHOLEST SERPL-MCNC: 165 MG/DL
CO2 SERPL-SCNC: 25 MMOL/L (ref 21–32)
CREAT SERPL-MCNC: 0.88 MG/DL (ref 0.55–1.02)
ERYTHROCYTE [DISTWIDTH] IN BLOOD BY AUTOMATED COUNT: 13.7 % (ref 11.5–14.5)
GLOBULIN SER CALC-MCNC: 2.7 G/DL (ref 2–4)
GLUCOSE SERPL-MCNC: 101 MG/DL (ref 65–100)
HCT VFR BLD AUTO: 40.4 % (ref 35–47)
HDLC SERPL-MCNC: 48 MG/DL
HDLC SERPL: 3.4 (ref 0–5)
HGB BLD-MCNC: 12.7 G/DL (ref 11.5–16)
LDLC SERPL CALC-MCNC: 83.2 MG/DL (ref 0–100)
MCH RBC QN AUTO: 29.9 PG (ref 26–34)
MCHC RBC AUTO-ENTMCNC: 31.4 G/DL (ref 30–36.5)
MCV RBC AUTO: 95.1 FL (ref 80–99)
NRBC # BLD: 0 K/UL (ref 0–0.01)
NRBC BLD-RTO: 0 PER 100 WBC
PLATELET # BLD AUTO: 226 K/UL (ref 150–400)
PMV BLD AUTO: 11.2 FL (ref 8.9–12.9)
POTASSIUM SERPL-SCNC: 4.7 MMOL/L (ref 3.5–5.1)
PROT SERPL-MCNC: 6.7 G/DL (ref 6.4–8.2)
RBC # BLD AUTO: 4.25 M/UL (ref 3.8–5.2)
SODIUM SERPL-SCNC: 140 MMOL/L (ref 136–145)
TRIGL SERPL-MCNC: 169 MG/DL
TSH SERPL DL<=0.05 MIU/L-ACNC: 1.36 UIU/ML (ref 0.36–3.74)
VLDLC SERPL CALC-MCNC: 33.8 MG/DL
WBC # BLD AUTO: 5.8 K/UL (ref 3.6–11)

## 2023-11-03 ENCOUNTER — TELEMEDICINE (OUTPATIENT)
Age: 68
End: 2023-11-03
Payer: MEDICARE

## 2023-11-03 DIAGNOSIS — Z00.00 MEDICARE ANNUAL WELLNESS VISIT, SUBSEQUENT: Primary | ICD-10-CM

## 2023-11-03 PROCEDURE — 1123F ACP DISCUSS/DSCN MKR DOCD: CPT | Performed by: FAMILY MEDICINE

## 2023-11-03 PROCEDURE — 3017F COLORECTAL CA SCREEN DOC REV: CPT | Performed by: FAMILY MEDICINE

## 2023-11-03 PROCEDURE — G0439 PPPS, SUBSEQ VISIT: HCPCS | Performed by: FAMILY MEDICINE

## 2023-11-03 NOTE — PROGRESS NOTES
Medicare Annual Wellness Visit    [address] is here for Medicare AWV    Assessment & Plan   Medicare annual wellness visit, subsequent  Recommendations for Preventive Services Due: see orders and patient instructions/AVS.  Recommended screening schedule for the next 5-10 years is provided to the patient in written form: see Patient Instructions/AVS.              Patient's complete Health Risk Assessment and screening values have been reviewed and are found in Flowsheets. The following problems were reviewed today and where indicated follow up appointments were made and/or referrals ordered. Positive Risk Factor Screenings with Interventions:    Fall Risk:  Do you feel unsteady or are you worried about falling? : (!) yes  2 or more falls in past year?: no  Fall with injury in past year?: no     Interventions: Fall prevention and home safety counseling w/ pt today. Recommended water aerobics, home chair exercises, exercise videos. See AVS for additional education material as well. Weight and Activity:  Physical Activity: Inactive (10/20/2023)    Exercise Vital Sign     Days of Exercise per Week: 0 days     Minutes of Exercise per Session: 0 min     On average, how many days per week do you engage in moderate to strenuous exercise (like a brisk walk)?: 0 days  Have you lost any weight without trying in the past 3 months?: No    Inactivity Interventions:  See AVS for additional education material  Obesity Interventions:  See counseling/recommendations below    Reviewed diet, nutrition, exercise, weight management/goals. Recommend at least 30 minutes of moderate exercise 5 x a week gradually building as tolerated.                  Patient Active Problem List   Diagnosis    Cancer of overlapping sites of right breast [facility])    H/O bilateral mastectomy    Carpal tunnel syndrome    Anxiety    Perimenopausal    Sleep apnea    Chronic depression    Ankle edema    Kidney stone    S/P cervical

## 2023-11-09 NOTE — TELEPHONE ENCOUNTER
Duplicate request for losartan.  Already pending to MD ROBLERO.  Thanks, Margie    For Pharmacy Admin Tracking Only    Program: Medication Refill  CPA in place:    Recommendation Provided To:   Intervention Detail: Discontinued Rx: 1, reason: Duplicate Therapy  Intervention Accepted By:   Gap Closed?:    Time Spent (min): 5

## 2023-11-10 RX ORDER — FLUOXETINE HYDROCHLORIDE 20 MG/1
CAPSULE ORAL
Qty: 90 CAPSULE | Refills: 3 | Status: SHIPPED | OUTPATIENT
Start: 2023-11-10

## 2023-11-10 RX ORDER — BUPROPION HYDROCHLORIDE 150 MG/1
150 TABLET ORAL
Qty: 90 TABLET | Refills: 3 | Status: SHIPPED | OUTPATIENT
Start: 2023-11-10

## 2023-11-10 RX ORDER — LOSARTAN POTASSIUM 100 MG/1
100 TABLET ORAL DAILY
Qty: 90 TABLET | Refills: 3 | Status: SHIPPED | OUTPATIENT
Start: 2023-11-10

## 2024-01-23 ENCOUNTER — OFFICE VISIT (OUTPATIENT)
Age: 69
End: 2024-01-23
Payer: MEDICARE

## 2024-01-23 VITALS — HEIGHT: 69 IN | BODY MASS INDEX: 43.4 KG/M2 | WEIGHT: 293 LBS

## 2024-01-23 DIAGNOSIS — Z85.3 PERSONAL HISTORY OF MALIGNANT NEOPLASM OF BREAST: ICD-10-CM

## 2024-01-23 DIAGNOSIS — C50.811 MALIGNANT NEOPLASM OF OVERLAPPING SITES OF RIGHT BREAST IN FEMALE, ESTROGEN RECEPTOR POSITIVE (HCC): Primary | ICD-10-CM

## 2024-01-23 DIAGNOSIS — Z90.13 STATUS POST BILATERAL MASTECTOMY: ICD-10-CM

## 2024-01-23 DIAGNOSIS — Z17.0 MALIGNANT NEOPLASM OF OVERLAPPING SITES OF RIGHT BREAST IN FEMALE, ESTROGEN RECEPTOR POSITIVE (HCC): Primary | ICD-10-CM

## 2024-01-23 PROCEDURE — 1090F PRES/ABSN URINE INCON ASSESS: CPT | Performed by: NURSE PRACTITIONER

## 2024-01-23 PROCEDURE — G8484 FLU IMMUNIZE NO ADMIN: HCPCS | Performed by: NURSE PRACTITIONER

## 2024-01-23 PROCEDURE — G8427 DOCREV CUR MEDS BY ELIG CLIN: HCPCS | Performed by: NURSE PRACTITIONER

## 2024-01-23 PROCEDURE — 1036F TOBACCO NON-USER: CPT | Performed by: NURSE PRACTITIONER

## 2024-01-23 PROCEDURE — G8417 CALC BMI ABV UP PARAM F/U: HCPCS | Performed by: NURSE PRACTITIONER

## 2024-01-23 PROCEDURE — G8399 PT W/DXA RESULTS DOCUMENT: HCPCS | Performed by: NURSE PRACTITIONER

## 2024-01-23 PROCEDURE — 99213 OFFICE O/P EST LOW 20 MIN: CPT | Performed by: NURSE PRACTITIONER

## 2024-01-23 PROCEDURE — 1123F ACP DISCUSS/DSCN MKR DOCD: CPT | Performed by: NURSE PRACTITIONER

## 2024-01-23 PROCEDURE — 3017F COLORECTAL CA SCREEN DOC REV: CPT | Performed by: NURSE PRACTITIONER

## 2024-01-23 NOTE — PROGRESS NOTES
HISTORY OF PRESENT ILLNESS  Dena Arellano is a 68 y.o. female     HPI  ESTABLISHED patient here for follow up RIGHT breast cancer, s/p bilateral mastectomies. No breast concerns and no pain.           Breast cancer history -   Referring - Dr. 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 Crooks  19 - BILATERAL mastectomies with reconstruction. Dr. Shahid did direct saline implants. - Dr. Crooks  Mammaprint - low risk  Surg path showed LEFT breast benign, residual IDC on RIGHT breast - 0.8cm, ER pos, FL pos, HER2 neg   10/15/19 - Removal of bilateral breast implants. Complicated breast cancer course including multiple surgeries and hospitalizations for wound infections.   2019 - LEFT lymph node biopsy - Dr. Smith   Left axillary lymph node, core biopsy:    Fat necrosis with dense mixed inflammation and abscess formation    No lymph node tissue or malignancy identified     2019 - started anastrozole - Dr. Peterson           Family history -    No family history of breast or ovarian cancer.   Father had melanoma.        OB History          1    Para        Term                AB        Living             SAB        IAB        Ectopic        Molar        Multiple        Live Births              Obstetric Comments   Menarche:  10.   LMP: ?.  # of Children:  1.  Age at Delivery of First Child:  29.   Hysterectomy/oophorectomy:  YES/YES.  Breast Bx:  yes.  Hx of Breast Feeding:  yes.  BCP:  yes. Hormone therapy:  no.                  Review of Systems      Physical Exam  Constitutional:       Appearance: Normal appearance.   Chest:   Breasts:     Right: Absent.      Left: Absent.      Comments: Chest wall s/p BILATERAL mastectomy with no reconstruction  Musculoskeletal:      Comments: FROM - UE x 2   Lymphadenopathy:      Upper Body:      Right upper body: No supraclavicular or axillary adenopathy.

## 2024-01-26 RX ORDER — ROSUVASTATIN CALCIUM 10 MG/1
TABLET, COATED ORAL
Qty: 90 TABLET | Refills: 2 | Status: SHIPPED | OUTPATIENT
Start: 2024-01-26

## 2024-03-01 DIAGNOSIS — Z17.0 MALIGNANT NEOPLASM OF OVERLAPPING SITES OF RIGHT BREAST IN FEMALE, ESTROGEN RECEPTOR POSITIVE (HCC): Primary | ICD-10-CM

## 2024-03-01 DIAGNOSIS — C50.811 MALIGNANT NEOPLASM OF OVERLAPPING SITES OF RIGHT BREAST IN FEMALE, ESTROGEN RECEPTOR POSITIVE (HCC): Primary | ICD-10-CM

## 2024-03-05 RX ORDER — ANASTROZOLE 1 MG/1
1 TABLET ORAL DAILY
Qty: 90 TABLET | Refills: 1 | Status: SHIPPED | OUTPATIENT
Start: 2024-03-05

## 2024-03-05 NOTE — TELEPHONE ENCOUNTER
Oral Hormone therapy     Dena Arellano is a  68 y.o.female  diagnosed with breast cancer . Ms. Arellano is being treated with anastrozole.     Medication name: anastrozole    Dose:  1 mg   Frequency: daily    Ordering provider: Malika Briceno DO  Start date: 12/2019        Last OV 3/1/23  Next OV 7/31/24    Refill for anastrozole sent to pharmacy on file       Catherine Lowry, ISRAELD, BCOP, BCPS    For Pharmacy Admin Tracking Only    Program: Medical Group  CPA in place:  Yes  Recommendation Provided To: Patient/Caregiver: 1 via Telephone  Intervention Detail: Refill(s) Provided  Intervention Accepted By: Patient/Caregiver: 1    Time Spent (min): 10

## 2024-03-09 DIAGNOSIS — E03.9 ACQUIRED HYPOTHYROIDISM: ICD-10-CM

## 2024-03-10 RX ORDER — LEVOTHYROXINE SODIUM 0.2 MG/1
TABLET ORAL
Qty: 90 TABLET | Refills: 2 | Status: SHIPPED | OUTPATIENT
Start: 2024-03-10

## 2024-04-14 RX ORDER — FLUTICASONE PROPIONATE 50 MCG
SPRAY, SUSPENSION (ML) NASAL
Qty: 48 G | Refills: 1 | Status: SHIPPED | OUTPATIENT
Start: 2024-04-14

## 2024-07-31 ENCOUNTER — OFFICE VISIT (OUTPATIENT)
Age: 69
End: 2024-07-31
Payer: MEDICARE

## 2024-07-31 VITALS
TEMPERATURE: 98.9 F | WEIGHT: 293 LBS | HEIGHT: 69 IN | HEART RATE: 73 BPM | BODY MASS INDEX: 43.4 KG/M2 | OXYGEN SATURATION: 95 % | SYSTOLIC BLOOD PRESSURE: 132 MMHG | DIASTOLIC BLOOD PRESSURE: 79 MMHG | RESPIRATION RATE: 19 BRPM

## 2024-07-31 DIAGNOSIS — C50.811 MALIGNANT NEOPLASM OF OVERLAPPING SITES OF RIGHT BREAST IN FEMALE, ESTROGEN RECEPTOR POSITIVE (HCC): Primary | ICD-10-CM

## 2024-07-31 DIAGNOSIS — Z17.0 MALIGNANT NEOPLASM OF OVERLAPPING SITES OF RIGHT BREAST IN FEMALE, ESTROGEN RECEPTOR POSITIVE (HCC): Primary | ICD-10-CM

## 2024-07-31 PROCEDURE — 3078F DIAST BP <80 MM HG: CPT | Performed by: INTERNAL MEDICINE

## 2024-07-31 PROCEDURE — G8417 CALC BMI ABV UP PARAM F/U: HCPCS | Performed by: INTERNAL MEDICINE

## 2024-07-31 PROCEDURE — 1090F PRES/ABSN URINE INCON ASSESS: CPT | Performed by: INTERNAL MEDICINE

## 2024-07-31 PROCEDURE — 99213 OFFICE O/P EST LOW 20 MIN: CPT | Performed by: INTERNAL MEDICINE

## 2024-07-31 PROCEDURE — 3017F COLORECTAL CA SCREEN DOC REV: CPT | Performed by: INTERNAL MEDICINE

## 2024-07-31 PROCEDURE — G8427 DOCREV CUR MEDS BY ELIG CLIN: HCPCS | Performed by: INTERNAL MEDICINE

## 2024-07-31 PROCEDURE — 1123F ACP DISCUSS/DSCN MKR DOCD: CPT | Performed by: INTERNAL MEDICINE

## 2024-07-31 PROCEDURE — G8399 PT W/DXA RESULTS DOCUMENT: HCPCS | Performed by: INTERNAL MEDICINE

## 2024-07-31 PROCEDURE — 1036F TOBACCO NON-USER: CPT | Performed by: INTERNAL MEDICINE

## 2024-07-31 PROCEDURE — 3075F SYST BP GE 130 - 139MM HG: CPT | Performed by: INTERNAL MEDICINE

## 2024-07-31 ASSESSMENT — PATIENT HEALTH QUESTIONNAIRE - PHQ9
SUM OF ALL RESPONSES TO PHQ QUESTIONS 1-9: 1
1. LITTLE INTEREST OR PLEASURE IN DOING THINGS: NOT AT ALL
SUM OF ALL RESPONSES TO PHQ QUESTIONS 1-9: 1
SUM OF ALL RESPONSES TO PHQ9 QUESTIONS 1 & 2: 1
SUM OF ALL RESPONSES TO PHQ QUESTIONS 1-9: 1
SUM OF ALL RESPONSES TO PHQ QUESTIONS 1-9: 1
2. FEELING DOWN, DEPRESSED OR HOPELESS: SEVERAL DAYS

## 2024-07-31 NOTE — PROGRESS NOTES
[facility]  [address], [address] [place] [id number]  W: [phone]  F: [phone]    Reason for Visit:   Dena Arellano is a 69 y.o. female who is seen for fu    Treatment History:      19 - RIGHT breast lumpectomy x 2 sites and RIGHT SLNB    19 - BILATERAL mastectomies with reconstruction. Dr. Shahid did direct saline implants. Surg path showed LEFT breast benign, residual IDC on RIGHT breast. ER+ HER2 negative.     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 Last seen by us 3/23 per pt preference.   Doing ok overall.       Past Medical History:   Diagnosis Date    Adjustment disorder with mixed anxiety and depressed mood 2020    After death of father  and personal diagnosis of/treatment for breast cancer [id number]; Counselin:  [facility], [facility]     Ankle edema 2010    Anxiety 2010    Benign essential hypertension 2016    2006     Carpal tunnel syndrome 2010    Chronic depression 2020    Colon polyp 2010    Fibroids 2010    H/O bilateral mastectomy 2019    Double Mastectomy (right breast cancer and elective left breast prophylactically); No chemo, No radiation    Hypercholesterolemia 2013    Hypothyroidism 2016    Kidney stone 2010    Mixed hyperlipidemia 2020    Perimenopausal 2010    Primary osteoarthritis of knees, bilateral 2016    Extensive; Ortho (needs knee replacement), CSI x 2, ; CSI 21 Dr. Fabrice Marsh    Primary osteoarthritis of left knee 2016    Extensive; Ortho (needs knee replacement), CSI x 2,     S/P cervical polypectomy 2010    Sleep

## 2024-07-31 NOTE — PROGRESS NOTES
Patient identified with two identification factors, Name and Date of Birth.    Chief Complaint   Patient presents with    Follow-up     Malignant neoplasm of overlapping sites of right female breast        /79 (Site: Left Upper Arm, Position: Sitting, Cuff Size: Large Adult)   Pulse 73   Temp 98.9 °F (37.2 °C) (Temporal)   Resp 19   Ht 1.753 m (5' 9\")   Wt (!) 138.8 kg (306 lb 1.6 oz)   SpO2 95%   BMI 45.20 kg/m²       1. \"Have you been to the ER, urgent care clinic since your last visit?  Hospitalized since your last visit?\" No     2. \"Have you seen or consulted any other health care providers outside of the [facility] since your last visit?\" No

## 2024-08-12 ENCOUNTER — TELEPHONE (OUTPATIENT)
Age: 69
End: 2024-08-12

## 2024-08-21 ENCOUNTER — TELEPHONE (OUTPATIENT)
Age: 69
End: 2024-08-21

## 2024-08-21 ENCOUNTER — CLINICAL DOCUMENTATION (OUTPATIENT)
Age: 69
End: 2024-08-21

## 2024-08-21 DIAGNOSIS — Z17.0 MALIGNANT NEOPLASM OF OVERLAPPING SITES OF RIGHT BREAST IN FEMALE, ESTROGEN RECEPTOR POSITIVE (HCC): ICD-10-CM

## 2024-08-21 DIAGNOSIS — C50.811 MALIGNANT NEOPLASM OF OVERLAPPING SITES OF RIGHT BREAST IN FEMALE, ESTROGEN RECEPTOR POSITIVE (HCC): ICD-10-CM

## 2024-08-21 RX ORDER — ANASTROZOLE 1 MG/1
1 TABLET ORAL DAILY
Qty: 90 TABLET | Refills: 1 | Status: SHIPPED | OUTPATIENT
Start: 2024-08-21

## 2024-08-21 NOTE — TELEPHONE ENCOUNTER
Call to patient to relay Provider message re BCI test results. Supplied RN contact info on [phone].   MCM sent, as well.   Update to Provider.

## 2024-08-21 NOTE — PROGRESS NOTES
BCI report received and reviewed. Please let patient know that BCI showed likely NO benefit to extended adjuvant endocrine therapy. Risk of late distant recurrence is 2.9% regardless of 5 vs 10 total years of adjuvant endocrine therapy. She should continue AI through 12/24 then can stop at 5 years. Can mail copy of report to patient if she wants it

## 2024-08-30 NOTE — TELEPHONE ENCOUNTER
PI of need to get labs done. I told her that the sooner the better. Only a 2 week supply given of med.
Remind pt we changed her dose after her last check in 2-2018 and told her to recheck in 8 weeks, and now we are 4 months later and she still has not had these done. Those lab orders are still pending. Advise to come for this asap. Does not need to fast for those and no appt needed. Please advise. I sent in 2 weeks worth of her Synthroid for now until we get those back so I can see what to do w/ dose. (And remember this is the patient you went back and forth with for weeks because of missed calls last time.  She was very frustrated and wanted it documented in her chart that it is ok to leave her detailed messages about her results, instructions, etc)
no loss of consciousness, no gait abnormality, no headache, no sensory deficits, and no weakness.

## 2024-09-26 ENCOUNTER — OFFICE VISIT (OUTPATIENT)
Age: 69
End: 2024-09-26
Payer: MEDICARE

## 2024-09-26 ENCOUNTER — TELEPHONE (OUTPATIENT)
Age: 69
End: 2024-09-26

## 2024-09-26 VITALS
SYSTOLIC BLOOD PRESSURE: 136 MMHG | HEART RATE: 70 BPM | WEIGHT: 293 LBS | BODY MASS INDEX: 43.4 KG/M2 | OXYGEN SATURATION: 96 % | RESPIRATION RATE: 20 BRPM | TEMPERATURE: 98.4 F | DIASTOLIC BLOOD PRESSURE: 88 MMHG | HEIGHT: 69 IN

## 2024-09-26 DIAGNOSIS — F32.A CHRONIC DEPRESSION: ICD-10-CM

## 2024-09-26 DIAGNOSIS — E66.01 CLASS 3 SEVERE OBESITY DUE TO EXCESS CALORIES WITH SERIOUS COMORBIDITY AND BODY MASS INDEX (BMI) OF 40.0 TO 44.9 IN ADULT: Primary | ICD-10-CM

## 2024-09-26 DIAGNOSIS — E66.01 CLASS 3 SEVERE OBESITY DUE TO EXCESS CALORIES WITH SERIOUS COMORBIDITY AND BODY MASS INDEX (BMI) OF 40.0 TO 44.9 IN ADULT: ICD-10-CM

## 2024-09-26 DIAGNOSIS — E66.813 CLASS 3 SEVERE OBESITY DUE TO EXCESS CALORIES WITH SERIOUS COMORBIDITY AND BODY MASS INDEX (BMI) OF 40.0 TO 44.9 IN ADULT: ICD-10-CM

## 2024-09-26 DIAGNOSIS — R73.9 BLOOD GLUCOSE ELEVATED: ICD-10-CM

## 2024-09-26 DIAGNOSIS — E03.9 ACQUIRED HYPOTHYROIDISM: ICD-10-CM

## 2024-09-26 DIAGNOSIS — G47.33 OBSTRUCTIVE SLEEP APNEA SYNDROME: ICD-10-CM

## 2024-09-26 DIAGNOSIS — E78.2 MIXED HYPERLIPIDEMIA: ICD-10-CM

## 2024-09-26 DIAGNOSIS — I10 BENIGN ESSENTIAL HYPERTENSION: ICD-10-CM

## 2024-09-26 DIAGNOSIS — Z90.13 H/O BILATERAL MASTECTOMY: ICD-10-CM

## 2024-09-26 LAB — HBA1C MFR BLD: 5.7 % (ref 4.8–5.6)

## 2024-09-26 PROCEDURE — 3017F COLORECTAL CA SCREEN DOC REV: CPT | Performed by: FAMILY MEDICINE

## 2024-09-26 PROCEDURE — G8427 DOCREV CUR MEDS BY ELIG CLIN: HCPCS | Performed by: FAMILY MEDICINE

## 2024-09-26 PROCEDURE — 3075F SYST BP GE 130 - 139MM HG: CPT | Performed by: FAMILY MEDICINE

## 2024-09-26 PROCEDURE — 1090F PRES/ABSN URINE INCON ASSESS: CPT | Performed by: FAMILY MEDICINE

## 2024-09-26 PROCEDURE — 1123F ACP DISCUSS/DSCN MKR DOCD: CPT | Performed by: FAMILY MEDICINE

## 2024-09-26 PROCEDURE — 3079F DIAST BP 80-89 MM HG: CPT | Performed by: FAMILY MEDICINE

## 2024-09-26 PROCEDURE — 99214 OFFICE O/P EST MOD 30 MIN: CPT | Performed by: FAMILY MEDICINE

## 2024-09-26 PROCEDURE — 1036F TOBACCO NON-USER: CPT | Performed by: FAMILY MEDICINE

## 2024-09-26 PROCEDURE — G8399 PT W/DXA RESULTS DOCUMENT: HCPCS | Performed by: FAMILY MEDICINE

## 2024-09-26 PROCEDURE — G8417 CALC BMI ABV UP PARAM F/U: HCPCS | Performed by: FAMILY MEDICINE

## 2024-09-26 ASSESSMENT — PATIENT HEALTH QUESTIONNAIRE - PHQ9
SUM OF ALL RESPONSES TO PHQ QUESTIONS 1-9: 0
SUM OF ALL RESPONSES TO PHQ9 QUESTIONS 1 & 2: 0
SUM OF ALL RESPONSES TO PHQ QUESTIONS 1-9: 0
2. FEELING DOWN, DEPRESSED OR HOPELESS: NOT AT ALL
1. LITTLE INTEREST OR PLEASURE IN DOING THINGS: NOT AT ALL
SUM OF ALL RESPONSES TO PHQ QUESTIONS 1-9: 0
SUM OF ALL RESPONSES TO PHQ QUESTIONS 1-9: 0

## 2024-09-26 NOTE — TELEPHONE ENCOUNTER
----- Message from Lizabeth ARORA sent at 9/26/2024  8:07 AM EDT -----  Regarding: FW: [facility] Appointment Request    ----- Message -----  From: Kishan Anna  Sent: 9/25/2024   9:36 AM EDT  To: [facility] Clinical Staff  Subject: E[facility] Appointment Request                          E[facility] Appointment Request    Patient needs appointment for E[facility] Appointment Type: Annual Visit.    Patient Requested Dates(s): November or December  Patient Requested Time: Mornings  Provider Name: Georgia Alcazar MD    Reason for Appointment Request: Established Patient - Available appointments did not meet patient need  --------------------------------------------------------------------------------------------------------------------------    Relationship to Patient: Self     Call Back Information: OK to leave message on voicemail  Preferred Call Back Number: Phone [phone]

## 2024-09-27 LAB
ALBUMIN SERPL-MCNC: 4.3 G/DL (ref 3.9–4.9)
ALP SERPL-CCNC: 90 IU/L (ref 44–121)
ALT SERPL-CCNC: 21 IU/L (ref 0–32)
AST SERPL-CCNC: 17 IU/L (ref 0–40)
BILIRUB SERPL-MCNC: 0.6 MG/DL (ref 0–1.2)
BUN SERPL-MCNC: 15 MG/DL (ref 8–27)
BUN/CREAT SERPL: 19 (ref 12–28)
CALCIUM SERPL-MCNC: 9.7 MG/DL (ref 8.7–10.3)
CHLORIDE SERPL-SCNC: 102 MMOL/L (ref 96–106)
CHOLEST SERPL-MCNC: 165 MG/DL (ref 100–199)
CO2 SERPL-SCNC: 23 MMOL/L (ref 20–29)
CREAT SERPL-MCNC: 0.78 MG/DL (ref 0.57–1)
EGFRCR SERPLBLD CKD-EPI 2021: 82 ML/MIN/1.73
GLOBULIN SER CALC-MCNC: 1.9 G/DL (ref 1.5–4.5)
GLUCOSE SERPL-MCNC: 101 MG/DL (ref 70–99)
HDLC SERPL-MCNC: 54 MG/DL
LDLC SERPL CALC-MCNC: 87 MG/DL (ref 0–99)
POTASSIUM SERPL-SCNC: 4.6 MMOL/L (ref 3.5–5.2)
PROT SERPL-MCNC: 6.2 G/DL (ref 6–8.5)
SODIUM SERPL-SCNC: 140 MMOL/L (ref 134–144)
TRIGL SERPL-MCNC: 138 MG/DL (ref 0–149)
TSH SERPL DL<=0.005 MIU/L-ACNC: 0.8 UIU/ML (ref 0.45–4.5)
VLDLC SERPL CALC-MCNC: 24 MG/DL (ref 5–40)

## 2024-10-01 ENCOUNTER — OFFICE VISIT (OUTPATIENT)
Age: 69
End: 2024-10-01

## 2024-10-01 ENCOUNTER — TELEMEDICINE (OUTPATIENT)
Age: 69
End: 2024-10-01

## 2024-10-01 DIAGNOSIS — E66.813 CLASS 3 SEVERE OBESITY DUE TO EXCESS CALORIES WITH SERIOUS COMORBIDITY AND BODY MASS INDEX (BMI) OF 40.0 TO 44.9 IN ADULT: Primary | ICD-10-CM

## 2024-10-01 DIAGNOSIS — E66.01 CLASS 3 SEVERE OBESITY DUE TO EXCESS CALORIES WITH SERIOUS COMORBIDITY AND BODY MASS INDEX (BMI) OF 40.0 TO 44.9 IN ADULT: Primary | ICD-10-CM

## 2024-10-01 NOTE — PROGRESS NOTES
[facility]  Metabolic Program Initial Nutrition Consult    Date: 10/1/2024   Physician: Geetha Rowley MD    Name: Dena Arellano  :  1955    Type of Plan: LCD    Weeks on Plan: week 1  Virtual visit was completed through Zoom.    ASSESSMENT:      Medications/Supplements:   Prior to Admission medications    Medication Sig Start Date End Date Taking? Authorizing Provider   anastrozole (ARIMIDEX) 1 MG tablet Take 1 tablet by mouth daily 24   Catherine Malin APRN - NP   fluticasone (FLONASE) 50 MCG/ACT nasal spray SHAKE BOTTLE AND USE 1 SPRAY IN EACH NOSTRIL EVERY DAY 24   Georgia Alcazar MD   levothyroxine (SYNTHROID) 200 MCG tablet TAKE 1 TABLET BY MOUTH DAILY BEFORE BREAKFAST 3/10/24   Georgia Alcazar MD   rosuvastatin (CRESTOR) 10 MG tablet TAKE 1 TABLET BY MOUTH EVERY NIGHT 24   Georgia Alcazar MD   FLUoxetine (PROZAC) 20 MG capsule TAKE 1 CAPSULE BY MOUTH DAILY 11/10/23   Georgia Alcazar MD   buPROPion (WELLBUTRIN XL) 150 MG extended release tablet TAKE 1 TABLET BY MOUTH DAILY BEFORE BREAKFAST 11/10/23   Georgia Alcazar MD   losartan (COZAAR) 100 MG tablet TAKE 1 TABLET BY MOUTH DAILY 11/10/23   Georgia Alcazar MD   Ascorbic Acid (VITAMIN C) 1000 MG tablet Take 1 tablet by mouth daily    ProviderCandido MD   Multiple Vitamin (MULTI VITAMIN PO) Take 1 tablet by mouth daily Centrum silver for women    ProviderCandido MD   Acetaminophen (TYLENOL PO) Take by mouth nightly as needed (for knee pain)    Candido Lyn MD   TURMERIC PO Take by mouth daily    Automatic Reconciliation, Ar   azelastine HCl 0.15 % SOLN 1 spray by Nasal route 2 times daily    Automatic Reconciliation, Ar   cetirizine (ZYRTEC) 10 MG tablet Take 1 tablet by mouth daily    Automatic Reconciliation, Ar   Cholecalciferol 50 MCG ( UT) TABS Take 1 tablet by mouth daily    Automatic Reconciliation, Ar         Anthropometrics:

## 2024-10-01 NOTE — PROGRESS NOTES
[facility]  Metabolic Weight Loss Program        Patient's Name: Dena Arellano  : 1955    This patient is a participant at [facility] and attended the weekly virtual nutrition class hosted via [employer].      Lourdes Padilla, MS, RD, LDN

## 2024-10-08 ENCOUNTER — OFFICE VISIT (OUTPATIENT)
Age: 69
End: 2024-10-08

## 2024-10-08 DIAGNOSIS — E66.01 CLASS 3 SEVERE OBESITY DUE TO EXCESS CALORIES WITH SERIOUS COMORBIDITY AND BODY MASS INDEX (BMI) OF 40.0 TO 44.9 IN ADULT: Primary | ICD-10-CM

## 2024-10-08 DIAGNOSIS — E66.813 CLASS 3 SEVERE OBESITY DUE TO EXCESS CALORIES WITH SERIOUS COMORBIDITY AND BODY MASS INDEX (BMI) OF 40.0 TO 44.9 IN ADULT: Primary | ICD-10-CM

## 2024-10-08 NOTE — PROGRESS NOTES
[facility]  Metabolic Weight Loss Program        Patient's Name: Dena Arellano  : 1955    This patient is a participant at [facility] and attended the weekly virtual nutrition class hosted via [employer].      Lourdes Padilla, MS, RD, LDN

## 2024-10-09 ENCOUNTER — NURSE ONLY (OUTPATIENT)
Age: 69
End: 2024-10-09

## 2024-10-09 VITALS
BODY MASS INDEX: 43.29 KG/M2 | HEIGHT: 69 IN | RESPIRATION RATE: 20 BRPM | SYSTOLIC BLOOD PRESSURE: 124 MMHG | DIASTOLIC BLOOD PRESSURE: 80 MMHG | TEMPERATURE: 98.1 F | OXYGEN SATURATION: 98 % | HEART RATE: 71 BPM | WEIGHT: 292.3 LBS

## 2024-10-09 DIAGNOSIS — F32.A CHRONIC DEPRESSION: ICD-10-CM

## 2024-10-09 DIAGNOSIS — E66.813 CLASS 3 SEVERE OBESITY DUE TO EXCESS CALORIES WITH SERIOUS COMORBIDITY AND BODY MASS INDEX (BMI) OF 40.0 TO 44.9 IN ADULT: Primary | ICD-10-CM

## 2024-10-09 DIAGNOSIS — E03.9 ACQUIRED HYPOTHYROIDISM: ICD-10-CM

## 2024-10-09 DIAGNOSIS — I10 BENIGN ESSENTIAL HYPERTENSION: ICD-10-CM

## 2024-10-09 DIAGNOSIS — G47.33 OBSTRUCTIVE SLEEP APNEA SYNDROME: ICD-10-CM

## 2024-10-09 DIAGNOSIS — E66.01 CLASS 3 SEVERE OBESITY DUE TO EXCESS CALORIES WITH SERIOUS COMORBIDITY AND BODY MASS INDEX (BMI) OF 40.0 TO 44.9 IN ADULT: Primary | ICD-10-CM

## 2024-10-09 ASSESSMENT — PATIENT HEALTH QUESTIONNAIRE - PHQ9
SUM OF ALL RESPONSES TO PHQ QUESTIONS 1-9: 0
SUM OF ALL RESPONSES TO PHQ QUESTIONS 1-9: 0
SUM OF ALL RESPONSES TO PHQ9 QUESTIONS 1 & 2: 0
SUM OF ALL RESPONSES TO PHQ QUESTIONS 1-9: 0
2. FEELING DOWN, DEPRESSED OR HOPELESS: NOT AT ALL
SUM OF ALL RESPONSES TO PHQ QUESTIONS 1-9: 0
1. LITTLE INTEREST OR PLEASURE IN DOING THINGS: NOT AT ALL

## 2024-10-09 NOTE — PROGRESS NOTES
Identified pt with two pt identifiers (name and ). Reviewed chart in preparation for visit and have obtained necessary documentation.    Dena Arellano is a 69 y.o. female  Chief Complaint   Patient presents with    Weight Management     /80 (Site: Right Upper Arm, Position: Sitting, Cuff Size: Large Adult)   Pulse 71   Temp 98.1 °F (36.7 °C) (Oral)   Resp 20   Ht 1.753 m (5' 9\")   Wt 132.6 kg (292 lb 4.8 oz)   SpO2 98%   BMI 43.17 kg/m²     1. Have you been to the ER, urgent care clinic since your last visit?  Hospitalized since your last visit?no    2. Have you seen or consulted any other health care providers outside of the [facility] since your last visit?  Include any pap smears or colon screening. no

## 2024-10-09 NOTE — PROGRESS NOTES
Progress Note: Weekly Education Class in the [facility]         Patient is on Very Low Calorie Diet [] (4 meal replacements per day, 800 kcal/day)      Low Calorie Diet [x] (2-3 meal replacements per day, 2362-2245 kcal/day)    1) Did patient have any new symptoms or physical problems?   Yes [x]    No []    If yes, check & comment: weakness [], fatigue [x], lightheadedness [], headache [], cramps [], cold intolerance [], hair loss [], diarrhea [], constipation [],  NA [] other: my joints seem to be worse than usual                                2) Has patient had any medical attention from other providers, urgent care or the emergency room this week?  Yes []  No [x]       NA [], If yes, why:                                       3) Any other sugar sweetened beverages consumed this week?   Yes []  No [x]    4) Did patient have any problems adhering to the diet? Yes []  No [x] NA []    If yes, Vacation [], Celebrations [], Conferences [], Family Reunions [] other:                                                5) How many hours of sleep this week? 8    (range)  NA []    Number of meal replacements consumed daily? 3 (range)  NA []    Average ounces of water patient consumed daily this week (not including shakes)? 80     (divide the weekly total by 7)    Did you eat any food outside of the program? Yes [] No []JEAN-PAUL    Physical Activity Over the Past Week:    Cardio exercise: 0 min  Strength exercise: 0 workouts / week  Number of steps walked per day: 0    How has patient mood overall been this week? Sad [], Happy [], Stressed [], Tired [x], Content [], NA [], other more than usual           Medications reconciled by nurse Yes [x]  No[]    Patient was given therapeutic recommendations for any noted side effects of their dietary approach based upon [facility] patient manual per providers recommendation.

## 2024-10-15 ENCOUNTER — OFFICE VISIT (OUTPATIENT)
Age: 69
End: 2024-10-15

## 2024-10-15 DIAGNOSIS — E66.01 CLASS 3 SEVERE OBESITY DUE TO EXCESS CALORIES WITH SERIOUS COMORBIDITY AND BODY MASS INDEX (BMI) OF 40.0 TO 44.9 IN ADULT: Primary | ICD-10-CM

## 2024-10-15 DIAGNOSIS — E66.813 CLASS 3 SEVERE OBESITY DUE TO EXCESS CALORIES WITH SERIOUS COMORBIDITY AND BODY MASS INDEX (BMI) OF 40.0 TO 44.9 IN ADULT: Primary | ICD-10-CM

## 2024-10-15 NOTE — PROGRESS NOTES
[facility]  Metabolic Weight Loss Program        Patient's Name: Dena Arellano  : 1955    This patient is a participant at [facility] and attended the weekly virtual nutrition class hosted via [employer]      Lourdes Padilla, MS, RD, LDN

## 2024-10-22 ENCOUNTER — OFFICE VISIT (OUTPATIENT)
Age: 69
End: 2024-10-22

## 2024-10-22 DIAGNOSIS — E66.01 CLASS 3 SEVERE OBESITY DUE TO EXCESS CALORIES WITH SERIOUS COMORBIDITY AND BODY MASS INDEX (BMI) OF 40.0 TO 44.9 IN ADULT: Primary | ICD-10-CM

## 2024-10-22 DIAGNOSIS — E66.813 CLASS 3 SEVERE OBESITY DUE TO EXCESS CALORIES WITH SERIOUS COMORBIDITY AND BODY MASS INDEX (BMI) OF 40.0 TO 44.9 IN ADULT: Primary | ICD-10-CM

## 2024-10-22 NOTE — PROGRESS NOTES
[facility]  Metabolic Weight Loss Program        Patient's Name: Dena Arellano  : 1955    This patient is a participant at [facility] and attended the weekly virtual nutrition class hosted via [employer].      Lourdes Padilla, MS, RD, LDN

## 2024-10-24 ENCOUNTER — TELEPHONE (OUTPATIENT)
Age: 69
End: 2024-10-24

## 2024-10-24 NOTE — TELEPHONE ENCOUNTER
Returned call to patient and verified using 2 patient identifiers. Pt states that she has been feeling sick nauseated this morning body aching, queasy stomach and feels feverish.  Pt states that she may have picked up some type of bug as some of her family members are sick as well with the same symptoms.  She states that she may not be able to tolerate the meal replacements while feeling like this.  She was not sure what she can eat. Pt was informed if she was not feeling well or having an upset stomach she should just consume what feels ok to her, i.e crackers, soup etc. She can try to start the replacement products again once she is feeling better.     Pt also advised that if she is not feeling well she may want to contact her PCP. Pt voiced understandings and will make them aware.

## 2024-10-24 NOTE — TELEPHONE ENCOUNTER
Patient called stating she woke up with nausea and vomiting    She would like to speak with a nurse about if the products would be something that can settle her stomach or what options she has    Requests a call back

## 2024-10-28 ENCOUNTER — HOSPITAL ENCOUNTER (OUTPATIENT)
Facility: HOSPITAL | Age: 69
Setting detail: OUTPATIENT SURGERY
Discharge: HOME | End: 2024-10-28
Attending: INTERNAL MEDICINE | Admitting: INTERNAL MEDICINE
Payer: MEDICARE

## 2024-10-28 ENCOUNTER — ANESTHESIA (OUTPATIENT)
Facility: HOSPITAL | Age: 69
End: 2024-10-28
Payer: MEDICARE

## 2024-10-28 ENCOUNTER — ANESTHESIA EVENT (OUTPATIENT)
Facility: HOSPITAL | Age: 69
End: 2024-10-28
Payer: MEDICARE

## 2024-10-28 VITALS
BODY MASS INDEX: 41.62 KG/M2 | HEIGHT: 69 IN | DIASTOLIC BLOOD PRESSURE: 58 MMHG | WEIGHT: 281 LBS | TEMPERATURE: 98.2 F | HEART RATE: 72 BPM | SYSTOLIC BLOOD PRESSURE: 107 MMHG | RESPIRATION RATE: 20 BRPM | OXYGEN SATURATION: 99 %

## 2024-10-28 PROCEDURE — 3700000000 HC ANESTHESIA ATTENDED CARE: Performed by: INTERNAL MEDICINE

## 2024-10-28 PROCEDURE — 3700000001 HC ADD 15 MINUTES (ANESTHESIA): Performed by: INTERNAL MEDICINE

## 2024-10-28 PROCEDURE — 6360000002 HC RX W HCPCS: Performed by: NURSE ANESTHETIST, CERTIFIED REGISTERED

## 2024-10-28 PROCEDURE — 3600007512: Performed by: INTERNAL MEDICINE

## 2024-10-28 PROCEDURE — 7100000010 HC PHASE II RECOVERY - FIRST 15 MIN: Performed by: INTERNAL MEDICINE

## 2024-10-28 PROCEDURE — 2500000003 HC RX 250 WO HCPCS: Performed by: NURSE ANESTHETIST, CERTIFIED REGISTERED

## 2024-10-28 PROCEDURE — 2580000003 HC RX 258: Performed by: INTERNAL MEDICINE

## 2024-10-28 PROCEDURE — 3600007502: Performed by: INTERNAL MEDICINE

## 2024-10-28 PROCEDURE — 2709999900 HC NON-CHARGEABLE SUPPLY: Performed by: INTERNAL MEDICINE

## 2024-10-28 PROCEDURE — 88305 TISSUE EXAM BY PATHOLOGIST: CPT

## 2024-10-28 RX ORDER — LIDOCAINE HYDROCHLORIDE 20 MG/ML
INJECTION, SOLUTION EPIDURAL; INFILTRATION; INTRACAUDAL; PERINEURAL
Status: DISCONTINUED | OUTPATIENT
Start: 2024-10-28 | End: 2024-10-28 | Stop reason: SDUPTHER

## 2024-10-28 RX ORDER — PHENYLEPHRINE HCL IN 0.9% NACL 0.4MG/10ML
SYRINGE (ML) INTRAVENOUS
Status: DISCONTINUED | OUTPATIENT
Start: 2024-10-28 | End: 2024-10-28 | Stop reason: SDUPTHER

## 2024-10-28 RX ORDER — SODIUM CHLORIDE 0.9 % (FLUSH) 0.9 %
5-40 SYRINGE (ML) INJECTION EVERY 12 HOURS SCHEDULED
Status: DISCONTINUED | OUTPATIENT
Start: 2024-10-28 | End: 2024-10-28 | Stop reason: HOSPADM

## 2024-10-28 RX ORDER — SODIUM CHLORIDE 9 MG/ML
INJECTION, SOLUTION INTRAVENOUS CONTINUOUS
Status: DISCONTINUED | OUTPATIENT
Start: 2024-10-28 | End: 2024-10-28 | Stop reason: HOSPADM

## 2024-10-28 RX ORDER — SODIUM CHLORIDE 9 MG/ML
INJECTION, SOLUTION INTRAVENOUS PRN
Status: DISCONTINUED | OUTPATIENT
Start: 2024-10-28 | End: 2024-10-28 | Stop reason: HOSPADM

## 2024-10-28 RX ORDER — SODIUM CHLORIDE 0.9 % (FLUSH) 0.9 %
5-40 SYRINGE (ML) INJECTION PRN
Status: DISCONTINUED | OUTPATIENT
Start: 2024-10-28 | End: 2024-10-28 | Stop reason: HOSPADM

## 2024-10-28 RX ADMIN — SODIUM CHLORIDE, PRESERVATIVE FREE 5 ML: 5 INJECTION INTRAVENOUS at 14:31

## 2024-10-28 RX ADMIN — LIDOCAINE HYDROCHLORIDE 40 MG: 20 INJECTION, SOLUTION EPIDURAL; INFILTRATION; INTRACAUDAL; PERINEURAL at 14:20

## 2024-10-28 RX ADMIN — PROPOFOL 50 MG: 10 INJECTION, EMULSION INTRAVENOUS at 14:20

## 2024-10-28 RX ADMIN — PROPOFOL 25 MG: 10 INJECTION, EMULSION INTRAVENOUS at 14:29

## 2024-10-28 RX ADMIN — PROPOFOL 50 MG: 10 INJECTION, EMULSION INTRAVENOUS at 14:22

## 2024-10-28 RX ADMIN — PROPOFOL 50 MG: 10 INJECTION, EMULSION INTRAVENOUS at 14:21

## 2024-10-28 RX ADMIN — PROPOFOL 50 MG: 10 INJECTION, EMULSION INTRAVENOUS at 14:26

## 2024-10-28 RX ADMIN — SODIUM CHLORIDE, PRESERVATIVE FREE 5 ML: 5 INJECTION INTRAVENOUS at 14:24

## 2024-10-28 RX ADMIN — Medication 120 MCG: at 14:31

## 2024-10-28 RX ADMIN — PROPOFOL 50 MG: 10 INJECTION, EMULSION INTRAVENOUS at 14:24

## 2024-10-28 ASSESSMENT — PAIN - FUNCTIONAL ASSESSMENT
PAIN_FUNCTIONAL_ASSESSMENT: NONE - DENIES PAIN

## 2024-10-28 NOTE — DISCHARGE INSTRUCTIONS
[facility]  [address], [place] [address]    COLON DISCHARGE INSTRUCTIONS    Dena Arellano  [id number]  1955    Discomfort:  Redness at IV site- apply warm compress to area; if redness or soreness persist- contact your physician  There may be a slight amount of blood passed from the rectum  Gaseous discomfort- walking, belching will help relieve any discomfort  You may not operate a vehicle for 12 hours  You may not engage in an occupation involving machinery or appliances for rest of today  You may not drink alcoholic beverages for at least 12 hours  Avoid making any critical decisions for at least 24 hour  DIET:  You may resume your regular diet - however -  remember your colon is empty and a heavy meal will produce gas.  Avoid these foods:  vegetables, fried / greasy foods, carbonated drinks     ACTIVITY:  You may  resume your normal daily activities it is recommended that you spend the remainder of the day resting -  avoid any strenuous activity.    CALL M.D.  ANY SIGN OF:   Increasing pain, nausea, vomiting  Abdominal distension (swelling)  New increased bleeding (oral or rectal)  Fever (chills)  Pain in chest area  Bloody discharge from nose or mouth  Shortness of breath      Follow-up Instructions:   Call Dr. Sonny Gonzalez for any questions or problems at [phone]      ENDOSCOPY FINDINGS:   Your colonoscopy showed a polyp, which was removed. We will contact you about the pathology results and when your next colonoscopy will be due. Please maintain a high fiber diet.  Telephone # [phone]      Signed By: Sonny Gonzalez MD     10/28/2024  2:40 PM

## 2024-10-28 NOTE — ANESTHESIA POSTPROCEDURE EVALUATION
Department of Anesthesiology  Postprocedure Note    Patient: Dena Arellano  MRN: [id number]  YOB: 1955  Date of evaluation: 10/28/2024    Procedure Summary       Date: 10/28/24 Room / Location: [place] [address] / [facility] ENDOSCOPY    Anesthesia Start: 1419 Anesthesia Stop: 1435    Procedure: COLONOSCOPY (Lower GI Region) Diagnosis:       Positive fecal occult blood test      (Positive fecal occult blood test [R19.5])    Surgeons: Sonny Gonzalez MD Responsible Provider: Daniela Amezquita DO    Anesthesia Type: MAC ASA Status: 2            Anesthesia Type: No value filed.    Chastity Phase I: Chastity Score: 10    Chastity Phase II: Chastity Score: 9    Anesthesia Post Evaluation    Patient location during evaluation: PACU  Patient participation: complete - patient participated  Level of consciousness: awake and alert  Pain score: 0  Airway patency: patent  Nausea & Vomiting: no nausea and no vomiting  Cardiovascular status: blood pressure returned to baseline and hemodynamically stable  Respiratory status: acceptable and room air  Hydration status: euvolemic  Multimodal analgesia pain management approach  Pain management: adequate and satisfactory to patient    No notable events documented.

## 2024-10-28 NOTE — H&P
AD[facility] [address] [address]        History and Physical       NAME:  Dena Arellano   :   1955   MRN:   [id number]             History of Present Illness:  Patient is a 69 y.o. who is seen for occult positive stool.     PMH:  Past Medical History:   Diagnosis Date    Adjustment disorder with mixed anxiety and depressed mood 2020    After death of father  and personal diagnosis of/treatment for breast cancer [id number]; Counselin:  [facility], [facility]     Ankle edema 2010    Anxiety 2010    Benign essential hypertension 2016    2006     Carpal tunnel syndrome 2010    Chronic depression 2020    Colon polyp 2010    Fibroids 2010    H/O bilateral mastectomy 2019    Double Mastectomy (right breast cancer and elective left breast prophylactically); No chemo, No radiation    Hypercholesterolemia 2013    Hypothyroidism 2016    Kidney stone 2010    Mixed hyperlipidemia 2020    Perimenopausal 2010    Primary osteoarthritis of knees, bilateral 2016    Extensive; Ortho (needs knee replacement), CSI x 2, ; CSI 21 Dr. Fabrice Marsh    Primary osteoarthritis of left knee 2016    Extensive; Ortho (needs knee replacement), CSI x 2,     S/P cervical polypectomy 2010    Sleep apnea 2010    USES CPAP    Status post right breast lumpectomy 2020, Invasive ductal carcinoma--->Mastectomy 2019, no chemo, no radiation, started on Arimidex    Stress incontinence 2010       PSH:  Past Surgical History:   Procedure Laterality Date    BLADDER SUSPENSION      2010    BREAST BIOPSY Right 2011    VPI, right breast biopsy negative    BREAST BIOPSY Right 2019    invasive ductal carcinoma    BREAST LUMPECTOMY Right 2019    RIGHT BREAST LUMPECTOMY ( x2 ) WTIH ULTRASOUND , RIGHT SENTINAL NODE

## 2024-10-28 NOTE — PERIOP NOTE
Initial RN admission and assessment performed and documented in Endoscopy navigator.     Patient evaluated by anesthesia in pre-procedure holding.     All procedural vital signs, airway assessment, and level of consciousness information monitored and recorded by anesthesia staff on the anesthesia record.     Report received from CRNA post procedure.  Patient transported to recovery area by RN.    Endoscopy post procedure time out was performed and specimens were verified with physician.    Endoscope was pre-cleaned at bedside immediately following procedure by BAM Valentine

## 2024-10-28 NOTE — OP NOTE
[place] [address] [place], [place] [address]        Colonoscopy Operative Report    Dena Arellano  [id number]  1955      Procedure Type:   Colonoscopy with polypectomy (cold biopsy)     Indications:    Occult blood in stool         Pre-operative Diagnosis: see indication above    Post-operative Diagnosis:  See findings below    :  Sonny Gonzalze MD    Staff: Circulator: Ludy Strauss RN  Endoscopy Technician: Marck May     Referring Provider: Georgia Alcazar MD      Sedation:  MAC      Procedure Details:  After informed consent was obtained with all risks and benefits of procedure explained and preoperative exam completed, the patient was taken to the endoscopy suite and placed in the left lateral decubitus position.  Upon sequential sedation as per above, a digital rectal exam was performed demonstrating internal hemorrhoids.  The Olympus pediatric videocolonoscope  was inserted in the rectum and carefully advanced to the terminal ileum.  The cecum was identified by the ileocecal valve and appendiceal orifice.  The quality of preparation was good.  The colonoscope was slowly withdrawn with careful evaluation between folds. Retroflexion in the rectum was completed .     Findings:   Mild left-sided diverticulosis. Single 2 mm sessile ascending colon polyp - removed with cold biopsy forceps.      Specimen Removed:  1. Ascending colon polyp    Complications: None.     EBL:  None.    Impression:     As above    Recommendations:  Follow up surgical pathology  High fiber diet education  Repeat colonoscopy in 5 years.    Signed By: Sonny Gonzalez MD     10/28/2024  2:41 PM

## 2024-10-28 NOTE — ANESTHESIA PRE PROCEDURE
Department of Anesthesiology  Preprocedure Note       Name:  Dena Arellano   Age:  69 y.o.  :  1955                                          MRN:  [id number]         Date:  10/28/2024      Surgeon: Surgeon(s):  Sonny Gonzalez MD    Procedure: Procedure(s):  COLONOSCOPY    Medications prior to admission:   Prior to Admission medications    Medication Sig Start Date End Date Taking? Authorizing Provider   anastrozole (ARIMIDEX) 1 MG tablet Take 1 tablet by mouth daily 24  Yes Catherine Malin APRN - NP   fluticasone (FLONASE) 50 MCG/ACT nasal spray SHAKE BOTTLE AND USE 1 SPRAY IN EACH NOSTRIL EVERY DAY 24  Yes Georgia Alcazar MD   levothyroxine (SYNTHROID) 200 MCG tablet TAKE 1 TABLET BY MOUTH DAILY BEFORE BREAKFAST 3/10/24  Yes Georgia Alcazar MD   rosuvastatin (CRESTOR) 10 MG tablet TAKE 1 TABLET BY MOUTH EVERY NIGHT 24  Yes Georgia Alcazar MD   FLUoxetine (PROZAC) 20 MG capsule TAKE 1 CAPSULE BY MOUTH DAILY 11/10/23  Yes Georgia Alcazar MD   buPROPion (WELLBUTRIN XL) 150 MG extended release tablet TAKE 1 TABLET BY MOUTH DAILY BEFORE BREAKFAST 11/10/23  Yes Georgia Alcazar MD   losartan (COZAAR) 100 MG tablet TAKE 1 TABLET BY MOUTH DAILY 11/10/23  Yes Georgia Alcazar MD   Ascorbic Acid (VITAMIN C) 1000 MG tablet Take 1 tablet by mouth daily   Yes Provider, MD Candido   Multiple Vitamin (MULTI VITAMIN PO) Take 1 tablet by mouth daily Centrum silver for women   Yes ProviderCandido MD   Acetaminophen (TYLENOL PO) Take by mouth nightly as needed (for knee pain)   Yes Provider, MD Candido   TURMERIC PO Take by mouth daily   Yes Automatic Reconciliation, Ar   azelastine HCl 0.15 % SOLN 1 spray by Nasal route 2 times daily   Yes Automatic Reconciliation, Ar   cetirizine (ZYRTEC) 10 MG tablet Take 1 tablet by mouth daily   Yes Automatic Reconciliation, Ar   Cholecalciferol 50 MCG ( UT) TABS Take 1 tablet by mouth daily   Yes

## 2024-10-28 NOTE — TELEPHONE ENCOUNTER
Last appointment: OV 10/23/23, VV 11/3/23 MD ROBLEOR, labs 9/2024 by weight heather provider  Next appointment: 4/10/25 MD ROBLERO  Previous refill encounter(s):   Fluoxetine 11/10/23 90 + 3  Rosuvastatin 1/26/24 90 + 2    Requested Prescriptions     Pending Prescriptions Disp Refills    FLUoxetine (PROZAC) 20 MG capsule 90 capsule 3     Sig: Take 1 capsule by mouth daily    rosuvastatin (CRESTOR) 10 MG tablet 90 tablet 3     Sig: Take 1 tablet by mouth nightly     For Pharmacy Admin Tracking Only    Program: Medication Refill  CPA in place:    Recommendation Provided To:   Intervention Detail: New Rx: 2, reason: Patient Preference  Intervention Accepted By:   Gap Closed?:    Time Spent (min): 5

## 2024-10-29 ENCOUNTER — OFFICE VISIT (OUTPATIENT)
Age: 69
End: 2024-10-29

## 2024-10-29 DIAGNOSIS — E66.813 CLASS 3 SEVERE OBESITY DUE TO EXCESS CALORIES WITH SERIOUS COMORBIDITY AND BODY MASS INDEX (BMI) OF 40.0 TO 44.9 IN ADULT: Primary | ICD-10-CM

## 2024-10-29 DIAGNOSIS — E66.01 CLASS 3 SEVERE OBESITY DUE TO EXCESS CALORIES WITH SERIOUS COMORBIDITY AND BODY MASS INDEX (BMI) OF 40.0 TO 44.9 IN ADULT: Primary | ICD-10-CM

## 2024-10-29 NOTE — TELEPHONE ENCOUNTER
I have not seen patient since  and that was virtual visit.  Last in office appt was .  She is not scheduled to see me again until 4-2025 which is too far out. She hasn't seen me at all this year.  Needs in office appointment asap. Book as Medicare AWV for  in office.   I will update rx at that time unless she needs some to last til then. Let me know.

## 2024-10-30 RX ORDER — ROSUVASTATIN CALCIUM 10 MG/1
10 TABLET, COATED ORAL NIGHTLY
Qty: 30 TABLET | Refills: 0 | Status: SHIPPED | OUTPATIENT
Start: 2024-10-30 | End: 2024-11-15 | Stop reason: SDUPTHER

## 2024-10-30 NOTE — PROGRESS NOTES
[facility]  Metabolic Weight Loss Program        Patient's Name: Dena Arellano  : 1955    This patient is a participant at [facility] and attended the weekly virtual nutrition class hosted via [employer].      Lourdes Padilla, MS, RD, LDN

## 2024-10-30 NOTE — TELEPHONE ENCOUNTER
Pt's been re-scheduled to see  on 11/15/24 @ 8:40 AM for he AMW.Pt did say that she is completley out of her Crestor.

## 2024-10-30 NOTE — TELEPHONE ENCOUNTER
Patient has re-scheduled appt for 11/15/24 per message below.      Stated is out of rosuvastatin.  (Went ahead and provided both for 30)  ThanksMargie    Next appointment: 11/15/24 MD ROBLERO  Previous refill encounter(s):   Fluoxetine 11/10/23 90 + 3  Rosuvastatin 1/26/24 90 + 2    Requested Prescriptions     Pending Prescriptions Disp Refills    FLUoxetine (PROZAC) 20 MG capsule 30 capsule 0     Sig: Take 1 capsule by mouth daily    rosuvastatin (CRESTOR) 10 MG tablet 30 tablet 0     Sig: Take 1 tablet by mouth nightly     For Pharmacy Admin Tracking Only    Program: Medication Refill  CPA in place:    Recommendation Provided To:   Intervention Detail: New Rx: 2, reason: Patient Preference  Intervention Accepted By:   Gap Closed?:    Time Spent (min): 5

## 2024-10-31 ENCOUNTER — OFFICE VISIT (OUTPATIENT)
Age: 69
End: 2024-10-31

## 2024-10-31 VITALS
OXYGEN SATURATION: 95 % | HEART RATE: 70 BPM | DIASTOLIC BLOOD PRESSURE: 68 MMHG | HEIGHT: 69 IN | TEMPERATURE: 98 F | SYSTOLIC BLOOD PRESSURE: 110 MMHG | RESPIRATION RATE: 20 BRPM | BODY MASS INDEX: 41.69 KG/M2 | WEIGHT: 281.5 LBS

## 2024-10-31 DIAGNOSIS — R73.9 BLOOD GLUCOSE ELEVATED: ICD-10-CM

## 2024-10-31 DIAGNOSIS — E66.813 CLASS 3 SEVERE OBESITY DUE TO EXCESS CALORIES WITH SERIOUS COMORBIDITY AND BODY MASS INDEX (BMI) OF 40.0 TO 44.9 IN ADULT: Primary | ICD-10-CM

## 2024-10-31 DIAGNOSIS — E66.01 CLASS 3 SEVERE OBESITY DUE TO EXCESS CALORIES WITH SERIOUS COMORBIDITY AND BODY MASS INDEX (BMI) OF 40.0 TO 44.9 IN ADULT: Primary | ICD-10-CM

## 2024-10-31 DIAGNOSIS — I10 BENIGN ESSENTIAL HYPERTENSION: ICD-10-CM

## 2024-10-31 DIAGNOSIS — E78.2 MIXED HYPERLIPIDEMIA: ICD-10-CM

## 2024-10-31 DIAGNOSIS — E03.9 ACQUIRED HYPOTHYROIDISM: ICD-10-CM

## 2024-10-31 DIAGNOSIS — G47.33 OBSTRUCTIVE SLEEP APNEA SYNDROME: ICD-10-CM

## 2024-10-31 ASSESSMENT — PATIENT HEALTH QUESTIONNAIRE - PHQ9
SUM OF ALL RESPONSES TO PHQ QUESTIONS 1-9: 0
1. LITTLE INTEREST OR PLEASURE IN DOING THINGS: NOT AT ALL
SUM OF ALL RESPONSES TO PHQ QUESTIONS 1-9: 0
SUM OF ALL RESPONSES TO PHQ QUESTIONS 1-9: 0
2. FEELING DOWN, DEPRESSED OR HOPELESS: NOT AT ALL
SUM OF ALL RESPONSES TO PHQ QUESTIONS 1-9: 0
SUM OF ALL RESPONSES TO PHQ9 QUESTIONS 1 & 2: 0

## 2024-10-31 NOTE — PATIENT INSTRUCTIONS
cater to individuals of all fitness levels.  VALENTIN Ferreira [facility]  [phone]  [email or web address]  Sit Fit w/ Camila 12pm  A fun chair and floor class for all ages. We will move and groove to beats from the 70s, 80s and 90s while getting fit!  [facility]  [phone]  [email or web address]  Reboot w/ Shira 5:30pm  A calorie-burning, dance fitness party combining all elements of fitness- cardio, muscle conditioning, balance and flexibility!  [facility] [employer]  [phone]  [email or web address]  Feeling Fit w/ Pat 6pm  Fun low impact to moderate workout to include Azeb, dance, mat and resistance training, All fitness levels are welcome.  Nita Donaldson [facility]  [phone]  [email or web address]  [place]ano w/ Piper 6pm  A calorie-burning, dance fitness party combining all elements of fitness- cardio, muscle conditioning, balance and flexibility!  [place]  [phone]  [email or web address]  [employer] w/ Demetri 6pm  Are you ready to kick up your fitness routine? Join Demetri for a heart pumping 45-minute workout set to get you moving and challenge your inner beast.  [facility]  [phone]  [email or web address]  Line Dancing Class w/ Leah 6pm  Join us every 2nd Tuesday for an energetic Line Dancing Extravaganza. All fitness levels are welcome. Have fun and improve coordination.  [facility] ([employer])  [phone]  [email or web address]  Cardio & Strength w/ Alberta 6:15pm  Join us as we blend the worlds of cardio and strength training, all while moving to the infectious beats of hip hop, trap, and island music. Whether you're a seasoned fitness enthusiast or just starting your fitness journey, this class is designed to accommodate all levels, helping you build stamina, muscle, and confidence.  [facility]  [phone]

## 2024-10-31 NOTE — PROGRESS NOTES
[facility] Progress Note:   F/up Physician Visit    CC: Weight Management      Dena Arellano is a 69 y.o. female who is here for her  f/up physician visit for the LCD Program.    Sept 304  Now 281  She c/o multiple aches and pains    Goal: bmi 40, 270 lbs        10/31/2024     9:17 AM 10/31/2024     9:00 AM 10/28/2024     2:01 PM 10/9/2024     9:24 AM 9/26/2024     9:20 AM 9/26/2024     9:00 AM 8/12/2024     2:44 PM   Weight Metrics   Weight 281 lb 8 oz  281 lb 292 lb 4.8 oz 304 lb 3.2 oz  300 lb   Neck (Inches)  16.5 in    17 in    Waist Measure Inches  51 in    58 in    Body Fat %  47.8 %    49.4 %    BMI (Calculated) 41.7 kg/m2  41.6 kg/m2 43.3 kg/m2 45 kg/m2  44.4 kg/m2          No data to display                   Current Outpatient Medications   Medication Sig Dispense Refill    FLUoxetine (PROZAC) 20 MG capsule Take 1 capsule by mouth daily 30 capsule 0    rosuvastatin (CRESTOR) 10 MG tablet Take 1 tablet by mouth nightly 30 tablet 0    anastrozole (ARIMIDEX) 1 MG tablet Take 1 tablet by mouth daily 90 tablet 1    fluticasone (FLONASE) 50 MCG/ACT nasal spray SHAKE BOTTLE AND USE 1 SPRAY IN EACH NOSTRIL EVERY DAY 48 g 1    levothyroxine (SYNTHROID) 200 MCG tablet TAKE 1 TABLET BY MOUTH DAILY BEFORE BREAKFAST 90 tablet 2    buPROPion (WELLBUTRIN XL) 150 MG extended release tablet TAKE 1 TABLET BY MOUTH DAILY BEFORE BREAKFAST 90 tablet 3    losartan (COZAAR) 100 MG tablet TAKE 1 TABLET BY MOUTH DAILY 90 tablet 3    Ascorbic Acid (VITAMIN C) 1000 MG tablet Take 1 tablet by mouth daily      Multiple Vitamin (MULTI VITAMIN PO) Take 1 tablet by mouth daily Centrum silver for women      Acetaminophen (TYLENOL PO) Take by mouth nightly as needed (for knee pain)      TURMERIC PO Take by mouth daily      azelastine HCl 0.15 % SOLN 1 spray by Nasal route 2 times daily      cetirizine (ZYRTEC) 10 MG tablet Take 1 tablet by mouth daily      Cholecalciferol 50 MCG (2000 UT) TABS Take 1 tablet by

## 2024-10-31 NOTE — PROGRESS NOTES
Identified pt with two pt identifiers (name and ). Reviewed chart in preparation for visit and have obtained necessary documentation.    Dena Arellano is a 69 y.o. female  Chief Complaint   Patient presents with    Weight Management     1 month follow up     /68 (Site: Right Upper Arm, Position: Sitting, Cuff Size: Large Adult) Comment: manual  Pulse 70   Temp 98 °F (36.7 °C) (Oral)   Resp 20   Ht 1.753 m (5' 9\")   Wt 127.7 kg (281 lb 8 oz)   SpO2 95%   BMI 41.57 kg/m²     1. Have you been to the ER, urgent care clinic since your last visit?  Hospitalized since your last visit?no    2. Have you seen or consulted any other health care providers outside of the [facility] since your last visit?  Include any pap smears or colon screening. No    BMI - 41.4

## 2024-11-07 ENCOUNTER — OFFICE VISIT (OUTPATIENT)
Age: 69
End: 2024-11-07

## 2024-11-07 DIAGNOSIS — E66.01 MORBID OBESITY: Primary | ICD-10-CM

## 2024-11-07 NOTE — PROGRESS NOTES
[facility]  Metabolic Weight Loss Program        Patient's Name: Dena Arellano  : 1955    This patient is enrolled in [facility] and attended the required weekly virtual nutrition class hosted via [employer].      Zakia Carrizales RD

## 2024-11-08 ENCOUNTER — TELEMEDICINE (OUTPATIENT)
Age: 69
End: 2024-11-08

## 2024-11-08 DIAGNOSIS — E66.01 MORBID OBESITY: Primary | ICD-10-CM

## 2024-11-08 NOTE — PROGRESS NOTES
cortisone shots in knees Monday.  Hosting Thanksgiving, maybe even Baron.  Take tumeric powder and 2 tumeric capsules daily.  Mixes tumeric, garlic, onion, in most dishes to help with inflammation.    NUTRITION INTERVENTION:  Pt educated on nutrition recommendations for the [facility] Low Calorie Plan (LCD), with the specific meal pattern of 2 meal replacements every day plus a grocery meal and snack.  Daily recommended totals: 1200 calories, 60 grams carbs, 80+ grams protein, and remaining calories as healthy fats.  Use LCD handout for meal and snack suggestions and preparation.    Grocery meal:  Use the balanced plate method to plan meals, include 3-6 oz of lean source of protein, unlimited non-starchy vegetables, 1/2 cup whole grains/beans OR 1/2 cup fruit OR 1 serving of low fat dairy. Utilize handouts listing healthy snack and meal ideas.     Read all nutrition labels. Demonstrated and emphasized identifying serving size, total fat, sugar and protein content. Defined low fat as </= 3 g per serving. Discussed lean and extra lean sources of protein. Avoid foods with sugar listed in the first 3 ingredients and >/10 g sugar per serving.     Consume meal replacements every three to four hours or pattern as discussed with provider.  Mix with water.  May add herbs/spices for taste.    Practice mindful eating habits; take small bites, chew thoroughly, avoid distractions, utilize hunger/fullness scale.     Reviewed attendance policy of attending weekly nutrition classes.  Metabolic support group recommended, and increase physical activity (approved per MD) for long term weight maintenance.      NUTRITION MONITORING AND EVALUATION: 23# loss x 1 month(s). Patient remains motivated, adherence likely.  F/U 1 m.    The following goals were established with patient;  1) consider using bone broth to make ND soup.  2) consider magnesium 200-400 mg once qd in the evening.      Specific tips and techniques to facilitate

## 2024-11-11 PROBLEM — M17.12 ARTHRITIS OF LEFT KNEE: Status: ACTIVE | Noted: 2024-11-11

## 2024-11-11 RX ORDER — LOSARTAN POTASSIUM 100 MG/1
100 TABLET ORAL DAILY
Qty: 90 TABLET | Refills: 3 | Status: CANCELLED | OUTPATIENT
Start: 2024-11-11

## 2024-11-11 RX ORDER — BUPROPION HYDROCHLORIDE 150 MG/1
150 TABLET ORAL
Qty: 90 TABLET | Refills: 3 | Status: CANCELLED | OUTPATIENT
Start: 2024-11-11

## 2024-11-11 SDOH — HEALTH STABILITY: PHYSICAL HEALTH: ON AVERAGE, HOW MANY DAYS PER WEEK DO YOU ENGAGE IN MODERATE TO STRENUOUS EXERCISE (LIKE A BRISK WALK)?: 0 DAYS

## 2024-11-11 ASSESSMENT — PATIENT HEALTH QUESTIONNAIRE - PHQ9
SUM OF ALL RESPONSES TO PHQ9 QUESTIONS 1 & 2: 0
SUM OF ALL RESPONSES TO PHQ QUESTIONS 1-9: 0
SUM OF ALL RESPONSES TO PHQ QUESTIONS 1-9: 0
1. LITTLE INTEREST OR PLEASURE IN DOING THINGS: NOT AT ALL
2. FEELING DOWN, DEPRESSED OR HOPELESS: NOT AT ALL
SUM OF ALL RESPONSES TO PHQ QUESTIONS 1-9: 0
SUM OF ALL RESPONSES TO PHQ QUESTIONS 1-9: 0

## 2024-11-11 ASSESSMENT — LIFESTYLE VARIABLES
HOW MANY STANDARD DRINKS CONTAINING ALCOHOL DO YOU HAVE ON A TYPICAL DAY: 1
HOW OFTEN DO YOU HAVE SIX OR MORE DRINKS ON ONE OCCASION: 1
HOW OFTEN DO YOU HAVE A DRINK CONTAINING ALCOHOL: 3
HOW OFTEN DO YOU HAVE A DRINK CONTAINING ALCOHOL: 2-4 TIMES A MONTH
HOW MANY STANDARD DRINKS CONTAINING ALCOHOL DO YOU HAVE ON A TYPICAL DAY: 1 OR 2

## 2024-11-11 NOTE — TELEPHONE ENCOUNTER
Last appointment: VV 11/3/23 MD ROBLERO, labs 9/2024  Next appointment: VV 11/15/24 MD ROBLERO  Previous refill encounter(s): 11/10/23 90 + 3 (both)    Requested Prescriptions     Pending Prescriptions Disp Refills    buPROPion (WELLBUTRIN XL) 150 MG extended release tablet 90 tablet 3     Sig: Take 1 tablet by mouth every morning (before breakfast)    losartan (COZAAR) 100 MG tablet 90 tablet 3     Sig: Take 1 tablet by mouth daily     For Pharmacy Admin Tracking Only    Program: Medication Refill  CPA in place:    Recommendation Provided To:   Intervention Detail: New Rx: 2, reason: Patient Preference  Intervention Accepted By:   Gap Closed?:    Time Spent (min): 5

## 2024-11-12 ENCOUNTER — OFFICE VISIT (OUTPATIENT)
Age: 69
End: 2024-11-12

## 2024-11-12 DIAGNOSIS — E66.01 MORBID OBESITY: Primary | ICD-10-CM

## 2024-11-12 SDOH — ECONOMIC STABILITY: FOOD INSECURITY: WITHIN THE PAST 12 MONTHS, YOU WORRIED THAT YOUR FOOD WOULD RUN OUT BEFORE YOU GOT MONEY TO BUY MORE.: NEVER TRUE

## 2024-11-12 SDOH — ECONOMIC STABILITY: INCOME INSECURITY: HOW HARD IS IT FOR YOU TO PAY FOR THE VERY BASICS LIKE FOOD, HOUSING, MEDICAL CARE, AND HEATING?: NOT VERY HARD

## 2024-11-12 SDOH — ECONOMIC STABILITY: FOOD INSECURITY: WITHIN THE PAST 12 MONTHS, THE FOOD YOU BOUGHT JUST DIDN'T LAST AND YOU DIDN'T HAVE MONEY TO GET MORE.: NEVER TRUE

## 2024-11-13 ENCOUNTER — NURSE ONLY (OUTPATIENT)
Age: 69
End: 2024-11-13

## 2024-11-13 VITALS
WEIGHT: 271 LBS | HEART RATE: 64 BPM | OXYGEN SATURATION: 97 % | RESPIRATION RATE: 20 BRPM | BODY MASS INDEX: 40.14 KG/M2 | HEIGHT: 69 IN | DIASTOLIC BLOOD PRESSURE: 66 MMHG | TEMPERATURE: 98.1 F | SYSTOLIC BLOOD PRESSURE: 112 MMHG

## 2024-11-13 DIAGNOSIS — E66.813 CLASS 3 SEVERE OBESITY DUE TO EXCESS CALORIES WITH SERIOUS COMORBIDITY AND BODY MASS INDEX (BMI) OF 40.0 TO 44.9 IN ADULT: Primary | ICD-10-CM

## 2024-11-13 DIAGNOSIS — I10 BENIGN ESSENTIAL HYPERTENSION: ICD-10-CM

## 2024-11-13 DIAGNOSIS — G47.33 OBSTRUCTIVE SLEEP APNEA SYNDROME: ICD-10-CM

## 2024-11-13 DIAGNOSIS — R73.9 BLOOD GLUCOSE ELEVATED: ICD-10-CM

## 2024-11-13 DIAGNOSIS — E66.01 CLASS 3 SEVERE OBESITY DUE TO EXCESS CALORIES WITH SERIOUS COMORBIDITY AND BODY MASS INDEX (BMI) OF 40.0 TO 44.9 IN ADULT: Primary | ICD-10-CM

## 2024-11-13 DIAGNOSIS — E03.9 ACQUIRED HYPOTHYROIDISM: ICD-10-CM

## 2024-11-13 ASSESSMENT — PATIENT HEALTH QUESTIONNAIRE - PHQ9
SUM OF ALL RESPONSES TO PHQ QUESTIONS 1-9: 0
1. LITTLE INTEREST OR PLEASURE IN DOING THINGS: NOT AT ALL
SUM OF ALL RESPONSES TO PHQ QUESTIONS 1-9: 0
2. FEELING DOWN, DEPRESSED OR HOPELESS: NOT AT ALL
SUM OF ALL RESPONSES TO PHQ9 QUESTIONS 1 & 2: 0

## 2024-11-13 NOTE — PROGRESS NOTES
11/4/2024      Progress Note: Weekly Education Class in the [facility]         Patient is on Very Low Calorie Diet [] (4 meal replacements per day, 800 kcal/day)      Low Calorie Diet [x] (2-3 meal replacements per day, 9607-2701 kcal/day)    1) Did patient have any new symptoms or physical problems?   Yes []    No [x]    If yes, check & comment: weakness [x], fatigue [], lightheadedness [], headache [], cramps [], cold intolerance [], hair loss [], diarrhea [], constipation [],  NA [] other: knee pain                                2) Has patient had any medical attention from other providers, urgent care or the emergency room this week?  Yes []  No [x]       NA [], If yes, why:                                       3) Any other sugar sweetened beverages consumed this week?   Yes []  No [x]    4) Did patient have any problems adhering to the diet? Yes []  No [x] NA []    If yes, Vacation [], Celebrations [], Conferences [], Family Reunions [] other:                                                5) How many hours of sleep this week? 5-8    (range)  NA []    Number of meal replacements consumed daily? 3 (range)  NA []    Average ounces of water patient consumed daily this week (not including shakes)? 80     (divide the weekly total by 7)    Did you eat any food outside of the program? Yes [x] No []On mona    Physical Activity Over the Past Week:    Cardio exercise: 0 min  Strength exercise: 0 workouts / week  Number of steps walked per day: 0    How has patient mood overall been this week? Sad [], Happy [], Stressed [], Tired [], Content [], NA [], other OK           Medications reconciled by nurse Yes [x]  No[]    Patient was given therapeutic recommendations for any noted side effects of their dietary approach based upon [facility] patient manual per providers recommendation.     11/11/2024    Progress Note: Weekly Education Class in the [facility]         Patient is

## 2024-11-13 NOTE — PROGRESS NOTES
Identified pt with two pt identifiers (name and ). Reviewed chart in preparation for visit and have obtained necessary documentation.    Dena Arellano is a 69 y.o. female  Chief Complaint   Patient presents with    Weight Management     /66 (Site: Right Upper Arm, Position: Sitting, Cuff Size: Large Adult) Comment: manual  Pulse 64   Temp 98.1 °F (36.7 °C) (Oral)   Resp 20   Ht 1.753 m (5' 9\")   Wt 122.9 kg (271 lb)   SpO2 97%   BMI 40.02 kg/m²     1. Have you been to the ER, urgent care clinic since your last visit?  Hospitalized since your last visit?no    2. Have you seen or consulted any other health care providers outside of the [facility] since your last visit?  Include any pap smears or colon screening. yes - Left total knee scheduled for 2025

## 2024-11-13 NOTE — PROGRESS NOTES
[facility]  Metabolic Weight Loss Program        Patient's Name: Dena Arellano  : 1955    This patient is a participant at [facility] and attended the weekly virtual nutrition class hosted via [email or web address].      Lourdes Padilla, MS, RD, LDN

## 2024-11-15 ENCOUNTER — TELEMEDICINE (OUTPATIENT)
Age: 69
End: 2024-11-15

## 2024-11-15 DIAGNOSIS — F32.A ANXIETY AND DEPRESSION: ICD-10-CM

## 2024-11-15 DIAGNOSIS — Z00.00 MEDICARE ANNUAL WELLNESS VISIT, SUBSEQUENT: Primary | ICD-10-CM

## 2024-11-15 DIAGNOSIS — E03.9 ACQUIRED HYPOTHYROIDISM: ICD-10-CM

## 2024-11-15 DIAGNOSIS — I10 BENIGN ESSENTIAL HYPERTENSION: ICD-10-CM

## 2024-11-15 DIAGNOSIS — F41.9 ANXIETY AND DEPRESSION: ICD-10-CM

## 2024-11-15 DIAGNOSIS — R73.03 PREDIABETES: ICD-10-CM

## 2024-11-15 DIAGNOSIS — E78.2 MIXED HYPERLIPIDEMIA: ICD-10-CM

## 2024-11-15 RX ORDER — ROSUVASTATIN CALCIUM 10 MG/1
10 TABLET, COATED ORAL NIGHTLY
Qty: 90 TABLET | Refills: 3 | Status: SHIPPED | OUTPATIENT
Start: 2024-11-15

## 2024-11-15 RX ORDER — LOSARTAN POTASSIUM 100 MG/1
100 TABLET ORAL DAILY
Qty: 90 TABLET | Refills: 3 | Status: SHIPPED | OUTPATIENT
Start: 2024-11-15

## 2024-11-15 RX ORDER — BUPROPION HYDROCHLORIDE 150 MG/1
150 TABLET ORAL
Qty: 90 TABLET | Refills: 3 | Status: SHIPPED | OUTPATIENT
Start: 2024-11-15

## 2024-11-15 ASSESSMENT — ENCOUNTER SYMPTOMS
RESPIRATORY NEGATIVE: 1
GASTROINTESTINAL NEGATIVE: 1

## 2024-11-15 NOTE — PROGRESS NOTES
Medicare Annual Wellness Visit    Dena Arellano is here for Medicare AWV, Cholesterol Problem, Hypertension, Hypothyroidism, Depression, and Medication Management  Refer to separate note in this encounter for follow up of patient's chronic conditions, disease management, problems addressed today, and/or other health concerns as noted.   Assessment & Plan   Medicare annual wellness visit, subsequent  Recommendations for Preventive Services Due: see orders and patient instructions/AVS.  Recommended screening schedule for the next 5-10 years is provided to the patient in written form: see Patient Instructions/AVS.      Patient's complete Health Risk Assessment and screening values have been reviewed and are found in Flowsheets. The following problems were reviewed today and where indicated follow up appointments were made and/or referrals ordered.    Positive Risk Factor Screenings with Interventions:              Inactivity:  On average, how many days per week do you engage in moderate to strenuous exercise (like a brisk walk)?: 0 days (!) Abnormal     Interventions:  See AVS for additional education material  See A/P for plan and any pertinent orders     Abnormal BMI (obese):  There is no height or weight on file to calculate BMI. (!) Abnormal  Interventions:  See AVS for additional education material  See A/P for plan and any pertinent orders    CareTeam (Including outside providers/suppliers regularly involved in providing care):   Patient Care Team:  Georgia Alcazar MD as PCP - General (Family Medicine)  Georgia Alcazar MD as PCP - Empaneled Provider  Bob Shahid MD as Physician  Malika Peterson DO as Physician  Geetha Rowley MD (Bariatrics)      Reviewed and updated this visit:  Tobacco  Allergies  Meds  Problems  Med Hx  Surg Hx  Soc Hx  Fam Hx           Dena Arellano, was evaluated through a synchronous (real-time) audio-video encounter. The patient (or guardian if applicable) 
Cognition and memory normal.           LABORATORY DATA/ANCILLARY/IMAGING   CMP:    Lab Results   Component Value Date/Time     09/26/2024 11:14 AM    K 4.6 09/26/2024 11:14 AM     09/26/2024 11:14 AM    CO2 23 09/26/2024 11:14 AM    BUN 15 09/26/2024 11:14 AM    CREATININE 0.78 09/26/2024 11:14 AM    GFRAA >60 09/24/2021 09:24 AM    AGRATIO 1.3 10/24/2022 02:05 PM    LABGLOM 82 09/26/2024 11:14 AM    LABGLOM >60 10/23/2023 11:57 AM    LABGLOM >60 10/24/2022 02:05 PM    GLUCOSE 101 09/26/2024 11:14 AM    CALCIUM 9.7 09/26/2024 11:14 AM    BILITOT 0.6 09/26/2024 11:14 AM    ALKPHOS 90 09/26/2024 11:14 AM    AST 17 09/26/2024 11:14 AM    ALT 21 09/26/2024 11:14 AM     HgBA1c:    Lab Results   Component Value Date/Time    LABA1C 5.7 09/26/2024 11:14 AM     FLP:    Lab Results   Component Value Date/Time    TRIG 138 09/26/2024 11:14 AM    HDL 54 09/26/2024 11:14 AM     Lab Results   Component Value Date    LDL 87 09/26/2024       TSH:    Lab Results   Component Value Date/Time    TSH 0.800 09/26/2024 11:14 AM    TSH 1.36 10/23/2023 11:57 AM        ASSESSMENT & PLAN   1. Medicare annual wellness visit, subsequent  See separate note under this same encounter visit for Medicare Wellness note.    2. Mixed hyperlipidemia  Lipids improved over time maintained on statin therapy.  Recent fasting lipids done 9/2024 through [facility], results reviewed  Continue Crestor 10 mg nightly  -     rosuvastatin (CRESTOR) 10 MG tablet; Take 1 tablet by mouth nightly, Disp-90 tablet, R-3Normal    3. Benign essential hypertension  Controlled, stable. Normal renal function and electrolytes done 9/2024  Continue Cozaar 100 mg daily  -     losartan (COZAAR) 100 MG tablet; Take 1 tablet by mouth daily, Disp-90 tablet, R-3**Patient requests 90 days supplyNormal    4. Prediabetes  HgbA1c 5.7 at [facility]  Doing well w/ their meal plan and weight down 30 lbs since 9/2024  Looks like follow up

## 2024-11-19 ENCOUNTER — OFFICE VISIT (OUTPATIENT)
Age: 69
End: 2024-11-19

## 2024-11-19 DIAGNOSIS — E66.01 MORBID OBESITY: Primary | ICD-10-CM

## 2024-11-29 NOTE — PROGRESS NOTES
[facility]  Metabolic Weight Loss Program        Patient's Name: Dena Arellano  : 1955    This patient is a participant at [facility] and attended the weekly virtual nutrition class hosted via [employer].      Lourdes Padilla, MS, RD, LDN

## 2024-12-04 ENCOUNTER — NURSE ONLY (OUTPATIENT)
Age: 69
End: 2024-12-04

## 2024-12-04 VITALS
DIASTOLIC BLOOD PRESSURE: 78 MMHG | BODY MASS INDEX: 38.88 KG/M2 | SYSTOLIC BLOOD PRESSURE: 122 MMHG | RESPIRATION RATE: 20 BRPM | HEART RATE: 71 BPM | WEIGHT: 262.5 LBS | OXYGEN SATURATION: 97 % | TEMPERATURE: 98.4 F | HEIGHT: 69 IN

## 2024-12-04 DIAGNOSIS — E66.812 CLASS 2 SEVERE OBESITY DUE TO EXCESS CALORIES WITH SERIOUS COMORBIDITY AND BODY MASS INDEX (BMI) OF 37.0 TO 37.9 IN ADULT: Primary | ICD-10-CM

## 2024-12-04 DIAGNOSIS — G47.33 OBSTRUCTIVE SLEEP APNEA SYNDROME: ICD-10-CM

## 2024-12-04 DIAGNOSIS — R73.9 BLOOD GLUCOSE ELEVATED: ICD-10-CM

## 2024-12-04 DIAGNOSIS — E66.01 CLASS 2 SEVERE OBESITY DUE TO EXCESS CALORIES WITH SERIOUS COMORBIDITY AND BODY MASS INDEX (BMI) OF 37.0 TO 37.9 IN ADULT: Primary | ICD-10-CM

## 2024-12-04 DIAGNOSIS — I10 BENIGN ESSENTIAL HYPERTENSION: ICD-10-CM

## 2024-12-04 DIAGNOSIS — E03.9 ACQUIRED HYPOTHYROIDISM: ICD-10-CM

## 2024-12-04 ASSESSMENT — PATIENT HEALTH QUESTIONNAIRE - PHQ9
2. FEELING DOWN, DEPRESSED OR HOPELESS: NOT AT ALL
1. LITTLE INTEREST OR PLEASURE IN DOING THINGS: NOT AT ALL
SUM OF ALL RESPONSES TO PHQ QUESTIONS 1-9: 0
SUM OF ALL RESPONSES TO PHQ9 QUESTIONS 1 & 2: 0
SUM OF ALL RESPONSES TO PHQ QUESTIONS 1-9: 0

## 2024-12-04 NOTE — PROGRESS NOTES
Identified pt with two pt identifiers (name and ). Reviewed chart in preparation for visit and have obtained necessary documentation.    Dena Arellano is a 69 y.o. female  Chief Complaint   Patient presents with    Weight Management     /78 (Site: Right Upper Arm, Position: Sitting, Cuff Size: Large Adult) Comment: manual  Pulse 71   Temp 98.4 °F (36.9 °C) (Oral)   Resp 20   Ht 1.753 m (5' 9\")   Wt 119.1 kg (262 lb 8 oz)   SpO2 97%   BMI 38.76 kg/m²     1. Have you been to the ER, urgent care clinic since your last visit?  Hospitalized since your last visit?no    2. Have you seen or consulted any other health care providers outside of the [facility] since your last visit?  Include any pap smears or colon screening. No

## 2024-12-04 NOTE — PROGRESS NOTES
11/18/2024    Progress Note: Weekly Education Class in the [facility]         Patient is on Very Low Calorie Diet [] (4 meal replacements per day, 800 kcal/day)      Low Calorie Diet [x] (2-3 meal replacements per day, 4832-1690 kcal/day)    1) Did patient have any new symptoms or physical problems?   Yes []    No []    If yes, check & comment: weakness [x], fatigue [], lightheadedness [], headache [], cramps [], cold intolerance [], hair loss [], diarrhea [], constipation [],  NA [] other:                                 2) Has patient had any medical attention from other providers, urgent care or the emergency room this week?  Yes []  No [x]       NA [], If yes, why:                                       3) Any other sugar sweetened beverages consumed this week?   Yes []  No [x]    4) Did patient have any problems adhering to the diet? Yes []  No [x] NA []    If yes, Vacation [], Celebrations [], Conferences [], Family Reunions [] other:                                                5) How many hours of sleep this week? 8    (range)  NA []    Number of meal replacements consumed daily? 3 (range)  NA []    Average ounces of water patient consumed daily this week (not including shakes)? 80     (divide the weekly total by 7)    Did you eat any food outside of the program? Yes [x] No []ONLINE    Physical Activity Over the Past Week:    Cardio exercise: 0 min  Strength exercise: 0 workouts / week  Number of steps walked per day: 0    How has patient mood overall been this week? Sad [], Happy [], Stressed [], Tired [], Content [], NA [], other ok           Medications reconciled by nurse Yes [x]  No[]    Patient was given therapeutic recommendations for any noted side effects of their dietary approach based upon [facility] patient manual per providers recommendation.     11/25/2024    Progress Note: Weekly Education Class in the [facility]         Patient is on Very Low

## 2024-12-10 ENCOUNTER — OFFICE VISIT (OUTPATIENT)
Age: 69
End: 2024-12-10

## 2024-12-10 DIAGNOSIS — E66.01 MORBID OBESITY: Primary | ICD-10-CM

## 2024-12-14 DIAGNOSIS — E03.9 ACQUIRED HYPOTHYROIDISM: ICD-10-CM

## 2024-12-15 RX ORDER — LEVOTHYROXINE SODIUM 200 UG/1
200 TABLET ORAL
Qty: 90 TABLET | Refills: 2 | Status: SHIPPED | OUTPATIENT
Start: 2024-12-15

## 2024-12-16 NOTE — PROGRESS NOTES
[facility]  Metabolic Weight Loss Program        Patient's Name: Dena Arellano  : 1955    This patient is a participant at [facility] and attended the weekly virtual nutrition class hosted via [employer].      Lourdes Padilla, MS, RD, LDN

## 2024-12-17 ENCOUNTER — OFFICE VISIT (OUTPATIENT)
Age: 69
End: 2024-12-17

## 2024-12-17 DIAGNOSIS — E66.01 MORBID OBESITY: Primary | ICD-10-CM

## 2024-12-17 NOTE — PROGRESS NOTES
[facility]  Metabolic Weight Loss Program        Patient's Name: Dena Arellano  : 1955    This patient is a participant at [facility] and attended the weekly virtual nutrition class hosted via "[employer].      Lourdes Padilla, MS, RD, LDN

## 2024-12-18 ENCOUNTER — NURSE ONLY (OUTPATIENT)
Age: 69
End: 2024-12-18

## 2024-12-18 VITALS
RESPIRATION RATE: 18 BRPM | HEART RATE: 71 BPM | HEIGHT: 69 IN | TEMPERATURE: 98 F | OXYGEN SATURATION: 98 % | SYSTOLIC BLOOD PRESSURE: 114 MMHG | DIASTOLIC BLOOD PRESSURE: 64 MMHG | WEIGHT: 257.8 LBS | BODY MASS INDEX: 38.18 KG/M2

## 2024-12-18 DIAGNOSIS — R73.9 BLOOD GLUCOSE ELEVATED: ICD-10-CM

## 2024-12-18 DIAGNOSIS — G47.33 OBSTRUCTIVE SLEEP APNEA SYNDROME: ICD-10-CM

## 2024-12-18 DIAGNOSIS — E66.812 CLASS 2 SEVERE OBESITY DUE TO EXCESS CALORIES WITH SERIOUS COMORBIDITY AND BODY MASS INDEX (BMI) OF 37.0 TO 37.9 IN ADULT: Primary | ICD-10-CM

## 2024-12-18 DIAGNOSIS — E03.9 ACQUIRED HYPOTHYROIDISM: ICD-10-CM

## 2024-12-18 DIAGNOSIS — I10 BENIGN ESSENTIAL HYPERTENSION: ICD-10-CM

## 2024-12-18 DIAGNOSIS — E66.01 CLASS 2 SEVERE OBESITY DUE TO EXCESS CALORIES WITH SERIOUS COMORBIDITY AND BODY MASS INDEX (BMI) OF 37.0 TO 37.9 IN ADULT: Primary | ICD-10-CM

## 2024-12-18 ASSESSMENT — PATIENT HEALTH QUESTIONNAIRE - PHQ9
SUM OF ALL RESPONSES TO PHQ QUESTIONS 1-9: 0
2. FEELING DOWN, DEPRESSED OR HOPELESS: NOT AT ALL
SUM OF ALL RESPONSES TO PHQ9 QUESTIONS 1 & 2: 0
SUM OF ALL RESPONSES TO PHQ QUESTIONS 1-9: 0
1. LITTLE INTEREST OR PLEASURE IN DOING THINGS: NOT AT ALL

## 2024-12-18 NOTE — PROGRESS NOTES
12/2/2024    Progress Note: Weekly Education Class in the [facility]         Patient is on Very Low Calorie Diet [] (4 meal replacements per day, 800 kcal/day)      Low Calorie Diet [x] (2-3 meal replacements per day, 1087-6510 kcal/day)    1) Did patient have any new symptoms or physical problems?   Yes []    No [x]    If yes, check & comment: weakness [], fatigue [], lightheadedness [], headache [], cramps [], cold intolerance [], hair loss [], diarrhea [], constipation [],  NA [] other:                                 2) Has patient had any medical attention from other providers, urgent care or the emergency room this week?  Yes []  No [x]       NA [], If yes, why:                                       3) Any other sugar sweetened beverages consumed this week?   Yes []  No [x]    4) Did patient have any problems adhering to the diet? Yes []  No [x] NA []    If yes, Vacation [], Celebrations [], Conferences [], Family Reunions [] other:                                                5) How many hours of sleep this week? 8    (range)  NA []    Number of meal replacements consumed daily? 3 (range)  NA []    Average ounces of water patient consumed daily this week (not including shakes)? 80     (divide the weekly total by 7)    Did you eat any food outside of the program? Yes [x] No []    Physical Activity Over the Past Week:    Cardio exercise: 0 min  Strength exercise: 0 workouts / week  Number of steps walked per day: 0    How has patient mood overall been this week? Sad [], Happy [], Stressed [], Tired [], Content [], NA [], other OK           Medications reconciled by nurse Yes [x]  No[]    Patient was given therapeutic recommendations for any noted side effects of their dietary approach based upon [facility] patient manual per providers recommendation.     12/9/2024    Progress Note: Weekly Education Class in the [facility]         Patient is on Very Low Calorie

## 2024-12-18 NOTE — PROGRESS NOTES
Identified pt with two pt identifiers (name and ). Reviewed chart in preparation for visit and have obtained necessary documentation.    Dena Arellano is a 69 y.o. female  Chief Complaint   Patient presents with    Weight Management     /64 (Site: Right Upper Arm, Position: Sitting, Cuff Size: Large Adult) Comment: manual  Pulse 71   Temp 98 °F (36.7 °C) (Oral)   Resp 18   Ht 1.753 m (5' 9\")   Wt 116.9 kg (257 lb 12.8 oz)   SpO2 98%   BMI 38.07 kg/m²     1. Have you been to the ER, urgent care clinic since your last visit?  Hospitalized since your last visit?no    2. Have you seen or consulted any other health care providers outside of the [facility] since your last visit?  Include any pap smears or colon screening. No    BMI - 37.8

## 2024-12-20 ENCOUNTER — CLINICAL DOCUMENTATION (OUTPATIENT)
Age: 69
End: 2024-12-20

## 2024-12-20 ENCOUNTER — TELEMEDICINE (OUTPATIENT)
Age: 69
End: 2024-12-20

## 2024-12-20 VITALS
WEIGHT: 257 LBS | BODY MASS INDEX: 38.06 KG/M2 | HEIGHT: 69 IN | SYSTOLIC BLOOD PRESSURE: 114 MMHG | DIASTOLIC BLOOD PRESSURE: 64 MMHG

## 2024-12-20 DIAGNOSIS — R73.9 BLOOD GLUCOSE ELEVATED: ICD-10-CM

## 2024-12-20 DIAGNOSIS — I10 BENIGN ESSENTIAL HYPERTENSION: ICD-10-CM

## 2024-12-20 DIAGNOSIS — G47.33 OBSTRUCTIVE SLEEP APNEA SYNDROME: ICD-10-CM

## 2024-12-20 DIAGNOSIS — E66.812 CLASS 2 SEVERE OBESITY DUE TO EXCESS CALORIES WITH SERIOUS COMORBIDITY AND BODY MASS INDEX (BMI) OF 37.0 TO 37.9 IN ADULT: Primary | ICD-10-CM

## 2024-12-20 DIAGNOSIS — E66.01 CLASS 2 SEVERE OBESITY DUE TO EXCESS CALORIES WITH SERIOUS COMORBIDITY AND BODY MASS INDEX (BMI) OF 37.0 TO 37.9 IN ADULT: Primary | ICD-10-CM

## 2024-12-20 DIAGNOSIS — E78.2 MIXED HYPERLIPIDEMIA: ICD-10-CM

## 2024-12-20 DIAGNOSIS — E03.9 ACQUIRED HYPOTHYROIDISM: ICD-10-CM

## 2024-12-20 ASSESSMENT — PATIENT HEALTH QUESTIONNAIRE - PHQ9
SUM OF ALL RESPONSES TO PHQ9 QUESTIONS 1 & 2: 0
SUM OF ALL RESPONSES TO PHQ QUESTIONS 1-9: 0
1. LITTLE INTEREST OR PLEASURE IN DOING THINGS: NOT AT ALL
2. FEELING DOWN, DEPRESSED OR HOPELESS: NOT AT ALL
SUM OF ALL RESPONSES TO PHQ QUESTIONS 1-9: 0

## 2024-12-20 NOTE — PROGRESS NOTES
Identified pt with two pt identifiers (name and ). Reviewed chart in preparation for visit and have obtained necessary documentation.    Dena Arellano is a 69 y.o. female  Chief Complaint   Patient presents with    Weight Management     [facility]     /64   Ht 1.753 m (5' 9\")   Wt 116.6 kg (257 lb)   BMI 37.95 kg/m²     1. Have you been to the ER, urgent care clinic since your last visit?  Hospitalized since your last visit?no    2. Have you seen or consulted any other health care providers outside of the [facility] since your last visit?  Include any pap smears or colon screening. no

## 2024-12-20 NOTE — PROGRESS NOTES
[facility] Progress Note:   F/up Physician Visit    Dena Arellano is a 69 y.o. female who was seen by synchronous (real-time) audio-video technology on 12/20/2024.      Consent:  She and/or her healthcare decision maker is aware that this patient-initiated Telehealth encounter is a billable service, with coverage as determined by her insurance carrier. She is aware that she may receive a bill and has provided verbal consent to proceed: Yes    Dena Arellano, was evaluated through a synchronous (real-time) audio-video encounter. The patient (or guardian if applicable) is aware that this is a billable service, which includes applicable co-pays. This Virtual Visit was conducted with patient's (and/or legal guardian's) consent. Patient identification was verified, and a caregiver was present when appropriate.   The patient was located at Home: [address] [place] [address] [place] [address]  Provider was located at Home (Appt Dept State): [place]  Confirm you are appropriately licensed, registered, or certified to deliver care in the state where the patient is located as indicated above. If you are not or unsure, please re-schedule the visit: Yes, I confirm.          --Geetha Rowley MD on 12/20/2024 at 9:51 AM           [id number]  Subjective:   Dena Arellano was seen for Weight Management (WLC)    f/up physician visit for the / LCD Program.    Oct 281  Now 257    Started  304      She has been working hard to avoid the simple carbs like sweets and carbs        Prior to Admission medications    Medication Sig Start Date End Date Taking? Authorizing Provider   levothyroxine (SYNTHROID) 200 MCG tablet Take 1 tablet by mouth every morning (before breakfast) 12/15/24  Yes Georgia Alcazar MD   buPROPion (WELLBUTRIN XL) 150 MG extended release tablet Take 1 tablet by mouth every morning (before breakfast) 11/15/24  Yes Georgia Alcazar MD   FLUoxetine (PROZAC) 20 MG capsule Take 1 capsule by mouth daily

## 2024-12-20 NOTE — PROGRESS NOTES
When contacting patient to complete pre appt triage, met with hostility.     Pt replied \"I've DONE all this this week.\"    Once I stated to the patient that her chart should be reviewed for this visit as well, considering it is a different visit, I was met with  \"fine.\" And very short responses to triage chart review questions.    Once chart review was complete I had asked \"Okay, and what is your current weight and BP?'' (As required for the VV) and was met with the response below:    \"I've DONE that. It's IN THERE. It was the WHOLE reason I went in and did triage this week, to have vitals for THIS VISIT, so you can use that.\"     I responded with \"Okay, I need you to understand that with my job, today, I am still required to ask. I can use them, that's fine but I am required to not only ask but input the numbers as well.\"     I then sent a connection link, instructed the pt I would let the provider know she was connecting and disconnected the call.

## 2024-12-31 NOTE — PROGRESS NOTES
Nurse note from patient's weekly / LCD / Maintenance class was reviewed.  Pertinent medical concerns were:   reviewed     BP Readings from Last 3 Encounters:   12/20/24 114/64   12/18/24 114/64   12/04/24 122/78       Failed to redirect to the Timeline version of the [facility] SmartLink.    Current Outpatient Medications   Medication Sig Dispense Refill    buPROPion (WELLBUTRIN XL) 150 MG extended release tablet Take 1 tablet by mouth every morning (before breakfast) 90 tablet 3    FLUoxetine (PROZAC) 20 MG capsule Take 1 capsule by mouth daily 90 capsule 3    losartan (COZAAR) 100 MG tablet Take 1 tablet by mouth daily 90 tablet 3    rosuvastatin (CRESTOR) 10 MG tablet Take 1 tablet by mouth nightly 90 tablet 3    diclofenac sodium (VOLTAREN) 1 % GEL Apply 2 g topically 2 times daily 50 g 0    fluticasone (FLONASE) 50 MCG/ACT nasal spray SHAKE BOTTLE AND USE 1 SPRAY IN EACH NOSTRIL EVERY DAY 48 g 1    Ascorbic Acid (VITAMIN C) 1000 MG tablet Take 1 tablet by mouth daily      Multiple Vitamin (MULTI VITAMIN PO) Take 1 tablet by mouth daily Centrum silver for women      Acetaminophen (TYLENOL PO) Take by mouth nightly as needed (for knee pain)      TURMERIC PO Take by mouth daily      azelastine HCl 0.15 % SOLN 1 spray by Nasal route 2 times daily      cetirizine (ZYRTEC) 10 MG tablet Take 1 tablet by mouth daily      Cholecalciferol 50 MCG (2000 UT) TABS Take 1 tablet by mouth daily      levothyroxine (SYNTHROID) 200 MCG tablet Take 1 tablet by mouth every morning (before breakfast) 90 tablet 2     No current facility-administered medications for this visit.

## 2025-01-01 NOTE — PROGRESS NOTES
Nurse note from patient's weekly LCD / Maintenance class was reviewed.  Pertinent medical concerns were:   reviewed     BP Readings from Last 3 Encounters:   12/20/24 114/64   12/18/24 114/64   12/04/24 122/78       Failed to redirect to the Timeline version of the [facility] SmartLink.    Current Outpatient Medications   Medication Sig Dispense Refill    levothyroxine (SYNTHROID) 200 MCG tablet Take 1 tablet by mouth every morning (before breakfast) 90 tablet 2    buPROPion (WELLBUTRIN XL) 150 MG extended release tablet Take 1 tablet by mouth every morning (before breakfast) 90 tablet 3    FLUoxetine (PROZAC) 20 MG capsule Take 1 capsule by mouth daily 90 capsule 3    losartan (COZAAR) 100 MG tablet Take 1 tablet by mouth daily 90 tablet 3    rosuvastatin (CRESTOR) 10 MG tablet Take 1 tablet by mouth nightly 90 tablet 3    diclofenac sodium (VOLTAREN) 1 % GEL Apply 2 g topically 2 times daily 50 g 0    fluticasone (FLONASE) 50 MCG/ACT nasal spray SHAKE BOTTLE AND USE 1 SPRAY IN EACH NOSTRIL EVERY DAY 48 g 1    Ascorbic Acid (VITAMIN C) 1000 MG tablet Take 1 tablet by mouth daily      Multiple Vitamin (MULTI VITAMIN PO) Take 1 tablet by mouth daily Centrum silver for women      Acetaminophen (TYLENOL PO) Take by mouth nightly as needed (for knee pain)      TURMERIC PO Take by mouth daily      azelastine HCl 0.15 % SOLN 1 spray by Nasal route 2 times daily      cetirizine (ZYRTEC) 10 MG tablet Take 1 tablet by mouth daily      Cholecalciferol 50 MCG (2000 UT) TABS Take 1 tablet by mouth daily       No current facility-administered medications for this visit.

## 2025-01-07 ENCOUNTER — OFFICE VISIT (OUTPATIENT)
Age: 70
End: 2025-01-07

## 2025-01-07 DIAGNOSIS — E66.812 CLASS 2 SEVERE OBESITY DUE TO EXCESS CALORIES WITH SERIOUS COMORBIDITY AND BODY MASS INDEX (BMI) OF 37.0 TO 37.9 IN ADULT: Primary | ICD-10-CM

## 2025-01-07 DIAGNOSIS — E66.01 CLASS 2 SEVERE OBESITY DUE TO EXCESS CALORIES WITH SERIOUS COMORBIDITY AND BODY MASS INDEX (BMI) OF 37.0 TO 37.9 IN ADULT: Primary | ICD-10-CM

## 2025-01-08 ENCOUNTER — TELEMEDICINE (OUTPATIENT)
Age: 70
End: 2025-01-08

## 2025-01-08 DIAGNOSIS — E66.01 CLASS 2 SEVERE OBESITY DUE TO EXCESS CALORIES WITH SERIOUS COMORBIDITY AND BODY MASS INDEX (BMI) OF 37.0 TO 37.9 IN ADULT: Primary | ICD-10-CM

## 2025-01-08 DIAGNOSIS — E66.812 CLASS 2 SEVERE OBESITY DUE TO EXCESS CALORIES WITH SERIOUS COMORBIDITY AND BODY MASS INDEX (BMI) OF 37.0 TO 37.9 IN ADULT: Primary | ICD-10-CM

## 2025-01-08 NOTE — PROGRESS NOTES
[facility]  Metabolic Weight Loss Program        Patient's Name: Dena Arellano  : 1955    This patient is a participant at [facility] and attended the weekly virtual nutrition class hosted via [employer].      Lourdes Padilla, MS, RD, LDN

## 2025-01-08 NOTE — PROGRESS NOTES
capsule Take 1 capsule by mouth daily 11/15/24   Georgia Alcazar MD   losartan (COZAAR) 100 MG tablet Take 1 tablet by mouth daily 11/15/24   Georgia Alcazar MD   rosuvastatin (CRESTOR) 10 MG tablet Take 1 tablet by mouth nightly 11/15/24   Georgia Alcazar MD   diclofenac sodium (VOLTAREN) 1 % GEL Apply 2 g topically 2 times daily 11/11/24   Jillian Howell PA-C   fluticasone (FLONASE) 50 MCG/ACT nasal spray SHAKE BOTTLE AND USE 1 SPRAY IN EACH NOSTRIL EVERY DAY 4/14/24   Georgia Alcazar MD   Ascorbic Acid (VITAMIN C) 1000 MG tablet Take 1 tablet by mouth daily    ProviderCandido MD   Multiple Vitamin (MULTI VITAMIN PO) Take 1 tablet by mouth daily Centrum silver for women    ProviderCandido MD   Acetaminophen (TYLENOL PO) Take by mouth nightly as needed (for knee pain)    Provider, MD Candido   TURMERIC PO Take by mouth daily    Automatic Reconciliation, Ar   azelastine HCl 0.15 % SOLN 1 spray by Nasal route 2 times daily    Automatic Reconciliation, Ar   cetirizine (ZYRTEC) 10 MG tablet Take 1 tablet by mouth daily    Automatic Reconciliation, Ar   Cholecalciferol 50 MCG (2000 UT) TABS Take 1 tablet by mouth daily    Automatic Reconciliation, Ar              Starting Weight: 304#  Current Weight: 257# (281# last visit two months ago)  Overall Pounds Lost: 47# Overall Pounds Gained: 0  Surgery scheduled for total knee replacement in Feb.    Exercise/Physical Activity:limited with knee pain.    Is patient using [facility] products:yes  If yes, how many per day:3    Aversions/side effects of product/program:none    Fluids used to mix with products:water    Reported Diet History:  3 ND products a day  Grocery meals: leftovers from holidays, including a meat and vegetables.  She hosted several meals but added food she could eat.        Barriers/concerns preventing patient from achieving goal(s) since last visit:  Her Aunt passed away Dec 30.  A lot of death in

## 2025-01-10 ENCOUNTER — NURSE ONLY (OUTPATIENT)
Age: 70
End: 2025-01-10

## 2025-01-10 VITALS
BODY MASS INDEX: 36.84 KG/M2 | OXYGEN SATURATION: 95 % | SYSTOLIC BLOOD PRESSURE: 116 MMHG | RESPIRATION RATE: 18 BRPM | HEIGHT: 69 IN | DIASTOLIC BLOOD PRESSURE: 74 MMHG | HEART RATE: 77 BPM | WEIGHT: 248.7 LBS | TEMPERATURE: 98.4 F

## 2025-01-10 DIAGNOSIS — E66.01 CLASS 2 SEVERE OBESITY DUE TO EXCESS CALORIES WITH SERIOUS COMORBIDITY AND BODY MASS INDEX (BMI) OF 36.0 TO 36.9 IN ADULT: Primary | ICD-10-CM

## 2025-01-10 DIAGNOSIS — E66.812 CLASS 2 SEVERE OBESITY DUE TO EXCESS CALORIES WITH SERIOUS COMORBIDITY AND BODY MASS INDEX (BMI) OF 36.0 TO 36.9 IN ADULT: Primary | ICD-10-CM

## 2025-01-10 ASSESSMENT — PATIENT HEALTH QUESTIONNAIRE - PHQ9
9. THOUGHTS THAT YOU WOULD BE BETTER OFF DEAD, OR OF HURTING YOURSELF: NOT AT ALL
SUM OF ALL RESPONSES TO PHQ QUESTIONS 1-9: 4
SUM OF ALL RESPONSES TO PHQ QUESTIONS 1-9: 4
3. TROUBLE FALLING OR STAYING ASLEEP: NOT AT ALL
2. FEELING DOWN, DEPRESSED OR HOPELESS: SEVERAL DAYS
4. FEELING TIRED OR HAVING LITTLE ENERGY: SEVERAL DAYS
SUM OF ALL RESPONSES TO PHQ9 QUESTIONS 1 & 2: 2
6. FEELING BAD ABOUT YOURSELF - OR THAT YOU ARE A FAILURE OR HAVE LET YOURSELF OR YOUR FAMILY DOWN: NOT AT ALL
7. TROUBLE CONCENTRATING ON THINGS, SUCH AS READING THE NEWSPAPER OR WATCHING TELEVISION: SEVERAL DAYS
1. LITTLE INTEREST OR PLEASURE IN DOING THINGS: SEVERAL DAYS
5. POOR APPETITE OR OVEREATING: NOT AT ALL
SUM OF ALL RESPONSES TO PHQ QUESTIONS 1-9: 4
8. MOVING OR SPEAKING SO SLOWLY THAT OTHER PEOPLE COULD HAVE NOTICED. OR THE OPPOSITE, BEING SO FIGETY OR RESTLESS THAT YOU HAVE BEEN MOVING AROUND A LOT MORE THAN USUAL: NOT AT ALL
SUM OF ALL RESPONSES TO PHQ QUESTIONS 1-9: 4

## 2025-01-10 NOTE — PROGRESS NOTES
Identified pt with two pt identifiers (name and ). Reviewed chart in preparation for visit and have obtained necessary documentation.    Dena Arellano is a 69 y.o. female  Chief Complaint   Patient presents with    Weight Management     LCD/ [facility]      /74 (Site: Right Upper Arm, Position: Sitting, Cuff Size: Large Adult)   Pulse 77   Temp 98.4 °F (36.9 °C) (Oral)   Resp 18   Ht 1.753 m (5' 9\")   Wt 112.8 kg (248 lb 11.2 oz)   LMP  (LMP Unknown)   SpO2 95%   BMI 36.73 kg/m²     1. Have you been to the ER, urgent care clinic since your last visit?  Hospitalized since your last visit?no    2. Have you seen or consulted any other health care providers outside of the [facility] since your last visit?  Include any pap smears or colon screening. no    Patient entered their homework via the [employer] mona.        Progress Note: Weekly Education Class in the [facility]         Patient is on Very Low Calorie Diet [] (4 meal replacements per day, 800 kcal/day)      Low Calorie Diet [x] (2-3 meal replacements per day, 7265-9460 kcal/day)    1) Did patient have any new symptoms or physical problems?   Yes []    No [x]    If yes, check & comment: weakness [], fatigue [], lightheadedness [], headache [], cramps [], cold intolerance [], hair loss [], diarrhea [], constipation [],  NA [] other:                                 2) Has patient had any medical attention from other providers, urgent care or the emergency room this week?  Yes []  No [x]       NA [], If yes, why:                                       3) Any other sugar sweetened beverages consumed this week?   Yes []  No [x]    4) Did patient have any problems adhering to the diet? Yes [x]  No [] NA []    If yes, Vacation [x], Celebrations [], Conferences [], Family Reunions [] other: family [place] lunch with friend                                                5) How many hours of sleep this week? 56    (range)  NA

## 2025-01-13 ENCOUNTER — OFFICE VISIT (OUTPATIENT)
Age: 70
End: 2025-01-13
Payer: MEDICARE

## 2025-01-13 VITALS
OXYGEN SATURATION: 98 % | WEIGHT: 250.2 LBS | RESPIRATION RATE: 16 BRPM | DIASTOLIC BLOOD PRESSURE: 70 MMHG | SYSTOLIC BLOOD PRESSURE: 118 MMHG | BODY MASS INDEX: 37.06 KG/M2 | TEMPERATURE: 97.8 F | HEART RATE: 87 BPM | HEIGHT: 69 IN

## 2025-01-13 DIAGNOSIS — R73.03 PREDIABETES: ICD-10-CM

## 2025-01-13 DIAGNOSIS — Z01.818 PREOP EXAMINATION: Primary | ICD-10-CM

## 2025-01-13 DIAGNOSIS — G47.33 OSA ON CPAP: ICD-10-CM

## 2025-01-13 DIAGNOSIS — E78.2 MIXED HYPERLIPIDEMIA: ICD-10-CM

## 2025-01-13 DIAGNOSIS — I10 BENIGN ESSENTIAL HYPERTENSION: ICD-10-CM

## 2025-01-13 DIAGNOSIS — M17.12 PRIMARY OSTEOARTHRITIS OF LEFT KNEE: ICD-10-CM

## 2025-01-13 DIAGNOSIS — Z86.14 HISTORY OF MRSA INFECTION: ICD-10-CM

## 2025-01-13 DIAGNOSIS — E03.9 ACQUIRED HYPOTHYROIDISM: ICD-10-CM

## 2025-01-13 PROCEDURE — 1036F TOBACCO NON-USER: CPT | Performed by: FAMILY MEDICINE

## 2025-01-13 PROCEDURE — G8399 PT W/DXA RESULTS DOCUMENT: HCPCS | Performed by: FAMILY MEDICINE

## 2025-01-13 PROCEDURE — 1126F AMNT PAIN NOTED NONE PRSNT: CPT | Performed by: FAMILY MEDICINE

## 2025-01-13 PROCEDURE — 3074F SYST BP LT 130 MM HG: CPT | Performed by: FAMILY MEDICINE

## 2025-01-13 PROCEDURE — 1090F PRES/ABSN URINE INCON ASSESS: CPT | Performed by: FAMILY MEDICINE

## 2025-01-13 PROCEDURE — 1159F MED LIST DOCD IN RCRD: CPT | Performed by: FAMILY MEDICINE

## 2025-01-13 PROCEDURE — 1160F RVW MEDS BY RX/DR IN RCRD: CPT | Performed by: FAMILY MEDICINE

## 2025-01-13 PROCEDURE — G8417 CALC BMI ABV UP PARAM F/U: HCPCS | Performed by: FAMILY MEDICINE

## 2025-01-13 PROCEDURE — G8427 DOCREV CUR MEDS BY ELIG CLIN: HCPCS | Performed by: FAMILY MEDICINE

## 2025-01-13 PROCEDURE — 1123F ACP DISCUSS/DSCN MKR DOCD: CPT | Performed by: FAMILY MEDICINE

## 2025-01-13 PROCEDURE — 3078F DIAST BP <80 MM HG: CPT | Performed by: FAMILY MEDICINE

## 2025-01-13 PROCEDURE — 3017F COLORECTAL CA SCREEN DOC REV: CPT | Performed by: FAMILY MEDICINE

## 2025-01-13 PROCEDURE — 99214 OFFICE O/P EST MOD 30 MIN: CPT | Performed by: FAMILY MEDICINE

## 2025-01-13 SDOH — ECONOMIC STABILITY: FOOD INSECURITY: WITHIN THE PAST 12 MONTHS, THE FOOD YOU BOUGHT JUST DIDN'T LAST AND YOU DIDN'T HAVE MONEY TO GET MORE.: NEVER TRUE

## 2025-01-13 SDOH — ECONOMIC STABILITY: FOOD INSECURITY: WITHIN THE PAST 12 MONTHS, YOU WORRIED THAT YOUR FOOD WOULD RUN OUT BEFORE YOU GOT MONEY TO BUY MORE.: NEVER TRUE

## 2025-01-13 ASSESSMENT — ENCOUNTER SYMPTOMS
EYES NEGATIVE: 1
ALLERGIC/IMMUNOLOGIC NEGATIVE: 1
RESPIRATORY NEGATIVE: 1
WHEEZING: 0
SHORTNESS OF BREATH: 0
GASTROINTESTINAL NEGATIVE: 1

## 2025-01-13 NOTE — PROGRESS NOTES
Chief Complaint   Patient presents with    Pre-op Exam     Knee Sx : Left Dr. Brooks 2/5/25     \"Have you been to the ER, urgent care clinic since your last visit?  Hospitalized since your last visit?\"    NO    “Have you seen or consulted any other health care providers outside of [facility] since your last visit?”    NO                   1/10/2025    10:25 AM   PHQ-9    Little interest or pleasure in doing things 1   Feeling down, depressed, or hopeless 1   Trouble falling or staying asleep, or sleeping too much 0   Feeling tired or having little energy 1   Poor appetite or overeating 0   Feeling bad about yourself - or that you are a failure or have let yourself or your family down 0   Trouble concentrating on things, such as reading the newspaper or watching television 1   Moving or speaking so slowly that other people could have noticed. Or the opposite - being so fidgety or restless that you have been moving around a lot more than usual 0   Thoughts that you would be better off dead, or of hurting yourself in some way 0   PHQ-2 Score 2   PHQ-9 Total Score 4           Financial Resource Strain: Low Risk  (10/20/2023)    Overall Financial Resource Strain (CARDIA)     Difficulty of Paying Living Expenses: Not very hard      Food Insecurity: No Food Insecurity (1/13/2025)    Hunger Vital Sign     Worried About Running Out of Food in the Last Year: Never true     Ran Out of Food in the Last Year: Never true          Health Maintenance Due   Topic Date Due    COVID-19 Vaccine (11 - 2023-24 season) 01/04/2025        
distress.     Appearance: She is not ill-appearing.   HENT:      Right Ear: Tympanic membrane normal.      Left Ear: Tympanic membrane normal.      Mouth/Throat:      Mouth: Mucous membranes are moist.      Pharynx: Oropharynx is clear.   Eyes:      Conjunctiva/sclera: Conjunctivae normal.   Neck:      Thyroid: No thyroid mass, thyromegaly or thyroid tenderness.      Vascular: No carotid bruit.   Cardiovascular:      Rate and Rhythm: Normal rate and regular rhythm.      Heart sounds: No murmur heard.  Pulmonary:      Effort: Pulmonary effort is normal. No respiratory distress.      Breath sounds: Normal breath sounds. No wheezing or rales.   Abdominal:      Palpations: Abdomen is soft.      Tenderness: There is no abdominal tenderness.   Musculoskeletal:      Cervical back: Neck supple. No tenderness.      Comments: Mild-moderate dependent ankle edema B, no pitting, no tenderness, skin warm dry and intact   Lymphadenopathy:      Cervical: No cervical adenopathy.   Skin:     General: Skin is warm and dry.   Neurological:      General: No focal deficit present.      Mental Status: She is alert and oriented to person, place, and time. Mental status is at baseline.   Psychiatric:         Mood and Affect: Mood normal.            ASSESSMENT & PLAN     Pre-Op H&P:  Surgery: Left Total Knee Arthroplasty  Date of Surgery: 2-5-2025  Surgeon: Dr. Jonas Brooks  Anesthesia: Not specified  Pre-Admission Testing: Scheduled 1- [facility]  Requested Testing: None    Indication for Surgery/Primary Diagnosis:  Primary osteoarthritis of left knee    Secondary Diagnoses/Chronic Health Conditions/Risk Assessment:  1) History of MRSA infection 2019  -PAT/screening and protocols per surgical team at [facility], scheduled 1-20-25    2) Benign essential hypertension  -Controlled, stable  -Renal function and electrolytes stable over time  -Continue Cozaar 100 mg daily    3) Mixed hyperlipidemia  -Treated with statin

## 2025-01-20 ENCOUNTER — HOSPITAL ENCOUNTER (OUTPATIENT)
Facility: HOSPITAL | Age: 70
Discharge: HOME | End: 2025-01-23
Payer: MEDICARE

## 2025-01-20 VITALS
HEIGHT: 69 IN | WEIGHT: 247.8 LBS | SYSTOLIC BLOOD PRESSURE: 117 MMHG | BODY MASS INDEX: 36.7 KG/M2 | TEMPERATURE: 98 F | DIASTOLIC BLOOD PRESSURE: 72 MMHG | HEART RATE: 71 BPM

## 2025-01-20 LAB
ABO + RH BLD: NORMAL
ANION GAP SERPL CALC-SCNC: 6 MMOL/L (ref 2–12)
APPEARANCE UR: CLEAR
BACTERIA URNS QL MICRO: NEGATIVE /HPF
BILIRUB UR QL: NEGATIVE
BLOOD GROUP ANTIBODIES SERPL: NORMAL
BUN SERPL-MCNC: 23 MG/DL (ref 6–20)
BUN/CREAT SERPL: 32 (ref 12–20)
CALCIUM SERPL-MCNC: 9.7 MG/DL (ref 8.5–10.1)
CHLORIDE SERPL-SCNC: 107 MMOL/L (ref 97–108)
CO2 SERPL-SCNC: 25 MMOL/L (ref 21–32)
COLOR UR: ABNORMAL
CREAT SERPL-MCNC: 0.71 MG/DL (ref 0.55–1.02)
EPITH CASTS URNS QL MICRO: ABNORMAL /LPF
ERYTHROCYTE [DISTWIDTH] IN BLOOD BY AUTOMATED COUNT: 13.4 % (ref 11.5–14.5)
EST. AVERAGE GLUCOSE BLD GHB EST-MCNC: 103 MG/DL
GLUCOSE SERPL-MCNC: 96 MG/DL (ref 65–100)
GLUCOSE UR STRIP.AUTO-MCNC: NEGATIVE MG/DL
HBA1C MFR BLD: 5.2 % (ref 4–5.6)
HCT VFR BLD AUTO: 40.6 % (ref 35–47)
HGB BLD-MCNC: 12.9 G/DL (ref 11.5–16)
HGB UR QL STRIP: NEGATIVE
HYALINE CASTS URNS QL MICRO: ABNORMAL /LPF (ref 0–5)
INR PPP: 1 (ref 0.9–1.1)
KETONES UR QL STRIP.AUTO: NEGATIVE MG/DL
LEUKOCYTE ESTERASE UR QL STRIP.AUTO: ABNORMAL
MCH RBC QN AUTO: 29.7 PG (ref 26–34)
MCHC RBC AUTO-ENTMCNC: 31.8 G/DL (ref 30–36.5)
MCV RBC AUTO: 93.3 FL (ref 80–99)
NITRITE UR QL STRIP.AUTO: NEGATIVE
NRBC # BLD: 0 K/UL (ref 0–0.01)
NRBC BLD-RTO: 0 PER 100 WBC
PH UR STRIP: 6.5 (ref 5–8)
PLATELET # BLD AUTO: 224 K/UL (ref 150–400)
PMV BLD AUTO: 11.6 FL (ref 8.9–12.9)
POTASSIUM SERPL-SCNC: 4.4 MMOL/L (ref 3.5–5.1)
PROT UR STRIP-MCNC: NEGATIVE MG/DL
PROTHROMBIN TIME: 10.5 SEC (ref 9.2–11.2)
RBC # BLD AUTO: 4.35 M/UL (ref 3.8–5.2)
RBC #/AREA URNS HPF: ABNORMAL /HPF (ref 0–5)
SODIUM SERPL-SCNC: 138 MMOL/L (ref 136–145)
SP GR UR REFRACTOMETRY: 1.01 (ref 1–1.03)
SPECIMEN EXP DATE BLD: NORMAL
URINE CULTURE IF INDICATED: ABNORMAL
UROBILINOGEN UR QL STRIP.AUTO: 0.2 EU/DL (ref 0.2–1)
WBC # BLD AUTO: 5.9 K/UL (ref 3.6–11)
WBC URNS QL MICRO: ABNORMAL /HPF (ref 0–4)

## 2025-01-20 PROCEDURE — 86900 BLOOD TYPING SEROLOGIC ABO: CPT

## 2025-01-20 PROCEDURE — APPNB30 APP NON BILLABLE TIME 0-30 MINS: Performed by: NURSE PRACTITIONER

## 2025-01-20 PROCEDURE — 83036 HEMOGLOBIN GLYCOSYLATED A1C: CPT

## 2025-01-20 PROCEDURE — 85027 COMPLETE CBC AUTOMATED: CPT

## 2025-01-20 PROCEDURE — 81001 URINALYSIS AUTO W/SCOPE: CPT

## 2025-01-20 PROCEDURE — 86850 RBC ANTIBODY SCREEN: CPT

## 2025-01-20 PROCEDURE — 86901 BLOOD TYPING SEROLOGIC RH(D): CPT

## 2025-01-20 PROCEDURE — 36415 COLL VENOUS BLD VENIPUNCTURE: CPT

## 2025-01-20 PROCEDURE — 80048 BASIC METABOLIC PNL TOTAL CA: CPT

## 2025-01-20 PROCEDURE — 93005 ELECTROCARDIOGRAM TRACING: CPT | Performed by: ORTHOPAEDIC SURGERY

## 2025-01-20 PROCEDURE — 85610 PROTHROMBIN TIME: CPT

## 2025-01-20 ASSESSMENT — KOOS JR
RISING FROM SITTING: MODERATE
KOOS JR TOTAL INTERVAL SCORE: 57.14
STRAIGHTENING KNEE FULLY: MODERATE
BENDING TO THE FLOOR TO PICK UP OBJECT: MILD
GOING UP OR DOWN STAIRS: MODERATE
STANDING UPRIGHT: MILD
TWISING OR PIVOTING ON KNEE: MODERATE
HOW SEVERE IS YOUR KNEE STIFFNESS AFTER FIRST WAKING IN MORNING: MODERATE

## 2025-01-20 ASSESSMENT — PROMIS GLOBAL HEALTH SCALE
WHO IS THE PERSON COMPLETING THE PROMIS V1.1 SURVEY?: SELF
IN THE PAST 7 DAYS, HOW WOULD YOU RATE YOUR FATIGUE ON AVERAGE [ON A SCALE FROM 1 (NONE) TO 5 (VERY SEVERE)]?: MODERATE
IN THE PAST 7 DAYS, HOW WOULD YOU RATE YOUR PAIN ON AVERAGE [ON A SCALE FROM 0 (NO PAIN) TO 10 (WORST IMAGINABLE PAIN)]?: 3
IN THE PAST 7 DAYS, HOW OFTEN HAVE YOU BEEN BOTHERED BY EMOTIONAL PROBLEMS, SUCH AS FEELING ANXIOUS, DEPRESSED, OR IRRITABLE [ON A SCALE FROM 1 (NEVER) TO 5 (ALWAYS)]?: RARELY
IN GENERAL, PLEASE RATE HOW WELL YOU CARRY OUT YOUR USUAL SOCIAL ACTIVITIES (INCLUDES ACTIVITIES AT HOME, AT WORK, AND IN YOUR COMMUNITY, AND RESPONSIBILITIES AS A PARENT, CHILD, SPOUSE, EMPLOYEE, FRIEND, ETC) [ON A SCALE OF 1 (POOR) TO 5 (EXCELLENT)]?: GOOD
IN GENERAL, HOW WOULD YOU RATE YOUR PHYSICAL HEALTH [ON A SCALE OF 1 (POOR) TO 5 (EXCELLENT)]?: FAIR
SUM OF RESPONSES TO QUESTIONS 2, 4, 5, & 10: 14
TO WHAT EXTENT ARE YOU ABLE TO CARRY OUT YOUR EVERYDAY PHYSICAL ACTIVITIES SUCH AS WALKING, CLIMBING STAIRS, CARRYING GROCERIES, OR MOVING A CHAIR [ON A SCALE OF 1 (NOT AT ALL) TO 5 (COMPLETELY)]?: MODERATELY
HOW IS THE PROMIS V1.1 BEING ADMINISTERED?: PAPER
SUM OF RESPONSES TO QUESTIONS 3, 6, 7, & 8: 11
IN GENERAL, HOW WOULD YOU RATE YOUR MENTAL HEALTH, INCLUDING YOUR MOOD AND YOUR ABILITY TO THINK [ON A SCALE OF 1 (POOR) TO 5 (EXCELLENT)]?: GOOD
IN GENERAL, WOULD YOU SAY YOUR HEALTH IS...[ON A SCALE OF 1 (POOR) TO 5 (EXCELLENT)]: FAIR
IN GENERAL, WOULD YOU SAY YOUR QUALITY OF LIFE IS...[ON A SCALE OF 1 (POOR) TO 5 (EXCELLENT)]: VERY GOOD
IN GENERAL, HOW WOULD YOU RATE YOUR SATISFACTION WITH YOUR SOCIAL ACTIVITIES AND RELATIONSHIPS [ON A SCALE OF 1 (POOR) TO 5 (EXCELLENT)]?: GOOD

## 2025-01-21 ENCOUNTER — OFFICE VISIT (OUTPATIENT)
Age: 70
End: 2025-01-21

## 2025-01-21 DIAGNOSIS — E66.812 CLASS 2 SEVERE OBESITY DUE TO EXCESS CALORIES WITH SERIOUS COMORBIDITY AND BODY MASS INDEX (BMI) OF 37.0 TO 37.9 IN ADULT: Primary | ICD-10-CM

## 2025-01-21 DIAGNOSIS — E66.01 CLASS 2 SEVERE OBESITY DUE TO EXCESS CALORIES WITH SERIOUS COMORBIDITY AND BODY MASS INDEX (BMI) OF 37.0 TO 37.9 IN ADULT: Primary | ICD-10-CM

## 2025-01-21 LAB
BACTERIA SPEC CULT: ABNORMAL
BACTERIA SPEC CULT: ABNORMAL
EKG ATRIAL RATE: 61 BPM
EKG DIAGNOSIS: NORMAL
EKG P AXIS: 53 DEGREES
EKG P-R INTERVAL: 178 MS
EKG Q-T INTERVAL: 412 MS
EKG QRS DURATION: 100 MS
EKG QTC CALCULATION (BAZETT): 414 MS
EKG R AXIS: -4 DEGREES
EKG T AXIS: 51 DEGREES
EKG VENTRICULAR RATE: 61 BPM
SERVICE CMNT-IMP: ABNORMAL

## 2025-01-21 PROCEDURE — 93010 ELECTROCARDIOGRAM REPORT: CPT | Performed by: SPECIALIST

## 2025-01-21 NOTE — PROGRESS NOTES
2025 patient completed online education. no questions at this time.     KNEE DISABILITY OSTEOARTHRITIS AND OUTCOME SCORE    Stiffness - The following question concerns the amount of joing stiffness you have experienced during the last week in your knee. Stiffness is a sensation of restriction or slowness in the ease with which you move your knee joint.  How severe is your knee stiffness after first wakening in the morning?: 2        Pain - What amount of knee pain have you experienced the last week during the following activities?  Twisting/pivoting on your knee: 2  Straightening knee fully: 2  Going up or down stairs: 2  Standing upright: 1        Function - Please indicate the degree of difficulty you have experienced in the last week due to your knee.  Rising from sittin  Bending to floor/ an object: 1        Raw Score  Jr SONY. Knee Survey Score: 12  KOOS JR Total Interval Score (0-100 Scale): 57.14      PROMIS Questions    In general, would you say your health is:: 2    In general, would you say your quality of life is:: 4    In general, how would you rate your physical health?: 2    In general, how would you rate your mental health, including your mood and your ability to think?: 3    To what extent are you able to carry out your everyday physical activities such as walking, climbing stairs, carrying groceries, or moving a chair?: 3    In the past 7 days how often have you been bothered by emotional problems such as feeling anxious, depressed or irritable?: 4    In the past 7 days how would you rate your fatigue on average?: 3    In the past 7 days how would you rate your pain on average?: 3    In general, please rate how well you carry out your usual social activities and roles. (This includes activities at home, at work and in your community, and responsibilities as a parent, child, spouse, employee, friend, etc.): 3    In general, how would you rate your satisfaction with your social

## 2025-01-22 PROBLEM — Z01.818 ENCOUNTER FOR PREADMISSION TESTING: Status: ACTIVE | Noted: 2025-01-22

## 2025-01-22 RX ORDER — MUPIROCIN 20 MG/G
OINTMENT TOPICAL 2 TIMES DAILY
Qty: 1 G | Refills: 0 | Status: SHIPPED | OUTPATIENT
Start: 2025-01-22 | End: 2025-01-27

## 2025-01-22 RX ORDER — VANCOMYCIN 1.5 G/300ML
1500 INJECTION, SOLUTION INTRAVENOUS ONCE
OUTPATIENT
Start: 2025-01-22 | End: 2025-01-22

## 2025-01-22 NOTE — PERIOP NOTE
CALLED TO [facility]/[phone] AND REQ RECENT NOTES TO BE FAXED TO PAT.  NOTES REC'DEMOND AND PLACED ON CHART.  
PAT Nurse Practitioner   Pre-Operative Chart Review/Assessment:-ORTHOPEDIC                Patient Name:  Dena Arellano                                                           Age:   69 y.o.    :  1955     Today's Date:  2025     Date of PAT:   25      Date of Surgery:    25      Procedure(s):  Left Total Knee Arthroplasty     Anesthesia:   Spinal     Surgeon:   Dr. Brooks                       PLAN:      1)  PCP:  Georgia Alcazar MD      2)  Cardiac Clearance:  EKG and METs reviewed. No further cardiac evaluation requested. PAT EKG showed normal sinus rhythm.         3)  Diabetic Treatment Consult:  Not indicated. A1c-5.2      4)  Sleep Apnea evaluation:   +RACHEL dx. Pt uses CPAP.       5) Treatment for MRSA/Staph Aureus:  +MRSA. Tx w/ mupirocin.      6) Discharge Plan:  Home w/ daughter.      7) Skin: Denies open wounds, cuts, sores, rashes or other areas of concern in PAT assessment.      8) Additional Concerns:  Former smoker, HTN, hx of R breast CA s/p giselle mastectomies, dep/anx      9) Cardiac/Diabetic Medication Recommendations Prior to Surgery:  hold losartan DOS      Additional Orders for Day of Surgery:  T&S      Records Scanned in Epic:   None              Vital Signs:        Vitals:    25 0922   BP: 117/72   Pulse: 71   Temp: 98 °F (36.7 °C)             Body mass index is 36.59 kg/m².       KNEE DISABILITY OSTEOARTHRITIS AND OUTCOME SCORE    Stiffness - The following question concerns the amount of joing stiffness you have experienced during the last week in your knee. Stiffness is a sensation of restriction or slowness in the ease with which you move your knee joint.  How severe is your knee stiffness after first wakening in the morning?: Moderate    Pain - What amount of knee pain have you experienced the last week during the following activities?  Twisting/pivoting on your knee: Moderate  Straightening knee fully: Moderate  Going up or down stairs: Moderate  Standing 
PAT: 1-20 @ 9;30 TO WATCH VIDEO  
PC to pt, full name and  verified, regarding positive nasal cx (MRSA) and need to start Mupirocin ointment BID x 5 days to B nostrils starting today and bathe with CHG soap for 5 days prior to surgery. Pt verbalized understanding of instructions and will start today as recommended. Allergies and pharmacy of choice reviewed. Rx escribed to pt's pharmacy of choice.  PTA medlist updated. Surgeon and PCP notified of positive culture and treatment.     PRESCRIPTION:    MUPIROCIN 2% OINTMENT  QUANTITY:  #22 GRAMS  REFILLS: NONE    Apply 0.25 g (small pea-sized amount) to both nostrils twice a day for five days.    Vancomycin 1.5 g IV x 1 ordered for pre-op DOS.    MIGDALIA BARGER - NP   
need to use nasal spray, clean the tip of the bottle with alcohol before use and do not use both at the same time.  If you are scheduled for COVID testing during the 5 days, do NOT apply morning dose until after the COVID test has been performed.        Follow all instructions so your surgery won’t be cancelled.  Please, be on time.                    If a situation occurs and you are delayed the day of surgery, call [phone]/ [phone].    If your physical condition changes (like a fever, cold, flu, etc.) call your surgeon as soon as possible.    Home medication(s) reviewed and verified via during PAT appointment/call.    The patient was contacted in person.     She verbalized understanding of all instructions does not need reinforcement.

## 2025-01-24 ENCOUNTER — NURSE ONLY (OUTPATIENT)
Age: 70
End: 2025-01-24

## 2025-01-24 VITALS
RESPIRATION RATE: 16 BRPM | TEMPERATURE: 98.1 F | DIASTOLIC BLOOD PRESSURE: 65 MMHG | HEIGHT: 69 IN | WEIGHT: 246.1 LBS | HEART RATE: 69 BPM | OXYGEN SATURATION: 95 % | SYSTOLIC BLOOD PRESSURE: 97 MMHG | BODY MASS INDEX: 36.45 KG/M2

## 2025-01-24 DIAGNOSIS — E66.01 CLASS 2 SEVERE OBESITY DUE TO EXCESS CALORIES WITH SERIOUS COMORBIDITY AND BODY MASS INDEX (BMI) OF 36.0 TO 36.9 IN ADULT: Primary | ICD-10-CM

## 2025-01-24 DIAGNOSIS — E66.812 CLASS 2 SEVERE OBESITY DUE TO EXCESS CALORIES WITH SERIOUS COMORBIDITY AND BODY MASS INDEX (BMI) OF 36.0 TO 36.9 IN ADULT: Primary | ICD-10-CM

## 2025-01-24 DIAGNOSIS — G47.33 OBSTRUCTIVE SLEEP APNEA SYNDROME: ICD-10-CM

## 2025-01-24 DIAGNOSIS — I10 BENIGN ESSENTIAL HYPERTENSION: ICD-10-CM

## 2025-01-24 DIAGNOSIS — R73.9 BLOOD GLUCOSE ELEVATED: ICD-10-CM

## 2025-01-24 ASSESSMENT — PATIENT HEALTH QUESTIONNAIRE - PHQ9
SUM OF ALL RESPONSES TO PHQ QUESTIONS 1-9: 0
SUM OF ALL RESPONSES TO PHQ QUESTIONS 1-9: 0
1. LITTLE INTEREST OR PLEASURE IN DOING THINGS: NOT AT ALL
2. FEELING DOWN, DEPRESSED OR HOPELESS: NOT AT ALL
SUM OF ALL RESPONSES TO PHQ9 QUESTIONS 1 & 2: 0
SUM OF ALL RESPONSES TO PHQ QUESTIONS 1-9: 0
SUM OF ALL RESPONSES TO PHQ QUESTIONS 1-9: 0

## 2025-01-24 NOTE — PROGRESS NOTES
Identified pt with two pt identifiers (name and ). Reviewed chart in preparation for visit and have obtained necessary documentation.    Dena Arellano is a 69 y.o. female  Chief Complaint   Patient presents with    Weight Management     LCD     BP 97/65 (Site: Left Upper Arm, Position: Sitting, Cuff Size: Large Adult)   Pulse 69   Temp 98.1 °F (36.7 °C) (Oral)   Resp 16   Ht 1.753 m (5' 9\")   Wt 111.6 kg (246 lb 1.6 oz)   LMP  (LMP Unknown)   SpO2 95%   BMI 36.34 kg/m²     1. Have you been to the ER, urgent care clinic since your last visit?  Hospitalized since your last visit?no    2. Have you seen or consulted any other health care providers outside of the [facility] since your last visit?  Include any pap smears or colon screening. yes - PCP, pre op         Progress Note: Weekly Education Class in the [facility] Loss Program         Patient is on Very Low Calorie Diet [] (4 meal replacements per day, 800 kcal/day)      Low Calorie Diet [x] (2-3 meal replacements per day, 3134-0223 kcal/day)    1) Did patient have any new symptoms or physical problems?   Yes [x]    No []    If yes, check & comment: weakness [], fatigue [], lightheadedness [], headache [], cramps [], cold intolerance [], hair loss [], diarrhea [], constipation [],  NA [] other: knee started hurting again                                2) Has patient had any medical attention from other providers, urgent care or the emergency room this week?  Yes [x]  No []       NA [], If yes, why: virtual annual, CPAP sleep study, check up eval                                      3) Any other sugar sweetened beverages consumed this week?   Yes []  No [x]    4) Did patient have any problems adhering to the diet? Yes [x]  No [] NA []    If yes, Vacation [], Celebrations [], Conferences [], Family Reunions [] other: extra hungry at night                                               5) How many hours of sleep this week? 7.5

## 2025-01-25 NOTE — PROGRESS NOTES
[facility]  Metabolic Weight Loss Program        Patient's Name: Dena Arellano  : 1955    This patient is a participant at [facility] and attended the weekly virtual nutrition class hosted via [employer].      Lourdes Padilla, MS, RD, LDN

## 2025-01-28 ENCOUNTER — OFFICE VISIT (OUTPATIENT)
Age: 70
End: 2025-01-28

## 2025-01-28 DIAGNOSIS — E66.01 CLASS 2 SEVERE OBESITY DUE TO EXCESS CALORIES WITH SERIOUS COMORBIDITY AND BODY MASS INDEX (BMI) OF 37.0 TO 37.9 IN ADULT: Primary | ICD-10-CM

## 2025-01-28 DIAGNOSIS — E66.812 CLASS 2 SEVERE OBESITY DUE TO EXCESS CALORIES WITH SERIOUS COMORBIDITY AND BODY MASS INDEX (BMI) OF 37.0 TO 37.9 IN ADULT: Primary | ICD-10-CM

## 2025-01-28 NOTE — PROGRESS NOTES
[facility]  Metabolic Weight Loss Program        Patient's Name: Dena Arellano  : 1955    This patient is a participant at [facility] and attended the weekly virtual nutrition class hosted via [employer].      Lourdes Padilla, MS, RD, LDN

## 2025-01-29 NOTE — PROGRESS NOTES
Nurse note from patient's weekly VLCD / LCD / Maintenance class was reviewed.  Pertinent medical concerns were:   reviewed     BP Readings from Last 3 Encounters:   01/24/25 97/65   01/20/25 117/72   01/13/25 118/70       Failed to redirect to the Timeline version of the [facility] SmartLink.    Current Outpatient Medications   Medication Sig Dispense Refill    Misc. Devices (CPAP MACHINE) MISC nightly      levothyroxine (SYNTHROID) 200 MCG tablet Take 1 tablet by mouth every morning (before breakfast) (Patient taking differently: Take 1 tablet by mouth Daily) 90 tablet 2    buPROPion (WELLBUTRIN XL) 150 MG extended release tablet Take 1 tablet by mouth every morning (before breakfast) (Patient taking differently: Take 1 tablet by mouth every morning) 90 tablet 3    FLUoxetine (PROZAC) 20 MG capsule Take 1 capsule by mouth daily 90 capsule 3    losartan (COZAAR) 100 MG tablet Take 1 tablet by mouth daily 90 tablet 3    rosuvastatin (CRESTOR) 10 MG tablet Take 1 tablet by mouth nightly (Patient taking differently: Take 1 tablet by mouth daily) 90 tablet 3    fluticasone (FLONASE) 50 MCG/ACT nasal spray SHAKE BOTTLE AND USE 1 SPRAY IN EACH NOSTRIL EVERY DAY (Patient taking differently: 1 spray by Nasal route in the morning and at bedtime) 48 g 1    Ascorbic Acid (VITAMIN C) 1000 MG tablet Take 1 tablet by mouth daily      Multiple Vitamin (MULTI VITAMIN PO) Take 1 tablet by mouth daily Centrum silver for women      Acetaminophen (TYLENOL PO) Take 650 mg by mouth nightly as needed (for knee pain)      TURMERIC PO Take 1 capsule by mouth daily      azelastine HCl 0.15 % SOLN 1 spray by Nasal route 2 times daily      cetirizine (ZYRTEC) 10 MG tablet Take 1 tablet by mouth daily      Cholecalciferol 50 MCG (2000 UT) TABS Take 1 tablet by mouth daily      diclofenac sodium (VOLTAREN) 1 % GEL Apply 2 g topically 2 times daily (Patient not taking: Reported on 1/24/2025) 50 g 0     No current facility-administered medications for

## 2025-01-29 NOTE — PROGRESS NOTES
Nurse note from patient's weekly  / LCD / Maintenance class was reviewed.  Pertinent medical concerns were:   reviewed     BP Readings from Last 3 Encounters:   01/24/25 97/65   01/20/25 117/72   01/13/25 118/70       Failed to redirect to the Timeline version of the [facility] SmartLink.    Current Outpatient Medications   Medication Sig Dispense Refill    levothyroxine (SYNTHROID) 200 MCG tablet Take 1 tablet by mouth every morning (before breakfast) (Patient taking differently: Take 1 tablet by mouth Daily) 90 tablet 2    buPROPion (WELLBUTRIN XL) 150 MG extended release tablet Take 1 tablet by mouth every morning (before breakfast) (Patient taking differently: Take 1 tablet by mouth every morning) 90 tablet 3    FLUoxetine (PROZAC) 20 MG capsule Take 1 capsule by mouth daily 90 capsule 3    losartan (COZAAR) 100 MG tablet Take 1 tablet by mouth daily 90 tablet 3    rosuvastatin (CRESTOR) 10 MG tablet Take 1 tablet by mouth nightly (Patient taking differently: Take 1 tablet by mouth daily) 90 tablet 3    diclofenac sodium (VOLTAREN) 1 % GEL Apply 2 g topically 2 times daily (Patient not taking: Reported on 1/24/2025) 50 g 0    fluticasone (FLONASE) 50 MCG/ACT nasal spray SHAKE BOTTLE AND USE 1 SPRAY IN EACH NOSTRIL EVERY DAY (Patient taking differently: 1 spray by Nasal route in the morning and at bedtime) 48 g 1    Ascorbic Acid (VITAMIN C) 1000 MG tablet Take 1 tablet by mouth daily      Multiple Vitamin (MULTI VITAMIN PO) Take 1 tablet by mouth daily Centrum silver for women      Acetaminophen (TYLENOL PO) Take 650 mg by mouth nightly as needed (for knee pain)      TURMERIC PO Take 1 capsule by mouth daily      azelastine HCl 0.15 % SOLN 1 spray by Nasal route 2 times daily      cetirizine (ZYRTEC) 10 MG tablet Take 1 tablet by mouth daily      Cholecalciferol 50 MCG (2000 UT) TABS Take 1 tablet by mouth daily      Misc. Devices (CPAP MACHINE) MISC nightly       No current facility-administered medications for this

## 2025-01-31 ENCOUNTER — TELEMEDICINE (OUTPATIENT)
Age: 70
End: 2025-01-31

## 2025-01-31 VITALS
BODY MASS INDEX: 36.14 KG/M2 | SYSTOLIC BLOOD PRESSURE: 114 MMHG | WEIGHT: 244 LBS | DIASTOLIC BLOOD PRESSURE: 62 MMHG | HEART RATE: 73 BPM | TEMPERATURE: 98.1 F | OXYGEN SATURATION: 95 % | HEIGHT: 69 IN

## 2025-01-31 DIAGNOSIS — R73.9 BLOOD GLUCOSE ELEVATED: ICD-10-CM

## 2025-01-31 DIAGNOSIS — M17.0 PRIMARY OSTEOARTHRITIS OF KNEES, BILATERAL: ICD-10-CM

## 2025-01-31 DIAGNOSIS — E78.2 MIXED HYPERLIPIDEMIA: ICD-10-CM

## 2025-01-31 DIAGNOSIS — E66.01 CLASS 2 SEVERE OBESITY DUE TO EXCESS CALORIES WITH SERIOUS COMORBIDITY AND BODY MASS INDEX (BMI) OF 36.0 TO 36.9 IN ADULT: Primary | ICD-10-CM

## 2025-01-31 DIAGNOSIS — E66.812 CLASS 2 SEVERE OBESITY DUE TO EXCESS CALORIES WITH SERIOUS COMORBIDITY AND BODY MASS INDEX (BMI) OF 36.0 TO 36.9 IN ADULT: Primary | ICD-10-CM

## 2025-01-31 DIAGNOSIS — E03.9 ACQUIRED HYPOTHYROIDISM: ICD-10-CM

## 2025-01-31 DIAGNOSIS — G47.33 OBSTRUCTIVE SLEEP APNEA SYNDROME: ICD-10-CM

## 2025-01-31 DIAGNOSIS — I10 BENIGN ESSENTIAL HYPERTENSION: ICD-10-CM

## 2025-01-31 NOTE — PROGRESS NOTES
Identified pt with two pt identifiers (name and ). Reviewed chart in preparation for visit and have obtained necessary documentation.    Dena Arellano is a 69 y.o. female  No chief complaint on file.    /62   Pulse 73   Temp 98.1 °F (36.7 °C)   Ht 1.753 m (5' 9\")   Wt 110.7 kg (244 lb)   LMP  (LMP Unknown)   SpO2 95%   BMI 36.03 kg/m²     1. Have you been to the ER, urgent care clinic since your last visit?  Hospitalized since your last visit?no    2. Have you seen or consulted any other health care providers outside of the [facility] since your last visit?  Include any pap smears or colon screening. No        
Indicates a positive item  \"[]\" Indicates a negative item  -- DELETE ALL ITEMS NOT EXAMINED]  Vital Signs: (As obtained by patient/caregiver at home)  /62   Pulse 73   Temp 98.1 °F (36.7 °C)   Ht 1.753 m (5' 9\")   Wt 110.7 kg (244 lb)   LMP  (LMP Unknown)   SpO2 95%   BMI 36.03 kg/m²      Constitutional: [x] Appears well-developed and well-nourished [x] No apparent distress      [] Abnormal -     Mental status: [x] Alert and awake  [x] Oriented to person/place/time [x] Able to follow commands    [] Abnormal -     Eyes:   EOM    [x]  Normal    [] Abnormal -   Sclera  [x]  Normal    [] Abnormal -          Discharge [x]  None visible   [] Abnormal -     HENT: [x] Normocephalic, atraumatic  [] Abnormal -   [x] Mouth/Throat: Mucous membranes are moist    External Ears [x] Normal  [] Abnormal -    Neck: [x] No visualized mass [] Abnormal -     Pulmonary/Chest: [x] Respiratory effort normal   [x] No visualized signs of difficulty breathing or respiratory distress        [] Abnormal -      Musculoskeletal:   [x] Normal gait with no signs of ataxia         [x] Normal range of motion of neck        [] Abnormal -     Neurological:        [x] No Facial Asymmetry (Cranial nerve 7 motor function) (limited exam due to video visit)          [x] No gaze palsy        [] Abnormal -          Skin:        [x] No significant exanthematous lesions or discoloration noted on facial skin         [] Abnormal -            Psychiatric:       [x] Normal Affect [] Abnormal -        [x] No Hallucinations    Other pertinent observable physical exam findings:-      Assessment / Plan    Dena Arellano was seen today for No chief complaint on file.       1. Class 2 severe obesity due to excess calories with serious comorbidity and body mass index (BMI) of 36.0 to 36.9 in adult  Movement  I encourage senior exercise classes as tolerated aiming for 3 days a week  Meds continue  taking wellbutrin  mg   Eating plan   continue the low

## 2025-02-03 ENCOUNTER — NURSE ONLY (OUTPATIENT)
Age: 70
End: 2025-02-03

## 2025-02-03 VITALS
RESPIRATION RATE: 20 BRPM | WEIGHT: 243.3 LBS | SYSTOLIC BLOOD PRESSURE: 95 MMHG | DIASTOLIC BLOOD PRESSURE: 62 MMHG | TEMPERATURE: 98.2 F | BODY MASS INDEX: 36.04 KG/M2 | HEIGHT: 69 IN | HEART RATE: 73 BPM | OXYGEN SATURATION: 94 %

## 2025-02-03 DIAGNOSIS — I10 BENIGN ESSENTIAL HYPERTENSION: ICD-10-CM

## 2025-02-03 DIAGNOSIS — G47.33 OBSTRUCTIVE SLEEP APNEA SYNDROME: ICD-10-CM

## 2025-02-03 DIAGNOSIS — E66.01 CLASS 2 SEVERE OBESITY DUE TO EXCESS CALORIES WITH SERIOUS COMORBIDITY AND BODY MASS INDEX (BMI) OF 36.0 TO 36.9 IN ADULT: Primary | ICD-10-CM

## 2025-02-03 DIAGNOSIS — R73.9 BLOOD GLUCOSE ELEVATED: ICD-10-CM

## 2025-02-03 DIAGNOSIS — E66.812 CLASS 2 SEVERE OBESITY DUE TO EXCESS CALORIES WITH SERIOUS COMORBIDITY AND BODY MASS INDEX (BMI) OF 36.0 TO 36.9 IN ADULT: Primary | ICD-10-CM

## 2025-02-03 ASSESSMENT — PATIENT HEALTH QUESTIONNAIRE - PHQ9
1. LITTLE INTEREST OR PLEASURE IN DOING THINGS: NOT AT ALL
SUM OF ALL RESPONSES TO PHQ QUESTIONS 1-9: 0
SUM OF ALL RESPONSES TO PHQ QUESTIONS 1-9: 0
SUM OF ALL RESPONSES TO PHQ9 QUESTIONS 1 & 2: 0
2. FEELING DOWN, DEPRESSED OR HOPELESS: NOT AT ALL
SUM OF ALL RESPONSES TO PHQ QUESTIONS 1-9: 0
SUM OF ALL RESPONSES TO PHQ QUESTIONS 1-9: 0

## 2025-02-03 NOTE — PROGRESS NOTES
Identified pt with two pt identifiers (name and ). Reviewed chart in preparation for visit and have obtained necessary documentation.    Dena Arellano is a 69 y.o. female  Chief Complaint   Patient presents with    Weight Management     BP 95/62 (Site: Right Upper Arm, Position: Sitting, Cuff Size: Large Adult)   Pulse 73   Temp 98.2 °F (36.8 °C) (Oral)   Resp 20   Ht 1.753 m (5' 9\")   Wt 110.4 kg (243 lb 4.8 oz)   LMP  (LMP Unknown)   SpO2 94%   BMI 35.93 kg/m²     1. Have you been to the ER, urgent care clinic since your last visit?  Hospitalized since your last visit?no    2. Have you seen or consulted any other health care providers outside of the [facility] since your last visit?  Include any pap smears or colon screening. no

## 2025-02-03 NOTE — PROGRESS NOTES
1/20/2025    Progress Note: Weekly Education Class in the [facility] Weight Loss Program         Patient is on Very Low Calorie Diet [] (4 meal replacements per day, 800 kcal/day)      Low Calorie Diet [] (2-3 meal replacements per day, 5330-6209 kcal/day)    1) Did patient have any new symptoms or physical problems?   Yes []    No []    If yes, check & comment: weakness [], fatigue [x], lightheadedness [], headache [], cramps [], cold intolerance [], hair loss [], diarrhea [], constipation [],  NA [] other:                                 2) Has patient had any medical attention from other providers, urgent care or the emergency room this week?  Yes []  No [x]       NA [], If yes, why:                                       3) Any other sugar sweetened beverages consumed this week?   Yes []  No [x]    4) Did patient have any problems adhering to the diet? Yes [x]  No [] NA []    If yes, Vacation [], Celebrations [], Conferences [], Family Reunions [] other: nighttime hunger, family                                               5) How many hours of sleep this week? 7.5    (range)  NA []    Number of meal replacements consumed daily? 3 (range)  NA []    Average ounces of water patient consumed daily this week (not including shakes)? 80     (divide the weekly total by 7)    Did you eat any food outside of the program? Yes [x] No []    Physical Activity Over the Past Week:    Cardio exercise: 0 min  Strength exercise: 0 workouts / week  Number of steps walked per day: 0    How has patient mood overall been this week? Sad [], Happy [], Stressed [], Tired [], Content [], NA [], other OK           Medications reconciled by nurse Yes [x]  No[]    Patient was given therapeutic recommendations for any noted side effects of their dietary approach based upon [facility] patient manual per providers recommendation.     1/27/2025    Progress Note: Weekly Education Class in the [facility] Weight Loss Program

## 2025-02-04 ENCOUNTER — OFFICE VISIT (OUTPATIENT)
Age: 70
End: 2025-02-04

## 2025-02-04 DIAGNOSIS — E66.01 CLASS 2 SEVERE OBESITY DUE TO EXCESS CALORIES WITH SERIOUS COMORBIDITY AND BODY MASS INDEX (BMI) OF 36.0 TO 36.9 IN ADULT: Primary | ICD-10-CM

## 2025-02-04 DIAGNOSIS — E66.812 CLASS 2 SEVERE OBESITY DUE TO EXCESS CALORIES WITH SERIOUS COMORBIDITY AND BODY MASS INDEX (BMI) OF 36.0 TO 36.9 IN ADULT: Primary | ICD-10-CM

## 2025-02-04 NOTE — DISCHARGE INSTRUCTIONS
Discharge Instructions Knee Replacement  Dr. Brooks  Patient Name:  Dena Arellano    Follow up care  Follow up visit with Dr. Brooks in 4 weeks.  Call [phone] extension [phone] ([place]) to make an appointment.  If Home Health has been arranged for you, they will call you to arrange dates/times for visits. Call them if you do not hear from them within 24 hours after you go home.    Activity at home  Take a short walk every hour; except at night when sleeping.  Do your Home Exercise Program 3 times every day.   After exercising lie down and elevate your leg on pillows for 15-30 minutes to decrease swelling.  Refer to your patient notebook for more information.  Bathing and caring for your incision  You have a waterproof surgical bandage (called fco). This bandage is to remain in place until your follow up appointment.   You may shower the day after surgery.      Preventing blood clots  Take Aspirin 81mg twice each day for one month (30 days) following surgery.   Call Dr. Brooks for signs of a blood clot in your leg: calf pain, tenderness, redness, swelling of lower leg   Preventing lung congestion  Use your incentive spirometer 4 times a day; do 10 repetitions each time  Remember to keep the small blue ball between the two arrows when taking a slow, deep breath   Pain Management  Get up and walk a short distance to relieve pain and stiffness.  Place ice wrap on your knee except when you are walking. The gel ice packs should be changed about every 4 hours.  Elevate your leg on pillows for 15-30 minutes.   Pain Medications  Take Tylenol 650mg (take two 325mg tablets) every 6 hours for the next 4 weeks.  Take Meloxicam (Mobic) every day as prescribed to decrease swelling and inflammation. Do not take Meloxicam if you have had stomach ulcers.  Take Famotidine (Pepcid) 20mg twice a day to prevent an upset stomach (if taking Aspirin and/or Meloxicam).  If needed, take Tramadol (narcotic pain pill) every 6 hours as

## 2025-02-05 ENCOUNTER — ANESTHESIA (OUTPATIENT)
Facility: HOSPITAL | Age: 70
End: 2025-02-05
Payer: MEDICARE

## 2025-02-05 ENCOUNTER — HOSPITAL ENCOUNTER (OUTPATIENT)
Facility: HOSPITAL | Age: 70
Setting detail: OBSERVATION
Discharge: HOME HEALTH | End: 2025-02-06
Attending: ORTHOPAEDIC SURGERY | Admitting: ORTHOPAEDIC SURGERY
Payer: MEDICARE

## 2025-02-05 ENCOUNTER — ANESTHESIA EVENT (OUTPATIENT)
Facility: HOSPITAL | Age: 70
End: 2025-02-05
Payer: MEDICARE

## 2025-02-05 DIAGNOSIS — I10 BENIGN ESSENTIAL HYPERTENSION: ICD-10-CM

## 2025-02-05 DIAGNOSIS — Z96.652 S/P TKR (TOTAL KNEE REPLACEMENT), LEFT: Primary | ICD-10-CM

## 2025-02-05 PROBLEM — M17.12 OSTEOARTHRITIS OF LEFT KNEE, UNSPECIFIED OSTEOARTHRITIS TYPE: Status: ACTIVE | Noted: 2025-02-05

## 2025-02-05 LAB
ABO + RH BLD: NORMAL
BLOOD GROUP ANTIBODIES SERPL: NORMAL
GLUCOSE BLD STRIP.AUTO-MCNC: 107 MG/DL (ref 65–117)
GLUCOSE BLD STRIP.AUTO-MCNC: 89 MG/DL (ref 65–117)
GLUCOSE BLD STRIP.AUTO-MCNC: 90 MG/DL (ref 65–117)
SERVICE CMNT-IMP: NORMAL
SPECIMEN EXP DATE BLD: NORMAL

## 2025-02-05 PROCEDURE — 2500000003 HC RX 250 WO HCPCS: Performed by: PHYSICIAN ASSISTANT

## 2025-02-05 PROCEDURE — G0378 HOSPITAL OBSERVATION PER HR: HCPCS

## 2025-02-05 PROCEDURE — 2580000003 HC RX 258: Performed by: ANESTHESIOLOGY

## 2025-02-05 PROCEDURE — 2580000003 HC RX 258: Performed by: PHYSICIAN ASSISTANT

## 2025-02-05 PROCEDURE — 20985 CPTR-ASST DIR MS PX: CPT | Performed by: ORTHOPAEDIC SURGERY

## 2025-02-05 PROCEDURE — 6360000002 HC RX W HCPCS: Performed by: ANESTHESIOLOGY

## 2025-02-05 PROCEDURE — 97116 GAIT TRAINING THERAPY: CPT

## 2025-02-05 PROCEDURE — C1776 JOINT DEVICE (IMPLANTABLE): HCPCS | Performed by: ORTHOPAEDIC SURGERY

## 2025-02-05 PROCEDURE — 3600000015 HC SURGERY LEVEL 5 ADDTL 15MIN: Performed by: ORTHOPAEDIC SURGERY

## 2025-02-05 PROCEDURE — 6360000002 HC RX W HCPCS: Performed by: PHYSICIAN ASSISTANT

## 2025-02-05 PROCEDURE — 2500000003 HC RX 250 WO HCPCS: Performed by: ORTHOPAEDIC SURGERY

## 2025-02-05 PROCEDURE — 82962 GLUCOSE BLOOD TEST: CPT

## 2025-02-05 PROCEDURE — 27447 TOTAL KNEE ARTHROPLASTY: CPT | Performed by: PHYSICIAN ASSISTANT

## 2025-02-05 PROCEDURE — C1713 ANCHOR/SCREW BN/BN,TIS/BN: HCPCS | Performed by: ORTHOPAEDIC SURGERY

## 2025-02-05 PROCEDURE — 2709999900 HC NON-CHARGEABLE SUPPLY: Performed by: ORTHOPAEDIC SURGERY

## 2025-02-05 PROCEDURE — 86901 BLOOD TYPING SEROLOGIC RH(D): CPT

## 2025-02-05 PROCEDURE — 2720000010 HC SURG SUPPLY STERILE: Performed by: ORTHOPAEDIC SURGERY

## 2025-02-05 PROCEDURE — 27447 TOTAL KNEE ARTHROPLASTY: CPT | Performed by: ORTHOPAEDIC SURGERY

## 2025-02-05 PROCEDURE — 2580000003 HC RX 258: Performed by: ORTHOPAEDIC SURGERY

## 2025-02-05 PROCEDURE — 3600000005 HC SURGERY LEVEL 5 BASE: Performed by: ORTHOPAEDIC SURGERY

## 2025-02-05 PROCEDURE — 97161 PT EVAL LOW COMPLEX 20 MIN: CPT

## 2025-02-05 PROCEDURE — 7100000000 HC PACU RECOVERY - FIRST 15 MIN: Performed by: ORTHOPAEDIC SURGERY

## 2025-02-05 PROCEDURE — 7100000001 HC PACU RECOVERY - ADDTL 15 MIN: Performed by: ORTHOPAEDIC SURGERY

## 2025-02-05 PROCEDURE — 3700000001 HC ADD 15 MINUTES (ANESTHESIA): Performed by: ORTHOPAEDIC SURGERY

## 2025-02-05 PROCEDURE — 86900 BLOOD TYPING SEROLOGIC ABO: CPT

## 2025-02-05 PROCEDURE — 6370000000 HC RX 637 (ALT 250 FOR IP): Performed by: PHYSICIAN ASSISTANT

## 2025-02-05 PROCEDURE — 36415 COLL VENOUS BLD VENIPUNCTURE: CPT

## 2025-02-05 PROCEDURE — 97530 THERAPEUTIC ACTIVITIES: CPT

## 2025-02-05 PROCEDURE — 3700000000 HC ANESTHESIA ATTENDED CARE: Performed by: ORTHOPAEDIC SURGERY

## 2025-02-05 PROCEDURE — 6360000002 HC RX W HCPCS: Performed by: NURSE ANESTHETIST, CERTIFIED REGISTERED

## 2025-02-05 PROCEDURE — 2580000003 HC RX 258: Performed by: NURSE ANESTHETIST, CERTIFIED REGISTERED

## 2025-02-05 PROCEDURE — 86850 RBC ANTIBODY SCREEN: CPT

## 2025-02-05 PROCEDURE — 6360000002 HC RX W HCPCS: Performed by: ORTHOPAEDIC SURGERY

## 2025-02-05 DEVICE — ATTUNE PATELLA MEDIALIZED DOME 38MM CEMENTED AOX
Type: IMPLANTABLE DEVICE | Site: KNEE | Status: FUNCTIONAL
Brand: ATTUNE

## 2025-02-05 DEVICE — KNEE K1 TOT HEMI STD CEM IMPL CAPPED SYNTHES: Type: IMPLANTABLE DEVICE | Site: KNEE | Status: FUNCTIONAL

## 2025-02-05 DEVICE — SMARTSET GHV GENTAMICIN HIGH VISCOSITY BONE CEMENT 40G
Type: IMPLANTABLE DEVICE | Site: KNEE | Status: FUNCTIONAL
Brand: SMARTSET

## 2025-02-05 DEVICE — ATTUNE KNEE SYSTEM TIBIAL INSERT FIXED BEARING MEDIAL STABILIZED LEFT AOX 6, 8MM
Type: IMPLANTABLE DEVICE | Site: KNEE | Status: FUNCTIONAL
Brand: ATTUNE

## 2025-02-05 DEVICE — ATTUNE KNEE SYSTEM FEMORAL CRUCIATE RETAINING SIZE 6 LEFT CEMENTED
Type: IMPLANTABLE DEVICE | Site: KNEE | Status: FUNCTIONAL
Brand: ATTUNE

## 2025-02-05 DEVICE — ATTUNE KNEE SYSTEM TIBIAL BASE FIXED BEARING SIZE 6 CEMENTED
Type: IMPLANTABLE DEVICE | Site: KNEE | Status: FUNCTIONAL
Brand: ATTUNE

## 2025-02-05 RX ORDER — NALOXONE HYDROCHLORIDE 0.4 MG/ML
INJECTION, SOLUTION INTRAMUSCULAR; INTRAVENOUS; SUBCUTANEOUS PRN
Status: DISCONTINUED | OUTPATIENT
Start: 2025-02-05 | End: 2025-02-05 | Stop reason: HOSPADM

## 2025-02-05 RX ORDER — ONDANSETRON 2 MG/ML
4 INJECTION INTRAMUSCULAR; INTRAVENOUS
Status: DISCONTINUED | OUTPATIENT
Start: 2025-02-05 | End: 2025-02-05 | Stop reason: HOSPADM

## 2025-02-05 RX ORDER — LOSARTAN POTASSIUM 50 MG/1
100 TABLET ORAL DAILY
Status: DISCONTINUED | OUTPATIENT
Start: 2025-02-05 | End: 2025-02-06 | Stop reason: HOSPADM

## 2025-02-05 RX ORDER — SODIUM CHLORIDE 0.9 % (FLUSH) 0.9 %
5-40 SYRINGE (ML) INJECTION EVERY 12 HOURS SCHEDULED
Status: DISCONTINUED | OUTPATIENT
Start: 2025-02-05 | End: 2025-02-05 | Stop reason: HOSPADM

## 2025-02-05 RX ORDER — SODIUM CHLORIDE 0.9 % (FLUSH) 0.9 %
5-40 SYRINGE (ML) INJECTION PRN
Status: DISCONTINUED | OUTPATIENT
Start: 2025-02-05 | End: 2025-02-05 | Stop reason: HOSPADM

## 2025-02-05 RX ORDER — LIDOCAINE HYDROCHLORIDE 10 MG/ML
INJECTION, SOLUTION INFILTRATION; PERINEURAL PRN
Status: DISCONTINUED | OUTPATIENT
Start: 2025-02-05 | End: 2025-02-05 | Stop reason: HOSPADM

## 2025-02-05 RX ORDER — ACETAMINOPHEN 325 MG/1
650 TABLET ORAL EVERY 6 HOURS
Status: DISCONTINUED | OUTPATIENT
Start: 2025-02-05 | End: 2025-02-06 | Stop reason: HOSPADM

## 2025-02-05 RX ORDER — FENTANYL CITRATE 50 UG/ML
INJECTION, SOLUTION INTRAMUSCULAR; INTRAVENOUS
Status: DISCONTINUED | OUTPATIENT
Start: 2025-02-05 | End: 2025-02-05 | Stop reason: SDUPTHER

## 2025-02-05 RX ORDER — PROCHLORPERAZINE EDISYLATE 5 MG/ML
5 INJECTION INTRAMUSCULAR; INTRAVENOUS
Status: DISCONTINUED | OUTPATIENT
Start: 2025-02-05 | End: 2025-02-05 | Stop reason: HOSPADM

## 2025-02-05 RX ORDER — POLYETHYLENE GLYCOL 3350 17 G/17G
17 POWDER, FOR SOLUTION ORAL DAILY
Status: DISCONTINUED | OUTPATIENT
Start: 2025-02-05 | End: 2025-02-06 | Stop reason: HOSPADM

## 2025-02-05 RX ORDER — ACETAMINOPHEN 500 MG
1000 TABLET ORAL ONCE
Status: COMPLETED | OUTPATIENT
Start: 2025-02-05 | End: 2025-02-05

## 2025-02-05 RX ORDER — LORAZEPAM 2 MG/ML
INJECTION INTRAMUSCULAR
Status: DISPENSED
Start: 2025-02-05 | End: 2025-02-06

## 2025-02-05 RX ORDER — LORAZEPAM 2 MG/ML
0.5 INJECTION INTRAMUSCULAR
Status: COMPLETED | OUTPATIENT
Start: 2025-02-05 | End: 2025-02-05

## 2025-02-05 RX ORDER — TRAMADOL HYDROCHLORIDE 50 MG/1
50 TABLET ORAL EVERY 6 HOURS PRN
Status: DISCONTINUED | OUTPATIENT
Start: 2025-02-05 | End: 2025-02-06 | Stop reason: HOSPADM

## 2025-02-05 RX ORDER — HYDROMORPHONE HYDROCHLORIDE 1 MG/ML
0.5 INJECTION, SOLUTION INTRAMUSCULAR; INTRAVENOUS; SUBCUTANEOUS
Status: DISCONTINUED | OUTPATIENT
Start: 2025-02-05 | End: 2025-02-06 | Stop reason: HOSPADM

## 2025-02-05 RX ORDER — SODIUM CHLORIDE 9 MG/ML
INJECTION, SOLUTION INTRAVENOUS CONTINUOUS
Status: DISCONTINUED | OUTPATIENT
Start: 2025-02-05 | End: 2025-02-06 | Stop reason: HOSPADM

## 2025-02-05 RX ORDER — DEXAMETHASONE SODIUM PHOSPHATE 10 MG/ML
8 INJECTION, SOLUTION INTRAMUSCULAR; INTRAVENOUS ONCE
Status: DISCONTINUED | OUTPATIENT
Start: 2025-02-05 | End: 2025-02-05 | Stop reason: HOSPADM

## 2025-02-05 RX ORDER — SENNA AND DOCUSATE SODIUM 50; 8.6 MG/1; MG/1
1 TABLET, FILM COATED ORAL 2 TIMES DAILY
Status: DISCONTINUED | OUTPATIENT
Start: 2025-02-05 | End: 2025-02-06 | Stop reason: HOSPADM

## 2025-02-05 RX ORDER — ASPIRIN 81 MG/1
81 TABLET ORAL 2 TIMES DAILY
Status: DISCONTINUED | OUTPATIENT
Start: 2025-02-05 | End: 2025-02-06 | Stop reason: HOSPADM

## 2025-02-05 RX ORDER — SODIUM CHLORIDE, SODIUM LACTATE, POTASSIUM CHLORIDE, CALCIUM CHLORIDE 600; 310; 30; 20 MG/100ML; MG/100ML; MG/100ML; MG/100ML
INJECTION, SOLUTION INTRAVENOUS CONTINUOUS
Status: DISCONTINUED | OUTPATIENT
Start: 2025-02-05 | End: 2025-02-05 | Stop reason: HOSPADM

## 2025-02-05 RX ORDER — IPRATROPIUM BROMIDE AND ALBUTEROL SULFATE 2.5; .5 MG/3ML; MG/3ML
1 SOLUTION RESPIRATORY (INHALATION)
Status: DISCONTINUED | OUTPATIENT
Start: 2025-02-05 | End: 2025-02-05 | Stop reason: HOSPADM

## 2025-02-05 RX ORDER — ONDANSETRON 2 MG/ML
4 INJECTION INTRAMUSCULAR; INTRAVENOUS EVERY 6 HOURS PRN
Status: DISCONTINUED | OUTPATIENT
Start: 2025-02-05 | End: 2025-02-06 | Stop reason: HOSPADM

## 2025-02-05 RX ORDER — ROSUVASTATIN CALCIUM 10 MG/1
10 TABLET, COATED ORAL DAILY
Status: DISCONTINUED | OUTPATIENT
Start: 2025-02-05 | End: 2025-02-06 | Stop reason: HOSPADM

## 2025-02-05 RX ORDER — SODIUM CHLORIDE 0.9 % (FLUSH) 0.9 %
5-40 SYRINGE (ML) INJECTION PRN
Status: DISCONTINUED | OUTPATIENT
Start: 2025-02-05 | End: 2025-02-06 | Stop reason: HOSPADM

## 2025-02-05 RX ORDER — ONDANSETRON 4 MG/1
4 TABLET, ORALLY DISINTEGRATING ORAL EVERY 8 HOURS PRN
Status: DISCONTINUED | OUTPATIENT
Start: 2025-02-05 | End: 2025-02-06 | Stop reason: HOSPADM

## 2025-02-05 RX ORDER — MIDAZOLAM HYDROCHLORIDE 2 MG/2ML
2 INJECTION, SOLUTION INTRAMUSCULAR; INTRAVENOUS AS NEEDED
Status: DISCONTINUED | OUTPATIENT
Start: 2025-02-05 | End: 2025-02-05 | Stop reason: HOSPADM

## 2025-02-05 RX ORDER — 0.9 % SODIUM CHLORIDE 0.9 %
500 INTRAVENOUS SOLUTION INTRAVENOUS PRN
Status: DISCONTINUED | OUTPATIENT
Start: 2025-02-05 | End: 2025-02-06 | Stop reason: HOSPADM

## 2025-02-05 RX ORDER — OXYCODONE HYDROCHLORIDE 5 MG/1
5 TABLET ORAL
Status: DISCONTINUED | OUTPATIENT
Start: 2025-02-05 | End: 2025-02-05 | Stop reason: HOSPADM

## 2025-02-05 RX ORDER — MIDAZOLAM HYDROCHLORIDE 1 MG/ML
INJECTION, SOLUTION INTRAMUSCULAR; INTRAVENOUS
Status: DISCONTINUED | OUTPATIENT
Start: 2025-02-05 | End: 2025-02-05 | Stop reason: SDUPTHER

## 2025-02-05 RX ORDER — FAMOTIDINE 20 MG/1
20 TABLET, FILM COATED ORAL 2 TIMES DAILY
Status: DISCONTINUED | OUTPATIENT
Start: 2025-02-05 | End: 2025-02-06 | Stop reason: HOSPADM

## 2025-02-05 RX ORDER — HYDROMORPHONE HYDROCHLORIDE 1 MG/ML
0.5 INJECTION, SOLUTION INTRAMUSCULAR; INTRAVENOUS; SUBCUTANEOUS
Status: DISCONTINUED | OUTPATIENT
Start: 2025-02-05 | End: 2025-02-05 | Stop reason: HOSPADM

## 2025-02-05 RX ORDER — DEXAMETHASONE SODIUM PHOSPHATE 4 MG/ML
INJECTION, SOLUTION INTRA-ARTICULAR; INTRALESIONAL; INTRAMUSCULAR; INTRAVENOUS; SOFT TISSUE
Status: DISCONTINUED | OUTPATIENT
Start: 2025-02-05 | End: 2025-02-05 | Stop reason: SDUPTHER

## 2025-02-05 RX ORDER — BUPROPION HYDROCHLORIDE 150 MG/1
150 TABLET ORAL EVERY MORNING
Status: DISCONTINUED | OUTPATIENT
Start: 2025-02-06 | End: 2025-02-06 | Stop reason: HOSPADM

## 2025-02-05 RX ORDER — LIDOCAINE HYDROCHLORIDE 10 MG/ML
1 INJECTION, SOLUTION EPIDURAL; INFILTRATION; INTRACAUDAL; PERINEURAL
Status: DISCONTINUED | OUTPATIENT
Start: 2025-02-05 | End: 2025-02-05 | Stop reason: HOSPADM

## 2025-02-05 RX ORDER — HYDROMORPHONE HYDROCHLORIDE 1 MG/ML
0.5 INJECTION, SOLUTION INTRAMUSCULAR; INTRAVENOUS; SUBCUTANEOUS EVERY 5 MIN PRN
Status: COMPLETED | OUTPATIENT
Start: 2025-02-05 | End: 2025-02-05

## 2025-02-05 RX ORDER — OXYCODONE HYDROCHLORIDE 5 MG/1
5 TABLET ORAL EVERY 4 HOURS PRN
Status: DISCONTINUED | OUTPATIENT
Start: 2025-02-05 | End: 2025-02-06 | Stop reason: HOSPADM

## 2025-02-05 RX ORDER — SODIUM CHLORIDE 0.9 % (FLUSH) 0.9 %
5-40 SYRINGE (ML) INJECTION EVERY 12 HOURS SCHEDULED
Status: DISCONTINUED | OUTPATIENT
Start: 2025-02-05 | End: 2025-02-06 | Stop reason: HOSPADM

## 2025-02-05 RX ORDER — HYDRALAZINE HYDROCHLORIDE 20 MG/ML
10 INJECTION INTRAMUSCULAR; INTRAVENOUS
Status: DISCONTINUED | OUTPATIENT
Start: 2025-02-05 | End: 2025-02-05 | Stop reason: HOSPADM

## 2025-02-05 RX ORDER — TRANEXAMIC ACID 100 MG/ML
INJECTION, SOLUTION INTRAVENOUS PRN
Status: DISCONTINUED | OUTPATIENT
Start: 2025-02-05 | End: 2025-02-05 | Stop reason: HOSPADM

## 2025-02-05 RX ORDER — SODIUM CHLORIDE, SODIUM LACTATE, POTASSIUM CHLORIDE, CALCIUM CHLORIDE 600; 310; 30; 20 MG/100ML; MG/100ML; MG/100ML; MG/100ML
INJECTION, SOLUTION INTRAVENOUS
Status: DISCONTINUED | OUTPATIENT
Start: 2025-02-05 | End: 2025-02-05 | Stop reason: SDUPTHER

## 2025-02-05 RX ORDER — KETOROLAC TROMETHAMINE 30 MG/ML
15 INJECTION, SOLUTION INTRAMUSCULAR; INTRAVENOUS EVERY 6 HOURS
Status: COMPLETED | OUTPATIENT
Start: 2025-02-05 | End: 2025-02-06

## 2025-02-05 RX ORDER — ACETAMINOPHEN 500 MG
1000 TABLET ORAL ONCE
Status: DISCONTINUED | OUTPATIENT
Start: 2025-02-05 | End: 2025-02-05 | Stop reason: SDUPTHER

## 2025-02-05 RX ORDER — ONDANSETRON 2 MG/ML
INJECTION INTRAMUSCULAR; INTRAVENOUS
Status: DISCONTINUED | OUTPATIENT
Start: 2025-02-05 | End: 2025-02-05 | Stop reason: SDUPTHER

## 2025-02-05 RX ORDER — HYDROXYZINE HYDROCHLORIDE 10 MG/1
10 TABLET, FILM COATED ORAL EVERY 8 HOURS PRN
Status: DISCONTINUED | OUTPATIENT
Start: 2025-02-05 | End: 2025-02-06 | Stop reason: HOSPADM

## 2025-02-05 RX ORDER — LEVOTHYROXINE SODIUM 50 UG/1
200 TABLET ORAL DAILY
Status: DISCONTINUED | OUTPATIENT
Start: 2025-02-06 | End: 2025-02-06 | Stop reason: HOSPADM

## 2025-02-05 RX ORDER — FENTANYL CITRATE 50 UG/ML
25 INJECTION, SOLUTION INTRAMUSCULAR; INTRAVENOUS EVERY 5 MIN PRN
Status: COMPLETED | OUTPATIENT
Start: 2025-02-05 | End: 2025-02-05

## 2025-02-05 RX ORDER — SODIUM CHLORIDE 9 MG/ML
INJECTION, SOLUTION INTRAVENOUS PRN
Status: DISCONTINUED | OUTPATIENT
Start: 2025-02-05 | End: 2025-02-05 | Stop reason: HOSPADM

## 2025-02-05 RX ORDER — DIPHENHYDRAMINE HYDROCHLORIDE 50 MG/ML
12.5 INJECTION INTRAMUSCULAR; INTRAVENOUS
Status: DISCONTINUED | OUTPATIENT
Start: 2025-02-05 | End: 2025-02-05 | Stop reason: HOSPADM

## 2025-02-05 RX ORDER — FENTANYL CITRATE 50 UG/ML
100 INJECTION, SOLUTION INTRAMUSCULAR; INTRAVENOUS
Status: DISCONTINUED | OUTPATIENT
Start: 2025-02-05 | End: 2025-02-05 | Stop reason: HOSPADM

## 2025-02-05 RX ORDER — SODIUM CHLORIDE 9 MG/ML
INJECTION, SOLUTION INTRAVENOUS PRN
Status: DISCONTINUED | OUTPATIENT
Start: 2025-02-05 | End: 2025-02-06 | Stop reason: HOSPADM

## 2025-02-05 RX ORDER — BISACODYL 10 MG
10 SUPPOSITORY, RECTAL RECTAL DAILY PRN
Status: DISCONTINUED | OUTPATIENT
Start: 2025-02-05 | End: 2025-02-06 | Stop reason: HOSPADM

## 2025-02-05 RX ORDER — NALOXONE HYDROCHLORIDE 0.4 MG/ML
0.4 INJECTION, SOLUTION INTRAMUSCULAR; INTRAVENOUS; SUBCUTANEOUS PRN
Status: DISCONTINUED | OUTPATIENT
Start: 2025-02-05 | End: 2025-02-06 | Stop reason: HOSPADM

## 2025-02-05 RX ADMIN — OXYCODONE HYDROCHLORIDE 5 MG: 5 TABLET ORAL at 16:23

## 2025-02-05 RX ADMIN — ACETAMINOPHEN 1000 MG: 500 TABLET ORAL at 08:41

## 2025-02-05 RX ADMIN — FENTANYL CITRATE 25 MCG: 50 INJECTION INTRAMUSCULAR; INTRAVENOUS at 10:22

## 2025-02-05 RX ADMIN — FENTANYL CITRATE 25 MCG: 50 INJECTION INTRAMUSCULAR; INTRAVENOUS at 10:05

## 2025-02-05 RX ADMIN — FENTANYL CITRATE 25 MCG: 50 INJECTION INTRAMUSCULAR; INTRAVENOUS at 12:20

## 2025-02-05 RX ADMIN — FENTANYL CITRATE 25 MCG: 50 INJECTION INTRAMUSCULAR; INTRAVENOUS at 12:27

## 2025-02-05 RX ADMIN — WATER 2000 MG: 1 INJECTION INTRAMUSCULAR; INTRAVENOUS; SUBCUTANEOUS at 09:49

## 2025-02-05 RX ADMIN — SODIUM CHLORIDE 1500 MG: 0.9 INJECTION, SOLUTION INTRAVENOUS at 20:42

## 2025-02-05 RX ADMIN — ONDANSETRON 4 MG: 2 INJECTION INTRAMUSCULAR; INTRAVENOUS at 09:59

## 2025-02-05 RX ADMIN — SODIUM CHLORIDE, PRESERVATIVE FREE 10 ML: 5 INJECTION INTRAVENOUS at 20:33

## 2025-02-05 RX ADMIN — HYDROMORPHONE HYDROCHLORIDE 0.5 MG: 1 INJECTION, SOLUTION INTRAMUSCULAR; INTRAVENOUS; SUBCUTANEOUS at 12:16

## 2025-02-05 RX ADMIN — WATER 2000 MG: 1 INJECTION INTRAMUSCULAR; INTRAVENOUS; SUBCUTANEOUS at 18:35

## 2025-02-05 RX ADMIN — SENNOSIDES, DOCUSATE SODIUM 1 TABLET: 50; 8.6 TABLET, FILM COATED ORAL at 20:31

## 2025-02-05 RX ADMIN — HYDROMORPHONE HYDROCHLORIDE 0.5 MG: 1 INJECTION, SOLUTION INTRAMUSCULAR; INTRAVENOUS; SUBCUTANEOUS at 13:00

## 2025-02-05 RX ADMIN — SODIUM CHLORIDE, POTASSIUM CHLORIDE, SODIUM LACTATE AND CALCIUM CHLORIDE: 600; 310; 30; 20 INJECTION, SOLUTION INTRAVENOUS at 08:26

## 2025-02-05 RX ADMIN — SODIUM CHLORIDE 1500 MG: 9 INJECTION, SOLUTION INTRAVENOUS at 08:30

## 2025-02-05 RX ADMIN — OXYCODONE HYDROCHLORIDE 5 MG: 5 TABLET ORAL at 20:30

## 2025-02-05 RX ADMIN — DEXAMETHASONE SODIUM PHOSPHATE 4 MG: 4 INJECTION, SOLUTION INTRAMUSCULAR; INTRAVENOUS at 10:04

## 2025-02-05 RX ADMIN — PROPOFOL 50 MCG/KG/MIN: 10 INJECTION, EMULSION INTRAVENOUS at 09:38

## 2025-02-05 RX ADMIN — TRANEXAMIC ACID 1000 MG: 100 INJECTION, SOLUTION INTRAVENOUS at 10:02

## 2025-02-05 RX ADMIN — KETOROLAC TROMETHAMINE 15 MG: 30 INJECTION, SOLUTION INTRAMUSCULAR at 20:32

## 2025-02-05 RX ADMIN — ACETAMINOPHEN 650 MG: 325 TABLET ORAL at 16:23

## 2025-02-05 RX ADMIN — LOSARTAN POTASSIUM 100 MG: 50 TABLET, FILM COATED ORAL at 16:23

## 2025-02-05 RX ADMIN — FENTANYL CITRATE 25 MCG: 50 INJECTION INTRAMUSCULAR; INTRAVENOUS at 09:37

## 2025-02-05 RX ADMIN — ACETAMINOPHEN 650 MG: 325 TABLET ORAL at 20:31

## 2025-02-05 RX ADMIN — SODIUM CHLORIDE: 9 INJECTION, SOLUTION INTRAVENOUS at 12:16

## 2025-02-05 RX ADMIN — ROSUVASTATIN 10 MG: 10 TABLET, FILM COATED ORAL at 16:24

## 2025-02-05 RX ADMIN — KETOROLAC TROMETHAMINE 15 MG: 30 INJECTION, SOLUTION INTRAMUSCULAR at 16:23

## 2025-02-05 RX ADMIN — HYDROMORPHONE HYDROCHLORIDE 0.5 MG: 1 INJECTION, SOLUTION INTRAMUSCULAR; INTRAVENOUS; SUBCUTANEOUS at 13:15

## 2025-02-05 RX ADMIN — HYDROMORPHONE HYDROCHLORIDE 0.5 MG: 1 INJECTION, SOLUTION INTRAMUSCULAR; INTRAVENOUS; SUBCUTANEOUS at 12:37

## 2025-02-05 RX ADMIN — FENTANYL CITRATE 25 MCG: 50 INJECTION INTRAMUSCULAR; INTRAVENOUS at 10:46

## 2025-02-05 RX ADMIN — FLUOXETINE HYDROCHLORIDE 20 MG: 20 CAPSULE ORAL at 16:24

## 2025-02-05 RX ADMIN — FENTANYL CITRATE 25 MCG: 50 INJECTION INTRAMUSCULAR; INTRAVENOUS at 13:00

## 2025-02-05 RX ADMIN — FENTANYL CITRATE 25 MCG: 50 INJECTION INTRAMUSCULAR; INTRAVENOUS at 12:37

## 2025-02-05 RX ADMIN — SODIUM CHLORIDE, POTASSIUM CHLORIDE, SODIUM LACTATE AND CALCIUM CHLORIDE: 600; 310; 30; 20 INJECTION, SOLUTION INTRAVENOUS at 08:51

## 2025-02-05 RX ADMIN — LORAZEPAM 0.5 MG: 2 INJECTION INTRAMUSCULAR; INTRAVENOUS at 12:46

## 2025-02-05 RX ADMIN — FAMOTIDINE 20 MG: 20 TABLET, FILM COATED ORAL at 20:32

## 2025-02-05 RX ADMIN — ASPIRIN 81 MG: 81 TABLET, COATED ORAL at 20:31

## 2025-02-05 RX ADMIN — MIDAZOLAM 2 MG: 1 INJECTION INTRAMUSCULAR; INTRAVENOUS at 09:31

## 2025-02-05 RX ADMIN — MEPIVACAINE HYDROCHLORIDE 55 MG: 15 INJECTION, SOLUTION EPIDURAL; INFILTRATION at 09:39

## 2025-02-05 ASSESSMENT — PAIN SCALES - GENERAL
PAINLEVEL_OUTOF10: 0
PAINLEVEL_OUTOF10: 5
PAINLEVEL_OUTOF10: 6
PAINLEVEL_OUTOF10: 4
PAINLEVEL_OUTOF10: 0
PAINLEVEL_OUTOF10: 8
PAINLEVEL_OUTOF10: 4
PAINLEVEL_OUTOF10: 8
PAINLEVEL_OUTOF10: 5
PAINLEVEL_OUTOF10: 3
PAINLEVEL_OUTOF10: 0
PAINLEVEL_OUTOF10: 0
PAINLEVEL_OUTOF10: 4
PAINLEVEL_OUTOF10: 0
PAINLEVEL_OUTOF10: 8
PAINLEVEL_OUTOF10: 5
PAINLEVEL_OUTOF10: 4
PAINLEVEL_OUTOF10: 8
PAINLEVEL_OUTOF10: 8
PAINLEVEL_OUTOF10: 3
PAINLEVEL_OUTOF10: 8
PAINLEVEL_OUTOF10: 6
PAINLEVEL_OUTOF10: 5

## 2025-02-05 ASSESSMENT — PAIN DESCRIPTION - ORIENTATION
ORIENTATION: LEFT

## 2025-02-05 ASSESSMENT — PAIN DESCRIPTION - DESCRIPTORS
DESCRIPTORS: ACHING

## 2025-02-05 ASSESSMENT — PAIN DESCRIPTION - LOCATION
LOCATION: KNEE

## 2025-02-05 ASSESSMENT — PAIN DESCRIPTION - PAIN TYPE
TYPE: SURGICAL PAIN

## 2025-02-05 ASSESSMENT — PAIN - FUNCTIONAL ASSESSMENT: PAIN_FUNCTIONAL_ASSESSMENT: 0-10

## 2025-02-05 NOTE — ANESTHESIA POSTPROCEDURE EVALUATION
Post-Anesthesia Evaluation and Assessment    Patient: Dena Arellano MRN: [id number]  SSN: [id number]    YOB: 1955  Age: 69 y.o.  Sex: female      I have evaluated the patient and they are stable and ready for discharge from the PACU.     Cardiovascular Function/Vital Signs  Visit Vitals  /66   Pulse 69   Temp 97.5 °F (36.4 °C) (Oral)   Resp 15   Ht 1.753 m (5' 9\")   Wt 112.4 kg (247 lb 12.8 oz)   SpO2 92%   BMI 36.59 kg/m²       Patient is status post Spinal anesthesia for Procedure(s):  ROBOTIC LEFT TOTAL KNEE ARTHROPLASTY (VELYS).    Nausea/Vomiting: None    Postoperative hydration reviewed and adequate.    Pain:      Managed    Neurological Status:       At baseline    Mental Status, Level of Consciousness: Alert and  oriented to person, place, and time    Pulmonary Status:       Adequate oxygenation and airway patent    Complications related to anesthesia: None    Post-anesthesia assessment completed. No concerns    Signed By: Frankie Luz MD     February 5, 2025

## 2025-02-05 NOTE — ANESTHESIA PROCEDURE NOTES
Spinal Block    Patient location during procedure: OR  End time: 2/5/2025 9:39 AM  Reason for block: primary anesthetic  Staffing  Performed: anesthesiologist   Anesthesiologist: Frankie Luz MD  Performed by: Frankie Luz MD  Authorized by: Frankie Luz MD    Spinal Block  Patient position: sitting  Prep: DuraPrep  Patient monitoring: cardiac monitor, continuous pulse ox, frequent blood pressure checks and oxygen  Approach: midline  Location: L3/L4  Provider prep: mask and sterile gloves  Needle  Needle type: pencil-tip   Needle gauge: 24 G  Needle length: 4 in  Assessment  Sensory level: T4  Events: None  Swirl obtained: Yes  CSF: clear  Attempts: 1  Hemodynamics: stable  Preanesthetic Checklist  Completed: patient identified, IV checked, site marked, risks and benefits discussed, surgical/procedural consents, equipment checked, pre-op evaluation, timeout performed, anesthesia consent given, oxygen available, monitors applied/VS acknowledged and fire risk safety assessment completed and verbalized

## 2025-02-05 NOTE — FLOWSHEET NOTE
02/05/25 1010   Family Communication    Relationship to Patient Daughter    Phone Number Rowan Arredondo [phone]   Family/Significant Other Update Called   Delivery Origin Nurse   Family Communication   Family Update Message Procedure started

## 2025-02-05 NOTE — ANESTHESIA PRE PROCEDURE
Department of Anesthesiology  Preprocedure Note       Name:  Dena Arellano   Age:  69 y.o.  :  1955                                          MRN:  [id number]         Date:  2025      Surgeon: Surgeon(s):  Jonas Brooks MD    Procedure: Procedure(s):  LEFT TOTAL KNEE ARTHROPLASTY (VELYS)    Medications prior to admission:   Prior to Admission medications    Medication Sig Start Date End Date Taking? Authorizing Provider   levothyroxine (SYNTHROID) 200 MCG tablet Take 1 tablet by mouth every morning (before breakfast)  Patient taking differently: Take 1 tablet by mouth Daily 12/15/24  Yes Georgia Alcazar MD   buPROPion (WELLBUTRIN XL) 150 MG extended release tablet Take 1 tablet by mouth every morning (before breakfast)  Patient taking differently: Take 1 tablet by mouth every morning 11/15/24  Yes Georgia Alcazar MD   FLUoxetine (PROZAC) 20 MG capsule Take 1 capsule by mouth daily 11/15/24  Yes Georgia Alcazar MD   losartan (COZAAR) 100 MG tablet Take 1 tablet by mouth daily 11/15/24  Yes Georgia Alcazar MD   rosuvastatin (CRESTOR) 10 MG tablet Take 1 tablet by mouth nightly  Patient taking differently: Take 1 tablet by mouth daily 11/15/24  Yes Georgia Alcazar MD   fluticasone (FLONASE) 50 MCG/ACT nasal spray SHAKE BOTTLE AND USE 1 SPRAY IN EACH NOSTRIL EVERY DAY  Patient taking differently: 1 spray by Nasal route in the morning and at bedtime 24  Yes Georgia Alcazar MD   Acetaminophen (TYLENOL PO) Take 650 mg by mouth nightly as needed (for knee pain)   Yes Provider, MD Candido   azelastine HCl 0.15 % SOLN 1 spray by Nasal route 2 times daily   Yes Automatic Reconciliation, Ar   cetirizine (ZYRTEC) 10 MG tablet Take 1 tablet by mouth daily   Yes Automatic Reconciliation, Ar   Misc. Devices (CPAP MACHINE) MISC nightly    ProviderCandido MD   diclofenac sodium (VOLTAREN) 1 % GEL Apply 2 g topically 2 times daily 24   Snellings,

## 2025-02-05 NOTE — OP NOTE
Name: Dena Arellano  MRN:  [id number]  : 1955  Age:  69 y.o.  Surgery Date: 2025      OPERATIVE REPORT - LEFT TOTAL KNEE REPLACEMENT-MIDVASTUS    PREOPERATIVE DIAGNOSIS: Osteoarthritis, left knee.    POSTOPERATIVE DIAGNOSIS: Osteoarthritis, left knee.    PROCEDURE PERFORMED: Computer assisted LEFT total knee arthroplasty Velys Robot    SURGEON: Jonas Brooks MD    FIRST ASSISTANT:  Caryn Boyle PA-C    ANESTHESIA: Spinal    PRE-OP ANTIBIOTIC: Ancef 2g    COMPLICATIONS: None.    ESTIMATED BLOOD LOSS: 100 mL.    SPECIMENS REMOVED: None.    COMPONENTS IMPLANTED:   Implant Name Type Inv. Item Serial No.  Lot No. LRB No. Used Action   COMPONENT PAT [id number] POLYETH DOME [employer]OD[employer] MEDIALIZED ATTUNE - SN/A  COMPONENT PAT [id number] POLYETH DOME [employer]OD[employer] MEDIALIZED ATTUNE N/A [facility] [id number] Left 1 Implanted   CEMENT BNE 40GM FULL DOSE PMMA W/ GENT HI VISC RADPQ LNG - SN/A  CEMENT BNE 40GM FULL DOSE PMMA W/ GENT HI VISC RADPQ LNG N/A [facility] [id number] Left 2 Implanted   COMPONENT FEM SZ 6 L KNEE CRUCE RET THEODORA ATTUNE - SN/A  COMPONENT FEM SZ 6 L KNEE CRUCE RET THEODORA ATTUNE N/A [facility] [id number] Left 1 Implanted   BASEPLATE TIB SZ 6 FIX BEAR THEODORA ATTUNE - SN/A  BASEPLATE TIB SZ 6 FIX BEAR THEODORA ATTUNE N/A [facility] [id number] Left 1 Implanted   INSERT TIB FIX BEAR 6 8 MM LT MEDL KNEE STBL AOX ATTUNE - SN/A  INSERT TIB FIX BEAR 6 8 MM LT MEDL KNEE STBL AOX ATTUNE N/A [facility] [id number] Left 1 Implanted       Intra-operative ROM revealed 26 degrees flexion contracture and 5 degrees varus.   ROM with trials in place revealed 3 degrees flexion contracture and 1 degrees varus.     INDICATIONS: The patient is an 69 yrs female with progressive debilitating left knee pain due to severe osteoarthritis. Symptoms have progressed despite comprehensive conservative treatment and they presents for left total knee

## 2025-02-05 NOTE — PERIOP NOTE
TRANSFER - OUT REPORT:    Verbal report given to Micky (name) on Dena Arellano  being transferred to 552 (unit) for routine post-op       Report consisted of patient’s Situation, Background, Assessment and   Recommendations(SBAR).     Time Pre op antibiotic given:0830 & 0949  Anesthesia Stop time: 1130    Information from the following report(s) SBAR, OR Summary, Procedure Summary, Intake/Output, MAR, and Cardiac Rhythm SR  was reviewed with the receiving nurse.    Opportunity for questions and clarification was provided.     Is the patient on 02? Yes       L/Min 2       Other N/q    Is the patient on a monitor? No    Is the nurse transporting with the patient? No    At transfer, are there Patient Belongings with the patient?  If Yes, please note/list:    Surgical Waiting Area notified of patient's transfer from PACU? Yes

## 2025-02-05 NOTE — H&P
Update History & Physical    The patient's History and Physical  was reviewed with the patient and I examined the patient. There was no change. The surgical site was confirmed by the patient and me.       Plan: The risks, benefits, expected outcome, and alternative to the recommended procedure have been discussed with the patient. Patient understands and wants to proceed with the procedure.     Electronically signed by Jonas Brooks MD on 2/5/2025 at 8:47 AM

## 2025-02-05 NOTE — PROGRESS NOTES
Ortho NP Note    POD# 0  s/p ROBOTIC LEFT TOTAL KNEE ARTHROPLASTY (VELYS)   Pt seen with dtr and friend at bedside.     Pt recently arrived to unit. A little drowsy but appropriate in conversation.   Reports BLE numbness resolved s/p spinal block   Reports postop knee pain, 5/10. Reviewed pain control regimen   Tolerating clears. No nausea   Postop plan reviewed - No complaints/concerns.    VSS Afebrile.    Visit Vitals  BP (!) 141/71   Pulse 84   Temp 98 °F (36.7 °C) (Oral)   Resp 14   Ht 1.753 m (5' 9\")   Wt 112.4 kg (247 lb 12.8 oz)   SpO2 100%   BMI 36.59 kg/m²       Voiding status: voiding          Labs    Lab Results   Component Value Date/Time    HGB 12.9 01/20/2025 09:45 AM      Lab Results   Component Value Date/Time    INR 1.0 01/20/2025 09:45 AM      Lab Results   Component Value Date/Time     01/20/2025 09:45 AM    K 4.4 01/20/2025 09:45 AM     01/20/2025 09:45 AM    CO2 25 01/20/2025 09:45 AM    BUN 23 01/20/2025 09:45 AM     Recent Glucose Results:   Glucose   Date Value Ref Range Status   01/20/2025 96 65 - 100 mg/dL Final   09/26/2024 101 (H) 70 - 99 mg/dL Final   10/23/2023 101 (H) 65 - 100 mg/dL Final   10/24/2022 88 65 - 100 mg/dL Final           Body mass index is 36.59 kg/m². : A BMI > 30 is classified as obesity and > 40 is classified as morbid obesity.       Ace wrap dressing c.d.I   Cryotherapy in place over incision  Calves soft and supple; No pain with passive stretch  Sensation and motor intact. +PF/DF/EHL intact   SCDs for mechanical DVT proph while in bed     PLAN:  1) PT BID, OT - WBAT  2) Aspirin 81 mg PO BID for DVT Prophylaxis. Encouraged early mobilization, bed exercises, and SCD use.  3) GI Prophylaxis - Pepcid    4) Pain control - scheduled tylenol  and toradol, and prn  tramadol and oxycodone. Start decadron and mobic at discharge.   5) Post op care - Continue bowel regimen, encouraged IS. Straight cath per protocol. Prineo to remain in place until follow up unless

## 2025-02-06 VITALS
BODY MASS INDEX: 36.7 KG/M2 | DIASTOLIC BLOOD PRESSURE: 72 MMHG | OXYGEN SATURATION: 100 % | WEIGHT: 247.8 LBS | SYSTOLIC BLOOD PRESSURE: 124 MMHG | TEMPERATURE: 98.1 F | RESPIRATION RATE: 17 BRPM | HEIGHT: 69 IN | HEART RATE: 78 BPM

## 2025-02-06 LAB
ANION GAP SERPL CALC-SCNC: 5 MMOL/L (ref 2–12)
BUN SERPL-MCNC: 17 MG/DL (ref 6–20)
BUN/CREAT SERPL: 27 (ref 12–20)
CALCIUM SERPL-MCNC: 8.9 MG/DL (ref 8.5–10.1)
CHLORIDE SERPL-SCNC: 109 MMOL/L (ref 97–108)
CO2 SERPL-SCNC: 25 MMOL/L (ref 21–32)
CREAT SERPL-MCNC: 0.64 MG/DL (ref 0.55–1.02)
GLUCOSE BLD STRIP.AUTO-MCNC: 92 MG/DL (ref 65–117)
GLUCOSE BLD STRIP.AUTO-MCNC: 98 MG/DL (ref 65–117)
GLUCOSE SERPL-MCNC: 83 MG/DL (ref 65–100)
HCT VFR BLD AUTO: 36.7 % (ref 35–47)
HGB BLD-MCNC: 11.4 G/DL (ref 11.5–16)
POTASSIUM SERPL-SCNC: 4.2 MMOL/L (ref 3.5–5.1)
SERVICE CMNT-IMP: NORMAL
SERVICE CMNT-IMP: NORMAL
SODIUM SERPL-SCNC: 139 MMOL/L (ref 136–145)

## 2025-02-06 PROCEDURE — 85014 HEMATOCRIT: CPT

## 2025-02-06 PROCEDURE — 97530 THERAPEUTIC ACTIVITIES: CPT

## 2025-02-06 PROCEDURE — 96376 TX/PRO/DX INJ SAME DRUG ADON: CPT

## 2025-02-06 PROCEDURE — 2580000003 HC RX 258: Performed by: PHYSICIAN ASSISTANT

## 2025-02-06 PROCEDURE — 2700000000 HC OXYGEN THERAPY PER DAY

## 2025-02-06 PROCEDURE — 2500000003 HC RX 250 WO HCPCS: Performed by: PHYSICIAN ASSISTANT

## 2025-02-06 PROCEDURE — 6370000000 HC RX 637 (ALT 250 FOR IP): Performed by: PHYSICIAN ASSISTANT

## 2025-02-06 PROCEDURE — 6360000002 HC RX W HCPCS: Performed by: PHYSICIAN ASSISTANT

## 2025-02-06 PROCEDURE — 94760 N-INVAS EAR/PLS OXIMETRY 1: CPT

## 2025-02-06 PROCEDURE — 97535 SELF CARE MNGMENT TRAINING: CPT

## 2025-02-06 PROCEDURE — 80048 BASIC METABOLIC PNL TOTAL CA: CPT

## 2025-02-06 PROCEDURE — APPNB60 APP NON BILLABLE TIME 46-60 MINS: Performed by: NURSE PRACTITIONER

## 2025-02-06 PROCEDURE — 97116 GAIT TRAINING THERAPY: CPT

## 2025-02-06 PROCEDURE — 82962 GLUCOSE BLOOD TEST: CPT

## 2025-02-06 PROCEDURE — 97165 OT EVAL LOW COMPLEX 30 MIN: CPT

## 2025-02-06 PROCEDURE — 97110 THERAPEUTIC EXERCISES: CPT

## 2025-02-06 PROCEDURE — 96374 THER/PROPH/DIAG INJ IV PUSH: CPT

## 2025-02-06 PROCEDURE — G0378 HOSPITAL OBSERVATION PER HR: HCPCS

## 2025-02-06 PROCEDURE — 85018 HEMOGLOBIN: CPT

## 2025-02-06 RX ORDER — ASPIRIN 81 MG/1
81 TABLET ORAL 2 TIMES DAILY
Qty: 60 TABLET | Refills: 0 | Status: SHIPPED | OUTPATIENT
Start: 2025-02-06 | End: 2025-03-08

## 2025-02-06 RX ORDER — DEXAMETHASONE 4 MG/1
4 TABLET ORAL
Qty: 4 TABLET | Refills: 0 | Status: SHIPPED | OUTPATIENT
Start: 2025-02-06 | End: 2025-02-10

## 2025-02-06 RX ORDER — TRAMADOL HYDROCHLORIDE 50 MG/1
50 TABLET ORAL EVERY 6 HOURS PRN
Qty: 28 TABLET | Refills: 0 | Status: SHIPPED | OUTPATIENT
Start: 2025-02-06 | End: 2025-02-13

## 2025-02-06 RX ORDER — POLYETHYLENE GLYCOL 3350 17 G/17G
17 POWDER, FOR SOLUTION ORAL DAILY
Qty: 7 EACH | Refills: 0 | Status: SHIPPED | OUTPATIENT
Start: 2025-02-06 | End: 2025-03-08

## 2025-02-06 RX ORDER — SENNA AND DOCUSATE SODIUM 50; 8.6 MG/1; MG/1
1 TABLET, FILM COATED ORAL 2 TIMES DAILY
Qty: 60 TABLET | Refills: 0 | Status: SHIPPED | OUTPATIENT
Start: 2025-02-06

## 2025-02-06 RX ORDER — LOSARTAN POTASSIUM 100 MG/1
100 TABLET ORAL DAILY
Qty: 90 TABLET | Refills: 3 | Status: SHIPPED
Start: 2025-02-06

## 2025-02-06 RX ORDER — FAMOTIDINE 20 MG/1
20 TABLET, FILM COATED ORAL 2 TIMES DAILY
Qty: 60 TABLET | Refills: 0 | Status: SHIPPED | OUTPATIENT
Start: 2025-02-06

## 2025-02-06 RX ORDER — OXYCODONE HYDROCHLORIDE 5 MG/1
5 TABLET ORAL EVERY 4 HOURS PRN
Qty: 42 TABLET | Refills: 0 | Status: SHIPPED | OUTPATIENT
Start: 2025-02-06 | End: 2025-02-13

## 2025-02-06 RX ORDER — MELOXICAM 7.5 MG/1
7.5 TABLET ORAL DAILY
Qty: 30 TABLET | Refills: 0 | Status: SHIPPED | OUTPATIENT
Start: 2025-02-06

## 2025-02-06 RX ADMIN — OXYCODONE HYDROCHLORIDE 5 MG: 5 TABLET ORAL at 11:54

## 2025-02-06 RX ADMIN — KETOROLAC TROMETHAMINE 15 MG: 30 INJECTION, SOLUTION INTRAMUSCULAR at 09:38

## 2025-02-06 RX ADMIN — SODIUM CHLORIDE, PRESERVATIVE FREE 10 ML: 5 INJECTION INTRAVENOUS at 09:42

## 2025-02-06 RX ADMIN — ROSUVASTATIN 10 MG: 10 TABLET, FILM COATED ORAL at 09:38

## 2025-02-06 RX ADMIN — SENNOSIDES, DOCUSATE SODIUM 1 TABLET: 50; 8.6 TABLET, FILM COATED ORAL at 09:38

## 2025-02-06 RX ADMIN — BUPROPION HYDROCHLORIDE 150 MG: 150 TABLET, EXTENDED RELEASE ORAL at 09:37

## 2025-02-06 RX ADMIN — ACETAMINOPHEN 650 MG: 325 TABLET ORAL at 09:37

## 2025-02-06 RX ADMIN — ACETAMINOPHEN 650 MG: 325 TABLET ORAL at 02:55

## 2025-02-06 RX ADMIN — ASPIRIN 81 MG: 81 TABLET, COATED ORAL at 09:36

## 2025-02-06 RX ADMIN — SODIUM CHLORIDE: 9 INJECTION, SOLUTION INTRAVENOUS at 02:58

## 2025-02-06 RX ADMIN — OXYCODONE HYDROCHLORIDE 5 MG: 5 TABLET ORAL at 02:53

## 2025-02-06 RX ADMIN — FLUOXETINE HYDROCHLORIDE 20 MG: 20 CAPSULE ORAL at 09:37

## 2025-02-06 RX ADMIN — KETOROLAC TROMETHAMINE 15 MG: 30 INJECTION, SOLUTION INTRAMUSCULAR at 02:58

## 2025-02-06 RX ADMIN — TRAMADOL HYDROCHLORIDE 50 MG: 50 TABLET, COATED ORAL at 09:35

## 2025-02-06 RX ADMIN — WATER 2000 MG: 1 INJECTION INTRAMUSCULAR; INTRAVENOUS; SUBCUTANEOUS at 03:03

## 2025-02-06 RX ADMIN — FAMOTIDINE 20 MG: 20 TABLET, FILM COATED ORAL at 09:37

## 2025-02-06 RX ADMIN — OXYCODONE HYDROCHLORIDE 5 MG: 5 TABLET ORAL at 06:57

## 2025-02-06 RX ADMIN — POLYETHYLENE GLYCOL 3350 17 G: 17 POWDER, FOR SOLUTION ORAL at 09:36

## 2025-02-06 RX ADMIN — LEVOTHYROXINE SODIUM 200 MCG: 0.05 TABLET ORAL at 06:56

## 2025-02-06 ASSESSMENT — PAIN DESCRIPTION - FREQUENCY: FREQUENCY: CONTINUOUS

## 2025-02-06 ASSESSMENT — PAIN DESCRIPTION - DESCRIPTORS
DESCRIPTORS: THROBBING;ACHING;BURNING
DESCRIPTORS: ACHING
DESCRIPTORS: ACHING

## 2025-02-06 ASSESSMENT — PAIN DESCRIPTION - LOCATION
LOCATION: KNEE

## 2025-02-06 ASSESSMENT — PAIN SCALES - GENERAL
PAINLEVEL_OUTOF10: 5
PAINLEVEL_OUTOF10: 6
PAINLEVEL_OUTOF10: 2
PAINLEVEL_OUTOF10: 6
PAINLEVEL_OUTOF10: 10

## 2025-02-06 ASSESSMENT — PAIN DESCRIPTION - PAIN TYPE: TYPE: ACUTE PAIN;SURGICAL PAIN

## 2025-02-06 ASSESSMENT — PAIN DESCRIPTION - ORIENTATION
ORIENTATION: LEFT

## 2025-02-06 ASSESSMENT — PAIN - FUNCTIONAL ASSESSMENT: PAIN_FUNCTIONAL_ASSESSMENT: ACTIVITIES ARE NOT PREVENTED

## 2025-02-06 ASSESSMENT — PAIN DESCRIPTION - ONSET: ONSET: ON-GOING

## 2025-02-06 NOTE — DISCHARGE SUMMARY
Ortho Discharge Summary    Patient ID:  eDna Arellano  [id number]  female  69 y.o.  1955    Admit date: 2025    Discharge date: 2025    Admitting Physician: Jonas Brooks MD     Consulting Physician(s):   Treatment Team:   Jonas Brooks MD Mudrick, Colin A, MD Murphy, Jayla Marie Koirala, Pratichaya, Alysa Santos, OT  Tara Dias, Nga Clemente, PT  Mary Goel    Date of Surgery:   2025     Preoperative Diagnosis:  Arthritis of left knee [M17.12]    Postoperative Diagnosis:   * No post-op diagnosis entered *    Procedure(s):   ROBOTIC LEFT TOTAL KNEE ARTHROPLASTY (VELYS)     Anesthesia Type:   Spinal     Surgeon: Jonas Brooks MD                            HPI:  Pt is a 69 y.o. female who has a history of Arthritis of left knee [M17.12]  with pain and limitations of activities of daily living who presents at this time for a ROBOTIC LEFT TOTAL KNEE ARTHROPLASTY (VELYS) following the failure of conservative management.    PMH:   Past Medical History:   Diagnosis Date    Adjustment disorder with mixed anxiety and depressed mood 2020    After death of father  and personal diagnosis of/treatment for breast cancer [id number]; Counselin:  [facility], [facility]     Ankle edema 2010    Benign essential hypertension 2016    2006     Cancer (HCC)     RT BREAST CANCER    Carpal tunnel syndrome 2010    Colon polyp 2010    Fibroids 2010    H/O bilateral mastectomy 2019    Double Mastectomy (right breast cancer and elective left breast prophylactically); No chemo, No radiation    Hypercholesterolemia 2013    Hypothyroidism 2016    Kidney stone 2010    Mixed hyperlipidemia 2020    MRSA (methicillin resistant Staphylococcus aureus) infection     LEFT BREAST    Perimenopausal 2010    Prediabetes 11/15/2024    2024      Primary osteoarthritis of knees, bilateral

## 2025-02-06 NOTE — CARE COORDINATION
[facility]:   [facility]   Family transport - lives with kesha and granddau  Already owns walker      CM met with Pt, confirmed face sheet and HH choices; OBS Pt       02/06/25 1053   Service Assessment   Patient Orientation Alert and Oriented   Cognition Alert   History Provided By Patient   Primary Caregiver Self   Support Systems Family Members;Children   Patient's Healthcare Decision Maker is: Legal Next of Kin   PCP Verified by CM Yes   Can patient return to prior living arrangement Yes   Ability to make needs known: Good   Family able to assist with home care needs: Yes   Discharge Planning   Patient expects to be discharged to: House   Services At/After Discharge   Confirm Follow Up Transport Family   Condition of Participation: Discharge Planning   The Plan for Transition of Care is related to the following treatment goals: Home Health   The Patient and/or Patient Representative was provided with a Choice of Provider? Patient   The Patient and/Or Patient Representative agree with the Discharge Plan? Yes   Freedom of Choice list was provided with basic dialogue that supports the patient's individualized plan of care/goals, treatment preferences, and shares the quality data associated with the providers?  Yes

## 2025-02-06 NOTE — PLAN OF CARE
Problem: Occupational Therapy - Adult  Goal: By Discharge: Performs self-care activities at highest level of function for planned discharge setting.  See evaluation for individualized goals.  Description: FUNCTIONAL STATUS PRIOR TO ADMISSION:  Patient was ambulatory without DME   , Prior Level of Assist for ADLs: Independent,  ,  ,  ,  ,  , Prior Level of Assist for Homemaking: Independent,  , Prior Level of Assist for Transfers: Independent, Active : Yes     HOME SUPPORT: Patient lived with daughter but didn't require assistance.    Occupational Therapy Goals:  Initiated 2/6/2025  1.  Patient will perform grooming with [place] within 7 day(s).  2.  Patient will perform LB bathing with Supervision within 7 day(s).  3.  Patient will perform lower body dressing with Contact Guard Assist within 7 day(s).  4.  Patient will perform toilet transfers with Supervision  within 7 day(s).  5.  Patient will perform all aspects of toileting with [place] within 7 day(s).  6.  Patient will participate in upper extremity therapeutic exercise/activities with [place] for 10 minutes within 7 day(s).    7.  Patient will utilize energy conservation techniques during functional activities with verbal cues within 7 day(s).]    Outcome: Progressing   OCCUPATIONAL THERAPY EVALUATION    Patient: Dena Arellano (69 y.o. female)  Date: 2/6/2025  Primary Diagnosis: Arthritis of left knee [M17.12]  Osteoarthritis of left knee, unspecified osteoarthritis type [M17.12]  Procedure(s) (LRB):  ROBOTIC LEFT TOTAL KNEE ARTHROPLASTY (VELYS) (Left) 1 Day Post-Op     Precautions: Fall Risk, Weight Bearing   Left Lower Extremity Weight Bearing: Weight Bearing As Tolerated              ASSESSMENT :  The patient is limited by decreased functional mobility, independence in ADLs, high-level IADLs, activity tolerance, endurance, coordination, balance s/p L TKA, POD #1. Pt received supine in bed; agreeable to OT session. Pt noted need to 
  Problem: Pain  Goal: Verbalizes/displays adequate comfort level or baseline comfort level  2/6/2025 0104 by Kita Negrete RN  Outcome: Progressing     Problem: Safety - Adult  Goal: Free from fall injury  2/6/2025 0104 by Kita Negrete RN  Outcome: Progressing     Problem: Discharge Planning  Goal: Discharge to home or other facility with appropriate resources  2/6/2025 0104 by Kita Negrete RN  Outcome: Progressing     
  Problem: Pain  Goal: Verbalizes/displays adequate comfort level or baseline comfort level  2/6/2025 1509 by Giovani De La Rosa RN  Outcome: Progressing  2/6/2025 1256 by William Solis RN  Outcome: Adequate for Discharge     Problem: Safety - Adult  Goal: Free from fall injury  2/6/2025 1509 by Giovani De La Rosa RN  Outcome: Progressing  2/6/2025 1256 by William Solis RN  Outcome: Adequate for Discharge     Problem: Discharge Planning  Goal: Discharge to home or other facility with appropriate resources  2/6/2025 1509 by Giovani De La Rosa RN  Outcome: Progressing  2/6/2025 1256 by William Solis RN  Outcome: Adequate for Discharge     Problem: Physical Therapy - Adult  Goal: By Discharge: Performs mobility at highest level of function for planned discharge setting.  See evaluation for individualized goals.  2/6/2025 1256 by William Solis RN  Outcome: Adequate for Discharge  2/6/2025 1143 by Nga Freeman, PT  Outcome: Adequate for Discharge     Problem: Occupational Therapy - Adult  Goal: By Discharge: Performs self-care activities at highest level of function for planned discharge setting.  See evaluation for individualized goals.  2/6/2025 1256 by William Solis RN  Outcome: Adequate for Discharge  2/6/2025 1044 by Tara Dias, ANNETTE  Outcome: Progressing     
  Problem: Pain  Goal: Verbalizes/displays adequate comfort level or baseline comfort level  Outcome: Progressing     Problem: Safety - Adult  Goal: Free from fall injury  Outcome: Progressing     Problem: Discharge Planning  Goal: Discharge to home or other facility with appropriate resources  Outcome: Progressing     
  Problem: Physical Therapy - Adult  Goal: By Discharge: Performs mobility at highest level of function for planned discharge setting.  See evaluation for individualized goals.  Description: FUNCTIONAL STATUS PRIOR TO ADMISSION: Patient was independent without use of DME.    HOME SUPPORT PRIOR TO ADMISSION: The patient lived with wife and did not require assistance.    Physical Therapy Goals  Initiated 2/5/2025  1.  Patient will move from supine to sit and sit to supine and scoot up and down in bed with modified independence within 4 day(s).    2.  Patient will perform sit to stand with modified independence within 4 day(s).  3.  Patient will transfer from bed to chair and chair to bed with modified independence using the least restrictive device within 4 day(s).  4.  Patient will ambulate with modified independence for > 150 feet with the least restrictive device within 4 day(s).   5.  Patient will ascend/descend 4 stairs with one handrail(s) with supervision within 4 day(s).  6. Patient will perform TKR home exercise program per protocol with supervision/set-up within 4 days.  7. Patient will demonstrate AROM 0-90 degrees in operative joint within 4 days.     Outcome: Adequate for Discharge   PHYSICAL THERAPY TREATMENT    Patient: Dena Arellano (69 y.o. female)  Date: 2/6/2025  Diagnosis: Arthritis of left knee [M17.12]  Osteoarthritis of left knee, unspecified osteoarthritis type [M17.12] Arthritis of left knee  Procedure(s) (LRB):  ROBOTIC LEFT TOTAL KNEE ARTHROPLASTY (VELYS) (Left) 1 Day Post-Op  Precautions: Fall Risk, Weight Bearing   Left Lower Extremity Weight Bearing: Weight Bearing As Tolerated                  ASSESSMENT:  Patient continues to benefit from skilled PT services and has adequately met goals to promote a safe discharge home with caregivers assist.  Pt able to advance to seated exercise achieving 75 knee flexion and perform knee extension.  Pt able to amb with RW and perform stairs with 
they tried?) Total A Lot A Little None   1.  Turning from your back to your side while in a flat bed without using bedrails? []  1 []  2 []  3  [x]  4   2.  Moving from lying on your back to sitting on the side of a flat bed without using bedrails? []  1 []  2 []  3  [x]  4   3.  Moving to and from a bed to a chair (including a wheelchair)? []  1 []  2 [x]  3  []  4   4. Standing up from a chair using your arms (e.g. wheelchair or bedside chair)? []  1 []  2 []  3  [x]  4   5.  Walking in hospital room? []  1 []  2 [x]  3  []  4   6.  Climbing 3-5 steps with a railing? []  1 []  2 [x]  3  []  4     Raw Score: 21/24                            Cutoff score <=171,2,3 had higher odds of discharging home with home health or need of SNF/IPR.    1. Ludy Yin, Mary Zamora, Troy Atkinson, Abigail Marquez, Mitchel Rolle, Benny Yin.  Validity of the AM-PAC “6-Clicks” Inpatient Daily Activity and Basic Mobility Short Forms. Physical Therapy Mar 2014, 94 3) [phone]; DOI: 10.2522/ptj.28242302  2. Mookie ARORA, Eileen J, [place] J, Umer J. Association of AM-PAC \"6-Clicks\" Basic Mobility and Daily Activity Scores With Discharge Destination. Phys Ther. 2021 Apr 4;101(4):suhd786. doi: 10.1093/ptj/ezsc171. PMID: 63825333.  3. Wilmar REGAN, Stephy D, Demetria S, Ender K, Declan S. Activity Measure for Post-Acute Care \"6-Clicks\" Basic Mobility Scores Predict Discharge Destination After Acute Care Hospitalization in Select Patient Groups: A Retrospective, Observational Study. Arch Rehabil Res Clin Transl. 2022 Jul 16;4(3):655788. doi: 10.1016/j.arrct.2022.431905. PMID: 30825230; PMCID: [id number].  4. Annamaria AHMADI, Rosie S, Tiffany W, Lyndsay P. AM-PAC Short Forms Manual 4.0. Revised 2/2020.                                                                                                                                                                                                                              Pain

## 2025-02-06 NOTE — PROGRESS NOTES
Ortho NP Note    POD# 1  s/p ROBOTIC LEFT TOTAL KNEE ARTHROPLASTY (VELYS)   Pt seen with no visitors.     Pt resting in bed comfortably.   Reports appropriate postop knee pain, relieved by oxycodone.   Reviewed medrec and pain control regimen for home.  Tolerating regular diet. No nausea   Postop plan reviewed - No complaints/concerns.    VSS Afebrile.    Visit Vitals  /62   Pulse 70   Temp 98.2 °F (36.8 °C) (Oral)   Resp 17   Ht 1.753 m (5' 9\")   Wt 112.4 kg (247 lb 12.8 oz)   SpO2 99%   BMI 36.59 kg/m²       Voiding status: voiding          Labs    Lab Results   Component Value Date/Time    HGB 11.4 02/06/2025 02:39 AM      Lab Results   Component Value Date/Time    INR 1.0 01/20/2025 09:45 AM      Lab Results   Component Value Date/Time     02/06/2025 02:39 AM    K 4.2 02/06/2025 02:39 AM     02/06/2025 02:39 AM    CO2 25 02/06/2025 02:39 AM    BUN 17 02/06/2025 02:39 AM     Recent Glucose Results:   Glucose   Date Value Ref Range Status   02/06/2025 83 65 - 100 mg/dL Final   01/20/2025 96 65 - 100 mg/dL Final   09/26/2024 101 (H) 70 - 99 mg/dL Final   10/23/2023 101 (H) 65 - 100 mg/dL Final           Body mass index is 36.59 kg/m². : A BMI > 30 is classified as obesity and > 40 is classified as morbid obesity.       Ace wrap removed. Dressing c.d.I   Cryotherapy in place over incision  Calves soft and supple; No pain with passive stretch  Sensation and motor intact. +PF/DF/EHL intact   SCDs for mechanical DVT proph while in bed     PLAN:  1) PT BID, OT - WBAT  2) Aspirin 81 mg PO BID for DVT Prophylaxis. Encouraged early mobilization, bed exercises, and SCD use.  3) GI Prophylaxis - Pepcid    4) Pain control - scheduled tylenol  and toradol, and prn  tramadol and oxycodone. Start decadron and mobic at discharge.   5) Post op care - Continue bowel regimen, encouraged IS. Straight cath per protocol. Prineo to remain in place until follow up unless integrity is lost.    6) Readniess for

## 2025-02-07 NOTE — PROGRESS NOTES
[facility]  Metabolic Weight Loss Program        Patient's Name: Dena Arellano  : 1955    This patient is a participant at [facility] and attended the weekly virtual nutrition class hosted via [employer].      Lourdes Padilla, MS, RD, LDN

## 2025-02-11 ENCOUNTER — OFFICE VISIT (OUTPATIENT)
Age: 70
End: 2025-02-11

## 2025-02-11 DIAGNOSIS — E66.01 CLASS 2 SEVERE OBESITY DUE TO EXCESS CALORIES WITH SERIOUS COMORBIDITY AND BODY MASS INDEX (BMI) OF 36.0 TO 36.9 IN ADULT: Primary | ICD-10-CM

## 2025-02-11 DIAGNOSIS — E66.812 CLASS 2 SEVERE OBESITY DUE TO EXCESS CALORIES WITH SERIOUS COMORBIDITY AND BODY MASS INDEX (BMI) OF 36.0 TO 36.9 IN ADULT: Primary | ICD-10-CM

## 2025-02-15 NOTE — PROGRESS NOTES
Nurse note from patient's weekly / LCD / Maintenance class was reviewed.  Pertinent medical concerns were:   reviewed     BP Readings from Last 3 Encounters:   02/06/25 124/72   02/03/25 95/62   01/31/25 114/62       Failed to redirect to the Timeline version of the [facility].    Current Outpatient Medications   Medication Sig Dispense Refill    Misc. Devices (CPAP MACHINE) MISC nightly      levothyroxine (SYNTHROID) 200 MCG tablet Take 1 tablet by mouth every morning (before breakfast) (Patient taking differently: Take 1 tablet by mouth Daily) 90 tablet 2    buPROPion (WELLBUTRIN XL) 150 MG extended release tablet Take 1 tablet by mouth every morning (before breakfast) (Patient taking differently: Take 1 tablet by mouth every morning) 90 tablet 3    FLUoxetine (PROZAC) 20 MG capsule Take 1 capsule by mouth daily 90 capsule 3    rosuvastatin (CRESTOR) 10 MG tablet Take 1 tablet by mouth nightly (Patient taking differently: Take 1 tablet by mouth daily) 90 tablet 3    fluticasone (FLONASE) 50 MCG/ACT nasal spray SHAKE BOTTLE AND USE 1 SPRAY IN EACH NOSTRIL EVERY DAY (Patient taking differently: 1 spray by Nasal route in the morning and at bedtime) 48 g 1    azelastine HCl 0.15 % SOLN 1 spray by Nasal route 2 times daily      cetirizine (ZYRTEC) 10 MG tablet Take 1 tablet by mouth daily      oxyCODONE (ROXICODONE) 5 MG immediate release tablet Take 1 tablet by mouth every 4-6 hours as needed for Pain for up to 7 days. Intended supply: 7 days. Take lowest dose possible to manage pain Max Daily Amount: 30 mg 30 tablet 0    traMADol (ULTRAM) 50 MG tablet Take 1 tablet by mouth every 6 hours as needed for Pain for up to 8 days. Intended supply: 7 days. Take lowest dose possible to manage pain Max Daily Amount: 200 mg 30 tablet 0    aspirin 81 MG EC tablet Take 1 tablet by mouth in the morning and at bedtime 60 tablet 0    polyethylene glycol (GLYCOLAX) 17 GM/SCOOP powder Take 17 g by mouth daily 7 each 0

## 2025-02-18 ENCOUNTER — OFFICE VISIT (OUTPATIENT)
Age: 70
End: 2025-02-18

## 2025-02-18 DIAGNOSIS — E66.812 CLASS 2 SEVERE OBESITY DUE TO EXCESS CALORIES WITH SERIOUS COMORBIDITY AND BODY MASS INDEX (BMI) OF 36.0 TO 36.9 IN ADULT: Primary | ICD-10-CM

## 2025-02-18 DIAGNOSIS — E66.01 CLASS 2 SEVERE OBESITY DUE TO EXCESS CALORIES WITH SERIOUS COMORBIDITY AND BODY MASS INDEX (BMI) OF 36.0 TO 36.9 IN ADULT: Primary | ICD-10-CM

## 2025-02-19 NOTE — PROGRESS NOTES
[facility]  Metabolic Weight Loss Program        Patient's Name: Dena Arellano  : 1955    This patient is a participant at [facility] and attended the weekly virtual nutrition class hosted via [employer].      Lourdes Padilla, MS, RD, LDN

## 2025-02-25 ENCOUNTER — OFFICE VISIT (OUTPATIENT)
Age: 70
End: 2025-02-25

## 2025-02-25 DIAGNOSIS — E66.812 CLASS 2 SEVERE OBESITY DUE TO EXCESS CALORIES WITH SERIOUS COMORBIDITY AND BODY MASS INDEX (BMI) OF 36.0 TO 36.9 IN ADULT: Primary | ICD-10-CM

## 2025-02-25 DIAGNOSIS — E66.01 CLASS 2 SEVERE OBESITY DUE TO EXCESS CALORIES WITH SERIOUS COMORBIDITY AND BODY MASS INDEX (BMI) OF 36.0 TO 36.9 IN ADULT: Primary | ICD-10-CM

## 2025-02-28 ENCOUNTER — TELEMEDICINE (OUTPATIENT)
Age: 70
End: 2025-02-28

## 2025-02-28 VITALS
SYSTOLIC BLOOD PRESSURE: 104 MMHG | HEART RATE: 74 BPM | OXYGEN SATURATION: 95 % | DIASTOLIC BLOOD PRESSURE: 56 MMHG | TEMPERATURE: 97.5 F | HEIGHT: 69 IN | BODY MASS INDEX: 34.8 KG/M2 | WEIGHT: 235 LBS

## 2025-02-28 DIAGNOSIS — E78.2 MIXED HYPERLIPIDEMIA: ICD-10-CM

## 2025-02-28 DIAGNOSIS — R73.9 BLOOD GLUCOSE ELEVATED: ICD-10-CM

## 2025-02-28 DIAGNOSIS — E03.9 ACQUIRED HYPOTHYROIDISM: ICD-10-CM

## 2025-02-28 DIAGNOSIS — E66.09 CLASS 1 OBESITY DUE TO EXCESS CALORIES WITH SERIOUS COMORBIDITY AND BODY MASS INDEX (BMI) OF 34.0 TO 34.9 IN ADULT: Primary | ICD-10-CM

## 2025-02-28 DIAGNOSIS — I10 BENIGN ESSENTIAL HYPERTENSION: ICD-10-CM

## 2025-02-28 DIAGNOSIS — E66.811 CLASS 1 OBESITY DUE TO EXCESS CALORIES WITH SERIOUS COMORBIDITY AND BODY MASS INDEX (BMI) OF 34.0 TO 34.9 IN ADULT: Primary | ICD-10-CM

## 2025-02-28 DIAGNOSIS — G47.33 OBSTRUCTIVE SLEEP APNEA SYNDROME: ICD-10-CM

## 2025-02-28 ASSESSMENT — PATIENT HEALTH QUESTIONNAIRE - PHQ9
9. THOUGHTS THAT YOU WOULD BE BETTER OFF DEAD, OR OF HURTING YOURSELF: NOT AT ALL
2. FEELING DOWN, DEPRESSED OR HOPELESS: NOT AT ALL
1. LITTLE INTEREST OR PLEASURE IN DOING THINGS: NOT AT ALL
4. FEELING TIRED OR HAVING LITTLE ENERGY: NOT AT ALL
7. TROUBLE CONCENTRATING ON THINGS, SUCH AS READING THE NEWSPAPER OR WATCHING TELEVISION: NOT AT ALL
SUM OF ALL RESPONSES TO PHQ9 QUESTIONS 1 & 2: 0
5. POOR APPETITE OR OVEREATING: NOT AT ALL
SUM OF ALL RESPONSES TO PHQ QUESTIONS 1-9: 0
6. FEELING BAD ABOUT YOURSELF - OR THAT YOU ARE A FAILURE OR HAVE LET YOURSELF OR YOUR FAMILY DOWN: NOT AT ALL
3. TROUBLE FALLING OR STAYING ASLEEP: NOT AT ALL
SUM OF ALL RESPONSES TO PHQ QUESTIONS 1-9: 0
SUM OF ALL RESPONSES TO PHQ QUESTIONS 1-9: 0
8. MOVING OR SPEAKING SO SLOWLY THAT OTHER PEOPLE COULD HAVE NOTICED. OR THE OPPOSITE, BEING SO FIGETY OR RESTLESS THAT YOU HAVE BEEN MOVING AROUND A LOT MORE THAN USUAL: NOT AT ALL
SUM OF ALL RESPONSES TO PHQ QUESTIONS 1-9: 0

## 2025-02-28 NOTE — PROGRESS NOTES
Identified pt with two pt identifiers (name and ). Reviewed chart in preparation for visit and have obtained necessary documentation.    Dena Arellano is a 69 y.o. female  Chief Complaint   Patient presents with    Weight Management     1 month      BP (!) 104/56   Pulse 74   Temp 97.5 °F (36.4 °C)   Ht 1.753 m (5' 9\")   Wt 106.6 kg (235 lb)   LMP  (LMP Unknown)   SpO2 95%   BMI 34.70 kg/m²     1. Have you been to the ER, urgent care clinic since your last visit?  Hospitalized since your last visit?no    2. Have you seen or consulted any other health care providers outside of the [facility] since your last visit?  Include any pap smears or colon screening. No    Patient entered their homework via the [employer] mona.

## 2025-02-28 NOTE — PROGRESS NOTES
[facility] Progress Note:   F/up Physician Visit    Dena Arellano is a 69 y.o. female who was seen by synchronous (real-time) audio-video technology on 2/28/2025.      Consent:  She and/or her healthcare decision maker is aware that this patient-initiated Telehealth encounter is a billable service, with coverage as determined by her insurance carrier. She is aware that she may receive a bill and has provided verbal consent to proceed: Yes    Dena Arellano, was evaluated through a synchronous (real-time) audio-video encounter. The patient (or guardian if applicable) is aware that this is a billable service, which includes applicable co-pays. This Virtual Visit was conducted with patient's (and/or legal guardian's) consent. Patient identification was verified, and a caregiver was present when appropriate.   The patient was located at Home: [address] [place] [address] [place] [address]  Provider was located at Home (Appt Dept State): [place]  Confirm you are appropriately licensed, registered, or certified to deliver care in the state where the patient is located as indicated above. If you are not or unsure, please re-schedule the visit: Yes, I confirm.          --Geetha Rowley MD on 2/28/2025 at 9:35 AM           712  Subjective:   Dena Arellano was seen for Weight Management (1 month )    f/up physician visit for the  LCD Program.      Kishore 244  Now 235    Doing well post  left knee rep on 2/5/25  Moving now wth no devices    Taking wellbutrin     Prior to Admission medications    Medication Sig Start Date End Date Taking? Authorizing Provider   aspirin 81 MG EC tablet Take 1 tablet by mouth in the morning and at bedtime 2/6/25 3/8/25 Yes Annamarie Chauhan APRN - NP   meloxicam (MOBIC) 7.5 MG tablet Take 1 tablet by mouth daily 2/6/25  Yes Annamarie Chauhan APRN - NP   famotidine (PEPCID) 20 MG tablet Take 1 tablet by mouth 2 times daily 2/6/25  Yes Annamarie Chauhan APRN - NP   losartan (COZAAR)

## 2025-02-28 NOTE — PROGRESS NOTES
[facility]  Metabolic Weight Loss Program        Patient's Name: Dena Arellano  : 1955    This patient is a participant at [facility] and attended the weekly virtual nutrition class hosted via [employer].      Lourdes Padilla, MS, RD, LDN   Statement Selected

## 2025-03-04 ENCOUNTER — OFFICE VISIT (OUTPATIENT)
Age: 70
End: 2025-03-04

## 2025-03-04 DIAGNOSIS — E66.09 CLASS 1 OBESITY DUE TO EXCESS CALORIES WITH SERIOUS COMORBIDITY AND BODY MASS INDEX (BMI) OF 34.0 TO 34.9 IN ADULT: Primary | ICD-10-CM

## 2025-03-04 DIAGNOSIS — E66.811 CLASS 1 OBESITY DUE TO EXCESS CALORIES WITH SERIOUS COMORBIDITY AND BODY MASS INDEX (BMI) OF 34.0 TO 34.9 IN ADULT: Primary | ICD-10-CM

## 2025-03-07 NOTE — PROGRESS NOTES
[facility]  Metabolic Weight Loss Program        Patient's Name: Dena Arellano  : 1955    This patient is a participant at [facility] and attended the weekly virtual nutrition class hosted via [employer].      Lourdes Padilla, MS, RD, LDN

## 2025-03-11 ENCOUNTER — HOSPITAL ENCOUNTER (OUTPATIENT)
Facility: HOSPITAL | Age: 70
Setting detail: RECURRING SERIES
Discharge: HOME | End: 2025-03-14
Payer: MEDICARE

## 2025-03-11 PROCEDURE — 97162 PT EVAL MOD COMPLEX 30 MIN: CPT

## 2025-03-11 PROCEDURE — 97110 THERAPEUTIC EXERCISES: CPT

## 2025-03-11 PROCEDURE — 97140 MANUAL THERAPY 1/> REGIONS: CPT

## 2025-03-11 NOTE — THERAPY EVALUATION
[facility] Physical Therapy  [address]  [place] [address]  Phone: [phone]   Fax: [phone]          PHYSICAL THERAPY - MEDICARE EVALUATION/PLAN OF CARE NOTE (updated 3/23)      Date: 3/11/2025          Patient Name:  Dena Arellano :  1955   Medical   Diagnosis:  Status post left knee replacement [Z96.652] Treatment Diagnosis:  M25.562  LEFT KNEE PAIN    Referral Source:  Caryn Boyle PA-C Provider #:  [id number]                Insurance: Payor: MEDICARE / Plan: MEDICARE PART A AND B / Product Type: *No Product type* /      Patient  verified yes     Visit #   Current  / Total 1 24   Time   In / Out 912 1007   Total Treatment Time 55   Total Timed Codes 25   1:1 Treatment Time 25      [facility] Totals Reminder:  bill using total billable   min of TIMED therapeutic procedures and modalities.   8-22 min = 1 unit; 23-37 min = 2 units; 38-52 min = 3 units;  53-67 min = 4 units; 68-82 min = 5 units           SUBJECTIVE  Pain Level (0-10 scale): 1-2  []constant []intermittent []improving []worsening []no change since onset    Any medication changes, allergies to medications, adverse drug reactions, diagnosis change, or new procedure performed?: [x] No    [] Yes (see summary sheet for update)  Medications: Verified on Patient Summary List    Subjective functional status/changes:     Pt reports a long history of knee pain.  Most recently in the last several years she has gotten cortisone injections in both knees.  Over time she has gotten less and less relief with each injection.  In the fall she only had a few weeks of relief and the decision was made to move forward with surgery.  This was completed by Dr Thomas 25.  She completed home health and is present for outpatient therapy today.  She states she thinks that home health PT was able to get to about 100 degrees of flexion.  She is still having trouble sleeping at

## 2025-03-13 ENCOUNTER — HOSPITAL ENCOUNTER (OUTPATIENT)
Facility: HOSPITAL | Age: 70
Setting detail: RECURRING SERIES
Discharge: HOME | End: 2025-03-16
Payer: MEDICARE

## 2025-03-13 ENCOUNTER — OFFICE VISIT (OUTPATIENT)
Age: 70
End: 2025-03-13

## 2025-03-13 DIAGNOSIS — E66.09 CLASS 1 OBESITY DUE TO EXCESS CALORIES WITH SERIOUS COMORBIDITY AND BODY MASS INDEX (BMI) OF 34.0 TO 34.9 IN ADULT: Primary | ICD-10-CM

## 2025-03-13 DIAGNOSIS — E66.811 CLASS 1 OBESITY DUE TO EXCESS CALORIES WITH SERIOUS COMORBIDITY AND BODY MASS INDEX (BMI) OF 34.0 TO 34.9 IN ADULT: Primary | ICD-10-CM

## 2025-03-13 PROCEDURE — 97140 MANUAL THERAPY 1/> REGIONS: CPT

## 2025-03-13 PROCEDURE — 97110 THERAPEUTIC EXERCISES: CPT

## 2025-03-13 NOTE — PROGRESS NOTES
work tasks without increased symptoms 100% of the time      PLAN  Yes  Continue plan of care  Re-Cert Due: 6/9/2025  [x]  Upgrade activities as tolerated  []  Discharge due to:  []  Other:      Chito Le, LORENZO       3/13/2025       9:57 AM

## 2025-03-17 ENCOUNTER — HOSPITAL ENCOUNTER (OUTPATIENT)
Facility: HOSPITAL | Age: 70
Setting detail: RECURRING SERIES
Discharge: HOME | End: 2025-03-20
Payer: MEDICARE

## 2025-03-17 PROCEDURE — 97110 THERAPEUTIC EXERCISES: CPT

## 2025-03-17 PROCEDURE — 97140 MANUAL THERAPY 1/> REGIONS: CPT

## 2025-03-17 NOTE — PROGRESS NOTES
all activity  Pt will increase strength to stabilize the knee and normalize gait over all surfaces  Pt will increase FOTO score by 9 points to meet MDC and demonstrate improved function with all activity  Pt will return to all care activity with her grand daughter without increased symptoms  Pt will use proper form and posture to complete all work tasks without increased symptoms 100% of the time      PLAN  Yes  Continue plan of care  Re-Cert Due: 6/9/2025  [x]  Upgrade activities as tolerated  []  Discharge due to:  []  Other:      Chito Le, LORENZO       3/17/2025       12:07 PM

## 2025-03-18 ENCOUNTER — OFFICE VISIT (OUTPATIENT)
Age: 70
End: 2025-03-18

## 2025-03-18 DIAGNOSIS — E66.811 CLASS 1 OBESITY DUE TO EXCESS CALORIES WITH SERIOUS COMORBIDITY AND BODY MASS INDEX (BMI) OF 34.0 TO 34.9 IN ADULT: Primary | ICD-10-CM

## 2025-03-18 DIAGNOSIS — E66.09 CLASS 1 OBESITY DUE TO EXCESS CALORIES WITH SERIOUS COMORBIDITY AND BODY MASS INDEX (BMI) OF 34.0 TO 34.9 IN ADULT: Primary | ICD-10-CM

## 2025-03-19 ENCOUNTER — CLINICAL SUPPORT (OUTPATIENT)
Age: 70
End: 2025-03-19

## 2025-03-19 VITALS
WEIGHT: 236.1 LBS | OXYGEN SATURATION: 98 % | SYSTOLIC BLOOD PRESSURE: 106 MMHG | DIASTOLIC BLOOD PRESSURE: 66 MMHG | RESPIRATION RATE: 20 BRPM | HEART RATE: 71 BPM | BODY MASS INDEX: 34.97 KG/M2 | HEIGHT: 69 IN | TEMPERATURE: 97.9 F

## 2025-03-19 DIAGNOSIS — E66.811 CLASS 1 OBESITY DUE TO EXCESS CALORIES WITH SERIOUS COMORBIDITY AND BODY MASS INDEX (BMI) OF 34.0 TO 34.9 IN ADULT: Primary | ICD-10-CM

## 2025-03-19 DIAGNOSIS — E78.2 MIXED HYPERLIPIDEMIA: ICD-10-CM

## 2025-03-19 DIAGNOSIS — R73.9 BLOOD GLUCOSE ELEVATED: ICD-10-CM

## 2025-03-19 DIAGNOSIS — G47.33 OBSTRUCTIVE SLEEP APNEA SYNDROME: ICD-10-CM

## 2025-03-19 DIAGNOSIS — E03.9 ACQUIRED HYPOTHYROIDISM: ICD-10-CM

## 2025-03-19 DIAGNOSIS — I10 BENIGN ESSENTIAL HYPERTENSION: ICD-10-CM

## 2025-03-19 DIAGNOSIS — E66.09 CLASS 1 OBESITY DUE TO EXCESS CALORIES WITH SERIOUS COMORBIDITY AND BODY MASS INDEX (BMI) OF 34.0 TO 34.9 IN ADULT: Primary | ICD-10-CM

## 2025-03-19 RX ORDER — ACETAMINOPHEN 500 MG
1000 TABLET ORAL AS NEEDED
COMMUNITY

## 2025-03-19 ASSESSMENT — PATIENT HEALTH QUESTIONNAIRE - PHQ9
SUM OF ALL RESPONSES TO PHQ QUESTIONS 1-9: 0
SUM OF ALL RESPONSES TO PHQ QUESTIONS 1-9: 0
1. LITTLE INTEREST OR PLEASURE IN DOING THINGS: NOT AT ALL
2. FEELING DOWN, DEPRESSED OR HOPELESS: NOT AT ALL
SUM OF ALL RESPONSES TO PHQ QUESTIONS 1-9: 0
SUM OF ALL RESPONSES TO PHQ QUESTIONS 1-9: 0

## 2025-03-20 ENCOUNTER — HOSPITAL ENCOUNTER (OUTPATIENT)
Facility: HOSPITAL | Age: 70
Setting detail: RECURRING SERIES
Discharge: HOME | End: 2025-03-23
Payer: MEDICARE

## 2025-03-20 PROCEDURE — 97140 MANUAL THERAPY 1/> REGIONS: CPT

## 2025-03-20 PROCEDURE — 97110 THERAPEUTIC EXERCISES: CPT

## 2025-03-20 NOTE — PROGRESS NOTES
PHYSICAL THERAPY - MEDICARE DAILY TREATMENT NOTE (updated 3/23)      Date: 3/20/2025          Patient Name:  Dena Arellano :  1955   Medical   Diagnosis:  Status post left knee replacement [Z96.652] Treatment Diagnosis:  M25.562 LEFT KNEE PAIN    Referral Source:  Caryn Boyle PA-C Insurance:   Payor: MEDICARE / Plan: MEDICARE PART A AND B / Product Type: *No Product type* /                     Patient  verified yes     Visit #   Current  / Total 4 24   Time   In / Out 811 909   Total Treatment Time 58   Total Timed Codes 58   1:1 Treatment Time 36      [facility] Totals Reminder:  bill using total billable   min of TIMED therapeutic procedures and modalities.   8-22 min = 1 unit; 23-37 min = 2 units; 38-52 min = 3 units; 53-67 min = 4 units; 68-82 min = 5 units            SUBJECTIVE    Pain Level (0-10 scale): 2    Any medication changes, allergies to medications, adverse drug reactions, diagnosis change, or new procedure performed?: [x] No    [] Yes (see summary sheet for update)  Medications: Verified on Patient Summary List    Subjective functional status/changes:     No new reports.    OBJECTIVE      Therapeutic Procedures:  Tx Min Billable or 1:1 Min (if diff from Tx Min) Procedure, Rationale, Specifics   46 24 [id number] Therapeutic Exercise (timed):  increase ROM, strength, coordination, balance, and proprioception to improve patient's ability to progress to PLOF and address remaining functional goals. (see flow sheet as applicable)     Details if applicable:     12 12 32372 Manual Therapy (timed):  decrease pain, increase ROM, increase tissue extensibility, decrease edema, and decrease trigger points to improve patient's ability to progress to PLOF and address remaining functional goals.  The manual therapy interventions were performed at a separate and distinct time from the therapeutic activities interventions . (see flow sheet as applicable)     Details if applicable:    PROM into extension with

## 2025-03-25 ENCOUNTER — OFFICE VISIT (OUTPATIENT)
Age: 70
End: 2025-03-25

## 2025-03-25 ENCOUNTER — HOSPITAL ENCOUNTER (OUTPATIENT)
Facility: HOSPITAL | Age: 70
Setting detail: RECURRING SERIES
Discharge: HOME | End: 2025-03-28
Payer: MEDICARE

## 2025-03-25 DIAGNOSIS — E66.09 CLASS 1 OBESITY DUE TO EXCESS CALORIES WITH SERIOUS COMORBIDITY AND BODY MASS INDEX (BMI) OF 34.0 TO 34.9 IN ADULT: Primary | ICD-10-CM

## 2025-03-25 DIAGNOSIS — E66.811 CLASS 1 OBESITY DUE TO EXCESS CALORIES WITH SERIOUS COMORBIDITY AND BODY MASS INDEX (BMI) OF 34.0 TO 34.9 IN ADULT: Primary | ICD-10-CM

## 2025-03-25 PROCEDURE — 97110 THERAPEUTIC EXERCISES: CPT

## 2025-03-25 PROCEDURE — 97140 MANUAL THERAPY 1/> REGIONS: CPT

## 2025-03-25 NOTE — PROGRESS NOTES
[facility]  Metabolic Weight Loss Program        Patient's Name: Dena Arellano  : 1955    This patient is a participant at [facility] and attended the weekly virtual nutrition class hosted via [employer].      Lourdes Padilla, MS, RD, LDN

## 2025-03-25 NOTE — PROGRESS NOTES
PHYSICAL THERAPY - MEDICARE DAILY TREATMENT NOTE (updated 3/23)      Date: 3/25/2025          Patient Name:  Dena Arellano :  1955   Medical   Diagnosis:  Status post left knee replacement [Z96.652] Treatment Diagnosis:  M25.562 LEFT KNEE PAIN    Referral Source:  Caryn Boyle PA-C Insurance:   Payor: MEDICARE / Plan: MEDICARE PART A AND B / Product Type: *No Product type* /                     Patient  verified yes     Visit #   Current  / Total 5 24   Time   In / Out 706 752   Total Treatment Time 46   Total Timed Codes 46   1:1 Treatment Time 46      [facility] Totals Reminder:  bill using total billable   min of TIMED therapeutic procedures and modalities.   8-22 min = 1 unit; 23-37 min = 2 units; 38-52 min = 3 units; 53-67 min = 4 units; 68-82 min = 5 units            SUBJECTIVE    Pain Level (0-10 scale): 2    Any medication changes, allergies to medications, adverse drug reactions, diagnosis change, or new procedure performed?: [x] No    [] Yes (see summary sheet for update)  Medications: Verified on Patient Summary List    Subjective functional status/changes:     Pt reports that she is doing the exercises at least 2 times each day.  She feels that steps are getting easier but still a struggle on her steps at home that are taller than those here in the clinic.      OBJECTIVE      Therapeutic Procedures:  Tx Min Billable or 1:1 Min (if diff from Tx Min) Procedure, Rationale, Specifics   34 34 26700 Therapeutic Exercise (timed):  increase ROM, strength, coordination, balance, and proprioception to improve patient's ability to progress to PLOF and address remaining functional goals. (see flow sheet as applicable)     Details if applicable:      41082 Manual Therapy (timed):  decrease pain, increase ROM, increase tissue extensibility, decrease edema, and decrease trigger points to improve patient's ability to progress to PLOF and address remaining functional goals.  The manual therapy

## 2025-03-26 ENCOUNTER — OFFICE VISIT (OUTPATIENT)
Age: 70
End: 2025-03-26

## 2025-03-26 VITALS
RESPIRATION RATE: 16 BRPM | HEART RATE: 74 BPM | DIASTOLIC BLOOD PRESSURE: 62 MMHG | BODY MASS INDEX: 41.35 KG/M2 | SYSTOLIC BLOOD PRESSURE: 107 MMHG | WEIGHT: 280 LBS | TEMPERATURE: 98 F | OXYGEN SATURATION: 100 %

## 2025-03-26 DIAGNOSIS — N76.0 ABSCESS OF VAGINA: Primary | ICD-10-CM

## 2025-03-26 DIAGNOSIS — L03.90 CELLULITIS OF SKIN: ICD-10-CM

## 2025-03-26 RX ORDER — DOXYCYCLINE HYCLATE 100 MG
100 TABLET ORAL 2 TIMES DAILY
Qty: 20 TABLET | Refills: 0 | Status: SHIPPED | OUTPATIENT
Start: 2025-03-26 | End: 2025-04-05

## 2025-03-26 NOTE — PROGRESS NOTES
Subjective: (As above and below)     The patient/guardian gave verbal consent to treat.        Chief Complaint   Patient presents with    Cyst     Pt present for quarter size cyst on vaginal lip x 3 days        Dena Arellano is a 70 y.o. female who presents for evaluation of : Left labial cyst x 3 days.   HPI      Review of Systems    Review of Systems - negative except as listed above    Reviewed PmHx, RxHx, FmHx, SocHx, AllgHx and updated in chart.  Family History   Problem Relation Age of Onset    Hypertension Mother     Osteoarthritis Mother     Heart Disease Mother         valve disease,      Anesth Problems Mother         \"HARD TO WAKE UP\" PER PT    Osteoarthritis Father     Stroke Father         TIA's    Cancer Father         melanoma on back removed    Pacemaker Father 89    Heart Disease Father          97 yo in     Cancer Sister     No Known Problems Brother     Heart Attack Maternal Grandmother 75    Substance Abuse Daughter     Alcohol Abuse Daughter     Anxiety Disorder Daughter     Depression Daughter     Malig Hypertherm Neg Hx        Past Medical History:   Diagnosis Date    Adjustment disorder with mixed anxiety and depressed mood 2020    After death of father  and personal diagnosis of/treatment for breast cancer [id number]; Counselin:  [facility], [facility]     Ankle edema 2010    Benign essential hypertension 2016    2006     Cancer (HCC)     RT BREAST CANCER    Carpal tunnel syndrome 2010    Colon polyp 2010    Fibroids 2010    H/O bilateral mastectomy 2019    Double Mastectomy (right breast cancer and elective left breast prophylactically); No chemo, No radiation    Hypercholesterolemia 2013    Hypothyroidism 2016    Kidney stone 2010    Mixed hyperlipidemia 2020    MRSA (methicillin resistant Staphylococcus aureus) infection     LEFT BREAST    Perimenopausal

## 2025-03-26 NOTE — PROGRESS NOTES
Nurse note from patient's weekly / LCD / Maintenance class was reviewed.  Pertinent medical concerns were:   reviewed     BP Readings from Last 3 Encounters:   03/19/25 106/66   02/28/25 (!) 104/56   02/06/25 124/72       Failed to redirect to the Timeline version of the [email or web address] SmartLink.    Current Outpatient Medications   Medication Sig Dispense Refill    acetaminophen (TYLENOL) 500 MG tablet Take 2 tablets by mouth as needed for Pain      meloxicam (MOBIC) 7.5 MG tablet Take 1 tablet by mouth daily (Patient taking differently: Take 1 tablet by mouth as needed) 30 tablet 0    losartan (COZAAR) 100 MG tablet Take 1 tablet by mouth daily Hold if systolic blood pressure (top number) is less than 120. 90 tablet 3    Misc. Devices (CPAP MACHINE) MISC nightly      levothyroxine (SYNTHROID) 200 MCG tablet Take 1 tablet by mouth every morning (before breakfast) 90 tablet 2    buPROPion (WELLBUTRIN XL) 150 MG extended release tablet Take 1 tablet by mouth every morning (before breakfast) 90 tablet 3    FLUoxetine (PROZAC) 20 MG capsule Take 1 capsule by mouth daily 90 capsule 3    rosuvastatin (CRESTOR) 10 MG tablet Take 1 tablet by mouth nightly (Patient taking differently: Take 1 tablet by mouth daily) 90 tablet 3    fluticasone (FLONASE) 50 MCG/ACT nasal spray SHAKE BOTTLE AND USE 1 SPRAY IN EACH NOSTRIL EVERY DAY 48 g 1    Ascorbic Acid (VITAMIN C) 1000 MG tablet Take 1 tablet by mouth daily      Multiple Vitamin (MULTI VITAMIN PO) Take 1 tablet by mouth daily Centrum silver for women      TURMERIC PO Take 1 capsule by mouth daily      azelastine HCl 0.15 % SOLN 1 spray by Nasal route 2 times daily      cetirizine (ZYRTEC) 10 MG tablet Take 1 tablet by mouth daily      Cholecalciferol 50 MCG (2000 UT) TABS Take 1 tablet by mouth daily       No current facility-administered medications for this visit.

## 2025-03-27 ENCOUNTER — HOSPITAL ENCOUNTER (OUTPATIENT)
Facility: HOSPITAL | Age: 70
Setting detail: RECURRING SERIES
Discharge: HOME | End: 2025-03-30
Payer: MEDICARE

## 2025-03-27 PROCEDURE — 97110 THERAPEUTIC EXERCISES: CPT

## 2025-03-27 PROCEDURE — 97140 MANUAL THERAPY 1/> REGIONS: CPT

## 2025-03-27 NOTE — PROGRESS NOTES
PHYSICAL THERAPY - MEDICARE DAILY TREATMENT NOTE (updated 3/23)      Date: 3/27/2025          Patient Name:  Dena Arellano :  1955   Medical   Diagnosis:  Status post left knee replacement [Z96.652] Treatment Diagnosis:  M25.562 LEFT KNEE PAIN    Referral Source:  Caryn Boyle PA-C Insurance:   Payor: MEDICARE / Plan: MEDICARE PART A AND B / Product Type: *No Product type* /                     Patient  verified yes     Visit #   Current  / Total 6 24   Time   In / Out 807 903   Total Treatment Time 54   Total Timed Codes 54   1:1 Treatment Time 35      [facility] Totals Reminder:  bill using total billable   min of TIMED therapeutic procedures and modalities.   8-22 min = 1 unit; 23-37 min = 2 units; 38-52 min = 3 units; 53-67 min = 4 units; 68-82 min = 5 units            SUBJECTIVE    Pain Level (0-10 scale): 1-2    Any medication changes, allergies to medications, adverse drug reactions, diagnosis change, or new procedure performed?: [x] No    [] Yes (see summary sheet for update)  Medications: Verified on Patient Summary List    Subjective functional status/changes:     Patient noted they have an abscess on the inside of their groin that has been causing more pain/irritation than their knee overall. Patient noted their knee has been doing well though.    OBJECTIVE      Therapeutic Procedures:  Tx Min Billable or 1:1 Min (if diff from Tx Min) Procedure, Rationale, Specifics   44 [phone] [id number] Therapeutic Exercise (timed):  increase ROM, strength, coordination, balance, and proprioception to improve patient's ability to progress to PLOF and address remaining functional goals. (see flow sheet as applicable)     Details if applicable:     10 10 04387 Manual Therapy (timed):  decrease pain, increase ROM, increase tissue extensibility, decrease edema, and decrease trigger points to improve patient's ability to progress to PLOF and address remaining functional goals.  The manual therapy interventions were

## 2025-03-29 ENCOUNTER — RESULTS FOLLOW-UP (OUTPATIENT)
Age: 70
End: 2025-03-29

## 2025-03-29 LAB
BACTERIA SPEC CULT: ABNORMAL
GRAM STN SPEC: ABNORMAL
GRAM STN SPEC: ABNORMAL
SERVICE CMNT-IMP: ABNORMAL

## 2025-03-31 ENCOUNTER — HOSPITAL ENCOUNTER (OUTPATIENT)
Facility: HOSPITAL | Age: 70
Setting detail: RECURRING SERIES
Discharge: HOME | End: 2025-04-03
Payer: MEDICARE

## 2025-03-31 PROCEDURE — 97140 MANUAL THERAPY 1/> REGIONS: CPT

## 2025-03-31 PROCEDURE — 97110 THERAPEUTIC EXERCISES: CPT

## 2025-03-31 NOTE — PROGRESS NOTES
PHYSICAL THERAPY - MEDICARE DAILY TREATMENT NOTE (updated 3/23)      Date: 3/31/2025          Patient Name:  Dena Arellano :  1955   Medical   Diagnosis:  Status post left knee replacement [Z96.652] Treatment Diagnosis:  M25.562 LEFT KNEE PAIN    Referral Source:  Caryn Boyle PA-C Insurance:   Payor: MEDICARE / Plan: MEDICARE PART A AND B / Product Type: *No Product type* /                     Patient  verified yes     Visit #   Current  / Total 7 24   Time   In / Out 1201 103   Total Treatment Time 62   Total Timed Codes 62   1:1 Treatment Time 43      [facility] Totals Reminder:  bill using total billable   min of TIMED therapeutic procedures and modalities.   8-22 min = 1 unit; 23-37 min = 2 units; 38-52 min = 3 units; 53-67 min = 4 units; 68-82 min = 5 units            SUBJECTIVE    Pain Level (0-10 scale): 1-2    Any medication changes, allergies to medications, adverse drug reactions, diagnosis change, or new procedure performed?: [x] No    [] Yes (see summary sheet for update)  Medications: Verified on Patient Summary List    Subjective functional status/changes:     Patient noted they have been doing well overall and have continued to work on their exercises at home.     OBJECTIVE      Therapeutic Procedures:  Tx Min Billable or 1:1 Min (if diff from Tx Min) Procedure, Rationale, Specifics   52 [phone] [id number] Therapeutic Exercise (timed):  increase ROM, strength, coordination, balance, and proprioception to improve patient's ability to progress to PLOF and address remaining functional goals. (see flow sheet as applicable)     Details if applicable:     10 10 43946 Manual Therapy (timed):  decrease pain, increase ROM, increase tissue extensibility, decrease edema, and decrease trigger points to improve patient's ability to progress to PLOF and address remaining functional goals.  The manual therapy interventions were performed at a separate and distinct time from the therapeutic activities

## 2025-04-03 ENCOUNTER — HOSPITAL ENCOUNTER (OUTPATIENT)
Facility: HOSPITAL | Age: 70
Setting detail: RECURRING SERIES
Discharge: HOME | End: 2025-04-06
Payer: MEDICARE

## 2025-04-03 PROCEDURE — 97110 THERAPEUTIC EXERCISES: CPT

## 2025-04-03 PROCEDURE — 97140 MANUAL THERAPY 1/> REGIONS: CPT

## 2025-04-03 NOTE — PROGRESS NOTES
PHYSICAL THERAPY - MEDICARE DAILY TREATMENT NOTE (updated 3/23)      Date: 4/3/2025          Patient Name:  Dena Arellano :  1955   Medical   Diagnosis:  Status post left knee replacement [Z96.652] Treatment Diagnosis:  M25.562 LEFT KNEE PAIN    Referral Source:  Caryn Boyle PA-C Insurance:   Payor: MEDICARE / Plan: MEDICARE PART A AND B / Product Type: *No Product type* /                     Patient  verified yes     Visit #   Current  / Total 8 24   Time   In / Out 1007 1105   Total Treatment Time 58   Total Timed Codes 50   1:1 Treatment Time 50      [facility] Totals Reminder:  bill using total billable   min of TIMED therapeutic procedures and modalities.   8-22 min = 1 unit; 23-37 min = 2 units; 38-52 min = 3 units; 53-67 min = 4 units; 68-82 min = 5 units            SUBJECTIVE    Pain Level (0-10 scale): 2-3    Any medication changes, allergies to medications, adverse drug reactions, diagnosis change, or new procedure performed?: [x] No    [] Yes (see summary sheet for update)  Medications: Verified on Patient Summary List    Subjective functional status/changes:     Pt reports today is the stiffest it has been      OBJECTIVE      Therapeutic Procedures:  Tx Min Billable or 1:1 Min (if diff from Tx Min) Procedure, Rationale, Specifics   48 40 [id number] Therapeutic Exercise (timed):  increase ROM, strength, coordination, balance, and proprioception to improve patient's ability to progress to PLOF and address remaining functional goals. (see flow sheet as applicable)     Details if applicable:     10 10 34366 Manual Therapy (timed):  decrease pain, increase ROM, increase tissue extensibility, decrease edema, and decrease trigger points to improve patient's ability to progress to PLOF and address remaining functional goals.  The manual therapy interventions were performed at a separate and distinct time from the therapeutic activities interventions . (see flow sheet as applicable)     Details if

## 2025-04-07 ENCOUNTER — HOSPITAL ENCOUNTER (OUTPATIENT)
Facility: HOSPITAL | Age: 70
Setting detail: RECURRING SERIES
Discharge: HOME | End: 2025-04-10
Payer: MEDICARE

## 2025-04-07 PROCEDURE — 97140 MANUAL THERAPY 1/> REGIONS: CPT

## 2025-04-07 PROCEDURE — 97110 THERAPEUTIC EXERCISES: CPT

## 2025-04-07 NOTE — PROGRESS NOTES
PHYSICAL THERAPY - MEDICARE DAILY TREATMENT NOTE (updated 3/23)      Date: 2025          Patient Name:  Dena Arellano :  1955   Medical   Diagnosis:  Status post left knee replacement [Z96.652] Treatment Diagnosis:  M25.562 LEFT KNEE PAIN    Referral Source:  Caryn Boyle PA-C Insurance:   Payor: MEDICARE / Plan: MEDICARE PART A AND B / Product Type: *No Product type* /                     Patient  verified yes     Visit #   Current  / Total 9 24   Time   In / Out 200 249   Total Treatment Time 49   Total Timed Codes 49   1:1 Treatment Time 34      [facility] Totals Reminder:  bill using total billable   min of TIMED therapeutic procedures and modalities.   8-22 min = 1 unit; 23-37 min = 2 units; 38-52 min = 3 units; 53-67 min = 4 units; 68-82 min = 5 units            SUBJECTIVE    Pain Level (0-10 scale): 2-3    Any medication changes, allergies to medications, adverse drug reactions, diagnosis change, or new procedure performed?: [x] No    [] Yes (see summary sheet for update)  Medications: Verified on Patient Summary List    Subjective functional status/changes:     No new reports today     OBJECTIVE      Therapeutic Procedures:  Tx Min Billable or 1:1 Min (if diff from Tx Min) Procedure, Rationale, Specifics   39 25 [id number] Therapeutic Exercise (timed):  increase ROM, strength, coordination, balance, and proprioception to improve patient's ability to progress to PLOF and address remaining functional goals. (see flow sheet as applicable)     Details if applicable:     10 10 02773 Manual Therapy (timed):  decrease pain, increase ROM, increase tissue extensibility, decrease edema, and decrease trigger points to improve patient's ability to progress to PLOF and address remaining functional goals.  The manual therapy interventions were performed at a separate and distinct time from the therapeutic activities interventions . (see flow sheet as applicable)     Details if applicable:    PROM into

## 2025-04-08 ENCOUNTER — OFFICE VISIT (OUTPATIENT)
Age: 70
End: 2025-04-08
Payer: MEDICARE

## 2025-04-08 VITALS
HEIGHT: 69 IN | BODY MASS INDEX: 34.17 KG/M2 | DIASTOLIC BLOOD PRESSURE: 62 MMHG | OXYGEN SATURATION: 99 % | WEIGHT: 230.7 LBS | TEMPERATURE: 97.8 F | RESPIRATION RATE: 20 BRPM | HEART RATE: 73 BPM | SYSTOLIC BLOOD PRESSURE: 94 MMHG

## 2025-04-08 DIAGNOSIS — E66.09 CLASS 1 OBESITY DUE TO EXCESS CALORIES WITH SERIOUS COMORBIDITY AND BODY MASS INDEX (BMI) OF 34.0 TO 34.9 IN ADULT: Primary | ICD-10-CM

## 2025-04-08 DIAGNOSIS — R73.9 BLOOD GLUCOSE ELEVATED: ICD-10-CM

## 2025-04-08 DIAGNOSIS — E66.811 CLASS 1 OBESITY DUE TO EXCESS CALORIES WITH SERIOUS COMORBIDITY AND BODY MASS INDEX (BMI) OF 34.0 TO 34.9 IN ADULT: Primary | ICD-10-CM

## 2025-04-08 DIAGNOSIS — E03.9 ACQUIRED HYPOTHYROIDISM: ICD-10-CM

## 2025-04-08 DIAGNOSIS — I10 BENIGN ESSENTIAL HYPERTENSION: ICD-10-CM

## 2025-04-08 DIAGNOSIS — G47.33 OBSTRUCTIVE SLEEP APNEA SYNDROME: ICD-10-CM

## 2025-04-08 DIAGNOSIS — E78.2 MIXED HYPERLIPIDEMIA: ICD-10-CM

## 2025-04-08 PROCEDURE — G8417 CALC BMI ABV UP PARAM F/U: HCPCS | Performed by: FAMILY MEDICINE

## 2025-04-08 PROCEDURE — 1123F ACP DISCUSS/DSCN MKR DOCD: CPT | Performed by: FAMILY MEDICINE

## 2025-04-08 PROCEDURE — 3017F COLORECTAL CA SCREEN DOC REV: CPT | Performed by: FAMILY MEDICINE

## 2025-04-08 PROCEDURE — 1036F TOBACCO NON-USER: CPT | Performed by: FAMILY MEDICINE

## 2025-04-08 PROCEDURE — G8427 DOCREV CUR MEDS BY ELIG CLIN: HCPCS | Performed by: FAMILY MEDICINE

## 2025-04-08 PROCEDURE — G8399 PT W/DXA RESULTS DOCUMENT: HCPCS | Performed by: FAMILY MEDICINE

## 2025-04-08 PROCEDURE — 3074F SYST BP LT 130 MM HG: CPT | Performed by: FAMILY MEDICINE

## 2025-04-08 PROCEDURE — 99214 OFFICE O/P EST MOD 30 MIN: CPT | Performed by: FAMILY MEDICINE

## 2025-04-08 PROCEDURE — 1125F AMNT PAIN NOTED PAIN PRSNT: CPT | Performed by: FAMILY MEDICINE

## 2025-04-08 PROCEDURE — 1090F PRES/ABSN URINE INCON ASSESS: CPT | Performed by: FAMILY MEDICINE

## 2025-04-08 PROCEDURE — 3078F DIAST BP <80 MM HG: CPT | Performed by: FAMILY MEDICINE

## 2025-04-08 ASSESSMENT — PATIENT HEALTH QUESTIONNAIRE - PHQ9
SUM OF ALL RESPONSES TO PHQ QUESTIONS 1-9: 0
1. LITTLE INTEREST OR PLEASURE IN DOING THINGS: NOT AT ALL
SUM OF ALL RESPONSES TO PHQ QUESTIONS 1-9: 0
2. FEELING DOWN, DEPRESSED OR HOPELESS: NOT AT ALL
SUM OF ALL RESPONSES TO PHQ QUESTIONS 1-9: 0
SUM OF ALL RESPONSES TO PHQ QUESTIONS 1-9: 0

## 2025-04-08 NOTE — PROGRESS NOTES
Identified pt with two pt identifiers (name and ). Reviewed chart in preparation for visit and have obtained necessary documentation.    Dena Arellano is a 70 y.o. female  Chief Complaint   Patient presents with    Weight Management     1 month follow up     BP 94/62 (BP Site: Right Upper Arm, Patient Position: Sitting, BP Cuff Size: Large Adult) Comment: manual  Pulse 73   Temp 97.8 °F (36.6 °C) (Oral)   Resp 20   Ht 1.753 m (5' 9\")   Wt 104.6 kg (230 lb 11.2 oz)   LMP  (LMP Unknown)   SpO2 99%   BMI 34.07 kg/m²     1. Have you been to the ER, urgent care clinic since your last visit?  Hospitalized since your last visit?yes - Urgent Care      2. Have you seen or consulted any other health care providers outside of the [facility] since your last visit?  Include any pap smears or colon screening. No    BMI - 33.8    Patient entered their homework via the [employer] mona.    
scleral icterus  Neck: no bruit or JVD  CV: RRR no M/R/G  Lung: clear, No W/R  ABD: soft, active, nontender  Ext:  no Edema  Neuro: nonfocal          Assessment / Plan    Dena Arellano was seen today for Weight Management (1 month follow up)       1. Class 1 obesity due to excess calories with serious comorbidity and body mass index (BMI) of 34.0 to 34.9 in adult  Movement  limited by ortho  Meds contnue wellbutrin  mg a day    Eating plan  Continue the low edwin eating plan using 2 robard meal replacements and one low carb meal  Sleep continue cpap 7-8 hrs      2. Benign essential hypertension  Losartan 100 mg a day  Good control of bp. No change needed  3. Obstructive sleep apnea syndrome  Using cpap and at goal  4. Blood glucose elevated    A1c is 5.2  Lifestyle change is the treatment plan   5. Acquired hypothyroidism  Continue Levothyroxine 200 mcg a day     6. Mixed hyperlipidemia     Lipid panel is now normal  Crestor 10 mg a day  1.  Weight management improved   Progress was reviewed with patient    2.  Labs    Latest results reviewed with patient       face to face time with Dena consisted of counseling & coordinating and/or discussing treatment plans in reference to her obesity The primary encounter diagnosis was Class 1 obesity due to excess calories with serious comorbidity and body mass index (BMI) of 34.0 to 34.9 in adult. Diagnoses of Benign essential hypertension, Obstructive sleep apnea syndrome, Blood glucose elevated, Acquired hypothyroidism, and Mixed hyperlipidemia were also pertinent to this visit.

## 2025-04-10 ENCOUNTER — OFFICE VISIT (OUTPATIENT)
Age: 70
End: 2025-04-10
Payer: MEDICARE

## 2025-04-10 VITALS
HEART RATE: 65 BPM | OXYGEN SATURATION: 98 % | HEIGHT: 69 IN | BODY MASS INDEX: 33.86 KG/M2 | TEMPERATURE: 97.1 F | SYSTOLIC BLOOD PRESSURE: 102 MMHG | RESPIRATION RATE: 14 BRPM | WEIGHT: 228.6 LBS | DIASTOLIC BLOOD PRESSURE: 58 MMHG

## 2025-04-10 DIAGNOSIS — E03.9 ACQUIRED HYPOTHYROIDISM: ICD-10-CM

## 2025-04-10 DIAGNOSIS — E78.2 MIXED HYPERLIPIDEMIA: ICD-10-CM

## 2025-04-10 DIAGNOSIS — R73.03 PREDIABETES: ICD-10-CM

## 2025-04-10 DIAGNOSIS — I10 BENIGN ESSENTIAL HYPERTENSION: Primary | ICD-10-CM

## 2025-04-10 PROBLEM — Z85.3 HISTORY OF RIGHT BREAST CANCER: Status: ACTIVE | Noted: 2019-08-27

## 2025-04-10 PROCEDURE — 1036F TOBACCO NON-USER: CPT | Performed by: FAMILY MEDICINE

## 2025-04-10 PROCEDURE — 3017F COLORECTAL CA SCREEN DOC REV: CPT | Performed by: FAMILY MEDICINE

## 2025-04-10 PROCEDURE — 1126F AMNT PAIN NOTED NONE PRSNT: CPT | Performed by: FAMILY MEDICINE

## 2025-04-10 PROCEDURE — 1090F PRES/ABSN URINE INCON ASSESS: CPT | Performed by: FAMILY MEDICINE

## 2025-04-10 PROCEDURE — G8417 CALC BMI ABV UP PARAM F/U: HCPCS | Performed by: FAMILY MEDICINE

## 2025-04-10 PROCEDURE — G8399 PT W/DXA RESULTS DOCUMENT: HCPCS | Performed by: FAMILY MEDICINE

## 2025-04-10 PROCEDURE — 99214 OFFICE O/P EST MOD 30 MIN: CPT | Performed by: FAMILY MEDICINE

## 2025-04-10 PROCEDURE — G8427 DOCREV CUR MEDS BY ELIG CLIN: HCPCS | Performed by: FAMILY MEDICINE

## 2025-04-10 PROCEDURE — 3078F DIAST BP <80 MM HG: CPT | Performed by: FAMILY MEDICINE

## 2025-04-10 PROCEDURE — 3074F SYST BP LT 130 MM HG: CPT | Performed by: FAMILY MEDICINE

## 2025-04-10 PROCEDURE — 1159F MED LIST DOCD IN RCRD: CPT | Performed by: FAMILY MEDICINE

## 2025-04-10 PROCEDURE — 1160F RVW MEDS BY RX/DR IN RCRD: CPT | Performed by: FAMILY MEDICINE

## 2025-04-10 PROCEDURE — 1123F ACP DISCUSS/DSCN MKR DOCD: CPT | Performed by: FAMILY MEDICINE

## 2025-04-10 ASSESSMENT — ENCOUNTER SYMPTOMS
RESPIRATORY NEGATIVE: 1
GASTROINTESTINAL NEGATIVE: 1

## 2025-04-10 NOTE — PROGRESS NOTES
`  Chief Complaint   Patient presents with    Medication Check         \"Have you been to the ER, urgent care clinic since your last visit?  Hospitalized since your last visit?\"    Yes, 3/27/25 for cyst in the private area.    “Have you seen or consulted any other health care providers outside of [facility] since your last visit?”    NO            Click Here for Release of Records Request           4/8/2025     8:59 AM   PHQ-9    Little interest or pleasure in doing things 0   Feeling down, depressed, or hopeless 0   PHQ-2 Score 0   PHQ-9 Total Score 0           Financial Resource Strain: Low Risk  (10/20/2023)    Overall Financial Resource Strain (CARDIA)     Difficulty of Paying Living Expenses: Not very hard      Food Insecurity: No Food Insecurity (1/13/2025)    Hunger Vital Sign     Worried About Running Out of Food in the Last Year: Never true     Ran Out of Food in the Last Year: Never true          Health Maintenance Due   Topic Date Due    COVID-19 Vaccine (12 - 2024-25 season) 10/30/2024       
few months then reassess with dietary modifications alone. Will have fasting labs done in 2-3 months either w/ me or at one of her checkups w/ Dr. Rowley this summer    3. Prediabetes  -Improved and normalized w/ dietary modifications and weight reduction  -Most recent Hgba1c normal at 5.2 in 1/2025    4. BMI 33.0-33.9,adult  -Commended on progress through weight management program thus far    5. Acquired hypothyroidism  -Clinically stable on current regimen of Synthroid 200 mcg qam but may eventually require dose adjustments as she continues to lose weight w/ wt loss program  -Most recent TSH normal 9/2024    She is already scheduled for her Medicare AWV w/ me in  and we can reassess again at that time as well. Otherwise I will be continuing to monitor her progress at [facility] q 6 weeks [facility]. She is having labs monitored there as well.

## 2025-04-11 ENCOUNTER — HOSPITAL ENCOUNTER (OUTPATIENT)
Facility: HOSPITAL | Age: 70
Setting detail: RECURRING SERIES
Discharge: HOME | End: 2025-04-14
Payer: MEDICARE

## 2025-04-11 PROCEDURE — 97110 THERAPEUTIC EXERCISES: CPT

## 2025-04-11 PROCEDURE — 97140 MANUAL THERAPY 1/> REGIONS: CPT

## 2025-04-11 NOTE — PROGRESS NOTES
PHYSICAL THERAPY - MEDICARE DAILY TREATMENT NOTE (updated 3/23)      Date: 2025          Patient Name:  Dena Arellano :  1955   Medical   Diagnosis:  Status post left knee replacement [Z96.652] Treatment Diagnosis:  M25.562 LEFT KNEE PAIN    Referral Source:  Caryn Boyle PA-C Insurance:   Payor: MEDICARE / Plan: MEDICARE PART A AND B / Product Type: *No Product type* /                     Patient  verified yes     Visit #   Current  / Total 10 24   Time   In / Out 836 941   Total Treatment Time 65   Total Timed Codes 65   1:1 Treatment Time 28      [facility] Totals Reminder:  bill using total billable   min of TIMED therapeutic procedures and modalities.   8-22 min = 1 unit; 23-37 min = 2 units; 38-52 min = 3 units; 53-67 min = 4 units; 68-82 min = 5 units            SUBJECTIVE    Pain Level (0-10 scale): 2    Any medication changes, allergies to medications, adverse drug reactions, diagnosis change, or new procedure performed?: [x] No    [] Yes (see summary sheet for update)  Medications: Verified on Patient Summary List    Subjective functional status/changes:     Patient noted they have been doing alright overall but continues to note \"more stiffness than I would like,\" but did note it has been improving and getting better. Patient noted that after about 30 minutes of being seated they will note some increased stiffness. Patient noted their noted their pain has ranged from 0-3/10 in the last week, but does note a little more increased irritation during some of their exercises. Patient also noted their sleep has been improved a lot, able to now sleep on their side pretty well. Patient also noted they have had about an 40% overall improvement since their initial treatment session.     OBJECTIVE      Therapeutic Procedures:  Tx Min Billable or 1:1 Min (if diff from Tx Min) Procedure, Rationale, Specifics   55 18 63375 Therapeutic Exercise (timed):  increase ROM, strength, coordination, balance,

## 2025-04-14 ENCOUNTER — HOSPITAL ENCOUNTER (OUTPATIENT)
Facility: HOSPITAL | Age: 70
Setting detail: RECURRING SERIES
Discharge: HOME | End: 2025-04-17
Payer: MEDICARE

## 2025-04-14 PROCEDURE — 97110 THERAPEUTIC EXERCISES: CPT

## 2025-04-14 PROCEDURE — 97140 MANUAL THERAPY 1/> REGIONS: CPT

## 2025-04-15 ENCOUNTER — OFFICE VISIT (OUTPATIENT)
Age: 70
End: 2025-04-15

## 2025-04-15 DIAGNOSIS — E66.811 CLASS 1 OBESITY DUE TO EXCESS CALORIES WITH SERIOUS COMORBIDITY AND BODY MASS INDEX (BMI) OF 34.0 TO 34.9 IN ADULT: Primary | ICD-10-CM

## 2025-04-15 DIAGNOSIS — E66.09 CLASS 1 OBESITY DUE TO EXCESS CALORIES WITH SERIOUS COMORBIDITY AND BODY MASS INDEX (BMI) OF 34.0 TO 34.9 IN ADULT: Primary | ICD-10-CM

## 2025-04-17 ENCOUNTER — HOSPITAL ENCOUNTER (OUTPATIENT)
Facility: HOSPITAL | Age: 70
Setting detail: RECURRING SERIES
Discharge: HOME | End: 2025-04-20
Payer: MEDICARE

## 2025-04-17 PROCEDURE — 97110 THERAPEUTIC EXERCISES: CPT

## 2025-04-17 PROCEDURE — 97140 MANUAL THERAPY 1/> REGIONS: CPT

## 2025-04-17 NOTE — PROGRESS NOTES
PHYSICAL THERAPY - MEDICARE DAILY TREATMENT NOTE (updated 3/23)      Date: 2025          Patient Name:  Dena Arellano :  1955   Medical   Diagnosis:  Status post left knee replacement [Z96.652] Treatment Diagnosis:  M25.562 LEFT KNEE PAIN    Referral Source:  Caryn Boyle PA-C Insurance:   Payor: MEDICARE / Plan: MEDICARE PART A AND B / Product Type: *No Product type* /                     Patient  verified yes     Visit #   Current  / Total 12 24   Time   In / Out 1004 1056   Total Treatment Time 52   Total Timed Codes 52   1:1 Treatment Time 46      [facility] Totals Reminder:  bill using total billable   min of TIMED therapeutic procedures and modalities.   8-22 min = 1 unit; 23-37 min = 2 units; 38-52 min = 3 units; 53-67 min = 4 units; 68-82 min = 5 units            SUBJECTIVE    Pain Level (0-10 scale): 1-2    Any medication changes, allergies to medications, adverse drug reactions, diagnosis change, or new procedure performed?: [x] No    [] Yes (see summary sheet for update)  Medications: Verified on Patient Summary List    Subjective functional status/changes:     No new reports    OBJECTIVE      Therapeutic Procedures:  Tx Min Billable or 1:1 Min (if diff from Tx Min) Procedure, Rationale, Specifics   42 32 [id number] Therapeutic Exercise (timed):  increase ROM, strength, coordination, balance, and proprioception to improve patient's ability to progress to PLOF and address remaining functional goals. (see flow sheet as applicable)     Details if applicable:     10 10 76328 Manual Therapy (timed):  decrease pain, increase ROM, increase tissue extensibility, decrease edema, and decrease trigger points to improve patient's ability to progress to PLOF and address remaining functional goals.  The manual therapy interventions were performed at a separate and distinct time from the therapeutic activities interventions . (see flow sheet as applicable)     Details if applicable:    PROM into extension

## 2025-04-17 NOTE — PROGRESS NOTES
[facility] Physical Therapy  [address]  [place] [address]  Phone: [phone]   Fax: [phone]     PHYSICAL THERAPY PROGRESS NOTE  Patient Name:  Dena Arellano :  1955   Treatment/Medical Diagnosis: Status post left knee replacement [Z96.652]   Referral Source:  Caryn Boyle PA-C     Date of Initial Visit:  3/11/25 Attended Visits:  12 Missed Visits:  0     SUMMARY OF TREATMENT/ASSESSMENT:  Patient is a 70 year old being seen for L knee pain s/p left TKR (2025). Patient has been seen for 10 visits and has made improvements with knee ROM and subjective improvements. Patient demonstrated improved knee ROM into knee flexion (L = 104 degrees v L = 95 degrees previously) during today's reassessment, although noting similar extension ROM. Patient noted they feel about 40% improvement since their initial treatment session, noting they are able to remain in seated positions for about 30 minutes but will continue to note increased amounts of stiffness. Patient has noted their pain has ranged 0-3/10 in the last week and are now able to sleep with less irritation. Patient has achieved 3/9 goals set for treatment with progress being made towards final goals for rehab.     CURRENT STATUS/GOALS:  Right Knee ROM:AROM    Left Knee ROM:AROM 6-104     PROM 112    Short Term Goals: To be accomplished in 8-10 treatments.  Pt will be consistent and demonstrate I with HEP MET  Pt will complain of pain 0-1/10 with all activity Progressing Toward  Pt will demonstrate improved ROM to complete all ADL's and household chores more efficiently Progressing Toward  Pt will be able to maintain positions for 45min without increased symptoms Progressing Toward     Long Term Goals: To be accomplished in 20-24 treatments.  Pt will complain of pain 0-1/10 with all activity Progressing Toward  Pt will increase strength to stabilize the knee and

## 2025-04-21 ENCOUNTER — HOSPITAL ENCOUNTER (OUTPATIENT)
Facility: HOSPITAL | Age: 70
Setting detail: RECURRING SERIES
Discharge: HOME | End: 2025-04-24
Payer: MEDICARE

## 2025-04-21 PROCEDURE — 97110 THERAPEUTIC EXERCISES: CPT

## 2025-04-21 NOTE — PROGRESS NOTES
PHYSICAL THERAPY - MEDICARE DAILY TREATMENT NOTE (updated 3/23)      Date: 2025          Patient Name:  Dena Arellano :  1955   Medical   Diagnosis:  Status post left knee replacement [Z96.652] Treatment Diagnosis:  M25.562 LEFT KNEE PAIN    Referral Source:  Caryn Boyle PA-C Insurance:   Payor: MEDICARE / Plan: MEDICARE PART A AND B / Product Type: *No Product type* /                     Patient  verified yes     Visit #   Current  / Total 13 24   Time   In / Out 100 142   Total Treatment Time 42   Total Timed Codes 42   1:1 Treatment Time 42      [facility] Totals Reminder:  bill using total billable   min of TIMED therapeutic procedures and modalities.   8-22 min = 1 unit; 23-37 min = 2 units; 38-52 min = 3 units; 53-67 min = 4 units; 68-82 min = 5 units            SUBJECTIVE    Pain Level (0-10 scale): 1-2    Any medication changes, allergies to medications, adverse drug reactions, diagnosis change, or new procedure performed?: [x] No    [] Yes (see summary sheet for update)  Medications: Verified on Patient Summary List    Subjective functional status/changes:     Pt reports that she is doing well.      OBJECTIVE      Therapeutic Procedures:  Tx Min Billable or 1:1 Min (if diff from Tx Min) Procedure, Rationale, Specifics   42 2 24744 Therapeutic Exercise (timed):  increase ROM, strength, coordination, balance, and proprioception to improve patient's ability to progress to PLOF and address remaining functional goals. (see flow sheet as applicable)     Details if applicable:       48495 Manual Therapy (timed):  decrease pain, increase ROM, increase tissue extensibility, decrease edema, and decrease trigger points to improve patient's ability to progress to PLOF and address remaining functional goals.  The manual therapy interventions were performed at a separate and distinct time from the therapeutic activities interventions . (see flow sheet as applicable)     Details if applicable:    PROM

## 2025-04-22 ENCOUNTER — OFFICE VISIT (OUTPATIENT)
Age: 70
End: 2025-04-22

## 2025-04-22 DIAGNOSIS — E66.09 CLASS 1 OBESITY DUE TO EXCESS CALORIES WITH SERIOUS COMORBIDITY AND BODY MASS INDEX (BMI) OF 34.0 TO 34.9 IN ADULT: Primary | ICD-10-CM

## 2025-04-22 DIAGNOSIS — E66.811 CLASS 1 OBESITY DUE TO EXCESS CALORIES WITH SERIOUS COMORBIDITY AND BODY MASS INDEX (BMI) OF 34.0 TO 34.9 IN ADULT: Primary | ICD-10-CM

## 2025-04-22 NOTE — PROGRESS NOTES
[facility]  Metabolic Weight Loss Program        Patient's Name: Dena Arellano  : 1955    This patient is a participant at [facility] and attended the weekly virtual nutrition class hosted via [employer].      Lourdes Padilla, MS, RD, LDN

## 2025-04-24 ENCOUNTER — HOSPITAL ENCOUNTER (OUTPATIENT)
Facility: HOSPITAL | Age: 70
Setting detail: RECURRING SERIES
Discharge: HOME | End: 2025-04-27
Payer: MEDICARE

## 2025-04-24 PROCEDURE — 97110 THERAPEUTIC EXERCISES: CPT

## 2025-04-24 NOTE — PROGRESS NOTES
[facility]  Metabolic Weight Loss Program        Patient's Name: Dena Arellano  : 1955    This patient is a participant at [facility] and attended the weekly virtual nutrition class hosted via [employer].      Lourdes Padilla, MS, RD, LDN

## 2025-04-24 NOTE — PROGRESS NOTES
PHYSICAL THERAPY - MEDICARE DAILY TREATMENT NOTE (updated 3/23)      Date: 2025          Patient Name:  Dena Arellano :  1955   Medical   Diagnosis:  Status post left knee replacement [Z96.652] Treatment Diagnosis:  M25.562 LEFT KNEE PAIN    Referral Source:  Caryn Boyle PA-C Insurance:   Payor: MEDICARE / Plan: MEDICARE PART A AND B / Product Type: *No Product type* /                     Patient  verified yes     Visit #   Current  / Total 19 24   Time   In / Out 1003 1053   Total Treatment Time 50   Total Timed Codes 50   1:1 Treatment Time 50      [facility] Totals Reminder:  bill using total billable   min of TIMED therapeutic procedures and modalities.   8-22 min = 1 unit; 23-37 min = 2 units; 38-52 min = 3 units; 53-67 min = 4 units; 68-82 min = 5 units            SUBJECTIVE    Pain Level (0-10 scale): .5    Any medication changes, allergies to medications, adverse drug reactions, diagnosis change, or new procedure performed?: [x] No    [] Yes (see summary sheet for update)  Medications: Verified on Patient Summary List    Subjective functional status/changes:     Pt reports that she wants to keep strengthening and maybe be able to get up and down form the floor better     OBJECTIVE      Therapeutic Procedures:  Tx Min Billable or 1:1 Min (if diff from Tx Min) Procedure, Rationale, Specifics   50 50 21910 Therapeutic Exercise (timed):  increase ROM, strength, coordination, balance, and proprioception to improve patient's ability to progress to PLOF and address remaining functional goals. (see flow sheet as applicable)     Details if applicable:       24031 Manual Therapy (timed):  decrease pain, increase ROM, increase tissue extensibility, decrease edema, and decrease trigger points to improve patient's ability to progress to PLOF and address remaining functional goals.  The manual therapy interventions were performed at a separate and distinct time from the therapeutic activities

## 2025-04-28 ENCOUNTER — HOSPITAL ENCOUNTER (OUTPATIENT)
Facility: HOSPITAL | Age: 70
Setting detail: RECURRING SERIES
Discharge: HOME | End: 2025-05-01
Payer: MEDICARE

## 2025-04-28 PROCEDURE — 97110 THERAPEUTIC EXERCISES: CPT

## 2025-04-28 NOTE — PROGRESS NOTES
PHYSICAL THERAPY - MEDICARE DAILY TREATMENT NOTE (updated 3/23)      Date: 2025          Patient Name:  Dena Arellano :  1955   Medical   Diagnosis:  Status post left knee replacement [Z96.652] Treatment Diagnosis:  M25.562 LEFT KNEE PAIN    Referral Source:  Caryn Boyle PA-C Insurance:   Payor: MEDICARE / Plan: MEDICARE PART A AND B / Product Type: *No Product type* /                     Patient  verified yes     Visit #   Current  / Total 15 24   Time   In / Out 135 220   Total Treatment Time 45   Total Timed Codes 45   1:1 Treatment Time 45      [facility] Totals Reminder:  bill using total billable   min of TIMED therapeutic procedures and modalities.   8-22 min = 1 unit; 23-37 min = 2 units; 38-52 min = 3 units; 53-67 min = 4 units; 68-82 min = 5 units            SUBJECTIVE    Pain Level (0-10 scale): 0    Any medication changes, allergies to medications, adverse drug reactions, diagnosis change, or new procedure performed?: [x] No    [] Yes (see summary sheet for update)  Medications: Verified on Patient Summary List    Subjective functional status/changes:     Pt reports that she is feeling better and starting to tolerate more activity.     OBJECTIVE      Therapeutic Procedures:  Tx Min Billable or 1:1 Min (if diff from Tx Min) Procedure, Rationale, Specifics   45 45 [id number] Therapeutic Exercise (timed):  increase ROM, strength, coordination, balance, and proprioception to improve patient's ability to progress to PLOF and address remaining functional goals. (see flow sheet as applicable)     Details if applicable:       49313 Manual Therapy (timed):  decrease pain, increase ROM, increase tissue extensibility, decrease edema, and decrease trigger points to improve patient's ability to progress to PLOF and address remaining functional goals.  The manual therapy interventions were performed at a separate and distinct time from the therapeutic activities interventions . (see flow sheet as

## 2025-04-29 ENCOUNTER — OFFICE VISIT (OUTPATIENT)
Age: 70
End: 2025-04-29

## 2025-04-29 DIAGNOSIS — E66.811 CLASS 1 OBESITY DUE TO EXCESS CALORIES WITH SERIOUS COMORBIDITY AND BODY MASS INDEX (BMI) OF 34.0 TO 34.9 IN ADULT: Primary | ICD-10-CM

## 2025-04-29 DIAGNOSIS — E66.09 CLASS 1 OBESITY DUE TO EXCESS CALORIES WITH SERIOUS COMORBIDITY AND BODY MASS INDEX (BMI) OF 34.0 TO 34.9 IN ADULT: Primary | ICD-10-CM

## 2025-04-30 ENCOUNTER — CLINICAL SUPPORT (OUTPATIENT)
Age: 70
End: 2025-04-30

## 2025-04-30 VITALS
TEMPERATURE: 98.2 F | RESPIRATION RATE: 20 BRPM | HEIGHT: 69 IN | HEART RATE: 66 BPM | SYSTOLIC BLOOD PRESSURE: 112 MMHG | OXYGEN SATURATION: 97 % | DIASTOLIC BLOOD PRESSURE: 64 MMHG | BODY MASS INDEX: 33.44 KG/M2 | WEIGHT: 225.8 LBS

## 2025-04-30 DIAGNOSIS — E66.09 CLASS 1 OBESITY DUE TO EXCESS CALORIES WITH SERIOUS COMORBIDITY AND BODY MASS INDEX (BMI) OF 33.0 TO 33.9 IN ADULT: Primary | ICD-10-CM

## 2025-04-30 DIAGNOSIS — I10 BENIGN ESSENTIAL HYPERTENSION: ICD-10-CM

## 2025-04-30 DIAGNOSIS — R73.9 BLOOD GLUCOSE ELEVATED: ICD-10-CM

## 2025-04-30 DIAGNOSIS — E78.2 MIXED HYPERLIPIDEMIA: ICD-10-CM

## 2025-04-30 DIAGNOSIS — E66.811 CLASS 1 OBESITY DUE TO EXCESS CALORIES WITH SERIOUS COMORBIDITY AND BODY MASS INDEX (BMI) OF 33.0 TO 33.9 IN ADULT: Primary | ICD-10-CM

## 2025-04-30 DIAGNOSIS — G47.33 OBSTRUCTIVE SLEEP APNEA SYNDROME: ICD-10-CM

## 2025-04-30 DIAGNOSIS — E03.9 ACQUIRED HYPOTHYROIDISM: ICD-10-CM

## 2025-04-30 ASSESSMENT — PATIENT HEALTH QUESTIONNAIRE - PHQ9
SUM OF ALL RESPONSES TO PHQ QUESTIONS 1-9: 0
SUM OF ALL RESPONSES TO PHQ QUESTIONS 1-9: 0
2. FEELING DOWN, DEPRESSED OR HOPELESS: NOT AT ALL
1. LITTLE INTEREST OR PLEASURE IN DOING THINGS: NOT AT ALL
SUM OF ALL RESPONSES TO PHQ QUESTIONS 1-9: 0
SUM OF ALL RESPONSES TO PHQ QUESTIONS 1-9: 0

## 2025-04-30 NOTE — PROGRESS NOTES
[facility]  Metabolic Weight Loss Program        Patient's Name: Dena Arellano  : 1955    This patient is a participant at [facility] and attended the weekly virtual nutrition class hosted via [employer].      Lourdes Padilla, MS, RD, LDN

## 2025-04-30 NOTE — PROGRESS NOTES
Identified pt with two pt identifiers (name and ). Reviewed chart in preparation for visit and have obtained necessary documentation.    Dena Arellano is a 70 y.o. female  Chief Complaint   Patient presents with    Weight Management     /64 (BP Site: Right Upper Arm, Patient Position: Sitting, BP Cuff Size: Large Adult) Comment: manual  Pulse 66   Temp 98.2 °F (36.8 °C) (Oral)   Resp 20   Ht 1.753 m (5' 9\")   Wt 102.4 kg (225 lb 12.8 oz)   LMP  (LMP Unknown)   SpO2 97%   BMI 33.34 kg/m²     1. Have you been to the ER, urgent care clinic since your last visit?  Hospitalized since your last visit?no    2. Have you seen or consulted any other health care providers outside of the [facility] since your last visit?  Include any pap smears or colon screening. no    Patient entered their homework via the [employer] mona.

## 2025-05-01 ENCOUNTER — APPOINTMENT (OUTPATIENT)
Facility: HOSPITAL | Age: 70
End: 2025-05-01
Payer: MEDICARE

## 2025-05-05 ENCOUNTER — APPOINTMENT (OUTPATIENT)
Facility: HOSPITAL | Age: 70
End: 2025-05-05
Payer: MEDICARE

## 2025-05-06 ENCOUNTER — OFFICE VISIT (OUTPATIENT)
Age: 70
End: 2025-05-06

## 2025-05-06 DIAGNOSIS — E66.811 CLASS 1 OBESITY DUE TO EXCESS CALORIES WITH SERIOUS COMORBIDITY AND BODY MASS INDEX (BMI) OF 34.0 TO 34.9 IN ADULT: Primary | ICD-10-CM

## 2025-05-06 DIAGNOSIS — E66.09 CLASS 1 OBESITY DUE TO EXCESS CALORIES WITH SERIOUS COMORBIDITY AND BODY MASS INDEX (BMI) OF 34.0 TO 34.9 IN ADULT: Primary | ICD-10-CM

## 2025-05-06 NOTE — PROGRESS NOTES
[facility]  Metabolic Weight Loss Program        Patient's Name: Dena Arellano  : 1955    This patient is a participant at [facility] and attended the weekly virtual nutrition class hosted via [employer].      Lourdes Padilla, MS, RD, LDN

## 2025-05-08 ENCOUNTER — HOSPITAL ENCOUNTER (OUTPATIENT)
Facility: HOSPITAL | Age: 70
Setting detail: RECURRING SERIES
Discharge: HOME | End: 2025-05-11
Payer: MEDICARE

## 2025-05-08 PROCEDURE — 97110 THERAPEUTIC EXERCISES: CPT

## 2025-05-08 NOTE — PROGRESS NOTES
PHYSICAL THERAPY - MEDICARE DAILY TREATMENT NOTE (updated 3/23)      Date: 2025          Patient Name:  Dena Arellano :  1955   Medical   Diagnosis:  Status post left knee replacement [Z96.652] Treatment Diagnosis:  M25.562 LEFT KNEE PAIN    Referral Source:  Caryn Boyle PA-C Insurance:   Payor: MEDICARE / Plan: MEDICARE PART A AND B / Product Type: *No Product type* /                     Patient  verified yes     Visit #   Current  / Total 16 24   Time   In / Out 737 815   Total Treatment Time 48   Total Timed Codes 48   1:1 Treatment Time 48      [facility] Totals Reminder:  bill using total billable   min of TIMED therapeutic procedures and modalities.   8-22 min = 1 unit; 23-37 min = 2 units; 38-52 min = 3 units; 53-67 min = 4 units; 68-82 min = 5 units            SUBJECTIVE    Pain Level (0-10 scale): 0-1    Any medication changes, allergies to medications, adverse drug reactions, diagnosis change, or new procedure performed?: [x] No    [] Yes (see summary sheet for update)  Medications: Verified on Patient Summary List    Subjective functional status/changes:     Pt reports that she is getting better     OBJECTIVE      Therapeutic Procedures:  Tx Min Billable or 1:1 Min (if diff from Tx Min) Procedure, Rationale, Specifics   48 48 21147 Therapeutic Exercise (timed):  increase ROM, strength, coordination, balance, and proprioception to improve patient's ability to progress to PLOF and address remaining functional goals. (see flow sheet as applicable)     Details if applicable:       64401 Manual Therapy (timed):  decrease pain, increase ROM, increase tissue extensibility, decrease edema, and decrease trigger points to improve patient's ability to progress to PLOF and address remaining functional goals.  The manual therapy interventions were performed at a separate and distinct time from the therapeutic activities interventions . (see flow sheet as applicable)     Details if applicable:    PROM

## 2025-05-12 ENCOUNTER — APPOINTMENT (OUTPATIENT)
Facility: HOSPITAL | Age: 70
End: 2025-05-12
Payer: MEDICARE

## 2025-05-13 ENCOUNTER — OFFICE VISIT (OUTPATIENT)
Age: 70
End: 2025-05-13

## 2025-05-13 DIAGNOSIS — E66.09 CLASS 1 OBESITY DUE TO EXCESS CALORIES WITH SERIOUS COMORBIDITY AND BODY MASS INDEX (BMI) OF 34.0 TO 34.9 IN ADULT: Primary | ICD-10-CM

## 2025-05-13 DIAGNOSIS — E66.811 CLASS 1 OBESITY DUE TO EXCESS CALORIES WITH SERIOUS COMORBIDITY AND BODY MASS INDEX (BMI) OF 34.0 TO 34.9 IN ADULT: Primary | ICD-10-CM

## 2025-05-13 NOTE — PROGRESS NOTES
[facility]  Metabolic Weight Loss Program        Patient's Name: Dena Arellano  : 1955    This patient is a participant at [facility] and attended the weekly virtual nutrition class hosted via [employer].      Lourdes Padilla, MS, RD, LDN

## 2025-05-15 ENCOUNTER — HOSPITAL ENCOUNTER (OUTPATIENT)
Facility: HOSPITAL | Age: 70
Setting detail: RECURRING SERIES
Discharge: HOME | End: 2025-05-18
Payer: MEDICARE

## 2025-05-15 PROCEDURE — 97110 THERAPEUTIC EXERCISES: CPT

## 2025-05-19 ENCOUNTER — APPOINTMENT (OUTPATIENT)
Facility: HOSPITAL | Age: 70
End: 2025-05-19
Payer: MEDICARE

## 2025-05-20 ENCOUNTER — OFFICE VISIT (OUTPATIENT)
Age: 70
End: 2025-05-20

## 2025-05-20 DIAGNOSIS — E66.811 CLASS 1 OBESITY DUE TO EXCESS CALORIES WITH SERIOUS COMORBIDITY AND BODY MASS INDEX (BMI) OF 34.0 TO 34.9 IN ADULT: Primary | ICD-10-CM

## 2025-05-20 DIAGNOSIS — E66.09 CLASS 1 OBESITY DUE TO EXCESS CALORIES WITH SERIOUS COMORBIDITY AND BODY MASS INDEX (BMI) OF 34.0 TO 34.9 IN ADULT: Primary | ICD-10-CM

## 2025-05-22 ENCOUNTER — OFFICE VISIT (OUTPATIENT)
Age: 70
End: 2025-05-22
Payer: MEDICARE

## 2025-05-22 ENCOUNTER — HOSPITAL ENCOUNTER (OUTPATIENT)
Facility: HOSPITAL | Age: 70
Setting detail: RECURRING SERIES
Discharge: HOME | End: 2025-05-25
Payer: MEDICARE

## 2025-05-22 VITALS
HEIGHT: 69 IN | TEMPERATURE: 97.9 F | BODY MASS INDEX: 32.98 KG/M2 | HEART RATE: 68 BPM | RESPIRATION RATE: 18 BRPM | WEIGHT: 222.7 LBS | OXYGEN SATURATION: 97 % | DIASTOLIC BLOOD PRESSURE: 60 MMHG | SYSTOLIC BLOOD PRESSURE: 106 MMHG

## 2025-05-22 DIAGNOSIS — G47.33 OBSTRUCTIVE SLEEP APNEA SYNDROME: ICD-10-CM

## 2025-05-22 DIAGNOSIS — I10 BENIGN ESSENTIAL HYPERTENSION: ICD-10-CM

## 2025-05-22 DIAGNOSIS — R73.9 BLOOD GLUCOSE ELEVATED: ICD-10-CM

## 2025-05-22 DIAGNOSIS — E78.2 MIXED HYPERLIPIDEMIA: ICD-10-CM

## 2025-05-22 DIAGNOSIS — E03.9 ACQUIRED HYPOTHYROIDISM: ICD-10-CM

## 2025-05-22 DIAGNOSIS — E66.811 CLASS 1 OBESITY DUE TO EXCESS CALORIES WITH SERIOUS COMORBIDITY AND BODY MASS INDEX (BMI) OF 32.0 TO 32.9 IN ADULT: Primary | ICD-10-CM

## 2025-05-22 DIAGNOSIS — E66.09 CLASS 1 OBESITY DUE TO EXCESS CALORIES WITH SERIOUS COMORBIDITY AND BODY MASS INDEX (BMI) OF 32.0 TO 32.9 IN ADULT: Primary | ICD-10-CM

## 2025-05-22 LAB — HBA1C MFR BLD: 5.3 % (ref 4.8–5.6)

## 2025-05-22 PROCEDURE — 1090F PRES/ABSN URINE INCON ASSESS: CPT | Performed by: FAMILY MEDICINE

## 2025-05-22 PROCEDURE — G8399 PT W/DXA RESULTS DOCUMENT: HCPCS | Performed by: FAMILY MEDICINE

## 2025-05-22 PROCEDURE — 99214 OFFICE O/P EST MOD 30 MIN: CPT | Performed by: FAMILY MEDICINE

## 2025-05-22 PROCEDURE — 1123F ACP DISCUSS/DSCN MKR DOCD: CPT | Performed by: FAMILY MEDICINE

## 2025-05-22 PROCEDURE — 1159F MED LIST DOCD IN RCRD: CPT | Performed by: FAMILY MEDICINE

## 2025-05-22 PROCEDURE — G8427 DOCREV CUR MEDS BY ELIG CLIN: HCPCS | Performed by: FAMILY MEDICINE

## 2025-05-22 PROCEDURE — 3074F SYST BP LT 130 MM HG: CPT | Performed by: FAMILY MEDICINE

## 2025-05-22 PROCEDURE — 3017F COLORECTAL CA SCREEN DOC REV: CPT | Performed by: FAMILY MEDICINE

## 2025-05-22 PROCEDURE — 1126F AMNT PAIN NOTED NONE PRSNT: CPT | Performed by: FAMILY MEDICINE

## 2025-05-22 PROCEDURE — G8417 CALC BMI ABV UP PARAM F/U: HCPCS | Performed by: FAMILY MEDICINE

## 2025-05-22 PROCEDURE — 3078F DIAST BP <80 MM HG: CPT | Performed by: FAMILY MEDICINE

## 2025-05-22 PROCEDURE — 97110 THERAPEUTIC EXERCISES: CPT

## 2025-05-22 PROCEDURE — 1036F TOBACCO NON-USER: CPT | Performed by: FAMILY MEDICINE

## 2025-05-22 ASSESSMENT — PATIENT HEALTH QUESTIONNAIRE - PHQ9
SUM OF ALL RESPONSES TO PHQ QUESTIONS 1-9: 0
2. FEELING DOWN, DEPRESSED OR HOPELESS: NOT AT ALL
SUM OF ALL RESPONSES TO PHQ QUESTIONS 1-9: 0
SUM OF ALL RESPONSES TO PHQ QUESTIONS 1-9: 0
1. LITTLE INTEREST OR PLEASURE IN DOING THINGS: NOT AT ALL
SUM OF ALL RESPONSES TO PHQ QUESTIONS 1-9: 0

## 2025-05-22 NOTE — PROGRESS NOTES
Identified pt with two pt identifiers (name and ). Reviewed chart in preparation for visit and have obtained necessary documentation.    Dena Arellano is a 70 y.o. female  Chief Complaint   Patient presents with    Weight Management     1 month follow up     /60 (BP Site: Right Upper Arm, Patient Position: Sitting, BP Cuff Size: Large Adult) Comment: manual  Pulse 68   Temp 97.9 °F (36.6 °C) (Oral)   Resp 18   Ht 1.753 m (5' 9\")   Wt 101 kg (222 lb 11.2 oz)   LMP  (LMP Unknown)   SpO2 97%   BMI 32.89 kg/m²     1. Have you been to the ER, urgent care clinic since your last visit?  Hospitalized since your last visit?no    2. Have you seen or consulted any other health care providers outside of the [facility] since your last visit?  Include any pap smears or colon screening. No    BMI - 32.7    Patient entered their homework via the [employer] mona.    
visit.          Participation   Did you attend clinic and class last week? yes    Review of Systems  Since your last visit, have you experienced any complications? no  If yes, please list:       Are you taking an appetite suppressant? yes  If so, is there any Chest Pain, Palpitations or Dizziness?      HUNGER CONTROL: good    BP Readings from Last 3 Encounters:   05/22/25 106/60   04/30/25 112/64   04/10/25 (!) 102/58       SLEEP:at least 7 hrs     Have you received any other medical care this week? no  If yes, where and for what?       Have you discontinued or changed any medicine or dose of your medicine since your last visit with Dr Rowley? no  If yes, where and for what?         Diet  How many ounces of calorie-free liquids did you consume each day?  80 oz    How many meal replacements did you take each day? 3 and a grocery meal    Did you have any problems adhering to the program?  no If yes, please explain:      Exercise  Aerobic exercise: 1 PT min  Resistance exercise: 1 workouts / week  Any discomfort?  no     If yes, where?    LABS:   repeat this month        Objective  /60 (BP Site: Right Upper Arm, Patient Position: Sitting, BP Cuff Size: Large Adult) Comment: manual  Pulse 68   Temp 97.9 °F (36.6 °C) (Oral)   Resp 18   Ht 1.753 m (5' 9\")   Wt 101 kg (222 lb 11.2 oz)   LMP  (LMP Unknown)   SpO2 97%   BMI 32.89 kg/m²   No LMP recorded (lmp unknown). Patient has had a hysterectomy.      Physical Exam  Appearance: well,   Mental:A&O x 3, NAD  H:NC/AT,  EENT:   EOMI, PERRL, No scleral icterus  Neck: no bruit or JVD  CV: RRR no M/R/G  Lung: clear, No W/R  ABD: soft, active, nontender  Ext:  no Edema  Neuro: nonfocal          Assessment / Plan    Dena Arellano was seen today for Weight Management (1 month follow up)       1. Class 1 obesity due to excess calories with serious comorbidity and body mass index (BMI) of 32.0 to 32.9 in adult  -     Comprehensive Metabolic Panel; Future  Movement

## 2025-05-22 NOTE — PROGRESS NOTES
PHYSICAL THERAPY - MEDICARE DAILY TREATMENT NOTE (updated 3/23)      Date: 2025          Patient Name:  Dena Arellano :  1955   Medical   Diagnosis:  Status post left knee replacement [Z96.652] Treatment Diagnosis:  M25.562 LEFT KNEE PAIN    Referral Source:  Caryn Boyle PA-C Insurance:   Payor: MEDICARE / Plan: MEDICARE PART A AND B / Product Type: *No Product type* /                     Patient  verified yes     Visit #   Current  / Total 18 24   Time   In / Out 735 828   Total Treatment Time 53   Total Timed Codes 53   1:1 Treatment Time 28      [facility] Totals Reminder:  bill using total billable   min of TIMED therapeutic procedures and modalities.   8-22 min = 1 unit; 23-37 min = 2 units; 38-52 min = 3 units; 53-67 min = 4 units; 68-82 min = 5 units            SUBJECTIVE    Pain Level (0-10 scale): 0    Any medication changes, allergies to medications, adverse drug reactions, diagnosis change, or new procedure performed?: [x] No    [] Yes (see summary sheet for update)  Medications: Verified on Patient Summary List    Subjective functional status/changes:     No new reports.  She did stay in a split level air bnb with more steps than she was used to over the weekend.  She had a little soreness from this and the drive.    OBJECTIVE      Therapeutic Procedures:  Tx Min Billable or 1:1 Min (if diff from Tx Min) Procedure, Rationale, Specifics   53 [id number] Therapeutic Exercise (timed):  increase ROM, strength, coordination, balance, and proprioception to improve patient's ability to progress to PLOF and address remaining functional goals. (see flow sheet as applicable)     Details if applicable:       59566 Manual Therapy (timed):  decrease pain, increase ROM, increase tissue extensibility, decrease edema, and decrease trigger points to improve patient's ability to progress to PLOF and address remaining functional goals.  The manual therapy interventions were performed at a separate and

## 2025-05-22 NOTE — PROGRESS NOTES
PHYSICAL THERAPY - MEDICARE DAILY TREATMENT NOTE (updated 3/23)      Date: 5/15/25          Patient Name:  Dena Arellano :  1955   Medical   Diagnosis:  Status post left knee replacement [Z96.652] Treatment Diagnosis:  M25.562 LEFT KNEE PAIN    Referral Source:  Caryn Boyle PA-C Insurance:   Payor: MEDICARE / Plan: MEDICARE PART A AND B / Product Type: *No Product type* /                     Patient  verified yes     Visit #   Current  / Total 17 24   Time   In / Out 807 858   Total Treatment Time 51   Total Timed Codes 51   1:1 Treatment Time 42      [facility] Totals Reminder:  bill using total billable   min of TIMED therapeutic procedures and modalities.   8-22 min = 1 unit; 23-37 min = 2 units; 38-52 min = 3 units; 53-67 min = 4 units; 68-82 min = 5 units            SUBJECTIVE    Pain Level (0-10 scale): 0    Any medication changes, allergies to medications, adverse drug reactions, diagnosis change, or new procedure performed?: [x] No    [] Yes (see summary sheet for update)  Medications: Verified on Patient Summary List    Subjective functional status/changes:     No new reports    OBJECTIVE      Therapeutic Procedures:  Tx Min Billable or 1:1 Min (if diff from Tx Min) Procedure, Rationale, Specifics   51 42 10938 Therapeutic Exercise (timed):  increase ROM, strength, coordination, balance, and proprioception to improve patient's ability to progress to PLOF and address remaining functional goals. (see flow sheet as applicable)     Details if applicable:       18907 Manual Therapy (timed):  decrease pain, increase ROM, increase tissue extensibility, decrease edema, and decrease trigger points to improve patient's ability to progress to PLOF and address remaining functional goals.  The manual therapy interventions were performed at a separate and distinct time from the therapeutic activities interventions . (see flow sheet as applicable)     Details if applicable:    PROM into extension with

## 2025-05-22 NOTE — PROGRESS NOTES
[facility]  Metabolic Weight Loss Program        Patient's Name: Dena Arellano  : 1955    This patient is a participant at [facility] and attended the weekly virtual nutrition class hosted via [employer].      Lourdes Padilla, MS, RD, LDN

## 2025-05-23 LAB
ALBUMIN SERPL-MCNC: 4.4 G/DL (ref 3.9–4.9)
ALP SERPL-CCNC: 83 IU/L (ref 44–121)
ALT SERPL-CCNC: 13 IU/L (ref 0–32)
AST SERPL-CCNC: 15 IU/L (ref 0–40)
BILIRUB SERPL-MCNC: 0.3 MG/DL (ref 0–1.2)
BUN SERPL-MCNC: 24 MG/DL (ref 8–27)
BUN/CREAT SERPL: 36 (ref 12–28)
CALCIUM SERPL-MCNC: 9.6 MG/DL (ref 8.7–10.3)
CHLORIDE SERPL-SCNC: 105 MMOL/L (ref 96–106)
CHOLEST SERPL-MCNC: 212 MG/DL (ref 100–199)
CO2 SERPL-SCNC: 19 MMOL/L (ref 20–29)
CREAT SERPL-MCNC: 0.67 MG/DL (ref 0.57–1)
EGFRCR SERPLBLD CKD-EPI 2021: 94 ML/MIN/1.73
GLOBULIN SER CALC-MCNC: 2 G/DL (ref 1.5–4.5)
GLUCOSE SERPL-MCNC: 89 MG/DL (ref 70–99)
HDLC SERPL-MCNC: 56 MG/DL
LDLC SERPL CALC-MCNC: 138 MG/DL (ref 0–99)
POTASSIUM SERPL-SCNC: 4.6 MMOL/L (ref 3.5–5.2)
PROT SERPL-MCNC: 6.4 G/DL (ref 6–8.5)
SODIUM SERPL-SCNC: 140 MMOL/L (ref 134–144)
TRIGL SERPL-MCNC: 101 MG/DL (ref 0–149)
TSH SERPL DL<=0.005 MIU/L-ACNC: 0.22 UIU/ML (ref 0.45–4.5)
VLDLC SERPL CALC-MCNC: 18 MG/DL (ref 5–40)

## 2025-05-27 ENCOUNTER — OFFICE VISIT (OUTPATIENT)
Age: 70
End: 2025-05-27

## 2025-05-27 DIAGNOSIS — E66.811 CLASS 1 OBESITY DUE TO EXCESS CALORIES WITH SERIOUS COMORBIDITY AND BODY MASS INDEX (BMI) OF 32.0 TO 32.9 IN ADULT: Primary | ICD-10-CM

## 2025-05-27 DIAGNOSIS — E66.09 CLASS 1 OBESITY DUE TO EXCESS CALORIES WITH SERIOUS COMORBIDITY AND BODY MASS INDEX (BMI) OF 32.0 TO 32.9 IN ADULT: Primary | ICD-10-CM

## 2025-05-28 NOTE — PROGRESS NOTES
[facility]  Metabolic Weight Loss Program        Patient's Name: Dena Arellano  : 1955    This patient is a participant at [facility] and attended the weekly virtual nutrition class hosted via [employer].      Lourdes Padilla, MS, RD, LDN

## 2025-05-30 ENCOUNTER — HOSPITAL ENCOUNTER (OUTPATIENT)
Facility: HOSPITAL | Age: 70
Setting detail: RECURRING SERIES
End: 2025-05-30
Payer: MEDICARE

## 2025-05-30 PROCEDURE — 97110 THERAPEUTIC EXERCISES: CPT

## 2025-05-30 NOTE — PROGRESS NOTES
PHYSICAL THERAPY - MEDICARE DAILY TREATMENT NOTE (updated 3/23)      Date: 2025          Patient Name:  Dena Arellano :  1955   Medical   Diagnosis:  Status post left knee replacement [Z96.652] Treatment Diagnosis:  M25.562 LEFT KNEE PAIN    Referral Source:  Caryn Boyle PA-C Insurance:   Payor: MEDICARE / Plan: MEDICARE PART A AND B / Product Type: *No Product type* /                     Patient  verified yes     Visit #   Current  / Total 19 24   Time   In / Out 831 920   Total Treatment Time 49   Total Timed Codes 49   1:1 Treatment Time 49      [facility] Totals Reminder:  bill using total billable   min of TIMED therapeutic procedures and modalities.   8-22 min = 1 unit; 23-37 min = 2 units; 38-52 min = 3 units; 53-67 min = 4 units; 68-82 min = 5 units            SUBJECTIVE    Pain Level (0-10 scale): 0    Any medication changes, allergies to medications, adverse drug reactions, diagnosis change, or new procedure performed?: [x] No    [] Yes (see summary sheet for update)  Medications: Verified on Patient Summary List    Subjective functional status/changes:     Pt is without new reports.      OBJECTIVE      Therapeutic Procedures:  Tx Min Billable or 1:1 Min (if diff from Tx Min) Procedure, Rationale, Specifics   49 49 [id number] Therapeutic Exercise (timed):  increase ROM, strength, coordination, balance, and proprioception to improve patient's ability to progress to PLOF and address remaining functional goals. (see flow sheet as applicable)     Details if applicable:       11380 Manual Therapy (timed):  decrease pain, increase ROM, increase tissue extensibility, decrease edema, and decrease trigger points to improve patient's ability to progress to PLOF and address remaining functional goals.  The manual therapy interventions were performed at a separate and distinct time from the therapeutic activities interventions . (see flow sheet as applicable)     Details if applicable:    PROM into

## 2025-05-31 DIAGNOSIS — E03.9 ACQUIRED HYPOTHYROIDISM: ICD-10-CM

## 2025-05-31 RX ORDER — LEVOTHYROXINE SODIUM 175 UG/1
175 TABLET ORAL
Qty: 90 TABLET | Refills: 0 | Status: SHIPPED | OUTPATIENT
Start: 2025-05-31

## 2025-06-02 ENCOUNTER — TELEPHONE (OUTPATIENT)
Age: 70
End: 2025-06-02

## 2025-06-02 NOTE — TELEPHONE ENCOUNTER
Provider out of office 7/24/25; contacted patient to reschedule appointment; contacted patient; LVM    none

## 2025-06-03 ENCOUNTER — OFFICE VISIT (OUTPATIENT)
Age: 70
End: 2025-06-03

## 2025-06-03 DIAGNOSIS — E66.811 CLASS 1 OBESITY DUE TO EXCESS CALORIES WITH SERIOUS COMORBIDITY AND BODY MASS INDEX (BMI) OF 32.0 TO 32.9 IN ADULT: Primary | ICD-10-CM

## 2025-06-03 DIAGNOSIS — E66.09 CLASS 1 OBESITY DUE TO EXCESS CALORIES WITH SERIOUS COMORBIDITY AND BODY MASS INDEX (BMI) OF 32.0 TO 32.9 IN ADULT: Primary | ICD-10-CM

## 2025-06-04 ENCOUNTER — CLINICAL SUPPORT (OUTPATIENT)
Age: 70
End: 2025-06-04

## 2025-06-04 VITALS
WEIGHT: 222.7 LBS | HEART RATE: 72 BPM | BODY MASS INDEX: 32.98 KG/M2 | HEIGHT: 69 IN | OXYGEN SATURATION: 98 % | DIASTOLIC BLOOD PRESSURE: 64 MMHG | SYSTOLIC BLOOD PRESSURE: 108 MMHG | RESPIRATION RATE: 18 BRPM | TEMPERATURE: 98.3 F

## 2025-06-04 DIAGNOSIS — G47.33 OBSTRUCTIVE SLEEP APNEA SYNDROME: ICD-10-CM

## 2025-06-04 DIAGNOSIS — E66.811 CLASS 1 OBESITY DUE TO EXCESS CALORIES WITH SERIOUS COMORBIDITY AND BODY MASS INDEX (BMI) OF 32.0 TO 32.9 IN ADULT: Primary | ICD-10-CM

## 2025-06-04 DIAGNOSIS — I10 BENIGN ESSENTIAL HYPERTENSION: ICD-10-CM

## 2025-06-04 DIAGNOSIS — R73.9 BLOOD GLUCOSE ELEVATED: ICD-10-CM

## 2025-06-04 DIAGNOSIS — E78.2 MIXED HYPERLIPIDEMIA: ICD-10-CM

## 2025-06-04 DIAGNOSIS — E03.9 ACQUIRED HYPOTHYROIDISM: ICD-10-CM

## 2025-06-04 DIAGNOSIS — E66.09 CLASS 1 OBESITY DUE TO EXCESS CALORIES WITH SERIOUS COMORBIDITY AND BODY MASS INDEX (BMI) OF 32.0 TO 32.9 IN ADULT: Primary | ICD-10-CM

## 2025-06-04 RX ORDER — AMOXICILLIN 500 MG/1
500 CAPSULE ORAL 3 TIMES DAILY
COMMUNITY
Start: 2025-05-31 | End: 2025-06-07

## 2025-06-04 ASSESSMENT — PATIENT HEALTH QUESTIONNAIRE - PHQ9
1. LITTLE INTEREST OR PLEASURE IN DOING THINGS: NOT AT ALL
SUM OF ALL RESPONSES TO PHQ QUESTIONS 1-9: 0
2. FEELING DOWN, DEPRESSED OR HOPELESS: NOT AT ALL

## 2025-06-04 NOTE — PROGRESS NOTES
Identified pt with two pt identifiers (name and ). Reviewed chart in preparation for visit and have obtained necessary documentation.    Dena Arellano is a 70 y.o. female  Chief Complaint   Patient presents with    Weight Management     /64 (BP Site: Right Upper Arm, Patient Position: Sitting, BP Cuff Size: Large Adult) Comment: manual  Pulse 72   Temp 98.3 °F (36.8 °C) (Oral)   Resp 18   Ht 1.753 m (5' 9\")   Wt 101 kg (222 lb 11.2 oz)   LMP  (LMP Unknown)   SpO2 98%   BMI 32.89 kg/m²     1. Have you been to the ER, urgent care clinic since your last visit?  Hospitalized since your last visit?no    2. Have you seen or consulted any other health care providers outside of the [facility] since your last visit?  Include any pap smears or colon screening. No    Patient entered their homework via the [employer] mona.

## 2025-06-05 ENCOUNTER — HOSPITAL ENCOUNTER (OUTPATIENT)
Facility: HOSPITAL | Age: 70
Setting detail: RECURRING SERIES
Discharge: HOME | End: 2025-06-08
Payer: MEDICARE

## 2025-06-05 PROCEDURE — 97110 THERAPEUTIC EXERCISES: CPT

## 2025-06-05 NOTE — PROGRESS NOTES
PHYSICAL THERAPY - MEDICARE DAILY TREATMENT NOTE (updated 3/23)      Date: 2025          Patient Name:  Dena Arellano :  1955   Medical   Diagnosis:  Status post left knee replacement [Z96.652] Treatment Diagnosis:  M25.562 LEFT KNEE PAIN    Referral Source:  Caryn Boyle PA-C Insurance:   Payor: MEDICARE / Plan: MEDICARE PART A AND B / Product Type: *No Product type* /                     Patient  verified yes     Visit #   Current  / Total 20 24   Time   In / Out 831 914   Total Treatment Time 43   Total Timed Codes 43   1:1 Treatment Time 43      [facility] Totals Reminder:  bill using total billable   min of TIMED therapeutic procedures and modalities.   8-22 min = 1 unit; 23-37 min = 2 units; 38-52 min = 3 units; 53-67 min = 4 units; 68-82 min = 5 units            SUBJECTIVE    Pain Level (0-10 scale): 0    Any medication changes, allergies to medications, adverse drug reactions, diagnosis change, or new procedure performed?: [x] No    [] Yes (see summary sheet for update)  Medications: Verified on Patient Summary List    Subjective functional status/changes:     PT reports that she walked a mile around the block without any issues.  She will be Dc'd today     OBJECTIVE      Therapeutic Procedures:  Tx Min Billable or 1:1 Min (if diff from Tx Min) Procedure, Rationale, Specifics   43 43 11219 Therapeutic Exercise (timed):  increase ROM, strength, coordination, balance, and proprioception to improve patient's ability to progress to PLOF and address remaining functional goals. (see flow sheet as applicable)     Details if applicable:       82334 Manual Therapy (timed):  decrease pain, increase ROM, increase tissue extensibility, decrease edema, and decrease trigger points to improve patient's ability to progress to PLOF and address remaining functional goals.  The manual therapy interventions were performed at a separate and distinct time from the therapeutic activities interventions . (see flow

## 2025-06-05 NOTE — PROGRESS NOTES
[facility]  [address]  [place] [address]  Phone: [phone]   Fax: [phone]     DISCHARGE SUMMARY  Patient Name: Dena Arellano : 1955   Treatment/Medical Diagnosis: Status post left knee replacement [Z96.652]   Referral Source: Caryn Boyle PA-C     Date of Initial Visit: 3/11/25 Attended Visits: 20 Missed Visits: 0     SUMMARY OF TREATMENT  Pt has completed 20 sessions of therapy and has progressed very well through her PT following left TKR.  She demonstrates increased ROM and strength, and recently returned to walking 1 mile without increased symptoms.  She is climbing stairs better and has improved balance as well.  At this time she will be Dc'd and encouraged to continue her HEP to keep working toward all goals     CURRENT STATUS  AROM 4-112  PROM 2-118    Strength  Knee flex 4-/5  Knee ext 5/5      Short Term Goals: To be accomplished in 8-10 treatments.  Pt will be consistent and demonstrate I with HEP-MET  Pt will complain of pain 0-1/10 with all activity MET  Pt will demonstrate improved ROM to complete all ADL's and household chores more efficiently-MET  Pt will be able to maintain positions for 45min without increased symptoms MET     Long Term Goals: To be accomplished in 20-24 treatments.  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 Progressing towards or have reached established goals.        Cameron Wick, PT       2025       10:33

## 2025-06-06 NOTE — PROGRESS NOTES
[facility]  Metabolic Weight Loss Program        Patient's Name: Dena Arellano  : 1955    This patient is a participant at [facility] and attended the weekly virtual nutrition class hosted via [employer].      Lourdes Padilla, MS, RD, LDN

## 2025-06-10 ENCOUNTER — OFFICE VISIT (OUTPATIENT)
Age: 70
End: 2025-06-10

## 2025-06-10 DIAGNOSIS — E66.811 CLASS 1 OBESITY DUE TO EXCESS CALORIES WITH SERIOUS COMORBIDITY AND BODY MASS INDEX (BMI) OF 32.0 TO 32.9 IN ADULT: Primary | ICD-10-CM

## 2025-06-10 DIAGNOSIS — E66.09 CLASS 1 OBESITY DUE TO EXCESS CALORIES WITH SERIOUS COMORBIDITY AND BODY MASS INDEX (BMI) OF 32.0 TO 32.9 IN ADULT: Primary | ICD-10-CM

## 2025-06-11 NOTE — PROGRESS NOTES
Nurse note from patient's weekly VLCD / LCD / Maintenance class was reviewed.  Pertinent medical concerns were:   reviewed     BP Readings from Last 3 Encounters:   06/04/25 108/64   05/22/25 106/60   04/30/25 112/64       Failed to redirect to the Timeline version of the [facility] SmartLink.    Current Outpatient Medications   Medication Sig Dispense Refill    levothyroxine (SYNTHROID) 175 MCG tablet Take 1 tablet by mouth every morning (before breakfast) 90 tablet 0    clindamycin (CLEOCIN) 300 MG capsule Take 2 Capsules 1 hour prior to dental procedure. 2 capsule 2    acetaminophen (TYLENOL) 500 MG tablet Take 2 tablets by mouth as needed for Pain      Misc. Devices (CPAP MACHINE) MISC nightly      buPROPion (WELLBUTRIN XL) 150 MG extended release tablet Take 1 tablet by mouth every morning (before breakfast) 90 tablet 3    FLUoxetine (PROZAC) 20 MG capsule Take 1 capsule by mouth daily 90 capsule 3    fluticasone (FLONASE) 50 MCG/ACT nasal spray SHAKE BOTTLE AND USE 1 SPRAY IN EACH NOSTRIL EVERY DAY 48 g 1    Ascorbic Acid (VITAMIN C) 1000 MG tablet Take 1 tablet by mouth daily      Multiple Vitamin (MULTI VITAMIN PO) Take 1 tablet by mouth daily Centrum silver for women      TURMERIC PO Take 1 capsule by mouth daily      azelastine HCl 0.15 % SOLN 1 spray by Nasal route 2 times daily      cetirizine (ZYRTEC) 10 MG tablet Take 1 tablet by mouth daily      Cholecalciferol 50 MCG (2000 UT) TABS Take 1 tablet by mouth daily       No current facility-administered medications for this visit.

## 2025-06-16 NOTE — PROGRESS NOTES
[facility]  Metabolic Weight Loss Program        Patient's Name: Dena Arellano  : 1955    This patient is a participant at [facility] and attended the weekly virtual nutrition class hosted via [employer].      Lourdes Padilla, MS, RD, LDN

## 2025-06-17 ENCOUNTER — OFFICE VISIT (OUTPATIENT)
Age: 70
End: 2025-06-17

## 2025-06-17 DIAGNOSIS — E66.811 CLASS 1 OBESITY DUE TO EXCESS CALORIES WITH SERIOUS COMORBIDITY AND BODY MASS INDEX (BMI) OF 32.0 TO 32.9 IN ADULT: Primary | ICD-10-CM

## 2025-06-17 DIAGNOSIS — E66.09 CLASS 1 OBESITY DUE TO EXCESS CALORIES WITH SERIOUS COMORBIDITY AND BODY MASS INDEX (BMI) OF 32.0 TO 32.9 IN ADULT: Primary | ICD-10-CM

## 2025-06-17 NOTE — PROGRESS NOTES
[facility]  Metabolic Weight Loss Program        Patient's Name: Dena Arellano  : 1955    This patient is a participant at [facility] and attended the weekly virtual nutrition class hosted via [employer].      Lourdes Padilla, MS, RD, LDN

## 2025-06-19 ENCOUNTER — OFFICE VISIT (OUTPATIENT)
Age: 70
End: 2025-06-19
Payer: MEDICARE

## 2025-06-19 VITALS
RESPIRATION RATE: 20 BRPM | BODY MASS INDEX: 33.06 KG/M2 | TEMPERATURE: 98.4 F | OXYGEN SATURATION: 97 % | HEART RATE: 67 BPM | DIASTOLIC BLOOD PRESSURE: 66 MMHG | SYSTOLIC BLOOD PRESSURE: 106 MMHG | HEIGHT: 69 IN | WEIGHT: 223.2 LBS

## 2025-06-19 DIAGNOSIS — E03.9 ACQUIRED HYPOTHYROIDISM: ICD-10-CM

## 2025-06-19 DIAGNOSIS — E78.2 MIXED HYPERLIPIDEMIA: ICD-10-CM

## 2025-06-19 DIAGNOSIS — E66.09 CLASS 1 OBESITY DUE TO EXCESS CALORIES WITH SERIOUS COMORBIDITY AND BODY MASS INDEX (BMI) OF 32.0 TO 32.9 IN ADULT: Primary | ICD-10-CM

## 2025-06-19 DIAGNOSIS — G47.33 OBSTRUCTIVE SLEEP APNEA SYNDROME: ICD-10-CM

## 2025-06-19 DIAGNOSIS — R73.9 BLOOD GLUCOSE ELEVATED: ICD-10-CM

## 2025-06-19 DIAGNOSIS — E66.811 CLASS 1 OBESITY DUE TO EXCESS CALORIES WITH SERIOUS COMORBIDITY AND BODY MASS INDEX (BMI) OF 32.0 TO 32.9 IN ADULT: Primary | ICD-10-CM

## 2025-06-19 DIAGNOSIS — I10 BENIGN ESSENTIAL HYPERTENSION: ICD-10-CM

## 2025-06-19 PROCEDURE — 3074F SYST BP LT 130 MM HG: CPT | Performed by: FAMILY MEDICINE

## 2025-06-19 PROCEDURE — 3017F COLORECTAL CA SCREEN DOC REV: CPT | Performed by: FAMILY MEDICINE

## 2025-06-19 PROCEDURE — G8417 CALC BMI ABV UP PARAM F/U: HCPCS | Performed by: FAMILY MEDICINE

## 2025-06-19 PROCEDURE — 1036F TOBACCO NON-USER: CPT | Performed by: FAMILY MEDICINE

## 2025-06-19 PROCEDURE — 1090F PRES/ABSN URINE INCON ASSESS: CPT | Performed by: FAMILY MEDICINE

## 2025-06-19 PROCEDURE — 1126F AMNT PAIN NOTED NONE PRSNT: CPT | Performed by: FAMILY MEDICINE

## 2025-06-19 PROCEDURE — G8399 PT W/DXA RESULTS DOCUMENT: HCPCS | Performed by: FAMILY MEDICINE

## 2025-06-19 PROCEDURE — 3078F DIAST BP <80 MM HG: CPT | Performed by: FAMILY MEDICINE

## 2025-06-19 PROCEDURE — 1159F MED LIST DOCD IN RCRD: CPT | Performed by: FAMILY MEDICINE

## 2025-06-19 PROCEDURE — G8427 DOCREV CUR MEDS BY ELIG CLIN: HCPCS | Performed by: FAMILY MEDICINE

## 2025-06-19 PROCEDURE — 1123F ACP DISCUSS/DSCN MKR DOCD: CPT | Performed by: FAMILY MEDICINE

## 2025-06-19 PROCEDURE — 99214 OFFICE O/P EST MOD 30 MIN: CPT | Performed by: FAMILY MEDICINE

## 2025-06-19 ASSESSMENT — PATIENT HEALTH QUESTIONNAIRE - PHQ9
1. LITTLE INTEREST OR PLEASURE IN DOING THINGS: NOT AT ALL
SUM OF ALL RESPONSES TO PHQ QUESTIONS 1-9: 0
2. FEELING DOWN, DEPRESSED OR HOPELESS: NOT AT ALL
SUM OF ALL RESPONSES TO PHQ QUESTIONS 1-9: 0

## 2025-06-19 NOTE — PROGRESS NOTES
[facility] Progress Note:   F/up Physician Visit    CC: Weight Management      Dena Arellano is a 70 y.o. female who is here for her  f/up physician visit for the / LCD Program.    May 222  Now 223    She is finished PT for the knee  Does a little walk around the block with grandchild          6/19/2025     9:12 AM 6/19/2025     9:00 AM 6/4/2025     8:59 AM 5/22/2025    10:45 AM 5/22/2025    10:00 AM 4/30/2025     7:59 AM 4/18/2025     9:46 AM   Weight Metrics   Weight 223 lb 3.2 oz  222 lb 11.2 oz 222 lb 11.2 oz  225 lb 12.8 oz 228 lb   Neck (Inches)  15 in   15 in     Waist Measure Inches  42.25 in   43.75 in     Body Fat %  42.4 %   42.3 %     BMI (Calculated) 33 kg/m2  33 kg/m2 33 kg/m2  33.4 kg/m2 33.7 kg/m2          No data to display                   Current Outpatient Medications   Medication Sig Dispense Refill    levothyroxine (SYNTHROID) 175 MCG tablet Take 1 tablet by mouth every morning (before breakfast) 90 tablet 0    clindamycin (CLEOCIN) 300 MG capsule Take 2 Capsules 1 hour prior to dental procedure. 2 capsule 2    acetaminophen (TYLENOL) 500 MG tablet Take 2 tablets by mouth as needed for Pain      Misc. Devices (CPAP MACHINE) MISC nightly      buPROPion (WELLBUTRIN XL) 150 MG extended release tablet Take 1 tablet by mouth every morning (before breakfast) 90 tablet 3    FLUoxetine (PROZAC) 20 MG capsule Take 1 capsule by mouth daily 90 capsule 3    fluticasone (FLONASE) 50 MCG/ACT nasal spray SHAKE BOTTLE AND USE 1 SPRAY IN EACH NOSTRIL EVERY DAY 48 g 1    Ascorbic Acid (VITAMIN C) 1000 MG tablet Take 1 tablet by mouth daily      Multiple Vitamin (MULTI VITAMIN PO) Take 1 tablet by mouth daily Centrum silver for women      TURMERIC PO Take 1 capsule by mouth daily      azelastine HCl 0.15 % SOLN 1 spray by Nasal route 2 times daily      cetirizine (ZYRTEC) 10 MG tablet Take 1 tablet by mouth daily      Cholecalciferol 50 MCG (2000 UT) TABS Take 1 tablet by mouth daily

## 2025-06-19 NOTE — PROGRESS NOTES
Identified pt with two pt identifiers (name and ). Reviewed chart in preparation for visit and have obtained necessary documentation.    Dena Arellano is a 70 y.o. female  Chief Complaint   Patient presents with    Weight Management     1 month follow up     /66 (BP Site: Right Upper Arm, Patient Position: Sitting, BP Cuff Size: Large Adult) Comment: manual  Pulse 67   Temp 98.4 °F (36.9 °C) (Oral)   Resp 20   Ht 1.753 m (5' 9\")   Wt 101.2 kg (223 lb 3.2 oz)   LMP  (LMP Unknown)   SpO2 97%   BMI 32.96 kg/m²     1. Have you been to the ER, urgent care clinic since your last visit?  Hospitalized since your last visit?no    2. Have you seen or consulted any other health care providers outside of the [facility] since your last visit?  Include any pap smears or colon screening. No    BMI - 32.7    Patient entered their homework via the [employer] mona.

## 2025-06-24 ENCOUNTER — OFFICE VISIT (OUTPATIENT)
Age: 70
End: 2025-06-24

## 2025-06-24 DIAGNOSIS — E66.09 CLASS 1 OBESITY DUE TO EXCESS CALORIES WITH SERIOUS COMORBIDITY AND BODY MASS INDEX (BMI) OF 32.0 TO 32.9 IN ADULT: Primary | ICD-10-CM

## 2025-06-24 DIAGNOSIS — E66.811 CLASS 1 OBESITY DUE TO EXCESS CALORIES WITH SERIOUS COMORBIDITY AND BODY MASS INDEX (BMI) OF 32.0 TO 32.9 IN ADULT: Primary | ICD-10-CM

## 2025-06-24 NOTE — PROGRESS NOTES
[facility]  Metabolic Weight Loss Program        Patient's Name: Dena Arellano  : 1955    This patient is a participant at [facility] and attended the weekly virtual nutrition class hosted via [employer].      Lourdes Padilla, MS, RD, LDN

## 2025-06-26 ENCOUNTER — RESULTS FOLLOW-UP (OUTPATIENT)
Age: 70
End: 2025-06-26

## 2025-07-08 ENCOUNTER — OFFICE VISIT (OUTPATIENT)
Age: 70
End: 2025-07-08

## 2025-07-08 DIAGNOSIS — E66.09 CLASS 1 OBESITY DUE TO EXCESS CALORIES WITH SERIOUS COMORBIDITY AND BODY MASS INDEX (BMI) OF 32.0 TO 32.9 IN ADULT: Primary | ICD-10-CM

## 2025-07-08 DIAGNOSIS — E66.811 CLASS 1 OBESITY DUE TO EXCESS CALORIES WITH SERIOUS COMORBIDITY AND BODY MASS INDEX (BMI) OF 32.0 TO 32.9 IN ADULT: Primary | ICD-10-CM

## 2025-07-09 ENCOUNTER — CLINICAL SUPPORT (OUTPATIENT)
Age: 70
End: 2025-07-09

## 2025-07-09 VITALS
DIASTOLIC BLOOD PRESSURE: 66 MMHG | BODY MASS INDEX: 32.97 KG/M2 | RESPIRATION RATE: 18 BRPM | TEMPERATURE: 98.8 F | HEART RATE: 61 BPM | SYSTOLIC BLOOD PRESSURE: 106 MMHG | HEIGHT: 69 IN | WEIGHT: 222.6 LBS | OXYGEN SATURATION: 93 %

## 2025-07-09 DIAGNOSIS — E03.9 ACQUIRED HYPOTHYROIDISM: ICD-10-CM

## 2025-07-09 DIAGNOSIS — R73.9 BLOOD GLUCOSE ELEVATED: ICD-10-CM

## 2025-07-09 DIAGNOSIS — E78.2 MIXED HYPERLIPIDEMIA: ICD-10-CM

## 2025-07-09 DIAGNOSIS — I10 BENIGN ESSENTIAL HYPERTENSION: ICD-10-CM

## 2025-07-09 DIAGNOSIS — E66.811 CLASS 1 OBESITY DUE TO EXCESS CALORIES WITH SERIOUS COMORBIDITY AND BODY MASS INDEX (BMI) OF 32.0 TO 32.9 IN ADULT: Primary | ICD-10-CM

## 2025-07-09 DIAGNOSIS — E66.09 CLASS 1 OBESITY DUE TO EXCESS CALORIES WITH SERIOUS COMORBIDITY AND BODY MASS INDEX (BMI) OF 32.0 TO 32.9 IN ADULT: Primary | ICD-10-CM

## 2025-07-09 DIAGNOSIS — G47.33 OBSTRUCTIVE SLEEP APNEA SYNDROME: ICD-10-CM

## 2025-07-09 ASSESSMENT — PATIENT HEALTH QUESTIONNAIRE - PHQ9
2. FEELING DOWN, DEPRESSED OR HOPELESS: NOT AT ALL
SUM OF ALL RESPONSES TO PHQ QUESTIONS 1-9: 0
SUM OF ALL RESPONSES TO PHQ QUESTIONS 1-9: 0
1. LITTLE INTEREST OR PLEASURE IN DOING THINGS: NOT AT ALL
SUM OF ALL RESPONSES TO PHQ QUESTIONS 1-9: 0
SUM OF ALL RESPONSES TO PHQ QUESTIONS 1-9: 0

## 2025-07-09 NOTE — TELEPHONE ENCOUNTER
Adult Annual Physical  Name: Antonio Mckeon      : 1968      MRN: [id number]  Encounter Provider: Becca Villa PA-C  Encounter Date: 2025   Encounter department: [facility] [address]  [facility]    :  Assessment & Plan  Annual physical exam  -Pt due for annual physical  -routine labs ordered   Orders:    CBC and differential; Future    Comprehensive metabolic panel; Future    Lipid panel; Future    Heart failure with reduced ejection fraction (HCC)  Atrial fibrillation, unspecified type (HCC)  Wt Readings from Last 3 Encounters:   25 103 kg (226 lb)   25 106 kg (234 lb)   10/23/24 106 kg (234 lb 11.2 oz)     -Following with cardiology  -Cont Eliquis, Dofetilide, and metoprolol   -s/p ablation and loop recorder implant 2024        Primary hypertension  -repeat 134/84 today in office  -Averages 117/60s at home, takes BP readings 2x a day   -Lisinopril 10 mg daily   -Refill sent   Orders:    lisinopril (ZESTRIL) 10 mg tablet; Take 1 tablet (10 mg total) by mouth daily    Screening for colorectal cancer  -discussed screening modlities, pt elects cologuard. Denies Fhx of colorectal cancer, change in BM, unintentional wt loss  Orders:    Cologuard                     Immunizations:  - Immunizations due: Prevnar 20, Tdap and Zoster (Shingrix)         History of Present Illness     Adult Annual Physical:  Patient presents for annual physical.     Diet and Physical Activity:  - Diet/Nutrition: well balanced diet.  - Exercise:. Pt works as a walkby    Depression Screening:  - PHQ-2 Score: 0    General Health:  - Sleep: sleeps poorly. 3-5 hours notes it has been like this for 30 years.  - Hearing: normal hearing bilateral ears.  - Vision: no vision problems and most recent eye exam > 1 year ago.  - Dental: regular dental visits, brushes teeth twice daily and does not floss.     Health:  - History of STDs: no.   - Urinary symptoms: none.     Review of Systems  Jamie sending refill requests for patient.  Has appt tomorrow- 11/15/24.  Thanks, jalen    *noted already pending*  Thanks, Jalen   "  Constitutional:  Negative for chills, fatigue and fever.   HENT:  Negative for congestion, ear pain, rhinorrhea, sinus pain and sore throat.    Eyes:  Negative for pain.   Respiratory:  Negative for cough and shortness of breath.    Cardiovascular:  Negative for chest pain and leg swelling.   Gastrointestinal:  Negative for abdominal pain, constipation, diarrhea, nausea and vomiting.   Genitourinary:  Negative for difficulty urinating, dysuria, frequency and urgency.   Musculoskeletal:  Negative for neck pain.   Skin:  Negative for rash.   Neurological:  Negative for dizziness and headaches.   Psychiatric/Behavioral:  Negative for sleep disturbance.          Objective   /84   Pulse 84   Temp 97.8 °F (36.6 °C) (Tympanic)   Ht 5' 11\" (1.803 m)   Wt 103 kg (226 lb)   SpO2 97%   BMI 31.52 kg/m²     Physical Exam  Vitals reviewed.   Constitutional:       General: He is not in acute distress.     Appearance: Normal appearance.   HENT:      Head: Normocephalic and atraumatic.      Right Ear: Tympanic membrane, ear canal and external ear normal.      Left Ear: Tympanic membrane, ear canal and external ear normal.      Nose: Nose normal. No congestion.      Right Sinus: No maxillary sinus tenderness or frontal sinus tenderness.      Left Sinus: No maxillary sinus tenderness or frontal sinus tenderness.      Mouth/Throat:      Mouth: Mucous membranes are moist.      Pharynx: Oropharynx is clear. No posterior oropharyngeal erythema or postnasal drip.     Eyes:      Extraocular Movements: Extraocular movements intact.      Conjunctiva/sclera: Conjunctivae normal.       Cardiovascular:      Rate and Rhythm: Normal rate and regular rhythm.      Heart sounds: Normal heart sounds.   Pulmonary:      Effort: Pulmonary effort is normal.      Breath sounds: Normal breath sounds. No wheezing, rhonchi or rales.   Abdominal:      General: Bowel sounds are normal. There is no distension.      Palpations: Abdomen is soft.      " Tenderness: There is no abdominal tenderness. There is no right CVA tenderness or left CVA tenderness.     Musculoskeletal:      Cervical back: Neck supple.      Right lower leg: No edema.      Left lower leg: No edema.   Lymphadenopathy:      Cervical: No cervical adenopathy.     Skin:     General: Skin is warm.      Capillary Refill: Capillary refill takes less than 2 seconds.      Findings: No rash.     Neurological:      Mental Status: He is alert. Mental status is at baseline.           Becca Villa PA-C  [facility]  7/9/2025 4:38 PM

## 2025-07-09 NOTE — PROGRESS NOTES
Identified pt with two pt identifiers (name and ). Reviewed chart in preparation for visit and have obtained necessary documentation.    Dena Arellano is a 70 y.o. female  Chief Complaint   Patient presents with    Weight Management     /66 (BP Site: Right Upper Arm, Patient Position: Sitting, BP Cuff Size: Large Adult)   Pulse 61   Temp 98.8 °F (37.1 °C) (Oral)   Resp 18   Ht 1.753 m (5' 9\")   Wt 101 kg (222 lb 9.6 oz)   LMP  (LMP Unknown)   SpO2 93%   BMI 32.87 kg/m²     1. Have you been to the ER, urgent care clinic since your last visit?  Hospitalized since your last visit?no    2. Have you seen or consulted any other health care providers outside of the [facility] since your last visit?  Include any pap smears or colon screening. No    Patient entered their homework via the [employer] mona.

## 2025-07-14 NOTE — PROGRESS NOTES
[facility]  Metabolic Weight Loss Program        Patient's Name: Dena Arellano  : 1955    This patient is a participant at [facility] and attended the weekly virtual nutrition class hosted via [employer].      Lourdes Padilla, MS, RD, LDN

## 2025-07-15 ENCOUNTER — OFFICE VISIT (OUTPATIENT)
Age: 70
End: 2025-07-15

## 2025-07-15 DIAGNOSIS — E66.09 CLASS 1 OBESITY DUE TO EXCESS CALORIES WITH SERIOUS COMORBIDITY AND BODY MASS INDEX (BMI) OF 32.0 TO 32.9 IN ADULT: Primary | ICD-10-CM

## 2025-07-15 DIAGNOSIS — E66.811 CLASS 1 OBESITY DUE TO EXCESS CALORIES WITH SERIOUS COMORBIDITY AND BODY MASS INDEX (BMI) OF 32.0 TO 32.9 IN ADULT: Primary | ICD-10-CM

## 2025-07-17 NOTE — PROGRESS NOTES
Nurse note from patient's weekly/ LCD / Maintenance class was reviewed.  Pertinent medical concerns were:   reviewed     BP Readings from Last 3 Encounters:   07/09/25 106/66   06/19/25 106/66   06/04/25 108/64       Failed to redirect to the Timeline version of the [facility] SmartLink.    Current Outpatient Medications   Medication Sig Dispense Refill    levothyroxine (SYNTHROID) 175 MCG tablet Take 1 tablet by mouth every morning (before breakfast) 90 tablet 0    clindamycin (CLEOCIN) 300 MG capsule Take 2 Capsules 1 hour prior to dental procedure. 2 capsule 2    acetaminophen (TYLENOL) 500 MG tablet Take 2 tablets by mouth as needed for Pain      Misc. Devices (CPAP MACHINE) MISC nightly      buPROPion (WELLBUTRIN XL) 150 MG extended release tablet Take 1 tablet by mouth every morning (before breakfast) 90 tablet 3    FLUoxetine (PROZAC) 20 MG capsule Take 1 capsule by mouth daily 90 capsule 3    fluticasone (FLONASE) 50 MCG/ACT nasal spray SHAKE BOTTLE AND USE 1 SPRAY IN EACH NOSTRIL EVERY DAY 48 g 1    Ascorbic Acid (VITAMIN C) 1000 MG tablet Take 1 tablet by mouth daily      Multiple Vitamin (MULTI VITAMIN PO) Take 1 tablet by mouth daily Centrum silver for women      TURMERIC PO Take 1 capsule by mouth daily      azelastine HCl 0.15 % SOLN 1 spray by Nasal route 2 times daily      cetirizine (ZYRTEC) 10 MG tablet Take 1 tablet by mouth daily      Cholecalciferol 50 MCG (2000 UT) TABS Take 1 tablet by mouth daily       No current facility-administered medications for this visit.

## 2025-07-18 PROBLEM — E03.9 HYPOTHYROIDISM: Status: ACTIVE | Noted: 2025-07-18

## 2025-07-18 PROBLEM — M19.90 ARTHRITIS: Status: ACTIVE | Noted: 2025-07-18

## 2025-07-18 PROBLEM — Z90.13 HISTORY OF BILATERAL MASTECTOMY: Status: ACTIVE | Noted: 2025-07-18

## 2025-07-18 PROBLEM — I10 ESSENTIAL (PRIMARY) HYPERTENSION: Status: ACTIVE | Noted: 2025-07-18

## 2025-07-18 NOTE — PROGRESS NOTES
[facility]  Metabolic Weight Loss Program        Patient's Name: Dena Arellano  : 1955    This patient is a participant at [facility] and attended the weekly virtual nutrition class hosted via [employer].      Lourdes Padilla, MS, RD, LDN

## 2025-07-22 ENCOUNTER — OFFICE VISIT (OUTPATIENT)
Age: 70
End: 2025-07-22

## 2025-07-22 DIAGNOSIS — E66.811 CLASS 1 OBESITY DUE TO EXCESS CALORIES WITH SERIOUS COMORBIDITY AND BODY MASS INDEX (BMI) OF 32.0 TO 32.9 IN ADULT: Primary | ICD-10-CM

## 2025-07-22 DIAGNOSIS — E66.09 CLASS 1 OBESITY DUE TO EXCESS CALORIES WITH SERIOUS COMORBIDITY AND BODY MASS INDEX (BMI) OF 32.0 TO 32.9 IN ADULT: Primary | ICD-10-CM

## 2025-07-25 NOTE — PROGRESS NOTES
[facility]  Metabolic Weight Loss Program        Patient's Name: Dena Arellano  : 1955    This patient is a participant at [facility] and attended the weekly virtual nutrition class hosted via [employer] ([employer]).      Lourdes Padilla, MS, RD, LDN

## 2025-07-29 ENCOUNTER — OFFICE VISIT (OUTPATIENT)
Age: 70
End: 2025-07-29

## 2025-07-29 ENCOUNTER — OFFICE VISIT (OUTPATIENT)
Age: 70
End: 2025-07-29
Payer: MEDICARE

## 2025-07-29 VITALS
HEIGHT: 69 IN | BODY MASS INDEX: 32.5 KG/M2 | SYSTOLIC BLOOD PRESSURE: 102 MMHG | HEART RATE: 62 BPM | DIASTOLIC BLOOD PRESSURE: 68 MMHG | RESPIRATION RATE: 18 BRPM | WEIGHT: 219.4 LBS | OXYGEN SATURATION: 96 % | TEMPERATURE: 98.1 F

## 2025-07-29 DIAGNOSIS — G47.33 OBSTRUCTIVE SLEEP APNEA SYNDROME: ICD-10-CM

## 2025-07-29 DIAGNOSIS — E78.2 MIXED HYPERLIPIDEMIA: ICD-10-CM

## 2025-07-29 DIAGNOSIS — E66.811 CLASS 1 OBESITY DUE TO EXCESS CALORIES WITH SERIOUS COMORBIDITY AND BODY MASS INDEX (BMI) OF 32.0 TO 32.9 IN ADULT: Primary | ICD-10-CM

## 2025-07-29 DIAGNOSIS — E66.09 CLASS 1 OBESITY DUE TO EXCESS CALORIES WITH SERIOUS COMORBIDITY AND BODY MASS INDEX (BMI) OF 32.0 TO 32.9 IN ADULT: Primary | ICD-10-CM

## 2025-07-29 DIAGNOSIS — E03.9 ACQUIRED HYPOTHYROIDISM: ICD-10-CM

## 2025-07-29 DIAGNOSIS — R73.9 BLOOD GLUCOSE ELEVATED: ICD-10-CM

## 2025-07-29 DIAGNOSIS — I10 BENIGN ESSENTIAL HYPERTENSION: ICD-10-CM

## 2025-07-29 PROCEDURE — 1123F ACP DISCUSS/DSCN MKR DOCD: CPT | Performed by: FAMILY MEDICINE

## 2025-07-29 PROCEDURE — 3074F SYST BP LT 130 MM HG: CPT | Performed by: FAMILY MEDICINE

## 2025-07-29 PROCEDURE — G8399 PT W/DXA RESULTS DOCUMENT: HCPCS | Performed by: FAMILY MEDICINE

## 2025-07-29 PROCEDURE — 1090F PRES/ABSN URINE INCON ASSESS: CPT | Performed by: FAMILY MEDICINE

## 2025-07-29 PROCEDURE — 1036F TOBACCO NON-USER: CPT | Performed by: FAMILY MEDICINE

## 2025-07-29 PROCEDURE — 99214 OFFICE O/P EST MOD 30 MIN: CPT | Performed by: FAMILY MEDICINE

## 2025-07-29 PROCEDURE — G8417 CALC BMI ABV UP PARAM F/U: HCPCS | Performed by: FAMILY MEDICINE

## 2025-07-29 PROCEDURE — 3078F DIAST BP <80 MM HG: CPT | Performed by: FAMILY MEDICINE

## 2025-07-29 PROCEDURE — G8427 DOCREV CUR MEDS BY ELIG CLIN: HCPCS | Performed by: FAMILY MEDICINE

## 2025-07-29 PROCEDURE — 3017F COLORECTAL CA SCREEN DOC REV: CPT | Performed by: FAMILY MEDICINE

## 2025-07-29 PROCEDURE — 1126F AMNT PAIN NOTED NONE PRSNT: CPT | Performed by: FAMILY MEDICINE

## 2025-07-29 NOTE — PROGRESS NOTES
[facility] Progress Note:   F/up Physician Visit    CC: Weight Management      Dena Arellano is a 70 y.o. female who is here for her  f/up physician visit for the / LCD Program.  Uma 223  Now 219  Doing well , she is finished her PT for the left knee replacement                  7/29/2025     7:31 AM 7/29/2025     7:00 AM 7/9/2025    10:11 AM 6/19/2025     9:12 AM 6/19/2025     9:00 AM 6/4/2025     8:59 AM 5/22/2025    10:45 AM   Weight Metrics   Weight 219 lb 6.4 oz  222 lb 9.6 oz 223 lb 3.2 oz  222 lb 11.2 oz 222 lb 11.2 oz   Neck (Inches)  15 in   15 in     Waist Measure Inches  43 in   42.25 in     Body Fat %  41.9 %   42.4 %     BMI (Calculated) 32.5 kg/m2  32.9 kg/m2 33 kg/m2  33 kg/m2 33 kg/m2          No data to display                   Current Outpatient Medications   Medication Sig Dispense Refill    levothyroxine (SYNTHROID) 175 MCG tablet Take 1 tablet by mouth every morning (before breakfast) 90 tablet 0    clindamycin (CLEOCIN) 300 MG capsule Take 2 Capsules 1 hour prior to dental procedure. 2 capsule 2    acetaminophen (TYLENOL) 500 MG tablet Take 2 tablets by mouth as needed for Pain      Misc. Devices (CPAP MACHINE) MISC nightly      buPROPion (WELLBUTRIN XL) 150 MG extended release tablet Take 1 tablet by mouth every morning (before breakfast) 90 tablet 3    FLUoxetine (PROZAC) 20 MG capsule Take 1 capsule by mouth daily 90 capsule 3    fluticasone (FLONASE) 50 MCG/ACT nasal spray SHAKE BOTTLE AND USE 1 SPRAY IN EACH NOSTRIL EVERY DAY 48 g 1    Ascorbic Acid (VITAMIN C) 1000 MG tablet Take 1 tablet by mouth daily      Multiple Vitamin (MULTI VITAMIN PO) Take 1 tablet by mouth daily Centrum silver for women      TURMERIC PO Take 1 capsule by mouth daily      azelastine HCl 0.15 % SOLN 1 spray by Nasal route 2 times daily      cetirizine (ZYRTEC) 10 MG tablet Take 1 tablet by mouth daily      Cholecalciferol 50 MCG (2000 UT) TABS Take 1 tablet by mouth daily       No current

## 2025-07-29 NOTE — PROGRESS NOTES
Identified pt with two pt identifiers (name and ). Reviewed chart in preparation for visit and have obtained necessary documentation.    Dena Arellano is a 70 y.o. female  Chief Complaint   Patient presents with    Weight Management     1 month follow up     /68 (BP Site: Right Upper Arm, Patient Position: Sitting, BP Cuff Size: Large Adult) Comment: manual  Pulse 62   Temp 98.1 °F (36.7 °C) (Oral)   Resp 18   Ht 1.753 m (5' 9\")   Wt 99.5 kg (219 lb 6.4 oz)   LMP  (LMP Unknown)   SpO2 96%   BMI 32.40 kg/m²     1. Have you been to the ER, urgent care clinic since your last visit?  Hospitalized since your last visit?no    2. Have you seen or consulted any other health care providers outside of the [facility] since your last visit?  Include any pap smears or colon screening. No    BMI - 32.2

## 2025-07-31 ENCOUNTER — OFFICE VISIT (OUTPATIENT)
Age: 70
End: 2025-07-31
Payer: MEDICARE

## 2025-07-31 VITALS
DIASTOLIC BLOOD PRESSURE: 63 MMHG | SYSTOLIC BLOOD PRESSURE: 98 MMHG | HEART RATE: 64 BPM | RESPIRATION RATE: 18 BRPM | WEIGHT: 223 LBS | TEMPERATURE: 98 F | OXYGEN SATURATION: 95 % | BODY MASS INDEX: 32.93 KG/M2

## 2025-07-31 DIAGNOSIS — F32.A CHRONIC DEPRESSION: ICD-10-CM

## 2025-07-31 DIAGNOSIS — Z90.13 HISTORY OF BILATERAL MASTECTOMY: ICD-10-CM

## 2025-07-31 DIAGNOSIS — E03.9 HYPOTHYROIDISM, UNSPECIFIED TYPE: ICD-10-CM

## 2025-07-31 DIAGNOSIS — I10 ESSENTIAL (PRIMARY) HYPERTENSION: ICD-10-CM

## 2025-07-31 DIAGNOSIS — Z85.3 HISTORY OF RIGHT BREAST CANCER: Primary | ICD-10-CM

## 2025-07-31 DIAGNOSIS — M19.90 ARTHRITIS: ICD-10-CM

## 2025-07-31 PROCEDURE — 99212 OFFICE O/P EST SF 10 MIN: CPT | Performed by: NURSE PRACTITIONER

## 2025-07-31 NOTE — PROGRESS NOTES
Dena Arellano is a 70 y.o. female      Chief Complaint   Patient presents with    Follow-up     Right Breast Cancer       1. Have you been to the ER, urgent care clinic since your last visit?  Hospitalized since your last visit? Yes,  Urgent Care     2. Have you seen or consulted any other health care providers outside of the [facility] since your last visit?  Include any pap smears or colon screening. Yes, Dr. Zimmer/OBGYN at [facility].    
reviewed above.  Radiology report(s) reviewed above.    Assessment:/PLAN     1) Stage 1 T1cN0 ER+ HER2 Negative Right Breast Cancer with Hx of Bilateral Mastectomy/Reconstruction in 2019  She had two separate tumors with different path types. 3 o clock adenosquamous triple negative . 5 o clock ER+ HER2 negative mammaprint low risk.  She had a lumpectomy 6/13/19 with + margin then had b/l mastectomy/reconstruction on 8/27/19.  Then infection and implants removed.   No adjuvant chemo; completed 5 years of adjuvant hormonal therapy with Anastrozole in 12/24.     Patient seen today in office for 12 month follow up.  She feels well overall today and is not on any therapy from here.   She has routine HM and labs with her PCP.  Continue course of surveillance.  Follow up as needed in office.   Patient agrees with plan.     2) Hx of Mastectomy/Reconstruction Infection   She was on IV abx and then had implants removed on 10/15/19.  She was hospitalized on 10/30/19 for infection/fever and d/c'd on 11/4/19.   She completed IV more abx on 11/21/19 and had a wound vac.  Management per Breast Surgery/Plastics.      3) HTN/Depression/Hypothyroid/Arthritis/Obesity  Management per PCP.      4) Psychosocial  Mood good, coping well.  NN/SW support as needed.    Call if questions.  Follow up as needed in office.  I appreciate the opportunity to participate in Ms. Dena Arellano's care.    Signed By: Catherine Malin, MIGDALIA - NP

## 2025-08-05 ENCOUNTER — OFFICE VISIT (OUTPATIENT)
Age: 70
End: 2025-08-05

## 2025-08-05 DIAGNOSIS — E66.811 CLASS 1 OBESITY DUE TO EXCESS CALORIES WITH SERIOUS COMORBIDITY AND BODY MASS INDEX (BMI) OF 32.0 TO 32.9 IN ADULT: Primary | ICD-10-CM

## 2025-08-05 DIAGNOSIS — E66.09 CLASS 1 OBESITY DUE TO EXCESS CALORIES WITH SERIOUS COMORBIDITY AND BODY MASS INDEX (BMI) OF 32.0 TO 32.9 IN ADULT: Primary | ICD-10-CM

## 2025-08-07 ENCOUNTER — RESULTS FOLLOW-UP (OUTPATIENT)
Age: 70
End: 2025-08-07

## 2025-08-07 LAB — TSH SERPL DL<=0.005 MIU/L-ACNC: 2.4 UIU/ML (ref 0.45–4.5)

## 2025-08-11 ENCOUNTER — CLINICAL SUPPORT (OUTPATIENT)
Age: 70
End: 2025-08-11

## 2025-08-11 VITALS
RESPIRATION RATE: 16 BRPM | BODY MASS INDEX: 32.32 KG/M2 | HEART RATE: 63 BPM | OXYGEN SATURATION: 95 % | HEIGHT: 69 IN | DIASTOLIC BLOOD PRESSURE: 73 MMHG | TEMPERATURE: 98.3 F | WEIGHT: 218.2 LBS | SYSTOLIC BLOOD PRESSURE: 115 MMHG

## 2025-08-11 DIAGNOSIS — G47.33 OBSTRUCTIVE SLEEP APNEA SYNDROME: ICD-10-CM

## 2025-08-11 DIAGNOSIS — I10 BENIGN ESSENTIAL HYPERTENSION: ICD-10-CM

## 2025-08-11 DIAGNOSIS — E78.2 MIXED HYPERLIPIDEMIA: ICD-10-CM

## 2025-08-11 DIAGNOSIS — E03.9 ACQUIRED HYPOTHYROIDISM: ICD-10-CM

## 2025-08-11 DIAGNOSIS — E66.09 CLASS 1 OBESITY DUE TO EXCESS CALORIES WITH SERIOUS COMORBIDITY AND BODY MASS INDEX (BMI) OF 32.0 TO 32.9 IN ADULT: Primary | ICD-10-CM

## 2025-08-11 DIAGNOSIS — E66.811 CLASS 1 OBESITY DUE TO EXCESS CALORIES WITH SERIOUS COMORBIDITY AND BODY MASS INDEX (BMI) OF 32.0 TO 32.9 IN ADULT: Primary | ICD-10-CM

## 2025-08-11 DIAGNOSIS — R73.9 BLOOD GLUCOSE ELEVATED: ICD-10-CM

## 2025-08-11 ASSESSMENT — PATIENT HEALTH QUESTIONNAIRE - PHQ9
SUM OF ALL RESPONSES TO PHQ QUESTIONS 1-9: 0
SUM OF ALL RESPONSES TO PHQ QUESTIONS 1-9: 0
2. FEELING DOWN, DEPRESSED OR HOPELESS: NOT AT ALL
SUM OF ALL RESPONSES TO PHQ QUESTIONS 1-9: 0
1. LITTLE INTEREST OR PLEASURE IN DOING THINGS: NOT AT ALL
SUM OF ALL RESPONSES TO PHQ QUESTIONS 1-9: 0

## 2025-08-12 ENCOUNTER — OFFICE VISIT (OUTPATIENT)
Age: 70
End: 2025-08-12

## 2025-08-12 DIAGNOSIS — E66.811 CLASS 1 OBESITY DUE TO EXCESS CALORIES WITH SERIOUS COMORBIDITY AND BODY MASS INDEX (BMI) OF 32.0 TO 32.9 IN ADULT: Primary | ICD-10-CM

## 2025-08-12 DIAGNOSIS — E66.09 CLASS 1 OBESITY DUE TO EXCESS CALORIES WITH SERIOUS COMORBIDITY AND BODY MASS INDEX (BMI) OF 32.0 TO 32.9 IN ADULT: Primary | ICD-10-CM

## 2025-08-12 DIAGNOSIS — E03.9 ACQUIRED HYPOTHYROIDISM: ICD-10-CM

## 2025-08-13 RX ORDER — LEVOTHYROXINE SODIUM 175 UG/1
175 TABLET ORAL
Qty: 90 TABLET | Refills: 1 | Status: SHIPPED | OUTPATIENT
Start: 2025-08-13

## 2025-08-21 ENCOUNTER — OFFICE VISIT (OUTPATIENT)
Age: 70
End: 2025-08-21

## 2025-08-21 ENCOUNTER — OFFICE VISIT (OUTPATIENT)
Age: 70
End: 2025-08-21
Payer: MEDICARE

## 2025-08-21 VITALS
TEMPERATURE: 98.1 F | HEART RATE: 62 BPM | RESPIRATION RATE: 16 BRPM | SYSTOLIC BLOOD PRESSURE: 112 MMHG | DIASTOLIC BLOOD PRESSURE: 71 MMHG | HEIGHT: 69 IN | WEIGHT: 219 LBS | BODY MASS INDEX: 32.44 KG/M2 | OXYGEN SATURATION: 94 %

## 2025-08-21 DIAGNOSIS — E03.9 ACQUIRED HYPOTHYROIDISM: ICD-10-CM

## 2025-08-21 DIAGNOSIS — E66.09 CLASS 1 OBESITY DUE TO EXCESS CALORIES WITH SERIOUS COMORBIDITY AND BODY MASS INDEX (BMI) OF 32.0 TO 32.9 IN ADULT: Primary | ICD-10-CM

## 2025-08-21 DIAGNOSIS — R73.9 BLOOD GLUCOSE ELEVATED: ICD-10-CM

## 2025-08-21 DIAGNOSIS — E66.811 CLASS 1 OBESITY DUE TO EXCESS CALORIES WITH SERIOUS COMORBIDITY AND BODY MASS INDEX (BMI) OF 32.0 TO 32.9 IN ADULT: Primary | ICD-10-CM

## 2025-08-21 DIAGNOSIS — I10 BENIGN ESSENTIAL HYPERTENSION: ICD-10-CM

## 2025-08-21 DIAGNOSIS — G47.33 OBSTRUCTIVE SLEEP APNEA SYNDROME: ICD-10-CM

## 2025-08-21 DIAGNOSIS — E78.2 MIXED HYPERLIPIDEMIA: ICD-10-CM

## 2025-08-21 PROCEDURE — G8399 PT W/DXA RESULTS DOCUMENT: HCPCS | Performed by: FAMILY MEDICINE

## 2025-08-21 PROCEDURE — 1126F AMNT PAIN NOTED NONE PRSNT: CPT | Performed by: FAMILY MEDICINE

## 2025-08-21 PROCEDURE — G8417 CALC BMI ABV UP PARAM F/U: HCPCS | Performed by: FAMILY MEDICINE

## 2025-08-21 PROCEDURE — 1090F PRES/ABSN URINE INCON ASSESS: CPT | Performed by: FAMILY MEDICINE

## 2025-08-21 PROCEDURE — 99214 OFFICE O/P EST MOD 30 MIN: CPT | Performed by: FAMILY MEDICINE

## 2025-08-21 PROCEDURE — 3078F DIAST BP <80 MM HG: CPT | Performed by: FAMILY MEDICINE

## 2025-08-21 PROCEDURE — 1123F ACP DISCUSS/DSCN MKR DOCD: CPT | Performed by: FAMILY MEDICINE

## 2025-08-21 PROCEDURE — 3074F SYST BP LT 130 MM HG: CPT | Performed by: FAMILY MEDICINE

## 2025-08-21 PROCEDURE — 3017F COLORECTAL CA SCREEN DOC REV: CPT | Performed by: FAMILY MEDICINE

## 2025-08-21 PROCEDURE — 1036F TOBACCO NON-USER: CPT | Performed by: FAMILY MEDICINE

## 2025-08-21 PROCEDURE — G8427 DOCREV CUR MEDS BY ELIG CLIN: HCPCS | Performed by: FAMILY MEDICINE

## 2025-08-21 PROCEDURE — 1159F MED LIST DOCD IN RCRD: CPT | Performed by: FAMILY MEDICINE

## 2025-08-21 ASSESSMENT — PATIENT HEALTH QUESTIONNAIRE - PHQ9
SUM OF ALL RESPONSES TO PHQ QUESTIONS 1-9: 0
2. FEELING DOWN, DEPRESSED OR HOPELESS: NOT AT ALL
SUM OF ALL RESPONSES TO PHQ QUESTIONS 1-9: 0
1. LITTLE INTEREST OR PLEASURE IN DOING THINGS: NOT AT ALL
SUM OF ALL RESPONSES TO PHQ QUESTIONS 1-9: 0
SUM OF ALL RESPONSES TO PHQ QUESTIONS 1-9: 0

## 2025-08-22 ENCOUNTER — TELEPHONE (OUTPATIENT)
Age: 70
End: 2025-08-22

## 2025-08-26 ENCOUNTER — OFFICE VISIT (OUTPATIENT)
Age: 70
End: 2025-08-26

## 2025-08-26 DIAGNOSIS — E66.09 CLASS 1 OBESITY DUE TO EXCESS CALORIES WITH SERIOUS COMORBIDITY AND BODY MASS INDEX (BMI) OF 32.0 TO 32.9 IN ADULT: Primary | ICD-10-CM

## 2025-08-26 DIAGNOSIS — E66.811 CLASS 1 OBESITY DUE TO EXCESS CALORIES WITH SERIOUS COMORBIDITY AND BODY MASS INDEX (BMI) OF 32.0 TO 32.9 IN ADULT: Primary | ICD-10-CM

## (undated) DEVICE — 1010 S-DRAPE TOWEL DRAPE 10/BX: Brand: STERI-DRAPE™

## (undated) DEVICE — SYSTEM IMPL DEL FOR BRST IMPL FUN (SEE COMMENT)

## (undated) DEVICE — HANDPIECE SET WITH BONE CLEANING TIP AND SUCTION TUBE: Brand: INTERPULSE

## (undated) DEVICE — APPLIER CLP LIG SM TI PREM SURGCLP SUPER INTLOK 20 DISP

## (undated) DEVICE — BLADE SAW OSCILLATING 85X19X2 MM ROBOTIC-ASSISTED VELYS

## (undated) DEVICE — SOLUTION SCRB 2OZ 10% POVIDONE IOD ANTIMIC BTL

## (undated) DEVICE — CONTAINER,SPEC,PNEUM TUBE,3OZ,STRL PATH: Brand: MEDLINE

## (undated) DEVICE — ADHESIVE SKIN CLOSURE WND 8.661X1.5 IN 22 CM LIQUIBAND SECUR

## (undated) DEVICE — (D)PREP SKN CHLRAPRP APPL 26ML -- CONVERT TO ITEM 371833

## (undated) DEVICE — TUBING IRRIG COMPATIBLE W ERBE MEDIVATOR PMP HYDR

## (undated) DEVICE — SPONGE GZ W4XL4IN COT 12 PLY TYP VII WVN C FLD DSGN

## (undated) DEVICE — SOLUTION IV 1000ML 0.9% SOD CHL

## (undated) DEVICE — SUTURE MCRYL SZ 3-0 L27IN ABSRB UD L24MM PS-1 3/8 CIR PRIM Y936H

## (undated) DEVICE — SUTURE MONOCRYL STRATAFIX SPRL + 3 0 SGL ARMED PS 1 24 IN LEN SXMP1B103

## (undated) DEVICE — DRAPE PRB US TRNSDCR 6X96IN --

## (undated) DEVICE — Device

## (undated) DEVICE — STERILE POLYISOPRENE POWDER-FREE SURGICAL GLOVES WITH EMOLLIENT COATING: Brand: PROTEXIS

## (undated) DEVICE — GOWN,SLEEVE,STERILE,W/CSR WRAP,1/P: Brand: MEDLINE

## (undated) DEVICE — MARKER,SKIN,WI/RULER AND LABELS: Brand: MEDLINE

## (undated) DEVICE — FORCEPS BX L240CM JAW DIA2.8MM L CAP W/ NDL MIC MESH TOOTH

## (undated) DEVICE — DRAPE,CHEST,FENES,15X10,STERIL: Brand: MEDLINE

## (undated) DEVICE — SUTURE ABS MF 2-0 CT1 27IN STRATAFIX PDS+ SXPP1B412

## (undated) DEVICE — GOWN,NON-REINFORCED,XXL: Brand: MEDLINE

## (undated) DEVICE — ATTUNE SOLO PINNING SYSTEM

## (undated) DEVICE — TRAY CATH OD16FR SIL URIN M STATLOK STBL DEV SURSTP

## (undated) DEVICE — CURVED, SMALL JAW, OPEN SEALER/DIVIDER: Brand: LIGASURE

## (undated) DEVICE — 4-PORT MANIFOLD: Brand: NEPTUNE 2

## (undated) DEVICE — PIN DRL 4X125 MM ROBOTIC-ASSISTED SOLUTION ARRY VELYS

## (undated) DEVICE — GLOVE ORTHO 8   MSG9480

## (undated) DEVICE — DERMABOND SKIN ADH 0.7ML -- DERMABOND ADVANCED 12/BX

## (undated) DEVICE — SOLUTION LACTATED RINGERS INJECTION USP

## (undated) DEVICE — CONTAINER,SPECIMEN,4OZ,OR STRL: Brand: MEDLINE

## (undated) DEVICE — GLOVE SURG SZ 65 L12IN FNGR THK94MIL STD WHT LTX FREE

## (undated) DEVICE — STOPCOCK IV 3W --

## (undated) DEVICE — SLIM BODY SKIN STAPLER: Brand: APPOSE ULC

## (undated) DEVICE — SYRINGE BLB FEED IV POLE BG 60ML

## (undated) DEVICE — INFECTION CONTROL KIT SYS

## (undated) DEVICE — SYR 10ML LUER LOK 1/5ML GRAD --

## (undated) DEVICE — SUTURE MCRYL SZ 4-0 L27IN ABSRB UD L19MM PS-2 1/2 CIR PRIM Y426H

## (undated) DEVICE — SMOKE EVACUATION PENCIL: Brand: VALLEYLAB

## (undated) DEVICE — KIT TISS EXP W/ 60ML SYR 122CM TRNSF SET PIERCING DEV L25CM

## (undated) DEVICE — UNDERPAD INCON STD 36X23IN --

## (undated) DEVICE — 3M™ IOBAN™ 2 ANTIMICROBIAL INCISE DRAPE 6640EZ: Brand: IOBAN™ 2

## (undated) DEVICE — AEGIS 1" DISK 4MM HOLE, PEEL OPEN: Brand: MEDLINE

## (undated) DEVICE — SYR 50ML LR LCK 1ML GRAD NSAF --

## (undated) DEVICE — ELECTRODE PT RET AD L9FT HI MOIST COND ADH HYDRGEL CORDED

## (undated) DEVICE — SUTURE VICRYL 1 L27IN ABSRB CT BRAID COAT UD J281H

## (undated) DEVICE — KENDALL DL ECG CABLE AND LEAD WIRE SYSTEM, 3-LEAD, SINGLE PATIENT USE: Brand: KENDALL

## (undated) DEVICE — TOTAL JOINT - SMH: Brand: MEDLINE INDUSTRIES, INC.

## (undated) DEVICE — CHEST/BREAST-LF: Brand: MEDLINE INDUSTRIES, INC.

## (undated) DEVICE — INTENDED USE FOR SURGICAL MARKING ON INTACT SKIN, ALSO PROVIDES A PERMANENT METHOD OF IDENTIFYING OBJECTS IN THE OPERATING ROOM: Brand: WRITESITE® REGULAR TIP SKIN MARKER

## (undated) DEVICE — SOLUTION IRRIG 3000ML 0.9% SOD CHL USP UROMATIC PLAS CONT

## (undated) DEVICE — DRAPE EQUIP ROBOT STRL VELYS 20/PK ORDER IN MULTIIPLES OF 20 EACH

## (undated) DEVICE — TOWEL SURG W17XL27IN STD BLU COT NONFENESTRATED PREWASHED

## (undated) DEVICE — 3M™ IOBAN™ 2 ANTIMICROBIAL INCISE DRAPE 6650EZ: Brand: IOBAN™ 2

## (undated) DEVICE — SET 2ND L34IN N DEHP THE QUEENS MED CNTR VALUELINK

## (undated) DEVICE — SUTURE PDS II SZ 2-0 L36IN ABSRB VLT CT L40MM 1/2 CIR TAPR Z357H

## (undated) DEVICE — PROBE DETECTION F/LUMPECTOMY -- ORDER IN MULTIPLES OF 5 EA ..MARGINPROBE

## (undated) DEVICE — GOWN,SIRUS,NONRNF,SETINSLV,XL,20/CS: Brand: MEDLINE

## (undated) DEVICE — DRAPE,REIN 53X77,STERILE: Brand: MEDLINE

## (undated) DEVICE — GLOVE SURG SZ 65 L12IN FNGR THK79MIL GRN LTX FREE

## (undated) DEVICE — SURGICAL PROCEDURE PACK BASIN MAJ SET CUST NO CAUT

## (undated) DEVICE — TUBING, SUCTION, 1/4" X 12', STRAIGHT: Brand: MEDLINE

## (undated) DEVICE — NDL SPNE QNCKE 18GX3.5IN LF --

## (undated) DEVICE — HOOD, PEEL-AWAY: Brand: FLYTE

## (undated) DEVICE — PACK,BASIC,SIRUS,V: Brand: MEDLINE

## (undated) DEVICE — STERILE POLYISOPRENE POWDER-FREE SURGICAL GLOVES: Brand: PROTEXIS

## (undated) DEVICE — SCRUBIN SCRUB BRUSH DRY STER: Brand: MEDLINE INDUSTRIES, INC.

## (undated) DEVICE — SOLUTION SURG PREP 26 CC PURPREP

## (undated) DEVICE — APPLICATOR MEDICATED 26 CC SOLUTION HI LT ORNG CHLORAPREP

## (undated) DEVICE — COVER US PRB W4XL15IN GAM CRDLSS FLD

## (undated) DEVICE — PACK,ORTOHMAX/CVMAX,UNIVERSAL,5/CS: Brand: MEDLINE

## (undated) DEVICE — CONNECTOR IV REG NDLLSS FEM LUERLOCK OR ADPT DEHP FREE [415067] [B BRAUN MEDICAL INC]

## (undated) DEVICE — SUPPORT MAMM SURG 48-50 IN 2XL BRA

## (undated) DEVICE — SUTURE VCRL SZ 3-0 L27IN ABSRB UD L26MM SH 1/2 CIR J416H

## (undated) DEVICE — GLOVE SURG SZ 8 L12IN FNGR THK94MIL STD WHT LTX FREE

## (undated) DEVICE — WRAP SURG W1.31XL1.34M CARD FOR PT 165-172CM THERMOWRP

## (undated) DEVICE — SUPPLEMENT DIGESTIVE H2O SOL GI-EASE

## (undated) DEVICE — DRAPE SURG KNEE 1 MIN SET ESYSUIT

## (undated) DEVICE — SOLUTION WND IRRIGATION 450 ML 0.5 PVP-I 0.9 NACL

## (undated) DEVICE — PADDING CAST W6INXL4YD NONSTERILE COT RAYON MICROPLEATED

## (undated) DEVICE — RADIALUX LIGHTED RETRACTOR

## (undated) DEVICE — TUBING SUCT 12FR MAL ALUM SHFT FN CAP VENT UNIV CONN W/ OBT

## (undated) DEVICE — REM POLYHESIVE ADULT PATIENT RETURN ELECTRODE: Brand: VALLEYLAB

## (undated) DEVICE — SUTURE PDS II SZ 2-0 L27IN ABSRB VLT L36MM CT-1 1/2 CIR Z339H

## (undated) DEVICE — 3M™ TEGADERM™ I.V. SECUREMENT DRESSING, 9519HP, 2-3/8 IN X 2-3/8 IN (6 CM X 6 CM), 100/CT 4 CT/CASE: Brand: 3M™ TEGADERM™

## (undated) DEVICE — STRIP,CLOSURE,WOUND,MEDI-STRIP,1/2X4: Brand: MEDLINE

## (undated) DEVICE — INSULATED BLADE ELECTRODE: Brand: EDGE

## (undated) DEVICE — ROCKER SWITCH PENCIL BLADE ELECTRODE, HOLSTER: Brand: EDGE

## (undated) DEVICE — SUTURE STRATAFIX SYMMETRIC PDS + SZ 1 L18IN ABSRB VLT L48MM SXPP1A400

## (undated) DEVICE — HANDLE LT SNAP ON ULT DURABLE LENS FOR TRUMPF ALC DISPOSABLE

## (undated) DEVICE — SOLUTION IRRIGATION NACL 0.9% 1000 ML FLX CONTAINER

## (undated) DEVICE — NEEDLE HYPO 25GA L1.5IN BLU POLYPR HUB S STL REG BVL STR

## (undated) DEVICE — SUTURE VCRL SZ 2-0 L27IN ABSRB UD L26MM SH 1/2 CIR J417H

## (undated) DEVICE — NEEDLE HYPO 25GA L1.5IN BVL ORIENTED ECLIPSE

## (undated) DEVICE — SPONGE GZ W4XL4IN COT 12 PLY TYP VII WVN C FLD DSGN STERILE

## (undated) DEVICE — 2108 SERIES SAGITTAL BLADE, NO OFFSET  (12.4 X 1.19 X 82.1MM)

## (undated) DEVICE — YANKAUER,FLEXIBLE HANDLE,REGLR CAPACITY: Brand: MEDLINE INDUSTRIES, INC.

## (undated) DEVICE — SOL IRR SOD CL 0.9% 1000ML BTL --

## (undated) DEVICE — SUT SLK 2-0SH 30IN BLK --

## (undated) DEVICE — 3M™ TEGADERM™ TRANSPARENT FILM DRESSING FRAME STYLE, 1624W, 2-3/8 IN X 2-3/4 IN (6 CM X 7 CM), 100/CT 4CT/CASE: Brand: 3M™ TEGADERM™

## (undated) DEVICE — BLADE ELECTRODE: Brand: EDGE

## (undated) DEVICE — INTENDED FOR TISSUE SEPARATION, AND OTHER PROCEDURES THAT REQUIRE A SHARP SURGICAL BLADE TO PUNCTURE OR CUT.: Brand: BARD-PARKER ® CARBON RIB-BACK BLADES

## (undated) DEVICE — GARMENT,MEDLINE,DVT,INT,CALF,MED, GEN2: Brand: MEDLINE

## (undated) DEVICE — GOWN,SIRUS,NONRNF,SETINSLV,2XL,18/CS: Brand: MEDLINE

## (undated) DEVICE — SYRINGE 20ML LL S/C 50

## (undated) DEVICE — TUBING, SUCTION, 1/4" X 10', STRAIGHT: Brand: MEDLINE

## (undated) DEVICE — X-RAY SPONGES,16 PLY: Brand: DERMACEA

## (undated) DEVICE — COVER LT HNDL PLAS RIG 1 PER PK

## (undated) DEVICE — BRA COMPR MAMM SILKY 3XL BGE

## (undated) DEVICE — DRAPE EQUIP SATELLITE STN STRL VELYS

## (undated) DEVICE — SPONGE LAP 18X18IN STRL -- 5/PK

## (undated) DEVICE — DRAIN SURG 15FR L3/16IN DIA4.7MM SIL CHN RND FULL FLUT TRCR

## (undated) DEVICE — CONTINU-FLO SOLUTION SET, 2 INJECTION SITES, MALE LUER LOCK ADAPTER WITH RETRACTABLE COLLAR, LARGE BORE STOPCOCK WITH ROTATING MALE LUER LOCK EXTENSION SET, 2 INJECTION SITES, MALE LUER LOCK ADAPTER WITH RETRACTABLE COLLAR: Brand: INTERLINK/CONTINU-FLO

## (undated) DEVICE — 1200 GUARD II KIT W/5MM TUBE W/O VAC TUBE: Brand: GUARDIAN

## (undated) DEVICE — DRAIN SURG W7MMXL20CM SIL FULL PERF HUBLESS FLAT RADPQ STRP

## (undated) DEVICE — HYPODERMIC SAFETY NEEDLE: Brand: MAGELLAN

## (undated) DEVICE — BOWL BNE CEM MIX SPAT CURET SMARTMIX CTS